# Patient Record
Sex: MALE | Race: WHITE | NOT HISPANIC OR LATINO
[De-identification: names, ages, dates, MRNs, and addresses within clinical notes are randomized per-mention and may not be internally consistent; named-entity substitution may affect disease eponyms.]

---

## 2018-10-15 ENCOUNTER — TRANSCRIPTION ENCOUNTER (OUTPATIENT)
Age: 83
End: 2018-10-15

## 2019-01-01 ENCOUNTER — LABORATORY RESULT (OUTPATIENT)
Age: 84
End: 2019-01-01

## 2019-01-01 ENCOUNTER — OUTPATIENT (OUTPATIENT)
Dept: OUTPATIENT SERVICES | Facility: HOSPITAL | Age: 84
LOS: 1 days | Discharge: HOME | End: 2019-01-01

## 2019-01-01 ENCOUNTER — APPOINTMENT (OUTPATIENT)
Dept: GASTROENTEROLOGY | Facility: CLINIC | Age: 84
End: 2019-01-01
Payer: MEDICARE

## 2019-01-01 ENCOUNTER — TRANSCRIPTION ENCOUNTER (OUTPATIENT)
Age: 84
End: 2019-01-01

## 2019-01-01 ENCOUNTER — APPOINTMENT (OUTPATIENT)
Dept: SURGERY | Facility: CLINIC | Age: 84
End: 2019-01-01
Payer: MEDICARE

## 2019-01-01 ENCOUNTER — INPATIENT (INPATIENT)
Facility: HOSPITAL | Age: 84
LOS: 19 days | Discharge: NURSING, REHAB OR LONG-TERM CARE | End: 2019-11-30
Attending: INTERNAL MEDICINE | Admitting: INTERNAL MEDICINE
Payer: MEDICARE

## 2019-01-01 ENCOUNTER — INBOUND DOCUMENT (OUTPATIENT)
Age: 84
End: 2019-01-01

## 2019-01-01 ENCOUNTER — MOBILE ON CALL (OUTPATIENT)
Age: 84
End: 2019-01-01

## 2019-01-01 ENCOUNTER — INPATIENT (INPATIENT)
Facility: HOSPITAL | Age: 84
LOS: 5 days | Discharge: HOME | End: 2019-10-02
Attending: INTERNAL MEDICINE | Admitting: INTERNAL MEDICINE
Payer: MEDICARE

## 2019-01-01 ENCOUNTER — APPOINTMENT (OUTPATIENT)
Dept: GASTROENTEROLOGY | Facility: CLINIC | Age: 84
End: 2019-01-01

## 2019-01-01 ENCOUNTER — INPATIENT (INPATIENT)
Facility: HOSPITAL | Age: 84
LOS: 2 days | Discharge: HOME | End: 2019-10-10
Attending: INTERNAL MEDICINE | Admitting: INTERNAL MEDICINE
Payer: MEDICARE

## 2019-01-01 ENCOUNTER — RESULT REVIEW (OUTPATIENT)
Age: 84
End: 2019-01-01

## 2019-01-01 ENCOUNTER — INPATIENT (INPATIENT)
Facility: HOSPITAL | Age: 84
LOS: 16 days | Discharge: NURSING, REHAB OR LONG-TERM CARE | End: 2019-11-05
Attending: COLON & RECTAL SURGERY | Admitting: COLON & RECTAL SURGERY
Payer: MEDICARE

## 2019-01-01 ENCOUNTER — CHART COPY (OUTPATIENT)
Age: 84
End: 2019-01-01

## 2019-01-01 ENCOUNTER — APPOINTMENT (OUTPATIENT)
Dept: SURGERY | Facility: CLINIC | Age: 84
End: 2019-01-01

## 2019-01-01 ENCOUNTER — APPOINTMENT (OUTPATIENT)
Dept: CARE COORDINATION | Facility: HOME HEALTH | Age: 84
End: 2019-01-01

## 2019-01-01 VITALS
DIASTOLIC BLOOD PRESSURE: 72 MMHG | SYSTOLIC BLOOD PRESSURE: 128 MMHG | HEART RATE: 81 BPM | RESPIRATION RATE: 19 BRPM | TEMPERATURE: 97 F

## 2019-01-01 VITALS
SYSTOLIC BLOOD PRESSURE: 191 MMHG | OXYGEN SATURATION: 97 % | HEART RATE: 79 BPM | RESPIRATION RATE: 18 BRPM | DIASTOLIC BLOOD PRESSURE: 91 MMHG | TEMPERATURE: 98 F

## 2019-01-01 VITALS
OXYGEN SATURATION: 98 % | RESPIRATION RATE: 16 BRPM | HEART RATE: 71 BPM | SYSTOLIC BLOOD PRESSURE: 178 MMHG | TEMPERATURE: 97 F | DIASTOLIC BLOOD PRESSURE: 82 MMHG

## 2019-01-01 VITALS
OXYGEN SATURATION: 99 % | HEART RATE: 70 BPM | TEMPERATURE: 98 F | SYSTOLIC BLOOD PRESSURE: 110 MMHG | RESPIRATION RATE: 16 BRPM | WEIGHT: 195 LBS | DIASTOLIC BLOOD PRESSURE: 60 MMHG

## 2019-01-01 VITALS
RESPIRATION RATE: 37 BRPM | OXYGEN SATURATION: 96 % | SYSTOLIC BLOOD PRESSURE: 117 MMHG | DIASTOLIC BLOOD PRESSURE: 58 MMHG | HEART RATE: 64 BPM

## 2019-01-01 VITALS
HEIGHT: 68 IN | BODY MASS INDEX: 26.37 KG/M2 | TEMPERATURE: 98 F | WEIGHT: 174 LBS | SYSTOLIC BLOOD PRESSURE: 130 MMHG | DIASTOLIC BLOOD PRESSURE: 54 MMHG | HEART RATE: 69 BPM

## 2019-01-01 VITALS
SYSTOLIC BLOOD PRESSURE: 137 MMHG | HEIGHT: 68 IN | HEART RATE: 80 BPM | DIASTOLIC BLOOD PRESSURE: 86 MMHG | BODY MASS INDEX: 28.34 KG/M2 | WEIGHT: 187 LBS

## 2019-01-01 VITALS
TEMPERATURE: 97.4 F | HEART RATE: 83 BPM | DIASTOLIC BLOOD PRESSURE: 59 MMHG | HEIGHT: 68 IN | BODY MASS INDEX: 25.61 KG/M2 | WEIGHT: 169 LBS | SYSTOLIC BLOOD PRESSURE: 100 MMHG

## 2019-01-01 VITALS
HEART RATE: 61 BPM | DIASTOLIC BLOOD PRESSURE: 64 MMHG | SYSTOLIC BLOOD PRESSURE: 126 MMHG | TEMPERATURE: 98.4 F | HEIGHT: 68 IN

## 2019-01-01 VITALS
HEART RATE: 70 BPM | DIASTOLIC BLOOD PRESSURE: 65 MMHG | RESPIRATION RATE: 18 BRPM | TEMPERATURE: 97 F | SYSTOLIC BLOOD PRESSURE: 134 MMHG

## 2019-01-01 VITALS — SYSTOLIC BLOOD PRESSURE: 156 MMHG | DIASTOLIC BLOOD PRESSURE: 67 MMHG | HEART RATE: 74 BPM

## 2019-01-01 VITALS
HEART RATE: 828 BPM | DIASTOLIC BLOOD PRESSURE: 87 MMHG | TEMPERATURE: 96 F | RESPIRATION RATE: 18 BRPM | OXYGEN SATURATION: 97 % | SYSTOLIC BLOOD PRESSURE: 120 MMHG

## 2019-01-01 VITALS
WEIGHT: 172 LBS | HEART RATE: 77 BPM | BODY MASS INDEX: 26.07 KG/M2 | SYSTOLIC BLOOD PRESSURE: 100 MMHG | TEMPERATURE: 98.4 F | DIASTOLIC BLOOD PRESSURE: 60 MMHG | HEIGHT: 68 IN

## 2019-01-01 VITALS
DIASTOLIC BLOOD PRESSURE: 75 MMHG | RESPIRATION RATE: 18 BRPM | OXYGEN SATURATION: 100 % | HEART RATE: 105 BPM | SYSTOLIC BLOOD PRESSURE: 141 MMHG | TEMPERATURE: 97 F

## 2019-01-01 DIAGNOSIS — I13.0 HYPERTENSIVE HEART AND CHRONIC KIDNEY DISEASE WITH HEART FAILURE AND STAGE 1 THROUGH STAGE 4 CHRONIC KIDNEY DISEASE, OR UNSPECIFIED CHRONIC KIDNEY DISEASE: ICD-10-CM

## 2019-01-01 DIAGNOSIS — E78.5 HYPERLIPIDEMIA, UNSPECIFIED: ICD-10-CM

## 2019-01-01 DIAGNOSIS — I47.2 VENTRICULAR TACHYCARDIA: ICD-10-CM

## 2019-01-01 DIAGNOSIS — K65.1 PERITONEAL ABSCESS: ICD-10-CM

## 2019-01-01 DIAGNOSIS — E87.5 HYPERKALEMIA: ICD-10-CM

## 2019-01-01 DIAGNOSIS — E83.42 HYPOMAGNESEMIA: ICD-10-CM

## 2019-01-01 DIAGNOSIS — E03.9 HYPOTHYROIDISM, UNSPECIFIED: ICD-10-CM

## 2019-01-01 DIAGNOSIS — Z87.891 PERSONAL HISTORY OF NICOTINE DEPENDENCE: ICD-10-CM

## 2019-01-01 DIAGNOSIS — I11.0 HYPERTENSIVE HEART DISEASE WITH HEART FAILURE: ICD-10-CM

## 2019-01-01 DIAGNOSIS — J96.01 ACUTE RESPIRATORY FAILURE WITH HYPOXIA: ICD-10-CM

## 2019-01-01 DIAGNOSIS — D62 ACUTE POSTHEMORRHAGIC ANEMIA: ICD-10-CM

## 2019-01-01 DIAGNOSIS — D72.828 OTHER ELEVATED WHITE BLOOD CELL COUNT: ICD-10-CM

## 2019-01-01 DIAGNOSIS — K55.059 ACUTE (REVERSIBLE) ISCHEMIA OF INTESTINE, PART AND EXTENT UNSPECIFIED: ICD-10-CM

## 2019-01-01 DIAGNOSIS — Z79.02 LONG TERM (CURRENT) USE OF ANTITHROMBOTICS/ANTIPLATELETS: ICD-10-CM

## 2019-01-01 DIAGNOSIS — I10 ESSENTIAL (PRIMARY) HYPERTENSION: ICD-10-CM

## 2019-01-01 DIAGNOSIS — A41.9 SEPSIS, UNSPECIFIED ORGANISM: ICD-10-CM

## 2019-01-01 DIAGNOSIS — I95.81 POSTPROCEDURAL HYPOTENSION: ICD-10-CM

## 2019-01-01 DIAGNOSIS — I21.9 ACUTE MYOCARDIAL INFARCTION, UNSPECIFIED: ICD-10-CM

## 2019-01-01 DIAGNOSIS — Z86.73 PERSONAL HISTORY OF TRANSIENT ISCHEMIC ATTACK (TIA), AND CEREBRAL INFARCTION WITHOUT RESIDUAL DEFICITS: ICD-10-CM

## 2019-01-01 DIAGNOSIS — I65.22 OCCLUSION AND STENOSIS OF LEFT CAROTID ARTERY: ICD-10-CM

## 2019-01-01 DIAGNOSIS — G45.9 TRANSIENT CEREBRAL ISCHEMIC ATTACK, UNSPECIFIED: ICD-10-CM

## 2019-01-01 DIAGNOSIS — Z79.82 LONG TERM (CURRENT) USE OF ASPIRIN: ICD-10-CM

## 2019-01-01 DIAGNOSIS — K25.4 CHRONIC OR UNSPECIFIED GASTRIC ULCER WITH HEMORRHAGE: ICD-10-CM

## 2019-01-01 DIAGNOSIS — I25.10 ATHEROSCLEROTIC HEART DISEASE OF NATIVE CORONARY ARTERY WITHOUT ANGINA PECTORIS: ICD-10-CM

## 2019-01-01 DIAGNOSIS — E87.1 HYPO-OSMOLALITY AND HYPONATREMIA: ICD-10-CM

## 2019-01-01 DIAGNOSIS — L23.89 ALLERGIC CONTACT DERMATITIS DUE TO OTHER AGENTS: ICD-10-CM

## 2019-01-01 DIAGNOSIS — K59.00 CONSTIPATION, UNSPECIFIED: ICD-10-CM

## 2019-01-01 DIAGNOSIS — K55.9 VASCULAR DISORDER OF INTESTINE, UNSPECIFIED: ICD-10-CM

## 2019-01-01 DIAGNOSIS — I21.4 NON-ST ELEVATION (NSTEMI) MYOCARDIAL INFARCTION: ICD-10-CM

## 2019-01-01 DIAGNOSIS — R79.9 ABNORMAL FINDING OF BLOOD CHEMISTRY, UNSPECIFIED: ICD-10-CM

## 2019-01-01 DIAGNOSIS — Z02.9 ENCOUNTER FOR ADMINISTRATIVE EXAMINATIONS, UNSPECIFIED: ICD-10-CM

## 2019-01-01 DIAGNOSIS — I25.84 CORONARY ATHEROSCLEROSIS DUE TO CALCIFIED CORONARY LESION: ICD-10-CM

## 2019-01-01 DIAGNOSIS — Z95.5 PRESENCE OF CORONARY ANGIOPLASTY IMPLANT AND GRAFT: ICD-10-CM

## 2019-01-01 DIAGNOSIS — Z86.19 PERSONAL HISTORY OF OTHER INFECTIOUS AND PARASITIC DISEASES: ICD-10-CM

## 2019-01-01 DIAGNOSIS — G62.9 POLYNEUROPATHY, UNSPECIFIED: ICD-10-CM

## 2019-01-01 DIAGNOSIS — I50.22 CHRONIC SYSTOLIC (CONGESTIVE) HEART FAILURE: ICD-10-CM

## 2019-01-01 DIAGNOSIS — R10.9 UNSPECIFIED ABDOMINAL PAIN: ICD-10-CM

## 2019-01-01 DIAGNOSIS — I16.1 HYPERTENSIVE EMERGENCY: ICD-10-CM

## 2019-01-01 DIAGNOSIS — R57.1 HYPOVOLEMIC SHOCK: ICD-10-CM

## 2019-01-01 DIAGNOSIS — Z87.19 PERSONAL HISTORY OF OTHER DISEASES OF THE DIGESTIVE SYSTEM: Chronic | ICD-10-CM

## 2019-01-01 DIAGNOSIS — I50.33 ACUTE ON CHRONIC DIASTOLIC (CONGESTIVE) HEART FAILURE: ICD-10-CM

## 2019-01-01 DIAGNOSIS — R10.10 UPPER ABDOMINAL PAIN, UNSPECIFIED: ICD-10-CM

## 2019-01-01 DIAGNOSIS — S31.109A UNSPECIFIED OPEN WOUND OF ABDOMINAL WALL, UNSPECIFIED QUADRANT WITHOUT PENETRATION INTO PERITONEAL CAVITY, INITIAL ENCOUNTER: ICD-10-CM

## 2019-01-01 DIAGNOSIS — K57.33 DIVERTICULITIS OF LARGE INTESTINE WITHOUT PERFORATION OR ABSCESS WITH BLEEDING: ICD-10-CM

## 2019-01-01 DIAGNOSIS — I95.9 HYPOTENSION, UNSPECIFIED: ICD-10-CM

## 2019-01-01 DIAGNOSIS — Z79.899 OTHER LONG TERM (CURRENT) DRUG THERAPY: ICD-10-CM

## 2019-01-01 DIAGNOSIS — Z90.49 ACQUIRED ABSENCE OF OTHER SPECIFIED PARTS OF DIGESTIVE TRACT: ICD-10-CM

## 2019-01-01 DIAGNOSIS — K64.8 OTHER HEMORRHOIDS: ICD-10-CM

## 2019-01-01 DIAGNOSIS — I25.2 OLD MYOCARDIAL INFARCTION: ICD-10-CM

## 2019-01-01 DIAGNOSIS — N18.2 CHRONIC KIDNEY DISEASE, STAGE 2 (MILD): ICD-10-CM

## 2019-01-01 DIAGNOSIS — D72.829 ELEVATED WHITE BLOOD CELL COUNT, UNSPECIFIED: ICD-10-CM

## 2019-01-01 DIAGNOSIS — I25.10 ATHEROSCLEROTIC HEART DISEASE OF NATIVE CORONARY ARTERY W/OUT ANGINA PECTORIS: ICD-10-CM

## 2019-01-01 DIAGNOSIS — A09 INFECTIOUS GASTROENTERITIS AND COLITIS, UNSPECIFIED: ICD-10-CM

## 2019-01-01 DIAGNOSIS — Z91.041 RADIOGRAPHIC DYE ALLERGY STATUS: ICD-10-CM

## 2019-01-01 DIAGNOSIS — D64.9 ANEMIA, UNSPECIFIED: ICD-10-CM

## 2019-01-01 DIAGNOSIS — K91.2 POSTSURGICAL MALABSORPTION, NOT ELSEWHERE CLASSIFIED: ICD-10-CM

## 2019-01-01 DIAGNOSIS — I50.9 HEART FAILURE, UNSPECIFIED: ICD-10-CM

## 2019-01-01 DIAGNOSIS — N28.89 OTHER SPECIFIED DISORDERS OF KIDNEY AND URETER: ICD-10-CM

## 2019-01-01 DIAGNOSIS — J98.11 ATELECTASIS: ICD-10-CM

## 2019-01-01 DIAGNOSIS — E87.6 HYPOKALEMIA: ICD-10-CM

## 2019-01-01 LAB
-  AMPICILLIN/SULBACTAM: SIGNIFICANT CHANGE UP
-  AMPICILLIN/SULBACTAM: SIGNIFICANT CHANGE UP
-  CEFAZOLIN: SIGNIFICANT CHANGE UP
-  CEFAZOLIN: SIGNIFICANT CHANGE UP
-  CLINDAMYCIN: SIGNIFICANT CHANGE UP
-  CLINDAMYCIN: SIGNIFICANT CHANGE UP
-  ERYTHROMYCIN: SIGNIFICANT CHANGE UP
-  ERYTHROMYCIN: SIGNIFICANT CHANGE UP
-  GENTAMICIN: SIGNIFICANT CHANGE UP
-  GENTAMICIN: SIGNIFICANT CHANGE UP
-  OXACILLIN: SIGNIFICANT CHANGE UP
-  OXACILLIN: SIGNIFICANT CHANGE UP
-  PENICILLIN: SIGNIFICANT CHANGE UP
-  RIFAMPIN: SIGNIFICANT CHANGE UP
-  RIFAMPIN: SIGNIFICANT CHANGE UP
-  TETRACYCLINE: SIGNIFICANT CHANGE UP
-  TETRACYCLINE: SIGNIFICANT CHANGE UP
-  TRIMETHOPRIM/SULFAMETHOXAZOLE: SIGNIFICANT CHANGE UP
-  TRIMETHOPRIM/SULFAMETHOXAZOLE: SIGNIFICANT CHANGE UP
-  VANCOMYCIN: SIGNIFICANT CHANGE UP
-  VANCOMYCIN: SIGNIFICANT CHANGE UP
ALBUMIN SERPL ELPH-MCNC: 2 G/DL — LOW (ref 3.5–5.2)
ALBUMIN SERPL ELPH-MCNC: 2.5 G/DL — LOW (ref 3.5–5.2)
ALBUMIN SERPL ELPH-MCNC: 2.5 G/DL — LOW (ref 3.5–5.2)
ALBUMIN SERPL ELPH-MCNC: 2.6 G/DL — LOW (ref 3.5–5.2)
ALBUMIN SERPL ELPH-MCNC: 2.8 G/DL — LOW (ref 3.5–5.2)
ALBUMIN SERPL ELPH-MCNC: 3.1 G/DL — LOW (ref 3.5–5.2)
ALBUMIN SERPL ELPH-MCNC: 3.2 G/DL — LOW (ref 3.5–5.2)
ALBUMIN SERPL ELPH-MCNC: 3.8 G/DL — SIGNIFICANT CHANGE UP (ref 3.5–5.2)
ALBUMIN SERPL ELPH-MCNC: 3.8 G/DL — SIGNIFICANT CHANGE UP (ref 3.5–5.2)
ALBUMIN SERPL ELPH-MCNC: 3.9 G/DL — SIGNIFICANT CHANGE UP (ref 3.5–5.2)
ALBUMIN SERPL ELPH-MCNC: 4 G/DL
ALBUMIN SERPL ELPH-MCNC: 4.3 G/DL — SIGNIFICANT CHANGE UP (ref 3.5–5.2)
ALBUMIN SERPL ELPH-MCNC: 4.3 G/DL — SIGNIFICANT CHANGE UP (ref 3.5–5.2)
ALBUMIN SERPL ELPH-MCNC: 4.4 G/DL — SIGNIFICANT CHANGE UP (ref 3.5–5.2)
ALP BLD-CCNC: 65 U/L
ALP SERPL-CCNC: 30 U/L — SIGNIFICANT CHANGE UP (ref 30–115)
ALP SERPL-CCNC: 37 U/L — SIGNIFICANT CHANGE UP (ref 30–115)
ALP SERPL-CCNC: 47 U/L — SIGNIFICANT CHANGE UP (ref 30–115)
ALP SERPL-CCNC: 49 U/L — SIGNIFICANT CHANGE UP (ref 30–115)
ALP SERPL-CCNC: 58 U/L — SIGNIFICANT CHANGE UP (ref 30–115)
ALP SERPL-CCNC: 62 U/L — SIGNIFICANT CHANGE UP (ref 30–115)
ALP SERPL-CCNC: 63 U/L — SIGNIFICANT CHANGE UP (ref 30–115)
ALP SERPL-CCNC: 63 U/L — SIGNIFICANT CHANGE UP (ref 30–115)
ALP SERPL-CCNC: 65 U/L — SIGNIFICANT CHANGE UP (ref 30–115)
ALP SERPL-CCNC: 66 U/L — SIGNIFICANT CHANGE UP (ref 30–115)
ALP SERPL-CCNC: 73 U/L — SIGNIFICANT CHANGE UP (ref 30–115)
ALP SERPL-CCNC: 75 U/L — SIGNIFICANT CHANGE UP (ref 30–115)
ALP SERPL-CCNC: 77 U/L — SIGNIFICANT CHANGE UP (ref 30–115)
ALP SERPL-CCNC: 78 U/L — SIGNIFICANT CHANGE UP (ref 30–115)
ALP SERPL-CCNC: 85 U/L — SIGNIFICANT CHANGE UP (ref 30–115)
ALP SERPL-CCNC: 87 U/L — SIGNIFICANT CHANGE UP (ref 30–115)
ALT FLD-CCNC: 108 U/L — HIGH (ref 0–41)
ALT FLD-CCNC: 167 U/L — HIGH (ref 0–41)
ALT FLD-CCNC: 21 U/L — SIGNIFICANT CHANGE UP (ref 0–41)
ALT FLD-CCNC: 28 U/L — SIGNIFICANT CHANGE UP (ref 0–41)
ALT FLD-CCNC: 30 U/L — SIGNIFICANT CHANGE UP (ref 0–41)
ALT FLD-CCNC: 32 U/L — SIGNIFICANT CHANGE UP (ref 0–41)
ALT FLD-CCNC: 34 U/L — SIGNIFICANT CHANGE UP (ref 0–41)
ALT FLD-CCNC: 41 U/L — SIGNIFICANT CHANGE UP (ref 0–41)
ALT FLD-CCNC: 43 U/L — HIGH (ref 0–41)
ALT FLD-CCNC: 46 U/L — HIGH (ref 0–41)
ALT FLD-CCNC: 46 U/L — HIGH (ref 0–41)
ALT FLD-CCNC: 53 U/L — HIGH (ref 0–41)
ALT FLD-CCNC: 58 U/L — HIGH (ref 0–41)
ALT FLD-CCNC: 61 U/L — HIGH (ref 0–41)
ALT FLD-CCNC: 84 U/L — HIGH (ref 0–41)
ALT FLD-CCNC: 99 U/L — HIGH (ref 0–41)
ALT SERPL-CCNC: 32 U/L
ANION GAP SERPL CALC-SCNC: 10 MMOL/L — SIGNIFICANT CHANGE UP (ref 7–14)
ANION GAP SERPL CALC-SCNC: 10 MMOL/L — SIGNIFICANT CHANGE UP (ref 7–14)
ANION GAP SERPL CALC-SCNC: 11 MMOL/L — SIGNIFICANT CHANGE UP (ref 7–14)
ANION GAP SERPL CALC-SCNC: 12 MMOL/L — SIGNIFICANT CHANGE UP (ref 7–14)
ANION GAP SERPL CALC-SCNC: 13 MMOL/L — SIGNIFICANT CHANGE UP (ref 7–14)
ANION GAP SERPL CALC-SCNC: 14 MMOL/L — SIGNIFICANT CHANGE UP (ref 7–14)
ANION GAP SERPL CALC-SCNC: 15 MMOL/L — HIGH (ref 7–14)
ANION GAP SERPL CALC-SCNC: 15 MMOL/L — HIGH (ref 7–14)
ANION GAP SERPL CALC-SCNC: 16 MMOL/L — HIGH (ref 7–14)
ANION GAP SERPL CALC-SCNC: 17 MMOL/L — HIGH (ref 7–14)
ANION GAP SERPL CALC-SCNC: 17 MMOL/L — HIGH (ref 7–14)
ANION GAP SERPL CALC-SCNC: 18 MMOL/L — HIGH (ref 7–14)
ANION GAP SERPL CALC-SCNC: 20 MMOL/L
ANION GAP SERPL CALC-SCNC: 20 MMOL/L — HIGH (ref 7–14)
ANION GAP SERPL CALC-SCNC: 23 MMOL/L — HIGH (ref 7–14)
ANION GAP SERPL CALC-SCNC: 7 MMOL/L — SIGNIFICANT CHANGE UP (ref 7–14)
ANION GAP SERPL CALC-SCNC: 8 MMOL/L — SIGNIFICANT CHANGE UP (ref 7–14)
ANION GAP SERPL CALC-SCNC: 8 MMOL/L — SIGNIFICANT CHANGE UP (ref 7–14)
ANION GAP SERPL CALC-SCNC: 9 MMOL/L — SIGNIFICANT CHANGE UP (ref 7–14)
ANISOCYTOSIS BLD QL: SLIGHT — SIGNIFICANT CHANGE UP
APPEARANCE UR: CLEAR — SIGNIFICANT CHANGE UP
APTT BLD: 22.4 SEC — CRITICAL LOW (ref 27–39.2)
APTT BLD: 23.4 SEC — CRITICAL LOW (ref 27–39.2)
APTT BLD: 27.4 SEC — SIGNIFICANT CHANGE UP (ref 27–39.2)
APTT BLD: 29.2 SEC — SIGNIFICANT CHANGE UP (ref 27–39.2)
APTT BLD: 29.3 SEC — SIGNIFICANT CHANGE UP (ref 27–39.2)
APTT BLD: 42.2 SEC — HIGH (ref 27–39.2)
AST SERPL-CCNC: 155 U/L — HIGH (ref 0–41)
AST SERPL-CCNC: 22 U/L — SIGNIFICANT CHANGE UP (ref 0–41)
AST SERPL-CCNC: 26 U/L — SIGNIFICANT CHANGE UP (ref 0–41)
AST SERPL-CCNC: 27 U/L — SIGNIFICANT CHANGE UP (ref 0–41)
AST SERPL-CCNC: 28 U/L
AST SERPL-CCNC: 28 U/L — SIGNIFICANT CHANGE UP (ref 0–41)
AST SERPL-CCNC: 36 U/L — SIGNIFICANT CHANGE UP (ref 0–41)
AST SERPL-CCNC: 36 U/L — SIGNIFICANT CHANGE UP (ref 0–41)
AST SERPL-CCNC: 37 U/L — SIGNIFICANT CHANGE UP (ref 0–41)
AST SERPL-CCNC: 42 U/L — HIGH (ref 0–41)
AST SERPL-CCNC: 43 U/L — HIGH (ref 0–41)
AST SERPL-CCNC: 50 U/L — HIGH (ref 0–41)
AST SERPL-CCNC: 51 U/L — HIGH (ref 0–41)
AST SERPL-CCNC: 57 U/L — HIGH (ref 0–41)
AST SERPL-CCNC: 75 U/L — HIGH (ref 0–41)
AST SERPL-CCNC: 81 U/L — HIGH (ref 0–41)
AST SERPL-CCNC: 86 U/L — HIGH (ref 0–41)
BACTERIA # UR AUTO: NEGATIVE — SIGNIFICANT CHANGE UP
BASE EXCESS BLDA CALC-SCNC: -2.7 MMOL/L — LOW (ref -2–2)
BASE EXCESS BLDA CALC-SCNC: 0.2 MMOL/L — SIGNIFICANT CHANGE UP (ref -2–2)
BASE EXCESS BLDA CALC-SCNC: 0.5 MMOL/L — SIGNIFICANT CHANGE UP (ref -2–2)
BASE EXCESS BLDA CALC-SCNC: 1.3 MMOL/L — SIGNIFICANT CHANGE UP (ref -2–2)
BASE EXCESS BLDA CALC-SCNC: 1.4 MMOL/L — SIGNIFICANT CHANGE UP (ref -2–2)
BASE EXCESS BLDA CALC-SCNC: 7.6 MMOL/L — HIGH (ref -2–2)
BASE EXCESS BLDA CALC-SCNC: 8.8 MMOL/L — HIGH (ref -2–2)
BASE EXCESS BLDV CALC-SCNC: 9.5 MMOL/L — HIGH (ref -2–2)
BASOPHILS # BLD AUTO: 0 K/UL — SIGNIFICANT CHANGE UP (ref 0–0.2)
BASOPHILS # BLD AUTO: 0 K/UL — SIGNIFICANT CHANGE UP (ref 0–0.2)
BASOPHILS # BLD AUTO: 0.01 K/UL — SIGNIFICANT CHANGE UP (ref 0–0.2)
BASOPHILS # BLD AUTO: 0.02 K/UL — SIGNIFICANT CHANGE UP (ref 0–0.2)
BASOPHILS # BLD AUTO: 0.03 K/UL — SIGNIFICANT CHANGE UP (ref 0–0.2)
BASOPHILS # BLD AUTO: 0.04 K/UL
BASOPHILS # BLD AUTO: 0.04 K/UL — SIGNIFICANT CHANGE UP (ref 0–0.2)
BASOPHILS # BLD AUTO: 0.04 K/UL — SIGNIFICANT CHANGE UP (ref 0–0.2)
BASOPHILS # BLD AUTO: 0.05 K/UL — SIGNIFICANT CHANGE UP (ref 0–0.2)
BASOPHILS # BLD AUTO: 0.05 K/UL — SIGNIFICANT CHANGE UP (ref 0–0.2)
BASOPHILS # BLD AUTO: 0.06 K/UL — SIGNIFICANT CHANGE UP (ref 0–0.2)
BASOPHILS NFR BLD AUTO: 0 % — SIGNIFICANT CHANGE UP (ref 0–1)
BASOPHILS NFR BLD AUTO: 0 % — SIGNIFICANT CHANGE UP (ref 0–1)
BASOPHILS NFR BLD AUTO: 0.1 % — SIGNIFICANT CHANGE UP (ref 0–1)
BASOPHILS NFR BLD AUTO: 0.2 % — SIGNIFICANT CHANGE UP (ref 0–1)
BASOPHILS NFR BLD AUTO: 0.3 %
BASOPHILS NFR BLD AUTO: 0.3 % — SIGNIFICANT CHANGE UP (ref 0–1)
BASOPHILS NFR BLD AUTO: 0.4 % — SIGNIFICANT CHANGE UP (ref 0–1)
BASOPHILS NFR BLD AUTO: 0.4 % — SIGNIFICANT CHANGE UP (ref 0–1)
BILIRUB DIRECT SERPL-MCNC: 0.3 MG/DL — HIGH (ref 0–0.2)
BILIRUB DIRECT SERPL-MCNC: <0.2 MG/DL — SIGNIFICANT CHANGE UP (ref 0–0.2)
BILIRUB INDIRECT FLD-MCNC: 0.3 MG/DL — SIGNIFICANT CHANGE UP (ref 0.2–1.2)
BILIRUB INDIRECT FLD-MCNC: >0.2 MG/DL — SIGNIFICANT CHANGE UP (ref 0.2–1.2)
BILIRUB SERPL-MCNC: 0.3 MG/DL — SIGNIFICANT CHANGE UP (ref 0.2–1.2)
BILIRUB SERPL-MCNC: 0.3 MG/DL — SIGNIFICANT CHANGE UP (ref 0.2–1.2)
BILIRUB SERPL-MCNC: 0.4 MG/DL
BILIRUB SERPL-MCNC: 0.4 MG/DL — SIGNIFICANT CHANGE UP (ref 0.2–1.2)
BILIRUB SERPL-MCNC: 0.5 MG/DL — SIGNIFICANT CHANGE UP (ref 0.2–1.2)
BILIRUB SERPL-MCNC: 0.6 MG/DL — SIGNIFICANT CHANGE UP (ref 0.2–1.2)
BILIRUB SERPL-MCNC: 0.6 MG/DL — SIGNIFICANT CHANGE UP (ref 0.2–1.2)
BILIRUB SERPL-MCNC: 0.7 MG/DL — SIGNIFICANT CHANGE UP (ref 0.2–1.2)
BILIRUB SERPL-MCNC: 0.8 MG/DL — SIGNIFICANT CHANGE UP (ref 0.2–1.2)
BILIRUB SERPL-MCNC: 0.8 MG/DL — SIGNIFICANT CHANGE UP (ref 0.2–1.2)
BILIRUB UR-MCNC: NEGATIVE — SIGNIFICANT CHANGE UP
BLD GP AB SCN SERPL QL: SIGNIFICANT CHANGE UP
BUN SERPL-MCNC: 10 MG/DL — SIGNIFICANT CHANGE UP (ref 10–20)
BUN SERPL-MCNC: 11 MG/DL — SIGNIFICANT CHANGE UP (ref 10–20)
BUN SERPL-MCNC: 11 MG/DL — SIGNIFICANT CHANGE UP (ref 10–20)
BUN SERPL-MCNC: 12 MG/DL — SIGNIFICANT CHANGE UP (ref 10–20)
BUN SERPL-MCNC: 13 MG/DL — SIGNIFICANT CHANGE UP (ref 10–20)
BUN SERPL-MCNC: 14 MG/DL — SIGNIFICANT CHANGE UP (ref 10–20)
BUN SERPL-MCNC: 15 MG/DL — SIGNIFICANT CHANGE UP (ref 10–20)
BUN SERPL-MCNC: 16 MG/DL — SIGNIFICANT CHANGE UP (ref 10–20)
BUN SERPL-MCNC: 17 MG/DL — SIGNIFICANT CHANGE UP (ref 10–20)
BUN SERPL-MCNC: 18 MG/DL — SIGNIFICANT CHANGE UP (ref 10–20)
BUN SERPL-MCNC: 19 MG/DL
BUN SERPL-MCNC: 19 MG/DL — SIGNIFICANT CHANGE UP (ref 10–20)
BUN SERPL-MCNC: 19 MG/DL — SIGNIFICANT CHANGE UP (ref 10–20)
BUN SERPL-MCNC: 21 MG/DL — HIGH (ref 10–20)
BUN SERPL-MCNC: 22 MG/DL — HIGH (ref 10–20)
BUN SERPL-MCNC: 23 MG/DL — HIGH (ref 10–20)
BUN SERPL-MCNC: 28 MG/DL — HIGH (ref 10–20)
BUN SERPL-MCNC: 32 MG/DL — HIGH (ref 10–20)
BUN SERPL-MCNC: 6 MG/DL — LOW (ref 10–20)
BUN SERPL-MCNC: 7 MG/DL — LOW (ref 10–20)
BUN SERPL-MCNC: 8 MG/DL — LOW (ref 10–20)
BUN SERPL-MCNC: 8 MG/DL — LOW (ref 10–20)
BUN SERPL-MCNC: 9 MG/DL — LOW (ref 10–20)
BURR CELLS BLD QL SMEAR: PRESENT — SIGNIFICANT CHANGE UP
C DIFF BY PCR RESULT: ABNORMAL
C DIFF BY PCR RESULT: NEGATIVE — SIGNIFICANT CHANGE UP
C DIFF TOX GENS STL QL NAA+PROBE: NORMAL
C DIFF TOX GENS STL QL NAA+PROBE: SIGNIFICANT CHANGE UP
C DIFF TOX GENS STL QL NAA+PROBE: SIGNIFICANT CHANGE UP
CA-I SERPL-SCNC: 1.1 MMOL/L — LOW (ref 1.12–1.3)
CALCIUM SERPL-MCNC: 6.9 MG/DL — LOW (ref 8.5–10.1)
CALCIUM SERPL-MCNC: 7 MG/DL — LOW (ref 8.5–10.1)
CALCIUM SERPL-MCNC: 7.1 MG/DL — LOW (ref 8.5–10.1)
CALCIUM SERPL-MCNC: 7.2 MG/DL — LOW (ref 8.5–10.1)
CALCIUM SERPL-MCNC: 7.3 MG/DL — LOW (ref 8.5–10.1)
CALCIUM SERPL-MCNC: 7.3 MG/DL — LOW (ref 8.5–10.1)
CALCIUM SERPL-MCNC: 7.4 MG/DL — LOW (ref 8.5–10.1)
CALCIUM SERPL-MCNC: 7.5 MG/DL — LOW (ref 8.5–10.1)
CALCIUM SERPL-MCNC: 7.5 MG/DL — LOW (ref 8.5–10.1)
CALCIUM SERPL-MCNC: 7.6 MG/DL — LOW (ref 8.5–10.1)
CALCIUM SERPL-MCNC: 7.7 MG/DL — LOW (ref 8.5–10.1)
CALCIUM SERPL-MCNC: 7.8 MG/DL — LOW (ref 8.5–10.1)
CALCIUM SERPL-MCNC: 7.9 MG/DL — LOW (ref 8.5–10.1)
CALCIUM SERPL-MCNC: 8 MG/DL — LOW (ref 8.5–10.1)
CALCIUM SERPL-MCNC: 8.1 MG/DL — LOW (ref 8.5–10.1)
CALCIUM SERPL-MCNC: 8.1 MG/DL — LOW (ref 8.5–10.1)
CALCIUM SERPL-MCNC: 8.3 MG/DL — LOW (ref 8.5–10.1)
CALCIUM SERPL-MCNC: 8.3 MG/DL — LOW (ref 8.5–10.1)
CALCIUM SERPL-MCNC: 8.4 MG/DL — LOW (ref 8.5–10.1)
CALCIUM SERPL-MCNC: 8.5 MG/DL — SIGNIFICANT CHANGE UP (ref 8.5–10.1)
CALCIUM SERPL-MCNC: 8.6 MG/DL — SIGNIFICANT CHANGE UP (ref 8.5–10.1)
CALCIUM SERPL-MCNC: 8.6 MG/DL — SIGNIFICANT CHANGE UP (ref 8.5–10.1)
CALCIUM SERPL-MCNC: 8.7 MG/DL — SIGNIFICANT CHANGE UP (ref 8.5–10.1)
CALCIUM SERPL-MCNC: 8.7 MG/DL — SIGNIFICANT CHANGE UP (ref 8.5–10.1)
CALCIUM SERPL-MCNC: 8.8 MG/DL — SIGNIFICANT CHANGE UP (ref 8.5–10.1)
CALCIUM SERPL-MCNC: 8.9 MG/DL
CALCIUM SERPL-MCNC: 8.9 MG/DL — SIGNIFICANT CHANGE UP (ref 8.5–10.1)
CALCIUM SERPL-MCNC: 9 MG/DL — SIGNIFICANT CHANGE UP (ref 8.5–10.1)
CALCIUM SERPL-MCNC: 9.2 MG/DL — SIGNIFICANT CHANGE UP (ref 8.5–10.1)
CALCIUM SERPL-MCNC: 9.3 MG/DL — SIGNIFICANT CHANGE UP (ref 8.5–10.1)
CALCIUM SERPL-MCNC: 9.4 MG/DL — SIGNIFICANT CHANGE UP (ref 8.5–10.1)
CALCIUM SERPL-MCNC: 9.5 MG/DL — SIGNIFICANT CHANGE UP (ref 8.5–10.1)
CALCIUM SERPL-MCNC: 9.5 MG/DL — SIGNIFICANT CHANGE UP (ref 8.5–10.1)
CDIFF BY PCR: NEGATIVE
CHLORIDE SERPL-SCNC: 101 MMOL/L — SIGNIFICANT CHANGE UP (ref 98–110)
CHLORIDE SERPL-SCNC: 101 MMOL/L — SIGNIFICANT CHANGE UP (ref 98–110)
CHLORIDE SERPL-SCNC: 102 MMOL/L — SIGNIFICANT CHANGE UP (ref 98–110)
CHLORIDE SERPL-SCNC: 103 MMOL/L — SIGNIFICANT CHANGE UP (ref 98–110)
CHLORIDE SERPL-SCNC: 105 MMOL/L — SIGNIFICANT CHANGE UP (ref 98–110)
CHLORIDE SERPL-SCNC: 106 MMOL/L — SIGNIFICANT CHANGE UP (ref 98–110)
CHLORIDE SERPL-SCNC: 107 MMOL/L — SIGNIFICANT CHANGE UP (ref 98–110)
CHLORIDE SERPL-SCNC: 108 MMOL/L — SIGNIFICANT CHANGE UP (ref 98–110)
CHLORIDE SERPL-SCNC: 108 MMOL/L — SIGNIFICANT CHANGE UP (ref 98–110)
CHLORIDE SERPL-SCNC: 109 MMOL/L — SIGNIFICANT CHANGE UP (ref 98–110)
CHLORIDE SERPL-SCNC: 111 MMOL/L — HIGH (ref 98–110)
CHLORIDE SERPL-SCNC: 111 MMOL/L — HIGH (ref 98–110)
CHLORIDE SERPL-SCNC: 87 MMOL/L — LOW (ref 98–110)
CHLORIDE SERPL-SCNC: 88 MMOL/L — LOW (ref 98–110)
CHLORIDE SERPL-SCNC: 88 MMOL/L — LOW (ref 98–110)
CHLORIDE SERPL-SCNC: 89 MMOL/L — LOW (ref 98–110)
CHLORIDE SERPL-SCNC: 90 MMOL/L — LOW (ref 98–110)
CHLORIDE SERPL-SCNC: 91 MMOL/L — LOW (ref 98–110)
CHLORIDE SERPL-SCNC: 92 MMOL/L — LOW (ref 98–110)
CHLORIDE SERPL-SCNC: 92 MMOL/L — LOW (ref 98–110)
CHLORIDE SERPL-SCNC: 93 MMOL/L — LOW (ref 98–110)
CHLORIDE SERPL-SCNC: 94 MMOL/L
CHLORIDE SERPL-SCNC: 95 MMOL/L — LOW (ref 98–110)
CHLORIDE SERPL-SCNC: 96 MMOL/L — LOW (ref 98–110)
CHLORIDE SERPL-SCNC: 97 MMOL/L — LOW (ref 98–110)
CHLORIDE SERPL-SCNC: 97 MMOL/L — LOW (ref 98–110)
CHLORIDE SERPL-SCNC: 98 MMOL/L — SIGNIFICANT CHANGE UP (ref 98–110)
CHOLEST SERPL-MCNC: 100 MG/DL — SIGNIFICANT CHANGE UP (ref 100–200)
CK MB CFR SERPL CALC: 2.4 NG/ML — SIGNIFICANT CHANGE UP (ref 0.6–6.3)
CK MB CFR SERPL CALC: 3 NG/ML — SIGNIFICANT CHANGE UP (ref 0.6–6.3)
CK MB CFR SERPL CALC: 3.8 NG/ML — SIGNIFICANT CHANGE UP (ref 0.6–6.3)
CK MB CFR SERPL CALC: 4.7 NG/ML — SIGNIFICANT CHANGE UP (ref 0.6–6.3)
CK SERPL-CCNC: 58 U/L — SIGNIFICANT CHANGE UP (ref 0–225)
CK SERPL-CCNC: 77 U/L — SIGNIFICANT CHANGE UP (ref 0–225)
CK SERPL-CCNC: 88 U/L — SIGNIFICANT CHANGE UP (ref 0–225)
CO2 SERPL-SCNC: 18 MMOL/L — SIGNIFICANT CHANGE UP (ref 17–32)
CO2 SERPL-SCNC: 19 MMOL/L — SIGNIFICANT CHANGE UP (ref 17–32)
CO2 SERPL-SCNC: 21 MMOL/L — SIGNIFICANT CHANGE UP (ref 17–32)
CO2 SERPL-SCNC: 21 MMOL/L — SIGNIFICANT CHANGE UP (ref 17–32)
CO2 SERPL-SCNC: 22 MMOL/L — SIGNIFICANT CHANGE UP (ref 17–32)
CO2 SERPL-SCNC: 23 MMOL/L — SIGNIFICANT CHANGE UP (ref 17–32)
CO2 SERPL-SCNC: 24 MMOL/L — SIGNIFICANT CHANGE UP (ref 17–32)
CO2 SERPL-SCNC: 25 MMOL/L
CO2 SERPL-SCNC: 25 MMOL/L — SIGNIFICANT CHANGE UP (ref 17–32)
CO2 SERPL-SCNC: 26 MMOL/L — SIGNIFICANT CHANGE UP (ref 17–32)
CO2 SERPL-SCNC: 26 MMOL/L — SIGNIFICANT CHANGE UP (ref 17–32)
CO2 SERPL-SCNC: 27 MMOL/L — SIGNIFICANT CHANGE UP (ref 17–32)
CO2 SERPL-SCNC: 28 MMOL/L — SIGNIFICANT CHANGE UP (ref 17–32)
CO2 SERPL-SCNC: 29 MMOL/L — SIGNIFICANT CHANGE UP (ref 17–32)
CO2 SERPL-SCNC: 30 MMOL/L — SIGNIFICANT CHANGE UP (ref 17–32)
CO2 SERPL-SCNC: 31 MMOL/L — SIGNIFICANT CHANGE UP (ref 17–32)
CO2 SERPL-SCNC: 31 MMOL/L — SIGNIFICANT CHANGE UP (ref 17–32)
CO2 SERPL-SCNC: 32 MMOL/L — SIGNIFICANT CHANGE UP (ref 17–32)
CO2 SERPL-SCNC: 32 MMOL/L — SIGNIFICANT CHANGE UP (ref 17–32)
CO2 SERPL-SCNC: 33 MMOL/L — HIGH (ref 17–32)
CO2 SERPL-SCNC: 34 MMOL/L — HIGH (ref 17–32)
CO2 SERPL-SCNC: 36 MMOL/L — HIGH (ref 17–32)
CO2 SERPL-SCNC: 37 MMOL/L — HIGH (ref 17–32)
COLOR SPEC: ABNORMAL
COLOR SPEC: COLORLESS — SIGNIFICANT CHANGE UP
COLOR SPEC: YELLOW — SIGNIFICANT CHANGE UP
CREAT SERPL-MCNC: 0.5 MG/DL — LOW (ref 0.7–1.5)
CREAT SERPL-MCNC: 0.6 MG/DL — LOW (ref 0.7–1.5)
CREAT SERPL-MCNC: 0.7 MG/DL
CREAT SERPL-MCNC: 0.7 MG/DL — SIGNIFICANT CHANGE UP (ref 0.7–1.5)
CREAT SERPL-MCNC: 0.8 MG/DL — SIGNIFICANT CHANGE UP (ref 0.7–1.5)
CREAT SERPL-MCNC: 0.9 MG/DL — SIGNIFICANT CHANGE UP (ref 0.7–1.5)
CREAT SERPL-MCNC: <0.5 MG/DL — LOW (ref 0.7–1.5)
CULTURE RESULTS: SIGNIFICANT CHANGE UP
DIFF PNL FLD: NEGATIVE — SIGNIFICANT CHANGE UP
ELLIPTOCYTES BLD QL SMEAR: SLIGHT — SIGNIFICANT CHANGE UP
EOSINOPHIL # BLD AUTO: 0 K/UL — SIGNIFICANT CHANGE UP (ref 0–0.7)
EOSINOPHIL # BLD AUTO: 0.01 K/UL — SIGNIFICANT CHANGE UP (ref 0–0.7)
EOSINOPHIL # BLD AUTO: 0.02 K/UL — SIGNIFICANT CHANGE UP (ref 0–0.7)
EOSINOPHIL # BLD AUTO: 0.03 K/UL — SIGNIFICANT CHANGE UP (ref 0–0.7)
EOSINOPHIL # BLD AUTO: 0.04 K/UL — SIGNIFICANT CHANGE UP (ref 0–0.7)
EOSINOPHIL # BLD AUTO: 0.05 K/UL — SIGNIFICANT CHANGE UP (ref 0–0.7)
EOSINOPHIL # BLD AUTO: 0.05 K/UL — SIGNIFICANT CHANGE UP (ref 0–0.7)
EOSINOPHIL # BLD AUTO: 0.06 K/UL — SIGNIFICANT CHANGE UP (ref 0–0.7)
EOSINOPHIL # BLD AUTO: 0.06 K/UL — SIGNIFICANT CHANGE UP (ref 0–0.7)
EOSINOPHIL # BLD AUTO: 0.07 K/UL — SIGNIFICANT CHANGE UP (ref 0–0.7)
EOSINOPHIL # BLD AUTO: 0.08 K/UL — SIGNIFICANT CHANGE UP (ref 0–0.7)
EOSINOPHIL # BLD AUTO: 0.09 K/UL
EOSINOPHIL # BLD AUTO: 0.1 K/UL — SIGNIFICANT CHANGE UP (ref 0–0.7)
EOSINOPHIL # BLD AUTO: 0.16 K/UL — SIGNIFICANT CHANGE UP (ref 0–0.7)
EOSINOPHIL NFR BLD AUTO: 0 % — SIGNIFICANT CHANGE UP (ref 0–8)
EOSINOPHIL NFR BLD AUTO: 0.1 % — SIGNIFICANT CHANGE UP (ref 0–8)
EOSINOPHIL NFR BLD AUTO: 0.2 % — SIGNIFICANT CHANGE UP (ref 0–8)
EOSINOPHIL NFR BLD AUTO: 0.3 % — SIGNIFICANT CHANGE UP (ref 0–8)
EOSINOPHIL NFR BLD AUTO: 0.3 % — SIGNIFICANT CHANGE UP (ref 0–8)
EOSINOPHIL NFR BLD AUTO: 0.4 % — SIGNIFICANT CHANGE UP (ref 0–8)
EOSINOPHIL NFR BLD AUTO: 0.5 % — SIGNIFICANT CHANGE UP (ref 0–8)
EOSINOPHIL NFR BLD AUTO: 0.6 %
EOSINOPHIL NFR BLD AUTO: 0.6 % — SIGNIFICANT CHANGE UP (ref 0–8)
EOSINOPHIL NFR BLD AUTO: 0.8 % — SIGNIFICANT CHANGE UP (ref 0–8)
EOSINOPHIL NFR BLD AUTO: 0.8 % — SIGNIFICANT CHANGE UP (ref 0–8)
EOSINOPHIL NFR BLD AUTO: 1 % — SIGNIFICANT CHANGE UP (ref 0–8)
EOSINOPHIL NFR BLD AUTO: 1.3 % — SIGNIFICANT CHANGE UP (ref 0–8)
EOSINOPHIL NFR BLD AUTO: 1.3 % — SIGNIFICANT CHANGE UP (ref 0–8)
EOSINOPHIL NFR BLD AUTO: 1.4 % — SIGNIFICANT CHANGE UP (ref 0–8)
EPI CELLS # UR: 3 /HPF — SIGNIFICANT CHANGE UP (ref 0–5)
ESTIMATED AVERAGE GLUCOSE: 137 MG/DL — HIGH (ref 68–114)
FAT STL QN: NORMAL — SIGNIFICANT CHANGE UP
FAT STL QN: NORMAL — SIGNIFICANT CHANGE UP
FERRITIN SERPL-MCNC: 874 NG/ML — HIGH (ref 30–400)
GAS PNL BLDA: SIGNIFICANT CHANGE UP
GAS PNL BLDV: 135 MMOL/L — LOW (ref 136–145)
GAS PNL BLDV: SIGNIFICANT CHANGE UP
GAS PNL BLDV: SIGNIFICANT CHANGE UP
GIANT PLATELETS BLD QL SMEAR: PRESENT — SIGNIFICANT CHANGE UP
GLUCOSE BLDC GLUCOMTR-MCNC: 101 MG/DL — HIGH (ref 70–99)
GLUCOSE BLDC GLUCOMTR-MCNC: 102 MG/DL — HIGH (ref 70–99)
GLUCOSE BLDC GLUCOMTR-MCNC: 102 MG/DL — HIGH (ref 70–99)
GLUCOSE BLDC GLUCOMTR-MCNC: 103 MG/DL — HIGH (ref 70–99)
GLUCOSE BLDC GLUCOMTR-MCNC: 106 MG/DL — HIGH (ref 70–99)
GLUCOSE BLDC GLUCOMTR-MCNC: 107 MG/DL — HIGH (ref 70–99)
GLUCOSE BLDC GLUCOMTR-MCNC: 110 MG/DL — HIGH (ref 70–99)
GLUCOSE BLDC GLUCOMTR-MCNC: 112 MG/DL — HIGH (ref 70–99)
GLUCOSE BLDC GLUCOMTR-MCNC: 113 MG/DL — HIGH (ref 70–99)
GLUCOSE BLDC GLUCOMTR-MCNC: 115 MG/DL — HIGH (ref 70–99)
GLUCOSE BLDC GLUCOMTR-MCNC: 120 MG/DL — HIGH (ref 70–99)
GLUCOSE BLDC GLUCOMTR-MCNC: 120 MG/DL — HIGH (ref 70–99)
GLUCOSE BLDC GLUCOMTR-MCNC: 121 MG/DL — HIGH (ref 70–99)
GLUCOSE BLDC GLUCOMTR-MCNC: 123 MG/DL — HIGH (ref 70–99)
GLUCOSE BLDC GLUCOMTR-MCNC: 123 MG/DL — HIGH (ref 70–99)
GLUCOSE BLDC GLUCOMTR-MCNC: 125 MG/DL — HIGH (ref 70–99)
GLUCOSE BLDC GLUCOMTR-MCNC: 127 MG/DL — HIGH (ref 70–99)
GLUCOSE BLDC GLUCOMTR-MCNC: 136 MG/DL — HIGH (ref 70–99)
GLUCOSE BLDC GLUCOMTR-MCNC: 150 MG/DL — HIGH (ref 70–99)
GLUCOSE BLDC GLUCOMTR-MCNC: 155 MG/DL — HIGH (ref 70–99)
GLUCOSE BLDC GLUCOMTR-MCNC: 158 MG/DL — HIGH (ref 70–99)
GLUCOSE BLDC GLUCOMTR-MCNC: 161 MG/DL — HIGH (ref 70–99)
GLUCOSE BLDC GLUCOMTR-MCNC: 162 MG/DL — HIGH (ref 70–99)
GLUCOSE BLDC GLUCOMTR-MCNC: 176 MG/DL — HIGH (ref 70–99)
GLUCOSE BLDC GLUCOMTR-MCNC: 180 MG/DL — HIGH (ref 70–99)
GLUCOSE BLDC GLUCOMTR-MCNC: 187 MG/DL — HIGH (ref 70–99)
GLUCOSE BLDC GLUCOMTR-MCNC: 85 MG/DL — SIGNIFICANT CHANGE UP (ref 70–99)
GLUCOSE SERPL-MCNC: 100 MG/DL — HIGH (ref 70–99)
GLUCOSE SERPL-MCNC: 102 MG/DL — HIGH (ref 70–99)
GLUCOSE SERPL-MCNC: 102 MG/DL — HIGH (ref 70–99)
GLUCOSE SERPL-MCNC: 104 MG/DL — HIGH (ref 70–99)
GLUCOSE SERPL-MCNC: 104 MG/DL — HIGH (ref 70–99)
GLUCOSE SERPL-MCNC: 105 MG/DL — HIGH (ref 70–99)
GLUCOSE SERPL-MCNC: 106 MG/DL — HIGH (ref 70–99)
GLUCOSE SERPL-MCNC: 107 MG/DL — HIGH (ref 70–99)
GLUCOSE SERPL-MCNC: 108 MG/DL — HIGH (ref 70–99)
GLUCOSE SERPL-MCNC: 108 MG/DL — HIGH (ref 70–99)
GLUCOSE SERPL-MCNC: 111 MG/DL — HIGH (ref 70–99)
GLUCOSE SERPL-MCNC: 112 MG/DL — HIGH (ref 70–99)
GLUCOSE SERPL-MCNC: 114 MG/DL — HIGH (ref 70–99)
GLUCOSE SERPL-MCNC: 115 MG/DL — HIGH (ref 70–99)
GLUCOSE SERPL-MCNC: 116 MG/DL — HIGH (ref 70–99)
GLUCOSE SERPL-MCNC: 116 MG/DL — HIGH (ref 70–99)
GLUCOSE SERPL-MCNC: 118 MG/DL — HIGH (ref 70–99)
GLUCOSE SERPL-MCNC: 120 MG/DL — HIGH (ref 70–99)
GLUCOSE SERPL-MCNC: 120 MG/DL — HIGH (ref 70–99)
GLUCOSE SERPL-MCNC: 121 MG/DL — HIGH (ref 70–99)
GLUCOSE SERPL-MCNC: 125 MG/DL — HIGH (ref 70–99)
GLUCOSE SERPL-MCNC: 126 MG/DL — HIGH (ref 70–99)
GLUCOSE SERPL-MCNC: 132 MG/DL — HIGH (ref 70–99)
GLUCOSE SERPL-MCNC: 132 MG/DL — HIGH (ref 70–99)
GLUCOSE SERPL-MCNC: 133 MG/DL — HIGH (ref 70–99)
GLUCOSE SERPL-MCNC: 137 MG/DL — HIGH (ref 70–99)
GLUCOSE SERPL-MCNC: 139 MG/DL — HIGH (ref 70–99)
GLUCOSE SERPL-MCNC: 140 MG/DL — HIGH (ref 70–99)
GLUCOSE SERPL-MCNC: 141 MG/DL — HIGH (ref 70–99)
GLUCOSE SERPL-MCNC: 143 MG/DL — HIGH (ref 70–99)
GLUCOSE SERPL-MCNC: 144 MG/DL — HIGH (ref 70–99)
GLUCOSE SERPL-MCNC: 145 MG/DL — HIGH (ref 70–99)
GLUCOSE SERPL-MCNC: 147 MG/DL — HIGH (ref 70–99)
GLUCOSE SERPL-MCNC: 149 MG/DL — HIGH (ref 70–99)
GLUCOSE SERPL-MCNC: 150 MG/DL — HIGH (ref 70–99)
GLUCOSE SERPL-MCNC: 155 MG/DL — HIGH (ref 70–99)
GLUCOSE SERPL-MCNC: 159 MG/DL — HIGH (ref 70–99)
GLUCOSE SERPL-MCNC: 168 MG/DL — HIGH (ref 70–99)
GLUCOSE SERPL-MCNC: 170 MG/DL — HIGH (ref 70–99)
GLUCOSE SERPL-MCNC: 173 MG/DL — HIGH (ref 70–99)
GLUCOSE SERPL-MCNC: 187 MG/DL — HIGH (ref 70–99)
GLUCOSE SERPL-MCNC: 60 MG/DL — LOW (ref 70–99)
GLUCOSE SERPL-MCNC: 76 MG/DL — SIGNIFICANT CHANGE UP (ref 70–99)
GLUCOSE SERPL-MCNC: 77 MG/DL — SIGNIFICANT CHANGE UP (ref 70–99)
GLUCOSE SERPL-MCNC: 84 MG/DL — SIGNIFICANT CHANGE UP (ref 70–99)
GLUCOSE SERPL-MCNC: 87 MG/DL — SIGNIFICANT CHANGE UP (ref 70–99)
GLUCOSE SERPL-MCNC: 89 MG/DL — SIGNIFICANT CHANGE UP (ref 70–99)
GLUCOSE SERPL-MCNC: 92 MG/DL
GLUCOSE SERPL-MCNC: 92 MG/DL — SIGNIFICANT CHANGE UP (ref 70–99)
GLUCOSE SERPL-MCNC: 92 MG/DL — SIGNIFICANT CHANGE UP (ref 70–99)
GLUCOSE SERPL-MCNC: 93 MG/DL — SIGNIFICANT CHANGE UP (ref 70–99)
GLUCOSE SERPL-MCNC: 94 MG/DL — SIGNIFICANT CHANGE UP (ref 70–99)
GLUCOSE UR QL: ABNORMAL
GLUCOSE UR QL: NEGATIVE — SIGNIFICANT CHANGE UP
GLUCOSE UR QL: NEGATIVE — SIGNIFICANT CHANGE UP
HBA1C BLD-MCNC: 6.4 % — HIGH (ref 4–5.6)
HCO3 BLDA-SCNC: 21 MMOL/L — LOW (ref 23–27)
HCO3 BLDA-SCNC: 24 MMOL/L — SIGNIFICANT CHANGE UP (ref 23–27)
HCO3 BLDA-SCNC: 25 MMOL/L — SIGNIFICANT CHANGE UP (ref 23–27)
HCO3 BLDA-SCNC: 32 MMOL/L — HIGH (ref 23–27)
HCO3 BLDA-SCNC: 32 MMOL/L — HIGH (ref 23–27)
HCO3 BLDV-SCNC: 36 MMOL/L — HIGH (ref 22–29)
HCT VFR BLD CALC: 19.1 % — LOW (ref 42–52)
HCT VFR BLD CALC: 19.2 % — LOW (ref 42–52)
HCT VFR BLD CALC: 21.8 % — LOW (ref 42–52)
HCT VFR BLD CALC: 22.1 % — LOW (ref 42–52)
HCT VFR BLD CALC: 22.6 % — LOW (ref 42–52)
HCT VFR BLD CALC: 22.8 % — LOW (ref 42–52)
HCT VFR BLD CALC: 22.9 % — LOW (ref 42–52)
HCT VFR BLD CALC: 23.4 % — LOW (ref 42–52)
HCT VFR BLD CALC: 23.6 % — LOW (ref 42–52)
HCT VFR BLD CALC: 24.3 % — LOW (ref 42–52)
HCT VFR BLD CALC: 25 % — LOW (ref 42–52)
HCT VFR BLD CALC: 25.2 % — LOW (ref 42–52)
HCT VFR BLD CALC: 25.2 % — LOW (ref 42–52)
HCT VFR BLD CALC: 25.3 % — LOW (ref 42–52)
HCT VFR BLD CALC: 25.3 % — LOW (ref 42–52)
HCT VFR BLD CALC: 25.5 % — LOW (ref 42–52)
HCT VFR BLD CALC: 25.5 % — LOW (ref 42–52)
HCT VFR BLD CALC: 25.6 % — LOW (ref 42–52)
HCT VFR BLD CALC: 26.3 % — LOW (ref 42–52)
HCT VFR BLD CALC: 26.4 % — LOW (ref 42–52)
HCT VFR BLD CALC: 26.6 % — LOW (ref 42–52)
HCT VFR BLD CALC: 27 % — LOW (ref 42–52)
HCT VFR BLD CALC: 27.3 % — LOW (ref 42–52)
HCT VFR BLD CALC: 27.3 % — LOW (ref 42–52)
HCT VFR BLD CALC: 28.4 % — LOW (ref 42–52)
HCT VFR BLD CALC: 28.5 % — LOW (ref 42–52)
HCT VFR BLD CALC: 28.7 % — LOW (ref 42–52)
HCT VFR BLD CALC: 28.7 % — LOW (ref 42–52)
HCT VFR BLD CALC: 29.2 % — LOW (ref 42–52)
HCT VFR BLD CALC: 29.2 % — LOW (ref 42–52)
HCT VFR BLD CALC: 29.4 % — LOW (ref 42–52)
HCT VFR BLD CALC: 29.5 % — LOW (ref 42–52)
HCT VFR BLD CALC: 29.5 % — LOW (ref 42–52)
HCT VFR BLD CALC: 29.7 % — LOW (ref 42–52)
HCT VFR BLD CALC: 30.1 % — LOW (ref 42–52)
HCT VFR BLD CALC: 30.2 % — LOW (ref 42–52)
HCT VFR BLD CALC: 30.4 % — LOW (ref 42–52)
HCT VFR BLD CALC: 31.2 % — LOW (ref 42–52)
HCT VFR BLD CALC: 31.3 % — LOW (ref 42–52)
HCT VFR BLD CALC: 31.7 % — LOW (ref 42–52)
HCT VFR BLD CALC: 31.8 % — LOW (ref 42–52)
HCT VFR BLD CALC: 31.9 % — LOW (ref 42–52)
HCT VFR BLD CALC: 32.6 % — LOW (ref 42–52)
HCT VFR BLD CALC: 33.1 % — LOW (ref 42–52)
HCT VFR BLD CALC: 33.5 % — LOW (ref 42–52)
HCT VFR BLD CALC: 34.2 % — LOW (ref 42–52)
HCT VFR BLD CALC: 34.5 % — LOW (ref 42–52)
HCT VFR BLD CALC: 38.4 % — LOW (ref 42–52)
HCT VFR BLD CALC: 40.1 % — LOW (ref 42–52)
HCT VFR BLD CALC: 41.8 % — LOW (ref 42–52)
HCT VFR BLD CALC: 42.1 % — SIGNIFICANT CHANGE UP (ref 42–52)
HCT VFR BLD CALC: 42.9 % — SIGNIFICANT CHANGE UP (ref 42–52)
HCT VFR BLD CALC: 44.2 % — SIGNIFICANT CHANGE UP (ref 42–52)
HCT VFR BLD CALC: 44.2 % — SIGNIFICANT CHANGE UP (ref 42–52)
HCT VFR BLD CALC: 44.6 % — SIGNIFICANT CHANGE UP (ref 42–52)
HCT VFR BLD CALC: 45.5 %
HCT VFR BLD CALC: 45.5 % — SIGNIFICANT CHANGE UP (ref 42–52)
HCT VFR BLD CALC: 46.1 % — SIGNIFICANT CHANGE UP (ref 42–52)
HCT VFR BLD CALC: 46.2 % — SIGNIFICANT CHANGE UP (ref 42–52)
HCT VFR BLD CALC: 46.5 % — SIGNIFICANT CHANGE UP (ref 42–52)
HCT VFR BLD CALC: 46.9 % — SIGNIFICANT CHANGE UP (ref 42–52)
HCT VFR BLD CALC: 48.4 % — SIGNIFICANT CHANGE UP (ref 42–52)
HCT VFR BLD CALC: 50.3 % — SIGNIFICANT CHANGE UP (ref 42–52)
HCT VFR BLDA CALC: 25.6 % — LOW (ref 34–44)
HDLC SERPL-MCNC: 39 MG/DL — LOW
HGB BLD CALC-MCNC: 8.4 G/DL — LOW (ref 14–18)
HGB BLD-MCNC: 10 G/DL — LOW (ref 14–18)
HGB BLD-MCNC: 10.2 G/DL — LOW (ref 14–18)
HGB BLD-MCNC: 10.4 G/DL — LOW (ref 14–18)
HGB BLD-MCNC: 10.4 G/DL — LOW (ref 14–18)
HGB BLD-MCNC: 10.9 G/DL — LOW (ref 14–18)
HGB BLD-MCNC: 11 G/DL — LOW (ref 14–18)
HGB BLD-MCNC: 11.5 G/DL — LOW (ref 14–18)
HGB BLD-MCNC: 12.9 G/DL — LOW (ref 14–18)
HGB BLD-MCNC: 14 G/DL — SIGNIFICANT CHANGE UP (ref 14–18)
HGB BLD-MCNC: 14.2 G/DL — SIGNIFICANT CHANGE UP (ref 14–18)
HGB BLD-MCNC: 14.4 G/DL — SIGNIFICANT CHANGE UP (ref 14–18)
HGB BLD-MCNC: 14.5 G/DL — SIGNIFICANT CHANGE UP (ref 14–18)
HGB BLD-MCNC: 14.6 G/DL — SIGNIFICANT CHANGE UP (ref 14–18)
HGB BLD-MCNC: 15.1 G/DL — SIGNIFICANT CHANGE UP (ref 14–18)
HGB BLD-MCNC: 15.3 G/DL
HGB BLD-MCNC: 15.5 G/DL — SIGNIFICANT CHANGE UP (ref 14–18)
HGB BLD-MCNC: 15.6 G/DL — SIGNIFICANT CHANGE UP (ref 14–18)
HGB BLD-MCNC: 15.6 G/DL — SIGNIFICANT CHANGE UP (ref 14–18)
HGB BLD-MCNC: 16 G/DL — SIGNIFICANT CHANGE UP (ref 14–18)
HGB BLD-MCNC: 16 G/DL — SIGNIFICANT CHANGE UP (ref 14–18)
HGB BLD-MCNC: 16.3 G/DL — SIGNIFICANT CHANGE UP (ref 14–18)
HGB BLD-MCNC: 16.6 G/DL — SIGNIFICANT CHANGE UP (ref 14–18)
HGB BLD-MCNC: 17.3 G/DL — SIGNIFICANT CHANGE UP (ref 14–18)
HGB BLD-MCNC: 6.3 G/DL — CRITICAL LOW (ref 14–18)
HGB BLD-MCNC: 6.4 G/DL — CRITICAL LOW (ref 14–18)
HGB BLD-MCNC: 6.8 G/DL — CRITICAL LOW (ref 14–18)
HGB BLD-MCNC: 7.2 G/DL — LOW (ref 14–18)
HGB BLD-MCNC: 7.3 G/DL — LOW (ref 14–18)
HGB BLD-MCNC: 7.4 G/DL — LOW (ref 14–18)
HGB BLD-MCNC: 7.5 G/DL — LOW (ref 14–18)
HGB BLD-MCNC: 7.6 G/DL — LOW (ref 14–18)
HGB BLD-MCNC: 7.6 G/DL — LOW (ref 14–18)
HGB BLD-MCNC: 7.7 G/DL — LOW (ref 14–18)
HGB BLD-MCNC: 7.9 G/DL — LOW (ref 14–18)
HGB BLD-MCNC: 8 G/DL — LOW (ref 14–18)
HGB BLD-MCNC: 8 G/DL — LOW (ref 14–18)
HGB BLD-MCNC: 8.2 G/DL — LOW (ref 14–18)
HGB BLD-MCNC: 8.3 G/DL — LOW (ref 14–18)
HGB BLD-MCNC: 8.4 G/DL — LOW (ref 14–18)
HGB BLD-MCNC: 8.5 G/DL — LOW (ref 14–18)
HGB BLD-MCNC: 8.6 G/DL — LOW (ref 14–18)
HGB BLD-MCNC: 8.7 G/DL — LOW (ref 14–18)
HGB BLD-MCNC: 8.8 G/DL — LOW (ref 14–18)
HGB BLD-MCNC: 8.8 G/DL — LOW (ref 14–18)
HGB BLD-MCNC: 8.9 G/DL — LOW (ref 14–18)
HGB BLD-MCNC: 9 G/DL — LOW (ref 14–18)
HGB BLD-MCNC: 9 G/DL — LOW (ref 14–18)
HGB BLD-MCNC: 9.2 G/DL — LOW (ref 14–18)
HGB BLD-MCNC: 9.2 G/DL — LOW (ref 14–18)
HGB BLD-MCNC: 9.3 G/DL — LOW (ref 14–18)
HGB BLD-MCNC: 9.4 G/DL — LOW (ref 14–18)
HGB BLD-MCNC: 9.4 G/DL — LOW (ref 14–18)
HGB BLD-MCNC: 9.5 G/DL — LOW (ref 14–18)
HGB BLD-MCNC: 9.6 G/DL — LOW (ref 14–18)
HGB BLD-MCNC: 9.6 G/DL — LOW (ref 14–18)
HGB BLD-MCNC: 9.9 G/DL — LOW (ref 14–18)
HOROWITZ INDEX BLDA+IHG-RTO: 21 — SIGNIFICANT CHANGE UP
HOROWITZ INDEX BLDA+IHG-RTO: 40 — SIGNIFICANT CHANGE UP
HOROWITZ INDEX BLDA+IHG-RTO: 40 — SIGNIFICANT CHANGE UP
HOROWITZ INDEX BLDA+IHG-RTO: 50 — SIGNIFICANT CHANGE UP
HOROWITZ INDEX BLDA+IHG-RTO: 60 — SIGNIFICANT CHANGE UP
HOROWITZ INDEX BLDA+IHG-RTO: 80 — SIGNIFICANT CHANGE UP
HYALINE CASTS # UR AUTO: 4 /LPF — SIGNIFICANT CHANGE UP (ref 0–7)
IMM GRANULOCYTES NFR BLD AUTO: 0.4 % — HIGH (ref 0.1–0.3)
IMM GRANULOCYTES NFR BLD AUTO: 0.5 %
IMM GRANULOCYTES NFR BLD AUTO: 0.5 % — HIGH (ref 0.1–0.3)
IMM GRANULOCYTES NFR BLD AUTO: 0.6 % — HIGH (ref 0.1–0.3)
IMM GRANULOCYTES NFR BLD AUTO: 0.6 % — HIGH (ref 0.1–0.3)
IMM GRANULOCYTES NFR BLD AUTO: 0.7 % — HIGH (ref 0.1–0.3)
IMM GRANULOCYTES NFR BLD AUTO: 0.8 % — HIGH (ref 0.1–0.3)
IMM GRANULOCYTES NFR BLD AUTO: 0.9 % — HIGH (ref 0.1–0.3)
IMM GRANULOCYTES NFR BLD AUTO: 1 % — HIGH (ref 0.1–0.3)
IMM GRANULOCYTES NFR BLD AUTO: 1.1 % — HIGH (ref 0.1–0.3)
IMM GRANULOCYTES NFR BLD AUTO: 1.2 % — HIGH (ref 0.1–0.3)
IMM GRANULOCYTES NFR BLD AUTO: 1.3 % — HIGH (ref 0.1–0.3)
IMM GRANULOCYTES NFR BLD AUTO: 1.3 % — HIGH (ref 0.1–0.3)
IMM GRANULOCYTES NFR BLD AUTO: 1.4 % — HIGH (ref 0.1–0.3)
IMM GRANULOCYTES NFR BLD AUTO: 1.4 % — HIGH (ref 0.1–0.3)
IMM GRANULOCYTES NFR BLD AUTO: 1.9 % — HIGH (ref 0.1–0.3)
IMM GRANULOCYTES NFR BLD AUTO: 2 % — HIGH (ref 0.1–0.3)
IMM GRANULOCYTES NFR BLD AUTO: 2.2 % — HIGH (ref 0.1–0.3)
IMM GRANULOCYTES NFR BLD AUTO: 2.6 % — HIGH (ref 0.1–0.3)
IMM GRANULOCYTES NFR BLD AUTO: 5.9 % — HIGH (ref 0.1–0.3)
INR BLD: 1.01 RATIO — SIGNIFICANT CHANGE UP (ref 0.65–1.3)
INR BLD: 1.16 RATIO — SIGNIFICANT CHANGE UP (ref 0.65–1.3)
INR BLD: 1.22 RATIO — SIGNIFICANT CHANGE UP (ref 0.65–1.3)
INR BLD: 1.48 RATIO — HIGH (ref 0.65–1.3)
INR BLD: 1.48 RATIO — HIGH (ref 0.65–1.3)
INR BLD: 1.58 RATIO — HIGH (ref 0.65–1.3)
INR BLD: 1.73 RATIO — HIGH (ref 0.65–1.3)
INR PPP: 1.13 RATIO
IRON SATN MFR SERPL: 17 % — SIGNIFICANT CHANGE UP (ref 15–50)
IRON SATN MFR SERPL: 27 UG/DL — LOW (ref 35–150)
KETONES UR-MCNC: ABNORMAL
LACTATE BLDV-MCNC: 2.2 MMOL/L — HIGH (ref 0.5–1.6)
LACTATE SERPL-SCNC: 1.4 MMOL/L — SIGNIFICANT CHANGE UP (ref 0.5–2.2)
LACTATE SERPL-SCNC: 1.6 MMOL/L — SIGNIFICANT CHANGE UP (ref 0.5–2.2)
LACTATE SERPL-SCNC: 1.7 MMOL/L — SIGNIFICANT CHANGE UP (ref 0.5–2.2)
LACTATE SERPL-SCNC: 1.9 MMOL/L — SIGNIFICANT CHANGE UP (ref 0.5–2.2)
LACTATE SERPL-SCNC: 2 MMOL/L — SIGNIFICANT CHANGE UP (ref 0.5–2.2)
LACTATE SERPL-SCNC: 2.4 MMOL/L — HIGH (ref 0.5–2.2)
LACTATE SERPL-SCNC: 2.4 MMOL/L — HIGH (ref 0.5–2.2)
LEUKOCYTE ESTERASE UR-ACNC: NEGATIVE — SIGNIFICANT CHANGE UP
LIDOCAIN IGE QN: 178 U/L — HIGH (ref 7–60)
LIDOCAIN IGE QN: 22 U/L — SIGNIFICANT CHANGE UP (ref 7–60)
LIDOCAIN IGE QN: 57 U/L — SIGNIFICANT CHANGE UP (ref 7–60)
LIDOCAIN IGE QN: 69 U/L — HIGH (ref 7–60)
LIPID PNL WITH DIRECT LDL SERPL: 51 MG/DL — SIGNIFICANT CHANGE UP (ref 4–129)
LYMPHOCYTES # BLD AUTO: 0.27 K/UL — LOW (ref 1.2–3.4)
LYMPHOCYTES # BLD AUTO: 0.4 K/UL — LOW (ref 1.2–3.4)
LYMPHOCYTES # BLD AUTO: 0.87 K/UL — LOW (ref 1.2–3.4)
LYMPHOCYTES # BLD AUTO: 0.89 K/UL — LOW (ref 1.2–3.4)
LYMPHOCYTES # BLD AUTO: 0.91 K/UL — LOW (ref 1.2–3.4)
LYMPHOCYTES # BLD AUTO: 0.95 K/UL — LOW (ref 1.2–3.4)
LYMPHOCYTES # BLD AUTO: 0.98 K/UL — LOW (ref 1.2–3.4)
LYMPHOCYTES # BLD AUTO: 0.99 K/UL — LOW (ref 1.2–3.4)
LYMPHOCYTES # BLD AUTO: 1.01 K/UL — LOW (ref 1.2–3.4)
LYMPHOCYTES # BLD AUTO: 1.02 K/UL — LOW (ref 1.2–3.4)
LYMPHOCYTES # BLD AUTO: 1.04 K/UL — LOW (ref 1.2–3.4)
LYMPHOCYTES # BLD AUTO: 1.07 K/UL — LOW (ref 1.2–3.4)
LYMPHOCYTES # BLD AUTO: 1.09 K/UL — LOW (ref 1.2–3.4)
LYMPHOCYTES # BLD AUTO: 1.19 K/UL — LOW (ref 1.2–3.4)
LYMPHOCYTES # BLD AUTO: 1.28 K/UL — SIGNIFICANT CHANGE UP (ref 1.2–3.4)
LYMPHOCYTES # BLD AUTO: 1.31 K/UL — SIGNIFICANT CHANGE UP (ref 1.2–3.4)
LYMPHOCYTES # BLD AUTO: 1.32 K/UL — SIGNIFICANT CHANGE UP (ref 1.2–3.4)
LYMPHOCYTES # BLD AUTO: 1.32 K/UL — SIGNIFICANT CHANGE UP (ref 1.2–3.4)
LYMPHOCYTES # BLD AUTO: 1.42 K/UL — SIGNIFICANT CHANGE UP (ref 1.2–3.4)
LYMPHOCYTES # BLD AUTO: 1.46 K/UL — SIGNIFICANT CHANGE UP (ref 1.2–3.4)
LYMPHOCYTES # BLD AUTO: 1.47 K/UL — SIGNIFICANT CHANGE UP (ref 1.2–3.4)
LYMPHOCYTES # BLD AUTO: 1.52 K/UL — SIGNIFICANT CHANGE UP (ref 1.2–3.4)
LYMPHOCYTES # BLD AUTO: 1.54 K/UL — SIGNIFICANT CHANGE UP (ref 1.2–3.4)
LYMPHOCYTES # BLD AUTO: 1.56 K/UL — SIGNIFICANT CHANGE UP (ref 1.2–3.4)
LYMPHOCYTES # BLD AUTO: 1.73 K/UL — SIGNIFICANT CHANGE UP (ref 1.2–3.4)
LYMPHOCYTES # BLD AUTO: 1.75 K/UL — SIGNIFICANT CHANGE UP (ref 1.2–3.4)
LYMPHOCYTES # BLD AUTO: 1.76 K/UL — SIGNIFICANT CHANGE UP (ref 1.2–3.4)
LYMPHOCYTES # BLD AUTO: 1.8 % — LOW (ref 20.5–51.1)
LYMPHOCYTES # BLD AUTO: 1.91 K/UL — SIGNIFICANT CHANGE UP (ref 1.2–3.4)
LYMPHOCYTES # BLD AUTO: 1.95 K/UL — SIGNIFICANT CHANGE UP (ref 1.2–3.4)
LYMPHOCYTES # BLD AUTO: 1.97 K/UL — SIGNIFICANT CHANGE UP (ref 1.2–3.4)
LYMPHOCYTES # BLD AUTO: 11 % — LOW (ref 20.5–51.1)
LYMPHOCYTES # BLD AUTO: 11.1 % — LOW (ref 20.5–51.1)
LYMPHOCYTES # BLD AUTO: 11.2 % — LOW (ref 20.5–51.1)
LYMPHOCYTES # BLD AUTO: 11.3 % — LOW (ref 20.5–51.1)
LYMPHOCYTES # BLD AUTO: 11.4 % — LOW (ref 20.5–51.1)
LYMPHOCYTES # BLD AUTO: 11.9 % — LOW (ref 20.5–51.1)
LYMPHOCYTES # BLD AUTO: 12.6 % — LOW (ref 20.5–51.1)
LYMPHOCYTES # BLD AUTO: 12.7 % — LOW (ref 20.5–51.1)
LYMPHOCYTES # BLD AUTO: 12.8 % — LOW (ref 20.5–51.1)
LYMPHOCYTES # BLD AUTO: 13.3 % — LOW (ref 20.5–51.1)
LYMPHOCYTES # BLD AUTO: 13.7 % — LOW (ref 20.5–51.1)
LYMPHOCYTES # BLD AUTO: 13.7 % — LOW (ref 20.5–51.1)
LYMPHOCYTES # BLD AUTO: 15.7 % — LOW (ref 20.5–51.1)
LYMPHOCYTES # BLD AUTO: 15.7 % — LOW (ref 20.5–51.1)
LYMPHOCYTES # BLD AUTO: 16 % — LOW (ref 20.5–51.1)
LYMPHOCYTES # BLD AUTO: 16.1 % — LOW (ref 20.5–51.1)
LYMPHOCYTES # BLD AUTO: 16.7 % — LOW (ref 20.5–51.1)
LYMPHOCYTES # BLD AUTO: 16.7 % — LOW (ref 20.5–51.1)
LYMPHOCYTES # BLD AUTO: 17.1 % — LOW (ref 20.5–51.1)
LYMPHOCYTES # BLD AUTO: 17.5 % — LOW (ref 20.5–51.1)
LYMPHOCYTES # BLD AUTO: 17.5 % — LOW (ref 20.5–51.1)
LYMPHOCYTES # BLD AUTO: 17.8 % — LOW (ref 20.5–51.1)
LYMPHOCYTES # BLD AUTO: 19.9 % — LOW (ref 20.5–51.1)
LYMPHOCYTES # BLD AUTO: 2.46 K/UL — SIGNIFICANT CHANGE UP (ref 1.2–3.4)
LYMPHOCYTES # BLD AUTO: 2.71 K/UL
LYMPHOCYTES # BLD AUTO: 5.4 % — LOW (ref 20.5–51.1)
LYMPHOCYTES # BLD AUTO: 7.7 % — LOW (ref 20.5–51.1)
LYMPHOCYTES # BLD AUTO: 7.8 % — LOW (ref 20.5–51.1)
LYMPHOCYTES # BLD AUTO: 8.7 % — LOW (ref 20.5–51.1)
LYMPHOCYTES # BLD AUTO: 8.8 % — LOW (ref 20.5–51.1)
LYMPHOCYTES # BLD AUTO: 9.4 % — LOW (ref 20.5–51.1)
LYMPHOCYTES # BLD AUTO: 9.7 % — LOW (ref 20.5–51.1)
LYMPHOCYTES NFR BLD AUTO: 19.4 %
MAGNESIUM SERPL-MCNC: 1.5 MG/DL — LOW (ref 1.8–2.4)
MAGNESIUM SERPL-MCNC: 1.6 MG/DL — LOW (ref 1.8–2.4)
MAGNESIUM SERPL-MCNC: 1.7 MG/DL — LOW (ref 1.8–2.4)
MAGNESIUM SERPL-MCNC: 1.8 MG/DL — SIGNIFICANT CHANGE UP (ref 1.8–2.4)
MAGNESIUM SERPL-MCNC: 1.9 MG/DL — SIGNIFICANT CHANGE UP (ref 1.8–2.4)
MAGNESIUM SERPL-MCNC: 2 MG/DL — SIGNIFICANT CHANGE UP (ref 1.8–2.4)
MAGNESIUM SERPL-MCNC: 2.1 MG/DL — SIGNIFICANT CHANGE UP (ref 1.8–2.4)
MAGNESIUM SERPL-MCNC: 2.2 MG/DL — SIGNIFICANT CHANGE UP (ref 1.8–2.4)
MAGNESIUM SERPL-MCNC: 2.3 MG/DL — SIGNIFICANT CHANGE UP (ref 1.8–2.4)
MAGNESIUM SERPL-MCNC: 2.4 MG/DL — SIGNIFICANT CHANGE UP (ref 1.8–2.4)
MAN DIFF?: NORMAL
MANUAL SMEAR VERIFICATION: SIGNIFICANT CHANGE UP
MCHC RBC-ENTMCNC: 27.1 PG — SIGNIFICANT CHANGE UP (ref 27–31)
MCHC RBC-ENTMCNC: 27.6 PG — SIGNIFICANT CHANGE UP (ref 27–31)
MCHC RBC-ENTMCNC: 27.6 PG — SIGNIFICANT CHANGE UP (ref 27–31)
MCHC RBC-ENTMCNC: 27.8 PG — SIGNIFICANT CHANGE UP (ref 27–31)
MCHC RBC-ENTMCNC: 27.8 PG — SIGNIFICANT CHANGE UP (ref 27–31)
MCHC RBC-ENTMCNC: 27.9 PG — SIGNIFICANT CHANGE UP (ref 27–31)
MCHC RBC-ENTMCNC: 28 PG — SIGNIFICANT CHANGE UP (ref 27–31)
MCHC RBC-ENTMCNC: 28.1 PG — SIGNIFICANT CHANGE UP (ref 27–31)
MCHC RBC-ENTMCNC: 28.1 PG — SIGNIFICANT CHANGE UP (ref 27–31)
MCHC RBC-ENTMCNC: 28.2 PG — SIGNIFICANT CHANGE UP (ref 27–31)
MCHC RBC-ENTMCNC: 28.5 PG — SIGNIFICANT CHANGE UP (ref 27–31)
MCHC RBC-ENTMCNC: 28.6 PG — SIGNIFICANT CHANGE UP (ref 27–31)
MCHC RBC-ENTMCNC: 28.7 PG — SIGNIFICANT CHANGE UP (ref 27–31)
MCHC RBC-ENTMCNC: 28.7 PG — SIGNIFICANT CHANGE UP (ref 27–31)
MCHC RBC-ENTMCNC: 28.8 PG — SIGNIFICANT CHANGE UP (ref 27–31)
MCHC RBC-ENTMCNC: 28.9 PG — SIGNIFICANT CHANGE UP (ref 27–31)
MCHC RBC-ENTMCNC: 29 PG — SIGNIFICANT CHANGE UP (ref 27–31)
MCHC RBC-ENTMCNC: 29.1 PG — SIGNIFICANT CHANGE UP (ref 27–31)
MCHC RBC-ENTMCNC: 29.3 PG — SIGNIFICANT CHANGE UP (ref 27–31)
MCHC RBC-ENTMCNC: 29.3 PG — SIGNIFICANT CHANGE UP (ref 27–31)
MCHC RBC-ENTMCNC: 29.4 PG — SIGNIFICANT CHANGE UP (ref 27–31)
MCHC RBC-ENTMCNC: 29.5 G/DL — LOW (ref 32–37)
MCHC RBC-ENTMCNC: 29.5 PG — SIGNIFICANT CHANGE UP (ref 27–31)
MCHC RBC-ENTMCNC: 29.9 PG — SIGNIFICANT CHANGE UP (ref 27–31)
MCHC RBC-ENTMCNC: 30 G/DL — LOW (ref 32–37)
MCHC RBC-ENTMCNC: 30 G/DL — LOW (ref 32–37)
MCHC RBC-ENTMCNC: 30 PG — SIGNIFICANT CHANGE UP (ref 27–31)
MCHC RBC-ENTMCNC: 30.1 G/DL — LOW (ref 32–37)
MCHC RBC-ENTMCNC: 30.1 PG — SIGNIFICANT CHANGE UP (ref 27–31)
MCHC RBC-ENTMCNC: 30.2 G/DL — LOW (ref 32–37)
MCHC RBC-ENTMCNC: 30.2 PG — SIGNIFICANT CHANGE UP (ref 27–31)
MCHC RBC-ENTMCNC: 30.3 G/DL — LOW (ref 32–37)
MCHC RBC-ENTMCNC: 30.3 PG — SIGNIFICANT CHANGE UP (ref 27–31)
MCHC RBC-ENTMCNC: 30.3 PG — SIGNIFICANT CHANGE UP (ref 27–31)
MCHC RBC-ENTMCNC: 30.4 G/DL — LOW (ref 32–37)
MCHC RBC-ENTMCNC: 30.4 PG
MCHC RBC-ENTMCNC: 30.4 PG — SIGNIFICANT CHANGE UP (ref 27–31)
MCHC RBC-ENTMCNC: 30.5 G/DL — LOW (ref 32–37)
MCHC RBC-ENTMCNC: 30.5 PG — SIGNIFICANT CHANGE UP (ref 27–31)
MCHC RBC-ENTMCNC: 30.6 PG — SIGNIFICANT CHANGE UP (ref 27–31)
MCHC RBC-ENTMCNC: 30.7 G/DL — LOW (ref 32–37)
MCHC RBC-ENTMCNC: 30.7 PG — SIGNIFICANT CHANGE UP (ref 27–31)
MCHC RBC-ENTMCNC: 30.8 G/DL — LOW (ref 32–37)
MCHC RBC-ENTMCNC: 30.8 PG — SIGNIFICANT CHANGE UP (ref 27–31)
MCHC RBC-ENTMCNC: 30.8 PG — SIGNIFICANT CHANGE UP (ref 27–31)
MCHC RBC-ENTMCNC: 30.9 PG — SIGNIFICANT CHANGE UP (ref 27–31)
MCHC RBC-ENTMCNC: 30.9 PG — SIGNIFICANT CHANGE UP (ref 27–31)
MCHC RBC-ENTMCNC: 31 G/DL — LOW (ref 32–37)
MCHC RBC-ENTMCNC: 31 PG — SIGNIFICANT CHANGE UP (ref 27–31)
MCHC RBC-ENTMCNC: 31.2 G/DL — LOW (ref 32–37)
MCHC RBC-ENTMCNC: 31.2 PG — HIGH (ref 27–31)
MCHC RBC-ENTMCNC: 31.3 G/DL — LOW (ref 32–37)
MCHC RBC-ENTMCNC: 31.4 G/DL — LOW (ref 32–37)
MCHC RBC-ENTMCNC: 31.6 G/DL — LOW (ref 32–37)
MCHC RBC-ENTMCNC: 31.8 G/DL — LOW (ref 32–37)
MCHC RBC-ENTMCNC: 32 G/DL — SIGNIFICANT CHANGE UP (ref 32–37)
MCHC RBC-ENTMCNC: 32.2 G/DL — SIGNIFICANT CHANGE UP (ref 32–37)
MCHC RBC-ENTMCNC: 32.5 G/DL — SIGNIFICANT CHANGE UP (ref 32–37)
MCHC RBC-ENTMCNC: 32.6 G/DL — SIGNIFICANT CHANGE UP (ref 32–37)
MCHC RBC-ENTMCNC: 32.6 G/DL — SIGNIFICANT CHANGE UP (ref 32–37)
MCHC RBC-ENTMCNC: 32.7 G/DL — SIGNIFICANT CHANGE UP (ref 32–37)
MCHC RBC-ENTMCNC: 32.8 G/DL — SIGNIFICANT CHANGE UP (ref 32–37)
MCHC RBC-ENTMCNC: 32.9 G/DL — SIGNIFICANT CHANGE UP (ref 32–37)
MCHC RBC-ENTMCNC: 32.9 G/DL — SIGNIFICANT CHANGE UP (ref 32–37)
MCHC RBC-ENTMCNC: 33 G/DL — SIGNIFICANT CHANGE UP (ref 32–37)
MCHC RBC-ENTMCNC: 33.2 G/DL — SIGNIFICANT CHANGE UP (ref 32–37)
MCHC RBC-ENTMCNC: 33.4 G/DL — SIGNIFICANT CHANGE UP (ref 32–37)
MCHC RBC-ENTMCNC: 33.5 G/DL — SIGNIFICANT CHANGE UP (ref 32–37)
MCHC RBC-ENTMCNC: 33.5 G/DL — SIGNIFICANT CHANGE UP (ref 32–37)
MCHC RBC-ENTMCNC: 33.6 G/DL
MCHC RBC-ENTMCNC: 33.6 G/DL — SIGNIFICANT CHANGE UP (ref 32–37)
MCHC RBC-ENTMCNC: 33.6 G/DL — SIGNIFICANT CHANGE UP (ref 32–37)
MCHC RBC-ENTMCNC: 33.7 G/DL — SIGNIFICANT CHANGE UP (ref 32–37)
MCHC RBC-ENTMCNC: 33.8 G/DL — SIGNIFICANT CHANGE UP (ref 32–37)
MCHC RBC-ENTMCNC: 33.8 G/DL — SIGNIFICANT CHANGE UP (ref 32–37)
MCHC RBC-ENTMCNC: 33.9 G/DL — SIGNIFICANT CHANGE UP (ref 32–37)
MCHC RBC-ENTMCNC: 34.3 G/DL — SIGNIFICANT CHANGE UP (ref 32–37)
MCHC RBC-ENTMCNC: 34.4 G/DL — SIGNIFICANT CHANGE UP (ref 32–37)
MCHC RBC-ENTMCNC: 34.4 G/DL — SIGNIFICANT CHANGE UP (ref 32–37)
MCHC RBC-ENTMCNC: 34.6 G/DL — SIGNIFICANT CHANGE UP (ref 32–37)
MCHC RBC-ENTMCNC: 34.7 G/DL — SIGNIFICANT CHANGE UP (ref 32–37)
MCHC RBC-ENTMCNC: 34.8 G/DL — SIGNIFICANT CHANGE UP (ref 32–37)
MCHC RBC-ENTMCNC: 34.8 G/DL — SIGNIFICANT CHANGE UP (ref 32–37)
MCHC RBC-ENTMCNC: 34.9 G/DL — SIGNIFICANT CHANGE UP (ref 32–37)
MCHC RBC-ENTMCNC: 34.9 G/DL — SIGNIFICANT CHANGE UP (ref 32–37)
MCHC RBC-ENTMCNC: 35.3 G/DL — SIGNIFICANT CHANGE UP (ref 32–37)
MCV RBC AUTO: 86.8 FL — SIGNIFICANT CHANGE UP (ref 80–94)
MCV RBC AUTO: 87 FL — SIGNIFICANT CHANGE UP (ref 80–94)
MCV RBC AUTO: 87.1 FL — SIGNIFICANT CHANGE UP (ref 80–94)
MCV RBC AUTO: 87.4 FL — SIGNIFICANT CHANGE UP (ref 80–94)
MCV RBC AUTO: 87.7 FL — SIGNIFICANT CHANGE UP (ref 80–94)
MCV RBC AUTO: 87.7 FL — SIGNIFICANT CHANGE UP (ref 80–94)
MCV RBC AUTO: 88 FL — SIGNIFICANT CHANGE UP (ref 80–94)
MCV RBC AUTO: 88 FL — SIGNIFICANT CHANGE UP (ref 80–94)
MCV RBC AUTO: 88.1 FL — SIGNIFICANT CHANGE UP (ref 80–94)
MCV RBC AUTO: 88.3 FL — SIGNIFICANT CHANGE UP (ref 80–94)
MCV RBC AUTO: 88.6 FL — SIGNIFICANT CHANGE UP (ref 80–94)
MCV RBC AUTO: 88.7 FL — SIGNIFICANT CHANGE UP (ref 80–94)
MCV RBC AUTO: 89.4 FL — SIGNIFICANT CHANGE UP (ref 80–94)
MCV RBC AUTO: 89.5 FL — SIGNIFICANT CHANGE UP (ref 80–94)
MCV RBC AUTO: 89.7 FL — SIGNIFICANT CHANGE UP (ref 80–94)
MCV RBC AUTO: 89.8 FL — SIGNIFICANT CHANGE UP (ref 80–94)
MCV RBC AUTO: 89.8 FL — SIGNIFICANT CHANGE UP (ref 80–94)
MCV RBC AUTO: 89.9 FL — SIGNIFICANT CHANGE UP (ref 80–94)
MCV RBC AUTO: 90 FL — SIGNIFICANT CHANGE UP (ref 80–94)
MCV RBC AUTO: 90.2 FL — SIGNIFICANT CHANGE UP (ref 80–94)
MCV RBC AUTO: 90.2 FL — SIGNIFICANT CHANGE UP (ref 80–94)
MCV RBC AUTO: 90.3 FL — SIGNIFICANT CHANGE UP (ref 80–94)
MCV RBC AUTO: 90.5 FL
MCV RBC AUTO: 90.5 FL — SIGNIFICANT CHANGE UP (ref 80–94)
MCV RBC AUTO: 90.7 FL — SIGNIFICANT CHANGE UP (ref 80–94)
MCV RBC AUTO: 90.7 FL — SIGNIFICANT CHANGE UP (ref 80–94)
MCV RBC AUTO: 90.8 FL — SIGNIFICANT CHANGE UP (ref 80–94)
MCV RBC AUTO: 91 FL — SIGNIFICANT CHANGE UP (ref 80–94)
MCV RBC AUTO: 91.4 FL — SIGNIFICANT CHANGE UP (ref 80–94)
MCV RBC AUTO: 91.6 FL — SIGNIFICANT CHANGE UP (ref 80–94)
MCV RBC AUTO: 91.9 FL — SIGNIFICANT CHANGE UP (ref 80–94)
MCV RBC AUTO: 91.9 FL — SIGNIFICANT CHANGE UP (ref 80–94)
MCV RBC AUTO: 92 FL — SIGNIFICANT CHANGE UP (ref 80–94)
MCV RBC AUTO: 92 FL — SIGNIFICANT CHANGE UP (ref 80–94)
MCV RBC AUTO: 92.1 FL — SIGNIFICANT CHANGE UP (ref 80–94)
MCV RBC AUTO: 92.1 FL — SIGNIFICANT CHANGE UP (ref 80–94)
MCV RBC AUTO: 92.3 FL — SIGNIFICANT CHANGE UP (ref 80–94)
MCV RBC AUTO: 92.3 FL — SIGNIFICANT CHANGE UP (ref 80–94)
MCV RBC AUTO: 92.4 FL — SIGNIFICANT CHANGE UP (ref 80–94)
MCV RBC AUTO: 92.6 FL — SIGNIFICANT CHANGE UP (ref 80–94)
MCV RBC AUTO: 92.6 FL — SIGNIFICANT CHANGE UP (ref 80–94)
MCV RBC AUTO: 92.7 FL — SIGNIFICANT CHANGE UP (ref 80–94)
MCV RBC AUTO: 92.8 FL — SIGNIFICANT CHANGE UP (ref 80–94)
MCV RBC AUTO: 92.9 FL — SIGNIFICANT CHANGE UP (ref 80–94)
MCV RBC AUTO: 93.2 FL — SIGNIFICANT CHANGE UP (ref 80–94)
MCV RBC AUTO: 93.6 FL — SIGNIFICANT CHANGE UP (ref 80–94)
MCV RBC AUTO: 93.7 FL — SIGNIFICANT CHANGE UP (ref 80–94)
MCV RBC AUTO: 93.7 FL — SIGNIFICANT CHANGE UP (ref 80–94)
MCV RBC AUTO: 93.8 FL — SIGNIFICANT CHANGE UP (ref 80–94)
MCV RBC AUTO: 93.9 FL — SIGNIFICANT CHANGE UP (ref 80–94)
MCV RBC AUTO: 94.2 FL — HIGH (ref 80–94)
MCV RBC AUTO: 94.4 FL — HIGH (ref 80–94)
MCV RBC AUTO: 94.4 FL — HIGH (ref 80–94)
MCV RBC AUTO: 94.8 FL — HIGH (ref 80–94)
MCV RBC AUTO: 94.8 FL — HIGH (ref 80–94)
MCV RBC AUTO: 95 FL — HIGH (ref 80–94)
MCV RBC AUTO: 95.3 FL — HIGH (ref 80–94)
MCV RBC AUTO: 95.3 FL — HIGH (ref 80–94)
METHOD TYPE: SIGNIFICANT CHANGE UP
METHOD TYPE: SIGNIFICANT CHANGE UP
MICROCYTES BLD QL: SLIGHT — SIGNIFICANT CHANGE UP
MONOCYTES # BLD AUTO: 0.24 K/UL — SIGNIFICANT CHANGE UP (ref 0.1–0.6)
MONOCYTES # BLD AUTO: 0.25 K/UL — SIGNIFICANT CHANGE UP (ref 0.1–0.6)
MONOCYTES # BLD AUTO: 0.26 K/UL — SIGNIFICANT CHANGE UP (ref 0.1–0.6)
MONOCYTES # BLD AUTO: 0.27 K/UL — SIGNIFICANT CHANGE UP (ref 0.1–0.6)
MONOCYTES # BLD AUTO: 0.3 K/UL — SIGNIFICANT CHANGE UP (ref 0.1–0.6)
MONOCYTES # BLD AUTO: 0.31 K/UL — SIGNIFICANT CHANGE UP (ref 0.1–0.6)
MONOCYTES # BLD AUTO: 0.31 K/UL — SIGNIFICANT CHANGE UP (ref 0.1–0.6)
MONOCYTES # BLD AUTO: 0.32 K/UL — SIGNIFICANT CHANGE UP (ref 0.1–0.6)
MONOCYTES # BLD AUTO: 0.34 K/UL — SIGNIFICANT CHANGE UP (ref 0.1–0.6)
MONOCYTES # BLD AUTO: 0.38 K/UL — SIGNIFICANT CHANGE UP (ref 0.1–0.6)
MONOCYTES # BLD AUTO: 0.4 K/UL — SIGNIFICANT CHANGE UP (ref 0.1–0.6)
MONOCYTES # BLD AUTO: 0.45 K/UL — SIGNIFICANT CHANGE UP (ref 0.1–0.6)
MONOCYTES # BLD AUTO: 0.46 K/UL — SIGNIFICANT CHANGE UP (ref 0.1–0.6)
MONOCYTES # BLD AUTO: 0.47 K/UL — SIGNIFICANT CHANGE UP (ref 0.1–0.6)
MONOCYTES # BLD AUTO: 0.48 K/UL — SIGNIFICANT CHANGE UP (ref 0.1–0.6)
MONOCYTES # BLD AUTO: 0.49 K/UL — SIGNIFICANT CHANGE UP (ref 0.1–0.6)
MONOCYTES # BLD AUTO: 0.54 K/UL — SIGNIFICANT CHANGE UP (ref 0.1–0.6)
MONOCYTES # BLD AUTO: 0.55 K/UL — SIGNIFICANT CHANGE UP (ref 0.1–0.6)
MONOCYTES # BLD AUTO: 0.55 K/UL — SIGNIFICANT CHANGE UP (ref 0.1–0.6)
MONOCYTES # BLD AUTO: 0.56 K/UL — SIGNIFICANT CHANGE UP (ref 0.1–0.6)
MONOCYTES # BLD AUTO: 0.56 K/UL — SIGNIFICANT CHANGE UP (ref 0.1–0.6)
MONOCYTES # BLD AUTO: 0.58 K/UL — SIGNIFICANT CHANGE UP (ref 0.1–0.6)
MONOCYTES # BLD AUTO: 0.59 K/UL — SIGNIFICANT CHANGE UP (ref 0.1–0.6)
MONOCYTES # BLD AUTO: 0.61 K/UL
MONOCYTES # BLD AUTO: 0.62 K/UL — HIGH (ref 0.1–0.6)
MONOCYTES # BLD AUTO: 0.77 K/UL — HIGH (ref 0.1–0.6)
MONOCYTES # BLD AUTO: 0.83 K/UL — HIGH (ref 0.1–0.6)
MONOCYTES # BLD AUTO: 0.91 K/UL — HIGH (ref 0.1–0.6)
MONOCYTES # BLD AUTO: 0.92 K/UL — HIGH (ref 0.1–0.6)
MONOCYTES # BLD AUTO: 0.93 K/UL — HIGH (ref 0.1–0.6)
MONOCYTES # BLD AUTO: 1.07 K/UL — HIGH (ref 0.1–0.6)
MONOCYTES # BLD AUTO: 1.14 K/UL — HIGH (ref 0.1–0.6)
MONOCYTES NFR BLD AUTO: 1.8 % — SIGNIFICANT CHANGE UP (ref 1.7–9.3)
MONOCYTES NFR BLD AUTO: 2.5 % — SIGNIFICANT CHANGE UP (ref 1.7–9.3)
MONOCYTES NFR BLD AUTO: 2.8 % — SIGNIFICANT CHANGE UP (ref 1.7–9.3)
MONOCYTES NFR BLD AUTO: 3.1 % — SIGNIFICANT CHANGE UP (ref 1.7–9.3)
MONOCYTES NFR BLD AUTO: 3.2 % — SIGNIFICANT CHANGE UP (ref 1.7–9.3)
MONOCYTES NFR BLD AUTO: 3.6 % — SIGNIFICANT CHANGE UP (ref 1.7–9.3)
MONOCYTES NFR BLD AUTO: 3.8 % — SIGNIFICANT CHANGE UP (ref 1.7–9.3)
MONOCYTES NFR BLD AUTO: 3.9 % — SIGNIFICANT CHANGE UP (ref 1.7–9.3)
MONOCYTES NFR BLD AUTO: 4.2 % — SIGNIFICANT CHANGE UP (ref 1.7–9.3)
MONOCYTES NFR BLD AUTO: 4.3 % — SIGNIFICANT CHANGE UP (ref 1.7–9.3)
MONOCYTES NFR BLD AUTO: 4.4 %
MONOCYTES NFR BLD AUTO: 4.4 % — SIGNIFICANT CHANGE UP (ref 1.7–9.3)
MONOCYTES NFR BLD AUTO: 4.5 % — SIGNIFICANT CHANGE UP (ref 1.7–9.3)
MONOCYTES NFR BLD AUTO: 4.6 % — SIGNIFICANT CHANGE UP (ref 1.7–9.3)
MONOCYTES NFR BLD AUTO: 4.8 % — SIGNIFICANT CHANGE UP (ref 1.7–9.3)
MONOCYTES NFR BLD AUTO: 4.9 % — SIGNIFICANT CHANGE UP (ref 1.7–9.3)
MONOCYTES NFR BLD AUTO: 5.1 % — SIGNIFICANT CHANGE UP (ref 1.7–9.3)
MONOCYTES NFR BLD AUTO: 5.1 % — SIGNIFICANT CHANGE UP (ref 1.7–9.3)
MONOCYTES NFR BLD AUTO: 5.2 % — SIGNIFICANT CHANGE UP (ref 1.7–9.3)
MONOCYTES NFR BLD AUTO: 5.6 % — SIGNIFICANT CHANGE UP (ref 1.7–9.3)
MONOCYTES NFR BLD AUTO: 5.6 % — SIGNIFICANT CHANGE UP (ref 1.7–9.3)
MONOCYTES NFR BLD AUTO: 5.7 % — SIGNIFICANT CHANGE UP (ref 1.7–9.3)
MONOCYTES NFR BLD AUTO: 5.9 % — SIGNIFICANT CHANGE UP (ref 1.7–9.3)
MONOCYTES NFR BLD AUTO: 6 % — SIGNIFICANT CHANGE UP (ref 1.7–9.3)
MONOCYTES NFR BLD AUTO: 6 % — SIGNIFICANT CHANGE UP (ref 1.7–9.3)
MONOCYTES NFR BLD AUTO: 6.2 % — SIGNIFICANT CHANGE UP (ref 1.7–9.3)
MONOCYTES NFR BLD AUTO: 7 % — SIGNIFICANT CHANGE UP (ref 1.7–9.3)
MONOCYTES NFR BLD AUTO: 7.1 % — SIGNIFICANT CHANGE UP (ref 1.7–9.3)
MONOCYTES NFR BLD AUTO: 7.1 % — SIGNIFICANT CHANGE UP (ref 1.7–9.3)
MONOCYTES NFR BLD AUTO: 7.4 % — SIGNIFICANT CHANGE UP (ref 1.7–9.3)
MONOCYTES NFR BLD AUTO: 7.7 % — SIGNIFICANT CHANGE UP (ref 1.7–9.3)
MONOCYTES NFR BLD AUTO: 8.2 % — SIGNIFICANT CHANGE UP (ref 1.7–9.3)
MYELOCYTES NFR BLD: 2.6 % — HIGH (ref 0–0)
NEUTROPHILS # BLD AUTO: 10.04 K/UL — HIGH (ref 1.4–6.5)
NEUTROPHILS # BLD AUTO: 10.43 K/UL
NEUTROPHILS # BLD AUTO: 10.43 K/UL — HIGH (ref 1.4–6.5)
NEUTROPHILS # BLD AUTO: 10.52 K/UL — HIGH (ref 1.4–6.5)
NEUTROPHILS # BLD AUTO: 11.22 K/UL — HIGH (ref 1.4–6.5)
NEUTROPHILS # BLD AUTO: 11.53 K/UL — HIGH (ref 1.4–6.5)
NEUTROPHILS # BLD AUTO: 13.67 K/UL — HIGH (ref 1.4–6.5)
NEUTROPHILS # BLD AUTO: 15.59 K/UL — HIGH (ref 1.4–6.5)
NEUTROPHILS # BLD AUTO: 16.6 K/UL — HIGH (ref 1.4–6.5)
NEUTROPHILS # BLD AUTO: 18.86 K/UL — HIGH (ref 1.4–6.5)
NEUTROPHILS # BLD AUTO: 3.69 K/UL — SIGNIFICANT CHANGE UP (ref 1.4–6.5)
NEUTROPHILS # BLD AUTO: 4.36 K/UL — SIGNIFICANT CHANGE UP (ref 1.4–6.5)
NEUTROPHILS # BLD AUTO: 4.53 K/UL — SIGNIFICANT CHANGE UP (ref 1.4–6.5)
NEUTROPHILS # BLD AUTO: 4.82 K/UL — SIGNIFICANT CHANGE UP (ref 1.4–6.5)
NEUTROPHILS # BLD AUTO: 4.87 K/UL — SIGNIFICANT CHANGE UP (ref 1.4–6.5)
NEUTROPHILS # BLD AUTO: 5.06 K/UL — SIGNIFICANT CHANGE UP (ref 1.4–6.5)
NEUTROPHILS # BLD AUTO: 5.74 K/UL — SIGNIFICANT CHANGE UP (ref 1.4–6.5)
NEUTROPHILS # BLD AUTO: 6.14 K/UL — SIGNIFICANT CHANGE UP (ref 1.4–6.5)
NEUTROPHILS # BLD AUTO: 6.56 K/UL — HIGH (ref 1.4–6.5)
NEUTROPHILS # BLD AUTO: 7.47 K/UL — HIGH (ref 1.4–6.5)
NEUTROPHILS # BLD AUTO: 7.62 K/UL — HIGH (ref 1.4–6.5)
NEUTROPHILS # BLD AUTO: 7.64 K/UL — HIGH (ref 1.4–6.5)
NEUTROPHILS # BLD AUTO: 8.16 K/UL — HIGH (ref 1.4–6.5)
NEUTROPHILS # BLD AUTO: 8.29 K/UL — HIGH (ref 1.4–6.5)
NEUTROPHILS # BLD AUTO: 8.37 K/UL — HIGH (ref 1.4–6.5)
NEUTROPHILS # BLD AUTO: 8.59 K/UL — HIGH (ref 1.4–6.5)
NEUTROPHILS # BLD AUTO: 8.73 K/UL — HIGH (ref 1.4–6.5)
NEUTROPHILS # BLD AUTO: 8.77 K/UL — HIGH (ref 1.4–6.5)
NEUTROPHILS # BLD AUTO: 8.88 K/UL — HIGH (ref 1.4–6.5)
NEUTROPHILS # BLD AUTO: 9.71 K/UL — HIGH (ref 1.4–6.5)
NEUTROPHILS # BLD AUTO: 9.8 K/UL — HIGH (ref 1.4–6.5)
NEUTROPHILS # BLD AUTO: 9.85 K/UL — HIGH (ref 1.4–6.5)
NEUTROPHILS NFR BLD AUTO: 69.4 % — SIGNIFICANT CHANGE UP (ref 42.2–75.2)
NEUTROPHILS NFR BLD AUTO: 73.5 % — SIGNIFICANT CHANGE UP (ref 42.2–75.2)
NEUTROPHILS NFR BLD AUTO: 74.3 % — SIGNIFICANT CHANGE UP (ref 42.2–75.2)
NEUTROPHILS NFR BLD AUTO: 74.6 % — SIGNIFICANT CHANGE UP (ref 42.2–75.2)
NEUTROPHILS NFR BLD AUTO: 74.8 %
NEUTROPHILS NFR BLD AUTO: 75.7 % — HIGH (ref 42.2–75.2)
NEUTROPHILS NFR BLD AUTO: 76.1 % — HIGH (ref 42.2–75.2)
NEUTROPHILS NFR BLD AUTO: 76.1 % — HIGH (ref 42.2–75.2)
NEUTROPHILS NFR BLD AUTO: 76.7 % — HIGH (ref 42.2–75.2)
NEUTROPHILS NFR BLD AUTO: 77.5 % — HIGH (ref 42.2–75.2)
NEUTROPHILS NFR BLD AUTO: 78.3 % — HIGH (ref 42.2–75.2)
NEUTROPHILS NFR BLD AUTO: 78.4 % — HIGH (ref 42.2–75.2)
NEUTROPHILS NFR BLD AUTO: 78.8 % — HIGH (ref 42.2–75.2)
NEUTROPHILS NFR BLD AUTO: 79.1 % — HIGH (ref 42.2–75.2)
NEUTROPHILS NFR BLD AUTO: 79.2 % — HIGH (ref 42.2–75.2)
NEUTROPHILS NFR BLD AUTO: 79.6 % — HIGH (ref 42.2–75.2)
NEUTROPHILS NFR BLD AUTO: 80.8 % — HIGH (ref 42.2–75.2)
NEUTROPHILS NFR BLD AUTO: 81.3 % — HIGH (ref 42.2–75.2)
NEUTROPHILS NFR BLD AUTO: 82.3 % — HIGH (ref 42.2–75.2)
NEUTROPHILS NFR BLD AUTO: 82.3 % — HIGH (ref 42.2–75.2)
NEUTROPHILS NFR BLD AUTO: 82.4 % — HIGH (ref 42.2–75.2)
NEUTROPHILS NFR BLD AUTO: 82.8 % — HIGH (ref 42.2–75.2)
NEUTROPHILS NFR BLD AUTO: 83.4 % — HIGH (ref 42.2–75.2)
NEUTROPHILS NFR BLD AUTO: 83.4 % — HIGH (ref 42.2–75.2)
NEUTROPHILS NFR BLD AUTO: 83.5 % — HIGH (ref 42.2–75.2)
NEUTROPHILS NFR BLD AUTO: 83.9 % — HIGH (ref 42.2–75.2)
NEUTROPHILS NFR BLD AUTO: 84.7 % — HIGH (ref 42.2–75.2)
NEUTROPHILS NFR BLD AUTO: 85.2 % — HIGH (ref 42.2–75.2)
NEUTROPHILS NFR BLD AUTO: 86 % — HIGH (ref 42.2–75.2)
NEUTROPHILS NFR BLD AUTO: 90.6 % — HIGH (ref 42.2–75.2)
NEUTS BAND # BLD: 1.7 % — SIGNIFICANT CHANGE UP (ref 0–6)
NITRITE UR-MCNC: NEGATIVE — SIGNIFICANT CHANGE UP
NRBC # BLD: 0 /100 WBCS — SIGNIFICANT CHANGE UP (ref 0–0)
NRBC # BLD: 1 /100 WBCS — HIGH (ref 0–0)
NRBC # BLD: 2 /100 WBCS — HIGH (ref 0–0)
NRBC # BLD: 2 /100 — HIGH (ref 0–0)
NRBC # BLD: 3 /100 WBCS — HIGH (ref 0–0)
NRBC # BLD: 4 /100 WBCS — HIGH (ref 0–0)
NRBC # BLD: 4 /100 WBCS — HIGH (ref 0–0)
NRBC # BLD: SIGNIFICANT CHANGE UP /100 WBCS (ref 0–0)
NT-PROBNP SERPL-SCNC: 2131 PG/ML — HIGH (ref 0–300)
NT-PROBNP SERPL-SCNC: 2374 PG/ML — HIGH (ref 0–300)
NT-PROBNP SERPL-SCNC: 2456 PG/ML — HIGH (ref 0–300)
ORGANISM # SPEC MICROSCOPIC CNT: SIGNIFICANT CHANGE UP
PCO2 BLDA: 33 MMHG — LOW (ref 38–42)
PCO2 BLDA: 33 MMHG — LOW (ref 38–42)
PCO2 BLDA: 35 MMHG — LOW (ref 38–42)
PCO2 BLDA: 35 MMHG — LOW (ref 38–42)
PCO2 BLDA: 38 MMHG — SIGNIFICANT CHANGE UP (ref 38–42)
PCO2 BLDA: 40 MMHG — SIGNIFICANT CHANGE UP (ref 38–42)
PCO2 BLDA: 41 MMHG — SIGNIFICANT CHANGE UP (ref 38–42)
PCO2 BLDV: 59 MMHG — HIGH (ref 41–51)
PH BLDA: 7.41 — SIGNIFICANT CHANGE UP (ref 7.38–7.42)
PH BLDA: 7.42 — SIGNIFICANT CHANGE UP (ref 7.38–7.42)
PH BLDA: 7.44 — HIGH (ref 7.38–7.42)
PH BLDA: 7.46 — HIGH (ref 7.38–7.42)
PH BLDA: 7.48 — HIGH (ref 7.38–7.42)
PH BLDA: 7.5 — HIGH (ref 7.38–7.42)
PH BLDA: 7.53 — HIGH (ref 7.38–7.42)
PH BLDV: 7.39 — SIGNIFICANT CHANGE UP (ref 7.26–7.43)
PH UR: 6 — SIGNIFICANT CHANGE UP (ref 5–8)
PH UR: 6.5 — SIGNIFICANT CHANGE UP (ref 5–8)
PH UR: 7 — SIGNIFICANT CHANGE UP (ref 5–8)
PHOSPHATE SERPL-MCNC: 1.7 MG/DL — LOW (ref 2.1–4.9)
PHOSPHATE SERPL-MCNC: 1.8 MG/DL — LOW (ref 2.1–4.9)
PHOSPHATE SERPL-MCNC: 1.8 MG/DL — LOW (ref 2.1–4.9)
PHOSPHATE SERPL-MCNC: 1.9 MG/DL — LOW (ref 2.1–4.9)
PHOSPHATE SERPL-MCNC: 2 MG/DL — LOW (ref 2.1–4.9)
PHOSPHATE SERPL-MCNC: 2.2 MG/DL — SIGNIFICANT CHANGE UP (ref 2.1–4.9)
PHOSPHATE SERPL-MCNC: 2.3 MG/DL — SIGNIFICANT CHANGE UP (ref 2.1–4.9)
PHOSPHATE SERPL-MCNC: 2.4 MG/DL — SIGNIFICANT CHANGE UP (ref 2.1–4.9)
PHOSPHATE SERPL-MCNC: 2.4 MG/DL — SIGNIFICANT CHANGE UP (ref 2.1–4.9)
PHOSPHATE SERPL-MCNC: 2.6 MG/DL — SIGNIFICANT CHANGE UP (ref 2.1–4.9)
PHOSPHATE SERPL-MCNC: 2.6 MG/DL — SIGNIFICANT CHANGE UP (ref 2.1–4.9)
PHOSPHATE SERPL-MCNC: 2.8 MG/DL — SIGNIFICANT CHANGE UP (ref 2.1–4.9)
PHOSPHATE SERPL-MCNC: 3.1 MG/DL — SIGNIFICANT CHANGE UP (ref 2.1–4.9)
PHOSPHATE SERPL-MCNC: 3.4 MG/DL — SIGNIFICANT CHANGE UP (ref 2.1–4.9)
PHOSPHATE SERPL-MCNC: 3.5 MG/DL — SIGNIFICANT CHANGE UP (ref 2.1–4.9)
PHOSPHATE SERPL-MCNC: 4 MG/DL — SIGNIFICANT CHANGE UP (ref 2.1–4.9)
PLAT MORPH BLD: NORMAL — SIGNIFICANT CHANGE UP
PLATELET # BLD AUTO: 157 K/UL — SIGNIFICANT CHANGE UP (ref 130–400)
PLATELET # BLD AUTO: 186 K/UL — SIGNIFICANT CHANGE UP (ref 130–400)
PLATELET # BLD AUTO: 187 K/UL — SIGNIFICANT CHANGE UP (ref 130–400)
PLATELET # BLD AUTO: 199 K/UL — SIGNIFICANT CHANGE UP (ref 130–400)
PLATELET # BLD AUTO: 201 K/UL — SIGNIFICANT CHANGE UP (ref 130–400)
PLATELET # BLD AUTO: 209 K/UL — SIGNIFICANT CHANGE UP (ref 130–400)
PLATELET # BLD AUTO: 220 K/UL — SIGNIFICANT CHANGE UP (ref 130–400)
PLATELET # BLD AUTO: 220 K/UL — SIGNIFICANT CHANGE UP (ref 130–400)
PLATELET # BLD AUTO: 222 K/UL — SIGNIFICANT CHANGE UP (ref 130–400)
PLATELET # BLD AUTO: 224 K/UL — SIGNIFICANT CHANGE UP (ref 130–400)
PLATELET # BLD AUTO: 230 K/UL — SIGNIFICANT CHANGE UP (ref 130–400)
PLATELET # BLD AUTO: 230 K/UL — SIGNIFICANT CHANGE UP (ref 130–400)
PLATELET # BLD AUTO: 236 K/UL — SIGNIFICANT CHANGE UP (ref 130–400)
PLATELET # BLD AUTO: 236 K/UL — SIGNIFICANT CHANGE UP (ref 130–400)
PLATELET # BLD AUTO: 237 K/UL — SIGNIFICANT CHANGE UP (ref 130–400)
PLATELET # BLD AUTO: 238 K/UL — SIGNIFICANT CHANGE UP (ref 130–400)
PLATELET # BLD AUTO: 247 K/UL — SIGNIFICANT CHANGE UP (ref 130–400)
PLATELET # BLD AUTO: 255 K/UL — SIGNIFICANT CHANGE UP (ref 130–400)
PLATELET # BLD AUTO: 257 K/UL — SIGNIFICANT CHANGE UP (ref 130–400)
PLATELET # BLD AUTO: 258 K/UL — SIGNIFICANT CHANGE UP (ref 130–400)
PLATELET # BLD AUTO: 259 K/UL — SIGNIFICANT CHANGE UP (ref 130–400)
PLATELET # BLD AUTO: 261 K/UL — SIGNIFICANT CHANGE UP (ref 130–400)
PLATELET # BLD AUTO: 266 K/UL — SIGNIFICANT CHANGE UP (ref 130–400)
PLATELET # BLD AUTO: 266 K/UL — SIGNIFICANT CHANGE UP (ref 130–400)
PLATELET # BLD AUTO: 267 K/UL — SIGNIFICANT CHANGE UP (ref 130–400)
PLATELET # BLD AUTO: 269 K/UL — SIGNIFICANT CHANGE UP (ref 130–400)
PLATELET # BLD AUTO: 271 K/UL — SIGNIFICANT CHANGE UP (ref 130–400)
PLATELET # BLD AUTO: 273 K/UL — SIGNIFICANT CHANGE UP (ref 130–400)
PLATELET # BLD AUTO: 273 K/UL — SIGNIFICANT CHANGE UP (ref 130–400)
PLATELET # BLD AUTO: 274 K/UL — SIGNIFICANT CHANGE UP (ref 130–400)
PLATELET # BLD AUTO: 275 K/UL — SIGNIFICANT CHANGE UP (ref 130–400)
PLATELET # BLD AUTO: 277 K/UL — SIGNIFICANT CHANGE UP (ref 130–400)
PLATELET # BLD AUTO: 279 K/UL — SIGNIFICANT CHANGE UP (ref 130–400)
PLATELET # BLD AUTO: 288 K/UL — SIGNIFICANT CHANGE UP (ref 130–400)
PLATELET # BLD AUTO: 289 K/UL — SIGNIFICANT CHANGE UP (ref 130–400)
PLATELET # BLD AUTO: 292 K/UL — SIGNIFICANT CHANGE UP (ref 130–400)
PLATELET # BLD AUTO: 292 K/UL — SIGNIFICANT CHANGE UP (ref 130–400)
PLATELET # BLD AUTO: 293 K/UL — SIGNIFICANT CHANGE UP (ref 130–400)
PLATELET # BLD AUTO: 294 K/UL — SIGNIFICANT CHANGE UP (ref 130–400)
PLATELET # BLD AUTO: 294 K/UL — SIGNIFICANT CHANGE UP (ref 130–400)
PLATELET # BLD AUTO: 300 K/UL — SIGNIFICANT CHANGE UP (ref 130–400)
PLATELET # BLD AUTO: 301 K/UL — SIGNIFICANT CHANGE UP (ref 130–400)
PLATELET # BLD AUTO: 303 K/UL — SIGNIFICANT CHANGE UP (ref 130–400)
PLATELET # BLD AUTO: 307 K/UL — SIGNIFICANT CHANGE UP (ref 130–400)
PLATELET # BLD AUTO: 309 K/UL — SIGNIFICANT CHANGE UP (ref 130–400)
PLATELET # BLD AUTO: 309 K/UL — SIGNIFICANT CHANGE UP (ref 130–400)
PLATELET # BLD AUTO: 311 K/UL — SIGNIFICANT CHANGE UP (ref 130–400)
PLATELET # BLD AUTO: 314 K/UL — SIGNIFICANT CHANGE UP (ref 130–400)
PLATELET # BLD AUTO: 316 K/UL — SIGNIFICANT CHANGE UP (ref 130–400)
PLATELET # BLD AUTO: 316 K/UL — SIGNIFICANT CHANGE UP (ref 130–400)
PLATELET # BLD AUTO: 321 K/UL — SIGNIFICANT CHANGE UP (ref 130–400)
PLATELET # BLD AUTO: 330 K/UL — SIGNIFICANT CHANGE UP (ref 130–400)
PLATELET # BLD AUTO: 336 K/UL — SIGNIFICANT CHANGE UP (ref 130–400)
PLATELET # BLD AUTO: 339 K/UL — SIGNIFICANT CHANGE UP (ref 130–400)
PLATELET # BLD AUTO: 342 K/UL — SIGNIFICANT CHANGE UP (ref 130–400)
PLATELET # BLD AUTO: 347 K/UL
PLATELET # BLD AUTO: 359 K/UL — SIGNIFICANT CHANGE UP (ref 130–400)
PLATELET # BLD AUTO: 362 K/UL — SIGNIFICANT CHANGE UP (ref 130–400)
PLATELET # BLD AUTO: 381 K/UL — SIGNIFICANT CHANGE UP (ref 130–400)
PLATELET # BLD AUTO: 396 K/UL — SIGNIFICANT CHANGE UP (ref 130–400)
PLATELET # BLD AUTO: 400 K/UL — SIGNIFICANT CHANGE UP (ref 130–400)
PLATELET # BLD AUTO: 401 K/UL — HIGH (ref 130–400)
PLATELET # BLD AUTO: 405 K/UL — HIGH (ref 130–400)
PLATELET # BLD AUTO: 428 K/UL — HIGH (ref 130–400)
PLATELET # BLD AUTO: 428 K/UL — HIGH (ref 130–400)
PO2 BLDA: 112 MMHG — HIGH (ref 78–95)
PO2 BLDA: 167 MMHG — HIGH (ref 78–95)
PO2 BLDA: 179 MMHG — HIGH (ref 78–95)
PO2 BLDA: 205 MMHG — HIGH (ref 78–95)
PO2 BLDA: 379 MMHG — HIGH (ref 78–95)
PO2 BLDA: 73 MMHG — LOW (ref 78–95)
PO2 BLDA: 98 MMHG — HIGH (ref 78–95)
PO2 BLDV: 25 MMHG — SIGNIFICANT CHANGE UP (ref 20–40)
POIKILOCYTOSIS BLD QL AUTO: SIGNIFICANT CHANGE UP
POTASSIUM BLDV-SCNC: 3.5 MMOL/L — SIGNIFICANT CHANGE UP (ref 3.3–5.6)
POTASSIUM SERPL-MCNC: 3.1 MMOL/L — LOW (ref 3.5–5)
POTASSIUM SERPL-MCNC: 3.1 MMOL/L — LOW (ref 3.5–5)
POTASSIUM SERPL-MCNC: 3.2 MMOL/L — LOW (ref 3.5–5)
POTASSIUM SERPL-MCNC: 3.3 MMOL/L — LOW (ref 3.5–5)
POTASSIUM SERPL-MCNC: 3.3 MMOL/L — LOW (ref 3.5–5)
POTASSIUM SERPL-MCNC: 3.4 MMOL/L — LOW (ref 3.5–5)
POTASSIUM SERPL-MCNC: 3.4 MMOL/L — LOW (ref 3.5–5)
POTASSIUM SERPL-MCNC: 3.5 MMOL/L — SIGNIFICANT CHANGE UP (ref 3.5–5)
POTASSIUM SERPL-MCNC: 3.5 MMOL/L — SIGNIFICANT CHANGE UP (ref 3.5–5)
POTASSIUM SERPL-MCNC: 3.6 MMOL/L — SIGNIFICANT CHANGE UP (ref 3.5–5)
POTASSIUM SERPL-MCNC: 3.7 MMOL/L — SIGNIFICANT CHANGE UP (ref 3.5–5)
POTASSIUM SERPL-MCNC: 3.7 MMOL/L — SIGNIFICANT CHANGE UP (ref 3.5–5)
POTASSIUM SERPL-MCNC: 3.8 MMOL/L — SIGNIFICANT CHANGE UP (ref 3.5–5)
POTASSIUM SERPL-MCNC: 3.9 MMOL/L — SIGNIFICANT CHANGE UP (ref 3.5–5)
POTASSIUM SERPL-MCNC: 4 MMOL/L — SIGNIFICANT CHANGE UP (ref 3.5–5)
POTASSIUM SERPL-MCNC: 4.1 MMOL/L — SIGNIFICANT CHANGE UP (ref 3.5–5)
POTASSIUM SERPL-MCNC: 4.2 MMOL/L — SIGNIFICANT CHANGE UP (ref 3.5–5)
POTASSIUM SERPL-MCNC: 4.3 MMOL/L — SIGNIFICANT CHANGE UP (ref 3.5–5)
POTASSIUM SERPL-MCNC: 4.4 MMOL/L — SIGNIFICANT CHANGE UP (ref 3.5–5)
POTASSIUM SERPL-MCNC: 4.4 MMOL/L — SIGNIFICANT CHANGE UP (ref 3.5–5)
POTASSIUM SERPL-MCNC: 4.5 MMOL/L — SIGNIFICANT CHANGE UP (ref 3.5–5)
POTASSIUM SERPL-MCNC: 4.8 MMOL/L — SIGNIFICANT CHANGE UP (ref 3.5–5)
POTASSIUM SERPL-MCNC: 4.8 MMOL/L — SIGNIFICANT CHANGE UP (ref 3.5–5)
POTASSIUM SERPL-MCNC: 5 MMOL/L — SIGNIFICANT CHANGE UP (ref 3.5–5)
POTASSIUM SERPL-MCNC: 5 MMOL/L — SIGNIFICANT CHANGE UP (ref 3.5–5)
POTASSIUM SERPL-MCNC: 5.2 MMOL/L — HIGH (ref 3.5–5)
POTASSIUM SERPL-MCNC: 5.5 MMOL/L — HIGH (ref 3.5–5)
POTASSIUM SERPL-SCNC: 3.1 MMOL/L — LOW (ref 3.5–5)
POTASSIUM SERPL-SCNC: 3.1 MMOL/L — LOW (ref 3.5–5)
POTASSIUM SERPL-SCNC: 3.2 MMOL/L — LOW (ref 3.5–5)
POTASSIUM SERPL-SCNC: 3.3 MMOL/L — LOW (ref 3.5–5)
POTASSIUM SERPL-SCNC: 3.3 MMOL/L — LOW (ref 3.5–5)
POTASSIUM SERPL-SCNC: 3.4 MMOL/L — LOW (ref 3.5–5)
POTASSIUM SERPL-SCNC: 3.4 MMOL/L — LOW (ref 3.5–5)
POTASSIUM SERPL-SCNC: 3.5 MMOL/L — SIGNIFICANT CHANGE UP (ref 3.5–5)
POTASSIUM SERPL-SCNC: 3.5 MMOL/L — SIGNIFICANT CHANGE UP (ref 3.5–5)
POTASSIUM SERPL-SCNC: 3.6 MMOL/L — SIGNIFICANT CHANGE UP (ref 3.5–5)
POTASSIUM SERPL-SCNC: 3.7 MMOL/L — SIGNIFICANT CHANGE UP (ref 3.5–5)
POTASSIUM SERPL-SCNC: 3.7 MMOL/L — SIGNIFICANT CHANGE UP (ref 3.5–5)
POTASSIUM SERPL-SCNC: 3.8 MMOL/L
POTASSIUM SERPL-SCNC: 3.8 MMOL/L — SIGNIFICANT CHANGE UP (ref 3.5–5)
POTASSIUM SERPL-SCNC: 3.9 MMOL/L — SIGNIFICANT CHANGE UP (ref 3.5–5)
POTASSIUM SERPL-SCNC: 4 MMOL/L — SIGNIFICANT CHANGE UP (ref 3.5–5)
POTASSIUM SERPL-SCNC: 4.1 MMOL/L — SIGNIFICANT CHANGE UP (ref 3.5–5)
POTASSIUM SERPL-SCNC: 4.2 MMOL/L — SIGNIFICANT CHANGE UP (ref 3.5–5)
POTASSIUM SERPL-SCNC: 4.3 MMOL/L — SIGNIFICANT CHANGE UP (ref 3.5–5)
POTASSIUM SERPL-SCNC: 4.4 MMOL/L — SIGNIFICANT CHANGE UP (ref 3.5–5)
POTASSIUM SERPL-SCNC: 4.4 MMOL/L — SIGNIFICANT CHANGE UP (ref 3.5–5)
POTASSIUM SERPL-SCNC: 4.5 MMOL/L — SIGNIFICANT CHANGE UP (ref 3.5–5)
POTASSIUM SERPL-SCNC: 4.8 MMOL/L — SIGNIFICANT CHANGE UP (ref 3.5–5)
POTASSIUM SERPL-SCNC: 4.8 MMOL/L — SIGNIFICANT CHANGE UP (ref 3.5–5)
POTASSIUM SERPL-SCNC: 5 MMOL/L — SIGNIFICANT CHANGE UP (ref 3.5–5)
POTASSIUM SERPL-SCNC: 5 MMOL/L — SIGNIFICANT CHANGE UP (ref 3.5–5)
POTASSIUM SERPL-SCNC: 5.2 MMOL/L — HIGH (ref 3.5–5)
POTASSIUM SERPL-SCNC: 5.5 MMOL/L — HIGH (ref 3.5–5)
PROT SERPL-MCNC: 3.8 G/DL — LOW (ref 6–8)
PROT SERPL-MCNC: 4.9 G/DL — LOW (ref 6–8)
PROT SERPL-MCNC: 5.1 G/DL — LOW (ref 6–8)
PROT SERPL-MCNC: 5.2 G/DL — LOW (ref 6–8)
PROT SERPL-MCNC: 5.2 G/DL — LOW (ref 6–8)
PROT SERPL-MCNC: 5.3 G/DL — LOW (ref 6–8)
PROT SERPL-MCNC: 5.3 G/DL — LOW (ref 6–8)
PROT SERPL-MCNC: 5.4 G/DL — LOW (ref 6–8)
PROT SERPL-MCNC: 5.5 G/DL — LOW (ref 6–8)
PROT SERPL-MCNC: 5.6 G/DL — LOW (ref 6–8)
PROT SERPL-MCNC: 6.3 G/DL — SIGNIFICANT CHANGE UP (ref 6–8)
PROT SERPL-MCNC: 6.6 G/DL
PROT SERPL-MCNC: 6.7 G/DL — SIGNIFICANT CHANGE UP (ref 6–8)
PROT SERPL-MCNC: 6.8 G/DL — SIGNIFICANT CHANGE UP (ref 6–8)
PROT SERPL-MCNC: 6.9 G/DL — SIGNIFICANT CHANGE UP (ref 6–8)
PROT SERPL-MCNC: 7.3 G/DL — SIGNIFICANT CHANGE UP (ref 6–8)
PROT SERPL-MCNC: 7.5 G/DL — SIGNIFICANT CHANGE UP (ref 6–8)
PROT UR-MCNC: ABNORMAL
PROT UR-MCNC: ABNORMAL
PROT UR-MCNC: SIGNIFICANT CHANGE UP
PROTHROM AB SERPL-ACNC: 11.6 SEC — SIGNIFICANT CHANGE UP (ref 9.95–12.87)
PROTHROM AB SERPL-ACNC: 13.3 SEC — HIGH (ref 9.95–12.87)
PROTHROM AB SERPL-ACNC: 14 SEC — HIGH (ref 9.95–12.87)
PROTHROM AB SERPL-ACNC: 16.9 SEC — HIGH (ref 9.95–12.87)
PROTHROM AB SERPL-ACNC: 17 SEC — HIGH (ref 9.95–12.87)
PROTHROM AB SERPL-ACNC: 18.1 SEC — HIGH (ref 9.95–12.87)
PROTHROM AB SERPL-ACNC: 19.8 SEC — HIGH (ref 9.95–12.87)
PT BLD: 13 SEC
RBC # BLD: 2.05 M/UL — LOW (ref 4.7–6.1)
RBC # BLD: 2.07 M/UL — LOW (ref 4.7–6.1)
RBC # BLD: 2.36 M/UL — LOW (ref 4.7–6.1)
RBC # BLD: 2.45 M/UL — LOW (ref 4.7–6.1)
RBC # BLD: 2.46 M/UL — LOW (ref 4.7–6.1)
RBC # BLD: 2.48 M/UL — LOW (ref 4.7–6.1)
RBC # BLD: 2.51 M/UL — LOW (ref 4.7–6.1)
RBC # BLD: 2.51 M/UL — LOW (ref 4.7–6.1)
RBC # BLD: 2.55 M/UL — LOW (ref 4.7–6.1)
RBC # BLD: 2.61 M/UL — LOW (ref 4.7–6.1)
RBC # BLD: 2.68 M/UL — LOW (ref 4.7–6.1)
RBC # BLD: 2.69 M/UL — LOW (ref 4.7–6.1)
RBC # BLD: 2.7 M/UL — LOW (ref 4.7–6.1)
RBC # BLD: 2.7 M/UL — LOW (ref 4.7–6.1)
RBC # BLD: 2.72 M/UL — LOW (ref 4.7–6.1)
RBC # BLD: 2.75 M/UL — LOW (ref 4.7–6.1)
RBC # BLD: 2.77 M/UL — LOW (ref 4.7–6.1)
RBC # BLD: 2.77 M/UL — LOW (ref 4.7–6.1)
RBC # BLD: 2.78 M/UL — LOW (ref 4.7–6.1)
RBC # BLD: 2.82 M/UL — LOW (ref 4.7–6.1)
RBC # BLD: 2.84 M/UL — LOW (ref 4.7–6.1)
RBC # BLD: 2.85 M/UL — LOW (ref 4.7–6.1)
RBC # BLD: 2.91 M/UL — LOW (ref 4.7–6.1)
RBC # BLD: 2.95 M/UL — LOW (ref 4.7–6.1)
RBC # BLD: 3 M/UL — LOW (ref 4.7–6.1)
RBC # BLD: 3.04 M/UL — LOW (ref 4.7–6.1)
RBC # BLD: 3.08 M/UL — LOW (ref 4.7–6.1)
RBC # BLD: 3.08 M/UL — LOW (ref 4.7–6.1)
RBC # BLD: 3.09 M/UL — LOW (ref 4.7–6.1)
RBC # BLD: 3.11 M/UL — LOW (ref 4.7–6.1)
RBC # BLD: 3.12 M/UL — LOW (ref 4.7–6.1)
RBC # BLD: 3.13 M/UL — LOW (ref 4.7–6.1)
RBC # BLD: 3.15 M/UL — LOW (ref 4.7–6.1)
RBC # BLD: 3.17 M/UL — LOW (ref 4.7–6.1)
RBC # BLD: 3.19 M/UL — LOW (ref 4.7–6.1)
RBC # BLD: 3.2 M/UL — LOW (ref 4.7–6.1)
RBC # BLD: 3.22 M/UL — LOW (ref 4.7–6.1)
RBC # BLD: 3.23 M/UL — LOW (ref 4.7–6.1)
RBC # BLD: 3.23 M/UL — LOW (ref 4.7–6.1)
RBC # BLD: 3.3 M/UL — LOW (ref 4.7–6.1)
RBC # BLD: 3.38 M/UL — LOW (ref 4.7–6.1)
RBC # BLD: 3.38 M/UL — LOW (ref 4.7–6.1)
RBC # BLD: 3.39 M/UL — LOW (ref 4.7–6.1)
RBC # BLD: 3.47 M/UL — LOW (ref 4.7–6.1)
RBC # BLD: 3.56 M/UL — LOW (ref 4.7–6.1)
RBC # BLD: 3.57 M/UL — LOW (ref 4.7–6.1)
RBC # BLD: 3.58 M/UL — LOW (ref 4.7–6.1)
RBC # BLD: 3.63 M/UL — LOW (ref 4.7–6.1)
RBC # BLD: 3.77 M/UL — LOW (ref 4.7–6.1)
RBC # BLD: 4.27 M/UL — LOW (ref 4.7–6.1)
RBC # BLD: 4.55 M/UL — LOW (ref 4.7–6.1)
RBC # BLD: 4.66 M/UL — LOW (ref 4.7–6.1)
RBC # BLD: 4.74 M/UL — SIGNIFICANT CHANGE UP (ref 4.7–6.1)
RBC # BLD: 4.75 M/UL — SIGNIFICANT CHANGE UP (ref 4.7–6.1)
RBC # BLD: 4.76 M/UL — SIGNIFICANT CHANGE UP (ref 4.7–6.1)
RBC # BLD: 5 M/UL — SIGNIFICANT CHANGE UP (ref 4.7–6.1)
RBC # BLD: 5.03 M/UL
RBC # BLD: 5.05 M/UL — SIGNIFICANT CHANGE UP (ref 4.7–6.1)
RBC # BLD: 5.09 M/UL — SIGNIFICANT CHANGE UP (ref 4.7–6.1)
RBC # BLD: 5.12 M/UL — SIGNIFICANT CHANGE UP (ref 4.7–6.1)
RBC # BLD: 5.21 M/UL — SIGNIFICANT CHANGE UP (ref 4.7–6.1)
RBC # BLD: 5.29 M/UL — SIGNIFICANT CHANGE UP (ref 4.7–6.1)
RBC # BLD: 5.39 M/UL — SIGNIFICANT CHANGE UP (ref 4.7–6.1)
RBC # BLD: 5.41 M/UL — SIGNIFICANT CHANGE UP (ref 4.7–6.1)
RBC # BLD: 5.61 M/UL — SIGNIFICANT CHANGE UP (ref 4.7–6.1)
RBC # FLD: 12.8 % — SIGNIFICANT CHANGE UP (ref 11.5–14.5)
RBC # FLD: 12.8 % — SIGNIFICANT CHANGE UP (ref 11.5–14.5)
RBC # FLD: 13.2 % — SIGNIFICANT CHANGE UP (ref 11.5–14.5)
RBC # FLD: 13.2 % — SIGNIFICANT CHANGE UP (ref 11.5–14.5)
RBC # FLD: 13.3 % — SIGNIFICANT CHANGE UP (ref 11.5–14.5)
RBC # FLD: 13.3 % — SIGNIFICANT CHANGE UP (ref 11.5–14.5)
RBC # FLD: 13.4 % — SIGNIFICANT CHANGE UP (ref 11.5–14.5)
RBC # FLD: 13.7 % — SIGNIFICANT CHANGE UP (ref 11.5–14.5)
RBC # FLD: 13.8 % — SIGNIFICANT CHANGE UP (ref 11.5–14.5)
RBC # FLD: 13.9 %
RBC # FLD: 14 % — SIGNIFICANT CHANGE UP (ref 11.5–14.5)
RBC # FLD: 14.2 % — SIGNIFICANT CHANGE UP (ref 11.5–14.5)
RBC # FLD: 14.4 % — SIGNIFICANT CHANGE UP (ref 11.5–14.5)
RBC # FLD: 14.5 % — SIGNIFICANT CHANGE UP (ref 11.5–14.5)
RBC # FLD: 14.6 % — HIGH (ref 11.5–14.5)
RBC # FLD: 14.6 % — HIGH (ref 11.5–14.5)
RBC # FLD: 14.7 % — HIGH (ref 11.5–14.5)
RBC # FLD: 14.7 % — HIGH (ref 11.5–14.5)
RBC # FLD: 14.8 % — HIGH (ref 11.5–14.5)
RBC # FLD: 14.9 % — HIGH (ref 11.5–14.5)
RBC # FLD: 15.5 % — HIGH (ref 11.5–14.5)
RBC # FLD: 15.8 % — HIGH (ref 11.5–14.5)
RBC # FLD: 15.9 % — HIGH (ref 11.5–14.5)
RBC # FLD: 16 % — HIGH (ref 11.5–14.5)
RBC # FLD: 16.3 % — HIGH (ref 11.5–14.5)
RBC # FLD: 16.3 % — HIGH (ref 11.5–14.5)
RBC # FLD: 17.9 % — HIGH (ref 11.5–14.5)
RBC # FLD: 18 % — HIGH (ref 11.5–14.5)
RBC # FLD: 18.6 % — HIGH (ref 11.5–14.5)
RBC # FLD: 18.7 % — HIGH (ref 11.5–14.5)
RBC # FLD: 18.7 % — HIGH (ref 11.5–14.5)
RBC # FLD: 18.8 % — HIGH (ref 11.5–14.5)
RBC # FLD: 19 % — HIGH (ref 11.5–14.5)
RBC # FLD: 19.1 % — HIGH (ref 11.5–14.5)
RBC # FLD: 19.2 % — HIGH (ref 11.5–14.5)
RBC # FLD: 19.3 % — HIGH (ref 11.5–14.5)
RBC # FLD: 19.4 % — HIGH (ref 11.5–14.5)
RBC # FLD: 19.4 % — HIGH (ref 11.5–14.5)
RBC # FLD: 19.5 % — HIGH (ref 11.5–14.5)
RBC # FLD: 19.5 % — HIGH (ref 11.5–14.5)
RBC # FLD: 20.3 % — HIGH (ref 11.5–14.5)
RBC # FLD: 20.3 % — HIGH (ref 11.5–14.5)
RBC # FLD: 20.7 % — HIGH (ref 11.5–14.5)
RBC # FLD: 20.8 % — HIGH (ref 11.5–14.5)
RBC # FLD: 21.2 % — HIGH (ref 11.5–14.5)
RBC # FLD: 21.4 % — HIGH (ref 11.5–14.5)
RBC # FLD: 21.5 % — HIGH (ref 11.5–14.5)
RBC # FLD: 21.8 % — HIGH (ref 11.5–14.5)
RBC # FLD: 21.9 % — HIGH (ref 11.5–14.5)
RBC # FLD: 22.8 % — HIGH (ref 11.5–14.5)
RBC # FLD: 23.1 % — HIGH (ref 11.5–14.5)
RBC # FLD: 23.2 % — HIGH (ref 11.5–14.5)
RBC # FLD: 23.6 % — HIGH (ref 11.5–14.5)
RBC BLD AUTO: ABNORMAL
RBC CASTS # UR COMP ASSIST: 3 /HPF — SIGNIFICANT CHANGE UP (ref 0–4)
SAO2 % BLDA: 100 % — HIGH (ref 94–98)
SAO2 % BLDA: 100 % — HIGH (ref 94–98)
SAO2 % BLDA: 95 % — SIGNIFICANT CHANGE UP (ref 94–98)
SAO2 % BLDA: 98 % — SIGNIFICANT CHANGE UP (ref 94–98)
SAO2 % BLDA: 98 % — SIGNIFICANT CHANGE UP (ref 94–98)
SAO2 % BLDA: 99 % — HIGH (ref 94–98)
SAO2 % BLDA: 99 % — HIGH (ref 94–98)
SAO2 % BLDV: 37 % — SIGNIFICANT CHANGE UP
SMUDGE CELLS # BLD: PRESENT — SIGNIFICANT CHANGE UP
SODIUM SERPL-SCNC: 129 MMOL/L — LOW (ref 135–146)
SODIUM SERPL-SCNC: 131 MMOL/L — LOW (ref 135–146)
SODIUM SERPL-SCNC: 132 MMOL/L — LOW (ref 135–146)
SODIUM SERPL-SCNC: 132 MMOL/L — LOW (ref 135–146)
SODIUM SERPL-SCNC: 133 MMOL/L — LOW (ref 135–146)
SODIUM SERPL-SCNC: 135 MMOL/L — SIGNIFICANT CHANGE UP (ref 135–146)
SODIUM SERPL-SCNC: 135 MMOL/L — SIGNIFICANT CHANGE UP (ref 135–146)
SODIUM SERPL-SCNC: 136 MMOL/L — SIGNIFICANT CHANGE UP (ref 135–146)
SODIUM SERPL-SCNC: 137 MMOL/L — SIGNIFICANT CHANGE UP (ref 135–146)
SODIUM SERPL-SCNC: 138 MMOL/L — SIGNIFICANT CHANGE UP (ref 135–146)
SODIUM SERPL-SCNC: 139 MMOL/L
SODIUM SERPL-SCNC: 139 MMOL/L — SIGNIFICANT CHANGE UP (ref 135–146)
SODIUM SERPL-SCNC: 140 MMOL/L — SIGNIFICANT CHANGE UP (ref 135–146)
SODIUM SERPL-SCNC: 141 MMOL/L — SIGNIFICANT CHANGE UP (ref 135–146)
SODIUM SERPL-SCNC: 142 MMOL/L — SIGNIFICANT CHANGE UP (ref 135–146)
SODIUM SERPL-SCNC: 143 MMOL/L — SIGNIFICANT CHANGE UP (ref 135–146)
SODIUM SERPL-SCNC: 143 MMOL/L — SIGNIFICANT CHANGE UP (ref 135–146)
SODIUM SERPL-SCNC: 144 MMOL/L — SIGNIFICANT CHANGE UP (ref 135–146)
SODIUM SERPL-SCNC: 144 MMOL/L — SIGNIFICANT CHANGE UP (ref 135–146)
SP GR SPEC: 1.01 — LOW (ref 1.01–1.02)
SP GR SPEC: 1.02 — SIGNIFICANT CHANGE UP (ref 1.01–1.02)
SP GR SPEC: 1.03 — HIGH (ref 1.01–1.02)
SPECIMEN SOURCE: SIGNIFICANT CHANGE UP
SPHEROCYTES BLD QL SMEAR: SLIGHT — SIGNIFICANT CHANGE UP
SURGICAL PATHOLOGY STUDY: SIGNIFICANT CHANGE UP
T4 FREE SERPL DIALY-MCNC: 1.1 NG/DL
TIBC SERPL-MCNC: 157 UG/DL — LOW (ref 220–430)
TOTAL CHOLESTEROL/HDL RATIO MEASUREMENT: 2.6 RATIO — LOW (ref 4–5.5)
TRIGL SERPL-MCNC: 82 MG/DL — SIGNIFICANT CHANGE UP (ref 10–149)
TROPONIN T SERPL-MCNC: 0.02 NG/ML — HIGH
TROPONIN T SERPL-MCNC: 0.03 NG/ML — CRITICAL HIGH
TROPONIN T SERPL-MCNC: 0.04 NG/ML — CRITICAL HIGH
TROPONIN T SERPL-MCNC: 0.05 NG/ML — CRITICAL HIGH
TROPONIN T SERPL-MCNC: 0.05 NG/ML — CRITICAL HIGH
TROPONIN T SERPL-MCNC: 0.06 NG/ML — CRITICAL HIGH
TROPONIN T SERPL-MCNC: 0.78 NG/ML — CRITICAL HIGH
TROPONIN T SERPL-MCNC: 2.05 NG/ML — CRITICAL HIGH
TROPONIN T SERPL-MCNC: <0.01 NG/ML — SIGNIFICANT CHANGE UP
TSH SERPL-ACNC: 2.58 UIU/ML
TSH SERPL-MCNC: 3.45 UIU/ML — SIGNIFICANT CHANGE UP (ref 0.27–4.2)
UIBC SERPL-MCNC: 130 UG/DL — SIGNIFICANT CHANGE UP (ref 110–370)
UROBILINOGEN FLD QL: SIGNIFICANT CHANGE UP
WBC # BLD: 10.07 K/UL — SIGNIFICANT CHANGE UP (ref 4.8–10.8)
WBC # BLD: 10.1 K/UL — SIGNIFICANT CHANGE UP (ref 4.8–10.8)
WBC # BLD: 10.29 K/UL — SIGNIFICANT CHANGE UP (ref 4.8–10.8)
WBC # BLD: 10.54 K/UL — SIGNIFICANT CHANGE UP (ref 4.8–10.8)
WBC # BLD: 10.58 K/UL — SIGNIFICANT CHANGE UP (ref 4.8–10.8)
WBC # BLD: 10.61 K/UL — SIGNIFICANT CHANGE UP (ref 4.8–10.8)
WBC # BLD: 10.97 K/UL — HIGH (ref 4.8–10.8)
WBC # BLD: 11.05 K/UL — HIGH (ref 4.8–10.8)
WBC # BLD: 11.06 K/UL — HIGH (ref 4.8–10.8)
WBC # BLD: 11.08 K/UL — HIGH (ref 4.8–10.8)
WBC # BLD: 11.18 K/UL — HIGH (ref 4.8–10.8)
WBC # BLD: 11.3 K/UL — HIGH (ref 4.8–10.8)
WBC # BLD: 11.39 K/UL — HIGH (ref 4.8–10.8)
WBC # BLD: 11.58 K/UL — HIGH (ref 4.8–10.8)
WBC # BLD: 11.73 K/UL — HIGH (ref 4.8–10.8)
WBC # BLD: 11.78 K/UL — HIGH (ref 4.8–10.8)
WBC # BLD: 11.79 K/UL — HIGH (ref 4.8–10.8)
WBC # BLD: 11.9 K/UL — HIGH (ref 4.8–10.8)
WBC # BLD: 12.18 K/UL — HIGH (ref 4.8–10.8)
WBC # BLD: 12.38 K/UL — HIGH (ref 4.8–10.8)
WBC # BLD: 12.44 K/UL — HIGH (ref 4.8–10.8)
WBC # BLD: 12.75 K/UL — HIGH (ref 4.8–10.8)
WBC # BLD: 12.77 K/UL — HIGH (ref 4.8–10.8)
WBC # BLD: 12.8 K/UL — HIGH (ref 4.8–10.8)
WBC # BLD: 12.9 K/UL — HIGH (ref 4.8–10.8)
WBC # BLD: 12.91 K/UL — HIGH (ref 4.8–10.8)
WBC # BLD: 12.93 K/UL — HIGH (ref 4.8–10.8)
WBC # BLD: 13 K/UL — HIGH (ref 4.8–10.8)
WBC # BLD: 13.14 K/UL — HIGH (ref 4.8–10.8)
WBC # BLD: 13.16 K/UL — HIGH (ref 4.8–10.8)
WBC # BLD: 13.44 K/UL — HIGH (ref 4.8–10.8)
WBC # BLD: 13.74 K/UL — HIGH (ref 4.8–10.8)
WBC # BLD: 14.02 K/UL — HIGH (ref 4.8–10.8)
WBC # BLD: 14.19 K/UL — HIGH (ref 4.8–10.8)
WBC # BLD: 15.01 K/UL — HIGH (ref 4.8–10.8)
WBC # BLD: 15.31 K/UL — HIGH (ref 4.8–10.8)
WBC # BLD: 15.67 K/UL — HIGH (ref 4.8–10.8)
WBC # BLD: 15.96 K/UL — HIGH (ref 4.8–10.8)
WBC # BLD: 16.37 K/UL — HIGH (ref 4.8–10.8)
WBC # BLD: 17.94 K/UL — HIGH (ref 4.8–10.8)
WBC # BLD: 18.32 K/UL — HIGH (ref 4.8–10.8)
WBC # BLD: 18.83 K/UL — HIGH (ref 4.8–10.8)
WBC # BLD: 22.27 K/UL — HIGH (ref 4.8–10.8)
WBC # BLD: 4.97 K/UL — SIGNIFICANT CHANGE UP (ref 4.8–10.8)
WBC # BLD: 5.22 K/UL — SIGNIFICANT CHANGE UP (ref 4.8–10.8)
WBC # BLD: 5.92 K/UL — SIGNIFICANT CHANGE UP (ref 4.8–10.8)
WBC # BLD: 6.22 K/UL — SIGNIFICANT CHANGE UP (ref 4.8–10.8)
WBC # BLD: 6.39 K/UL — SIGNIFICANT CHANGE UP (ref 4.8–10.8)
WBC # BLD: 6.69 K/UL — SIGNIFICANT CHANGE UP (ref 4.8–10.8)
WBC # BLD: 6.87 K/UL — SIGNIFICANT CHANGE UP (ref 4.8–10.8)
WBC # BLD: 6.94 K/UL — SIGNIFICANT CHANGE UP (ref 4.8–10.8)
WBC # BLD: 6.97 K/UL — SIGNIFICANT CHANGE UP (ref 4.8–10.8)
WBC # BLD: 7.74 K/UL — SIGNIFICANT CHANGE UP (ref 4.8–10.8)
WBC # BLD: 8.33 K/UL — SIGNIFICANT CHANGE UP (ref 4.8–10.8)
WBC # BLD: 9.11 K/UL — SIGNIFICANT CHANGE UP (ref 4.8–10.8)
WBC # BLD: 9.26 K/UL — SIGNIFICANT CHANGE UP (ref 4.8–10.8)
WBC # BLD: 9.6 K/UL — SIGNIFICANT CHANGE UP (ref 4.8–10.8)
WBC # BLD: 9.64 K/UL — SIGNIFICANT CHANGE UP (ref 4.8–10.8)
WBC # BLD: 9.64 K/UL — SIGNIFICANT CHANGE UP (ref 4.8–10.8)
WBC # BLD: 9.82 K/UL — SIGNIFICANT CHANGE UP (ref 4.8–10.8)
WBC # BLD: 9.87 K/UL — SIGNIFICANT CHANGE UP (ref 4.8–10.8)
WBC # BLD: 9.91 K/UL — SIGNIFICANT CHANGE UP (ref 4.8–10.8)
WBC # FLD AUTO: 10.07 K/UL — SIGNIFICANT CHANGE UP (ref 4.8–10.8)
WBC # FLD AUTO: 10.1 K/UL — SIGNIFICANT CHANGE UP (ref 4.8–10.8)
WBC # FLD AUTO: 10.29 K/UL — SIGNIFICANT CHANGE UP (ref 4.8–10.8)
WBC # FLD AUTO: 10.54 K/UL — SIGNIFICANT CHANGE UP (ref 4.8–10.8)
WBC # FLD AUTO: 10.58 K/UL — SIGNIFICANT CHANGE UP (ref 4.8–10.8)
WBC # FLD AUTO: 10.61 K/UL — SIGNIFICANT CHANGE UP (ref 4.8–10.8)
WBC # FLD AUTO: 10.97 K/UL — HIGH (ref 4.8–10.8)
WBC # FLD AUTO: 11.05 K/UL — HIGH (ref 4.8–10.8)
WBC # FLD AUTO: 11.06 K/UL — HIGH (ref 4.8–10.8)
WBC # FLD AUTO: 11.08 K/UL — HIGH (ref 4.8–10.8)
WBC # FLD AUTO: 11.18 K/UL — HIGH (ref 4.8–10.8)
WBC # FLD AUTO: 11.3 K/UL — HIGH (ref 4.8–10.8)
WBC # FLD AUTO: 11.39 K/UL — HIGH (ref 4.8–10.8)
WBC # FLD AUTO: 11.58 K/UL — HIGH (ref 4.8–10.8)
WBC # FLD AUTO: 11.73 K/UL — HIGH (ref 4.8–10.8)
WBC # FLD AUTO: 11.78 K/UL — HIGH (ref 4.8–10.8)
WBC # FLD AUTO: 11.79 K/UL — HIGH (ref 4.8–10.8)
WBC # FLD AUTO: 11.9 K/UL — HIGH (ref 4.8–10.8)
WBC # FLD AUTO: 12.18 K/UL — HIGH (ref 4.8–10.8)
WBC # FLD AUTO: 12.38 K/UL — HIGH (ref 4.8–10.8)
WBC # FLD AUTO: 12.44 K/UL — HIGH (ref 4.8–10.8)
WBC # FLD AUTO: 12.75 K/UL — HIGH (ref 4.8–10.8)
WBC # FLD AUTO: 12.77 K/UL — HIGH (ref 4.8–10.8)
WBC # FLD AUTO: 12.8 K/UL — HIGH (ref 4.8–10.8)
WBC # FLD AUTO: 12.9 K/UL — HIGH (ref 4.8–10.8)
WBC # FLD AUTO: 12.91 K/UL — HIGH (ref 4.8–10.8)
WBC # FLD AUTO: 12.93 K/UL — HIGH (ref 4.8–10.8)
WBC # FLD AUTO: 13 K/UL — HIGH (ref 4.8–10.8)
WBC # FLD AUTO: 13.14 K/UL — HIGH (ref 4.8–10.8)
WBC # FLD AUTO: 13.16 K/UL — HIGH (ref 4.8–10.8)
WBC # FLD AUTO: 13.44 K/UL — HIGH (ref 4.8–10.8)
WBC # FLD AUTO: 13.74 K/UL — HIGH (ref 4.8–10.8)
WBC # FLD AUTO: 13.95 K/UL
WBC # FLD AUTO: 14.02 K/UL — HIGH (ref 4.8–10.8)
WBC # FLD AUTO: 14.19 K/UL — HIGH (ref 4.8–10.8)
WBC # FLD AUTO: 15.01 K/UL — HIGH (ref 4.8–10.8)
WBC # FLD AUTO: 15.31 K/UL — HIGH (ref 4.8–10.8)
WBC # FLD AUTO: 15.67 K/UL — HIGH (ref 4.8–10.8)
WBC # FLD AUTO: 15.96 K/UL — HIGH (ref 4.8–10.8)
WBC # FLD AUTO: 16.37 K/UL — HIGH (ref 4.8–10.8)
WBC # FLD AUTO: 17.94 K/UL — HIGH (ref 4.8–10.8)
WBC # FLD AUTO: 18.32 K/UL — HIGH (ref 4.8–10.8)
WBC # FLD AUTO: 18.83 K/UL — HIGH (ref 4.8–10.8)
WBC # FLD AUTO: 22.27 K/UL — HIGH (ref 4.8–10.8)
WBC # FLD AUTO: 4.97 K/UL — SIGNIFICANT CHANGE UP (ref 4.8–10.8)
WBC # FLD AUTO: 5.22 K/UL — SIGNIFICANT CHANGE UP (ref 4.8–10.8)
WBC # FLD AUTO: 5.92 K/UL — SIGNIFICANT CHANGE UP (ref 4.8–10.8)
WBC # FLD AUTO: 6.22 K/UL — SIGNIFICANT CHANGE UP (ref 4.8–10.8)
WBC # FLD AUTO: 6.39 K/UL — SIGNIFICANT CHANGE UP (ref 4.8–10.8)
WBC # FLD AUTO: 6.69 K/UL — SIGNIFICANT CHANGE UP (ref 4.8–10.8)
WBC # FLD AUTO: 6.87 K/UL — SIGNIFICANT CHANGE UP (ref 4.8–10.8)
WBC # FLD AUTO: 6.94 K/UL — SIGNIFICANT CHANGE UP (ref 4.8–10.8)
WBC # FLD AUTO: 6.97 K/UL — SIGNIFICANT CHANGE UP (ref 4.8–10.8)
WBC # FLD AUTO: 7.74 K/UL — SIGNIFICANT CHANGE UP (ref 4.8–10.8)
WBC # FLD AUTO: 8.33 K/UL — SIGNIFICANT CHANGE UP (ref 4.8–10.8)
WBC # FLD AUTO: 9.11 K/UL — SIGNIFICANT CHANGE UP (ref 4.8–10.8)
WBC # FLD AUTO: 9.26 K/UL — SIGNIFICANT CHANGE UP (ref 4.8–10.8)
WBC # FLD AUTO: 9.6 K/UL — SIGNIFICANT CHANGE UP (ref 4.8–10.8)
WBC # FLD AUTO: 9.64 K/UL — SIGNIFICANT CHANGE UP (ref 4.8–10.8)
WBC # FLD AUTO: 9.64 K/UL — SIGNIFICANT CHANGE UP (ref 4.8–10.8)
WBC # FLD AUTO: 9.82 K/UL — SIGNIFICANT CHANGE UP (ref 4.8–10.8)
WBC # FLD AUTO: 9.87 K/UL — SIGNIFICANT CHANGE UP (ref 4.8–10.8)
WBC # FLD AUTO: 9.91 K/UL — SIGNIFICANT CHANGE UP (ref 4.8–10.8)
WBC UR QL: 4 /HPF — SIGNIFICANT CHANGE UP (ref 0–5)

## 2019-01-01 PROCEDURE — 99223 1ST HOSP IP/OBS HIGH 75: CPT | Mod: AI

## 2019-01-01 PROCEDURE — 71045 X-RAY EXAM CHEST 1 VIEW: CPT | Mod: 26

## 2019-01-01 PROCEDURE — 74018 RADEX ABDOMEN 1 VIEW: CPT | Mod: 26

## 2019-01-01 PROCEDURE — 99024 POSTOP FOLLOW-UP VISIT: CPT

## 2019-01-01 PROCEDURE — 93010 ELECTROCARDIOGRAM REPORT: CPT

## 2019-01-01 PROCEDURE — 99214 OFFICE O/P EST MOD 30 MIN: CPT

## 2019-01-01 PROCEDURE — 99231 SBSQ HOSP IP/OBS SF/LOW 25: CPT

## 2019-01-01 PROCEDURE — 99233 SBSQ HOSP IP/OBS HIGH 50: CPT

## 2019-01-01 PROCEDURE — 93306 TTE W/DOPPLER COMPLETE: CPT | Mod: 26

## 2019-01-01 PROCEDURE — 99233 SBSQ HOSP IP/OBS HIGH 50: CPT | Mod: 25

## 2019-01-01 PROCEDURE — 99231 SBSQ HOSP IP/OBS SF/LOW 25: CPT | Mod: 25

## 2019-01-01 PROCEDURE — 99291 CRITICAL CARE FIRST HOUR: CPT | Mod: 25

## 2019-01-01 PROCEDURE — 74174 CTA ABD&PLVS W/CONTRAST: CPT | Mod: 26

## 2019-01-01 PROCEDURE — 93970 EXTREMITY STUDY: CPT | Mod: 26

## 2019-01-01 PROCEDURE — 99232 SBSQ HOSP IP/OBS MODERATE 35: CPT

## 2019-01-01 PROCEDURE — 71045 X-RAY EXAM CHEST 1 VIEW: CPT | Mod: 26,76

## 2019-01-01 PROCEDURE — 99222 1ST HOSP IP/OBS MODERATE 55: CPT

## 2019-01-01 PROCEDURE — 74177 CT ABD & PELVIS W/CONTRAST: CPT | Mod: 26

## 2019-01-01 PROCEDURE — 99239 HOSP IP/OBS DSCHRG MGMT >30: CPT

## 2019-01-01 PROCEDURE — 74176 CT ABD & PELVIS W/O CONTRAST: CPT | Mod: 26

## 2019-01-01 PROCEDURE — 99222 1ST HOSP IP/OBS MODERATE 55: CPT | Mod: 57

## 2019-01-01 PROCEDURE — 99285 EMERGENCY DEPT VISIT HI MDM: CPT | Mod: GC

## 2019-01-01 PROCEDURE — 93010 ELECTROCARDIOGRAM REPORT: CPT | Mod: 76

## 2019-01-01 PROCEDURE — 99291 CRITICAL CARE FIRST HOUR: CPT | Mod: 24

## 2019-01-01 PROCEDURE — 93010 ELECTROCARDIOGRAM REPORT: CPT | Mod: 77

## 2019-01-01 PROCEDURE — 99223 1ST HOSP IP/OBS HIGH 75: CPT | Mod: 57

## 2019-01-01 PROCEDURE — 99232 SBSQ HOSP IP/OBS MODERATE 35: CPT | Mod: 25

## 2019-01-01 PROCEDURE — 43235 EGD DIAGNOSTIC BRUSH WASH: CPT | Mod: XS

## 2019-01-01 PROCEDURE — 44120 REMOVAL OF SMALL INTESTINE: CPT

## 2019-01-01 PROCEDURE — 99238 HOSP IP/OBS DSCHRG MGMT 30/<: CPT

## 2019-01-01 PROCEDURE — 35633 BPG ILIO-MESENTERIC: CPT

## 2019-01-01 PROCEDURE — 97608 NEG PRS WND THER NDME>50SQCM: CPT

## 2019-01-01 PROCEDURE — 43255 EGD CONTROL BLEEDING ANY: CPT

## 2019-01-01 PROCEDURE — 45378 DIAGNOSTIC COLONOSCOPY: CPT

## 2019-01-01 PROCEDURE — 36620 INSERTION CATHETER ARTERY: CPT | Mod: 58

## 2019-01-01 PROCEDURE — 71275 CT ANGIOGRAPHY CHEST: CPT | Mod: 26

## 2019-01-01 PROCEDURE — 71260 CT THORAX DX C+: CPT | Mod: 26

## 2019-01-01 PROCEDURE — 91110 GI TRC IMG INTRAL ESOPH-ILE: CPT | Mod: 26

## 2019-01-01 PROCEDURE — 99223 1ST HOSP IP/OBS HIGH 75: CPT

## 2019-01-01 PROCEDURE — 99285 EMERGENCY DEPT VISIT HI MDM: CPT

## 2019-01-01 PROCEDURE — 36556 INSERT NON-TUNNEL CV CATH: CPT | Mod: 58

## 2019-01-01 PROCEDURE — 88307 TISSUE EXAM BY PATHOLOGIST: CPT | Mod: 26

## 2019-01-01 PROCEDURE — 99291 CRITICAL CARE FIRST HOUR: CPT

## 2019-01-01 PROCEDURE — 71045 X-RAY EXAM CHEST 1 VIEW: CPT | Mod: 26,77

## 2019-01-01 RX ORDER — METRONIDAZOLE 500 MG
500 TABLET ORAL EVERY 12 HOURS
Refills: 0 | Status: DISCONTINUED | OUTPATIENT
Start: 2019-01-01 | End: 2019-01-01

## 2019-01-01 RX ORDER — ASPIRIN/CALCIUM CARB/MAGNESIUM 324 MG
81 TABLET ORAL DAILY
Refills: 0 | Status: DISCONTINUED | OUTPATIENT
Start: 2019-01-01 | End: 2019-01-01

## 2019-01-01 RX ORDER — ALPRAZOLAM 0.25 MG
1 TABLET ORAL
Qty: 270 | Refills: 0
Start: 2019-01-01 | End: 2019-01-01

## 2019-01-01 RX ORDER — MORPHINE SULFATE 50 MG/1
4 CAPSULE, EXTENDED RELEASE ORAL ONCE
Refills: 0 | Status: DISCONTINUED | OUTPATIENT
Start: 2019-01-01 | End: 2019-01-01

## 2019-01-01 RX ORDER — CIPROFLOXACIN LACTATE 400MG/40ML
1 VIAL (ML) INTRAVENOUS
Qty: 16 | Refills: 0
Start: 2019-01-01 | End: 2019-01-01

## 2019-01-01 RX ORDER — NOREPINEPHRINE BITARTRATE/D5W 8 MG/250ML
0.05 PLASTIC BAG, INJECTION (ML) INTRAVENOUS
Qty: 8 | Refills: 0 | Status: DISCONTINUED | OUTPATIENT
Start: 2019-01-01 | End: 2019-01-01

## 2019-01-01 RX ORDER — SENNA PLUS 8.6 MG/1
2 TABLET ORAL AT BEDTIME
Refills: 0 | Status: DISCONTINUED | OUTPATIENT
Start: 2019-01-01 | End: 2019-01-01

## 2019-01-01 RX ORDER — LISINOPRIL 2.5 MG/1
10 TABLET ORAL DAILY
Refills: 0 | Status: DISCONTINUED | OUTPATIENT
Start: 2019-01-01 | End: 2019-01-01

## 2019-01-01 RX ORDER — CEFEPIME 1 G/1
2000 INJECTION, POWDER, FOR SOLUTION INTRAMUSCULAR; INTRAVENOUS EVERY 8 HOURS
Refills: 0 | Status: DISCONTINUED | OUTPATIENT
Start: 2019-01-01 | End: 2019-01-01

## 2019-01-01 RX ORDER — SODIUM CHLORIDE 9 MG/ML
1000 INJECTION INTRAMUSCULAR; INTRAVENOUS; SUBCUTANEOUS
Refills: 0 | Status: DISCONTINUED | OUTPATIENT
Start: 2019-01-01 | End: 2019-01-01

## 2019-01-01 RX ORDER — POTASSIUM PHOSPHATE, MONOBASIC POTASSIUM PHOSPHATE, DIBASIC 236; 224 MG/ML; MG/ML
30 INJECTION, SOLUTION INTRAVENOUS ONCE
Refills: 0 | Status: COMPLETED | OUTPATIENT
Start: 2019-01-01 | End: 2019-01-01

## 2019-01-01 RX ORDER — ASPIRIN/CALCIUM CARB/MAGNESIUM 324 MG
1 TABLET ORAL
Qty: 0 | Refills: 0 | DISCHARGE

## 2019-01-01 RX ORDER — ACETAMINOPHEN 500 MG
650 TABLET ORAL EVERY 6 HOURS
Refills: 0 | Status: DISCONTINUED | OUTPATIENT
Start: 2019-01-01 | End: 2019-01-01

## 2019-01-01 RX ORDER — VANCOMYCIN HCL 1 G
125 VIAL (EA) INTRAVENOUS EVERY 6 HOURS
Refills: 0 | Status: DISCONTINUED | OUTPATIENT
Start: 2019-01-01 | End: 2019-01-01

## 2019-01-01 RX ORDER — ALPRAZOLAM 0.25 MG
0.25 TABLET ORAL ONCE
Refills: 0 | Status: DISCONTINUED | OUTPATIENT
Start: 2019-01-01 | End: 2019-01-01

## 2019-01-01 RX ORDER — DIPHENHYDRAMINE HCL 50 MG
50 CAPSULE ORAL ONCE
Refills: 0 | Status: DISCONTINUED | OUTPATIENT
Start: 2019-01-01 | End: 2019-01-01

## 2019-01-01 RX ORDER — POTASSIUM CHLORIDE 20 MEQ
40 PACKET (EA) ORAL EVERY 4 HOURS
Refills: 0 | Status: COMPLETED | OUTPATIENT
Start: 2019-01-01 | End: 2019-01-01

## 2019-01-01 RX ORDER — PANTOPRAZOLE SODIUM 20 MG/1
1 TABLET, DELAYED RELEASE ORAL
Qty: 30 | Refills: 0
Start: 2019-01-01 | End: 2019-01-01

## 2019-01-01 RX ORDER — ROSUVASTATIN CALCIUM 20 MG/1
20 TABLET, FILM COATED ORAL DAILY
Refills: 0 | Status: ACTIVE | COMMUNITY

## 2019-01-01 RX ORDER — SODIUM CHLORIDE 9 MG/ML
1000 INJECTION INTRAMUSCULAR; INTRAVENOUS; SUBCUTANEOUS ONCE
Refills: 0 | Status: COMPLETED | OUTPATIENT
Start: 2019-01-01 | End: 2019-01-01

## 2019-01-01 RX ORDER — METOPROLOL TARTRATE 25 MG/1
25 TABLET, FILM COATED ORAL TWICE DAILY
Qty: 120 | Refills: 3 | Status: DISCONTINUED | COMMUNITY
End: 2019-01-01

## 2019-01-01 RX ORDER — CIPROFLOXACIN LACTATE 400MG/40ML
400 VIAL (ML) INTRAVENOUS EVERY 12 HOURS
Refills: 0 | Status: DISCONTINUED | OUTPATIENT
Start: 2019-01-01 | End: 2019-01-01

## 2019-01-01 RX ORDER — DIPHENHYDRAMINE HCL 50 MG
25 CAPSULE ORAL AT BEDTIME
Refills: 0 | Status: DISCONTINUED | OUTPATIENT
Start: 2019-01-01 | End: 2019-01-01

## 2019-01-01 RX ORDER — METRONIDAZOLE 500 MG
TABLET ORAL
Refills: 0 | Status: DISCONTINUED | OUTPATIENT
Start: 2019-01-01 | End: 2019-01-01

## 2019-01-01 RX ORDER — CEFEPIME 1 G/1
2000 INJECTION, POWDER, FOR SOLUTION INTRAMUSCULAR; INTRAVENOUS ONCE
Refills: 0 | Status: COMPLETED | OUTPATIENT
Start: 2019-01-01 | End: 2019-01-01

## 2019-01-01 RX ORDER — PANTOPRAZOLE SODIUM 20 MG/1
40 TABLET, DELAYED RELEASE ORAL DAILY
Refills: 0 | Status: DISCONTINUED | OUTPATIENT
Start: 2019-01-01 | End: 2019-01-01

## 2019-01-01 RX ORDER — PANTOPRAZOLE SODIUM 20 MG/1
40 TABLET, DELAYED RELEASE ORAL EVERY 12 HOURS
Refills: 0 | Status: DISCONTINUED | OUTPATIENT
Start: 2019-01-01 | End: 2019-01-01

## 2019-01-01 RX ORDER — SODIUM CHLORIDE 9 MG/ML
1000 INJECTION, SOLUTION INTRAVENOUS
Refills: 0 | Status: DISCONTINUED | OUTPATIENT
Start: 2019-01-01 | End: 2019-01-01

## 2019-01-01 RX ORDER — LEVOTHYROXINE SODIUM 125 MCG
1 TABLET ORAL
Qty: 0 | Refills: 0 | DISCHARGE
Start: 2019-01-01

## 2019-01-01 RX ORDER — CLOPIDOGREL BISULFATE 75 MG/1
75 TABLET, FILM COATED ORAL DAILY
Refills: 0 | Status: DISCONTINUED | OUTPATIENT
Start: 2019-01-01 | End: 2019-01-01

## 2019-01-01 RX ORDER — DEXMEDETOMIDINE HYDROCHLORIDE IN 0.9% SODIUM CHLORIDE 4 UG/ML
0.01 INJECTION INTRAVENOUS
Qty: 200 | Refills: 0 | Status: DISCONTINUED | OUTPATIENT
Start: 2019-01-01 | End: 2019-01-01

## 2019-01-01 RX ORDER — POTASSIUM CHLORIDE 20 MEQ
40 PACKET (EA) ORAL ONCE
Refills: 0 | Status: COMPLETED | OUTPATIENT
Start: 2019-01-01 | End: 2019-01-01

## 2019-01-01 RX ORDER — PANTOPRAZOLE SODIUM 20 MG/1
40 TABLET, DELAYED RELEASE ORAL
Refills: 0 | Status: DISCONTINUED | OUTPATIENT
Start: 2019-01-01 | End: 2019-01-01

## 2019-01-01 RX ORDER — HYDROMORPHONE HYDROCHLORIDE 2 MG/ML
0.25 INJECTION INTRAMUSCULAR; INTRAVENOUS; SUBCUTANEOUS EVERY 4 HOURS
Refills: 0 | Status: DISCONTINUED | OUTPATIENT
Start: 2019-01-01 | End: 2019-01-01

## 2019-01-01 RX ORDER — MAGNESIUM SULFATE 500 MG/ML
2 VIAL (ML) INJECTION ONCE
Refills: 0 | Status: COMPLETED | OUTPATIENT
Start: 2019-01-01 | End: 2019-01-01

## 2019-01-01 RX ORDER — SIMETHICONE 80 MG/1
1 TABLET, CHEWABLE ORAL
Qty: 30 | Refills: 0
Start: 2019-01-01 | End: 2019-01-01

## 2019-01-01 RX ORDER — BUDESONIDE AND FORMOTEROL FUMARATE DIHYDRATE 160; 4.5 UG/1; UG/1
0 AEROSOL RESPIRATORY (INHALATION)
Qty: 0 | Refills: 0 | DISCHARGE
Start: 2019-01-01

## 2019-01-01 RX ORDER — METRONIDAZOLE 500 MG
500 TABLET ORAL EVERY 8 HOURS
Refills: 0 | Status: DISCONTINUED | OUTPATIENT
Start: 2019-01-01 | End: 2019-01-01

## 2019-01-01 RX ORDER — HYDROCORTISONE 1 %
1 OINTMENT (GRAM) TOPICAL
Qty: 0 | Refills: 0 | DISCHARGE
Start: 2019-01-01

## 2019-01-01 RX ORDER — POTASSIUM PHOSPHATE, MONOBASIC POTASSIUM PHOSPHATE, DIBASIC 236; 224 MG/ML; MG/ML
15 INJECTION, SOLUTION INTRAVENOUS ONCE
Refills: 0 | Status: COMPLETED | OUTPATIENT
Start: 2019-01-01 | End: 2019-01-01

## 2019-01-01 RX ORDER — CHLORHEXIDINE GLUCONATE 213 G/1000ML
15 SOLUTION TOPICAL EVERY 12 HOURS
Refills: 0 | Status: DISCONTINUED | OUTPATIENT
Start: 2019-01-01 | End: 2019-01-01

## 2019-01-01 RX ORDER — AMIODARONE HYDROCHLORIDE 400 MG/1
200 TABLET ORAL DAILY
Refills: 0 | Status: DISCONTINUED | OUTPATIENT
Start: 2019-01-01 | End: 2019-01-01

## 2019-01-01 RX ORDER — CEFEPIME 1 G/1
INJECTION, POWDER, FOR SOLUTION INTRAMUSCULAR; INTRAVENOUS
Refills: 0 | Status: DISCONTINUED | OUTPATIENT
Start: 2019-01-01 | End: 2019-01-01

## 2019-01-01 RX ORDER — SODIUM CHLORIDE 9 MG/ML
250 INJECTION INTRAMUSCULAR; INTRAVENOUS; SUBCUTANEOUS ONCE
Refills: 0 | Status: COMPLETED | OUTPATIENT
Start: 2019-01-01 | End: 2019-01-01

## 2019-01-01 RX ORDER — CHLORHEXIDINE GLUCONATE 213 G/1000ML
1 SOLUTION TOPICAL
Refills: 0 | Status: DISCONTINUED | OUTPATIENT
Start: 2019-01-01 | End: 2019-01-01

## 2019-01-01 RX ORDER — OXYCODONE AND ACETAMINOPHEN 5; 325 MG/1; MG/1
1 TABLET ORAL ONCE
Refills: 0 | Status: DISCONTINUED | OUTPATIENT
Start: 2019-01-01 | End: 2019-01-01

## 2019-01-01 RX ORDER — TAMSULOSIN HYDROCHLORIDE 0.4 MG/1
1 CAPSULE ORAL
Qty: 0 | Refills: 0 | DISCHARGE
Start: 2019-01-01

## 2019-01-01 RX ORDER — LEVOTHYROXINE SODIUM 125 MCG
200 TABLET ORAL AT BEDTIME
Refills: 0 | Status: DISCONTINUED | OUTPATIENT
Start: 2019-01-01 | End: 2019-01-01

## 2019-01-01 RX ORDER — HYDROCORTISONE 20 MG
200 TABLET ORAL ONCE
Refills: 0 | Status: COMPLETED | OUTPATIENT
Start: 2019-01-01 | End: 2019-01-01

## 2019-01-01 RX ORDER — MORPHINE SULFATE 50 MG/1
2 CAPSULE, EXTENDED RELEASE ORAL EVERY 4 HOURS
Refills: 0 | Status: DISCONTINUED | OUTPATIENT
Start: 2019-01-01 | End: 2019-01-01

## 2019-01-01 RX ORDER — HYDRALAZINE HCL 50 MG
5 TABLET ORAL ONCE
Refills: 0 | Status: COMPLETED | OUTPATIENT
Start: 2019-01-01 | End: 2019-01-01

## 2019-01-01 RX ORDER — DIPHENHYDRAMINE HCL 50 MG
50 CAPSULE ORAL EVERY 4 HOURS
Refills: 0 | Status: DISCONTINUED | OUTPATIENT
Start: 2019-01-01 | End: 2019-01-01

## 2019-01-01 RX ORDER — LEVOTHYROXINE SODIUM 0.2 MG/1
200 TABLET ORAL DAILY
Refills: 0 | Status: DISCONTINUED | COMMUNITY
End: 2019-01-01

## 2019-01-01 RX ORDER — MORPHINE SULFATE 50 MG/1
2 CAPSULE, EXTENDED RELEASE ORAL ONCE
Refills: 0 | Status: DISCONTINUED | OUTPATIENT
Start: 2019-01-01 | End: 2019-01-01

## 2019-01-01 RX ORDER — BUDESONIDE AND FORMOTEROL FUMARATE DIHYDRATE 160; 4.5 UG/1; UG/1
2 AEROSOL RESPIRATORY (INHALATION)
Refills: 0 | Status: DISCONTINUED | OUTPATIENT
Start: 2019-01-01 | End: 2019-01-01

## 2019-01-01 RX ORDER — FUROSEMIDE 40 MG
20 TABLET ORAL ONCE
Refills: 0 | Status: COMPLETED | OUTPATIENT
Start: 2019-01-01 | End: 2019-01-01

## 2019-01-01 RX ORDER — LANOLIN ALCOHOL/MO/W.PET/CERES
3 CREAM (GRAM) TOPICAL ONCE
Refills: 0 | Status: COMPLETED | OUTPATIENT
Start: 2019-01-01 | End: 2019-01-01

## 2019-01-01 RX ORDER — ASPIRIN/CALCIUM CARB/MAGNESIUM 324 MG
300 TABLET ORAL DAILY
Refills: 0 | Status: DISCONTINUED | OUTPATIENT
Start: 2019-01-01 | End: 2019-01-01

## 2019-01-01 RX ORDER — LANOLIN ALCOHOL/MO/W.PET/CERES
1 CREAM (GRAM) TOPICAL ONCE
Refills: 0 | Status: COMPLETED | OUTPATIENT
Start: 2019-01-01 | End: 2019-01-01

## 2019-01-01 RX ORDER — ASPIRIN/CALCIUM CARB/MAGNESIUM 324 MG
162 TABLET ORAL ONCE
Refills: 0 | Status: COMPLETED | OUTPATIENT
Start: 2019-01-01 | End: 2019-01-01

## 2019-01-01 RX ORDER — LEVOTHYROXINE SODIUM 125 MCG
300 TABLET ORAL DAILY
Refills: 0 | Status: DISCONTINUED | OUTPATIENT
Start: 2019-01-01 | End: 2019-01-01

## 2019-01-01 RX ORDER — ACETAMINOPHEN 500 MG
650 TABLET ORAL DAILY
Refills: 0 | Status: DISCONTINUED | OUTPATIENT
Start: 2019-01-01 | End: 2019-01-01

## 2019-01-01 RX ORDER — PANTOPRAZOLE SODIUM 20 MG/1
8 TABLET, DELAYED RELEASE ORAL
Qty: 80 | Refills: 0 | Status: DISCONTINUED | OUTPATIENT
Start: 2019-01-01 | End: 2019-01-01

## 2019-01-01 RX ORDER — SODIUM CHLORIDE 9 MG/ML
500 INJECTION INTRAMUSCULAR; INTRAVENOUS; SUBCUTANEOUS ONCE
Refills: 0 | Status: COMPLETED | OUTPATIENT
Start: 2019-01-01 | End: 2019-01-01

## 2019-01-01 RX ORDER — METOPROLOL TARTRATE 50 MG
12.5 TABLET ORAL EVERY 12 HOURS
Refills: 0 | Status: DISCONTINUED | OUTPATIENT
Start: 2019-01-01 | End: 2019-01-01

## 2019-01-01 RX ORDER — PANTOPRAZOLE SODIUM 20 MG/1
20 TABLET, DELAYED RELEASE ORAL ONCE
Refills: 0 | Status: DISCONTINUED | OUTPATIENT
Start: 2019-01-01 | End: 2019-01-01

## 2019-01-01 RX ORDER — AMIODARONE HYDROCHLORIDE 200 MG/1
200 TABLET ORAL DAILY
Qty: 90 | Refills: 3 | Status: DISCONTINUED | COMMUNITY
End: 2019-01-01

## 2019-01-01 RX ORDER — SODIUM CHLORIDE 9 MG/ML
250 INJECTION, SOLUTION INTRAVENOUS ONCE
Refills: 0 | Status: COMPLETED | OUTPATIENT
Start: 2019-01-01 | End: 2019-01-01

## 2019-01-01 RX ORDER — SUCRALFATE 1 G
1 TABLET ORAL EVERY 12 HOURS
Refills: 0 | Status: DISCONTINUED | OUTPATIENT
Start: 2019-01-01 | End: 2019-01-01

## 2019-01-01 RX ORDER — OXYCODONE HYDROCHLORIDE 5 MG/1
5 TABLET ORAL EVERY 6 HOURS
Refills: 0 | Status: DISCONTINUED | OUTPATIENT
Start: 2019-01-01 | End: 2019-01-01

## 2019-01-01 RX ORDER — AMIODARONE HYDROCHLORIDE 400 MG/1
1 TABLET ORAL
Qty: 60 | Refills: 0
Start: 2019-01-01 | End: 2019-01-01

## 2019-01-01 RX ORDER — MORPHINE SULFATE 50 MG/1
4 CAPSULE, EXTENDED RELEASE ORAL EVERY 4 HOURS
Refills: 0 | Status: DISCONTINUED | OUTPATIENT
Start: 2019-01-01 | End: 2019-01-01

## 2019-01-01 RX ORDER — IPRATROPIUM/ALBUTEROL SULFATE 18-103MCG
3 AEROSOL WITH ADAPTER (GRAM) INHALATION EVERY 6 HOURS
Refills: 0 | Status: DISCONTINUED | OUTPATIENT
Start: 2019-01-01 | End: 2019-01-01

## 2019-01-01 RX ORDER — PIPERACILLIN AND TAZOBACTAM 4; .5 G/20ML; G/20ML
1 INJECTION, POWDER, LYOPHILIZED, FOR SOLUTION INTRAVENOUS ONCE
Refills: 0 | Status: DISCONTINUED | OUTPATIENT
Start: 2019-01-01 | End: 2019-01-01

## 2019-01-01 RX ORDER — ALPRAZOLAM 0.25 MG
0.5 TABLET ORAL ONCE
Refills: 0 | Status: DISCONTINUED | OUTPATIENT
Start: 2019-01-01 | End: 2019-01-01

## 2019-01-01 RX ORDER — THYROID 120 MG
1 TABLET ORAL
Qty: 0 | Refills: 0 | DISCHARGE

## 2019-01-01 RX ORDER — DIPHENHYDRAMINE HCL 50 MG
50 CAPSULE ORAL ONCE
Refills: 0 | Status: COMPLETED | OUTPATIENT
Start: 2019-01-01 | End: 2019-01-01

## 2019-01-01 RX ORDER — FUROSEMIDE 40 MG
1 TABLET ORAL
Qty: 0 | Refills: 0 | DISCHARGE
Start: 2019-01-01

## 2019-01-01 RX ORDER — CHLORHEXIDINE GLUCONATE 213 G/1000ML
15 SOLUTION TOPICAL
Refills: 0 | Status: DISCONTINUED | OUTPATIENT
Start: 2019-01-01 | End: 2019-01-01

## 2019-01-01 RX ORDER — HYDROMORPHONE HYDROCHLORIDE 2 MG/ML
1 INJECTION INTRAMUSCULAR; INTRAVENOUS; SUBCUTANEOUS
Refills: 0 | Status: DISCONTINUED | OUTPATIENT
Start: 2019-01-01 | End: 2019-01-01

## 2019-01-01 RX ORDER — NYSTATIN 500MM UNIT
500000 POWDER (EA) MISCELLANEOUS
Refills: 0 | Status: DISCONTINUED | OUTPATIENT
Start: 2019-01-01 | End: 2019-01-01

## 2019-01-01 RX ORDER — LISINOPRIL 2.5 MG/1
20 TABLET ORAL DAILY
Refills: 0 | Status: DISCONTINUED | OUTPATIENT
Start: 2019-01-01 | End: 2019-01-01

## 2019-01-01 RX ORDER — FUROSEMIDE 40 MG
20 TABLET ORAL DAILY
Refills: 0 | Status: DISCONTINUED | OUTPATIENT
Start: 2019-01-01 | End: 2019-01-01

## 2019-01-01 RX ORDER — POTASSIUM CHLORIDE 20 MEQ
20 PACKET (EA) ORAL
Refills: 0 | Status: COMPLETED | OUTPATIENT
Start: 2019-01-01 | End: 2019-01-01

## 2019-01-01 RX ORDER — FUROSEMIDE 40 MG
40 TABLET ORAL
Refills: 0 | Status: DISCONTINUED | OUTPATIENT
Start: 2019-01-01 | End: 2019-01-01

## 2019-01-01 RX ORDER — METOPROLOL TARTRATE 50 MG
1 TABLET ORAL
Qty: 0 | Refills: 0 | DISCHARGE
Start: 2019-01-01

## 2019-01-01 RX ORDER — DEXMEDETOMIDINE HYDROCHLORIDE IN 0.9% SODIUM CHLORIDE 4 UG/ML
0.1 INJECTION INTRAVENOUS
Qty: 200 | Refills: 0 | Status: DISCONTINUED | OUTPATIENT
Start: 2019-01-01 | End: 2019-01-01

## 2019-01-01 RX ORDER — ATORVASTATIN CALCIUM 80 MG/1
40 TABLET, FILM COATED ORAL AT BEDTIME
Refills: 0 | Status: DISCONTINUED | OUTPATIENT
Start: 2019-01-01 | End: 2019-01-01

## 2019-01-01 RX ORDER — DEXMEDETOMIDINE HYDROCHLORIDE IN 0.9% SODIUM CHLORIDE 4 UG/ML
0.2 INJECTION INTRAVENOUS
Qty: 200 | Refills: 0 | Status: DISCONTINUED | OUTPATIENT
Start: 2019-01-01 | End: 2019-01-01

## 2019-01-01 RX ORDER — HYDROMORPHONE HYDROCHLORIDE 2 MG/ML
0.5 INJECTION INTRAMUSCULAR; INTRAVENOUS; SUBCUTANEOUS
Refills: 0 | Status: DISCONTINUED | OUTPATIENT
Start: 2019-01-01 | End: 2019-01-01

## 2019-01-01 RX ORDER — SODIUM CHLORIDE 9 MG/ML
500 INJECTION, SOLUTION INTRAVENOUS ONCE
Refills: 0 | Status: COMPLETED | OUTPATIENT
Start: 2019-01-01 | End: 2019-01-01

## 2019-01-01 RX ORDER — OXYCODONE AND ACETAMINOPHEN 5; 325 MG/1; MG/1
1 TABLET ORAL EVERY 4 HOURS
Refills: 0 | Status: DISCONTINUED | OUTPATIENT
Start: 2019-01-01 | End: 2019-01-01

## 2019-01-01 RX ORDER — NOREPINEPHRINE BITARTRATE/D5W 8 MG/250ML
0.05 PLASTIC BAG, INJECTION (ML) INTRAVENOUS
Qty: 16 | Refills: 0 | Status: DISCONTINUED | OUTPATIENT
Start: 2019-01-01 | End: 2019-01-01

## 2019-01-01 RX ORDER — ONDANSETRON 8 MG/1
4 TABLET, FILM COATED ORAL EVERY 4 HOURS
Refills: 0 | Status: COMPLETED | OUTPATIENT
Start: 2019-01-01 | End: 2019-01-01

## 2019-01-01 RX ORDER — FAMOTIDINE 10 MG/ML
20 INJECTION INTRAVENOUS ONCE
Refills: 0 | Status: COMPLETED | OUTPATIENT
Start: 2019-01-01 | End: 2019-01-01

## 2019-01-01 RX ORDER — AMIODARONE HYDROCHLORIDE 400 MG/1
200 TABLET ORAL
Refills: 0 | Status: DISCONTINUED | OUTPATIENT
Start: 2019-01-01 | End: 2019-01-01

## 2019-01-01 RX ORDER — AMIODARONE HYDROCHLORIDE 400 MG/1
0.5 TABLET ORAL
Qty: 900 | Refills: 0 | Status: DISCONTINUED | OUTPATIENT
Start: 2019-01-01 | End: 2019-01-01

## 2019-01-01 RX ORDER — ONDANSETRON 8 MG/1
4 TABLET, FILM COATED ORAL ONCE
Refills: 0 | Status: DISCONTINUED | OUTPATIENT
Start: 2019-01-01 | End: 2019-01-01

## 2019-01-01 RX ORDER — MAGNESIUM OXIDE 400 MG ORAL TABLET 241.3 MG
400 TABLET ORAL
Refills: 0 | Status: DISCONTINUED | OUTPATIENT
Start: 2019-01-01 | End: 2019-01-01

## 2019-01-01 RX ORDER — AMIODARONE HYDROCHLORIDE 400 MG/1
1 TABLET ORAL
Qty: 0 | Refills: 0 | DISCHARGE
Start: 2019-01-01

## 2019-01-01 RX ORDER — GLYCERIN ADULT
1 SUPPOSITORY, RECTAL RECTAL ONCE
Refills: 0 | Status: COMPLETED | OUTPATIENT
Start: 2019-01-01 | End: 2019-01-01

## 2019-01-01 RX ORDER — ASPIRIN 81 MG/1
81 TABLET, COATED ORAL DAILY
Refills: 0 | Status: ACTIVE | COMMUNITY

## 2019-01-01 RX ORDER — METOPROLOL TARTRATE 50 MG
12.5 TABLET ORAL
Refills: 0 | Status: DISCONTINUED | OUTPATIENT
Start: 2019-01-01 | End: 2019-01-01

## 2019-01-01 RX ORDER — POLYETHYLENE GLYCOL 3350 17 G/17G
17 POWDER, FOR SOLUTION ORAL ONCE
Refills: 0 | Status: DISCONTINUED | OUTPATIENT
Start: 2019-01-01 | End: 2019-01-01

## 2019-01-01 RX ORDER — ATORVASTATIN CALCIUM 80 MG/1
80 TABLET, FILM COATED ORAL AT BEDTIME
Refills: 0 | Status: DISCONTINUED | OUTPATIENT
Start: 2019-01-01 | End: 2019-01-01

## 2019-01-01 RX ORDER — LEVOTHYROXINE SODIUM 125 MCG
100 TABLET ORAL AT BEDTIME
Refills: 0 | Status: DISCONTINUED | OUTPATIENT
Start: 2019-01-01 | End: 2019-01-01

## 2019-01-01 RX ORDER — IRON SUCROSE 20 MG/ML
300 INJECTION, SOLUTION INTRAVENOUS EVERY 24 HOURS
Refills: 0 | Status: COMPLETED | OUTPATIENT
Start: 2019-01-01 | End: 2019-01-01

## 2019-01-01 RX ORDER — HEPARIN SODIUM 5000 [USP'U]/ML
5000 INJECTION INTRAVENOUS; SUBCUTANEOUS EVERY 12 HOURS
Refills: 0 | Status: DISCONTINUED | OUTPATIENT
Start: 2019-01-01 | End: 2019-01-01

## 2019-01-01 RX ORDER — FUROSEMIDE 40 MG
40 TABLET ORAL EVERY 8 HOURS
Refills: 0 | Status: DISCONTINUED | OUTPATIENT
Start: 2019-01-01 | End: 2019-01-01

## 2019-01-01 RX ORDER — FUROSEMIDE 40 MG
40 TABLET ORAL DAILY
Refills: 0 | Status: DISCONTINUED | OUTPATIENT
Start: 2019-01-01 | End: 2019-01-01

## 2019-01-01 RX ORDER — FUROSEMIDE 20 MG/1
20 TABLET ORAL
Qty: 30 | Refills: 1 | Status: DISCONTINUED | COMMUNITY
End: 2019-01-01

## 2019-01-01 RX ORDER — SIMETHICONE 80 MG/1
80 TABLET, CHEWABLE ORAL
Refills: 0 | Status: DISCONTINUED | OUTPATIENT
Start: 2019-01-01 | End: 2019-01-01

## 2019-01-01 RX ORDER — POTASSIUM CHLORIDE 20 MEQ
40 PACKET (EA) ORAL EVERY 4 HOURS
Refills: 0 | Status: DISCONTINUED | OUTPATIENT
Start: 2019-01-01 | End: 2019-01-01

## 2019-01-01 RX ORDER — AMIODARONE HYDROCHLORIDE 400 MG/1
1 TABLET ORAL
Qty: 30 | Refills: 0
Start: 2019-01-01 | End: 2019-01-01

## 2019-01-01 RX ORDER — FENTANYL CITRATE 50 UG/ML
50 INJECTION INTRAVENOUS ONCE
Refills: 0 | Status: DISCONTINUED | OUTPATIENT
Start: 2019-01-01 | End: 2019-01-01

## 2019-01-01 RX ORDER — FUROSEMIDE 40 MG
40 TABLET ORAL THREE TIMES A DAY
Refills: 0 | Status: DISCONTINUED | OUTPATIENT
Start: 2019-01-01 | End: 2019-01-01

## 2019-01-01 RX ORDER — POTASSIUM CHLORIDE 20 MEQ
20 PACKET (EA) ORAL ONCE
Refills: 0 | Status: COMPLETED | OUTPATIENT
Start: 2019-01-01 | End: 2019-01-01

## 2019-01-01 RX ORDER — METOPROLOL TARTRATE 50 MG
25 TABLET ORAL
Refills: 0 | Status: DISCONTINUED | OUTPATIENT
Start: 2019-01-01 | End: 2019-01-01

## 2019-01-01 RX ORDER — FUROSEMIDE 40 MG
40 TABLET ORAL ONCE
Refills: 0 | Status: COMPLETED | OUTPATIENT
Start: 2019-01-01 | End: 2019-01-01

## 2019-01-01 RX ORDER — METOPROLOL TARTRATE 50 MG
2.5 TABLET ORAL EVERY 6 HOURS
Refills: 0 | Status: DISCONTINUED | OUTPATIENT
Start: 2019-01-01 | End: 2019-01-01

## 2019-01-01 RX ORDER — SODIUM CHLORIDE 9 MG/ML
250 INJECTION, SOLUTION INTRAVENOUS ONCE
Refills: 0 | Status: DISCONTINUED | OUTPATIENT
Start: 2019-01-01 | End: 2019-01-01

## 2019-01-01 RX ORDER — TAMSULOSIN HYDROCHLORIDE 0.4 MG/1
0.4 CAPSULE ORAL AT BEDTIME
Refills: 0 | Status: DISCONTINUED | OUTPATIENT
Start: 2019-01-01 | End: 2019-01-01

## 2019-01-01 RX ORDER — LABETALOL HCL 100 MG
10 TABLET ORAL ONCE
Refills: 0 | Status: COMPLETED | OUTPATIENT
Start: 2019-01-01 | End: 2019-01-01

## 2019-01-01 RX ORDER — NITROGLYCERIN 6.5 MG
0.4 CAPSULE, EXTENDED RELEASE ORAL
Refills: 0 | Status: DISCONTINUED | OUTPATIENT
Start: 2019-01-01 | End: 2019-01-01

## 2019-01-01 RX ORDER — CIPROFLOXACIN LACTATE 400MG/40ML
500 VIAL (ML) INTRAVENOUS EVERY 12 HOURS
Refills: 0 | Status: DISCONTINUED | OUTPATIENT
Start: 2019-01-01 | End: 2019-01-01

## 2019-01-01 RX ORDER — DEXAMETHASONE 0.5 MG/5ML
4 ELIXIR ORAL ONCE
Refills: 0 | Status: COMPLETED | OUTPATIENT
Start: 2019-01-01 | End: 2019-01-01

## 2019-01-01 RX ORDER — LEVOTHYROXINE SODIUM 125 MCG
200 TABLET ORAL DAILY
Refills: 0 | Status: DISCONTINUED | OUTPATIENT
Start: 2019-01-01 | End: 2019-01-01

## 2019-01-01 RX ORDER — SACCHAROMYCES BOULARDII 250 MG
250 POWDER IN PACKET (EA) ORAL
Refills: 0 | Status: DISCONTINUED | OUTPATIENT
Start: 2019-01-01 | End: 2019-01-01

## 2019-01-01 RX ORDER — CLOPIDOGREL BISULFATE 75 MG/1
1 TABLET, FILM COATED ORAL
Qty: 0 | Refills: 0 | DISCHARGE
Start: 2019-01-01

## 2019-01-01 RX ORDER — SIMETHICONE 80 MG/1
1 TABLET, CHEWABLE ORAL
Qty: 0 | Refills: 0 | DISCHARGE
Start: 2019-01-01

## 2019-01-01 RX ORDER — VASOPRESSIN 20 [USP'U]/ML
0.08 INJECTION INTRAVENOUS
Qty: 50 | Refills: 0 | Status: DISCONTINUED | OUTPATIENT
Start: 2019-01-01 | End: 2019-01-01

## 2019-01-01 RX ORDER — METRONIDAZOLE 500 MG
1 TABLET ORAL
Qty: 24 | Refills: 0
Start: 2019-01-01 | End: 2019-01-01

## 2019-01-01 RX ORDER — KETOROLAC TROMETHAMINE 30 MG/ML
10 SYRINGE (ML) INJECTION ONCE
Refills: 0 | Status: DISCONTINUED | OUTPATIENT
Start: 2019-01-01 | End: 2019-01-01

## 2019-01-01 RX ORDER — DOXYCYCLINE HYCLATE 100 MG/1
100 CAPSULE ORAL
Qty: 14 | Refills: 0 | Status: ACTIVE | COMMUNITY
Start: 2019-01-01 | End: 1900-01-01

## 2019-01-01 RX ORDER — IPRATROPIUM/ALBUTEROL SULFATE 18-103MCG
3 AEROSOL WITH ADAPTER (GRAM) INHALATION
Qty: 0 | Refills: 0 | DISCHARGE
Start: 2019-01-01

## 2019-01-01 RX ORDER — ENOXAPARIN SODIUM 100 MG/ML
40 INJECTION SUBCUTANEOUS DAILY
Refills: 0 | Status: DISCONTINUED | OUTPATIENT
Start: 2019-01-01 | End: 2019-01-01

## 2019-01-01 RX ORDER — ACETAMINOPHEN 500 MG
650 TABLET ORAL ONCE
Refills: 0 | Status: COMPLETED | OUTPATIENT
Start: 2019-01-01 | End: 2019-01-01

## 2019-01-01 RX ORDER — ONDANSETRON 8 MG/1
4 TABLET, FILM COATED ORAL ONCE
Refills: 0 | Status: COMPLETED | OUTPATIENT
Start: 2019-01-01 | End: 2019-01-01

## 2019-01-01 RX ORDER — METRONIDAZOLE 500 MG
500 TABLET ORAL ONCE
Refills: 0 | Status: COMPLETED | OUTPATIENT
Start: 2019-01-01 | End: 2019-01-01

## 2019-01-01 RX ORDER — OXYCODONE HYDROCHLORIDE 5 MG/1
10 TABLET ORAL EVERY 6 HOURS
Refills: 0 | Status: DISCONTINUED | OUTPATIENT
Start: 2019-01-01 | End: 2019-01-01

## 2019-01-01 RX ORDER — ALPRAZOLAM 0.25 MG
1 TABLET ORAL ONCE
Refills: 0 | Status: DISCONTINUED | OUTPATIENT
Start: 2019-01-01 | End: 2019-01-01

## 2019-01-01 RX ORDER — CEFEPIME 1 G/1
1000 INJECTION, POWDER, FOR SOLUTION INTRAMUSCULAR; INTRAVENOUS EVERY 8 HOURS
Refills: 0 | Status: DISCONTINUED | OUTPATIENT
Start: 2019-01-01 | End: 2019-01-01

## 2019-01-01 RX ORDER — IOHEXOL 300 MG/ML
30 INJECTION, SOLUTION INTRAVENOUS ONCE
Refills: 0 | Status: COMPLETED | OUTPATIENT
Start: 2019-01-01 | End: 2019-01-01

## 2019-01-01 RX ORDER — METHOCARBAMOL 500 MG/1
750 TABLET, FILM COATED ORAL EVERY 12 HOURS
Refills: 0 | Status: DISCONTINUED | OUTPATIENT
Start: 2019-01-01 | End: 2019-01-01

## 2019-01-01 RX ORDER — SODIUM CHLORIDE 9 MG/ML
2000 INJECTION INTRAMUSCULAR; INTRAVENOUS; SUBCUTANEOUS ONCE
Refills: 0 | Status: COMPLETED | OUTPATIENT
Start: 2019-01-01 | End: 2019-01-01

## 2019-01-01 RX ORDER — PHENYLEPHRINE HYDROCHLORIDE 10 MG/ML
0.63 INJECTION INTRAVENOUS
Qty: 160 | Refills: 0 | Status: DISCONTINUED | OUTPATIENT
Start: 2019-01-01 | End: 2019-01-01

## 2019-01-01 RX ORDER — DEXTROSE 10 % IN WATER 10 %
25 INTRAVENOUS SOLUTION INTRAVENOUS ONCE
Refills: 0 | Status: COMPLETED | OUTPATIENT
Start: 2019-01-01 | End: 2019-01-01

## 2019-01-01 RX ORDER — PIPERACILLIN AND TAZOBACTAM 4; .5 G/20ML; G/20ML
3.38 INJECTION, POWDER, LYOPHILIZED, FOR SOLUTION INTRAVENOUS ONCE
Refills: 0 | Status: COMPLETED | OUTPATIENT
Start: 2019-01-01 | End: 2019-01-01

## 2019-01-01 RX ORDER — MEPERIDINE HYDROCHLORIDE 50 MG/ML
12.5 INJECTION INTRAMUSCULAR; INTRAVENOUS; SUBCUTANEOUS
Refills: 0 | Status: DISCONTINUED | OUTPATIENT
Start: 2019-01-01 | End: 2019-01-01

## 2019-01-01 RX ORDER — IPRATROPIUM/ALBUTEROL SULFATE 18-103MCG
3 AEROSOL WITH ADAPTER (GRAM) INHALATION ONCE
Refills: 0 | Status: COMPLETED | OUTPATIENT
Start: 2019-01-01 | End: 2019-01-01

## 2019-01-01 RX ORDER — METOPROLOL TARTRATE 50 MG
1 TABLET ORAL
Qty: 0 | Refills: 0 | DISCHARGE
Start: 2019-01-01 | End: 2019-01-01

## 2019-01-01 RX ORDER — FUROSEMIDE 40 MG
40 TABLET ORAL EVERY 12 HOURS
Refills: 0 | Status: DISCONTINUED | OUTPATIENT
Start: 2019-01-01 | End: 2019-01-01

## 2019-01-01 RX ORDER — ALPRAZOLAM 0.25 MG
0.5 TABLET ORAL AT BEDTIME
Refills: 0 | Status: DISCONTINUED | OUTPATIENT
Start: 2019-01-01 | End: 2019-01-01

## 2019-01-01 RX ORDER — HEPARIN SODIUM 5000 [USP'U]/ML
5000 INJECTION INTRAVENOUS; SUBCUTANEOUS EVERY 8 HOURS
Refills: 0 | Status: DISCONTINUED | OUTPATIENT
Start: 2019-01-01 | End: 2019-01-01

## 2019-01-01 RX ORDER — SUCRALFATE 1 G
1 TABLET ORAL
Refills: 0 | Status: DISCONTINUED | OUTPATIENT
Start: 2019-01-01 | End: 2019-01-01

## 2019-01-01 RX ORDER — DIPHENHYDRAMINE HCL 50 MG
25 CAPSULE ORAL ONCE
Refills: 0 | Status: COMPLETED | OUTPATIENT
Start: 2019-01-01 | End: 2019-01-01

## 2019-01-01 RX ORDER — ASPIRIN/CALCIUM CARB/MAGNESIUM 324 MG
162 TABLET ORAL ONCE
Refills: 0 | Status: DISCONTINUED | OUTPATIENT
Start: 2019-01-01 | End: 2019-01-01

## 2019-01-01 RX ORDER — AMIODARONE HYDROCHLORIDE 400 MG/1
1 TABLET ORAL
Qty: 900 | Refills: 0 | Status: DISCONTINUED | OUTPATIENT
Start: 2019-01-01 | End: 2019-01-01

## 2019-01-01 RX ORDER — SUCRALFATE 1 G
1 TABLET ORAL
Qty: 0 | Refills: 0 | DISCHARGE
Start: 2019-01-01

## 2019-01-01 RX ORDER — NICARDIPINE HYDROCHLORIDE 30 MG/1
5 CAPSULE, EXTENDED RELEASE ORAL
Qty: 40 | Refills: 0 | Status: DISCONTINUED | OUTPATIENT
Start: 2019-01-01 | End: 2019-01-01

## 2019-01-01 RX ORDER — FENTANYL CITRATE 50 UG/ML
25 INJECTION INTRAVENOUS EVERY 6 HOURS
Refills: 0 | Status: DISCONTINUED | OUTPATIENT
Start: 2019-01-01 | End: 2019-01-01

## 2019-01-01 RX ORDER — ACETAMINOPHEN 500 MG
2 TABLET ORAL
Qty: 0 | Refills: 0 | DISCHARGE
Start: 2019-01-01

## 2019-01-01 RX ORDER — HYDROCORTISONE 1 %
1 OINTMENT (GRAM) TOPICAL
Refills: 0 | Status: DISCONTINUED | OUTPATIENT
Start: 2019-01-01 | End: 2019-01-01

## 2019-01-01 RX ORDER — THYROID, PORCINE 120 MG/1
120 TABLET ORAL DAILY
Refills: 0 | Status: ACTIVE | COMMUNITY

## 2019-01-01 RX ORDER — DIPHENHYDRAMINE HCL 50 MG
25 CAPSULE ORAL ONCE
Refills: 0 | Status: DISCONTINUED | OUTPATIENT
Start: 2019-01-01 | End: 2019-01-01

## 2019-01-01 RX ORDER — SENNA PLUS 8.6 MG/1
2 TABLET ORAL
Qty: 0 | Refills: 0 | DISCHARGE
Start: 2019-01-01

## 2019-01-01 RX ORDER — POLYETHYLENE GLYCOL 3350 17 G/17G
17 POWDER, FOR SOLUTION ORAL
Refills: 0 | Status: DISCONTINUED | OUTPATIENT
Start: 2019-01-01 | End: 2019-01-01

## 2019-01-01 RX ORDER — CLOPIDOGREL 75 MG/1
75 TABLET, FILM COATED ORAL DAILY
Qty: 30 | Refills: 6 | Status: DISCONTINUED | COMMUNITY
End: 2019-01-01

## 2019-01-01 RX ORDER — METOPROLOL TARTRATE 50 MG
0.5 TABLET ORAL
Qty: 30 | Refills: 0
Start: 2019-01-01 | End: 2019-01-01

## 2019-01-01 RX ORDER — AMIODARONE HYDROCHLORIDE 400 MG/1
150 TABLET ORAL ONCE
Refills: 0 | Status: COMPLETED | OUTPATIENT
Start: 2019-01-01 | End: 2019-01-01

## 2019-01-01 RX ORDER — HYDROCORTISONE 1 %
1 OINTMENT (GRAM) TOPICAL ONCE
Refills: 0 | Status: COMPLETED | OUTPATIENT
Start: 2019-01-01 | End: 2019-01-01

## 2019-01-01 RX ORDER — ONDANSETRON 8 MG/1
4 TABLET, FILM COATED ORAL EVERY 6 HOURS
Refills: 0 | Status: DISCONTINUED | OUTPATIENT
Start: 2019-01-01 | End: 2019-01-01

## 2019-01-01 RX ORDER — SOD SULF/SODIUM/NAHCO3/KCL/PEG
4000 SOLUTION, RECONSTITUTED, ORAL ORAL ONCE
Refills: 0 | Status: COMPLETED | OUTPATIENT
Start: 2019-01-01 | End: 2019-01-01

## 2019-01-01 RX ORDER — HYDROMORPHONE HYDROCHLORIDE 2 MG/ML
2 INJECTION INTRAMUSCULAR; INTRAVENOUS; SUBCUTANEOUS ONCE
Refills: 0 | Status: DISCONTINUED | OUTPATIENT
Start: 2019-01-01 | End: 2019-01-01

## 2019-01-01 RX ORDER — MAGNESIUM SULFATE 500 MG/ML
1 VIAL (ML) INJECTION ONCE
Refills: 0 | Status: COMPLETED | OUTPATIENT
Start: 2019-01-01 | End: 2019-01-01

## 2019-01-01 RX ORDER — LANOLIN ALCOHOL/MO/W.PET/CERES
5 CREAM (GRAM) TOPICAL AT BEDTIME
Refills: 0 | Status: DISCONTINUED | OUTPATIENT
Start: 2019-01-01 | End: 2019-01-01

## 2019-01-01 RX ORDER — PANTOPRAZOLE SODIUM 20 MG/1
1 TABLET, DELAYED RELEASE ORAL
Qty: 60 | Refills: 0
Start: 2019-01-01 | End: 2019-01-01

## 2019-01-01 RX ORDER — LISINOPRIL 20 MG/1
20 TABLET ORAL DAILY
Refills: 0 | Status: ACTIVE | COMMUNITY

## 2019-01-01 RX ORDER — PHENYLEPHRINE HYDROCHLORIDE 10 MG/ML
0.1 INJECTION INTRAVENOUS
Qty: 160 | Refills: 0 | Status: DISCONTINUED | OUTPATIENT
Start: 2019-01-01 | End: 2019-01-01

## 2019-01-01 RX ORDER — CLOPIDOGREL BISULFATE 75 MG/1
1 TABLET, FILM COATED ORAL
Qty: 30 | Refills: 0
Start: 2019-01-01 | End: 2019-01-01

## 2019-01-01 RX ADMIN — Medication 0.5 MILLIGRAM(S): at 23:32

## 2019-01-01 RX ADMIN — Medication 12.5 MILLIGRAM(S): at 13:14

## 2019-01-01 RX ADMIN — AMIODARONE HYDROCHLORIDE 200 MILLIGRAM(S): 400 TABLET ORAL at 05:22

## 2019-01-01 RX ADMIN — Medication 650 MILLIGRAM(S): at 12:02

## 2019-01-01 RX ADMIN — Medication 12.5 MILLIGRAM(S): at 17:36

## 2019-01-01 RX ADMIN — Medication 12.5 GRAM(S): at 11:50

## 2019-01-01 RX ADMIN — Medication 100 MICROGRAM(S): at 22:58

## 2019-01-01 RX ADMIN — Medication 125 MILLIGRAM(S): at 18:55

## 2019-01-01 RX ADMIN — ATORVASTATIN CALCIUM 40 MILLIGRAM(S): 80 TABLET, FILM COATED ORAL at 22:07

## 2019-01-01 RX ADMIN — Medication 1 GRAM(S): at 17:09

## 2019-01-01 RX ADMIN — LISINOPRIL 10 MILLIGRAM(S): 2.5 TABLET ORAL at 11:50

## 2019-01-01 RX ADMIN — ONDANSETRON 4 MILLIGRAM(S): 8 TABLET, FILM COATED ORAL at 00:54

## 2019-01-01 RX ADMIN — PANTOPRAZOLE SODIUM 40 MILLIGRAM(S): 20 TABLET, DELAYED RELEASE ORAL at 05:55

## 2019-01-01 RX ADMIN — Medication 1 TABLET(S): at 11:59

## 2019-01-01 RX ADMIN — CEFEPIME 100 MILLIGRAM(S): 1 INJECTION, POWDER, FOR SOLUTION INTRAMUSCULAR; INTRAVENOUS at 05:03

## 2019-01-01 RX ADMIN — Medication 1 GRAM(S): at 17:31

## 2019-01-01 RX ADMIN — LISINOPRIL 20 MILLIGRAM(S): 2.5 TABLET ORAL at 06:24

## 2019-01-01 RX ADMIN — HYDROMORPHONE HYDROCHLORIDE 0.25 MILLIGRAM(S): 2 INJECTION INTRAMUSCULAR; INTRAVENOUS; SUBCUTANEOUS at 18:20

## 2019-01-01 RX ADMIN — SODIUM CHLORIDE 1000 MILLILITER(S): 9 INJECTION INTRAMUSCULAR; INTRAVENOUS; SUBCUTANEOUS at 01:00

## 2019-01-01 RX ADMIN — SODIUM CHLORIDE 75 MILLILITER(S): 9 INJECTION INTRAMUSCULAR; INTRAVENOUS; SUBCUTANEOUS at 14:27

## 2019-01-01 RX ADMIN — CLOPIDOGREL BISULFATE 75 MILLIGRAM(S): 75 TABLET, FILM COATED ORAL at 12:37

## 2019-01-01 RX ADMIN — BUDESONIDE AND FORMOTEROL FUMARATE DIHYDRATE 2 PUFF(S): 160; 4.5 AEROSOL RESPIRATORY (INHALATION) at 09:09

## 2019-01-01 RX ADMIN — Medication 40 MILLIGRAM(S): at 18:28

## 2019-01-01 RX ADMIN — Medication 500000 UNIT(S): at 18:15

## 2019-01-01 RX ADMIN — Medication 40 MILLIGRAM(S): at 06:06

## 2019-01-01 RX ADMIN — Medication 300 MICROGRAM(S): at 05:06

## 2019-01-01 RX ADMIN — CEFEPIME 100 MILLIGRAM(S): 1 INJECTION, POWDER, FOR SOLUTION INTRAMUSCULAR; INTRAVENOUS at 09:44

## 2019-01-01 RX ADMIN — Medication 200 MILLIGRAM(S): at 11:50

## 2019-01-01 RX ADMIN — Medication 12.5 MILLIGRAM(S): at 06:08

## 2019-01-01 RX ADMIN — METHOCARBAMOL 750 MILLIGRAM(S): 500 TABLET, FILM COATED ORAL at 15:00

## 2019-01-01 RX ADMIN — Medication 25 GRAM(S): at 14:09

## 2019-01-01 RX ADMIN — PANTOPRAZOLE SODIUM 40 MILLIGRAM(S): 20 TABLET, DELAYED RELEASE ORAL at 17:17

## 2019-01-01 RX ADMIN — HYDROMORPHONE HYDROCHLORIDE 0.25 MILLIGRAM(S): 2 INJECTION INTRAMUSCULAR; INTRAVENOUS; SUBCUTANEOUS at 03:36

## 2019-01-01 RX ADMIN — MAGNESIUM OXIDE 400 MG ORAL TABLET 400 MILLIGRAM(S): 241.3 TABLET ORAL at 12:43

## 2019-01-01 RX ADMIN — PANTOPRAZOLE SODIUM 40 MILLIGRAM(S): 20 TABLET, DELAYED RELEASE ORAL at 17:30

## 2019-01-01 RX ADMIN — AMIODARONE HYDROCHLORIDE 200 MILLIGRAM(S): 400 TABLET ORAL at 06:20

## 2019-01-01 RX ADMIN — Medication 1 GRAM(S): at 18:29

## 2019-01-01 RX ADMIN — AMIODARONE HYDROCHLORIDE 33.33 MG/MIN: 400 TABLET ORAL at 18:21

## 2019-01-01 RX ADMIN — Medication 200 MICROGRAM(S): at 06:11

## 2019-01-01 RX ADMIN — Medication 12.5 MILLIGRAM(S): at 05:09

## 2019-01-01 RX ADMIN — ATORVASTATIN CALCIUM 40 MILLIGRAM(S): 80 TABLET, FILM COATED ORAL at 21:41

## 2019-01-01 RX ADMIN — AMIODARONE HYDROCHLORIDE 200 MILLIGRAM(S): 400 TABLET ORAL at 05:04

## 2019-01-01 RX ADMIN — Medication 300 MICROGRAM(S): at 06:14

## 2019-01-01 RX ADMIN — MAGNESIUM OXIDE 400 MG ORAL TABLET 400 MILLIGRAM(S): 241.3 TABLET ORAL at 08:07

## 2019-01-01 RX ADMIN — Medication 1 GRAM(S): at 17:22

## 2019-01-01 RX ADMIN — PANTOPRAZOLE SODIUM 40 MILLIGRAM(S): 20 TABLET, DELAYED RELEASE ORAL at 17:27

## 2019-01-01 RX ADMIN — Medication 12.5 MILLIGRAM(S): at 18:43

## 2019-01-01 RX ADMIN — PANTOPRAZOLE SODIUM 40 MILLIGRAM(S): 20 TABLET, DELAYED RELEASE ORAL at 05:09

## 2019-01-01 RX ADMIN — Medication 50 MILLIEQUIVALENT(S): at 03:29

## 2019-01-01 RX ADMIN — SENNA PLUS 2 TABLET(S): 8.6 TABLET ORAL at 22:07

## 2019-01-01 RX ADMIN — Medication 12.5 MILLIGRAM(S): at 20:14

## 2019-01-01 RX ADMIN — Medication 1 GRAM(S): at 06:08

## 2019-01-01 RX ADMIN — OXYCODONE AND ACETAMINOPHEN 1 TABLET(S): 5; 325 TABLET ORAL at 20:41

## 2019-01-01 RX ADMIN — HEPARIN SODIUM 5000 UNIT(S): 5000 INJECTION INTRAVENOUS; SUBCUTANEOUS at 17:26

## 2019-01-01 RX ADMIN — ATORVASTATIN CALCIUM 80 MILLIGRAM(S): 80 TABLET, FILM COATED ORAL at 22:05

## 2019-01-01 RX ADMIN — Medication 200 MICROGRAM(S): at 05:09

## 2019-01-01 RX ADMIN — CEFEPIME 100 MILLIGRAM(S): 1 INJECTION, POWDER, FOR SOLUTION INTRAMUSCULAR; INTRAVENOUS at 22:53

## 2019-01-01 RX ADMIN — Medication 300 MILLIGRAM(S): at 12:10

## 2019-01-01 RX ADMIN — Medication 100 MILLIGRAM(S): at 21:17

## 2019-01-01 RX ADMIN — LISINOPRIL 20 MILLIGRAM(S): 2.5 TABLET ORAL at 05:57

## 2019-01-01 RX ADMIN — Medication 40 MILLIGRAM(S): at 06:00

## 2019-01-01 RX ADMIN — LISINOPRIL 20 MILLIGRAM(S): 2.5 TABLET ORAL at 05:50

## 2019-01-01 RX ADMIN — PANTOPRAZOLE SODIUM 40 MILLIGRAM(S): 20 TABLET, DELAYED RELEASE ORAL at 18:41

## 2019-01-01 RX ADMIN — CLOPIDOGREL BISULFATE 75 MILLIGRAM(S): 75 TABLET, FILM COATED ORAL at 11:50

## 2019-01-01 RX ADMIN — Medication 0.25 MILLIGRAM(S): at 10:51

## 2019-01-01 RX ADMIN — AMIODARONE HYDROCHLORIDE 200 MILLIGRAM(S): 400 TABLET ORAL at 06:10

## 2019-01-01 RX ADMIN — Medication 40 MILLIGRAM(S): at 06:14

## 2019-01-01 RX ADMIN — HYDROMORPHONE HYDROCHLORIDE 0.25 MILLIGRAM(S): 2 INJECTION INTRAMUSCULAR; INTRAVENOUS; SUBCUTANEOUS at 14:15

## 2019-01-01 RX ADMIN — AMIODARONE HYDROCHLORIDE 200 MILLIGRAM(S): 400 TABLET ORAL at 05:57

## 2019-01-01 RX ADMIN — HEPARIN SODIUM 5000 UNIT(S): 5000 INJECTION INTRAVENOUS; SUBCUTANEOUS at 06:47

## 2019-01-01 RX ADMIN — HEPARIN SODIUM 5000 UNIT(S): 5000 INJECTION INTRAVENOUS; SUBCUTANEOUS at 06:11

## 2019-01-01 RX ADMIN — HEPARIN SODIUM 5000 UNIT(S): 5000 INJECTION INTRAVENOUS; SUBCUTANEOUS at 17:38

## 2019-01-01 RX ADMIN — OXYCODONE AND ACETAMINOPHEN 1 TABLET(S): 5; 325 TABLET ORAL at 21:21

## 2019-01-01 RX ADMIN — Medication 250 MILLIGRAM(S): at 18:18

## 2019-01-01 RX ADMIN — CEFEPIME 100 MILLIGRAM(S): 1 INJECTION, POWDER, FOR SOLUTION INTRAMUSCULAR; INTRAVENOUS at 05:13

## 2019-01-01 RX ADMIN — HEPARIN SODIUM 5000 UNIT(S): 5000 INJECTION INTRAVENOUS; SUBCUTANEOUS at 05:05

## 2019-01-01 RX ADMIN — CEFEPIME 100 MILLIGRAM(S): 1 INJECTION, POWDER, FOR SOLUTION INTRAMUSCULAR; INTRAVENOUS at 06:11

## 2019-01-01 RX ADMIN — Medication 12.5 MILLIGRAM(S): at 18:41

## 2019-01-01 RX ADMIN — SODIUM CHLORIDE 100 MILLILITER(S): 9 INJECTION, SOLUTION INTRAVENOUS at 01:07

## 2019-01-01 RX ADMIN — Medication 3 MILLILITER(S): at 08:59

## 2019-01-01 RX ADMIN — HEPARIN SODIUM 5000 UNIT(S): 5000 INJECTION INTRAVENOUS; SUBCUTANEOUS at 06:23

## 2019-01-01 RX ADMIN — Medication 200 MILLIGRAM(S): at 10:25

## 2019-01-01 RX ADMIN — CEFEPIME 100 MILLIGRAM(S): 1 INJECTION, POWDER, FOR SOLUTION INTRAMUSCULAR; INTRAVENOUS at 21:53

## 2019-01-01 RX ADMIN — MORPHINE SULFATE 4 MILLIGRAM(S): 50 CAPSULE, EXTENDED RELEASE ORAL at 04:30

## 2019-01-01 RX ADMIN — CLOPIDOGREL BISULFATE 75 MILLIGRAM(S): 75 TABLET, FILM COATED ORAL at 11:41

## 2019-01-01 RX ADMIN — Medication 1 GRAM(S): at 17:34

## 2019-01-01 RX ADMIN — Medication 650 MILLIGRAM(S): at 00:30

## 2019-01-01 RX ADMIN — AMIODARONE HYDROCHLORIDE 200 MILLIGRAM(S): 400 TABLET ORAL at 06:14

## 2019-01-01 RX ADMIN — HEPARIN SODIUM 5000 UNIT(S): 5000 INJECTION INTRAVENOUS; SUBCUTANEOUS at 14:19

## 2019-01-01 RX ADMIN — CLOPIDOGREL BISULFATE 75 MILLIGRAM(S): 75 TABLET, FILM COATED ORAL at 11:24

## 2019-01-01 RX ADMIN — FENTANYL CITRATE 25 MICROGRAM(S): 50 INJECTION INTRAVENOUS at 09:45

## 2019-01-01 RX ADMIN — Medication 650 MILLIGRAM(S): at 12:14

## 2019-01-01 RX ADMIN — CHLORHEXIDINE GLUCONATE 1 APPLICATION(S): 213 SOLUTION TOPICAL at 06:07

## 2019-01-01 RX ADMIN — IRON SUCROSE 176.67 MILLIGRAM(S): 20 INJECTION, SOLUTION INTRAVENOUS at 20:51

## 2019-01-01 RX ADMIN — Medication 50 MILLIGRAM(S): at 22:11

## 2019-01-01 RX ADMIN — HEPARIN SODIUM 5000 UNIT(S): 5000 INJECTION INTRAVENOUS; SUBCUTANEOUS at 22:31

## 2019-01-01 RX ADMIN — Medication 1 GRAM(S): at 05:10

## 2019-01-01 RX ADMIN — Medication 4 MILLIGRAM(S): at 21:12

## 2019-01-01 RX ADMIN — LISINOPRIL 20 MILLIGRAM(S): 2.5 TABLET ORAL at 05:09

## 2019-01-01 RX ADMIN — Medication 1 GRAM(S): at 06:24

## 2019-01-01 RX ADMIN — CEFEPIME 100 MILLIGRAM(S): 1 INJECTION, POWDER, FOR SOLUTION INTRAMUSCULAR; INTRAVENOUS at 13:09

## 2019-01-01 RX ADMIN — Medication 12.5 MILLIGRAM(S): at 05:42

## 2019-01-01 RX ADMIN — HYDROMORPHONE HYDROCHLORIDE 0.25 MILLIGRAM(S): 2 INJECTION INTRAMUSCULAR; INTRAVENOUS; SUBCUTANEOUS at 03:21

## 2019-01-01 RX ADMIN — ENOXAPARIN SODIUM 40 MILLIGRAM(S): 100 INJECTION SUBCUTANEOUS at 12:49

## 2019-01-01 RX ADMIN — Medication 12.5 MILLIGRAM(S): at 18:17

## 2019-01-01 RX ADMIN — Medication 600 MILLIGRAM(S): at 15:50

## 2019-01-01 RX ADMIN — AMIODARONE HYDROCHLORIDE 200 MILLIGRAM(S): 400 TABLET ORAL at 06:02

## 2019-01-01 RX ADMIN — Medication 100 MILLIGRAM(S): at 05:52

## 2019-01-01 RX ADMIN — FENTANYL CITRATE 50 MICROGRAM(S): 50 INJECTION INTRAVENOUS at 22:25

## 2019-01-01 RX ADMIN — Medication 20 MILLIGRAM(S): at 17:24

## 2019-01-01 RX ADMIN — CLOPIDOGREL BISULFATE 75 MILLIGRAM(S): 75 TABLET, FILM COATED ORAL at 11:59

## 2019-01-01 RX ADMIN — Medication 81 MILLIGRAM(S): at 12:36

## 2019-01-01 RX ADMIN — Medication 1 SUPPOSITORY(S): at 14:30

## 2019-01-01 RX ADMIN — PANTOPRAZOLE SODIUM 40 MILLIGRAM(S): 20 TABLET, DELAYED RELEASE ORAL at 05:10

## 2019-01-01 RX ADMIN — Medication 650 MILLIGRAM(S): at 15:19

## 2019-01-01 RX ADMIN — HEPARIN SODIUM 5000 UNIT(S): 5000 INJECTION INTRAVENOUS; SUBCUTANEOUS at 18:01

## 2019-01-01 RX ADMIN — SIMETHICONE 80 MILLIGRAM(S): 80 TABLET, CHEWABLE ORAL at 11:08

## 2019-01-01 RX ADMIN — Medication 12.5 MILLIGRAM(S): at 06:46

## 2019-01-01 RX ADMIN — Medication 1 GRAM(S): at 17:17

## 2019-01-01 RX ADMIN — ATORVASTATIN CALCIUM 80 MILLIGRAM(S): 80 TABLET, FILM COATED ORAL at 21:51

## 2019-01-01 RX ADMIN — Medication 81 MILLIGRAM(S): at 12:11

## 2019-01-01 RX ADMIN — TAMSULOSIN HYDROCHLORIDE 0.4 MILLIGRAM(S): 0.4 CAPSULE ORAL at 21:25

## 2019-01-01 RX ADMIN — LISINOPRIL 20 MILLIGRAM(S): 2.5 TABLET ORAL at 05:17

## 2019-01-01 RX ADMIN — Medication 40 MILLIGRAM(S): at 05:49

## 2019-01-01 RX ADMIN — Medication 650 MILLIGRAM(S): at 12:45

## 2019-01-01 RX ADMIN — BUDESONIDE AND FORMOTEROL FUMARATE DIHYDRATE 2 PUFF(S): 160; 4.5 AEROSOL RESPIRATORY (INHALATION) at 08:00

## 2019-01-01 RX ADMIN — PANTOPRAZOLE SODIUM 40 MILLIGRAM(S): 20 TABLET, DELAYED RELEASE ORAL at 06:54

## 2019-01-01 RX ADMIN — SIMETHICONE 80 MILLIGRAM(S): 80 TABLET, CHEWABLE ORAL at 17:09

## 2019-01-01 RX ADMIN — Medication 7.7 MICROGRAM(S)/KG/MIN: at 07:00

## 2019-01-01 RX ADMIN — Medication 1 TABLET(S): at 11:08

## 2019-01-01 RX ADMIN — Medication 81 MILLIGRAM(S): at 12:46

## 2019-01-01 RX ADMIN — Medication 200 MICROGRAM(S): at 05:11

## 2019-01-01 RX ADMIN — MAGNESIUM OXIDE 400 MG ORAL TABLET 400 MILLIGRAM(S): 241.3 TABLET ORAL at 12:25

## 2019-01-01 RX ADMIN — Medication 650 MILLIGRAM(S): at 05:49

## 2019-01-01 RX ADMIN — HYDROMORPHONE HYDROCHLORIDE 0.25 MILLIGRAM(S): 2 INJECTION INTRAMUSCULAR; INTRAVENOUS; SUBCUTANEOUS at 16:33

## 2019-01-01 RX ADMIN — ATORVASTATIN CALCIUM 40 MILLIGRAM(S): 80 TABLET, FILM COATED ORAL at 21:03

## 2019-01-01 RX ADMIN — PANTOPRAZOLE SODIUM 40 MILLIGRAM(S): 20 TABLET, DELAYED RELEASE ORAL at 06:29

## 2019-01-01 RX ADMIN — SIMETHICONE 80 MILLIGRAM(S): 80 TABLET, CHEWABLE ORAL at 17:17

## 2019-01-01 RX ADMIN — Medication 10 MILLIGRAM(S): at 22:10

## 2019-01-01 RX ADMIN — Medication 50 MILLIEQUIVALENT(S): at 23:26

## 2019-01-01 RX ADMIN — Medication 40 MILLIGRAM(S): at 17:09

## 2019-01-01 RX ADMIN — CLOPIDOGREL BISULFATE 75 MILLIGRAM(S): 75 TABLET, FILM COATED ORAL at 12:08

## 2019-01-01 RX ADMIN — AMIODARONE HYDROCHLORIDE 200 MILLIGRAM(S): 400 TABLET ORAL at 05:10

## 2019-01-01 RX ADMIN — Medication 12.5 MILLIGRAM(S): at 05:12

## 2019-01-01 RX ADMIN — PANTOPRAZOLE SODIUM 40 MILLIGRAM(S): 20 TABLET, DELAYED RELEASE ORAL at 05:21

## 2019-01-01 RX ADMIN — PANTOPRAZOLE SODIUM 40 MILLIGRAM(S): 20 TABLET, DELAYED RELEASE ORAL at 17:22

## 2019-01-01 RX ADMIN — OXYCODONE AND ACETAMINOPHEN 1 TABLET(S): 5; 325 TABLET ORAL at 15:26

## 2019-01-01 RX ADMIN — CLOPIDOGREL BISULFATE 75 MILLIGRAM(S): 75 TABLET, FILM COATED ORAL at 11:12

## 2019-01-01 RX ADMIN — PANTOPRAZOLE SODIUM 40 MILLIGRAM(S): 20 TABLET, DELAYED RELEASE ORAL at 05:49

## 2019-01-01 RX ADMIN — Medication 50 MILLIGRAM(S): at 10:26

## 2019-01-01 RX ADMIN — SIMETHICONE 80 MILLIGRAM(S): 80 TABLET, CHEWABLE ORAL at 18:15

## 2019-01-01 RX ADMIN — POTASSIUM PHOSPHATE, MONOBASIC POTASSIUM PHOSPHATE, DIBASIC 63.75 MILLIMOLE(S): 236; 224 INJECTION, SOLUTION INTRAVENOUS at 03:13

## 2019-01-01 RX ADMIN — TAMSULOSIN HYDROCHLORIDE 0.4 MILLIGRAM(S): 0.4 CAPSULE ORAL at 23:07

## 2019-01-01 RX ADMIN — SIMETHICONE 80 MILLIGRAM(S): 80 TABLET, CHEWABLE ORAL at 23:13

## 2019-01-01 RX ADMIN — LISINOPRIL 20 MILLIGRAM(S): 2.5 TABLET ORAL at 06:07

## 2019-01-01 RX ADMIN — BUDESONIDE AND FORMOTEROL FUMARATE DIHYDRATE 2 PUFF(S): 160; 4.5 AEROSOL RESPIRATORY (INHALATION) at 19:23

## 2019-01-01 RX ADMIN — MORPHINE SULFATE 4 MILLIGRAM(S): 50 CAPSULE, EXTENDED RELEASE ORAL at 17:16

## 2019-01-01 RX ADMIN — ENOXAPARIN SODIUM 40 MILLIGRAM(S): 100 INJECTION SUBCUTANEOUS at 12:11

## 2019-01-01 RX ADMIN — CLOPIDOGREL BISULFATE 75 MILLIGRAM(S): 75 TABLET, FILM COATED ORAL at 17:09

## 2019-01-01 RX ADMIN — SODIUM CHLORIDE 125 MILLILITER(S): 9 INJECTION INTRAMUSCULAR; INTRAVENOUS; SUBCUTANEOUS at 07:00

## 2019-01-01 RX ADMIN — Medication 1 APPLICATION(S): at 17:17

## 2019-01-01 RX ADMIN — BUDESONIDE AND FORMOTEROL FUMARATE DIHYDRATE 2 PUFF(S): 160; 4.5 AEROSOL RESPIRATORY (INHALATION) at 22:26

## 2019-01-01 RX ADMIN — SIMETHICONE 80 MILLIGRAM(S): 80 TABLET, CHEWABLE ORAL at 05:09

## 2019-01-01 RX ADMIN — SIMETHICONE 80 MILLIGRAM(S): 80 TABLET, CHEWABLE ORAL at 23:26

## 2019-01-01 RX ADMIN — Medication 12.5 MILLIGRAM(S): at 06:30

## 2019-01-01 RX ADMIN — SODIUM CHLORIDE 1500 MILLILITER(S): 9 INJECTION INTRAMUSCULAR; INTRAVENOUS; SUBCUTANEOUS at 02:00

## 2019-01-01 RX ADMIN — Medication 3 MILLILITER(S): at 22:25

## 2019-01-01 RX ADMIN — PANTOPRAZOLE SODIUM 40 MILLIGRAM(S): 20 TABLET, DELAYED RELEASE ORAL at 17:12

## 2019-01-01 RX ADMIN — CEFEPIME 100 MILLIGRAM(S): 1 INJECTION, POWDER, FOR SOLUTION INTRAMUSCULAR; INTRAVENOUS at 05:00

## 2019-01-01 RX ADMIN — Medication 40 MILLIGRAM(S): at 05:42

## 2019-01-01 RX ADMIN — SODIUM CHLORIDE 75 MILLILITER(S): 9 INJECTION, SOLUTION INTRAVENOUS at 00:54

## 2019-01-01 RX ADMIN — MAGNESIUM OXIDE 400 MG ORAL TABLET 400 MILLIGRAM(S): 241.3 TABLET ORAL at 09:41

## 2019-01-01 RX ADMIN — Medication 40 MILLIGRAM(S): at 05:06

## 2019-01-01 RX ADMIN — Medication 12.5 MILLIGRAM(S): at 05:05

## 2019-01-01 RX ADMIN — HEPARIN SODIUM 5000 UNIT(S): 5000 INJECTION INTRAVENOUS; SUBCUTANEOUS at 05:35

## 2019-01-01 RX ADMIN — Medication 12.5 MILLIGRAM(S): at 05:57

## 2019-01-01 RX ADMIN — CLOPIDOGREL BISULFATE 75 MILLIGRAM(S): 75 TABLET, FILM COATED ORAL at 11:07

## 2019-01-01 RX ADMIN — AMIODARONE HYDROCHLORIDE 200 MILLIGRAM(S): 400 TABLET ORAL at 05:49

## 2019-01-01 RX ADMIN — Medication 7.7 MICROGRAM(S)/KG/MIN: at 07:05

## 2019-01-01 RX ADMIN — PANTOPRAZOLE SODIUM 40 MILLIGRAM(S): 20 TABLET, DELAYED RELEASE ORAL at 17:46

## 2019-01-01 RX ADMIN — TAMSULOSIN HYDROCHLORIDE 0.4 MILLIGRAM(S): 0.4 CAPSULE ORAL at 21:47

## 2019-01-01 RX ADMIN — Medication 1 APPLICATION(S): at 18:29

## 2019-01-01 RX ADMIN — Medication 10 MILLIGRAM(S): at 05:06

## 2019-01-01 RX ADMIN — CHLORHEXIDINE GLUCONATE 1 APPLICATION(S): 213 SOLUTION TOPICAL at 06:40

## 2019-01-01 RX ADMIN — Medication 100 MILLIGRAM(S): at 05:12

## 2019-01-01 RX ADMIN — Medication 50 MILLIEQUIVALENT(S): at 02:10

## 2019-01-01 RX ADMIN — CHLORHEXIDINE GLUCONATE 1 APPLICATION(S): 213 SOLUTION TOPICAL at 06:23

## 2019-01-01 RX ADMIN — SIMETHICONE 80 MILLIGRAM(S): 80 TABLET, CHEWABLE ORAL at 05:50

## 2019-01-01 RX ADMIN — CLOPIDOGREL BISULFATE 75 MILLIGRAM(S): 75 TABLET, FILM COATED ORAL at 12:32

## 2019-01-01 RX ADMIN — Medication 1 GRAM(S): at 05:39

## 2019-01-01 RX ADMIN — Medication 650 MILLIGRAM(S): at 18:41

## 2019-01-01 RX ADMIN — CLOPIDOGREL BISULFATE 75 MILLIGRAM(S): 75 TABLET, FILM COATED ORAL at 16:12

## 2019-01-01 RX ADMIN — Medication 40 MILLIGRAM(S): at 21:56

## 2019-01-01 RX ADMIN — AMIODARONE HYDROCHLORIDE 200 MILLIGRAM(S): 400 TABLET ORAL at 05:29

## 2019-01-01 RX ADMIN — SIMETHICONE 80 MILLIGRAM(S): 80 TABLET, CHEWABLE ORAL at 23:18

## 2019-01-01 RX ADMIN — PANTOPRAZOLE SODIUM 40 MILLIGRAM(S): 20 TABLET, DELAYED RELEASE ORAL at 12:12

## 2019-01-01 RX ADMIN — Medication 81 MILLIGRAM(S): at 15:05

## 2019-01-01 RX ADMIN — Medication 50 MILLIGRAM(S): at 21:36

## 2019-01-01 RX ADMIN — AMIODARONE HYDROCHLORIDE 200 MILLIGRAM(S): 400 TABLET ORAL at 17:46

## 2019-01-01 RX ADMIN — Medication 81 MILLIGRAM(S): at 13:27

## 2019-01-01 RX ADMIN — Medication 100 MILLIGRAM(S): at 14:22

## 2019-01-01 RX ADMIN — SIMETHICONE 80 MILLIGRAM(S): 80 TABLET, CHEWABLE ORAL at 11:13

## 2019-01-01 RX ADMIN — SENNA PLUS 2 TABLET(S): 8.6 TABLET ORAL at 22:39

## 2019-01-01 RX ADMIN — Medication 1 GRAM(S): at 05:42

## 2019-01-01 RX ADMIN — Medication 25 MILLIGRAM(S): at 06:55

## 2019-01-01 RX ADMIN — PANTOPRAZOLE SODIUM 40 MILLIGRAM(S): 20 TABLET, DELAYED RELEASE ORAL at 05:40

## 2019-01-01 RX ADMIN — Medication 81 MILLIGRAM(S): at 12:21

## 2019-01-01 RX ADMIN — Medication 100 MILLIGRAM(S): at 21:04

## 2019-01-01 RX ADMIN — Medication 650 MILLIGRAM(S): at 06:55

## 2019-01-01 RX ADMIN — Medication 40 MILLIGRAM(S): at 06:38

## 2019-01-01 RX ADMIN — LISINOPRIL 20 MILLIGRAM(S): 2.5 TABLET ORAL at 05:21

## 2019-01-01 RX ADMIN — Medication 50 MILLIEQUIVALENT(S): at 01:24

## 2019-01-01 RX ADMIN — CHLORHEXIDINE GLUCONATE 1 APPLICATION(S): 213 SOLUTION TOPICAL at 05:00

## 2019-01-01 RX ADMIN — Medication 300 MICROGRAM(S): at 06:08

## 2019-01-01 RX ADMIN — HEPARIN SODIUM 5000 UNIT(S): 5000 INJECTION INTRAVENOUS; SUBCUTANEOUS at 18:18

## 2019-01-01 RX ADMIN — Medication 5 MILLIGRAM(S): at 18:20

## 2019-01-01 RX ADMIN — CHLORHEXIDINE GLUCONATE 1 APPLICATION(S): 213 SOLUTION TOPICAL at 07:14

## 2019-01-01 RX ADMIN — HEPARIN SODIUM 5000 UNIT(S): 5000 INJECTION INTRAVENOUS; SUBCUTANEOUS at 14:15

## 2019-01-01 RX ADMIN — ENOXAPARIN SODIUM 40 MILLIGRAM(S): 100 INJECTION SUBCUTANEOUS at 12:25

## 2019-01-01 RX ADMIN — CLOPIDOGREL BISULFATE 75 MILLIGRAM(S): 75 TABLET, FILM COATED ORAL at 12:00

## 2019-01-01 RX ADMIN — MAGNESIUM OXIDE 400 MG ORAL TABLET 400 MILLIGRAM(S): 241.3 TABLET ORAL at 13:17

## 2019-01-01 RX ADMIN — Medication 1 TABLET(S): at 12:28

## 2019-01-01 RX ADMIN — Medication 40 MILLIGRAM(S): at 10:41

## 2019-01-01 RX ADMIN — Medication 500000 UNIT(S): at 05:06

## 2019-01-01 RX ADMIN — Medication 500000 UNIT(S): at 23:55

## 2019-01-01 RX ADMIN — SIMETHICONE 80 MILLIGRAM(S): 80 TABLET, CHEWABLE ORAL at 12:55

## 2019-01-01 RX ADMIN — HYDROMORPHONE HYDROCHLORIDE 2 MILLIGRAM(S): 2 INJECTION INTRAMUSCULAR; INTRAVENOUS; SUBCUTANEOUS at 07:25

## 2019-01-01 RX ADMIN — CHLORHEXIDINE GLUCONATE 15 MILLILITER(S): 213 SOLUTION TOPICAL at 18:15

## 2019-01-01 RX ADMIN — Medication 12.5 MILLIGRAM(S): at 17:22

## 2019-01-01 RX ADMIN — Medication 81 MILLIGRAM(S): at 11:34

## 2019-01-01 RX ADMIN — SIMETHICONE 80 MILLIGRAM(S): 80 TABLET, CHEWABLE ORAL at 06:18

## 2019-01-01 RX ADMIN — Medication 500000 UNIT(S): at 15:44

## 2019-01-01 RX ADMIN — Medication 3 MILLIGRAM(S): at 01:47

## 2019-01-01 RX ADMIN — BUDESONIDE AND FORMOTEROL FUMARATE DIHYDRATE 2 PUFF(S): 160; 4.5 AEROSOL RESPIRATORY (INHALATION) at 12:54

## 2019-01-01 RX ADMIN — Medication 10 MILLIGRAM(S): at 11:25

## 2019-01-01 RX ADMIN — HYDROMORPHONE HYDROCHLORIDE 0.25 MILLIGRAM(S): 2 INJECTION INTRAMUSCULAR; INTRAVENOUS; SUBCUTANEOUS at 12:00

## 2019-01-01 RX ADMIN — PANTOPRAZOLE SODIUM 40 MILLIGRAM(S): 20 TABLET, DELAYED RELEASE ORAL at 05:42

## 2019-01-01 RX ADMIN — Medication 12.5 MILLIGRAM(S): at 05:48

## 2019-01-01 RX ADMIN — ATORVASTATIN CALCIUM 80 MILLIGRAM(S): 80 TABLET, FILM COATED ORAL at 21:16

## 2019-01-01 RX ADMIN — Medication 81 MILLIGRAM(S): at 13:17

## 2019-01-01 RX ADMIN — Medication 25 MILLIGRAM(S): at 05:09

## 2019-01-01 RX ADMIN — Medication 12.5 MILLIGRAM(S): at 06:19

## 2019-01-01 RX ADMIN — Medication 25 MILLIGRAM(S): at 17:12

## 2019-01-01 RX ADMIN — HEPARIN SODIUM 5000 UNIT(S): 5000 INJECTION INTRAVENOUS; SUBCUTANEOUS at 21:17

## 2019-01-01 RX ADMIN — AMIODARONE HYDROCHLORIDE 200 MILLIGRAM(S): 400 TABLET ORAL at 05:18

## 2019-01-01 RX ADMIN — Medication 40 MILLIGRAM(S): at 09:27

## 2019-01-01 RX ADMIN — MAGNESIUM OXIDE 400 MG ORAL TABLET 400 MILLIGRAM(S): 241.3 TABLET ORAL at 09:28

## 2019-01-01 RX ADMIN — VASOPRESSIN 5 UNIT(S)/MIN: 20 INJECTION INTRAVENOUS at 20:37

## 2019-01-01 RX ADMIN — Medication 650 MILLIGRAM(S): at 17:47

## 2019-01-01 RX ADMIN — Medication 1 GRAM(S): at 05:05

## 2019-01-01 RX ADMIN — Medication 1 APPLICATION(S): at 10:32

## 2019-01-01 RX ADMIN — PANTOPRAZOLE SODIUM 40 MILLIGRAM(S): 20 TABLET, DELAYED RELEASE ORAL at 11:43

## 2019-01-01 RX ADMIN — CEFEPIME 100 MILLIGRAM(S): 1 INJECTION, POWDER, FOR SOLUTION INTRAMUSCULAR; INTRAVENOUS at 01:24

## 2019-01-01 RX ADMIN — BUDESONIDE AND FORMOTEROL FUMARATE DIHYDRATE 2 PUFF(S): 160; 4.5 AEROSOL RESPIRATORY (INHALATION) at 22:20

## 2019-01-01 RX ADMIN — Medication 3 MILLILITER(S): at 13:46

## 2019-01-01 RX ADMIN — Medication 40 MILLIEQUIVALENT(S): at 18:40

## 2019-01-01 RX ADMIN — AMIODARONE HYDROCHLORIDE 200 MILLIGRAM(S): 400 TABLET ORAL at 18:01

## 2019-01-01 RX ADMIN — Medication 650 MILLIGRAM(S): at 13:19

## 2019-01-01 RX ADMIN — CLOPIDOGREL BISULFATE 75 MILLIGRAM(S): 75 TABLET, FILM COATED ORAL at 11:18

## 2019-01-01 RX ADMIN — Medication 1 GRAM(S): at 06:00

## 2019-01-01 RX ADMIN — MORPHINE SULFATE 4 MILLIGRAM(S): 50 CAPSULE, EXTENDED RELEASE ORAL at 04:22

## 2019-01-01 RX ADMIN — Medication 1 APPLICATION(S): at 05:42

## 2019-01-01 RX ADMIN — ENOXAPARIN SODIUM 40 MILLIGRAM(S): 100 INJECTION SUBCUTANEOUS at 13:17

## 2019-01-01 RX ADMIN — Medication 40 MILLIGRAM(S): at 05:48

## 2019-01-01 RX ADMIN — Medication 1 GRAM(S): at 05:59

## 2019-01-01 RX ADMIN — IRON SUCROSE 176.67 MILLIGRAM(S): 20 INJECTION, SOLUTION INTRAVENOUS at 20:22

## 2019-01-01 RX ADMIN — CLOPIDOGREL BISULFATE 75 MILLIGRAM(S): 75 TABLET, FILM COATED ORAL at 12:28

## 2019-01-01 RX ADMIN — Medication 500 MILLIGRAM(S): at 22:31

## 2019-01-01 RX ADMIN — SIMETHICONE 80 MILLIGRAM(S): 80 TABLET, CHEWABLE ORAL at 12:25

## 2019-01-01 RX ADMIN — Medication 200 MILLIGRAM(S): at 06:01

## 2019-01-01 RX ADMIN — BUDESONIDE AND FORMOTEROL FUMARATE DIHYDRATE 2 PUFF(S): 160; 4.5 AEROSOL RESPIRATORY (INHALATION) at 20:55

## 2019-01-01 RX ADMIN — Medication 650 MILLIGRAM(S): at 02:30

## 2019-01-01 RX ADMIN — Medication 81 MILLIGRAM(S): at 14:22

## 2019-01-01 RX ADMIN — PANTOPRAZOLE SODIUM 40 MILLIGRAM(S): 20 TABLET, DELAYED RELEASE ORAL at 05:17

## 2019-01-01 RX ADMIN — SIMETHICONE 80 MILLIGRAM(S): 80 TABLET, CHEWABLE ORAL at 05:06

## 2019-01-01 RX ADMIN — Medication 81 MILLIGRAM(S): at 13:10

## 2019-01-01 RX ADMIN — Medication 40 MILLIGRAM(S): at 20:16

## 2019-01-01 RX ADMIN — ATORVASTATIN CALCIUM 40 MILLIGRAM(S): 80 TABLET, FILM COATED ORAL at 22:16

## 2019-01-01 RX ADMIN — ENOXAPARIN SODIUM 40 MILLIGRAM(S): 100 INJECTION SUBCUTANEOUS at 13:00

## 2019-01-01 RX ADMIN — HYDROMORPHONE HYDROCHLORIDE 0.25 MILLIGRAM(S): 2 INJECTION INTRAMUSCULAR; INTRAVENOUS; SUBCUTANEOUS at 23:13

## 2019-01-01 RX ADMIN — PANTOPRAZOLE SODIUM 40 MILLIGRAM(S): 20 TABLET, DELAYED RELEASE ORAL at 17:25

## 2019-01-01 RX ADMIN — Medication 1 GRAM(S): at 17:46

## 2019-01-01 RX ADMIN — HYDROMORPHONE HYDROCHLORIDE 2 MILLIGRAM(S): 2 INJECTION INTRAMUSCULAR; INTRAVENOUS; SUBCUTANEOUS at 06:51

## 2019-01-01 RX ADMIN — CHLORHEXIDINE GLUCONATE 1 APPLICATION(S): 213 SOLUTION TOPICAL at 06:02

## 2019-01-01 RX ADMIN — Medication 300 MICROGRAM(S): at 05:39

## 2019-01-01 RX ADMIN — AMIODARONE HYDROCHLORIDE 200 MILLIGRAM(S): 400 TABLET ORAL at 07:13

## 2019-01-01 RX ADMIN — Medication 0.5 MILLIGRAM(S): at 22:19

## 2019-01-01 RX ADMIN — SIMETHICONE 80 MILLIGRAM(S): 80 TABLET, CHEWABLE ORAL at 06:06

## 2019-01-01 RX ADMIN — SENNA PLUS 2 TABLET(S): 8.6 TABLET ORAL at 22:14

## 2019-01-01 RX ADMIN — PANTOPRAZOLE SODIUM 40 MILLIGRAM(S): 20 TABLET, DELAYED RELEASE ORAL at 06:01

## 2019-01-01 RX ADMIN — PANTOPRAZOLE SODIUM 40 MILLIGRAM(S): 20 TABLET, DELAYED RELEASE ORAL at 19:03

## 2019-01-01 RX ADMIN — ATORVASTATIN CALCIUM 80 MILLIGRAM(S): 80 TABLET, FILM COATED ORAL at 22:31

## 2019-01-01 RX ADMIN — CLOPIDOGREL BISULFATE 75 MILLIGRAM(S): 75 TABLET, FILM COATED ORAL at 12:21

## 2019-01-01 RX ADMIN — Medication 300 MICROGRAM(S): at 05:55

## 2019-01-01 RX ADMIN — Medication 500000 UNIT(S): at 18:28

## 2019-01-01 RX ADMIN — CLOPIDOGREL BISULFATE 75 MILLIGRAM(S): 75 TABLET, FILM COATED ORAL at 12:02

## 2019-01-01 RX ADMIN — Medication 81 MILLIGRAM(S): at 12:37

## 2019-01-01 RX ADMIN — Medication 40 MILLIGRAM(S): at 00:20

## 2019-01-01 RX ADMIN — Medication 650 MILLIGRAM(S): at 00:00

## 2019-01-01 RX ADMIN — Medication 50 MILLIGRAM(S): at 08:36

## 2019-01-01 RX ADMIN — SIMETHICONE 80 MILLIGRAM(S): 80 TABLET, CHEWABLE ORAL at 17:30

## 2019-01-01 RX ADMIN — Medication 12.5 MILLIGRAM(S): at 18:19

## 2019-01-01 RX ADMIN — PHENYLEPHRINE HYDROCHLORIDE 10 MICROGRAM(S)/KG/MIN: 10 INJECTION INTRAVENOUS at 20:37

## 2019-01-01 RX ADMIN — Medication 1 GRAM(S): at 06:29

## 2019-01-01 RX ADMIN — AMIODARONE HYDROCHLORIDE 200 MILLIGRAM(S): 400 TABLET ORAL at 06:54

## 2019-01-01 RX ADMIN — LISINOPRIL 20 MILLIGRAM(S): 2.5 TABLET ORAL at 06:08

## 2019-01-01 RX ADMIN — Medication 50 GRAM(S): at 08:08

## 2019-01-01 RX ADMIN — Medication 10 MILLIGRAM(S): at 17:27

## 2019-01-01 RX ADMIN — BUDESONIDE AND FORMOTEROL FUMARATE DIHYDRATE 2 PUFF(S): 160; 4.5 AEROSOL RESPIRATORY (INHALATION) at 10:49

## 2019-01-01 RX ADMIN — PANTOPRAZOLE SODIUM 40 MILLIGRAM(S): 20 TABLET, DELAYED RELEASE ORAL at 12:41

## 2019-01-01 RX ADMIN — BUDESONIDE AND FORMOTEROL FUMARATE DIHYDRATE 2 PUFF(S): 160; 4.5 AEROSOL RESPIRATORY (INHALATION) at 20:22

## 2019-01-01 RX ADMIN — CHLORHEXIDINE GLUCONATE 1 APPLICATION(S): 213 SOLUTION TOPICAL at 05:35

## 2019-01-01 RX ADMIN — CHLORHEXIDINE GLUCONATE 1 APPLICATION(S): 213 SOLUTION TOPICAL at 05:39

## 2019-01-01 RX ADMIN — PANTOPRAZOLE SODIUM 40 MILLIGRAM(S): 20 TABLET, DELAYED RELEASE ORAL at 20:16

## 2019-01-01 RX ADMIN — Medication 12.5 MILLIGRAM(S): at 06:00

## 2019-01-01 RX ADMIN — CEFEPIME 100 MILLIGRAM(S): 1 INJECTION, POWDER, FOR SOLUTION INTRAMUSCULAR; INTRAVENOUS at 06:32

## 2019-01-01 RX ADMIN — FENTANYL CITRATE 25 MICROGRAM(S): 50 INJECTION INTRAVENOUS at 19:15

## 2019-01-01 RX ADMIN — MORPHINE SULFATE 2 MILLIGRAM(S): 50 CAPSULE, EXTENDED RELEASE ORAL at 00:45

## 2019-01-01 RX ADMIN — Medication 1 APPLICATION(S): at 17:29

## 2019-01-01 RX ADMIN — AMIODARONE HYDROCHLORIDE 200 MILLIGRAM(S): 400 TABLET ORAL at 06:25

## 2019-01-01 RX ADMIN — Medication 25 MILLIGRAM(S): at 21:12

## 2019-01-01 RX ADMIN — Medication 12.5 MILLIGRAM(S): at 18:30

## 2019-01-01 RX ADMIN — Medication 81 MILLIGRAM(S): at 12:25

## 2019-01-01 RX ADMIN — Medication 20 MILLIGRAM(S): at 14:18

## 2019-01-01 RX ADMIN — Medication 1 APPLICATION(S): at 06:23

## 2019-01-01 RX ADMIN — ATORVASTATIN CALCIUM 80 MILLIGRAM(S): 80 TABLET, FILM COATED ORAL at 22:09

## 2019-01-01 RX ADMIN — BUDESONIDE AND FORMOTEROL FUMARATE DIHYDRATE 2 PUFF(S): 160; 4.5 AEROSOL RESPIRATORY (INHALATION) at 22:13

## 2019-01-01 RX ADMIN — CLOPIDOGREL BISULFATE 75 MILLIGRAM(S): 75 TABLET, FILM COATED ORAL at 11:49

## 2019-01-01 RX ADMIN — Medication 500000 UNIT(S): at 06:07

## 2019-01-01 RX ADMIN — Medication 40 MILLIGRAM(S): at 12:38

## 2019-01-01 RX ADMIN — HYDROMORPHONE HYDROCHLORIDE 0.25 MILLIGRAM(S): 2 INJECTION INTRAMUSCULAR; INTRAVENOUS; SUBCUTANEOUS at 08:05

## 2019-01-01 RX ADMIN — HEPARIN SODIUM 5000 UNIT(S): 5000 INJECTION INTRAVENOUS; SUBCUTANEOUS at 05:00

## 2019-01-01 RX ADMIN — POTASSIUM PHOSPHATE, MONOBASIC POTASSIUM PHOSPHATE, DIBASIC 83.33 MILLIMOLE(S): 236; 224 INJECTION, SOLUTION INTRAVENOUS at 14:27

## 2019-01-01 RX ADMIN — Medication 100 MILLIGRAM(S): at 13:37

## 2019-01-01 RX ADMIN — Medication 40 MILLIEQUIVALENT(S): at 22:12

## 2019-01-01 RX ADMIN — SIMETHICONE 80 MILLIGRAM(S): 80 TABLET, CHEWABLE ORAL at 17:22

## 2019-01-01 RX ADMIN — DEXMEDETOMIDINE HYDROCHLORIDE IN 0.9% SODIUM CHLORIDE 0.2 MICROGRAM(S)/KG/HR: 4 INJECTION INTRAVENOUS at 07:01

## 2019-01-01 RX ADMIN — ENOXAPARIN SODIUM 40 MILLIGRAM(S): 100 INJECTION SUBCUTANEOUS at 11:36

## 2019-01-01 RX ADMIN — Medication 12.5 MILLIGRAM(S): at 05:59

## 2019-01-01 RX ADMIN — SODIUM CHLORIDE 125 MILLILITER(S): 9 INJECTION INTRAMUSCULAR; INTRAVENOUS; SUBCUTANEOUS at 22:29

## 2019-01-01 RX ADMIN — CHLORHEXIDINE GLUCONATE 1 APPLICATION(S): 213 SOLUTION TOPICAL at 06:25

## 2019-01-01 RX ADMIN — Medication 500000 UNIT(S): at 06:01

## 2019-01-01 RX ADMIN — LISINOPRIL 20 MILLIGRAM(S): 2.5 TABLET ORAL at 10:51

## 2019-01-01 RX ADMIN — SIMETHICONE 80 MILLIGRAM(S): 80 TABLET, CHEWABLE ORAL at 05:30

## 2019-01-01 RX ADMIN — Medication 1 GRAM(S): at 05:29

## 2019-01-01 RX ADMIN — CLOPIDOGREL BISULFATE 75 MILLIGRAM(S): 75 TABLET, FILM COATED ORAL at 12:10

## 2019-01-01 RX ADMIN — Medication 81 MILLIGRAM(S): at 12:55

## 2019-01-01 RX ADMIN — TAMSULOSIN HYDROCHLORIDE 0.4 MILLIGRAM(S): 0.4 CAPSULE ORAL at 21:45

## 2019-01-01 RX ADMIN — Medication 1 GRAM(S): at 17:27

## 2019-01-01 RX ADMIN — Medication 1 MILLIGRAM(S): at 22:25

## 2019-01-01 RX ADMIN — ATORVASTATIN CALCIUM 80 MILLIGRAM(S): 80 TABLET, FILM COATED ORAL at 22:10

## 2019-01-01 RX ADMIN — ATORVASTATIN CALCIUM 80 MILLIGRAM(S): 80 TABLET, FILM COATED ORAL at 21:47

## 2019-01-01 RX ADMIN — FENTANYL CITRATE 25 MICROGRAM(S): 50 INJECTION INTRAVENOUS at 06:00

## 2019-01-01 RX ADMIN — Medication 650 MILLIGRAM(S): at 13:20

## 2019-01-01 RX ADMIN — Medication 500000 UNIT(S): at 17:36

## 2019-01-01 RX ADMIN — OXYCODONE AND ACETAMINOPHEN 1 TABLET(S): 5; 325 TABLET ORAL at 10:08

## 2019-01-01 RX ADMIN — LISINOPRIL 20 MILLIGRAM(S): 2.5 TABLET ORAL at 05:59

## 2019-01-01 RX ADMIN — Medication 200 MICROGRAM(S): at 05:21

## 2019-01-01 RX ADMIN — CEFEPIME 100 MILLIGRAM(S): 1 INJECTION, POWDER, FOR SOLUTION INTRAMUSCULAR; INTRAVENOUS at 05:06

## 2019-01-01 RX ADMIN — Medication 1 GRAM(S): at 06:04

## 2019-01-01 RX ADMIN — Medication 500000 UNIT(S): at 17:16

## 2019-01-01 RX ADMIN — ONDANSETRON 4 MILLIGRAM(S): 8 TABLET, FILM COATED ORAL at 04:48

## 2019-01-01 RX ADMIN — SODIUM CHLORIDE 75 MILLILITER(S): 9 INJECTION, SOLUTION INTRAVENOUS at 02:32

## 2019-01-01 RX ADMIN — PANTOPRAZOLE SODIUM 40 MILLIGRAM(S): 20 TABLET, DELAYED RELEASE ORAL at 06:39

## 2019-01-01 RX ADMIN — Medication 1 APPLICATION(S): at 06:09

## 2019-01-01 RX ADMIN — SIMETHICONE 80 MILLIGRAM(S): 80 TABLET, CHEWABLE ORAL at 06:22

## 2019-01-01 RX ADMIN — Medication 162 MILLIGRAM(S): at 10:56

## 2019-01-01 RX ADMIN — DEXMEDETOMIDINE HYDROCHLORIDE IN 0.9% SODIUM CHLORIDE 0.2 MICROGRAM(S)/KG/HR: 4 INJECTION INTRAVENOUS at 07:00

## 2019-01-01 RX ADMIN — HYDROMORPHONE HYDROCHLORIDE 0.25 MILLIGRAM(S): 2 INJECTION INTRAMUSCULAR; INTRAVENOUS; SUBCUTANEOUS at 16:48

## 2019-01-01 RX ADMIN — SIMETHICONE 80 MILLIGRAM(S): 80 TABLET, CHEWABLE ORAL at 00:32

## 2019-01-01 RX ADMIN — Medication 300 MICROGRAM(S): at 06:24

## 2019-01-01 RX ADMIN — Medication 40 MILLIGRAM(S): at 22:16

## 2019-01-01 RX ADMIN — MAGNESIUM OXIDE 400 MG ORAL TABLET 400 MILLIGRAM(S): 241.3 TABLET ORAL at 17:25

## 2019-01-01 RX ADMIN — Medication 1 GRAM(S): at 18:09

## 2019-01-01 RX ADMIN — Medication 81 MILLIGRAM(S): at 12:02

## 2019-01-01 RX ADMIN — Medication 650 MILLIGRAM(S): at 12:36

## 2019-01-01 RX ADMIN — Medication 40 MILLIGRAM(S): at 05:29

## 2019-01-01 RX ADMIN — SIMETHICONE 80 MILLIGRAM(S): 80 TABLET, CHEWABLE ORAL at 12:01

## 2019-01-01 RX ADMIN — CHLORHEXIDINE GLUCONATE 1 APPLICATION(S): 213 SOLUTION TOPICAL at 05:32

## 2019-01-01 RX ADMIN — Medication 100 MILLIGRAM(S): at 01:31

## 2019-01-01 RX ADMIN — Medication 200 MILLIGRAM(S): at 05:05

## 2019-01-01 RX ADMIN — SIMETHICONE 80 MILLIGRAM(S): 80 TABLET, CHEWABLE ORAL at 06:00

## 2019-01-01 RX ADMIN — PANTOPRAZOLE SODIUM 40 MILLIGRAM(S): 20 TABLET, DELAYED RELEASE ORAL at 05:59

## 2019-01-01 RX ADMIN — Medication 50 GRAM(S): at 10:41

## 2019-01-01 RX ADMIN — IOHEXOL 30 MILLILITER(S): 300 INJECTION, SOLUTION INTRAVENOUS at 02:13

## 2019-01-01 RX ADMIN — SODIUM CHLORIDE 3000 MILLILITER(S): 9 INJECTION INTRAMUSCULAR; INTRAVENOUS; SUBCUTANEOUS at 21:00

## 2019-01-01 RX ADMIN — LISINOPRIL 20 MILLIGRAM(S): 2.5 TABLET ORAL at 06:18

## 2019-01-01 RX ADMIN — SIMETHICONE 80 MILLIGRAM(S): 80 TABLET, CHEWABLE ORAL at 13:17

## 2019-01-01 RX ADMIN — ATORVASTATIN CALCIUM 40 MILLIGRAM(S): 80 TABLET, FILM COATED ORAL at 22:12

## 2019-01-01 RX ADMIN — Medication 200 MILLIGRAM(S): at 18:16

## 2019-01-01 RX ADMIN — Medication 40 MILLIGRAM(S): at 17:25

## 2019-01-01 RX ADMIN — MAGNESIUM OXIDE 400 MG ORAL TABLET 400 MILLIGRAM(S): 241.3 TABLET ORAL at 18:15

## 2019-01-01 RX ADMIN — Medication 200 MICROGRAM(S): at 06:23

## 2019-01-01 RX ADMIN — Medication 100 MILLIGRAM(S): at 05:35

## 2019-01-01 RX ADMIN — Medication 1 GRAM(S): at 06:14

## 2019-01-01 RX ADMIN — Medication 500000 UNIT(S): at 06:09

## 2019-01-01 RX ADMIN — Medication 650 MILLIGRAM(S): at 18:00

## 2019-01-01 RX ADMIN — Medication 10 MILLIGRAM(S): at 05:05

## 2019-01-01 RX ADMIN — HEPARIN SODIUM 5000 UNIT(S): 5000 INJECTION INTRAVENOUS; SUBCUTANEOUS at 05:50

## 2019-01-01 RX ADMIN — Medication 12.5 MILLIGRAM(S): at 18:10

## 2019-01-01 RX ADMIN — Medication 300 MICROGRAM(S): at 05:48

## 2019-01-01 RX ADMIN — CLOPIDOGREL BISULFATE 75 MILLIGRAM(S): 75 TABLET, FILM COATED ORAL at 11:28

## 2019-01-01 RX ADMIN — AMIODARONE HYDROCHLORIDE 200 MILLIGRAM(S): 400 TABLET ORAL at 05:05

## 2019-01-01 RX ADMIN — SENNA PLUS 2 TABLET(S): 8.6 TABLET ORAL at 21:54

## 2019-01-01 RX ADMIN — Medication 500000 UNIT(S): at 05:39

## 2019-01-01 RX ADMIN — Medication 81 MILLIGRAM(S): at 12:00

## 2019-01-01 RX ADMIN — SODIUM CHLORIDE 75 MILLILITER(S): 9 INJECTION, SOLUTION INTRAVENOUS at 13:30

## 2019-01-01 RX ADMIN — Medication 200 MILLIGRAM(S): at 18:20

## 2019-01-01 RX ADMIN — ATORVASTATIN CALCIUM 80 MILLIGRAM(S): 80 TABLET, FILM COATED ORAL at 21:04

## 2019-01-01 RX ADMIN — Medication 300 MICROGRAM(S): at 06:00

## 2019-01-01 RX ADMIN — Medication 1 TABLET(S): at 13:17

## 2019-01-01 RX ADMIN — Medication 1 APPLICATION(S): at 05:06

## 2019-01-01 RX ADMIN — OXYCODONE AND ACETAMINOPHEN 1 TABLET(S): 5; 325 TABLET ORAL at 13:06

## 2019-01-01 RX ADMIN — Medication 100 MILLIGRAM(S): at 13:57

## 2019-01-01 RX ADMIN — SIMETHICONE 80 MILLIGRAM(S): 80 TABLET, CHEWABLE ORAL at 23:33

## 2019-01-01 RX ADMIN — Medication 1 GRAM(S): at 06:06

## 2019-01-01 RX ADMIN — AMIODARONE HYDROCHLORIDE 200 MILLIGRAM(S): 400 TABLET ORAL at 05:34

## 2019-01-01 RX ADMIN — Medication 1 GRAM(S): at 17:07

## 2019-01-01 RX ADMIN — CLOPIDOGREL BISULFATE 75 MILLIGRAM(S): 75 TABLET, FILM COATED ORAL at 13:49

## 2019-01-01 RX ADMIN — CLOPIDOGREL BISULFATE 75 MILLIGRAM(S): 75 TABLET, FILM COATED ORAL at 12:11

## 2019-01-01 RX ADMIN — IRON SUCROSE 176.67 MILLIGRAM(S): 20 INJECTION, SOLUTION INTRAVENOUS at 20:19

## 2019-01-01 RX ADMIN — PANTOPRAZOLE SODIUM 40 MILLIGRAM(S): 20 TABLET, DELAYED RELEASE ORAL at 18:18

## 2019-01-01 RX ADMIN — Medication 12.5 MILLIGRAM(S): at 06:16

## 2019-01-01 RX ADMIN — Medication 250 MILLIGRAM(S): at 14:34

## 2019-01-01 RX ADMIN — Medication 12.5 MILLIGRAM(S): at 05:50

## 2019-01-01 RX ADMIN — ATORVASTATIN CALCIUM 40 MILLIGRAM(S): 80 TABLET, FILM COATED ORAL at 21:56

## 2019-01-01 RX ADMIN — BUDESONIDE AND FORMOTEROL FUMARATE DIHYDRATE 2 PUFF(S): 160; 4.5 AEROSOL RESPIRATORY (INHALATION) at 20:20

## 2019-01-01 RX ADMIN — CLOPIDOGREL BISULFATE 75 MILLIGRAM(S): 75 TABLET, FILM COATED ORAL at 14:51

## 2019-01-01 RX ADMIN — Medication 650 MILLIGRAM(S): at 17:19

## 2019-01-01 RX ADMIN — SENNA PLUS 2 TABLET(S): 8.6 TABLET ORAL at 22:25

## 2019-01-01 RX ADMIN — SIMETHICONE 80 MILLIGRAM(S): 80 TABLET, CHEWABLE ORAL at 17:25

## 2019-01-01 RX ADMIN — Medication 300 MICROGRAM(S): at 06:39

## 2019-01-01 RX ADMIN — Medication 30 MILLILITER(S): at 13:26

## 2019-01-01 RX ADMIN — PANTOPRAZOLE SODIUM 40 MILLIGRAM(S): 20 TABLET, DELAYED RELEASE ORAL at 06:07

## 2019-01-01 RX ADMIN — Medication 81 MILLIGRAM(S): at 12:08

## 2019-01-01 RX ADMIN — Medication 500000 UNIT(S): at 05:57

## 2019-01-01 RX ADMIN — SIMETHICONE 80 MILLIGRAM(S): 80 TABLET, CHEWABLE ORAL at 06:07

## 2019-01-01 RX ADMIN — Medication 40 MILLIGRAM(S): at 05:39

## 2019-01-01 RX ADMIN — SIMETHICONE 80 MILLIGRAM(S): 80 TABLET, CHEWABLE ORAL at 17:31

## 2019-01-01 RX ADMIN — HEPARIN SODIUM 5000 UNIT(S): 5000 INJECTION INTRAVENOUS; SUBCUTANEOUS at 14:24

## 2019-01-01 RX ADMIN — TAMSULOSIN HYDROCHLORIDE 0.4 MILLIGRAM(S): 0.4 CAPSULE ORAL at 21:11

## 2019-01-01 RX ADMIN — Medication 40 MILLIGRAM(S): at 06:29

## 2019-01-01 RX ADMIN — SODIUM CHLORIDE 500 MILLILITER(S): 9 INJECTION, SOLUTION INTRAVENOUS at 11:16

## 2019-01-01 RX ADMIN — SIMETHICONE 80 MILLIGRAM(S): 80 TABLET, CHEWABLE ORAL at 00:08

## 2019-01-01 RX ADMIN — LISINOPRIL 20 MILLIGRAM(S): 2.5 TABLET ORAL at 05:55

## 2019-01-01 RX ADMIN — Medication 65 MILLIMOLE(S): at 06:32

## 2019-01-01 RX ADMIN — SIMETHICONE 80 MILLIGRAM(S): 80 TABLET, CHEWABLE ORAL at 18:32

## 2019-01-01 RX ADMIN — CLOPIDOGREL BISULFATE 75 MILLIGRAM(S): 75 TABLET, FILM COATED ORAL at 11:43

## 2019-01-01 RX ADMIN — AMIODARONE HYDROCHLORIDE 200 MILLIGRAM(S): 400 TABLET ORAL at 06:46

## 2019-01-01 RX ADMIN — LISINOPRIL 20 MILLIGRAM(S): 2.5 TABLET ORAL at 06:00

## 2019-01-01 RX ADMIN — PANTOPRAZOLE SODIUM 40 MILLIGRAM(S): 20 TABLET, DELAYED RELEASE ORAL at 06:09

## 2019-01-01 RX ADMIN — Medication 650 MILLIGRAM(S): at 12:46

## 2019-01-01 RX ADMIN — Medication 650 MILLIGRAM(S): at 06:53

## 2019-01-01 RX ADMIN — SIMETHICONE 80 MILLIGRAM(S): 80 TABLET, CHEWABLE ORAL at 12:03

## 2019-01-01 RX ADMIN — SODIUM CHLORIDE 1000 MILLILITER(S): 9 INJECTION INTRAMUSCULAR; INTRAVENOUS; SUBCUTANEOUS at 21:15

## 2019-01-01 RX ADMIN — Medication 200 MICROGRAM(S): at 05:57

## 2019-01-01 RX ADMIN — Medication 650 MILLIGRAM(S): at 23:56

## 2019-01-01 RX ADMIN — HEPARIN SODIUM 5000 UNIT(S): 5000 INJECTION INTRAVENOUS; SUBCUTANEOUS at 21:51

## 2019-01-01 RX ADMIN — SENNA PLUS 2 TABLET(S): 8.6 TABLET ORAL at 21:15

## 2019-01-01 RX ADMIN — BUDESONIDE AND FORMOTEROL FUMARATE DIHYDRATE 2 PUFF(S): 160; 4.5 AEROSOL RESPIRATORY (INHALATION) at 08:19

## 2019-01-01 RX ADMIN — Medication 40 MILLIGRAM(S): at 17:46

## 2019-01-01 RX ADMIN — CHLORHEXIDINE GLUCONATE 1 APPLICATION(S): 213 SOLUTION TOPICAL at 06:38

## 2019-01-01 RX ADMIN — ATORVASTATIN CALCIUM 40 MILLIGRAM(S): 80 TABLET, FILM COATED ORAL at 21:45

## 2019-01-01 RX ADMIN — SIMETHICONE 80 MILLIGRAM(S): 80 TABLET, CHEWABLE ORAL at 12:48

## 2019-01-01 RX ADMIN — PIPERACILLIN AND TAZOBACTAM 200 GRAM(S): 4; .5 INJECTION, POWDER, LYOPHILIZED, FOR SOLUTION INTRAVENOUS at 22:45

## 2019-01-01 RX ADMIN — HEPARIN SODIUM 5000 UNIT(S): 5000 INJECTION INTRAVENOUS; SUBCUTANEOUS at 21:52

## 2019-01-01 RX ADMIN — CHLORHEXIDINE GLUCONATE 1 APPLICATION(S): 213 SOLUTION TOPICAL at 05:05

## 2019-01-01 RX ADMIN — AMIODARONE HYDROCHLORIDE 200 MILLIGRAM(S): 400 TABLET ORAL at 05:42

## 2019-01-01 RX ADMIN — Medication 50 GRAM(S): at 13:18

## 2019-01-01 RX ADMIN — Medication 650 MILLIGRAM(S): at 12:35

## 2019-01-01 RX ADMIN — PANTOPRAZOLE SODIUM 40 MILLIGRAM(S): 20 TABLET, DELAYED RELEASE ORAL at 17:31

## 2019-01-01 RX ADMIN — Medication 62.5 MILLIMOLE(S): at 02:00

## 2019-01-01 RX ADMIN — Medication 1 APPLICATION(S): at 17:13

## 2019-01-01 RX ADMIN — CEFEPIME 100 MILLIGRAM(S): 1 INJECTION, POWDER, FOR SOLUTION INTRAMUSCULAR; INTRAVENOUS at 14:27

## 2019-01-01 RX ADMIN — PANTOPRAZOLE SODIUM 40 MILLIGRAM(S): 20 TABLET, DELAYED RELEASE ORAL at 18:19

## 2019-01-01 RX ADMIN — Medication 100 MILLIGRAM(S): at 05:05

## 2019-01-01 RX ADMIN — SENNA PLUS 2 TABLET(S): 8.6 TABLET ORAL at 22:06

## 2019-01-01 RX ADMIN — Medication 1 TABLET(S): at 12:01

## 2019-01-01 RX ADMIN — Medication 50 MILLIGRAM(S): at 21:48

## 2019-01-01 RX ADMIN — Medication 500000 UNIT(S): at 06:25

## 2019-01-01 RX ADMIN — Medication 600 MILLIGRAM(S): at 06:38

## 2019-01-01 RX ADMIN — Medication 600 MILLIGRAM(S): at 18:10

## 2019-01-01 RX ADMIN — Medication 40 MILLIGRAM(S): at 18:15

## 2019-01-01 RX ADMIN — Medication 20 MILLIEQUIVALENT(S): at 16:15

## 2019-01-01 RX ADMIN — Medication 12.5 MILLIGRAM(S): at 17:42

## 2019-01-01 RX ADMIN — ONDANSETRON 4 MILLIGRAM(S): 8 TABLET, FILM COATED ORAL at 17:15

## 2019-01-01 RX ADMIN — Medication 50 MILLIGRAM(S): at 10:21

## 2019-01-01 RX ADMIN — Medication 200 MICROGRAM(S): at 23:07

## 2019-01-01 RX ADMIN — Medication 81 MILLIGRAM(S): at 11:51

## 2019-01-01 RX ADMIN — AMIODARONE HYDROCHLORIDE 16.67 MG/MIN: 400 TABLET ORAL at 00:30

## 2019-01-01 RX ADMIN — Medication 1 GRAM(S): at 20:19

## 2019-01-01 RX ADMIN — Medication 1 GRAM(S): at 18:20

## 2019-01-01 RX ADMIN — Medication 500000 UNIT(S): at 06:08

## 2019-01-01 RX ADMIN — SENNA PLUS 2 TABLET(S): 8.6 TABLET ORAL at 21:47

## 2019-01-01 RX ADMIN — SENNA PLUS 2 TABLET(S): 8.6 TABLET ORAL at 22:17

## 2019-01-01 RX ADMIN — CHLORHEXIDINE GLUCONATE 1 APPLICATION(S): 213 SOLUTION TOPICAL at 05:42

## 2019-01-01 RX ADMIN — Medication 25 MILLIGRAM(S): at 05:17

## 2019-01-01 RX ADMIN — HEPARIN SODIUM 5000 UNIT(S): 5000 INJECTION INTRAVENOUS; SUBCUTANEOUS at 06:54

## 2019-01-01 RX ADMIN — HYDROMORPHONE HYDROCHLORIDE 0.25 MILLIGRAM(S): 2 INJECTION INTRAMUSCULAR; INTRAVENOUS; SUBCUTANEOUS at 18:35

## 2019-01-01 RX ADMIN — Medication 12.5 MILLIGRAM(S): at 06:24

## 2019-01-01 RX ADMIN — Medication 1 APPLICATION(S): at 05:51

## 2019-01-01 RX ADMIN — Medication 20 MILLIGRAM(S): at 05:10

## 2019-01-01 RX ADMIN — SODIUM CHLORIDE 75 MILLILITER(S): 9 INJECTION, SOLUTION INTRAVENOUS at 21:36

## 2019-01-01 RX ADMIN — CEFEPIME 100 MILLIGRAM(S): 1 INJECTION, POWDER, FOR SOLUTION INTRAMUSCULAR; INTRAVENOUS at 21:18

## 2019-01-01 RX ADMIN — Medication 650 MILLIGRAM(S): at 17:30

## 2019-01-01 RX ADMIN — Medication 100 MILLIGRAM(S): at 05:23

## 2019-01-01 RX ADMIN — OXYCODONE AND ACETAMINOPHEN 1 TABLET(S): 5; 325 TABLET ORAL at 22:25

## 2019-01-01 RX ADMIN — Medication 1 TABLET(S): at 12:55

## 2019-01-01 RX ADMIN — Medication 1 GRAM(S): at 06:25

## 2019-01-01 RX ADMIN — MAGNESIUM OXIDE 400 MG ORAL TABLET 400 MILLIGRAM(S): 241.3 TABLET ORAL at 20:15

## 2019-01-01 RX ADMIN — Medication 500000 UNIT(S): at 17:25

## 2019-01-01 RX ADMIN — Medication 50 MILLIEQUIVALENT(S): at 02:05

## 2019-01-01 RX ADMIN — TAMSULOSIN HYDROCHLORIDE 0.4 MILLIGRAM(S): 0.4 CAPSULE ORAL at 21:50

## 2019-01-01 RX ADMIN — Medication 20 MILLIGRAM(S): at 06:22

## 2019-01-01 RX ADMIN — HEPARIN SODIUM 5000 UNIT(S): 5000 INJECTION INTRAVENOUS; SUBCUTANEOUS at 13:10

## 2019-01-01 RX ADMIN — CEFEPIME 100 MILLIGRAM(S): 1 INJECTION, POWDER, FOR SOLUTION INTRAMUSCULAR; INTRAVENOUS at 05:27

## 2019-01-01 RX ADMIN — Medication 12.5 MILLIGRAM(S): at 05:55

## 2019-01-01 RX ADMIN — Medication 200 MILLIGRAM(S): at 20:28

## 2019-01-01 RX ADMIN — BUDESONIDE AND FORMOTEROL FUMARATE DIHYDRATE 2 PUFF(S): 160; 4.5 AEROSOL RESPIRATORY (INHALATION) at 12:03

## 2019-01-01 RX ADMIN — SIMETHICONE 80 MILLIGRAM(S): 80 TABLET, CHEWABLE ORAL at 01:05

## 2019-01-01 RX ADMIN — SODIUM CHLORIDE 125 MILLILITER(S): 9 INJECTION INTRAMUSCULAR; INTRAVENOUS; SUBCUTANEOUS at 07:01

## 2019-01-01 RX ADMIN — Medication 12.5 MILLIGRAM(S): at 05:24

## 2019-01-01 RX ADMIN — Medication 100 MILLIGRAM(S): at 21:21

## 2019-01-01 RX ADMIN — ENOXAPARIN SODIUM 40 MILLIGRAM(S): 100 INJECTION SUBCUTANEOUS at 11:47

## 2019-01-01 RX ADMIN — CEFEPIME 100 MILLIGRAM(S): 1 INJECTION, POWDER, FOR SOLUTION INTRAMUSCULAR; INTRAVENOUS at 01:07

## 2019-01-01 RX ADMIN — Medication 40 MILLIGRAM(S): at 05:55

## 2019-01-01 RX ADMIN — NICARDIPINE HYDROCHLORIDE 25 MG/HR: 30 CAPSULE, EXTENDED RELEASE ORAL at 13:30

## 2019-01-01 RX ADMIN — Medication 50 MILLIGRAM(S): at 14:35

## 2019-01-01 RX ADMIN — SODIUM CHLORIDE 125 MILLILITER(S): 9 INJECTION INTRAMUSCULAR; INTRAVENOUS; SUBCUTANEOUS at 07:05

## 2019-01-01 RX ADMIN — Medication 25 MILLIGRAM(S): at 05:21

## 2019-01-01 RX ADMIN — PANTOPRAZOLE SODIUM 40 MILLIGRAM(S): 20 TABLET, DELAYED RELEASE ORAL at 11:18

## 2019-01-01 RX ADMIN — PANTOPRAZOLE SODIUM 40 MILLIGRAM(S): 20 TABLET, DELAYED RELEASE ORAL at 18:16

## 2019-01-01 RX ADMIN — Medication 1 APPLICATION(S): at 17:25

## 2019-01-01 RX ADMIN — CLOPIDOGREL BISULFATE 75 MILLIGRAM(S): 75 TABLET, FILM COATED ORAL at 11:10

## 2019-01-01 RX ADMIN — PANTOPRAZOLE SODIUM 40 MILLIGRAM(S): 20 TABLET, DELAYED RELEASE ORAL at 17:49

## 2019-01-01 RX ADMIN — CLOPIDOGREL BISULFATE 75 MILLIGRAM(S): 75 TABLET, FILM COATED ORAL at 13:26

## 2019-01-01 RX ADMIN — Medication 81 MILLIGRAM(S): at 11:41

## 2019-01-01 RX ADMIN — Medication 81 MILLIGRAM(S): at 11:43

## 2019-01-01 RX ADMIN — CEFEPIME 100 MILLIGRAM(S): 1 INJECTION, POWDER, FOR SOLUTION INTRAMUSCULAR; INTRAVENOUS at 22:33

## 2019-01-01 RX ADMIN — Medication 12.5 MILLIGRAM(S): at 17:31

## 2019-01-01 RX ADMIN — CHLORHEXIDINE GLUCONATE 1 APPLICATION(S): 213 SOLUTION TOPICAL at 05:51

## 2019-01-01 RX ADMIN — Medication 81 MILLIGRAM(S): at 13:49

## 2019-01-01 RX ADMIN — NICARDIPINE HYDROCHLORIDE 25 MG/HR: 30 CAPSULE, EXTENDED RELEASE ORAL at 01:06

## 2019-01-01 RX ADMIN — CHLORHEXIDINE GLUCONATE 1 APPLICATION(S): 213 SOLUTION TOPICAL at 06:24

## 2019-01-01 RX ADMIN — AMIODARONE HYDROCHLORIDE 200 MILLIGRAM(S): 400 TABLET ORAL at 05:24

## 2019-01-01 RX ADMIN — CLOPIDOGREL BISULFATE 75 MILLIGRAM(S): 75 TABLET, FILM COATED ORAL at 12:25

## 2019-01-01 RX ADMIN — Medication 81 MILLIGRAM(S): at 09:51

## 2019-01-01 RX ADMIN — Medication 1 GRAM(S): at 19:03

## 2019-01-01 RX ADMIN — Medication 500000 UNIT(S): at 18:32

## 2019-01-01 RX ADMIN — SIMETHICONE 80 MILLIGRAM(S): 80 TABLET, CHEWABLE ORAL at 12:00

## 2019-01-01 RX ADMIN — HEPARIN SODIUM 5000 UNIT(S): 5000 INJECTION INTRAVENOUS; SUBCUTANEOUS at 05:06

## 2019-01-01 RX ADMIN — Medication 12.5 MILLIGRAM(S): at 07:07

## 2019-01-01 RX ADMIN — CLOPIDOGREL BISULFATE 75 MILLIGRAM(S): 75 TABLET, FILM COATED ORAL at 11:34

## 2019-01-01 RX ADMIN — ATORVASTATIN CALCIUM 80 MILLIGRAM(S): 80 TABLET, FILM COATED ORAL at 21:50

## 2019-01-01 RX ADMIN — Medication 600 MILLIGRAM(S): at 05:55

## 2019-01-01 RX ADMIN — BUDESONIDE AND FORMOTEROL FUMARATE DIHYDRATE 2 PUFF(S): 160; 4.5 AEROSOL RESPIRATORY (INHALATION) at 08:07

## 2019-01-01 RX ADMIN — Medication 12.5 MILLIGRAM(S): at 21:54

## 2019-01-01 RX ADMIN — Medication 1 APPLICATION(S): at 06:02

## 2019-01-01 RX ADMIN — OXYCODONE AND ACETAMINOPHEN 1 TABLET(S): 5; 325 TABLET ORAL at 15:52

## 2019-01-01 RX ADMIN — Medication 12.5 MILLIGRAM(S): at 05:39

## 2019-01-01 RX ADMIN — CHLORHEXIDINE GLUCONATE 1 APPLICATION(S): 213 SOLUTION TOPICAL at 06:22

## 2019-01-01 RX ADMIN — POTASSIUM PHOSPHATE, MONOBASIC POTASSIUM PHOSPHATE, DIBASIC 63.75 MILLIMOLE(S): 236; 224 INJECTION, SOLUTION INTRAVENOUS at 02:15

## 2019-01-01 RX ADMIN — Medication 25 MILLIGRAM(S): at 18:09

## 2019-01-01 RX ADMIN — Medication 1 GRAM(S): at 17:49

## 2019-01-01 RX ADMIN — Medication 12.5 MILLIGRAM(S): at 05:29

## 2019-01-01 RX ADMIN — LISINOPRIL 20 MILLIGRAM(S): 2.5 TABLET ORAL at 05:39

## 2019-01-01 RX ADMIN — Medication 12.5 MILLIGRAM(S): at 19:03

## 2019-01-01 RX ADMIN — Medication 500000 UNIT(S): at 17:10

## 2019-01-01 RX ADMIN — CHLORHEXIDINE GLUCONATE 1 APPLICATION(S): 213 SOLUTION TOPICAL at 09:45

## 2019-01-01 RX ADMIN — PANTOPRAZOLE SODIUM 40 MILLIGRAM(S): 20 TABLET, DELAYED RELEASE ORAL at 17:09

## 2019-01-01 RX ADMIN — Medication 500000 UNIT(S): at 06:18

## 2019-01-01 RX ADMIN — Medication 50 GRAM(S): at 02:05

## 2019-01-01 RX ADMIN — FENTANYL CITRATE 25 MICROGRAM(S): 50 INJECTION INTRAVENOUS at 10:00

## 2019-01-01 RX ADMIN — Medication 300 MICROGRAM(S): at 06:07

## 2019-01-01 RX ADMIN — LISINOPRIL 20 MILLIGRAM(S): 2.5 TABLET ORAL at 06:53

## 2019-01-01 RX ADMIN — ATORVASTATIN CALCIUM 40 MILLIGRAM(S): 80 TABLET, FILM COATED ORAL at 21:54

## 2019-01-01 RX ADMIN — ATORVASTATIN CALCIUM 80 MILLIGRAM(S): 80 TABLET, FILM COATED ORAL at 21:12

## 2019-01-01 RX ADMIN — Medication 10 MILLIGRAM(S): at 11:18

## 2019-01-01 RX ADMIN — CLOPIDOGREL BISULFATE 75 MILLIGRAM(S): 75 TABLET, FILM COATED ORAL at 12:55

## 2019-01-01 RX ADMIN — Medication 81 MILLIGRAM(S): at 11:24

## 2019-01-01 RX ADMIN — Medication 1 GRAM(S): at 05:55

## 2019-01-01 RX ADMIN — AMIODARONE HYDROCHLORIDE 200 MILLIGRAM(S): 400 TABLET ORAL at 05:59

## 2019-01-01 RX ADMIN — CHLORHEXIDINE GLUCONATE 15 MILLILITER(S): 213 SOLUTION TOPICAL at 05:00

## 2019-01-01 RX ADMIN — LISINOPRIL 20 MILLIGRAM(S): 2.5 TABLET ORAL at 06:22

## 2019-01-01 RX ADMIN — Medication 1 GRAM(S): at 18:14

## 2019-01-01 RX ADMIN — MAGNESIUM OXIDE 400 MG ORAL TABLET 400 MILLIGRAM(S): 241.3 TABLET ORAL at 12:28

## 2019-01-01 RX ADMIN — SODIUM CHLORIDE 500 MILLILITER(S): 9 INJECTION INTRAMUSCULAR; INTRAVENOUS; SUBCUTANEOUS at 00:37

## 2019-01-01 RX ADMIN — Medication 12.5 MILLIGRAM(S): at 06:07

## 2019-01-01 RX ADMIN — Medication 200 MILLIGRAM(S): at 05:06

## 2019-01-01 RX ADMIN — CLOPIDOGREL BISULFATE 75 MILLIGRAM(S): 75 TABLET, FILM COATED ORAL at 12:04

## 2019-01-01 RX ADMIN — Medication 500000 UNIT(S): at 17:22

## 2019-01-01 RX ADMIN — DEXMEDETOMIDINE HYDROCHLORIDE IN 0.9% SODIUM CHLORIDE 0.2 MICROGRAM(S)/KG/HR: 4 INJECTION INTRAVENOUS at 07:15

## 2019-01-01 RX ADMIN — Medication 200 MILLIGRAM(S): at 19:37

## 2019-01-01 RX ADMIN — HYDROMORPHONE HYDROCHLORIDE 0.25 MILLIGRAM(S): 2 INJECTION INTRAMUSCULAR; INTRAVENOUS; SUBCUTANEOUS at 07:51

## 2019-01-01 RX ADMIN — Medication 500 MILLIGRAM(S): at 06:53

## 2019-01-01 RX ADMIN — Medication 12.5 MILLIGRAM(S): at 17:17

## 2019-01-01 RX ADMIN — Medication 12.5 MILLIGRAM(S): at 06:53

## 2019-01-01 RX ADMIN — Medication 40 MILLIEQUIVALENT(S): at 12:33

## 2019-01-01 RX ADMIN — Medication 1 GRAM(S): at 18:15

## 2019-01-01 RX ADMIN — MAGNESIUM OXIDE 400 MG ORAL TABLET 400 MILLIGRAM(S): 241.3 TABLET ORAL at 18:27

## 2019-01-01 RX ADMIN — FENTANYL CITRATE 25 MICROGRAM(S): 50 INJECTION INTRAVENOUS at 19:00

## 2019-01-01 RX ADMIN — PANTOPRAZOLE SODIUM 40 MILLIGRAM(S): 20 TABLET, DELAYED RELEASE ORAL at 06:25

## 2019-01-01 RX ADMIN — Medication 100 MICROGRAM(S): at 22:31

## 2019-01-01 RX ADMIN — AMIODARONE HYDROCHLORIDE 200 MILLIGRAM(S): 400 TABLET ORAL at 06:08

## 2019-01-01 RX ADMIN — SENNA PLUS 2 TABLET(S): 8.6 TABLET ORAL at 21:11

## 2019-01-01 RX ADMIN — AMIODARONE HYDROCHLORIDE 200 MILLIGRAM(S): 400 TABLET ORAL at 06:22

## 2019-01-01 RX ADMIN — SODIUM CHLORIDE 500 MILLILITER(S): 9 INJECTION, SOLUTION INTRAVENOUS at 17:16

## 2019-01-01 RX ADMIN — Medication 101 MILLIGRAM(S): at 02:00

## 2019-01-01 RX ADMIN — PANTOPRAZOLE SODIUM 40 MILLIGRAM(S): 20 TABLET, DELAYED RELEASE ORAL at 11:28

## 2019-01-01 RX ADMIN — Medication 1 GRAM(S): at 05:06

## 2019-01-01 RX ADMIN — CLOPIDOGREL BISULFATE 75 MILLIGRAM(S): 75 TABLET, FILM COATED ORAL at 09:51

## 2019-01-01 RX ADMIN — PANTOPRAZOLE SODIUM 40 MILLIGRAM(S): 20 TABLET, DELAYED RELEASE ORAL at 05:12

## 2019-01-01 RX ADMIN — SODIUM CHLORIDE 75 MILLILITER(S): 9 INJECTION INTRAMUSCULAR; INTRAVENOUS; SUBCUTANEOUS at 00:01

## 2019-01-01 RX ADMIN — TAMSULOSIN HYDROCHLORIDE 0.4 MILLIGRAM(S): 0.4 CAPSULE ORAL at 22:31

## 2019-01-01 RX ADMIN — HYDROMORPHONE HYDROCHLORIDE 0.25 MILLIGRAM(S): 2 INJECTION INTRAMUSCULAR; INTRAVENOUS; SUBCUTANEOUS at 20:20

## 2019-01-01 RX ADMIN — Medication 100 MILLIGRAM(S): at 14:19

## 2019-01-01 RX ADMIN — MAGNESIUM OXIDE 400 MG ORAL TABLET 400 MILLIGRAM(S): 241.3 TABLET ORAL at 10:27

## 2019-01-01 RX ADMIN — Medication 3 MILLILITER(S): at 14:51

## 2019-01-01 RX ADMIN — Medication 40 MILLIEQUIVALENT(S): at 21:12

## 2019-01-01 RX ADMIN — Medication 25 MILLIGRAM(S): at 17:08

## 2019-01-01 RX ADMIN — Medication 12.5 MILLIGRAM(S): at 05:11

## 2019-01-01 RX ADMIN — LISINOPRIL 20 MILLIGRAM(S): 2.5 TABLET ORAL at 06:29

## 2019-01-01 RX ADMIN — Medication 200 MILLIGRAM(S): at 14:35

## 2019-01-01 RX ADMIN — CHLORHEXIDINE GLUCONATE 1 APPLICATION(S): 213 SOLUTION TOPICAL at 06:08

## 2019-01-01 RX ADMIN — Medication 81 MILLIGRAM(S): at 12:28

## 2019-01-01 RX ADMIN — CLOPIDOGREL BISULFATE 75 MILLIGRAM(S): 75 TABLET, FILM COATED ORAL at 13:17

## 2019-01-01 RX ADMIN — Medication 650 MILLIGRAM(S): at 11:28

## 2019-01-01 RX ADMIN — Medication 300 MICROGRAM(S): at 06:06

## 2019-01-01 RX ADMIN — SIMETHICONE 80 MILLIGRAM(S): 80 TABLET, CHEWABLE ORAL at 20:14

## 2019-01-01 RX ADMIN — Medication 650 MILLIGRAM(S): at 06:09

## 2019-01-01 RX ADMIN — Medication 100 MILLIGRAM(S): at 21:12

## 2019-01-01 RX ADMIN — HEPARIN SODIUM 5000 UNIT(S): 5000 INJECTION INTRAVENOUS; SUBCUTANEOUS at 05:52

## 2019-01-01 RX ADMIN — Medication 25 MILLIGRAM(S): at 06:25

## 2019-01-01 RX ADMIN — Medication 25 MILLIGRAM(S): at 10:29

## 2019-01-01 RX ADMIN — ATORVASTATIN CALCIUM 80 MILLIGRAM(S): 80 TABLET, FILM COATED ORAL at 21:21

## 2019-01-01 RX ADMIN — HYDROMORPHONE HYDROCHLORIDE 0.25 MILLIGRAM(S): 2 INJECTION INTRAMUSCULAR; INTRAVENOUS; SUBCUTANEOUS at 22:58

## 2019-01-01 RX ADMIN — HEPARIN SODIUM 5000 UNIT(S): 5000 INJECTION INTRAVENOUS; SUBCUTANEOUS at 23:07

## 2019-01-01 RX ADMIN — ATORVASTATIN CALCIUM 80 MILLIGRAM(S): 80 TABLET, FILM COATED ORAL at 21:19

## 2019-01-01 RX ADMIN — Medication 50 MILLIGRAM(S): at 19:30

## 2019-01-01 RX ADMIN — AMIODARONE HYDROCHLORIDE 200 MILLIGRAM(S): 400 TABLET ORAL at 05:52

## 2019-01-01 RX ADMIN — CHLORHEXIDINE GLUCONATE 1 APPLICATION(S): 213 SOLUTION TOPICAL at 17:13

## 2019-01-01 RX ADMIN — DEXMEDETOMIDINE HYDROCHLORIDE IN 0.9% SODIUM CHLORIDE 4.24 MICROGRAM(S)/KG/HR: 4 INJECTION INTRAVENOUS at 21:30

## 2019-01-01 RX ADMIN — DEXMEDETOMIDINE HYDROCHLORIDE IN 0.9% SODIUM CHLORIDE 4.11 MICROGRAM(S)/KG/HR: 4 INJECTION INTRAVENOUS at 22:24

## 2019-01-01 RX ADMIN — Medication 200 MICROGRAM(S): at 06:02

## 2019-01-01 RX ADMIN — Medication 200 MICROGRAM(S): at 05:05

## 2019-01-01 RX ADMIN — Medication 50 MILLIEQUIVALENT(S): at 09:42

## 2019-01-01 RX ADMIN — ATORVASTATIN CALCIUM 40 MILLIGRAM(S): 80 TABLET, FILM COATED ORAL at 22:25

## 2019-01-01 RX ADMIN — PANTOPRAZOLE SODIUM 40 MILLIGRAM(S): 20 TABLET, DELAYED RELEASE ORAL at 18:14

## 2019-01-01 RX ADMIN — ENOXAPARIN SODIUM 40 MILLIGRAM(S): 100 INJECTION SUBCUTANEOUS at 12:36

## 2019-01-01 RX ADMIN — Medication 200 MILLIGRAM(S): at 06:23

## 2019-01-01 RX ADMIN — ATORVASTATIN CALCIUM 40 MILLIGRAM(S): 80 TABLET, FILM COATED ORAL at 21:24

## 2019-01-01 RX ADMIN — Medication 300 MICROGRAM(S): at 06:29

## 2019-01-01 RX ADMIN — Medication 100 MILLIGRAM(S): at 13:18

## 2019-01-01 RX ADMIN — Medication 12.5 MILLIGRAM(S): at 18:14

## 2019-01-01 RX ADMIN — Medication 200 MICROGRAM(S): at 05:17

## 2019-01-01 RX ADMIN — OXYCODONE AND ACETAMINOPHEN 1 TABLET(S): 5; 325 TABLET ORAL at 14:55

## 2019-01-01 RX ADMIN — Medication 650 MILLIGRAM(S): at 19:07

## 2019-01-01 RX ADMIN — SIMETHICONE 80 MILLIGRAM(S): 80 TABLET, CHEWABLE ORAL at 23:11

## 2019-01-01 RX ADMIN — Medication 4000 MILLILITER(S): at 15:30

## 2019-01-01 RX ADMIN — CHLORHEXIDINE GLUCONATE 1 APPLICATION(S): 213 SOLUTION TOPICAL at 17:36

## 2019-01-01 RX ADMIN — Medication 7.7 MICROGRAM(S)/KG/MIN: at 00:11

## 2019-01-01 RX ADMIN — Medication 40 MILLIGRAM(S): at 15:05

## 2019-01-01 RX ADMIN — Medication 10 MILLIGRAM(S): at 20:26

## 2019-01-01 RX ADMIN — Medication 60 MILLIGRAM(S): at 17:36

## 2019-01-01 RX ADMIN — Medication 81 MILLIGRAM(S): at 11:28

## 2019-01-01 RX ADMIN — Medication 40 MILLIGRAM(S): at 06:09

## 2019-01-01 RX ADMIN — Medication 40 MILLIGRAM(S): at 15:00

## 2019-01-01 RX ADMIN — Medication 12.5 MILLIGRAM(S): at 18:01

## 2019-01-01 RX ADMIN — ENOXAPARIN SODIUM 40 MILLIGRAM(S): 100 INJECTION SUBCUTANEOUS at 12:29

## 2019-01-01 RX ADMIN — AMIODARONE HYDROCHLORIDE 200 MILLIGRAM(S): 400 TABLET ORAL at 06:23

## 2019-01-01 RX ADMIN — ENOXAPARIN SODIUM 40 MILLIGRAM(S): 100 INJECTION SUBCUTANEOUS at 11:51

## 2019-01-01 RX ADMIN — Medication 650 MILLIGRAM(S): at 08:44

## 2019-01-01 RX ADMIN — AMIODARONE HYDROCHLORIDE 200 MILLIGRAM(S): 400 TABLET ORAL at 17:13

## 2019-01-01 RX ADMIN — Medication 250 MILLIGRAM(S): at 17:27

## 2019-01-01 RX ADMIN — AMIODARONE HYDROCHLORIDE 200 MILLIGRAM(S): 400 TABLET ORAL at 05:11

## 2019-01-01 RX ADMIN — CHLORHEXIDINE GLUCONATE 1 APPLICATION(S): 213 SOLUTION TOPICAL at 05:06

## 2019-01-01 RX ADMIN — TAMSULOSIN HYDROCHLORIDE 0.4 MILLIGRAM(S): 0.4 CAPSULE ORAL at 21:21

## 2019-01-01 RX ADMIN — Medication 1 GRAM(S): at 17:25

## 2019-01-01 RX ADMIN — Medication 650 MILLIGRAM(S): at 12:56

## 2019-01-01 RX ADMIN — Medication 500000 UNIT(S): at 17:31

## 2019-01-01 RX ADMIN — Medication 12.5 MILLIGRAM(S): at 17:46

## 2019-01-01 RX ADMIN — Medication 600 MILLIGRAM(S): at 17:49

## 2019-01-01 RX ADMIN — Medication 81 MILLIGRAM(S): at 13:00

## 2019-01-01 RX ADMIN — CLOPIDOGREL BISULFATE 75 MILLIGRAM(S): 75 TABLET, FILM COATED ORAL at 12:48

## 2019-01-01 RX ADMIN — Medication 12.5 MILLIGRAM(S): at 17:09

## 2019-01-01 RX ADMIN — SODIUM CHLORIDE 500 MILLILITER(S): 9 INJECTION, SOLUTION INTRAVENOUS at 12:00

## 2019-01-01 RX ADMIN — Medication 650 MILLIGRAM(S): at 11:50

## 2019-01-01 RX ADMIN — OXYCODONE AND ACETAMINOPHEN 1 TABLET(S): 5; 325 TABLET ORAL at 13:20

## 2019-01-01 RX ADMIN — CEFEPIME 100 MILLIGRAM(S): 1 INJECTION, POWDER, FOR SOLUTION INTRAMUSCULAR; INTRAVENOUS at 21:11

## 2019-01-01 RX ADMIN — Medication 100 MILLIGRAM(S): at 18:21

## 2019-01-01 RX ADMIN — FENTANYL CITRATE 25 MICROGRAM(S): 50 INJECTION INTRAVENOUS at 12:30

## 2019-01-01 RX ADMIN — Medication 0.4 MILLIGRAM(S): at 10:56

## 2019-01-01 RX ADMIN — Medication 25 MILLIGRAM(S): at 05:11

## 2019-01-01 RX ADMIN — Medication 40 MILLIEQUIVALENT(S): at 09:10

## 2019-01-01 RX ADMIN — Medication 1 GRAM(S): at 06:10

## 2019-01-01 RX ADMIN — CEFEPIME 100 MILLIGRAM(S): 1 INJECTION, POWDER, FOR SOLUTION INTRAMUSCULAR; INTRAVENOUS at 23:48

## 2019-01-01 RX ADMIN — Medication 500000 UNIT(S): at 06:14

## 2019-01-01 RX ADMIN — CHLORHEXIDINE GLUCONATE 1 APPLICATION(S): 213 SOLUTION TOPICAL at 05:24

## 2019-01-01 RX ADMIN — ATORVASTATIN CALCIUM 40 MILLIGRAM(S): 80 TABLET, FILM COATED ORAL at 21:12

## 2019-01-01 RX ADMIN — Medication 12.5 MILLIGRAM(S): at 17:34

## 2019-01-01 RX ADMIN — MAGNESIUM OXIDE 400 MG ORAL TABLET 400 MILLIGRAM(S): 241.3 TABLET ORAL at 12:01

## 2019-01-01 RX ADMIN — Medication 12.5 MILLIGRAM(S): at 17:27

## 2019-01-01 RX ADMIN — ATORVASTATIN CALCIUM 80 MILLIGRAM(S): 80 TABLET, FILM COATED ORAL at 21:25

## 2019-01-01 RX ADMIN — SODIUM CHLORIDE 3000 MILLILITER(S): 9 INJECTION INTRAMUSCULAR; INTRAVENOUS; SUBCUTANEOUS at 03:00

## 2019-01-01 RX ADMIN — SODIUM CHLORIDE 333.33 MILLILITER(S): 9 INJECTION INTRAMUSCULAR; INTRAVENOUS; SUBCUTANEOUS at 02:04

## 2019-01-01 RX ADMIN — Medication 300 MICROGRAM(S): at 06:19

## 2019-01-01 RX ADMIN — CLOPIDOGREL BISULFATE 75 MILLIGRAM(S): 75 TABLET, FILM COATED ORAL at 13:16

## 2019-01-01 RX ADMIN — BUDESONIDE AND FORMOTEROL FUMARATE DIHYDRATE 2 PUFF(S): 160; 4.5 AEROSOL RESPIRATORY (INHALATION) at 09:10

## 2019-01-01 RX ADMIN — Medication 250 MILLIGRAM(S): at 05:06

## 2019-01-01 RX ADMIN — Medication 50 MILLIGRAM(S): at 03:05

## 2019-01-01 RX ADMIN — CHLORHEXIDINE GLUCONATE 1 APPLICATION(S): 213 SOLUTION TOPICAL at 05:07

## 2019-01-01 RX ADMIN — Medication 50 GRAM(S): at 01:52

## 2019-01-01 RX ADMIN — Medication 12.5 MILLIGRAM(S): at 17:13

## 2019-01-01 RX ADMIN — Medication 500000 UNIT(S): at 13:49

## 2019-01-01 RX ADMIN — Medication 100 MICROGRAM(S): at 01:26

## 2019-01-01 RX ADMIN — Medication 300 MICROGRAM(S): at 06:17

## 2019-01-01 RX ADMIN — MORPHINE SULFATE 4 MILLIGRAM(S): 50 CAPSULE, EXTENDED RELEASE ORAL at 05:50

## 2019-01-01 RX ADMIN — Medication 40 MILLIGRAM(S): at 22:14

## 2019-01-01 RX ADMIN — Medication 1 APPLICATION(S): at 18:16

## 2019-01-01 RX ADMIN — Medication 50 GRAM(S): at 04:30

## 2019-01-01 RX ADMIN — PANTOPRAZOLE SODIUM 40 MILLIGRAM(S): 20 TABLET, DELAYED RELEASE ORAL at 17:34

## 2019-01-01 RX ADMIN — Medication 250 MILLIGRAM(S): at 05:05

## 2019-01-01 RX ADMIN — OXYCODONE AND ACETAMINOPHEN 1 TABLET(S): 5; 325 TABLET ORAL at 23:14

## 2019-01-01 RX ADMIN — MORPHINE SULFATE 2 MILLIGRAM(S): 50 CAPSULE, EXTENDED RELEASE ORAL at 20:27

## 2019-01-01 RX ADMIN — SENNA PLUS 2 TABLET(S): 8.6 TABLET ORAL at 21:41

## 2019-01-01 RX ADMIN — Medication 20 MILLIGRAM(S): at 21:41

## 2019-01-01 RX ADMIN — Medication 1 TABLET(S): at 17:09

## 2019-01-01 RX ADMIN — ATORVASTATIN CALCIUM 40 MILLIGRAM(S): 80 TABLET, FILM COATED ORAL at 22:06

## 2019-01-01 RX ADMIN — BUDESONIDE AND FORMOTEROL FUMARATE DIHYDRATE 2 PUFF(S): 160; 4.5 AEROSOL RESPIRATORY (INHALATION) at 20:02

## 2019-01-01 RX ADMIN — CLOPIDOGREL BISULFATE 75 MILLIGRAM(S): 75 TABLET, FILM COATED ORAL at 11:47

## 2019-01-01 RX ADMIN — Medication 500000 UNIT(S): at 01:09

## 2019-01-01 RX ADMIN — Medication 12.5 MILLIGRAM(S): at 06:23

## 2019-01-01 RX ADMIN — Medication 20 MILLIEQUIVALENT(S): at 18:20

## 2019-01-01 RX ADMIN — Medication 60 MILLIGRAM(S): at 06:23

## 2019-01-01 RX ADMIN — Medication 1 GRAM(S): at 06:21

## 2019-01-01 RX ADMIN — Medication 81 MILLIGRAM(S): at 11:50

## 2019-01-01 RX ADMIN — Medication 20 MILLIEQUIVALENT(S): at 18:24

## 2019-01-01 RX ADMIN — CEFEPIME 100 MILLIGRAM(S): 1 INJECTION, POWDER, FOR SOLUTION INTRAMUSCULAR; INTRAVENOUS at 15:21

## 2019-01-01 RX ADMIN — AMIODARONE HYDROCHLORIDE 200 MILLIGRAM(S): 400 TABLET ORAL at 06:17

## 2019-01-01 RX ADMIN — Medication 81 MILLIGRAM(S): at 11:18

## 2019-01-01 RX ADMIN — CHLORHEXIDINE GLUCONATE 1 APPLICATION(S): 213 SOLUTION TOPICAL at 05:50

## 2019-01-01 RX ADMIN — Medication 20 MILLIEQUIVALENT(S): at 14:18

## 2019-01-01 RX ADMIN — Medication 1 GRAM(S): at 05:21

## 2019-01-01 RX ADMIN — BUDESONIDE AND FORMOTEROL FUMARATE DIHYDRATE 2 PUFF(S): 160; 4.5 AEROSOL RESPIRATORY (INHALATION) at 09:42

## 2019-01-01 RX ADMIN — ENOXAPARIN SODIUM 40 MILLIGRAM(S): 100 INJECTION SUBCUTANEOUS at 12:00

## 2019-01-01 RX ADMIN — LISINOPRIL 10 MILLIGRAM(S): 2.5 TABLET ORAL at 06:02

## 2019-01-01 RX ADMIN — HEPARIN SODIUM 5000 UNIT(S): 5000 INJECTION INTRAVENOUS; SUBCUTANEOUS at 05:34

## 2019-01-01 RX ADMIN — LISINOPRIL 10 MILLIGRAM(S): 2.5 TABLET ORAL at 06:23

## 2019-01-01 RX ADMIN — Medication 1 GRAM(S): at 06:19

## 2019-01-01 RX ADMIN — Medication 60 MILLIGRAM(S): at 06:02

## 2019-01-01 RX ADMIN — SIMETHICONE 80 MILLIGRAM(S): 80 TABLET, CHEWABLE ORAL at 18:19

## 2019-01-01 RX ADMIN — CHLORHEXIDINE GLUCONATE 1 APPLICATION(S): 213 SOLUTION TOPICAL at 06:00

## 2019-01-01 RX ADMIN — Medication 1 GRAM(S): at 18:33

## 2019-01-01 RX ADMIN — CLOPIDOGREL BISULFATE 75 MILLIGRAM(S): 75 TABLET, FILM COATED ORAL at 13:10

## 2019-01-01 RX ADMIN — SIMETHICONE 80 MILLIGRAM(S): 80 TABLET, CHEWABLE ORAL at 00:12

## 2019-01-01 RX ADMIN — Medication 20 MILLIGRAM(S): at 21:45

## 2019-01-01 RX ADMIN — CHLORHEXIDINE GLUCONATE 15 MILLILITER(S): 213 SOLUTION TOPICAL at 21:51

## 2019-01-01 RX ADMIN — Medication 40 MILLIGRAM(S): at 06:19

## 2019-01-01 RX ADMIN — Medication 50 MILLIGRAM(S): at 23:22

## 2019-01-01 RX ADMIN — CLOPIDOGREL BISULFATE 75 MILLIGRAM(S): 75 TABLET, FILM COATED ORAL at 13:00

## 2019-01-01 RX ADMIN — ATORVASTATIN CALCIUM 80 MILLIGRAM(S): 80 TABLET, FILM COATED ORAL at 21:35

## 2019-01-01 RX ADMIN — PANTOPRAZOLE SODIUM 40 MILLIGRAM(S): 20 TABLET, DELAYED RELEASE ORAL at 05:30

## 2019-01-01 RX ADMIN — POTASSIUM PHOSPHATE, MONOBASIC POTASSIUM PHOSPHATE, DIBASIC 83.33 MILLIMOLE(S): 236; 224 INJECTION, SOLUTION INTRAVENOUS at 06:12

## 2019-01-01 RX ADMIN — BUDESONIDE AND FORMOTEROL FUMARATE DIHYDRATE 2 PUFF(S): 160; 4.5 AEROSOL RESPIRATORY (INHALATION) at 10:27

## 2019-01-01 RX ADMIN — Medication 100 MILLIGRAM(S): at 05:11

## 2019-01-01 RX ADMIN — LISINOPRIL 20 MILLIGRAM(S): 2.5 TABLET ORAL at 05:48

## 2019-01-01 RX ADMIN — MORPHINE SULFATE 4 MILLIGRAM(S): 50 CAPSULE, EXTENDED RELEASE ORAL at 04:51

## 2019-01-01 RX ADMIN — MORPHINE SULFATE 2 MILLIGRAM(S): 50 CAPSULE, EXTENDED RELEASE ORAL at 15:20

## 2019-01-01 RX ADMIN — SIMETHICONE 80 MILLIGRAM(S): 80 TABLET, CHEWABLE ORAL at 11:02

## 2019-01-01 RX ADMIN — Medication 40 MILLIEQUIVALENT(S): at 13:16

## 2019-01-01 RX ADMIN — Medication 12.5 MILLIGRAM(S): at 06:15

## 2019-01-01 RX ADMIN — HYDROMORPHONE HYDROCHLORIDE 0.25 MILLIGRAM(S): 2 INJECTION INTRAMUSCULAR; INTRAVENOUS; SUBCUTANEOUS at 12:15

## 2019-01-01 RX ADMIN — MAGNESIUM OXIDE 400 MG ORAL TABLET 400 MILLIGRAM(S): 241.3 TABLET ORAL at 18:31

## 2019-01-01 RX ADMIN — LISINOPRIL 20 MILLIGRAM(S): 2.5 TABLET ORAL at 05:11

## 2019-01-01 RX ADMIN — Medication 25 MILLIGRAM(S): at 17:47

## 2019-01-01 RX ADMIN — Medication 40 MILLIGRAM(S): at 06:07

## 2019-01-01 RX ADMIN — AMIODARONE HYDROCHLORIDE 200 MILLIGRAM(S): 400 TABLET ORAL at 06:37

## 2019-01-01 RX ADMIN — ATORVASTATIN CALCIUM 40 MILLIGRAM(S): 80 TABLET, FILM COATED ORAL at 21:14

## 2019-01-01 RX ADMIN — Medication 40 MILLIGRAM(S): at 18:14

## 2019-01-01 RX ADMIN — PANTOPRAZOLE SODIUM 40 MILLIGRAM(S): 20 TABLET, DELAYED RELEASE ORAL at 05:06

## 2019-01-01 RX ADMIN — Medication 12.5 MILLIGRAM(S): at 17:49

## 2019-01-01 RX ADMIN — Medication 81 MILLIGRAM(S): at 12:10

## 2019-01-01 RX ADMIN — BUDESONIDE AND FORMOTEROL FUMARATE DIHYDRATE 2 PUFF(S): 160; 4.5 AEROSOL RESPIRATORY (INHALATION) at 21:12

## 2019-01-01 RX ADMIN — AMIODARONE HYDROCHLORIDE 200 MILLIGRAM(S): 400 TABLET ORAL at 05:39

## 2019-01-01 RX ADMIN — Medication 200 MILLIGRAM(S): at 22:41

## 2019-01-01 RX ADMIN — Medication 100 MILLIGRAM(S): at 05:04

## 2019-01-01 RX ADMIN — Medication 50 MILLIEQUIVALENT(S): at 03:56

## 2019-01-01 RX ADMIN — Medication 0.5 MILLIGRAM(S): at 22:33

## 2019-01-01 RX ADMIN — Medication 650 MILLIGRAM(S): at 04:05

## 2019-01-01 RX ADMIN — OXYCODONE AND ACETAMINOPHEN 1 TABLET(S): 5; 325 TABLET ORAL at 11:00

## 2019-01-01 RX ADMIN — Medication 1 GRAM(S): at 17:12

## 2019-01-01 RX ADMIN — AMIODARONE HYDROCHLORIDE 200 MILLIGRAM(S): 400 TABLET ORAL at 06:24

## 2019-01-01 RX ADMIN — OXYCODONE AND ACETAMINOPHEN 1 TABLET(S): 5; 325 TABLET ORAL at 03:20

## 2019-01-01 RX ADMIN — Medication 1 TABLET(S): at 11:49

## 2019-01-01 RX ADMIN — AMIODARONE HYDROCHLORIDE 200 MILLIGRAM(S): 400 TABLET ORAL at 17:36

## 2019-01-01 RX ADMIN — CHLORHEXIDINE GLUCONATE 1 APPLICATION(S): 213 SOLUTION TOPICAL at 05:11

## 2019-01-01 RX ADMIN — Medication 40 MILLIGRAM(S): at 05:09

## 2019-01-01 RX ADMIN — Medication 12.5 MILLIGRAM(S): at 18:15

## 2019-01-01 RX ADMIN — PANTOPRAZOLE SODIUM 40 MILLIGRAM(S): 20 TABLET, DELAYED RELEASE ORAL at 18:34

## 2019-01-01 RX ADMIN — CEFEPIME 100 MILLIGRAM(S): 1 INJECTION, POWDER, FOR SOLUTION INTRAMUSCULAR; INTRAVENOUS at 13:14

## 2019-01-01 RX ADMIN — PANTOPRAZOLE SODIUM 40 MILLIGRAM(S): 20 TABLET, DELAYED RELEASE ORAL at 05:13

## 2019-01-01 RX ADMIN — PANTOPRAZOLE SODIUM 40 MILLIGRAM(S): 20 TABLET, DELAYED RELEASE ORAL at 06:45

## 2019-01-01 RX ADMIN — Medication 12.5 MILLIGRAM(S): at 06:11

## 2019-01-01 RX ADMIN — ATORVASTATIN CALCIUM 40 MILLIGRAM(S): 80 TABLET, FILM COATED ORAL at 22:09

## 2019-01-01 RX ADMIN — Medication 100 MILLIGRAM(S): at 14:34

## 2019-01-01 RX ADMIN — Medication 1 GRAM(S): at 18:38

## 2019-01-01 RX ADMIN — Medication 81 MILLIGRAM(S): at 11:10

## 2019-01-01 RX ADMIN — CEFEPIME 100 MILLIGRAM(S): 1 INJECTION, POWDER, FOR SOLUTION INTRAMUSCULAR; INTRAVENOUS at 05:53

## 2019-01-01 RX ADMIN — SIMETHICONE 80 MILLIGRAM(S): 80 TABLET, CHEWABLE ORAL at 12:28

## 2019-01-01 RX ADMIN — SIMETHICONE 80 MILLIGRAM(S): 80 TABLET, CHEWABLE ORAL at 06:24

## 2019-01-01 RX ADMIN — AMIODARONE HYDROCHLORIDE 200 MILLIGRAM(S): 400 TABLET ORAL at 05:21

## 2019-01-01 RX ADMIN — POLYETHYLENE GLYCOL 3350 17 GRAM(S): 17 POWDER, FOR SOLUTION ORAL at 18:34

## 2019-01-01 RX ADMIN — LISINOPRIL 20 MILLIGRAM(S): 2.5 TABLET ORAL at 05:10

## 2019-01-01 RX ADMIN — Medication 40 MILLIGRAM(S): at 21:57

## 2019-01-01 RX ADMIN — CHLORHEXIDINE GLUCONATE 1 APPLICATION(S): 213 SOLUTION TOPICAL at 05:13

## 2019-01-01 RX ADMIN — PANTOPRAZOLE SODIUM 10 MG/HR: 20 TABLET, DELAYED RELEASE ORAL at 01:06

## 2019-01-01 RX ADMIN — Medication 200 MICROGRAM(S): at 05:10

## 2019-01-01 RX ADMIN — Medication 40 MILLIGRAM(S): at 06:10

## 2019-01-01 RX ADMIN — Medication 100 MILLIGRAM(S): at 21:56

## 2019-01-01 RX ADMIN — CEFEPIME 100 MILLIGRAM(S): 1 INJECTION, POWDER, FOR SOLUTION INTRAMUSCULAR; INTRAVENOUS at 14:22

## 2019-01-01 RX ADMIN — SODIUM CHLORIDE 3000 MILLILITER(S): 9 INJECTION, SOLUTION INTRAVENOUS at 11:30

## 2019-01-01 RX ADMIN — Medication 40 MILLIGRAM(S): at 06:24

## 2019-01-01 RX ADMIN — Medication 100 MILLIGRAM(S): at 05:06

## 2019-01-01 RX ADMIN — Medication 25 MILLIGRAM(S): at 03:59

## 2019-01-01 RX ADMIN — SIMETHICONE 80 MILLIGRAM(S): 80 TABLET, CHEWABLE ORAL at 06:10

## 2019-01-01 RX ADMIN — PANTOPRAZOLE SODIUM 40 MILLIGRAM(S): 20 TABLET, DELAYED RELEASE ORAL at 06:13

## 2019-01-01 RX ADMIN — Medication 25 MILLIGRAM(S): at 22:01

## 2019-01-01 RX ADMIN — Medication 50 MILLIEQUIVALENT(S): at 05:35

## 2019-01-01 RX ADMIN — Medication 500 MILLIGRAM(S): at 19:08

## 2019-01-01 RX ADMIN — LISINOPRIL 20 MILLIGRAM(S): 2.5 TABLET ORAL at 06:14

## 2019-01-01 RX ADMIN — ATORVASTATIN CALCIUM 40 MILLIGRAM(S): 80 TABLET, FILM COATED ORAL at 22:13

## 2019-01-01 RX ADMIN — LISINOPRIL 20 MILLIGRAM(S): 2.5 TABLET ORAL at 06:39

## 2019-01-01 RX ADMIN — Medication 1 MILLIGRAM(S): at 03:48

## 2019-01-01 RX ADMIN — Medication 1 GRAM(S): at 05:18

## 2019-01-01 RX ADMIN — ATORVASTATIN CALCIUM 40 MILLIGRAM(S): 80 TABLET, FILM COATED ORAL at 21:57

## 2019-01-01 RX ADMIN — Medication 650 MILLIGRAM(S): at 13:10

## 2019-01-01 RX ADMIN — POTASSIUM PHOSPHATE, MONOBASIC POTASSIUM PHOSPHATE, DIBASIC 83.33 MILLIMOLE(S): 236; 224 INJECTION, SOLUTION INTRAVENOUS at 03:40

## 2019-01-01 RX ADMIN — Medication 100 MICROGRAM(S): at 21:54

## 2019-01-01 RX ADMIN — LISINOPRIL 20 MILLIGRAM(S): 2.5 TABLET ORAL at 05:05

## 2019-01-01 RX ADMIN — Medication 1 GRAM(S): at 05:09

## 2019-01-01 RX ADMIN — Medication 500000 UNIT(S): at 18:19

## 2019-01-01 RX ADMIN — ENOXAPARIN SODIUM 40 MILLIGRAM(S): 100 INJECTION SUBCUTANEOUS at 12:22

## 2019-01-01 RX ADMIN — CHLORHEXIDINE GLUCONATE 1 APPLICATION(S): 213 SOLUTION TOPICAL at 06:09

## 2019-01-01 RX ADMIN — AMIODARONE HYDROCHLORIDE 618 MILLIGRAM(S): 400 TABLET ORAL at 18:06

## 2019-01-01 RX ADMIN — LISINOPRIL 20 MILLIGRAM(S): 2.5 TABLET ORAL at 05:12

## 2019-01-01 RX ADMIN — Medication 25 MILLIGRAM(S): at 17:57

## 2019-01-01 RX ADMIN — Medication 300 MICROGRAM(S): at 05:59

## 2019-01-01 RX ADMIN — Medication 50 MILLIGRAM(S): at 17:48

## 2019-01-01 RX ADMIN — OXYCODONE AND ACETAMINOPHEN 1 TABLET(S): 5; 325 TABLET ORAL at 16:49

## 2019-01-01 RX ADMIN — Medication 50 GRAM(S): at 16:32

## 2019-01-01 RX ADMIN — OXYCODONE AND ACETAMINOPHEN 1 TABLET(S): 5; 325 TABLET ORAL at 04:06

## 2019-01-01 RX ADMIN — Medication 650 MILLIGRAM(S): at 18:05

## 2019-01-01 RX ADMIN — Medication 12.5 MILLIGRAM(S): at 06:40

## 2019-01-01 RX ADMIN — SENNA PLUS 2 TABLET(S): 8.6 TABLET ORAL at 21:25

## 2019-01-01 RX ADMIN — SIMETHICONE 80 MILLIGRAM(S): 80 TABLET, CHEWABLE ORAL at 18:35

## 2019-01-01 RX ADMIN — ATORVASTATIN CALCIUM 80 MILLIGRAM(S): 80 TABLET, FILM COATED ORAL at 21:48

## 2019-01-01 RX ADMIN — CLOPIDOGREL BISULFATE 75 MILLIGRAM(S): 75 TABLET, FILM COATED ORAL at 12:46

## 2019-01-01 RX ADMIN — Medication 1 TABLET(S): at 11:13

## 2019-01-01 RX ADMIN — HYDROMORPHONE HYDROCHLORIDE 0.25 MILLIGRAM(S): 2 INJECTION INTRAMUSCULAR; INTRAVENOUS; SUBCUTANEOUS at 14:30

## 2019-01-01 RX ADMIN — CEFEPIME 100 MILLIGRAM(S): 1 INJECTION, POWDER, FOR SOLUTION INTRAMUSCULAR; INTRAVENOUS at 14:34

## 2019-01-01 RX ADMIN — Medication 1 SUPPOSITORY(S): at 20:27

## 2019-01-01 RX ADMIN — Medication 200 MICROGRAM(S): at 06:45

## 2019-01-01 RX ADMIN — CHLORHEXIDINE GLUCONATE 1 APPLICATION(S): 213 SOLUTION TOPICAL at 06:20

## 2019-01-01 RX ADMIN — Medication 500000 UNIT(S): at 06:03

## 2019-01-01 RX ADMIN — ATORVASTATIN CALCIUM 80 MILLIGRAM(S): 80 TABLET, FILM COATED ORAL at 21:15

## 2019-01-01 RX ADMIN — LISINOPRIL 10 MILLIGRAM(S): 2.5 TABLET ORAL at 05:11

## 2019-01-01 RX ADMIN — Medication 1 TABLET(S): at 12:03

## 2019-01-01 RX ADMIN — Medication 200 MICROGRAM(S): at 05:06

## 2019-01-01 RX ADMIN — SIMETHICONE 80 MILLIGRAM(S): 80 TABLET, CHEWABLE ORAL at 06:14

## 2019-01-01 RX ADMIN — CHLORHEXIDINE GLUCONATE 1 APPLICATION(S): 213 SOLUTION TOPICAL at 06:16

## 2019-01-01 RX ADMIN — FENTANYL CITRATE 25 MICROGRAM(S): 50 INJECTION INTRAVENOUS at 12:15

## 2019-01-01 RX ADMIN — LISINOPRIL 20 MILLIGRAM(S): 2.5 TABLET ORAL at 06:45

## 2019-01-01 RX ADMIN — SODIUM CHLORIDE 500 MILLILITER(S): 9 INJECTION, SOLUTION INTRAVENOUS at 16:16

## 2019-01-01 RX ADMIN — PANTOPRAZOLE SODIUM 40 MILLIGRAM(S): 20 TABLET, DELAYED RELEASE ORAL at 06:18

## 2019-01-01 RX ADMIN — MAGNESIUM OXIDE 400 MG ORAL TABLET 400 MILLIGRAM(S): 241.3 TABLET ORAL at 17:09

## 2019-01-01 RX ADMIN — Medication 25 MILLIGRAM(S): at 17:49

## 2019-01-01 RX ADMIN — Medication 300 MICROGRAM(S): at 05:42

## 2019-01-01 RX ADMIN — PANTOPRAZOLE SODIUM 40 MILLIGRAM(S): 20 TABLET, DELAYED RELEASE ORAL at 06:16

## 2019-01-01 RX ADMIN — FAMOTIDINE 104 MILLIGRAM(S): 10 INJECTION INTRAVENOUS at 13:25

## 2019-01-01 RX ADMIN — Medication 25 GRAM(S): at 00:42

## 2019-01-01 RX ADMIN — ENOXAPARIN SODIUM 40 MILLIGRAM(S): 100 INJECTION SUBCUTANEOUS at 12:21

## 2019-01-01 RX ADMIN — PANTOPRAZOLE SODIUM 40 MILLIGRAM(S): 20 TABLET, DELAYED RELEASE ORAL at 17:47

## 2019-01-01 RX ADMIN — SIMETHICONE 80 MILLIGRAM(S): 80 TABLET, CHEWABLE ORAL at 20:27

## 2019-01-01 RX ADMIN — Medication 25 MILLIGRAM(S): at 17:27

## 2019-01-01 RX ADMIN — HYDROMORPHONE HYDROCHLORIDE 0.25 MILLIGRAM(S): 2 INJECTION INTRAMUSCULAR; INTRAVENOUS; SUBCUTANEOUS at 04:09

## 2019-01-01 RX ADMIN — Medication 200 MILLIGRAM(S): at 08:39

## 2019-01-01 RX ADMIN — AMIODARONE HYDROCHLORIDE 200 MILLIGRAM(S): 400 TABLET ORAL at 06:09

## 2019-01-01 RX ADMIN — SODIUM CHLORIDE 100 MILLILITER(S): 9 INJECTION INTRAMUSCULAR; INTRAVENOUS; SUBCUTANEOUS at 14:41

## 2019-01-01 RX ADMIN — Medication 12.5 MILLIGRAM(S): at 18:29

## 2019-01-01 RX ADMIN — Medication 50 MILLIEQUIVALENT(S): at 13:18

## 2019-01-01 RX ADMIN — POLYETHYLENE GLYCOL 3350 17 GRAM(S): 17 POWDER, FOR SOLUTION ORAL at 05:49

## 2019-01-01 RX ADMIN — Medication 100 MILLIGRAM(S): at 05:00

## 2019-01-01 RX ADMIN — ATORVASTATIN CALCIUM 80 MILLIGRAM(S): 80 TABLET, FILM COATED ORAL at 23:07

## 2019-01-01 RX ADMIN — SIMETHICONE 80 MILLIGRAM(S): 80 TABLET, CHEWABLE ORAL at 05:39

## 2019-01-01 RX ADMIN — Medication 50 MILLIGRAM(S): at 00:00

## 2019-01-01 RX ADMIN — HEPARIN SODIUM 5000 UNIT(S): 5000 INJECTION INTRAVENOUS; SUBCUTANEOUS at 13:14

## 2019-01-01 RX ADMIN — LISINOPRIL 10 MILLIGRAM(S): 2.5 TABLET ORAL at 06:24

## 2019-01-01 RX ADMIN — LISINOPRIL 20 MILLIGRAM(S): 2.5 TABLET ORAL at 05:29

## 2019-01-01 RX ADMIN — Medication 50 GRAM(S): at 18:07

## 2019-01-01 RX ADMIN — MAGNESIUM OXIDE 400 MG ORAL TABLET 400 MILLIGRAM(S): 241.3 TABLET ORAL at 08:19

## 2019-01-01 RX ADMIN — Medication 100 MILLIGRAM(S): at 09:45

## 2019-01-01 RX ADMIN — Medication 25 MILLIGRAM(S): at 05:10

## 2019-01-01 RX ADMIN — Medication 200 MICROGRAM(S): at 11:35

## 2019-01-01 RX ADMIN — CEFEPIME 100 MILLIGRAM(S): 1 INJECTION, POWDER, FOR SOLUTION INTRAMUSCULAR; INTRAVENOUS at 13:17

## 2019-01-01 RX ADMIN — CEFEPIME 100 MILLIGRAM(S): 1 INJECTION, POWDER, FOR SOLUTION INTRAMUSCULAR; INTRAVENOUS at 05:35

## 2019-01-01 RX ADMIN — Medication 1 APPLICATION(S): at 17:23

## 2019-01-01 RX ADMIN — SODIUM CHLORIDE 3000 MILLILITER(S): 9 INJECTION, SOLUTION INTRAVENOUS at 11:00

## 2019-01-01 RX ADMIN — Medication 3 MILLILITER(S): at 04:03

## 2019-01-01 RX ADMIN — POLYETHYLENE GLYCOL 3350 17 GRAM(S): 17 POWDER, FOR SOLUTION ORAL at 05:30

## 2019-01-01 RX ADMIN — SENNA PLUS 2 TABLET(S): 8.6 TABLET ORAL at 21:14

## 2019-01-01 RX ADMIN — Medication 1 APPLICATION(S): at 06:20

## 2019-01-01 RX ADMIN — Medication 300 MICROGRAM(S): at 05:29

## 2019-01-01 RX ADMIN — Medication 81 MILLIGRAM(S): at 12:48

## 2019-01-01 RX ADMIN — ATORVASTATIN CALCIUM 40 MILLIGRAM(S): 80 TABLET, FILM COATED ORAL at 21:50

## 2019-01-01 RX ADMIN — LISINOPRIL 20 MILLIGRAM(S): 2.5 TABLET ORAL at 05:42

## 2019-01-01 RX ADMIN — Medication 1 GRAM(S): at 18:16

## 2019-01-01 RX ADMIN — PANTOPRAZOLE SODIUM 40 MILLIGRAM(S): 20 TABLET, DELAYED RELEASE ORAL at 11:24

## 2019-01-01 RX ADMIN — Medication 50 MILLILITER(S): at 01:57

## 2019-01-01 RX ADMIN — PANTOPRAZOLE SODIUM 40 MILLIGRAM(S): 20 TABLET, DELAYED RELEASE ORAL at 18:29

## 2019-01-01 RX ADMIN — PANTOPRAZOLE SODIUM 40 MILLIGRAM(S): 20 TABLET, DELAYED RELEASE ORAL at 18:10

## 2019-01-01 RX ADMIN — SIMETHICONE 80 MILLIGRAM(S): 80 TABLET, CHEWABLE ORAL at 11:49

## 2019-01-01 RX ADMIN — SODIUM CHLORIDE 3000 MILLILITER(S): 9 INJECTION INTRAMUSCULAR; INTRAVENOUS; SUBCUTANEOUS at 00:45

## 2019-01-01 RX ADMIN — ATORVASTATIN CALCIUM 40 MILLIGRAM(S): 80 TABLET, FILM COATED ORAL at 22:15

## 2019-01-01 RX ADMIN — Medication 650 MILLIGRAM(S): at 01:47

## 2019-01-01 RX ADMIN — Medication 40 MILLIGRAM(S): at 15:40

## 2019-01-01 RX ADMIN — Medication 300 MICROGRAM(S): at 05:09

## 2019-01-01 RX ADMIN — Medication 25 GRAM(S): at 01:41

## 2019-01-01 RX ADMIN — Medication 12.5 MILLIGRAM(S): at 17:25

## 2019-01-01 RX ADMIN — AMIODARONE HYDROCHLORIDE 200 MILLIGRAM(S): 400 TABLET ORAL at 05:06

## 2019-01-01 RX ADMIN — FENTANYL CITRATE 25 MICROGRAM(S): 50 INJECTION INTRAVENOUS at 06:15

## 2019-01-01 RX ADMIN — TAMSULOSIN HYDROCHLORIDE 0.4 MILLIGRAM(S): 0.4 CAPSULE ORAL at 22:09

## 2019-01-01 RX ADMIN — Medication 3 MILLILITER(S): at 06:47

## 2019-01-01 RX ADMIN — Medication 1 GRAM(S): at 06:40

## 2019-01-01 RX ADMIN — LISINOPRIL 20 MILLIGRAM(S): 2.5 TABLET ORAL at 12:10

## 2019-01-01 RX ADMIN — Medication 3 MILLILITER(S): at 18:41

## 2019-01-01 RX ADMIN — MORPHINE SULFATE 4 MILLIGRAM(S): 50 CAPSULE, EXTENDED RELEASE ORAL at 15:59

## 2019-01-01 RX ADMIN — LISINOPRIL 10 MILLIGRAM(S): 2.5 TABLET ORAL at 06:35

## 2019-01-01 RX ADMIN — ATORVASTATIN CALCIUM 40 MILLIGRAM(S): 80 TABLET, FILM COATED ORAL at 22:17

## 2019-01-01 RX ADMIN — DEXMEDETOMIDINE HYDROCHLORIDE IN 0.9% SODIUM CHLORIDE 2.12 MICROGRAM(S)/KG/HR: 4 INJECTION INTRAVENOUS at 05:04

## 2019-01-01 RX ADMIN — ENOXAPARIN SODIUM 40 MILLIGRAM(S): 100 INJECTION SUBCUTANEOUS at 15:07

## 2019-01-01 RX ADMIN — Medication 500000 UNIT(S): at 05:30

## 2019-01-01 RX ADMIN — Medication 12.5 MILLIGRAM(S): at 17:19

## 2019-01-01 RX ADMIN — Medication 50 GRAM(S): at 04:55

## 2019-01-01 RX ADMIN — Medication 81 MILLIGRAM(S): at 11:47

## 2019-01-01 RX ADMIN — SODIUM CHLORIDE 75 MILLILITER(S): 9 INJECTION, SOLUTION INTRAVENOUS at 17:01

## 2019-01-01 RX ADMIN — PANTOPRAZOLE SODIUM 40 MILLIGRAM(S): 20 TABLET, DELAYED RELEASE ORAL at 17:11

## 2019-01-01 RX ADMIN — MORPHINE SULFATE 2 MILLIGRAM(S): 50 CAPSULE, EXTENDED RELEASE ORAL at 01:15

## 2019-01-01 RX ADMIN — SODIUM CHLORIDE 1500 MILLILITER(S): 9 INJECTION, SOLUTION INTRAVENOUS at 15:15

## 2019-01-01 RX ADMIN — PANTOPRAZOLE SODIUM 40 MILLIGRAM(S): 20 TABLET, DELAYED RELEASE ORAL at 17:58

## 2019-01-29 ENCOUNTER — OUTPATIENT (OUTPATIENT)
Dept: OUTPATIENT SERVICES | Facility: HOSPITAL | Age: 84
LOS: 1 days | Discharge: HOME | End: 2019-01-29

## 2019-01-29 DIAGNOSIS — I71.4 ABDOMINAL AORTIC ANEURYSM, WITHOUT RUPTURE: ICD-10-CM

## 2019-09-26 NOTE — H&P ADULT - ASSESSMENT
85 yo M with hx of CAD s/p PCIx2, lyme disease, hypothyroidism, multiple TIAs, L carotid stenosis, neuropathy presenting with NSTEMI.    #NSTEMI  - cath 9/26 showed significant 2 vessel coronary artery disease (LAD, LCX) s/p successful PCI of proximal LAD  - staged PCI of distal LCX/OM2  - continue DAPT, statin, BB, ACEi  - Echo pending  - lipids, HbA1c pending    #Hypothyroidism  - continue thyroxine 83 yo M with hx of CAD s/p PCIx2, lyme disease, hypothyroidism, multiple TIAs, L carotid stenosis, neuropathy presenting with NSTEMI.    #NSTEMI  - cath 9/26 showed significant 2 vessel coronary artery disease (LAD, LCX) s/p successful PCI of proximal LAD  - staged PCI of distal LCX/OM2 later this admission  - continue DAPT, statin, BB, ACEi  - Echo pending  - lipids, HbA1c pending    #Hypothyroidism  - continue thyroxine 85 yo M with hx of CAD s/p PCIx2, lyme disease, hypothyroidism, multiple TIAs, L carotid stenosis, neuropathy presenting with NSTEMI.    #NSTEMI  - cath 9/26 showed significant 2 vessel coronary artery disease (LAD, LCX) s/p successful PCI of proximal LAD  - staged PCI of distal LCX/OM2 later this admission  - continue DAPT, statin, BB, ACEi  - Echo pending  - lipids, HbA1c pending 83 yo M with hx of CAD s/p PCIx2, lyme disease, hypothyroidism, multiple TIAs, L carotid stenosis, neuropathy presenting with NSTEMI.    #NSTEMI  - cath 9/26 showed significant 2 vessel coronary artery disease (LAD, LCX) s/p successful PCI of proximal LAD  - staged PCI of distal LCX/OM2 later this admission  - continue DAPT, statin, BB, ACEi  - Echo pending  - lipids, HbA1c pending    #Rash  - IV 4 decadron 9/26  - benadryl as needed

## 2019-09-26 NOTE — H&P ADULT - NSHPPHYSICALEXAM_GEN_ALL_CORE
CONSTITUTIONAL: Well-developed; well-nourished; in no acute distress.   SKIN: diffuse rash throughout body  HEAD: Normocephalic; atraumatic.  EYES: PERRL, EOMI, normal sclera and conjunctiva   ENT: No nasal discharge; airway clear.  NECK: Supple; non tender.  CARD: S1, S2 normal; no murmurs, gallops, or rubs. Regular rate and rhythm.   RESP: No wheezes, rales or rhonchi.  ABD: soft ntnd,   EXT: Normal ROM.  No clubbing, cyanosis or edema.   LYMPH: No acute cervical adenopathy.  NEURO: Alert, oriented, grossly unremarkable  PSYCH: Cooperative, appropriate.

## 2019-09-26 NOTE — ED PROVIDER NOTE - PHYSICAL EXAMINATION
CONSTITUTIONAL: well developed, nontoxic appearing, in no acute distress, speaking in full sentences  SKIN: warm, dry, diffuse erythematous rash with central clearing, cap refill < 2 seconds  HEENT: normocephalic, atraumatic, no conjunctival erythema, moist mucous membranes, patent airway  NECK: supple, no masses  CV:  regular rate, regular rhythm, 2+ radial pulses bilaterally  RESP: no wheezes, no rales, no rhonchi, normal work of breathing  ABD: soft, nontender, nondistended, no rebound, no guarding  MSK: normal ROM, no cyanosis, no edema  NEURO: alert, oriented, grossly unremarkable  PSYCH: cooperative, appropriate

## 2019-09-26 NOTE — H&P ADULT - NSICDXPASTMEDICALHX_GEN_ALL_CORE_FT
PAST MEDICAL HISTORY:  Coronary artery disease     Hypothyroidism     Left carotid stenosis     Lyme disease     Neuropathy     TIA (transient ischemic attack)

## 2019-09-26 NOTE — ED ADULT TRIAGE NOTE - CHIEF COMPLAINT QUOTE
chest pain that started one week ago. EMS gave 162 mg PO Aspirin and 1 Nitro sublingual. alert and in no distress chest pain that started one week ago. EMS gave 162 mg PO Aspirin and 1 Nitro sublingual. alert and in no distress. STEMI code activated

## 2019-09-26 NOTE — ED PROVIDER NOTE - OBJECTIVE STATEMENT
85 yo M with hx of CAD s/p PCIx2, lyme disease, hypothyroidism, multiple TIAs, L carotid stenosis, neuropathy who presents with gradual onset of intermittent, midsternal, moderate chest tightness radiating to back and associated sob that started yesterday while walking and became worse today. Took  mg at home and then called EMS. Given sublingual NTG x1 by EMS without improvement in sx. Pt is being worked up for a possible pelvic mass and had a CT abd with IV contrast recently when he developed hives, so he thought sob was related to this. No prior hx of allergic reaction to IV contrast. No fever, chills, cough, abd pain, leg pain/swelling. Cardiologist is in [place].

## 2019-09-26 NOTE — ED PROVIDER NOTE - PMH
Coronary artery disease    Hypothyroidism    Left carotid stenosis    Lyme disease    Neuropathy    TIA (transient ischemic attack)

## 2019-09-26 NOTE — CHART NOTE - NSCHARTNOTEFT_GEN_A_CORE
STEMI code called which I responded immediately  EKG reviewed with STEMI attending on call  STEMI code cancelled

## 2019-09-26 NOTE — ED PROVIDER NOTE - ATTENDING CONTRIBUTION TO CARE
84yoM with h/o HTN, HLD, CAD s/p stents, presents with chest pressure worsened with walking as well as SOB with walking for the past few days. Associates this with allergic reaction he had to contrast a few days ago. On exam, afebrile, hemodynamically stable, saturating well, NAD, well appearing, no increased WOB, head NCAT, EOMI grossly, anicteric, MMM, no JVD, RRR, nml S1/S2, no m/r/g, lungs CTAB, no w/r/r, abd soft, NT, ND, nml BS, no rebound or guarding, AAO, CN's 3-12 grossly intact, CRUZ spontaneously, no leg cyanosis or edema, skin warm, well perfused, no rashes or hives. Character low suspicion for PE and no associated signs of DVT/hypoxia/tachycardia. Character low suspicion for dissection and no murmur or pulse asymmetry. No e/o fluid overload, PTX, PNA. Concern for CP/SOB and CAD hx as well as mild aVR-V1 elevation for ACS. Per cardiology low suspicion for acute STEMI, and also pt appearance and context of lower suspicion for active STEMI as well. Per cardiology will be going for cath later today. Given ASA. 84yoM with h/o HTN, HLD, CAD s/p stents, presents with chest pressure worsened with walking as well as SOB with walking for the past few days. Associates this with allergic reaction he had to contrast a few days ago. On exam, afebrile, hemodynamically stable, saturating well, NAD, well appearing, no increased WOB, head NCAT, EOMI grossly, anicteric, MMM, no JVD, RRR, nml S1/S2, no m/r/g, lungs CTAB, no w/r/r, abd soft, NT, ND, nml BS, no rebound or guarding, AAO, CN's 3-12 grossly intact, CRUZ spontaneously, no leg cyanosis or edema, skin warm, well perfused, no rashes or hives. Character low suspicion for PE and no associated signs of DVT/hypoxia/tachycardia. Character low suspicion for dissection and no murmur or pulse asymmetry. No e/o fluid overload, PTX, PNA. Concern for CP/SOB and CAD hx as well as mild aVR-V1 elevation for ACS. Per cardiology low suspicion for acute STEMI, and also pt appearance and context of lower suspicion for active STEMI as well. Per cardiology will be going for cath later today. Given ASA. Patient well appearing, hemodynamically stable. Admitted to CCU for further monitoring, w/u, and care.

## 2019-09-26 NOTE — H&P ADULT - HISTORY OF PRESENT ILLNESS
83 yo M with hx of CAD s/p PCIx2, lyme disease, hypothyroidism, multiple TIAs, L carotid stenosis, neuropathy who presents with gradual onset of intermittent, midsternal, moderate chest tightness radiating to back and associated sob that started yesterday worse on exertion today. Pt received  and sublingual with no improvement.    Pt had a CT abd w/ IV contrast on Monday for workup of kidney mass and developed hives that have responded to steroids and benadryl. No prior hx of allergic reaction to IV contrast. Otherwise no fever, chills, cough, abd pain, leg pain/swelling.

## 2019-09-26 NOTE — ED ADULT NURSE NOTE - CHIEF COMPLAINT QUOTE
chest pain that started one week ago. EMS gave 162 mg PO Aspirin and 1 Nitro sublingual. alert and in no distress. STEMI code activated

## 2019-09-26 NOTE — ED PROVIDER NOTE - NS ED ROS FT
GEN:  no fever, no chills  NEURO:  no headache, no dizziness  ENT: no sore throat, no runny nose  CV:  + chest pain, + SOB  RESP:  + dyspnea, no cough  GI:  no nausea, no vomiting, no abdominal pain  :  no dysuria, no urinary frequency, no hematuria  MSK:  no joint pain, no edema  SKIN:  + rash, no bruising  HEME: no hematochezia, no melena

## 2019-09-26 NOTE — ED ADULT NURSE NOTE - NSIMPLEMENTINTERV_GEN_ALL_ED
Implemented All Fall with Harm Risk Interventions:  [place] to call system. Call bell, personal items and telephone within reach. Instruct patient to call for assistance. Room bathroom lighting operational. Non-slip footwear when patient is off stretcher. Physically safe environment: no spills, clutter or unnecessary equipment. Stretcher in lowest position, wheels locked, appropriate side rails in place. Provide visual cue, wrist band, yellow gown, etc. Monitor gait and stability. Monitor for mental status changes and reorient to person, place, and time. Review medications for side effects contributing to fall risk. Reinforce activity limits and safety measures with patient and family. Provide visual clues: red socks.

## 2019-09-26 NOTE — CHART NOTE - NSCHARTNOTEFT_GEN_A_CORE
Preliminary Cardiac Catheterization Post-Procedure Report    PRE-OP DIAGNOSIS: NSTEMI    PROCEDURE: Coronary angiogram, PCI    Physician: Dr. Chavez   Assistant: Dr. Mccracken, Dr. Blackburn     ANESTHESIA TYPE:  [  ]General Anesthesia  [ x ] Sedation  [ x ] Local/Regional    ESTIMATED BLOOD LOSS:   < 10 mL    CONDITION  [  ] Critical  [  ] Serious  [  ]Fair  [  x]Good    ACCESS & HEMOSTASIS  [ x ] Right radial  -> D-stat  [  ] Right femoral  [  ] Left radial  [  ] Left femoral       FINDINGS    Coronary circulation: The coronary circulation is right dominant. There was significant 2-vessel coronary artery disease (LAD and circumflex). Left main: Normal. LAD: The vessel was medium sized. Proximal LAD: There was a tubular 95 % stenosis in-stent. The lesion was heavily calcified. There was MAUREEN grade 3 flow through the vessel (brisk flow). This is a likely culprit for the patient's clinical presentation. An intervention was performed. Mid LAD: Angiography showed mild atherosclerosis with no flow limiting lesions. Patent prior stents. Distal LAD: Angiography showed mild atherosclerosis with no flow limiting lesions. 1st diagonal: The vessel was small sized. Angiography showed mild atherosclerosis with no flow limiting lesions. Circumflex: The vessel was medium sized. Proximal circumflex: Angiography showed mild atherosclerosis with no flow limiting lesions. Distal circumflex: There was a tubular 80 % stenosis at the origin of OM1. 1st obtuse marginal: Angiography showed minor luminal irregularities with no flow limiting lesions. 2nd obtuse marginal: There was a tubular 90 % stenosis in the proximal third of the vessel segment. RCA: The vessel was medium sized. Proximal RCA: Angiography showed minor luminal irregularities with no flow limiting lesions. Mid RCA: Angiography showed mild atherosclerosis with no flow limiting lesions. Distal RCA: Angiography showed minor luminal irregularities with no flow limiting lesions. Right PDA: Normal. Right posterolateral segment: Normal.       INTERVENTION/ IMPLANTS: cutting balloon angioplasty and DULCE x 1 to proximal LAD.      POST-OP DIAGNOSIS  There is significant double vessel coronary artery disease (LAD, LCX) s/p successful PCI of proximal LAD.         PLAN OF CARE  CCU monitoring  cont DAPT, statin, BB, ACEi  check ECHO, lipids, HBA1C  Staged PCI of distal LCX/ OM2 during this admission  IV hydration today, monitor Is & Os  check Labs in AM Preliminary Cardiac Catheterization Post-Procedure Report    PRE-OP DIAGNOSIS: NSTEMI    PROCEDURE: Coronary angiogram, PCI    Physician: Dr. Chavez   Assistant: Dr. Mccracken, Dr. Blackburn     ANESTHESIA TYPE:  [  ]General Anesthesia  [ x ] Sedation  [ x ] Local/Regional    ESTIMATED BLOOD LOSS:   < 10 mL    CONDITION  [  ] Critical  [  ] Serious  [  ]Fair  [  x]Good    ACCESS & HEMOSTASIS  [ x ] Right radial  -> D-stat  [  ] Right femoral  [  ] Left radial  [  ] Left femoral       FINDINGS    Coronary circulation: The coronary circulation is right dominant. There was significant 2-vessel coronary artery disease (LAD and circumflex). Left main: Normal. LAD: The vessel was medium sized. Proximal LAD: There was a tubular 95 % stenosis in-stent. The lesion was heavily calcified. There was MAUREEN grade 3 flow through the vessel (brisk flow). This is a likely culprit for the patient's clinical presentation. An intervention was performed. Mid LAD: Angiography showed mild atherosclerosis with no flow limiting lesions. Patent prior stents. Distal LAD: Angiography showed mild atherosclerosis with no flow limiting lesions. 1st diagonal: The vessel was small sized. Angiography showed mild atherosclerosis with no flow limiting lesions. Circumflex: The vessel was medium sized. Proximal circumflex: Angiography showed mild atherosclerosis with no flow limiting lesions. Distal circumflex: There was a tubular 80 % stenosis at the origin of OM1. 1st obtuse marginal: Angiography showed minor luminal irregularities with no flow limiting lesions. 2nd obtuse marginal: There was a tubular 90 % stenosis in the proximal third of the vessel segment. RCA: The vessel was medium sized. Proximal RCA: Angiography showed minor luminal irregularities with no flow limiting lesions. Mid RCA: Angiography showed mild atherosclerosis with no flow limiting lesions. Distal RCA: Angiography showed minor luminal irregularities with no flow limiting lesions. Right PDA: Normal. Right posterolateral segment: Normal.       INTERVENTION/ IMPLANTS: cutting balloon angioplasty and DULCE x 1 to proximal LAD.      POST-OP DIAGNOSIS  There is significant double vessel coronary artery disease (LAD, LCX) s/p successful PCI of proximal LAD.         PLAN OF CARE  CCU monitoring  cont DAPT, statin, BB, ACEi  check ECHO, lipids, HBA1C  Staged PCI of distal LCX/ OM2 during this admission  IV hydration today, monitor Is & Os  check Labs in AM    agree

## 2019-09-26 NOTE — H&P ADULT - NSHPREVIEWOFSYSTEMS_GEN_ALL_CORE
Constitutional:  see HPI  Head:  no headache, dizziness, loss of consciousness  Eyes:  no visual changes; no eye pain, redness, or discharge  ENMT:  no ear pain or discharge; no hearing problems; no mouth or throat sores or lesions; no throat pain  Cardiac: substernal pressure radiating to back  Respiratory: sob  GI: no nausea, vomiting, diarrhea or abdominal pain  :  no dysuria, frequency, or burning with urination; no change in urine output  MS: no joint pain or swelling  Neuro: generalized weakness  Skin:  rash

## 2019-09-26 NOTE — CONSULT NOTE ADULT - ASSESSMENT
Assessment:  Unstable angina    Plan:  give aspirin  c/w statin  NPO for possible cath today Assessment:  NSTEMI    Plan:  give aspirin  c/w statin  NPO for possible cath today Assessment:  NSTEMI    Plan:  give aspirin  c/w statin  NPO for possible cath today    agree

## 2019-09-26 NOTE — ED PROVIDER NOTE - CLINICAL SUMMARY MEDICAL DECISION MAKING FREE TEXT BOX
84yoM with h/o HTN, HLD, CAD s/p stents, presents with chest pressure worsened with walking as well as SOB with walking for the past few days. Associates this with allergic reaction he had to contrast a few days ago. On exam, afebrile, hemodynamically stable, saturating well, NAD, well appearing, no increased WOB, head NCAT, EOMI grossly, anicteric, MMM, no JVD, RRR, nml S1/S2, no m/r/g, lungs CTAB, no w/r/r, abd soft, NT, ND, nml BS, no rebound or guarding, AAO, CN's 3-12 grossly intact, CRUZ spontaneously, no leg cyanosis or edema, skin warm, well perfused, no rashes or hives. Character low suspicion for PE and no associated signs of DVT/hypoxia/tachycardia. Character low suspicion for dissection and no murmur or pulse asymmetry. No e/o fluid overload, PTX, PNA. Concern for CP/SOB and CAD hx as well as mild aVR-V1 elevation for ACS. Per cardiology low suspicion for acute STEMI, and also pt appearance and context of lower suspicion for active STEMI as well. Per cardiology will be going for cath later today. Given ASA. Patient well appearing, hemodynamically stable. Admitted to CCU for further monitoring, w/u, and care.

## 2019-09-26 NOTE — ED PROVIDER NOTE - PROGRESS NOTE DETAILS
STEMI code activated, cardiac fellow Dr. Morales at bedside. Cancelled STEMI code but will take pt to cath later today. Pt already took  mg at home, given another 162 mg here. Ekg shows ST elevation in RVR and V1. STEMI code activated, cardiac fellow Dr. Morales at bedside. Cancelled STEMI code but will take pt to cath later today. Pt already took  mg at home, given another 162 mg here. Lab called positive troponin 0.78, cardiology informed, will discuss with Dr. Agustin. Lab called positive troponin 0.78, cardiology informed, will discuss with Dr. Chavez

## 2019-09-26 NOTE — PATIENT PROFILE ADULT - TOBACCO USE
Bleeding that does not stop/Unable to Urinate/Pain not relieved by Medications Pain not relieved by Medications/Bleeding that does not stop/Unable to Urinate/Fever greater than 101 Never smoker

## 2019-09-26 NOTE — CONSULT NOTE ADULT - SUBJECTIVE AND OBJECTIVE BOX
Date of Admission: 09-26-19    CHIEF COMPLAINT: Patient is a 84y old  Male who presents with a chief complaint of shortness of breath, chest pressure    HPI: 85 y/o M with PMH of CAD s/p PCI+stents, active lyme disease, hypothyroidism, left carotid stenosis, neuropathy, recent allergic reaction to dye, possible pelvic mass currently work up presented with worsening chest pressure associated with SOB. Pt has been having intermittent symptoms for past few days with varying chest pressure from scale of 3-8.      PAST MEDICAL & SURGICAL HISTORY:  CAD s/p PCI+Stents  Lyme disease  Hypothyroidism  Left carotid stenosis  Neuorpathy    Knee surgery    FAMILY HISTORY:  [x] no pertinent family history of premature cardiovascular disease in first degree relatives.    SOCIAL HISTORY:    [x] Non-smoker  [x] No alcohol use  [x] No illicit drug use    Allergies: iodinated radiocontrast agents (Hives)    REVIEW OF SYSTEMS:  CONSTITUTIONAL: No fever, weight loss. (+) weakness and fatigue.  CARDIOLOGY: (+) Chest pressure, VAZQUEZ. No syncopal episodes.   RESPIRATORY: (+) SOB. No cough, wheezing.   NEUROLOGICAL: No weakness, no focal deficits to report.  GI: No BRBPR, no N,V,diarrhea.    PSYCHIATRY: Normal mood and affect.  HEENT: No nasal discharge, no ecchymosis  SKIN: No ecchymosis, no breakdown. Diffuse rash.  MUSCULOSKELETAL: Full range of motion x4.   EXTREM: No leg swelling or erythema.    PHYSICAL EXAM:  T(C): 36.7 (09-26-19 @ 10:59), Max: 36.7 (09-26-19 @ 10:59)  HR: 85 (09-26-19 @ 10:59) (82 - 828)  BP: 140/78 (09-26-19 @ 10:59) (116/87 - 140/78)  RR: 18 (09-26-19 @ 10:59) (16 - 18)  SpO2: 99% (09-26-19 @ 10:59) (96% - 99%)  Wt(kg): --  I&O's Summary      General Appearance: Well appearing, normal for age and gender. 	  Neck: Normal JVP, no bruit.   Eyes: No xanthomalasia, Extra Ocular muscles intact.   Cardiovascular: Regular rate and rhythm S1 S2, No JVD, No murmurs.  Respiratory: Lungs clear to auscultation. No wheezes, rales or rhonchi.  Psychiatry: Alert and oriented x 3, Mood & affect appropriate  Gastrointestinal:  Soft, Non-tender  Skin/Integumen: No ecchymoses, No cyanosis. Diffuse erythematous rash throughout the body.  Neurologic: Non-focal deficits.  Musculoskeletal/ extremities: Normal range of motion, No clubbing, cyanosis or edema  Vascular: Peripheral pulses palpable 2+ bilaterally    LABS:	 	                        16.0   13.74 )-----------( 201      ( 26 Sep 2019 10:44 )             46.2             TELEMETRY EVENTS: 	    ECG:  	  RADIOLOGY:  OTHER: 	      	  Home Medications:    aspirin  crestor   lisinopril  armour thyroid    MEDICATIONS  (STANDING):    MEDICATIONS  (PRN):  nitroglycerin     SubLingual 0.4 milliGRAM(s) SubLingual every 5 minutes PRN Chest Pain Date of Admission: 09-26-19    CHIEF COMPLAINT: Patient is a 84y old  Male who presents with a chief complaint of shortness of breath, chest pressure    HPI: 83 y/o M with PMH of CAD s/p PCI+stents, active lyme disease, hypothyroidism, multiple TIAs, left carotid stenosis, neuropathy, recent allergic reaction to dye, possible pelvic mass currently work up presented with worsening chest pressure associated with SOB. Pt has been having intermittent symptoms for past few days with varying chest pressure from scale of 3-8.      PAST MEDICAL & SURGICAL HISTORY:  CAD s/p PCI+Stents  Lyme disease  Hypothyroidism  TIAs  Left carotid stenosis  Neuropathy    Knee surgery    FAMILY HISTORY:  [x] no pertinent family history of premature cardiovascular disease in first degree relatives.    SOCIAL HISTORY:    [x] Non-smoker  [x] No alcohol use  [x] No illicit drug use    Allergies: iodinated radiocontrast agents (Hives)    REVIEW OF SYSTEMS:  CONSTITUTIONAL: No fever, weight loss. (+) weakness and fatigue.  CARDIOLOGY: (+) Chest pressure, VAZQUEZ. No syncopal episodes.   RESPIRATORY: (+) SOB. No cough, wheezing.   NEUROLOGICAL: No weakness, no focal deficits to report.  GI: No BRBPR, no N,V,diarrhea.    PSYCHIATRY: Normal mood and affect.  HEENT: No nasal discharge, no ecchymosis  SKIN: No ecchymosis, no breakdown. Diffuse rash.  MUSCULOSKELETAL: Full range of motion x4.   EXTREM: No leg swelling or erythema.    PHYSICAL EXAM:  T(C): 36.7 (09-26-19 @ 10:59), Max: 36.7 (09-26-19 @ 10:59)  HR: 85 (09-26-19 @ 10:59) (82 - 828)  BP: 140/78 (09-26-19 @ 10:59) (116/87 - 140/78)  RR: 18 (09-26-19 @ 10:59) (16 - 18)  SpO2: 99% (09-26-19 @ 10:59) (96% - 99%)  Wt(kg): --  I&O's Summary      General Appearance: Well appearing, normal for age and gender. 	  Neck: Normal JVP, no bruit.   Eyes: No xanthomalasia, Extra Ocular muscles intact.   Cardiovascular: Regular rate and rhythm S1 S2, No JVD, No murmurs.  Respiratory: Lungs clear to auscultation. No wheezes, rales or rhonchi.  Psychiatry: Alert and oriented x 3, Mood & affect appropriate  Gastrointestinal:  Soft, Non-tender  Skin/Integumen: No ecchymoses, No cyanosis. Diffuse erythematous rash throughout the body.  Neurologic: Non-focal deficits.  Musculoskeletal/ extremities: Normal range of motion, No clubbing, cyanosis or edema  Vascular: Peripheral pulses palpable 2+ bilaterally    LABS:	 	                        16.0   13.74 )-----------( 201      ( 26 Sep 2019 10:44 )             46.2             TELEMETRY EVENTS: 	    ECG:  	  RADIOLOGY:  OTHER: 	      	  Home Medications:    aspirin  crestor   lisinopril  armour thyroid    MEDICATIONS  (STANDING):    MEDICATIONS  (PRN):  nitroglycerin     SubLingual 0.4 milliGRAM(s) SubLingual every 5 minutes PRN Chest Pain

## 2019-09-27 NOTE — CHART NOTE - NSCHARTNOTEFT_GEN_A_CORE
5 yo M with hx of CAD s/p PCIx2, lyme disease, hypothyroidism, multiple TIAs, L carotid stenosis, neuropathy presenting with NSTEMI. Cath on 9/26 showed significant 2 vessel coronary artery disease (LAD, LCX) s/p successful PCI of proximal LAD. Will require Staged PCI, 2nd cath likely on tuesday. Will need contrast allergy protocol prep, see below.    #NSTEMI  - cath 9/26 showed significant 2 vessel coronary artery disease (LAD, LCX) s/p successful PCI of proximal LAD  - staged PCI of distal LCX/OM2 later this admission  - continue DAPT, statin, BB, ACEi  - f/u echo results  - f/u lipids, HbA1c results  - no need for CBC, only need to monitor Cr as per cardiology    #Rash  - IV 4 decadron 9/26  - benadryl as needed  - improving since Tuesday    #Hypotension  - reduced lisinopril from 20 to 10 mg    Plan  - continue DAPT, statin, BB, aCEi  - f/u HbA1c, lipids, echo  - No need to draw CBC, only CMP for Cr  -  IV Contrast Allergy: use Standard Oral Premedication Regiment ( Total 3 doses ) prior to procedure 13 hr as per cardiology    Dispo: tele to wait for staged PCI 5 yo M with hx of CAD s/p PCIx2, lyme disease, hypothyroidism, multiple TIAs, L carotid stenosis, neuropathy presenting with NSTEMI. Cath on 9/26 showed significant 2 vessel coronary artery disease (LAD, LCX) s/p successful PCI of proximal LAD. Will require Staged PCI, 2nd cath likely on tuesday. Will need contrast allergy protocol prep, see below.    #NSTEMI  - cath 9/26 showed significant 2 vessel coronary artery disease (LAD, LCX) s/p successful PCI of proximal LAD  - staged PCI of distal LCX/OM2 later this admission  - continue DAPT, statin, BB, ACEi  - f/u echo results  - f/u lipids, HbA1c results  - no need for CBC, only need to monitor Cr as per cardiology  - echo (9/27): EF 35-40%    #Rash  - IV 4 decadron 9/26  - benadryl as needed  - improving since Tuesday    #Hypotension  - reduced lisinopril from 20 to 10 mg    Plan  - continue DAPT, statin, BB, aCEi  - f/u HbA1c, lipids, echo  - No need to draw CBC, only CMP for Cr  -  IV Contrast Allergy: use Standard Oral Premedication Regiment ( Total 3 doses ) prior to procedure 13 hr as per cardiology    Dispo: tele to wait for staged PCI 85 yo M with hx of CAD s/p PCIx2, lyme disease, hypothyroidism, multiple TIAs, L carotid stenosis, neuropathy presenting with NSTEMI. Cath on 9/26 showed significant 2 vessel coronary artery disease (LAD, LCX) s/p successful PCI of proximal LAD. Will require Staged PCI, 2nd cath likely on tuesday. Will need contrast allergy protocol prep, see below.    #NSTEMI  - cath 9/26 showed significant 2 vessel coronary artery disease (LAD, LCX) s/p successful PCI of proximal LAD  - staged PCI of distal LCX/OM2 later this admission  - continue DAPT, statin, BB, ACEi  - f/u echo results  - f/u lipids, HbA1c results  - no need for CBC, only need to monitor Cr as per cardiology  - echo (9/27): EF 35-40%    #Rash  - IV 4 decadron 9/26  - benadryl as needed  - improving since Tuesday    #Hypotension  - reduced lisinopril from 20 to 10 mg    Plan  - continue DAPT, statin, BB, aCEi  - f/u HbA1c, lipids, echo  - No need to draw CBC, only CMP for Cr  -  IV Contrast Allergy: use Standard Oral Premedication Regiment ( Total 3 doses ) prior to procedure 13 hr as per cardiology    Dispo: tele to wait for staged PCI 83 yo M with hx of CAD s/p PCIx2, lyme disease, hypothyroidism, multiple TIAs, L carotid stenosis, neuropathy presenting with NSTEMI. Cath on 9/26 showed significant 2 vessel coronary artery disease (LAD, LCX) s/p successful PCI of proximal LAD. Will require Staged PCI, 2nd cath likely on tuesday. Will need contrast allergy protocol prep, see below.  Patient had a sustained run of v-tach in which he was treated with a push of amiodarone 150mg IV pushed over 10minutes and then subsequent infusions of 1mg/hour over 6 hours of amiodarone and 18 hours of .5mg/hour of amiodarone.     #NSTEMI  - cath 9/26 showed significant 2 vessel coronary artery disease (LAD, LCX) s/p successful PCI of proximal LAD  - staged PCI of distal LCX/OM2 later this admission  - continue DAPT, statin, BB, ACEi  - f/u echo results  - f/u lipids, HbA1c results  - no need for CBC, only need to monitor Cr as per cardiology  - echo (9/27): EF 35-40%    #Rash  - IV 4 decadron 9/26  - benadryl as needed  - improving since Tuesday    #Hypotension  - reduced lisinopril from 20 to 10 mg    Plan  - continue DAPT, statin, BB, aCEi  - f/u HbA1c, lipids, echo  - No need to draw CBC, only CMP for Cr  -  IV Contrast Allergy: use Standard Oral Premedication Regiment ( Total 3 doses ) prior to procedure 13 hr as per cardiology    Dispo: tele to wait for staged PCI

## 2019-09-27 NOTE — PROGRESS NOTE ADULT - SUBJECTIVE AND OBJECTIVE BOX
PATIENT:  LAKSHMI JUDD  [id number]    CHIEF COMPLAINT:  Patient is a 84y old  Male who presents with a chief complaint of Code STEMI (26 Sep 2019 11:15)      INTERVAL HISTORYOVERNIGHT EVENTS:  Several beats of vtach non sustained. BP dropped <60 MAP, no chest pain or SOB.        MEDICATIONS:  MEDICATIONS  (STANDING):  aspirin  chewable 81 milliGRAM(s) Oral daily  atorvastatin 80 milliGRAM(s) Oral at bedtime  clopidogrel Tablet 75 milliGRAM(s) Oral daily  enoxaparin Injectable 40 milliGRAM(s) SubCutaneous daily  lisinopril 10 milliGRAM(s) Oral daily  metoprolol tartrate 25 milliGRAM(s) Oral two times a day  sodium chloride 0.9%. 1000 milliLiter(s) (150 mL/Hr) IV Continuous <Continuous>    MEDICATIONS  (PRN):  nitroglycerin     SubLingual 0.4 milliGRAM(s) SubLingual every 5 minutes PRN Chest Pain      ALLERGIES:  Allergies    iodinated radiocontrast agents (Hives)    Intolerances        OBJECTIVE:  ICU Vital Signs Last 24 Hrs  T(C): 36.7 (27 Sep 2019 04:00), Max: 37.1 (26 Sep 2019 20:00)  T(F): 98 (27 Sep 2019 04:00), Max: 98.7 (26 Sep 2019 20:00)  HR: 62 (27 Sep 2019 08:00) (62 - 828)  BP: 94/51 (27 Sep 2019 08:00) (77/41 - 140/78)  BP(mean): 62 (27 Sep 2019 08:00) (51 - 95)  ABP: --  ABP(mean): --  RR: 22 (27 Sep 2019 08:00) (16 - 28)  SpO2: 97% (27 Sep 2019 08:00) (95% - 99%)      Adult Advanced Hemodynamics Last 24 Hrs  CVP(mm Hg): --  CVP(cm H2O): --  CO: --  CI: --  PA: --  PA(mean): --  PCWP: --  SVR: --  SVRI: --  PVR: --  PVRI: --  CAPILLARY BLOOD GLUCOSE        CAPILLARY BLOOD GLUCOSE        I&O's Summary    26 Sep 2019 07:01  -  27 Sep 2019 07:00  --------------------------------------------------------  IN: 1550 mL / OUT: 500 mL / NET: 1050 mL      Daily     Daily Weight in k (27 Sep 2019 06:00)    PHYSICAL EXAMINATION:  General: WN/WD NAD  HEENT: PERRLA, EOMI, moist mucous membranes  Neurology: A&Ox3  Respiratory: CTA B/L, normal respiratory effort, no wheezes, crackles, rales  CV: RRR, S1S2, no murmurs, rubs or gallops  Abdominal: Soft, NT, ND  Extremities: No edema  Incisions:   Tubes:    LABS:                          14.2   8.33  )-----------( 157      ( 27 Sep 2019 04:45 )             42.1         139  |  105  |  22<H>  ----------------------------<  133<H>  4.8   |  23  |  0.7    Ca    8.3<L>      27 Sep 2019 04:45  Mg     2.1         TPro  5.3<L>  /  Alb  3.2<L>  /  TBili  0.6  /  DBili  x   /  AST  155<H>  /  ALT  53<H>  /  AlkPhos  30      LIVER FUNCTIONS - ( 27 Sep 2019 04:45 )  Alb: 3.2 g/dL / Pro: 5.3 g/dL / ALK PHOS: 30 U/L / ALT: 53 U/L / AST: 155 U/L / GGT: x           PT/INR - ( 26 Sep 2019 10:44 )   PT: 11.60 sec;   INR: 1.01 ratio           Troponin T, Serum: 2.05 ng/mL ( @ 04:45)  Troponin T, Serum: 0.78 ng/mL ( @ 10:44)    CARDIAC MARKERS ( 27 Sep 2019 04:45 )  x     / 2.05 ng/mL / x     / x     / x      CARDIAC MARKERS ( 26 Sep 2019 10:44 )  x     / 0.78 ng/mL / x     / x     / x              TELEMETRY:     EKG:     IMAGING: PATIENT:  LAKSHMI JUDD  [id number]    CHIEF COMPLAINT:  Patient is a 84y old  Male who presents with a chief complaint of Code STEMI (26 Sep 2019 11:15)      INTERVAL HISTORYOVERNIGHT EVENTS:  Several beats of vtach non sustained. BP dropped <60 MAP, no chest pain or SOB.        MEDICATIONS:  MEDICATIONS  (STANDING):  aspirin  chewable 81 milliGRAM(s) Oral daily  atorvastatin 80 milliGRAM(s) Oral at bedtime  clopidogrel Tablet 75 milliGRAM(s) Oral daily  enoxaparin Injectable 40 milliGRAM(s) SubCutaneous daily  lisinopril 10 milliGRAM(s) Oral daily  metoprolol tartrate 25 milliGRAM(s) Oral two times a day  sodium chloride 0.9%. 1000 milliLiter(s) (150 mL/Hr) IV Continuous <Continuous>    MEDICATIONS  (PRN):  nitroglycerin     SubLingual 0.4 milliGRAM(s) SubLingual every 5 minutes PRN Chest Pain      ALLERGIES:  Allergies    iodinated radiocontrast agents (Hives)    Intolerances        OBJECTIVE:  ICU Vital Signs Last 24 Hrs  T(C): 36.7 (27 Sep 2019 04:00), Max: 37.1 (26 Sep 2019 20:00)  T(F): 98 (27 Sep 2019 04:00), Max: 98.7 (26 Sep 2019 20:00)  HR: 62 (27 Sep 2019 08:00) (62 - 828)  BP: 94/51 (27 Sep 2019 08:00) (77/41 - 140/78)  BP(mean): 62 (27 Sep 2019 08:00) (51 - 95)  ABP: --  ABP(mean): --  RR: 22 (27 Sep 2019 08:00) (16 - 28)  SpO2: 97% (27 Sep 2019 08:00) (95% - 99%)      Adult Advanced Hemodynamics Last 24 Hrs  CVP(mm Hg): --  CVP(cm H2O): --  CO: --  CI: --  PA: --  PA(mean): --  PCWP: --  SVR: --  SVRI: --  PVR: --  PVRI: --  CAPILLARY BLOOD GLUCOSE        CAPILLARY BLOOD GLUCOSE        I&O's Summary    26 Sep 2019 07:01  -  27 Sep 2019 07:00  --------------------------------------------------------  IN: 1550 mL / OUT: 500 mL / NET: 1050 mL      Daily     Daily Weight in k (27 Sep 2019 06:00)    PHYSICAL EXAMINATION:  General: WN/WD NAD  HEENT: PERRLA, EOMI, moist mucous membranes  Neurology: A&Ox3  Respiratory: CTA B/L, normal respiratory effort, no wheezes, crackles, rales  CV: RRR, S1S2, no murmurs, rubs or gallops  Abdominal: Soft, NT, ND  Extremities: No edema, rash on R upper extremity and b/l upper thigh      LABS:                          14.2   8.33  )-----------( 157      ( 27 Sep 2019 04:45 )             42.1         139  |  105  |  22<H>  ----------------------------<  133<H>  4.8   |  23  |  0.7    Ca    8.3<L>      27 Sep 2019 04:45  Mg     2.1         TPro  5.3<L>  /  Alb  3.2<L>  /  TBili  0.6  /  DBili  x   /  AST  155<H>  /  ALT  53<H>  /  AlkPhos  30      LIVER FUNCTIONS - ( 27 Sep 2019 04:45 )  Alb: 3.2 g/dL / Pro: 5.3 g/dL / ALK PHOS: 30 U/L / ALT: 53 U/L / AST: 155 U/L / GGT: x           PT/INR - ( 26 Sep 2019 10:44 )   PT: 11.60 sec;   INR: 1.01 ratio           Troponin T, Serum: 2.05 ng/mL ( @ 04:45)  Troponin T, Serum: 0.78 ng/mL ( @ 10:44)    CARDIAC MARKERS ( 27 Sep 2019 04:45 )  x     / 2.05 ng/mL / x     / x     / x      CARDIAC MARKERS ( 26 Sep 2019 10:44 )  x     / 0.78 ng/mL / x     / x     / x              TELEMETRY:     EKG:     IMAGING:

## 2019-09-27 NOTE — PROGRESS NOTE ADULT - SUBJECTIVE AND OBJECTIVE BOX
Cardiology Follow up    LAKSHMI JUDD   84y Male  PAST MEDICAL & SURGICAL HISTORY:  Neuropathy  TIA (transient ischemic attack)  Left carotid stenosis  Lyme disease  Hypothyroidism  Coronary artery disease       HPI:  83 yo M with hx of CAD s/p PCIx2, lyme disease, hypothyroidism, multiple TIAs, L carotid stenosis, neuropathy who presents with gradual onset of intermittent, midsternal, moderate chest tightness radiating to back and associated sob that started yesterday worse on exertion today. Pt received  and sublingual with no improvement.    Pt had a CT abd w/ IV contrast on Monday for workup of kidney mass and developed hives that have responded to steroids and benadryl. No prior hx of allergic reaction to IV contrast. Otherwise no fever, chills, cough, abd pain, leg pain/swelling. (26 Sep 2019 18:51)    Allergies    iodinated radiocontrast agents (Hives)    Patient without complaints. Pt ambulated without issues/symptoms  Denies CP, SOB, palpitations, or dizziness  NSVTACK and PVC's noted on telemetry overnight    Vital Signs Last 24 Hrs  T(C): 36.7 (27 Sep 2019 04:00), Max: 37.1 (26 Sep 2019 20:00)  T(F): 98 (27 Sep 2019 04:00), Max: 98.7 (26 Sep 2019 20:00)  HR: 62 (27 Sep 2019 08:00) (62 - 828)  BP: 94/51 (27 Sep 2019 08:00) (77/41 - 140/78)  BP(mean): 62 (27 Sep 2019 08:00) (51 - 95)  RR: 22 (27 Sep 2019 08:00) (16 - 28)  SpO2: 97% (27 Sep 2019 08:00) (95% - 99%)    MEDICATIONS  (STANDING):  aspirin  chewable 81 milliGRAM(s) Oral daily  atorvastatin 80 milliGRAM(s) Oral at bedtime  clopidogrel Tablet 75 milliGRAM(s) Oral daily  enoxaparin Injectable 40 milliGRAM(s) SubCutaneous daily  lisinopril 10 milliGRAM(s) Oral daily  metoprolol tartrate 25 milliGRAM(s) Oral two times a day  sodium chloride 0.9%. 1000 milliLiter(s) (150 mL/Hr) IV Continuous <Continuous>    MEDICATIONS  (PRN):  nitroglycerin     SubLingual 0.4 milliGRAM(s) SubLingual every 5 minutes PRN Chest Pain    REVIEW OF SYSTEMS:          CONSTITUTIONAL: No weakness, fevers or chills          EYES/ENT: No visual changes;  No vertigo or throat pain           NECK: No pain or stiffness          RESPIRATORY: No cough, wheezing, hemoptysis          CARDIOVASCULAR: no pain, no VAZQUEZ, no palpitations           GASTROINTESTINAL: No abdominal or epigastric pain. No nausea, vomiting, or hematemesis;           GENITOURINARY: No dysuria, frequency or hematuria          NEUROLOGICAL: No numbness or weakness          SKIN: No itching, rashes    PHYSICAL EXAM:           CONSTITUTIONAL: Well-developed; well-nourished; in no acute distress  	SKIN: warm, dry  	HEAD: Normocephalic; atraumatic  	EYES: PERRL.  	ENT: No nasal discharge, airway clear, mucous membranes moist  	NECK: Supple; non tender.  	CARD: +S1, +S2, no murmurs, gallops, or rubs. Regular rate and rhythm    	RESP: No wheezes, rales or rhonchi. CTA B/L  	ABD: soft ntnd, + BS x 4 quadrants  	EXT: moves all extremities,  no clubbing, cyanosis or edema  	NEURO: Alert and oriented x3, no focal deficits          PSYCH: Cooperative, appropriate          VASCULAR:  +2 Rad / +2PTs / + 2DPs          EXTREMITY:              Right Radial: Dressing removed, access site soft, no hematoma, no pain, no signs of infection            ECG:   < from: 12 Lead ECG (09.27.19 @ 07:24) >  Ventricular Rate 67 BPM    Atrial Rate 67 BPM    P-R Interval 126 ms    QRS Duration 88 ms    Q-T Interval 390 ms    QTC Calculation(Bezet) 412 ms    P Axis 65 degrees    R Axis -6 degrees    T Axis 36 degrees    Diagnosis Line Normal sinus rhythm  Low voltage QRS  Inferior infarct , age undetermined  Cannot rule out Anteroseptal infarct , age undetermined  Abnormal ECG    Confirmed by FATOUMATA FRANCO MD ([id number]) on 9/27/2019 9:08:46 AM                                                                                 2D ECHO:  P    LABS:                        14.2   8.33  )-----------( 157      ( 27 Sep 2019 04:45 )             42.1     09-27    139  |  105  |  22<H>  ----------------------------<  133<H>  4.8   |  23  |  0.7    Ca    8.3<L>      27 Sep 2019 04:45  Mg     2.1     09-26    TPro  5.3<L>  /  Alb  3.2<L>  /  TBili  0.6  /  DBili  x   /  AST  155<H>  /  ALT  53<H>  /  AlkPhos  30  09-27    CARDIAC MARKERS ( 27 Sep 2019 04:45 )  x     / 2.05 ng/mL / x     / x     / x      CARDIAC MARKERS ( 26 Sep 2019 10:44 )  x     / 0.78 ng/mL / x     / x     / x          Magnesium, Serum: 2.1 mg/dL [1.8 - 2.4] (09-26-19 @ 10:44)  LIVER FUNCTIONS - ( 27 Sep 2019 04:45 )  Alb: 3.2 g/dL / Pro: 5.3 g/dL / ALK PHOS: 30 U/L / ALT: 53 U/L / AST: 155 U/L / GGT: x             A/P:  I discussed the case with Cardiologist Dr. Chavez and recommend the following:    s/p NSTEMI, PCI: cutting balloon angioplasty and DULCE x 1 to proximal LAD                     Decrease Lisinopril to 10 mg PO Daily due to low B/P                   F/U 2D Echo results                   Daily ECG                   f/u A1C                   OOB, Ambulate                   Monitor access site                    Keep K = 4, Mg = 2                   Monitor LFT's                   Continue DAPT ( Aspirin 81 mg daily and Plavix 75 mg daily ), B-Blocker, Statin Therapy, Ace                   Patient given 30 day supply of ( Aspirin 81 mg daily and Plavix 75 mg daily ) to take at home                   Patient agreeing to take DAPT for at least one year or as directed by cardiologist                    Pt given instructions on importance of taking antiplatelet medication or risk acute stent thrombosis/death                   Post cath instructions, access site care and activity restrictions reviewed with patient                     Discussed with patient to return to hospital if experience chest pain, shortness breath, dizziness and site bleeding                   Aggressive risk factor modification, diet counseling, smoking cessation discussed with patient                       Can transfer patient to TELE Cardiology Follow up    LAKSHMI JUDD   84y Male  PAST MEDICAL & SURGICAL HISTORY:  Neuropathy  TIA (transient ischemic attack)  Left carotid stenosis  Lyme disease  Hypothyroidism  Coronary artery disease       HPI:  85 yo M with hx of CAD s/p PCIx2, lyme disease, hypothyroidism, multiple TIAs, L carotid stenosis, neuropathy who presents with gradual onset of intermittent, midsternal, moderate chest tightness radiating to back and associated sob that started yesterday worse on exertion today. Pt received  and sublingual with no improvement.    Pt had a CT abd w/ IV contrast on Monday for workup of kidney mass and developed hives that have responded to steroids and benadryl. No prior hx of allergic reaction to IV contrast. Otherwise no fever, chills, cough, abd pain, leg pain/swelling. (26 Sep 2019 18:51)    Allergies    iodinated radiocontrast agents (Hives)    Patient without complaints. Pt ambulated without issues/symptoms  Denies CP, SOB, palpitations, or dizziness  NSVTACK and PVC's noted on telemetry overnight    Vital Signs Last 24 Hrs  T(C): 36.7 (27 Sep 2019 04:00), Max: 37.1 (26 Sep 2019 20:00)  T(F): 98 (27 Sep 2019 04:00), Max: 98.7 (26 Sep 2019 20:00)  HR: 62 (27 Sep 2019 08:00) (62 - 828)  BP: 94/51 (27 Sep 2019 08:00) (77/41 - 140/78)  BP(mean): 62 (27 Sep 2019 08:00) (51 - 95)  RR: 22 (27 Sep 2019 08:00) (16 - 28)  SpO2: 97% (27 Sep 2019 08:00) (95% - 99%)    MEDICATIONS  (STANDING):  aspirin  chewable 81 milliGRAM(s) Oral daily  atorvastatin 80 milliGRAM(s) Oral at bedtime  clopidogrel Tablet 75 milliGRAM(s) Oral daily  enoxaparin Injectable 40 milliGRAM(s) SubCutaneous daily  lisinopril 10 milliGRAM(s) Oral daily  metoprolol tartrate 25 milliGRAM(s) Oral two times a day  sodium chloride 0.9%. 1000 milliLiter(s) (150 mL/Hr) IV Continuous <Continuous>    MEDICATIONS  (PRN):  nitroglycerin     SubLingual 0.4 milliGRAM(s) SubLingual every 5 minutes PRN Chest Pain    REVIEW OF SYSTEMS:          CONSTITUTIONAL: No weakness, fevers or chills          EYES/ENT: No visual changes;  No vertigo or throat pain           NECK: No pain or stiffness          RESPIRATORY: No cough, wheezing, hemoptysis          CARDIOVASCULAR: no pain, no VAZQUEZ, no palpitations           GASTROINTESTINAL: No abdominal or epigastric pain. No nausea, vomiting, or hematemesis;           GENITOURINARY: No dysuria, frequency or hematuria          NEUROLOGICAL: No numbness or weakness          SKIN: No itching, rashes    PHYSICAL EXAM:           CONSTITUTIONAL: Well-developed; well-nourished; in no acute distress  	SKIN: warm, dry  	HEAD: Normocephalic; atraumatic  	EYES: PERRL.  	ENT: No nasal discharge, airway clear, mucous membranes moist  	NECK: Supple; non tender.  	CARD: +S1, +S2, no murmurs, gallops, or rubs. Regular rate and rhythm    	RESP: No wheezes, rales or rhonchi. CTA B/L  	ABD: soft ntnd, + BS x 4 quadrants  	EXT: moves all extremities,  no clubbing, cyanosis or edema  	NEURO: Alert and oriented x3, no focal deficits          PSYCH: Cooperative, appropriate          VASCULAR:  +2 Rad / +2PTs / + 2DPs          EXTREMITY:              Right Radial: Dressing removed, access site soft, no hematoma, no pain, no signs of infection            ECG:   < from: 12 Lead ECG (09.27.19 @ 07:24) >  Ventricular Rate 67 BPM    Atrial Rate 67 BPM    P-R Interval 126 ms    QRS Duration 88 ms    Q-T Interval 390 ms    QTC Calculation(Bezet) 412 ms    P Axis 65 degrees    R Axis -6 degrees    T Axis 36 degrees    Diagnosis Line Normal sinus rhythm  Low voltage QRS  Inferior infarct , age undetermined  Cannot rule out Anteroseptal infarct , age undetermined  Abnormal ECG    Confirmed by FATOUMATA FRANCO MD [id number]) on 9/27/2019 9:08:46 AM                                                                                 2D ECHO:  P    LABS:                        14.2   8.33  )-----------( 157      ( 27 Sep 2019 04:45 )             42.1     09-27    139  |  105  |  22<H>  ----------------------------<  133<H>  4.8   |  23  |  0.7    Ca    8.3<L>      27 Sep 2019 04:45  Mg     2.1     09-26    TPro  5.3<L>  /  Alb  3.2<L>  /  TBili  0.6  /  DBili  x   /  AST  155<H>  /  ALT  53<H>  /  AlkPhos  30  09-27    CARDIAC MARKERS ( 27 Sep 2019 04:45 )  x     / 2.05 ng/mL / x     / x     / x      CARDIAC MARKERS ( 26 Sep 2019 10:44 )  x     / 0.78 ng/mL / x     / x     / x          Magnesium, Serum: 2.1 mg/dL [1.8 - 2.4] (09-26-19 @ 10:44)  LIVER FUNCTIONS - ( 27 Sep 2019 04:45 )  Alb: 3.2 g/dL / Pro: 5.3 g/dL / ALK PHOS: 30 U/L / ALT: 53 U/L / AST: 155 U/L / GGT: x             A/P:  I discussed the case with Cardiologist Dr. Chavez and recommend the following:    s/p NSTEMI, PCI: cutting balloon angioplasty and DULCE x 1 to proximal LAD                     Decrease Lisinopril to 10 mg PO Daily due to low B/P                   F/U 2D Echo results                   Daily ECG                   f/u A1C                   OOB, Ambulate                   Monitor access site                    Keep K = 4, Mg = 2                   Monitor LFT's                   Continue DAPT ( Aspirin 81 mg daily and Plavix 75 mg daily ), B-Blocker, Statin Therapy, Ace                   Patient given 30 day supply of ( Aspirin 81 mg daily and Plavix 75 mg daily ) to take at home                   Patient agreeing to take DAPT for at least one year or as directed by cardiologist                    Pt given instructions on importance of taking antiplatelet medication or risk acute stent thrombosis/death                   Post cath instructions, access site care and activity restrictions reviewed with patient                     Discussed with patient to return to hospital if experience chest pain, shortness breath, dizziness and site bleeding                   Aggressive risk factor modification, diet counseling, smoking cessation discussed with patient                       Can transfer patient to TELE                    Staged PCI of distal LCX/ OM2 during this admission Cardiology Follow up    LAKSHMI JUDD   84y Male  PAST MEDICAL & SURGICAL HISTORY:  Neuropathy  TIA (transient ischemic attack)  Left carotid stenosis  Lyme disease  Hypothyroidism  Coronary artery disease       HPI:  85 yo M with hx of CAD s/p PCIx2, lyme disease, hypothyroidism, multiple TIAs, L carotid stenosis, neuropathy who presents with gradual onset of intermittent, midsternal, moderate chest tightness radiating to back and associated sob that started yesterday worse on exertion today. Pt received  and sublingual with no improvement.    Pt had a CT abd w/ IV contrast on Monday for workup of kidney mass and developed hives that have responded to steroids and benadryl. No prior hx of allergic reaction to IV contrast. Otherwise no fever, chills, cough, abd pain, leg pain/swelling. (26 Sep 2019 18:51)    Allergies    iodinated radiocontrast agents (Hives)    Patient without complaints. Pt ambulated without issues/symptoms  Denies CP, SOB, palpitations, or dizziness  NSVTACK and PVC's noted on telemetry overnight    Vital Signs Last 24 Hrs  T(C): 36.7 (27 Sep 2019 04:00), Max: 37.1 (26 Sep 2019 20:00)  T(F): 98 (27 Sep 2019 04:00), Max: 98.7 (26 Sep 2019 20:00)  HR: 62 (27 Sep 2019 08:00) (62 - 828)  BP: 94/51 (27 Sep 2019 08:00) (77/41 - 140/78)  BP(mean): 62 (27 Sep 2019 08:00) (51 - 95)  RR: 22 (27 Sep 2019 08:00) (16 - 28)  SpO2: 97% (27 Sep 2019 08:00) (95% - 99%)    MEDICATIONS  (STANDING):  aspirin  chewable 81 milliGRAM(s) Oral daily  atorvastatin 80 milliGRAM(s) Oral at bedtime  clopidogrel Tablet 75 milliGRAM(s) Oral daily  enoxaparin Injectable 40 milliGRAM(s) SubCutaneous daily  lisinopril 10 milliGRAM(s) Oral daily  metoprolol tartrate 25 milliGRAM(s) Oral two times a day  sodium chloride 0.9%. 1000 milliLiter(s) (150 mL/Hr) IV Continuous <Continuous>    MEDICATIONS  (PRN):  nitroglycerin     SubLingual 0.4 milliGRAM(s) SubLingual every 5 minutes PRN Chest Pain    REVIEW OF SYSTEMS:          CONSTITUTIONAL: No weakness, fevers or chills          EYES/ENT: No visual changes;  No vertigo or throat pain           NECK: No pain or stiffness          RESPIRATORY: No cough, wheezing, hemoptysis          CARDIOVASCULAR: no pain, no VAZQUEZ, no palpitations           GASTROINTESTINAL: No abdominal or epigastric pain. No nausea, vomiting, or hematemesis;           GENITOURINARY: No dysuria, frequency or hematuria          NEUROLOGICAL: No numbness or weakness          SKIN: No itching, rashes    PHYSICAL EXAM:           CONSTITUTIONAL: Well-developed; well-nourished; in no acute distress  	SKIN: warm, dry  	HEAD: Normocephalic; atraumatic  	EYES: PERRL.  	ENT: No nasal discharge, airway clear, mucous membranes moist  	NECK: Supple; non tender.  	CARD: +S1, +S2, no murmurs, gallops, or rubs. Regular rate and rhythm    	RESP: No wheezes, rales or rhonchi. CTA B/L  	ABD: soft ntnd, + BS x 4 quadrants  	EXT: moves all extremities,  no clubbing, cyanosis or edema  	NEURO: Alert and oriented x3, no focal deficits          PSYCH: Cooperative, appropriate          VASCULAR:  +2 Rad / +2PTs / + 2DPs          EXTREMITY:              Right Radial: Dressing removed, access site soft, no hematoma, no pain, no signs of infection            ECG:   < from: 12 Lead ECG (09.27.19 @ 07:24) >  Ventricular Rate 67 BPM    Atrial Rate 67 BPM    P-R Interval 126 ms    QRS Duration 88 ms    Q-T Interval 390 ms    QTC Calculation(Bezet) 412 ms    P Axis 65 degrees    R Axis -6 degrees    T Axis 36 degrees    Diagnosis Line Normal sinus rhythm  Low voltage QRS  Inferior infarct , age undetermined  Cannot rule out Anteroseptal infarct , age undetermined  Abnormal ECG    Confirmed by FATOUMATA FRANCO MD [id number]) on 9/27/2019 9:08:46 AM                                                                                 2D ECHO:  P    LABS:                        14.2   8.33  )-----------( 157      ( 27 Sep 2019 04:45 )             42.1     09-27    139  |  105  |  22<H>  ----------------------------<  133<H>  4.8   |  23  |  0.7    Ca    8.3<L>      27 Sep 2019 04:45  Mg     2.1     09-26    TPro  5.3<L>  /  Alb  3.2<L>  /  TBili  0.6  /  DBili  x   /  AST  155<H>  /  ALT  53<H>  /  AlkPhos  30  09-27    CARDIAC MARKERS ( 27 Sep 2019 04:45 )  x     / 2.05 ng/mL / x     / x     / x      CARDIAC MARKERS ( 26 Sep 2019 10:44 )  x     / 0.78 ng/mL / x     / x     / x          Magnesium, Serum: 2.1 mg/dL [1.8 - 2.4] (09-26-19 @ 10:44)  LIVER FUNCTIONS - ( 27 Sep 2019 04:45 )  Alb: 3.2 g/dL / Pro: 5.3 g/dL / ALK PHOS: 30 U/L / ALT: 53 U/L / AST: 155 U/L / GGT: x             A/P:  I discussed the case with Cardiologist Dr. Chavez and recommend the following:    s/p NSTEMI, PCI: cutting balloon angioplasty and DULCE x 1 to proximal LAD                     no need for CBC, monitor Cr                    Decrease Lisinopril to 10 mg PO Daily due to low B/P                   F/U 2D Echo results                   Daily ECG                   f/u A1C                   OOB, Ambulate                   Monitor access site                    Keep K = 4, Mg = 2                   Monitor LFT's                   Continue DAPT ( Aspirin 81 mg daily and Plavix 75 mg daily ), B-Blocker, Statin Therapy, Ace                   Patient given 30 day supply of ( Aspirin 81 mg daily and Plavix 75 mg daily ) to take at home                   Patient agreeing to take DAPT for at least one year or as directed by cardiologist                    Pt given instructions on importance of taking antiplatelet medication or risk acute stent thrombosis/death                   Post cath instructions, access site care and activity restrictions reviewed with patient                     Discussed with patient to return to hospital if experience chest pain, shortness breath, dizziness and site bleeding                   Aggressive risk factor modification, diet counseling, smoking cessation discussed with patient                       Can transfer patient to TELE                    Staged PCI of distal LCX/ OM2 during this admission Cardiology Follow up    LAKSHMI JUDD   84y Male  PAST MEDICAL & SURGICAL HISTORY:  Neuropathy  TIA (transient ischemic attack)  Left carotid stenosis  Lyme disease  Hypothyroidism  Coronary artery disease       HPI:  83 yo M with hx of CAD s/p PCIx2, lyme disease, hypothyroidism, multiple TIAs, L carotid stenosis, neuropathy who presents with gradual onset of intermittent, midsternal, moderate chest tightness radiating to back and associated sob that started yesterday worse on exertion today. Pt received  and sublingual with no improvement.    Pt had a CT abd w/ IV contrast on Monday for workup of kidney mass and developed hives that have responded to steroids and benadryl. No prior hx of allergic reaction to IV contrast. Otherwise no fever, chills, cough, abd pain, leg pain/swelling. (26 Sep 2019 18:51)    Allergies    iodinated radiocontrast agents (Hives)    Patient without complaints. Pt ambulated without issues/symptoms  Denies CP, SOB, palpitations, or dizziness  NSVTACK and PVC's noted on telemetry overnight    Vital Signs Last 24 Hrs  T(C): 36.7 (27 Sep 2019 04:00), Max: 37.1 (26 Sep 2019 20:00)  T(F): 98 (27 Sep 2019 04:00), Max: 98.7 (26 Sep 2019 20:00)  HR: 62 (27 Sep 2019 08:00) (62 - 828)  BP: 94/51 (27 Sep 2019 08:00) (77/41 - 140/78)  BP(mean): 62 (27 Sep 2019 08:00) (51 - 95)  RR: 22 (27 Sep 2019 08:00) (16 - 28)  SpO2: 97% (27 Sep 2019 08:00) (95% - 99%)    MEDICATIONS  (STANDING):  aspirin  chewable 81 milliGRAM(s) Oral daily  atorvastatin 80 milliGRAM(s) Oral at bedtime  clopidogrel Tablet 75 milliGRAM(s) Oral daily  enoxaparin Injectable 40 milliGRAM(s) SubCutaneous daily  lisinopril 10 milliGRAM(s) Oral daily  metoprolol tartrate 25 milliGRAM(s) Oral two times a day  sodium chloride 0.9%. 1000 milliLiter(s) (150 mL/Hr) IV Continuous <Continuous>    MEDICATIONS  (PRN):  nitroglycerin     SubLingual 0.4 milliGRAM(s) SubLingual every 5 minutes PRN Chest Pain    REVIEW OF SYSTEMS:          CONSTITUTIONAL: No weakness, fevers or chills          EYES/ENT: No visual changes;  No vertigo or throat pain           NECK: No pain or stiffness          RESPIRATORY: No cough, wheezing, hemoptysis          CARDIOVASCULAR: no pain, no VAZQUEZ, no palpitations           GASTROINTESTINAL: No abdominal or epigastric pain. No nausea, vomiting, or hematemesis;           GENITOURINARY: No dysuria, frequency or hematuria          NEUROLOGICAL: No numbness or weakness          SKIN: No itching, rashes    PHYSICAL EXAM:           CONSTITUTIONAL: Well-developed; well-nourished; in no acute distress  	SKIN: warm, dry  	HEAD: Normocephalic; atraumatic  	EYES: PERRL.  	ENT: No nasal discharge, airway clear, mucous membranes moist  	NECK: Supple; non tender.  	CARD: +S1, +S2, no murmurs, gallops, or rubs. Regular rate and rhythm    	RESP: No wheezes, rales or rhonchi. CTA B/L  	ABD: soft ntnd, + BS x 4 quadrants  	EXT: moves all extremities,  no clubbing, cyanosis or edema  	NEURO: Alert and oriented x3, no focal deficits          PSYCH: Cooperative, appropriate          VASCULAR:  +2 Rad / +2PTs / + 2DPs          EXTREMITY:              Right Radial: Dressing removed, access site soft, no hematoma, no pain, no signs of infection            ECG:   < from: 12 Lead ECG (09.27.19 @ 07:24) >  Ventricular Rate 67 BPM    Atrial Rate 67 BPM    P-R Interval 126 ms    QRS Duration 88 ms    Q-T Interval 390 ms    QTC Calculation(Bezet) 412 ms    P Axis 65 degrees    R Axis -6 degrees    T Axis 36 degrees    Diagnosis Line Normal sinus rhythm  Low voltage QRS  Inferior infarct , age undetermined  Cannot rule out Anteroseptal infarct , age undetermined  Abnormal ECG    Confirmed by FATOUMATA FRANCO MD [id number]) on 9/27/2019 9:08:46 AM                                                                                 2D ECHO:  < from: Transthoracic Echocardiogram (09.27.19 @ 07:17) >  EXAM:  2-D ECHO (TTE) COMPLETE        PROCEDURE DATE:  09/27/2019      INTERPRETATION:  REPORT:    TRANSTHORACIC ECHOCARDIOGRAM REPORT         Patient Name:   LAKSHMI JUDD Accession #: [id number]  Medical Rec #:  [id number]          Height:      68.0 in 172.7 cm  YOB: 1935         Weight:      194.0 lb 88.00 kg  Patient Age:    84 years          BSA:         2.02 m²  Patient Gender: M                 BP:          105/66 mmHg       Date of Exam:        9/27/2019 7:17:35 AM  Referring Physician: [id number] MILI RAO  Sonographer:         Ori zuleta  Reading Physician:   Everett Haq M.D.    Procedure:   2D Echo/Doppler/Color Doppler Complete.  Indications: R07.9 - Chest Pain, unspecified  Diagnosis:   Chest pain, unspecified - R07.9         Summary:   1. Left ventricular ejection fraction, by visual estimation, is 35 to   40%.   2. Mid anteroseptal segment and entire apex are abnormal as described   above.   3. Mildly increased LV wall thickness.   4. Mild mitral annular calcification.   5. PSAP at least 35.   6. Sclerotic aortic valve with normal opening.   7. Dilatation of the ascending aorta.   8. There is mild aortic root calcification.    PHYSICIAN INTERPRETATION:  Left Ventricle: The left ventricularinternal cavity size is normal. Left   ventricular wall thickness is mildly increased. Left ventricular ejection   fraction, by visual estimation, is 35 to 40%.       LV Wall Scoring:  The mid anteroseptal segment is akinetic. The entire apex is hypokinetic.   All  remaining scored segments are normal.    Left Atrium: Mildly enlarged left atrium.  Right Atrium: Normal right atrial size.  Mitral Valve: Structurally normal mitral valve, with normal leaflet   excursion. There is mild mitral annular calcification. Trace mitral valve   regurgitation is seen.  Tricuspid Valve: The tricuspid valve is normal in structure. Mild   tricuspid regurgitation is visualized. PSAP at least 35.  Aortic Valve: The aortic valve is trileaflet. Sclerotic aortic valve with   normal opening.  Aorta: The aortic root is normal in size and structure. There is   dilatation of the ascending aorta. There is mild aortic root   calcification.       2D AND M-MODE MEASUREMENTS (normal ranges within parentheses):  Left             Normal   Aorta/Left             Normal  Ventricle:                     Atrium:  IVSd (2D):  1.40 cm  (0.7-1.1) AoV Cusp       2.23  (1.5-2.6)  LVPWd (2D): 1.41 cm  (0.7-1.1) Separation:     cm  LVIDd (2D): 4.60 cm  (3.4-5.7) Left Atrium  4.37  (1.9-4.0)  LVIDs (2D): 3.70 cm            (Mmode):        cm  LV FS (2D):  19.6 %   (>25%)   LA Volume      30.4  IVSd        1.19 cm  (0.7-1.1) Index         ml/m²  (Mmode):                       Right  LVPWd       1.20 cm  (0.7-1.1) Ventricle:  (Mmode):                       RVd (2D):      2.49 cm  LVIDd       4.66 cm  (3.4-5.7)  (Mmode):  LVIDs       3.36 cm  (Mmode):  LV FS        27.8 %   (>25%)  (Mmode):  Relative      0.61    (<0.42)  Wall  Thickness  Rel. Wall     0.51    (<0.42)  Thickness  Mm  LV Mass      103.0  Index:        g/m²  Mmode    SPECTRAL DOPPLER ANALYSIS:  LV DIASTOLIC FUNCTION:  MV Peak E: 0.85 m/s Decel Time: 204 msec  MV Peak A: 0.58 m/s  E/A Ratio: 1.46    LVOT Vmax: 0.95 m/s  LVOT VTI:  0.19 m  LVOT Diam: 1.70 cm    Mitral Valve:  MV P1/2 Time: 59.21 msec  MV Area, PHT: 3.72 cm²    Tricuspid Valve and PA/RV Systolic Pressure: TR Max Velocity: 2.96 m/s RA   Pressure:  RVSP/PASP: 38.0 mmHg     [id number] Everett Haq M.D., Electronically signed on 9/27/2019 at   11:18:55 AM    Hemoglobin A1C with Mean Plasma Glucose (09.27.19 @ 04:45)    Hemoglobin A1C, Whole Blood: 6.4: Method: Immunoassay       Reference Range                4.0-5.6%       High risk (prediabetic)        5.7-6.4%       Diabetic, diagnostic             >=6.5%       ADA diabetic treatment goal       <7.0%  The Hemoglobin A1c testing is NGSP-certified.Reference ranges are based  upon the 2010 recommendations of  the American Diabetes Association.  Interpretation may vary for children  and adolescents. %    Mean Plasma Glucose: 137 mg/dL      LABS:                        14.2   8.33  )-----------( 157      ( 27 Sep 2019 04:45 )             42.1     09-27    139  |  105  |  22<H>  ----------------------------<  133<H>  4.8   |  23  |  0.7    Ca    8.3<L>      27 Sep 2019 04:45  Mg     2.1     09-26    TPro  5.3<L>  /  Alb  3.2<L>  /  TBili  0.6  /  DBili  x   /  AST  155<H>  /  ALT  53<H>  /  AlkPhos  30  09-27    CARDIAC MARKERS ( 27 Sep 2019 04:45 )  x     / 2.05 ng/mL / x     / x     / x      CARDIAC MARKERS ( 26 Sep 2019 10:44 )  x     / 0.78 ng/mL / x     / x     / x          Magnesium, Serum: 2.1 mg/dL [1.8 - 2.4] (09-26-19 @ 10:44)  LIVER FUNCTIONS - ( 27 Sep 2019 04:45 )  Alb: 3.2 g/dL / Pro: 5.3 g/dL / ALK PHOS: 30 U/L / ALT: 53 U/L / AST: 155 U/L / GGT: x             A/P:  I discussed the case with Cardiologist Dr. Chavez and recommend the following:    s/p NSTEMI, PCI: cutting balloon angioplasty and DULCE x 1 to proximal LAD                     Possible staged PCI on Monday/Tuesday                   IV Contrast Allergy: use Standard Oral Premedication Regiment ( Total 3 doses ) prior to procedure 13 hr                   no need for daily CBC, monitor Cr                    Decrease Lisinopril to 10 mg PO Daily due to low B/P                   trend CE                   Daily ECG                   OOB, Ambulate                   Monitor access site                    Keep K = 4, Mg = 2                   Monitor LFT's                   Continue DAPT ( Aspirin 81 mg daily and Plavix 75 mg daily ), B-Blocker, Statin Therapy, Ace                   Patient given 30 day supply of ( Aspirin 81 mg daily and Plavix 75 mg daily ) to take at home                   Patient agreeing to take DAPT for at least one year or as directed by cardiologist                    Pt given instructions on importance of taking antiplatelet medication or risk acute stent thrombosis/death                   Post cath instructions, access site care and activity restrictions reviewed with patient                     Discussed with patient to return to hospital if experience chest pain, shortness breath, dizziness and site bleeding                   Aggressive risk factor modification, diet counseling, smoking cessation discussed with patient                       Can transfer patient to TELE                    Staged PCI of distal LCX/ OM2 during this admission Cardiology Follow up    LAKSHMI JUDD   84y Male  PAST MEDICAL & SURGICAL HISTORY:  Neuropathy  TIA (transient ischemic attack)  Left carotid stenosis  Lyme disease  Hypothyroidism  Coronary artery disease       HPI:  83 yo M with hx of CAD s/p PCIx2, lyme disease, hypothyroidism, multiple TIAs, L carotid stenosis, neuropathy who presents with gradual onset of intermittent, midsternal, moderate chest tightness radiating to back and associated sob that started yesterday worse on exertion today. Pt received  and sublingual with no improvement.    Pt had a CT abd w/ IV contrast on Monday for workup of kidney mass and developed hives that have responded to steroids and benadryl. No prior hx of allergic reaction to IV contrast. Otherwise no fever, chills, cough, abd pain, leg pain/swelling. (26 Sep 2019 18:51)    Allergies    iodinated radiocontrast agents (Hives)    Patient without complaints. Pt ambulated without issues/symptoms  Denies CP, SOB, palpitations, or dizziness  NSVTACK and PVC's noted on telemetry overnight    Vital Signs Last 24 Hrs  T(C): 36.7 (27 Sep 2019 04:00), Max: 37.1 (26 Sep 2019 20:00)  T(F): 98 (27 Sep 2019 04:00), Max: 98.7 (26 Sep 2019 20:00)  HR: 62 (27 Sep 2019 08:00) (62 - 828)  BP: 94/51 (27 Sep 2019 08:00) (77/41 - 140/78)  BP(mean): 62 (27 Sep 2019 08:00) (51 - 95)  RR: 22 (27 Sep 2019 08:00) (16 - 28)  SpO2: 97% (27 Sep 2019 08:00) (95% - 99%)    MEDICATIONS  (STANDING):  aspirin  chewable 81 milliGRAM(s) Oral daily  atorvastatin 80 milliGRAM(s) Oral at bedtime  clopidogrel Tablet 75 milliGRAM(s) Oral daily  enoxaparin Injectable 40 milliGRAM(s) SubCutaneous daily  lisinopril 10 milliGRAM(s) Oral daily  metoprolol tartrate 25 milliGRAM(s) Oral two times a day  sodium chloride 0.9%. 1000 milliLiter(s) (150 mL/Hr) IV Continuous <Continuous>    MEDICATIONS  (PRN):  nitroglycerin     SubLingual 0.4 milliGRAM(s) SubLingual every 5 minutes PRN Chest Pain    REVIEW OF SYSTEMS:          CONSTITUTIONAL: No weakness, fevers or chills          EYES/ENT: No visual changes;  No vertigo or throat pain           NECK: No pain or stiffness          RESPIRATORY: No cough, wheezing, hemoptysis          CARDIOVASCULAR: no pain, no VAZQUEZ, no palpitations           GASTROINTESTINAL: No abdominal or epigastric pain. No nausea, vomiting, or hematemesis;           GENITOURINARY: No dysuria, frequency or hematuria          NEUROLOGICAL: No numbness or weakness          SKIN: No itching, rashes    PHYSICAL EXAM:           CONSTITUTIONAL: Well-developed; well-nourished; in no acute distress  	SKIN: warm, dry  	HEAD: Normocephalic; atraumatic  	EYES: PERRL.  	ENT: No nasal discharge, airway clear, mucous membranes moist  	NECK: Supple; non tender.  	CARD: +S1, +S2, no murmurs, gallops, or rubs. Regular rate and rhythm    	RESP: No wheezes, rales or rhonchi. CTA B/L  	ABD: soft ntnd, + BS x 4 quadrants  	EXT: moves all extremities,  no clubbing, cyanosis or edema  	NEURO: Alert and oriented x3, no focal deficits          PSYCH: Cooperative, appropriate          VASCULAR:  +2 Rad / +2PTs / + 2DPs          EXTREMITY:              Right Radial: Dressing removed, access site soft, no hematoma, no pain, no signs of infection            ECG:   < from: 12 Lead ECG (09.27.19 @ 07:24) >  Ventricular Rate 67 BPM    Atrial Rate 67 BPM    P-R Interval 126 ms    QRS Duration 88 ms    Q-T Interval 390 ms    QTC Calculation(Bezet) 412 ms    P Axis 65 degrees    R Axis -6 degrees    T Axis 36 degrees    Diagnosis Line Normal sinus rhythm  Low voltage QRS  Inferior infarct , age undetermined  Cannot rule out Anteroseptal infarct , age undetermined  Abnormal ECG    Confirmed by FATOUMATA FRANCO MD ([id number]) on 9/27/2019 9:08:46 AM                                                                                 2D ECHO:  < from: Transthoracic Echocardiogram (09.27.19 @ 07:17) >  EXAM:  2-D ECHO (TTE) COMPLETE        PROCEDURE DATE:  09/27/2019      INTERPRETATION:  REPORT:    TRANSTHORACIC ECHOCARDIOGRAM REPORT         Patient Name:   LAKSHMI JUDD Accession #: [id number]  Medical Rec #:  [id number]          Height:      68.0 in 172.7 cm  YOB: 1935         Weight:      194.0 lb 88.00 kg  Patient Age:    84 years          BSA:         2.02 m²  Patient Gender: M                 BP:          105/66 mmHg       Date of Exam:        9/27/2019 7:17:35 AM  Referring Physician: [id number] MILI RAO  Sonographer:         Ori zuleta  Reading Physician:   Everett Haq M.D.    Procedure:   2D Echo/Doppler/Color Doppler Complete.  Indications: R07.9 - Chest Pain, unspecified  Diagnosis:   Chest pain, unspecified - R07.9         Summary:   1. Left ventricular ejection fraction, by visual estimation, is 35 to   40%.   2. Mid anteroseptal segment and entire apex are abnormal as described   above.   3. Mildly increased LV wall thickness.   4. Mild mitral annular calcification.   5. PSAP at least 35.   6. Sclerotic aortic valve with normal opening.   7. Dilatation of the ascending aorta.   8. There is mild aortic root calcification.    PHYSICIAN INTERPRETATION:  Left Ventricle: The left ventricularinternal cavity size is normal. Left   ventricular wall thickness is mildly increased. Left ventricular ejection   fraction, by visual estimation, is 35 to 40%.       LV Wall Scoring:  The mid anteroseptal segment is akinetic. The entire apex is hypokinetic.   All  remaining scored segments are normal.    Left Atrium: Mildly enlarged left atrium.  Right Atrium: Normal right atrial size.  Mitral Valve: Structurally normal mitral valve, with normal leaflet   excursion. There is mild mitral annular calcification. Trace mitral valve   regurgitation is seen.  Tricuspid Valve: The tricuspid valve is normal in structure. Mild   tricuspid regurgitation is visualized. PSAP at least 35.  Aortic Valve: The aortic valve is trileaflet. Sclerotic aortic valve with   normal opening.  Aorta: The aortic root is normal in size and structure. There is   dilatation of the ascending aorta. There is mild aortic root   calcification.       2D AND M-MODE MEASUREMENTS (normal ranges within parentheses):  Left             Normal   Aorta/Left             Normal  Ventricle:                     Atrium:  IVSd (2D):  1.40 cm  (0.7-1.1) AoV Cusp       2.23  (1.5-2.6)  LVPWd (2D): 1.41 cm  (0.7-1.1) Separation:     cm  LVIDd (2D): 4.60 cm  (3.4-5.7) Left Atrium  4.37  (1.9-4.0)  LVIDs (2D): 3.70 cm            (Mmode):        cm  LV FS (2D):  19.6 %   (>25%)   LA Volume      30.4  IVSd        1.19 cm  (0.7-1.1) Index         ml/m²  (Mmode):                       Right  LVPWd       1.20 cm  (0.7-1.1) Ventricle:  (Mmode):                       RVd (2D):      2.49 cm  LVIDd       4.66 cm  (3.4-5.7)  (Mmode):  LVIDs       3.36 cm  (Mmode):  LV FS        27.8 %   (>25%)  (Mmode):  Relative      0.61    (<0.42)  Wall  Thickness  Rel. Wall     0.51    (<0.42)  Thickness  Mm  LV Mass      103.0  Index:        g/m²  Mmode    SPECTRAL DOPPLER ANALYSIS:  LV DIASTOLIC FUNCTION:  MV Peak E: 0.85 m/s Decel Time: 204 msec  MV Peak A: 0.58 m/s  E/A Ratio: 1.46    LVOT Vmax: 0.95 m/s  LVOT VTI:  0.19 m  LVOT Diam: 1.70 cm    Mitral Valve:  MV P1/2 Time: 59.21 msec  MV Area, PHT: 3.72 cm²    Tricuspid Valve and PA/RV Systolic Pressure: TR Max Velocity: 2.96 m/s RA   Pressure:  RVSP/PASP: 38.0 mmHg     [id number] Everett Haq M.D., Electronically signed on 9/27/2019 at   11:18:55 AM    Hemoglobin A1C with Mean Plasma Glucose (09.27.19 @ 04:45)    Hemoglobin A1C, Whole Blood: 6.4: Method: Immunoassay       Reference Range                4.0-5.6%       High risk (prediabetic)        5.7-6.4%       Diabetic, diagnostic             >=6.5%       ADA diabetic treatment goal       <7.0%  The Hemoglobin A1c testing is NGSP-certified.Reference ranges are based  upon the 2010 recommendations of  the American Diabetes Association.  Interpretation may vary for children  and adolescents. %    Mean Plasma Glucose: 137 mg/dL      LABS:                        14.2   8.33  )-----------( 157      ( 27 Sep 2019 04:45 )             42.1     09-27    139  |  105  |  22<H>  ----------------------------<  133<H>  4.8   |  23  |  0.7    Ca    8.3<L>      27 Sep 2019 04:45  Mg     2.1     09-26    TPro  5.3<L>  /  Alb  3.2<L>  /  TBili  0.6  /  DBili  x   /  AST  155<H>  /  ALT  53<H>  /  AlkPhos  30  09-27    CARDIAC MARKERS ( 27 Sep 2019 04:45 )  x     / 2.05 ng/mL / x     / x     / x      CARDIAC MARKERS ( 26 Sep 2019 10:44 )  x     / 0.78 ng/mL / x     / x     / x          Magnesium, Serum: 2.1 mg/dL [1.8 - 2.4] (09-26-19 @ 10:44)  LIVER FUNCTIONS - ( 27 Sep 2019 04:45 )  Alb: 3.2 g/dL / Pro: 5.3 g/dL / ALK PHOS: 30 U/L / ALT: 53 U/L / AST: 155 U/L / GGT: x             A/P:  I discussed the case with Cardiologist Dr. Chavez and recommend the following:    s/p NSTEMI, PCI: cutting balloon angioplasty and DULCE x 1 to proximal LAD                     Possible staged PCI on Monday/Tuesday                   IV Contrast Allergy: use Standard Oral Premedication Regiment ( Total 3 doses ) prior to procedure 13 hr                   no need for daily CBC, monitor Cr                    Decrease Lisinopril to 10 mg PO Daily due to low B/P                   trend CE                   Daily ECG                   OOB, Ambulate                   Monitor access site                    Keep K = 4, Mg = 2                   Monitor LFT's                   Continue DAPT ( Aspirin 81 mg daily and Plavix 75 mg daily ), B-Blocker, Statin Therapy, Ace                   Patient given 30 day supply of ( Aspirin 81 mg daily and Plavix 75 mg daily ) to take at home                   Patient agreeing to take DAPT for at least one year or as directed by cardiologist                    Pt given instructions on importance of taking antiplatelet medication or risk acute stent thrombosis/death                   Post cath instructions, access site care and activity restrictions reviewed with patient                     Discussed with patient to return to hospital if experience chest pain, shortness breath, dizziness and site bleeding                   Aggressive risk factor modification, diet counseling, smoking cessation discussed with patient                       Can transfer patient to TELE                    Staged PCI of distal LCX/ OM2 during this admission      agree with above.

## 2019-09-27 NOTE — PROGRESS NOTE ADULT - ASSESSMENT
85 yo M with hx of CAD s/p PCIx2, lyme disease, hypothyroidism, multiple TIAs, L carotid stenosis, neuropathy presenting with NSTEMI.    #NSTEMI  - cath 9/26 showed significant 2 vessel coronary artery disease (LAD, LCX) s/p successful PCI of proximal LAD  - staged PCI of distal LCX/OM2 later this admission  - continue DAPT, statin, BB, ACEi  - Echo pending  - lipids, HbA1c pending    #Rash  - IV 4 decadron 9/26  - benadryl as needed    #Hypotension  - reduced lisinopril from 20 to 10 mg    Dispo: tele to wait for staged PCI pending cardio clearance 85 yo M with hx of CAD s/p PCIx2, lyme disease, hypothyroidism, multiple TIAs, L carotid stenosis, neuropathy presenting with NSTEMI.    #NSTEMI  - cath 9/26 showed significant 2 vessel coronary artery disease (LAD, LCX) s/p successful PCI of proximal LAD  - staged PCI of distal LCX/OM2 later this admission  - continue DAPT, statin, BB, ACEi  - f/u echo results  - f/u lipids, HbA1c results  - no need for CBC, only need to monitor Cr as per cardiology    #Rash  - IV 4 decadron 9/26  - benadryl as needed  - improving since Tuesday    #Hypotension  - reduced lisinopril from 20 to 10 mg    Dispo: tele to wait for staged PCI pending cardio clearance

## 2019-09-28 NOTE — PROGRESS NOTE ADULT - ASSESSMENT
83 yo M with hx of CAD s/p PCIx2, lyme disease, hypothyroidism, multiple TIAs, L carotid stenosis, neuropathy presenting with NSTEMI. Had successful PCI to proximal LAD.  Staged PCI of distal LCX/OM2 later this admission    1. CAD c/b NSTEMI: Patient had PCI to proximal LAD. Staged PCI of distal LCX/OM2 scheduled for early next week. Echo reviewed (35-40% EF). Cont DAPT, BB, ACEi.     #Progress Note Handoff  Pending (specify): Repeat PCI  Family discussion: Discussed with pt regarding plan for 2nd [facility] next week  Disposition: TBD

## 2019-09-28 NOTE — PROGRESS NOTE ADULT - SUBJECTIVE AND OBJECTIVE BOX
LAKSHMI JUDD  84y, Male  Allergy: iodinated radiocontrast agents (Hives)    Hospital Day: 2d    Patient seen and examined earlier today. No acute events overnight.    PMH/PSH:  PAST MEDICAL & SURGICAL HISTORY:  Neuropathy  TIA (transient ischemic attack)  Left carotid stenosis  Lyme disease  Hypothyroidism  Coronary artery disease    VITALS:  T(F): 97.8 (09-28-19 @ 05:29), Max: 98.5 (09-28-19 @ 00:26)  HR: 71 (09-28-19 @ 05:29)  BP: 116/56 (09-28-19 @ 05:29) (102/59 - 130/55)  RR: 20 (09-28-19 @ 00:26)  SpO2: 96% (09-27-19 @ 21:00)    TESTS & MEASUREMENTS:  Weight (Kg): 90 (09-26-19 @ 18:18)    09-26-19 @ 07:01  -  09-27-19 @ 07:00  --------------------------------------------------------  IN: 1550 mL / OUT: 500 mL / NET: 1050 mL    09-27-19 @ 07:01  -  09-28-19 @ 07:00  --------------------------------------------------------  IN: 0 mL / OUT: 275 mL / NET: -275 mL                        14.2   8.33  )-----------( 157      ( 27 Sep 2019 04:45 )             42.1     09-28    138  |  103  |  23<H>  ----------------------------<  92  4.4   |  23  |  0.7    Ca    8.0<L>      28 Sep 2019 06:19  Phos  2.6     09-28  Mg     2.0     09-28    TPro  5.3<L>  /  Alb  3.2<L>  /  TBili  0.6  /  DBili  x   /  AST  155<H>  /  ALT  53<H>  /  AlkPhos  30  09-27    LIVER FUNCTIONS - ( 27 Sep 2019 04:45 )  Alb: 3.2 g/dL / Pro: 5.3 g/dL / ALK PHOS: 30 U/L / ALT: 53 U/L / AST: 155 U/L / GGT: x           CARDIAC MARKERS ( 27 Sep 2019 04:45 )  x     / 2.05 ng/mL / x     / x     / x        RECENT DIAGNOSTIC ORDERS:  Diet, DASH/TLC:   Sodium & Cholesterol Restricted (09-28-19 @ 00:13)  Complete Blood Count: Repeat From: 28-Sep-2019 11:54 To: 28-Oct-2019 04:30, Every 1 day(s) (09-28-19 @ 11:55)  Complete Blood Count: AM Sched. Collection: 29-Sep-2019 04:30 (09-28-19 @ 11:55)  Basic Metabolic Panel: Repeat From: 28-Sep-2019 11:55 To: 28-Oct-2019 04:30, Every 1 day(s) (09-28-19 @ 11:55)  Basic Metabolic Panel: AM Sched. Collection: 29-Sep-2019 04:30 (09-28-19 @ 11:55)    MEDICATIONS:  MEDICATIONS  (STANDING):  aspirin  chewable 81 milliGRAM(s) Oral daily  atorvastatin 80 milliGRAM(s) Oral at bedtime  clopidogrel Tablet 75 milliGRAM(s) Oral daily  enoxaparin Injectable 40 milliGRAM(s) SubCutaneous daily  lisinopril 10 milliGRAM(s) Oral daily  metoprolol tartrate 25 milliGRAM(s) Oral two times a day  sodium chloride 0.9%. 1000 milliLiter(s) (150 mL/Hr) IV Continuous <Continuous>    MEDICATIONS  (PRN):  nitroglycerin     SubLingual 0.4 milliGRAM(s) SubLingual every 5 minutes PRN Chest Pain    HOME MEDICATIONS:  [place] Thyroid 120 mg oral tablet (09-26)  aspirin 81 mg oral tablet, chewable (09-26)  Crestor 20 mg oral tablet (09-26)  lisinopril 20 mg oral tablet (09-26)    REVIEW OF SYSTEMS:  All other review of systems is negative unless indicated above.     PHYSICAL EXAM:  GENERAL: NAD  HEENT: No Swelling  CHEST/LUNG: Good air entry, No wheezing  HEART: RRR, No murmurs  ABDOMEN: Soft, Bowel sounds present  EXTREMITIES:  No clubbing

## 2019-09-28 NOTE — PROGRESS NOTE ADULT - ASSESSMENT
83 yo M with hx of CAD s/p PCIx2, lyme disease, hypothyroidism, multiple TIAs, L carotid stenosis, neuropathy presenting with NSTEMI. Cath on 9/26 showed significant 2 vessel coronary artery disease (LAD, LCX) s/p successful PCI of proximal LAD. Will require Staged PCI, 2nd cath likely on tuesday. Will need contrast allergy protocol prep, see below.  Patient had a sustained run of v-tach in which he was treated with a push of amiodarone 150mg IV pushed over 10minutes and then subsequent infusions of 1mg/hour over 6 hours of amiodarone and 18 hours of .5mg/hour of amiodarone.     #NSTEMI  - cath 9/26 showed significant 2 vessel coronary artery disease (LAD, LCX) s/p successful PCI of proximal LAD  - staged PCI of distal LCX/OM2 later this admission  - continue DAPT, statin, BB, ACEi  - HbA1c 6.4  - no need for CBC, only need to monitor Cr as per cardiology  - echo (9/27): EF 35-40%    #Vtach  - on amio drip    #Rash  - IV 4 decadron 9/26  - benadryl as needed  - improving since Tuesday    #Hypotension  - reduced lisinopril from 20 to 10 mg    Plan  - continue DAPT, statin, BB, aCEi  - No need to draw CBC, only CMP for Cr  -  IV Contrast Allergy: use Standard Oral Premedication Regiment ( Total 3 doses ) prior to procedure 13 hr as per cardiology  - Staged PCI on Tuesday    Dispo: tele to wait for staged PCI. 83 yo M with hx of CAD s/p PCIx2, lyme disease, hypothyroidism, multiple TIAs, L carotid stenosis, neuropathy presenting with NSTEMI. Cath on 9/26 showed significant 2 vessel coronary artery disease (LAD, LCX) s/p successful PCI of proximal LAD. Will require Staged PCI, 2nd cath likely on tuesday. Will need contrast allergy protocol prep, see below.  Patient had a sustained run of v-tach in which he was treated with a push of amiodarone 150mg IV pushed over 10minutes and then subsequent infusions of 1mg/hour over 6 hours of amiodarone and 18 hours of .5mg/hour of amiodarone.     #NSTEMI  - cath 9/26 showed significant 2 vessel coronary artery disease (LAD, LCX) s/p successful PCI of proximal LAD  - staged PCI of distal LCX/OM2 later this admission  - continue DAPT, statin, BB, ACEi  - HbA1c 6.4  - no need for CBC, only need to monitor Cr as per cardiology  - echo (9/27): EF 35-40%    #Vtach  - on amio drip    #Rash  - IV 4 decadron 9/26  - benadryl as needed  - improving since Tuesday    #Hypotension  - reduced lisinopril from 20 to 10 mg    Plan  - continue DAPT, statin, BB, aCEi  - No need to draw CBC, only CMP for Cr  -  IV Contrast Allergy: use Standard Oral Premedication Regiment: methylpredinsolone 32 mg @ 12 hours prior and 2 hours prior, 50 mg IV benadryl at 1 hour prior  - Staged PCI on Tuesday    Dispo: tele to wait for staged PCI. 85 yo M with hx of CAD s/p PCIx2, lyme disease, hypothyroidism, multiple TIAs, L carotid stenosis, neuropathy presenting with NSTEMI. Cath on 9/26 showed significant 2 vessel coronary artery disease (LAD, LCX) s/p successful PCI of proximal LAD. Will require Staged PCI, 2nd cath likely on tuesday. Will need contrast allergy protocol prep, see below.  Patient had a sustained run of v-tach in which he was treated with a push of amiodarone 150mg IV pushed over 10minutes and then subsequent infusions of 1mg/hour over 6 hours of amiodarone and 18 hours of .5mg/hour of amiodarone.     #NSTEMI  - cath 9/26 showed significant 2 vessel coronary artery disease (LAD, LCX) s/p successful PCI of proximal LAD  - staged PCI of distal LCX/OM2 later this admission  - continue DAPT, statin, BB, ACEi  - HbA1c 6.4  - no need for CBC, only need to monitor Cr as per cardiology  - echo (9/27): EF 35-40%    #Vtach  - on amio drip    #Rash  - IV 4 decadron 9/26  - benadryl as needed  - improving since Tuesday    #Hypotension  - reduced lisinopril from 20 to 10 mg    Plan  - continue DAPT, statin, BB, aCEi  - No need to draw CBC, only CMP for Cr  -  IV Contrast Allergy: use Standard Oral Premedication Regiment: PO methylpredinsolone 32 mg @ 12 hours prior and 2 hours prior, 50 mg IV benadryl at 1 hour prior  - Staged PCI on Tuesday    Dispo: tele to wait for staged PCI.

## 2019-09-28 NOTE — PROGRESS NOTE ADULT - SUBJECTIVE AND OBJECTIVE BOX
PATIENT:  LAKSHMI JUDD  [id number]    CHIEF COMPLAINT:  Patient is a 84y old  Male who presents with a chief complaint of nstemi (28 Sep 2019 15:34)      INTERVAL HISTORYOVERNIGHT EVENTS:  Had sustained v tach, on amio drip now. no chest pain or sob.        MEDICATIONS:  MEDICATIONS  (STANDING):  amiodarone Infusion 1 mG/Min (33.333 mL/Hr) IV Continuous <Continuous>  amiodarone Infusion 0.5 mG/Min (16.667 mL/Hr) IV Continuous <Continuous>  aspirin  chewable 81 milliGRAM(s) Oral daily  atorvastatin 80 milliGRAM(s) Oral at bedtime  clopidogrel Tablet 75 milliGRAM(s) Oral daily  enoxaparin Injectable 40 milliGRAM(s) SubCutaneous daily  lisinopril 10 milliGRAM(s) Oral daily  metoprolol tartrate 25 milliGRAM(s) Oral two times a day  sodium chloride 0.9%. 1000 milliLiter(s) (150 mL/Hr) IV Continuous <Continuous>    MEDICATIONS  (PRN):  nitroglycerin     SubLingual 0.4 milliGRAM(s) SubLingual every 5 minutes PRN Chest Pain      ALLERGIES:  Allergies    iodinated radiocontrast agents (Hives)    Intolerances        OBJECTIVE:  ICU Vital Signs Last 24 Hrs  T(C): 36.4 (28 Sep 2019 21:40), Max: 36.9 (28 Sep 2019 00:26)  T(F): 97.5 (28 Sep 2019 21:40), Max: 98.5 (28 Sep 2019 00:26)  HR: 64 (28 Sep 2019 22:00) (64 - 88)  BP: 114/54 (28 Sep 2019 22:00) (114/54 - 156/70)  BP(mean): 80 (28 Sep 2019 22:00) (80 - 89)  ABP: 133/62 (28 Sep 2019 18:09) (106/54 - 133/62)  ABP(mean): --  RR: 18 (28 Sep 2019 22:00) (18 - 20)  SpO2: 96% (28 Sep 2019 22:00) (96% - 99%)      Adult Advanced Hemodynamics Last 24 Hrs  CVP(mm Hg): --  CVP(cm H2O): --  CO: --  CI: --  PA: --  PA(mean): --  PCWP: --  SVR: --  SVRI: --  PVR: --  PVRI: --  CAPILLARY BLOOD GLUCOSE        CAPILLARY BLOOD GLUCOSE        I&O's Summary    27 Sep 2019 07:  -  28 Sep 2019 07:00  --------------------------------------------------------  IN: 0 mL / OUT: 275 mL / NET: -275 mL    28 Sep 2019 07:  -  28 Sep 2019 22:46  --------------------------------------------------------  IN: 333.3 mL / OUT: 725 mL / NET: -391.7 mL      Daily Height in cm: 172.72 (28 Sep 2019 21:40)    Daily Weight in k (28 Sep 2019 05:29)    PHYSICAL EXAMINATION:  General: WN/WD NAD  HEENT: PERRLA, EOMI, moist mucous membranes  Neurology: A&Ox3, nonfocal, CRUZ x 4  Respiratory: CTA B/L, normal respiratory effort, no wheezes, crackles, rales  CV: RRR, S1S2, no murmurs, rubs or gallops  Abdominal: Soft, NT, ND +BS, Last BM  Extremities: No edema, + peripheral pulses  Incisions:   Tubes:    LABS:                          14.2   8.33  )-----------( 157      ( 27 Sep 2019 04:45 )             42.1         138  |  103  |  23<H>  ----------------------------<  92  4.4   |  23  |  0.7    Ca    8.0<L>      28 Sep 2019 06:19  Phos  2.6       Mg     2.0         TPro  5.3<L>  /  Alb  3.2<L>  /  TBili  0.6  /  DBili  x   /  AST  155<H>  /  ALT  53<H>  /  AlkPhos  30      LIVER FUNCTIONS - ( 27 Sep 2019 04:45 )  Alb: 3.2 g/dL / Pro: 5.3 g/dL / ALK PHOS: 30 U/L / ALT: 53 U/L / AST: 155 U/L / GGT: x               CARDIAC MARKERS ( 27 Sep 2019 04:45 )  x     / 2.05 ng/mL / x     / x     / x              TELEMETRY:     EKG:     IMAGING:

## 2019-09-28 NOTE — PROGRESS NOTE ADULT - SUBJECTIVE AND OBJECTIVE BOX
Subjective:pt feels well and is ambulating with no cp nor sob.  pt's right wrist feels fine.        MEDICATIONS  (STANDING):  aspirin  chewable 81 milliGRAM(s) Oral daily  atorvastatin 80 milliGRAM(s) Oral at bedtime  clopidogrel Tablet 75 milliGRAM(s) Oral daily  enoxaparin Injectable 40 milliGRAM(s) SubCutaneous daily  lisinopril 10 milliGRAM(s) Oral daily  metoprolol tartrate 25 milliGRAM(s) Oral two times a day  sodium chloride 0.9%. 1000 milliLiter(s) (150 mL/Hr) IV Continuous <Continuous>    MEDICATIONS  (PRN):  nitroglycerin     SubLingual 0.4 milliGRAM(s) SubLingual every 5 minutes PRN Chest Pain            Vital Signs Last 24 Hrs  T(C): 36.8 (28 Sep 2019 13:07), Max: 36.9 (28 Sep 2019 00:26)  T(F): 98.3 (28 Sep 2019 13:07), Max: 98.5 (28 Sep 2019 00:26)  HR: 72 (28 Sep 2019 13:07) (62 - 74)  BP: 135/64 (28 Sep 2019 13:07) (102/59 - 135/64)  BP(mean): 75 (27 Sep 2019 20:00) (75 - 83)  RR: 20 (28 Sep 2019 13:07) (20 - 31)  SpO2: 96% (27 Sep 2019 21:00) (96% - 96%)             REVIEW OF SYSTEMS:  CONSTITUTIONAL: no fever, no chills, no diaphoresis  CARDIOLOGY: no chest pain, no SOB, no palpitation, no diaphoresis, no faint   RESPIRATORY: no dyspnea, no wheeze, no orthopnea, no PND   NEUROLOGICAL: no dizziness, headache, focal deficits to report.  GI: no abdominal pain, no dyspepsia, no nausea, no vomiting, no diarrhea.    HEENT: no congestion, no nasal bleeding  SKIN: no ecchymosis             PHYSICAL EXAM:  · CONSTITUTIONAL: Looks stable  . NECK: Supple, no JVD, no bruit on either carotid side   · RESPIRATORY: Normal air entry to lung base, no wheeze, no crackle, no wet rales  · CARDIOVASCULAR: Normal S1, A2, P2, no murmur, no click, regular rate,  no rub,  · EXTREMITIES: No cyanosis, no clubbing, no edema  · VASCULAR: Pulses are regular, equal, bilateral in upper and lower extremities rt radial is 2 pluse  	  TELEMETRY: nsr, 3 4 beats vt and 1 14 beat vt overnight.     ECG:< from: 12 Lead ECG (09.27.19 @ 07:24) >  Diagnosis Line Normal sinus rhythm  Low voltage QRS  Inferior infarct , age undetermined  Cannot rule out Anteroseptal infarct , age undetermined  Abnormal ECG      < end of copied text >      TTE:< from: Transthoracic Echocardiogram (09.27.19 @ 07:17) >  Summary:   1. Left ventricular ejection fraction, by visual estimation, is 35 to   40%.   2. Mid anteroseptal segment and entire apex are abnormal as described   above.   3. Mildly increased LV wall thickness.   4. Mild mitral annular calcification.   5. PSAP at least 35.   6. Sclerotic aortic valve with normal opening.   7. Dilatation of the ascending aorta.   8. There is mild aortic root calcification.    < end of copied text >      LABS:                        14.2   8.33  )-----------( 157      ( 27 Sep 2019 04:45 )             42.1     09-28    138  |  103  |  23<H>  ----------------------------<  92  4.4   |  23  |  0.7    Ca    8.0<L>      28 Sep 2019 06:19  Phos  2.6     09-28  Mg     2.0     09-28    TPro  5.3<L>  /  Alb  3.2<L>  /  TBili  0.6  /  DBili  x   /  AST  155<H>  /  ALT  53<H>  /  AlkPhos  30  09-27    CARDIAC MARKERS ( 27 Sep 2019 04:45 )  x     / 2.05 ng/mL / x     / x     / x              I&O's Summary    27 Sep 2019 07:01  -  28 Sep 2019 07:00  --------------------------------------------------------  IN: 0 mL / OUT: 275 mL / NET: -275 mL      BNP  RADIOLOGY & ADDITIONAL STUDIES:    IMPRESSION AND PLAN:

## 2019-09-29 NOTE — PROGRESS NOTE ADULT - SUBJECTIVE AND OBJECTIVE BOX
Subjective: pt feels well with no cp nor sob.  pt had rapid heart rate on monitor yesterday for 40 beats, pt unaware.  pt was placed on amiodarone by Dr. Chavez and transferred to the ccu.  pt continues to feel well.            MEDICATIONS  (STANDING):  amiodarone    Tablet 200 milliGRAM(s) Oral two times a day  amiodarone Infusion 1 mG/Min (33.333 mL/Hr) IV Continuous <Continuous>  amiodarone Infusion 0.5 mG/Min (16.667 mL/Hr) IV Continuous <Continuous>  aspirin  chewable 81 milliGRAM(s) Oral daily  atorvastatin 80 milliGRAM(s) Oral at bedtime  chlorhexidine 4% Liquid 1 Application(s) Topical two times a day  clopidogrel Tablet 75 milliGRAM(s) Oral daily  enoxaparin Injectable 40 milliGRAM(s) SubCutaneous daily  levothyroxine 200 MICROGram(s) Oral daily  lisinopril 10 milliGRAM(s) Oral daily  metoprolol tartrate 12.5 milliGRAM(s) Oral two times a day  sodium chloride 0.9%. 1000 milliLiter(s) (150 mL/Hr) IV Continuous <Continuous>    MEDICATIONS  (PRN):  nitroglycerin     SubLingual 0.4 milliGRAM(s) SubLingual every 5 minutes PRN Chest Pain            Vital Signs Last 24 Hrs  T(C): 36.7 (29 Sep 2019 11:00), Max: 36.8 (28 Sep 2019 20:30)  T(F): 98 (29 Sep 2019 11:00), Max: 98.2 (28 Sep 2019 20:30)  HR: 60 (29 Sep 2019 15:00) (52 - 88)  BP: 124/55 (29 Sep 2019 15:00) (99/53 - 156/70)  BP(mean): 83 (29 Sep 2019 15:00) (67 - 98)  RR: 27 (29 Sep 2019 15:00) (18 - 32)  SpO2: 97% (29 Sep 2019 15:00) (95% - 99%)             REVIEW OF SYSTEMS:  CONSTITUTIONAL: no fever, no chills, no diaphoresis  CARDIOLOGY: no chest pain, no SOB, no palpitation, no diaphoresis, no faint   RESPIRATORY: no dyspnea, no wheeze, no orthopnea, no PND   NEUROLOGICAL: no dizziness, headache, focal deficits to report.  GI: no abdominal pain, no dyspepsia, no nausea, no vomiting, no diarrhea.    HEENT: no congestion, no nasal bleeding  SKIN: no ecchymosis             PHYSICAL EXAM:  · CONSTITUTIONAL: Looks stable, in no diress  . NECK: Supple, no JVD, no bruit on either carotid side   · RESPIRATORY: Normal air entry to lung base, no wheeze, no crackle, no wet rales  · CARDIOVASCULAR: Normal S1, A2, P2, no murmur, no click, regular rate,  no rub,  · EXTREMITIES: No cyanosis, no clubbing, no edema  · VASCULAR: Pulses are regular, equal, bilateral in upper and lower extremities  	  TELEMETRY:nsr, vt vs svt with abberancy    ECG:< from: 12 Lead ECG (09.28.19 @ 07:57) >  Diagnosis Line Normal sinus rhythm  Left axis deviation  Cannot rule out Inferior infarct , age undetermined  Cannot rule out Anteroseptal infarct , age undetermined  T wave abnormality, consider lateral ischemia  Abnormal ECG    < end of copied text >      TTE:  < from: Transthoracic Echocardiogram (09.27.19 @ 07:17) >  Summary:   1. Left ventricular ejection fraction, by visual estimation, is 35 to   40%.   2. Mid anteroseptal segment and entire apex are abnormal as described   above.   3. Mildly increased LV wall thickness.   4. Mild mitral annular calcification.   5. PSAP at least 35.   6. Sclerotic aortic valve with normal opening.   7. Dilatation of the ascending aorta.   8. There is mild aortic root calcification.    < end of copied text >    LABS:    09-29    137  |  106  |  19  ----------------------------<  125<H>  5.0   |  22  |  0.8    Ca    8.1<L>      29 Sep 2019 05:21  Phos  2.6     09-28  Mg     2.0     09-28              I&O's Summary    28 Sep 2019 07:01  -  29 Sep 2019 07:00  --------------------------------------------------------  IN: 483.5 mL / OUT: 1125 mL / NET: -641.5 mL    29 Sep 2019 07:01  -  29 Sep 2019 15:33  --------------------------------------------------------  IN: 373.6 mL / OUT: 500 mL / NET: -126.4 mL      BNP  RADIOLOGY & ADDITIONAL STUDIES:    IMPRESSION AND PLAN:

## 2019-09-29 NOTE — PROGRESS NOTE ADULT - SUBJECTIVE AND OBJECTIVE BOX
LAKSHMI JUDD  84y, Male  Allergy: iodinated radiocontrast agents (Hives)    Hospital Day: 3d    Patient seen and examined earlier today. Transferred back to CCU overnight due to episode of non-sustained Vtach.    PMH/PSH:  PAST MEDICAL & SURGICAL HISTORY:  Neuropathy  TIA (transient ischemic attack)  Left carotid stenosis  Lyme disease  Hypothyroidism  Coronary artery disease    VITALS:  T(F): 98 (09-29-19 @ 11:00), Max: 98.3 (09-28-19 @ 13:07)  HR: 58 (09-29-19 @ 11:00)  BP: 120/58 (09-29-19 @ 11:00) (99/53 - 156/70)  RR: 29 (09-29-19 @ 11:00)  SpO2: 98% (09-29-19 @ 11:00)    TESTS & MEASUREMENTS:  Weight (Kg): 93.4 (09-28-19 @ 21:40)  BMI (kg/m2): 31.3 (09-28)    09-27-19 @ 07:01  -  09-28-19 @ 07:00  --------------------------------------------------------  IN: 0 mL / OUT: 275 mL / NET: -275 mL    09-28-19 @ 07:01  -  09-29-19 @ 07:00  --------------------------------------------------------  IN: 483.5 mL / OUT: 1125 mL / NET: -641.5 mL    09-29    137  |  106  |  19  ----------------------------<  125<H>  5.0   |  22  |  0.8    Ca    8.1<L>      29 Sep 2019 05:21  Phos  2.6     09-28  Mg     2.0     09-28    RECENT DIAGNOSTIC ORDERS:  Basic Metabolic Panel: AM Sched. Collection: 30-Sep-2019 04:30 (09-28-19 @ 21:44)  12 Lead ECG (09-28-19 @ 21:53)  12 Lead ECG (09-28-19 @ 21:53)      MEDICATIONS:  MEDICATIONS  (STANDING):  amiodarone Infusion 1 mG/Min (33.333 mL/Hr) IV Continuous <Continuous>  amiodarone Infusion 0.5 mG/Min (16.667 mL/Hr) IV Continuous <Continuous>  aspirin  chewable 81 milliGRAM(s) Oral daily  atorvastatin 80 milliGRAM(s) Oral at bedtime  chlorhexidine 4% Liquid 1 Application(s) Topical two times a day  clopidogrel Tablet 75 milliGRAM(s) Oral daily  enoxaparin Injectable 40 milliGRAM(s) SubCutaneous daily  levothyroxine 200 MICROGram(s) Oral daily  lisinopril 10 milliGRAM(s) Oral daily  metoprolol tartrate 25 milliGRAM(s) Oral two times a day  sodium chloride 0.9%. 1000 milliLiter(s) (150 mL/Hr) IV Continuous <Continuous>    MEDICATIONS  (PRN):  nitroglycerin     SubLingual 0.4 milliGRAM(s) SubLingual every 5 minutes PRN Chest Pain    HOME MEDICATIONS:  Mims Thyroid 120 mg oral tablet (09-26)  aspirin 81 mg oral tablet, chewable (09-26)  Crestor 20 mg oral tablet (09-26)  lisinopril 20 mg oral tablet (09-26)      REVIEW OF SYSTEMS:  All other review of systems is negative unless indicated above.     PHYSICAL EXAM:  GENERAL: NAD  HEENT: No Swelling  CHEST/LUNG: Good air entry, No wheezing  HEART: RRR, No murmurs  ABDOMEN: Soft, Bowel sounds present  EXTREMITIES:  No clubbing

## 2019-09-29 NOTE — PROGRESS NOTE ADULT - PROBLEM SELECTOR PLAN 1
pt s/p stent to lad for staged procedure on Tuesday of lcx  keep on tele  cont current meds
pt s/p lad stent and for staged lcx stent on Tuesday.  pt asymptomatic but had an arrhythmia and is on amiodarone iv.  pt to completee load and start amio 200 po q12  f/u ep  keep in icu  keep MG > 2 and K+ 4 to 5 as doing  decrease metoprolol to 12.5 po q12 with bradycardia  start prednisone tomorrow am 60 mgs po q12 x 3 doase  f/u ekg today and check qtc to confirm not prolonging

## 2019-09-29 NOTE — PROGRESS NOTE ADULT - ASSESSMENT
83 yo M with hx of CAD s/p PCIx2, lyme disease, hypothyroidism, multiple TIAs, L carotid stenosis, neuropathy presenting with NSTEMI. Had successful PCI to proximal LAD.  Staged PCI of distal LCX/OM2 later this admission    1. CAD c/b NSTEMI: On amio drip for episode of non-sustained V-Tack 09/28 PM. Patient had PCI to proximal LAD 09/26. Staged PCI of distal LCX/OM2 scheduled forTuesday. Echo reviewed (35-40% EF). Cont DAPT, BB, ACEi.     #Progress Note Handoff  Pending (specify): Repeat PCI  Family discussion: Discussed with pt regarding plan for PCI  Disposition: TBD

## 2019-09-30 NOTE — PROGRESS NOTE ADULT - SUBJECTIVE AND OBJECTIVE BOX
PATIENT:  LAKSHMI JUDD  [id number]    CHIEF COMPLAINT:  Patient is a 84y old  Male who presents with a chief complaint of cp (30 Sep 2019 09:58)      INTERVAL HISTORYOVERNIGHT EVENTS:  No events overnight.         MEDICATIONS:  MEDICATIONS  (STANDING):  amiodarone    Tablet 200 milliGRAM(s) Oral two times a day  amiodarone Infusion 1 mG/Min (33.333 mL/Hr) IV Continuous <Continuous>  amiodarone Infusion 0.5 mG/Min (16.667 mL/Hr) IV Continuous <Continuous>  aspirin  chewable 81 milliGRAM(s) Oral daily  atorvastatin 80 milliGRAM(s) Oral at bedtime  chlorhexidine 4% Liquid 1 Application(s) Topical two times a day  clopidogrel Tablet 75 milliGRAM(s) Oral daily  enoxaparin Injectable 40 milliGRAM(s) SubCutaneous daily  levothyroxine 200 MICROGram(s) Oral daily  lisinopril 10 milliGRAM(s) Oral daily  metoprolol tartrate 12.5 milliGRAM(s) Oral two times a day  predniSONE   Tablet 60 milliGRAM(s) Oral every 12 hours  sodium chloride 0.9%. 1000 milliLiter(s) (150 mL/Hr) IV Continuous <Continuous>    MEDICATIONS  (PRN):  nitroglycerin     SubLingual 0.4 milliGRAM(s) SubLingual every 5 minutes PRN Chest Pain      ALLERGIES:  Allergies    iodinated radiocontrast agents (Hives)    Intolerances        OBJECTIVE:  ICU Vital Signs Last 24 Hrs  T(C): 35.6 (30 Sep 2019 08:00), Max: 37.4 (29 Sep 2019 18:00)  T(F): 96 (30 Sep 2019 08:00), Max: 99.3 (29 Sep 2019 18:00)  HR: 56 (30 Sep 2019 08:00) (52 - 72)  BP: 137/66 (30 Sep 2019 08:00) (100/49 - 149/73)  BP(mean): 92 (30 Sep 2019 08:00) (69 - 112)  ABP: --  ABP(mean): --  RR: 19 (30 Sep 2019 08:00) (18 - 22)  SpO2: 96% (30 Sep 2019 08:00) (96% - 99%)      Adult Advanced Hemodynamics Last 24 Hrs  CVP(mm Hg): --  CVP(cm H2O): --  CO: --  CI: --  PA: --  PA(mean): --  PCWP: --  SVR: --  SVRI: --  PVR: --  PVRI: --  CAPILLARY BLOOD GLUCOSE        CAPILLARY BLOOD GLUCOSE        I&O's Summary    29 Sep 2019 07:  -  30 Sep 2019 07:00  --------------------------------------------------------  IN: 543.7 mL / OUT: 1050 mL / NET: -506.3 mL    30 Sep 2019 07:  -  30 Sep 2019 10:06  --------------------------------------------------------  IN: 0 mL / OUT: 250 mL / NET: -250 mL      Daily     Daily Weight in k.5 (30 Sep 2019 06:00)    PHYSICAL EXAMINATION:  General: WN/WD NAD  HEENT: PERRLA, EOMI, moist mucous membranes  Neurology: A&Ox3, nonfocal, CRUZ x 4  Respiratory: CTA B/L, normal respiratory effort, no wheezes, crackles, rales  CV: RRR, S1S2, no murmurs, rubs or gallops  Abdominal: Soft, NT, ND   Extremities: No edema  Incisions:   Tubes:    LABS:          137  |  103  |  13  ----------------------------<  111<H>  5.2<H>   |  22  |  0.7    Ca    8.3<L>      30 Sep 2019 05:35  Phos  4.0       Mg     2.1                         TELEMETRY:     EKG:     IMAGING: PATIENT:  LAKSHMI JUDD  [id number]    CHIEF COMPLAINT:  Patient is a 84y old  Male who presents with a chief complaint of cp (30 Sep 2019 09:58)      INTERVAL HISTORYOVERNIGHT EVENTS:  No events overnight. No active complaints    MEDICATIONS:  MEDICATIONS  (STANDING):  amiodarone    Tablet 200 milliGRAM(s) Oral two times a day  amiodarone Infusion 1 mG/Min (33.333 mL/Hr) IV Continuous <Continuous>  amiodarone Infusion 0.5 mG/Min (16.667 mL/Hr) IV Continuous <Continuous>  aspirin  chewable 81 milliGRAM(s) Oral daily  atorvastatin 80 milliGRAM(s) Oral at bedtime  chlorhexidine 4% Liquid 1 Application(s) Topical two times a day  clopidogrel Tablet 75 milliGRAM(s) Oral daily  enoxaparin Injectable 40 milliGRAM(s) SubCutaneous daily  levothyroxine 200 MICROGram(s) Oral daily  lisinopril 10 milliGRAM(s) Oral daily  metoprolol tartrate 12.5 milliGRAM(s) Oral two times a day  predniSONE   Tablet 60 milliGRAM(s) Oral every 12 hours  sodium chloride 0.9%. 1000 milliLiter(s) (150 mL/Hr) IV Continuous <Continuous>    MEDICATIONS  (PRN):  nitroglycerin     SubLingual 0.4 milliGRAM(s) SubLingual every 5 minutes PRN Chest Pain    ALLERGIES:  Allergies    iodinated radiocontrast agents (Hives)    OBJECTIVE:  ICU Vital Signs Last 24 Hrs  T(C): 35.6 (30 Sep 2019 08:00), Max: 37.4 (29 Sep 2019 18:00)  T(F): 96 (30 Sep 2019 08:00), Max: 99.3 (29 Sep 2019 18:00)  HR: 56 (30 Sep 2019 08:00) (52 - 72)  BP: 137/66 (30 Sep 2019 08:00) (100/49 - 149/73)  BP(mean): 92 (30 Sep 2019 08:00) (69 - 112)  RR: 19 (30 Sep 2019 08:00) (18 - 22)  SpO2: 96% (30 Sep 2019 08:00) (96% - 99%)    29 Sep 2019 07:01  -  30 Sep 2019 07:00  --------------------------------------------------------  IN: 543.7 mL / OUT: 1050 mL / NET: -506.3 mL    30 Sep 2019 07:01  -  30 Sep 2019 10:06  --------------------------------------------------------  IN: 0 mL / OUT: 250 mL / NET: -250 mL    Daily Weight in k.5 (30 Sep 2019 06:00)    PHYSICAL EXAMINATION:  General: WN/WD NAD  HEENT: PERRLA, EOMI, moist mucous membranes  Neurology: A&Ox3, nonfocal, CRUZ x 4  Respiratory: CTA B/L, normal respiratory effort, no wheezes, crackles, rales  CV: RRR, S1S2, no murmurs, rubs or gallops  Abdominal: Soft, NT, ND   Extremities: No edema, no clubbing or cyanosis    137  |  103  |  13  ----------------------------<  111<H>  5.2<H>   |  22  |  0.7    Ca    8.3<L>      30 Sep 2019 05:35  Phos  4.0       Mg     2.1

## 2019-09-30 NOTE — PROGRESS NOTE ADULT - SUBJECTIVE AND OBJECTIVE BOX
SUBJ:pt stable foing well on amiodarone.     No further events.  For intervention of LCX on Tuesday.         MEDICATIONS  (STANDING):  amiodarone    Tablet 200 milliGRAM(s) Oral two times a day  amiodarone Infusion 1 mG/Min (33.333 mL/Hr) IV Continuous <Continuous>  amiodarone Infusion 0.5 mG/Min (16.667 mL/Hr) IV Continuous <Continuous>  aspirin  chewable 81 milliGRAM(s) Oral daily  atorvastatin 80 milliGRAM(s) Oral at bedtime  chlorhexidine 4% Liquid 1 Application(s) Topical two times a day  clopidogrel Tablet 75 milliGRAM(s) Oral daily  enoxaparin Injectable 40 milliGRAM(s) SubCutaneous daily  levothyroxine 200 MICROGram(s) Oral daily  lisinopril 10 milliGRAM(s) Oral daily  metoprolol tartrate 12.5 milliGRAM(s) Oral two times a day  predniSONE   Tablet 60 milliGRAM(s) Oral every 12 hours  sodium chloride 0.9%. 1000 milliLiter(s) (150 mL/Hr) IV Continuous <Continuous>    MEDICATIONS  (PRN):  nitroglycerin     SubLingual 0.4 milliGRAM(s) SubLingual every 5 minutes PRN Chest Pain            Vital Signs Last 24 Hrs  T(C): 35.6 (30 Sep 2019 08:00), Max: 37.4 (29 Sep 2019 18:00)  T(F): 96 (30 Sep 2019 08:00), Max: 99.3 (29 Sep 2019 18:00)  HR: 56 (30 Sep 2019 08:00) (52 - 72)  BP: 137/66 (30 Sep 2019 08:00) (100/49 - 149/73)  BP(mean): 92 (30 Sep 2019 08:00) (69 - 112)  RR: 19 (30 Sep 2019 08:00) (18 - 23)  SpO2: 96% (30 Sep 2019 08:00) (96% - 99%)     REVIEW OF SYSTEMS:  CONSTITUTIONAL: No fever, weight loss, or fatigue  CARDIOLOGY: PAtient denies chest pain, shortness of breath or syncopal episodes.   RESPIRATORY: denies shortness of breath, wheezeing.   NEUROLOGICAL: NO weakness, no focal deficits to report.  ENDOCRINOLOGICAL: no recent change in diabetic medications.   GI: no BRBPR, no N,V,diarrhea.    PSYCHIATRY: normal mood and affect  HEENT: no nasal discharge, no ecchymosis  SKIN: no ecchymosis, no breakdown  MUSCULOSKELETAL: Full range of motion x4.        PHYSICAL EXAM:  · CONSTITUTIONAL:	Well-developed, well nourished    BMI-  ·RESPIRATORY:   airway patent; breath sounds equal; good air movement; respirations non-labored; clear to auscultation bilaterally; no chest wall tenderness; no intercostal retractions; no rales,rhonchi or wheeze  · CARDIOVASCULAR	regular rate and rhythm  no rub  no murmur  normal PMI  · EXTREMITIES: No cyanosis, clubbing or edema  · VASCULAR: 	Equal and normal pulses (carotid, femoral, dorsalis pedis)  	  TELEMETRY:    ECG:    TTE:    LABS:    09-30    137  |  103  |  13  ----------------------------<  111<H>  5.2<H>   |  22  |  0.7    Ca    8.3<L>      30 Sep 2019 05:35  Phos  4.0     09-29  Mg     2.1     09-29              I&O's Summary    29 Sep 2019 07:01  -  30 Sep 2019 07:00  --------------------------------------------------------  IN: 543.7 mL / OUT: 1050 mL / NET: -506.3 mL    30 Sep 2019 07:01  -  30 Sep 2019 09:59  --------------------------------------------------------  IN: 0 mL / OUT: 250 mL / NET: -250 mL      BNP  RADIOLOGY & ADDITIONAL STUDIES:    IMPRESSION AND PLAN:    Pt is stable doing well.    on amiodarone.   We will do intervention of LCX at 10 am.   Doing well. Continue BB  prep with prednisone.

## 2019-09-30 NOTE — PROGRESS NOTE ADULT - SUBJECTIVE AND OBJECTIVE BOX
Cardiology Follow up    LAKSHMI JUDD   84y Male  PAST MEDICAL & SURGICAL HISTORY:  Neuropathy  TIA (transient ischemic attack)  Left carotid stenosis  Lyme disease  Hypothyroidism  Coronary artery disease       HPI:  83 yo M with hx of CAD s/p PCIx2, lyme disease, hypothyroidism, multiple TIAs, L carotid stenosis, neuropathy who presents with gradual onset of intermittent, midsternal, moderate chest tightness radiating to back and associated sob that started yesterday worse on exertion today. Pt received  and sublingual with no improvement.    Pt had a CT abd w/ IV contrast on Monday for workup of kidney mass and developed hives that have responded to steroids and benadryl. No prior hx of allergic reaction to IV contrast. Otherwise no fever, chills, cough, abd pain, leg pain/swelling. (26 Sep 2019 18:51)    Allergies    iodinated radiocontrast agents (Hives)    Intolerances      Patient without complaints. Pt ambulated without issues/symptoms  Denies CP, SOB, palpitations, or dizziness  No events on telemetry overnight    Vital Signs Last 24 Hrs  T(C): 35.6 (30 Sep 2019 08:00), Max: 37.4 (29 Sep 2019 18:00)  T(F): 96 (30 Sep 2019 08:00), Max: 99.3 (29 Sep 2019 18:00)  HR: 56 (30 Sep 2019 08:00) (52 - 72)  BP: 137/66 (30 Sep 2019 08:00) (100/49 - 149/73)  BP(mean): 92 (30 Sep 2019 08:00) (69 - 112)  RR: 19 (30 Sep 2019 08:00) (18 - 22)  SpO2: 96% (30 Sep 2019 08:00) (96% - 99%)    MEDICATIONS  (STANDING):  amiodarone    Tablet 200 milliGRAM(s) Oral two times a day  amiodarone Infusion 1 mG/Min (33.333 mL/Hr) IV Continuous <Continuous>  amiodarone Infusion 0.5 mG/Min (16.667 mL/Hr) IV Continuous <Continuous>  aspirin  chewable 81 milliGRAM(s) Oral daily  atorvastatin 80 milliGRAM(s) Oral at bedtime  chlorhexidine 4% Liquid 1 Application(s) Topical two times a day  clopidogrel Tablet 75 milliGRAM(s) Oral daily  enoxaparin Injectable 40 milliGRAM(s) SubCutaneous daily  levothyroxine 200 MICROGram(s) Oral daily  lisinopril 10 milliGRAM(s) Oral daily  metoprolol tartrate 12.5 milliGRAM(s) Oral two times a day  predniSONE   Tablet 60 milliGRAM(s) Oral every 12 hours  sodium chloride 0.9%. 1000 milliLiter(s) (150 mL/Hr) IV Continuous <Continuous>    MEDICATIONS  (PRN):  nitroglycerin     SubLingual 0.4 milliGRAM(s) SubLingual every 5 minutes PRN Chest Pain      REVIEW OF SYSTEMS:          CONSTITUTIONAL: No weakness, fevers or chills          EYES/ENT: No visual changes;  No vertigo or throat pain           NECK: No pain or stiffness          RESPIRATORY: No cough, wheezing, hemoptysis          CARDIOVASCULAR: no pain, no VAZQUEZ, no palpitations           GASTROINTESTINAL: No abdominal or epigastric pain. No nausea, vomiting, or hematemesis;           GENITOURINARY: No dysuria, frequency or hematuria          NEUROLOGICAL: No numbness or weakness          SKIN: No itching, rashes    PHYSICAL EXAM:           CONSTITUTIONAL: Well-developed; well-nourished; in no acute distress  	SKIN: warm, dry  	HEAD: Normocephalic; atraumatic  	EYES: PERRL.  	ENT: No nasal discharge, airway clear, mucous membranes moist  	NECK: Supple; non tender.  	CARD: +S1, +S2, no murmurs, gallops, or rubs. (Regular) rate and rhythm    	RESP: No wheezes, rales or rhonchi. CTA B/L  	ABD: soft ntnd, + BS x 4 quadrants  	EXT: moves all extremities,  no clubbing, cyanosis or edema  	NEURO: Alert and oriented x3, no focal deficits          PSYCH: Cooperative, appropriate          VASCULAR:  + Rad / + PTs / + DPs          EXTREMITY:  	   Right Radial: Dressing removed, + pulses, access site soft, no hematoma, no pain, no numbness, no signs and symptoms of infection             ECG:   Ventricular Rate 54 BPM    Atrial Rate 54 BPM    P-R Interval 128 ms    QRS Duration 92 ms    Q-T Interval 460 ms    QTC Calculation(Bezet) 436 ms    P Axis 18 degrees    R Axis 11 degrees    T Axis 7 degrees    Diagnosis Line Sinus bradycardia  Septal infarct , age undetermined  Baseline artifact  Abnormal ECG    Confirmed by Sharlene Art MD [id number]) on 9/30/2019 8:37:23 AM                                                                                                                  2D ECHO:  EXAM:  2-D ECHO (TTE) COMPLETE        PROCEDURE DATE:  09/27/2019      INTERPRETATION:  REPORT:    TRANSTHORACIC ECHOCARDIOGRAM REPORT         Patient Name:   LAKSHMI JUDD Accession #: [id number]  Medical Rec #:  [id number]          Height:      68.0 in 172.7 cm  YOB: 1935         Weight:      194.0 lb 88.00 kg  Patient Age:    84 years          BSA:         2.02 m²  Patient Gender: M                 BP:          105/66 mmHg       Date of Exam:        9/27/2019 7:17:35 AM  Referring Physician: [id number] MILI RAO  Sonographer:         Ori zuleta  Reading Physician:   Ernesto Davenport M.D.    Procedure:   2D Echo/Doppler/Color Doppler Complete.  Indications: R07.9 - Chest Pain, unspecified  Diagnosis:   Chest pain, unspecified - R07.9         Summary:   1. Left ventricular ejection fraction, by visual estimation, is 35 to   40%.   2. Mid anteroseptal segment and entire apex are abnormal as described   above.   3. Mildly increased LV wall thickness.   4. Mild mitral annular calcification.   5. PSAP at least 35.   6. Sclerotic aortic valve with normal opening.   7. Dilatation of the ascending aorta.   8. There is mild aortic root calcification.    PHYSICIAN INTERPRETATION:  Left Ventricle: The left ventricularinternal cavity size is normal. Left   ventricular wall thickness is mildly increased. Left ventricular ejection   fraction, by visual estimation, is 35 to 40%.       LV Wall Scoring:  The mid anteroseptal segment is akinetic. The entire apex is hypokinetic.   All  remaining scored segments are normal.    Left Atrium: Mildly enlarged left atrium.  Right Atrium: Normal right atrial size.  Mitral Valve: Structurally normal mitral valve, with normal leaflet   excursion. There is mild mitral annular calcification. Trace mitral valve   regurgitation is seen.  Tricuspid Valve: The tricuspid valve is normal in structure. Mild   tricuspid regurgitation is visualized. PSAP at least 35.  Aortic Valve: The aortic valve is trileaflet. Sclerotic aortic valve with   normal opening.  Aorta: The aortic root is normal in size and structure. There is   dilatation of the ascending aorta. There is mild aortic root   calcification.       2D AND M-MODE MEASUREMENTS (normal ranges within parentheses):  Left             Normal   Aorta/Left             Normal  Ventricle:                     Atrium:  IVSd (2D):  1.40 cm  (0.7-1.1) AoV Cusp       2.23  (1.5-2.6)  LVPWd (2D): 1.41 cm  (0.7-1.1) Separation:     cm  LVIDd (2D): 4.60 cm  (3.4-5.7) Left Atrium  4.37  (1.9-4.0)  LVIDs (2D): 3.70 cm            (Mmode):        cm  LV FS (2D):  19.6 %   (>25%)   LA Volume      30.4  IVSd        1.19 cm  (0.7-1.1) Index         ml/m²  (Mmode):                       Right  LVPWd       1.20 cm  (0.7-1.1) Ventricle:  (Mmode):                       RVd (2D):      2.49 cm  LVIDd       4.66 cm  (3.4-5.7)  (Mmode):  LVIDs       3.36 cm  (Mmode):  LV FS        27.8 %   (>25%)  (Mmode):  Relative      0.61    (<0.42)  Wall  Thickness  Rel. Wall     0.51    (<0.42)  Thickness  Mm  LV Mass      103.0  Index:        g/m²  Mmode    SPECTRAL DOPPLER ANALYSIS:  LV DIASTOLIC FUNCTION:  MV Peak E: 0.85 m/s Decel Time: 204 msec  MV Peak A: 0.58 m/s  E/A Ratio: 1.46    LVOT Vmax: 0.95 m/s  LVOT VTI:  0.19 m  LVOT Diam: 1.70 cm    Mitral Valve:  MV P1/2 Time: 59.21 msec  MV Area, PHT: 3.72 cm²    Tricuspid Valve and PA/RV Systolic Pressure: TR Max Velocity: 2.96 m/s RA   Pressure:  RVSP/PASP: 38.0 mmHg       [id number] Ernesto Davenport M.D., Electronically signed on 9/27/2019 at   11:18:55 AM              *** Final ***                    ERNETSO DAVENPORT MD  This document has been electronically signed. Sep 27 2019  7:17AM                  LABS:    09-30    137  |  103  |  13  ----------------------------<  111<H>  5.2<H>   |  22  |  0.7    Ca    8.3<L>      30 Sep 2019 05:35  Phos  4.0     09-29  Mg     2.1     09-29          Magnesium, Serum: 2.1 mg/dL [1.8 - 2.4] (09-29-19 @ 21:00)      A/P:  I discussed the case with Cardiologist Dr. Chavez and recommend the following:    NSTEMI/ S/P PCI pLAD/ 2V CAD(LAD and LCX)  	     Continue DAPT (asa 81mg daily, plavix 75mg daily),  B-Blocker, Statin Therapy, ACE                    continue current medical regimen                    keep K>4, Mg >2                   daily EKG to monitor QTC                   f/u EPS recommendations                    keep NPO p Midnight for staged PCI LCX Tues at 10am                    NS 75cc/hr x 12hrs for pre-cath hydration as per Dr. Chavez                   prednisone prep as ordered                    Patient given 30 day supply of ( Aspirin 81 mg daily and Plavix 75 mg daily ) to take at home                   Patient agreeing to take DAPT for at least one year or as directed by cardiologist                    Pt given instructions on importance of taking antiplatelet medication or risk acute stent thrombosis/death                   Post cath instructions, access site care and activity restrictions reviewed with patient                     Discussed with patient to return to hospital if experience chest pain, shortness breath, dizziness and site bleeding                   Aggressive risk factor modification, diet counseling, smoking cessation discussed with patient                       continue CCU monitoring Cardiology Follow up    LAKSHMI JUDD   84y Male  PAST MEDICAL & SURGICAL HISTORY:  Neuropathy  TIA (transient ischemic attack)  Left carotid stenosis  Lyme disease  Hypothyroidism  Coronary artery disease       HPI:  85 yo M with hx of CAD s/p PCIx2, lyme disease, hypothyroidism, multiple TIAs, L carotid stenosis, neuropathy who presents with gradual onset of intermittent, midsternal, moderate chest tightness radiating to back and associated sob that started yesterday worse on exertion today. Pt received  and sublingual with no improvement.    Pt had a CT abd w/ IV contrast on Monday for workup of kidney mass and developed hives that have responded to steroids and benadryl. No prior hx of allergic reaction to IV contrast. Otherwise no fever, chills, cough, abd pain, leg pain/swelling. (26 Sep 2019 18:51)    Allergies    iodinated radiocontrast agents (Hives)    Intolerances      Patient without complaints. Pt ambulated without issues/symptoms  Denies CP, SOB, palpitations, or dizziness  No events on telemetry overnight    Vital Signs Last 24 Hrs  T(C): 35.6 (30 Sep 2019 08:00), Max: 37.4 (29 Sep 2019 18:00)  T(F): 96 (30 Sep 2019 08:00), Max: 99.3 (29 Sep 2019 18:00)  HR: 56 (30 Sep 2019 08:00) (52 - 72)  BP: 137/66 (30 Sep 2019 08:00) (100/49 - 149/73)  BP(mean): 92 (30 Sep 2019 08:00) (69 - 112)  RR: 19 (30 Sep 2019 08:00) (18 - 22)  SpO2: 96% (30 Sep 2019 08:00) (96% - 99%)    MEDICATIONS  (STANDING):  amiodarone    Tablet 200 milliGRAM(s) Oral two times a day  amiodarone Infusion 1 mG/Min (33.333 mL/Hr) IV Continuous <Continuous>  amiodarone Infusion 0.5 mG/Min (16.667 mL/Hr) IV Continuous <Continuous>  aspirin  chewable 81 milliGRAM(s) Oral daily  atorvastatin 80 milliGRAM(s) Oral at bedtime  chlorhexidine 4% Liquid 1 Application(s) Topical two times a day  clopidogrel Tablet 75 milliGRAM(s) Oral daily  enoxaparin Injectable 40 milliGRAM(s) SubCutaneous daily  levothyroxine 200 MICROGram(s) Oral daily  lisinopril 10 milliGRAM(s) Oral daily  metoprolol tartrate 12.5 milliGRAM(s) Oral two times a day  predniSONE   Tablet 60 milliGRAM(s) Oral every 12 hours  sodium chloride 0.9%. 1000 milliLiter(s) (150 mL/Hr) IV Continuous <Continuous>    MEDICATIONS  (PRN):  nitroglycerin     SubLingual 0.4 milliGRAM(s) SubLingual every 5 minutes PRN Chest Pain      REVIEW OF SYSTEMS:          CONSTITUTIONAL: No weakness, fevers or chills          EYES/ENT: No visual changes;  No vertigo or throat pain           NECK: No pain or stiffness          RESPIRATORY: No cough, wheezing, hemoptysis          CARDIOVASCULAR: no pain, no VAZQUEZ, no palpitations           GASTROINTESTINAL: No abdominal or epigastric pain. No nausea, vomiting, or hematemesis;           GENITOURINARY: No dysuria, frequency or hematuria          NEUROLOGICAL: No numbness or weakness          SKIN: No itching, rashes    PHYSICAL EXAM:           CONSTITUTIONAL: Well-developed; well-nourished; in no acute distress  	SKIN: warm, dry  	HEAD: Normocephalic; atraumatic  	EYES: PERRL.  	ENT: No nasal discharge, airway clear, mucous membranes moist  	NECK: Supple; non tender.  	CARD: +S1, +S2, no murmurs, gallops, or rubs. (Regular) rate and rhythm    	RESP: No wheezes, rales or rhonchi. CTA B/L  	ABD: soft ntnd, + BS x 4 quadrants  	EXT: moves all extremities,  no clubbing, cyanosis or edema  	NEURO: Alert and oriented x3, no focal deficits          PSYCH: Cooperative, appropriate          VASCULAR:  + Rad / + PTs / + DPs          EXTREMITY:  	   Right Radial: Dressing removed, + pulses, access site soft, no hematoma, no pain, no numbness, no signs and symptoms of infection             ECG:   Ventricular Rate 54 BPM    Atrial Rate 54 BPM    P-R Interval 128 ms    QRS Duration 92 ms    Q-T Interval 460 ms    QTC Calculation(Bezet) 436 ms    P Axis 18 degrees    R Axis 11 degrees    T Axis 7 degrees    Diagnosis Line Sinus bradycardia  Septal infarct , age undetermined  Baseline artifact  Abnormal ECG    Confirmed by Sharlene Art MD [id number]) on 9/30/2019 8:37:23 AM                                                                                                                  2D ECHO:  EXAM:  2-D ECHO (TTE) COMPLETE        PROCEDURE DATE:  09/27/2019      INTERPRETATION:  REPORT:    TRANSTHORACIC ECHOCARDIOGRAM REPORT         Patient Name:   LAKSHMI JUDD Accession #: [id number]  Medical Rec #:  [id number]          Height:      68.0 in 172.7 cm  YOB: 1935         Weight:      194.0 lb 88.00 kg  Patient Age:    84 years          BSA:         2.02 m²  Patient Gender: M                 BP:          105/66 mmHg       Date of Exam:        9/27/2019 7:17:35 AM  Referring Physician: [id number] MILI RAO  Sonographer:         Ori zuleta  Reading Physician:   Ernesto Davenport M.D.    Procedure:   2D Echo/Doppler/Color Doppler Complete.  Indications: R07.9 - Chest Pain, unspecified  Diagnosis:   Chest pain, unspecified - R07.9         Summary:   1. Left ventricular ejection fraction, by visual estimation, is 35 to   40%.   2. Mid anteroseptal segment and entire apex are abnormal as described   above.   3. Mildly increased LV wall thickness.   4. Mild mitral annular calcification.   5. PSAP at least 35.   6. Sclerotic aortic valve with normal opening.   7. Dilatation of the ascending aorta.   8. There is mild aortic root calcification.    PHYSICIAN INTERPRETATION:  Left Ventricle: The left ventricularinternal cavity size is normal. Left   ventricular wall thickness is mildly increased. Left ventricular ejection   fraction, by visual estimation, is 35 to 40%.       LV Wall Scoring:  The mid anteroseptal segment is akinetic. The entire apex is hypokinetic.   All  remaining scored segments are normal.    Left Atrium: Mildly enlarged left atrium.  Right Atrium: Normal right atrial size.  Mitral Valve: Structurally normal mitral valve, with normal leaflet   excursion. There is mild mitral annular calcification. Trace mitral valve   regurgitation is seen.  Tricuspid Valve: The tricuspid valve is normal in structure. Mild   tricuspid regurgitation is visualized. PSAP at least 35.  Aortic Valve: The aortic valve is trileaflet. Sclerotic aortic valve with   normal opening.  Aorta: The aortic root is normal in size and structure. There is   dilatation of the ascending aorta. There is mild aortic root   calcification.       2D AND M-MODE MEASUREMENTS (normal ranges within parentheses):  Left             Normal   Aorta/Left             Normal  Ventricle:                     Atrium:  IVSd (2D):  1.40 cm  (0.7-1.1) AoV Cusp       2.23  (1.5-2.6)  LVPWd (2D): 1.41 cm  (0.7-1.1) Separation:     cm  LVIDd (2D): 4.60 cm  (3.4-5.7) Left Atrium  4.37  (1.9-4.0)  LVIDs (2D): 3.70 cm            (Mmode):        cm  LV FS (2D):  19.6 %   (>25%)   LA Volume      30.4  IVSd        1.19 cm  (0.7-1.1) Index         ml/m²  (Mmode):                       Right  LVPWd       1.20 cm  (0.7-1.1) Ventricle:  (Mmode):                       RVd (2D):      2.49 cm  LVIDd       4.66 cm  (3.4-5.7)  (Mmode):  LVIDs       3.36 cm  (Mmode):  LV FS        27.8 %   (>25%)  (Mmode):  Relative      0.61    (<0.42)  Wall  Thickness  Rel. Wall     0.51    (<0.42)  Thickness  Mm  LV Mass      103.0  Index:        g/m²  Mmode    SPECTRAL DOPPLER ANALYSIS:  LV DIASTOLIC FUNCTION:  MV Peak E: 0.85 m/s Decel Time: 204 msec  MV Peak A: 0.58 m/s  E/A Ratio: 1.46    LVOT Vmax: 0.95 m/s  LVOT VTI:  0.19 m  LVOT Diam: 1.70 cm    Mitral Valve:  MV P1/2 Time: 59.21 msec  MV Area, PHT: 3.72 cm²    Tricuspid Valve and PA/RV Systolic Pressure: TR Max Velocity: 2.96 m/s RA   Pressure:  RVSP/PASP: 38.0 mmHg       [id number] Ernesto Davenport M.D., Electronically signed on 9/27/2019 at   11:18:55 AM              *** Final ***                    ERNESTO DAVENPORT MD  This document has been electronically signed. Sep 27 2019  7:17AM                  LABS:    09-30    137  |  103  |  13  ----------------------------<  111<H>  5.2<H>   |  22  |  0.7    Ca    8.3<L>      30 Sep 2019 05:35  Phos  4.0     09-29  Mg     2.1     09-29          Magnesium, Serum: 2.1 mg/dL [1.8 - 2.4] (09-29-19 @ 21:00)      A/P:  I discussed the case with Cardiologist Dr. Chavez and recommend the following:    NSTEMI/ S/P PCI pLAD/ 2V CAD(LAD and LCX)  	     Continue DAPT (asa 81mg daily, plavix 75mg daily),  B-Blocker, Statin Therapy, ACE                    continue current medical regimen                    keep K>4, Mg >2                   daily EKG to monitor QTC                   f/u EPS recommendations                    keep NPO p Midnight for staged PCI LCX Tues at 10am                    NS 75cc/hr x 12hrs for pre-cath hydration as per Dr. Chavez                   hold am lovenox dose prior to cath                    prednisone prep as ordered                    Patient given 30 day supply of ( Aspirin 81 mg daily and Plavix 75 mg daily ) to take at home                   Patient agreeing to take DAPT for at least one year or as directed by cardiologist                    Pt given instructions on importance of taking antiplatelet medication or risk acute stent thrombosis/death                   Post cath instructions, access site care and activity restrictions reviewed with patient                     Discussed with patient to return to hospital if experience chest pain, shortness breath, dizziness and site bleeding                   Aggressive risk factor modification, diet counseling, smoking cessation discussed with patient                       continue CCU monitoring Cardiology Follow up    LAKSHMI JUDD   84y Male  PAST MEDICAL & SURGICAL HISTORY:  Neuropathy  TIA (transient ischemic attack)  Left carotid stenosis  Lyme disease  Hypothyroidism  Coronary artery disease       HPI:  83 yo M with hx of CAD s/p PCIx2, lyme disease, hypothyroidism, multiple TIAs, L carotid stenosis, neuropathy who presents with gradual onset of intermittent, midsternal, moderate chest tightness radiating to back and associated sob that started yesterday worse on exertion today. Pt received  and sublingual with no improvement.    Pt had a CT abd w/ IV contrast on Monday for workup of kidney mass and developed hives that have responded to steroids and benadryl. No prior hx of allergic reaction to IV contrast. Otherwise no fever, chills, cough, abd pain, leg pain/swelling. (26 Sep 2019 18:51)    Allergies    iodinated radiocontrast agents (Hives)    Intolerances      Patient without complaints. Pt ambulated without issues/symptoms  Denies CP, SOB, palpitations, or dizziness  No events on telemetry overnight    Vital Signs Last 24 Hrs  T(C): 35.6 (30 Sep 2019 08:00), Max: 37.4 (29 Sep 2019 18:00)  T(F): 96 (30 Sep 2019 08:00), Max: 99.3 (29 Sep 2019 18:00)  HR: 56 (30 Sep 2019 08:00) (52 - 72)  BP: 137/66 (30 Sep 2019 08:00) (100/49 - 149/73)  BP(mean): 92 (30 Sep 2019 08:00) (69 - 112)  RR: 19 (30 Sep 2019 08:00) (18 - 22)  SpO2: 96% (30 Sep 2019 08:00) (96% - 99%)    MEDICATIONS  (STANDING):  amiodarone    Tablet 200 milliGRAM(s) Oral two times a day  amiodarone Infusion 1 mG/Min (33.333 mL/Hr) IV Continuous <Continuous>  amiodarone Infusion 0.5 mG/Min (16.667 mL/Hr) IV Continuous <Continuous>  aspirin  chewable 81 milliGRAM(s) Oral daily  atorvastatin 80 milliGRAM(s) Oral at bedtime  chlorhexidine 4% Liquid 1 Application(s) Topical two times a day  clopidogrel Tablet 75 milliGRAM(s) Oral daily  enoxaparin Injectable 40 milliGRAM(s) SubCutaneous daily  levothyroxine 200 MICROGram(s) Oral daily  lisinopril 10 milliGRAM(s) Oral daily  metoprolol tartrate 12.5 milliGRAM(s) Oral two times a day  predniSONE   Tablet 60 milliGRAM(s) Oral every 12 hours  sodium chloride 0.9%. 1000 milliLiter(s) (150 mL/Hr) IV Continuous <Continuous>    MEDICATIONS  (PRN):  nitroglycerin     SubLingual 0.4 milliGRAM(s) SubLingual every 5 minutes PRN Chest Pain      REVIEW OF SYSTEMS:          CONSTITUTIONAL: No weakness, fevers or chills          EYES/ENT: No visual changes;  No vertigo or throat pain           NECK: No pain or stiffness          RESPIRATORY: No cough, wheezing, hemoptysis          CARDIOVASCULAR: no pain, no VAZQUEZ, no palpitations           GASTROINTESTINAL: No abdominal or epigastric pain. No nausea, vomiting, or hematemesis;           GENITOURINARY: No dysuria, frequency or hematuria          NEUROLOGICAL: No numbness or weakness          SKIN: No itching, rashes    PHYSICAL EXAM:           CONSTITUTIONAL: Well-developed; well-nourished; in no acute distress  	SKIN: warm, dry  	HEAD: Normocephalic; atraumatic  	EYES: PERRL.  	ENT: No nasal discharge, airway clear, mucous membranes moist  	NECK: Supple; non tender.  	CARD: +S1, +S2, no murmurs, gallops, or rubs. (Regular) rate and rhythm    	RESP: No wheezes, rales or rhonchi. CTA B/L  	ABD: soft ntnd, + BS x 4 quadrants  	EXT: moves all extremities,  no clubbing, cyanosis or edema  	NEURO: Alert and oriented x3, no focal deficits          PSYCH: Cooperative, appropriate          VASCULAR:  + Rad / + PTs / + DPs          EXTREMITY:  	   Right Radial: Dressing removed, + pulses, access site soft, no hematoma, no pain, no numbness, no signs and symptoms of infection             ECG:   Ventricular Rate 54 BPM    Atrial Rate 54 BPM    P-R Interval 128 ms    QRS Duration 92 ms    Q-T Interval 460 ms    QTC Calculation(Bezet) 436 ms    P Axis 18 degrees    R Axis 11 degrees    T Axis 7 degrees    Diagnosis Line Sinus bradycardia  Septal infarct , age undetermined  Baseline artifact  Abnormal ECG    Confirmed by Sharlene Art MD [id number]) on 9/30/2019 8:37:23 AM                                                                                                                  2D ECHO:  EXAM:  2-D ECHO (TTE) COMPLETE        PROCEDURE DATE:  09/27/2019      INTERPRETATION:  REPORT:    TRANSTHORACIC ECHOCARDIOGRAM REPORT         Patient Name:   LAKSHMI JUDD Accession #: [id number]  Medical Rec #:  [id number]          Height:      68.0 in 172.7 cm  YOB: 1935         Weight:      194.0 lb 88.00 kg  Patient Age:    84 years          BSA:         2.02 m²  Patient Gender: M                 BP:          105/66 mmHg       Date of Exam:        9/27/2019 7:17:35 AM  Referring Physician: [id number] MILI RAO  Sonographer:         Ori zuleta  Reading Physician:   Ernesto Davenport M.D.    Procedure:   2D Echo/Doppler/Color Doppler Complete.  Indications: R07.9 - Chest Pain, unspecified  Diagnosis:   Chest pain, unspecified - R07.9         Summary:   1. Left ventricular ejection fraction, by visual estimation, is 35 to   40%.   2. Mid anteroseptal segment and entire apex are abnormal as described   above.   3. Mildly increased LV wall thickness.   4. Mild mitral annular calcification.   5. PSAP at least 35.   6. Sclerotic aortic valve with normal opening.   7. Dilatation of the ascending aorta.   8. There is mild aortic root calcification.    PHYSICIAN INTERPRETATION:  Left Ventricle: The left ventricularinternal cavity size is normal. Left   ventricular wall thickness is mildly increased. Left ventricular ejection   fraction, by visual estimation, is 35 to 40%.       LV Wall Scoring:  The mid anteroseptal segment is akinetic. The entire apex is hypokinetic.   All  remaining scored segments are normal.    Left Atrium: Mildly enlarged left atrium.  Right Atrium: Normal right atrial size.  Mitral Valve: Structurally normal mitral valve, with normal leaflet   excursion. There is mild mitral annular calcification. Trace mitral valve   regurgitation is seen.  Tricuspid Valve: The tricuspid valve is normal in structure. Mild   tricuspid regurgitation is visualized. PSAP at least 35.  Aortic Valve: The aortic valve is trileaflet. Sclerotic aortic valve with   normal opening.  Aorta: The aortic root is normal in size and structure. There is   dilatation of the ascending aorta. There is mild aortic root   calcification.       2D AND M-MODE MEASUREMENTS (normal ranges within parentheses):  Left             Normal   Aorta/Left             Normal  Ventricle:                     Atrium:  IVSd (2D):  1.40 cm  (0.7-1.1) AoV Cusp       2.23  (1.5-2.6)  LVPWd (2D): 1.41 cm  (0.7-1.1) Separation:     cm  LVIDd (2D): 4.60 cm  (3.4-5.7) Left Atrium  4.37  (1.9-4.0)  LVIDs (2D): 3.70 cm            (Mmode):        cm  LV FS (2D):  19.6 %   (>25%)   LA Volume      30.4  IVSd        1.19 cm  (0.7-1.1) Index         ml/m²  (Mmode):                       Right  LVPWd       1.20 cm  (0.7-1.1) Ventricle:  (Mmode):                       RVd (2D):      2.49 cm  LVIDd       4.66 cm  (3.4-5.7)  (Mmode):  LVIDs       3.36 cm  (Mmode):  LV FS        27.8 %   (>25%)  (Mmode):  Relative      0.61    (<0.42)  Wall  Thickness  Rel. Wall     0.51    (<0.42)  Thickness  Mm  LV Mass      103.0  Index:        g/m²  Mmode    SPECTRAL DOPPLER ANALYSIS:  LV DIASTOLIC FUNCTION:  MV Peak E: 0.85 m/s Decel Time: 204 msec  MV Peak A: 0.58 m/s  E/A Ratio: 1.46    LVOT Vmax: 0.95 m/s  LVOT VTI:  0.19 m  LVOT Diam: 1.70 cm    Mitral Valve:  MV P1/2 Time: 59.21 msec  MV Area, PHT: 3.72 cm²    Tricuspid Valve and PA/RV Systolic Pressure: TR Max Velocity: 2.96 m/s RA   Pressure:  RVSP/PASP: 38.0 mmHg       [id number] Ernesto Davenport M.D., Electronically signed on 9/27/2019 at   11:18:55 AM              *** Final ***                    ERNESTO DAVENPORT MD  This document has been electronically signed. Sep 27 2019  7:17AM                  LABS:    09-30    137  |  103  |  13  ----------------------------<  111<H>  5.2<H>   |  22  |  0.7    Ca    8.3<L>      30 Sep 2019 05:35  Phos  4.0     09-29  Mg     2.1     09-29          Magnesium, Serum: 2.1 mg/dL [1.8 - 2.4] (09-29-19 @ 21:00)      A/P:  I discussed the case with Cardiologist Dr. Chavez and recommend the following:    NSTEMI/ S/P PCI pLAD/ 2V CAD(LAD and LCX)  	     Continue DAPT (asa 81mg daily, plavix 75mg daily),  B-Blocker, Statin Therapy, ACE                    continue current medical regimen                    keep K>4, Mg >2                   daily EKG to monitor QTC                   f/u EPS recommendations                    keep NPO p Midnight for staged PCI LCX Tues at 10am                    NS 75cc/hr x 12hrs for pre-cath hydration as per Dr. Chavez                   hold am lovenox dose prior to cath                    prednisone prep as ordered                    Patient given 30 day supply of ( Aspirin 81 mg daily and Plavix 75 mg daily ) to take at home                   Patient agreeing to take DAPT for at least one year or as directed by cardiologist                    Pt given instructions on importance of taking antiplatelet medication or risk acute stent thrombosis/death                   Post cath instructions, access site care and activity restrictions reviewed with patient                     Discussed with patient to return to hospital if experience chest pain, shortness breath, dizziness and site bleeding                   Aggressive risk factor modification, diet counseling, smoking cessation discussed with patient                       continue CCU monitoring     agree

## 2019-09-30 NOTE — PROGRESS NOTE ADULT - ASSESSMENT
83 yo M with hx of CAD s/p PCIx2, lyme disease, hypothyroidism, multiple TIAs, L carotid stenosis, neuropathy presenting with NSTEMI. Cath on 9/26 showed significant 2 vessel coronary artery disease (LAD, LCX) s/p successful PCI of proximal LAD. Will require Staged PCI, 2nd cath likely on tuesday. Will need contrast allergy protocol prep, see below.  Patient had a sustained run of v-tach in which he was treated with a push of amiodarone 150mg IV pushed over 10minutes and then subsequent infusions of 1mg/hour over 6 hours of amiodarone and 18 hours of .5mg/hour of amiodarone. Cath 9/31, will prep with prednisone 60 mg for contrast dye allergy.      #NSTEMI  - cath 9/26 showed significant 2 vessel coronary artery disease (LAD, LCX) s/p successful PCI of proximal LAD  - continue DAPT, statin, BB, ACEi  - HbA1c 6.4  - no need for CBC, only need to monitor Cr as per cardiology  - echo (9/27): EF 35-40%  - staged PCI of distal LCX/OM2 9/31, will prep with 60 mg predinsone    #Vtach  - off amio drip, start on PO amio    #Rash  - IV 4 decadron 9/26  - benadryl as needed  - improving since Tuesday  - prep for PCI tuesday with 60 mg predinsone     #Hypotension  - reduced lisinopril from 20 to 10 mg    Plan  - continue DAPT, statin, BB, aCEi  - No need to draw CBC, only CMP for Cr  - Staged PCI on Tuesday, prep with prednisone     Dispo: tele to wait for staged PCI. 83 yo M with hx of CAD s/p PCIx2, lyme disease, hypothyroidism, multiple TIAs, L carotid stenosis, neuropathy presenting with NSTEMI. Cath on 9/26 showed significant 2 vessel coronary artery disease (LAD, LCX) s/p successful PCI of proximal LAD. Will require Staged PCI, 2nd cath likely on tuesday. Will need contrast allergy protocol prep, see below.  Patient had a sustained run of v-tach in which he was treated with a push of amiodarone 150mg IV pushed over 10minutes and then subsequent infusions of 1mg/hour over 6 hours of amiodarone and 18 hours of .5mg/hour of amiodarone. Cath 9/31, will prep with prednisone 60 mg for contrast dye allergy.      #NSTEMI  - cath 9/26 showed significant 2 vessel coronary artery disease (LAD, LCX) s/p successful PCI of proximal LAD  - continue DAPT, statin, BB, ACEi  - HbA1c 6.4  - no need for CBC, only need to monitor Cr as per cardiology  - echo (9/27): EF 35-40%  - staged PCI of distal LCX/OM2 9/31, will prep with 60 mg predinsone    #Vtach  - off amio drip, start on PO amio    #Rash  - IV 4 decadron 9/26  - benadryl as needed  - improving since Tuesday  - prep for PCI tuesday with 60 mg predinsone     #Hypotension  - reduced lisinopril from 20 to 10 mg    Plan  - continue DAPT, statin, BB, aCEi  - No need to draw CBC, only CMP for Cr  - Staged PCI on Tuesday, prep with prednisone

## 2019-10-01 NOTE — PROGRESS NOTE ADULT - ASSESSMENT
85 yo M with hx of CAD s/p PCIx2, lyme disease, hypothyroidism, multiple TIAs, L carotid stenosis, neuropathy presenting with NSTEMI. Cath on 9/26 showed significant 2 vessel coronary artery disease (LAD, LCX) s/p successful PCI of proximal LAD. Will require Staged PCI, 2nd cath likely on tuesday. Will need contrast allergy protocol prep, see below.  Patient had a sustained run of v-tach in which he was treated with a push of amiodarone 150mg IV pushed over 10minutes and then subsequent infusions of 1mg/hour over 6 hours of amiodarone and 18 hours of .5mg/hour of amiodarone. Cath 9/31, will prep with prednisone 60 mg for contrast dye allergy.      #NSTEMI  - cath 9/26 showed significant 2 vessel coronary artery disease (LAD, LCX) s/p successful PCI of proximal LAD  - continue DAPT, statin, BB, ACEi  - HbA1c 6.4  - no need for CBC, only need to monitor Cr as per cardiology  - echo (9/27): EF 35-40%  - staged PCI of distal LCX/OM2 9/31, will prep with 60 mg predinsone    #Vtach  - off amio drip, start on PO amio  - possible EP consult after staged PCI tuesday    #Rash  - IV 4 decadron 9/26  - benadryl as needed  - improving since admission  - prep for PCI tuesday with 60 mg predinsone     #Hypotension  - reduced lisinopril from 20 to 10 mg  - BP improved    Plan  - continue DAPT, statin, BB, aCEi  - No need to draw CBC, only CMP for Cr  - Staged PCI on Tuesday, prep with prednisone

## 2019-10-01 NOTE — CHART NOTE - NSCHARTNOTEFT_GEN_A_CORE
PREOPERATIVE DAY OF PROCEDURE EVALUATION:  I have personally seen and examined the patient.  I agree with the history and physical which I have reviewed and noted any changes below.  (Signed electronically by Dr. James Chavez)  10-01-19 @ 10:20    Pre cath note:    indication:  [ ] STEMI                [ ] NSTEMI                 [ ] Acute coronary syndrome                     [ ]Unstable Angina   [ ] high risk  [ ] intermediate risk  [ ] low risk                     [ ] Stable Angina     non-invasive testing:                          Date:                     result: [ ] high risk  [ ] intermediate risk  [ ] low risk    Anti- Anginal medications:                    [ ] not used                       [ x] used                   [ ] not used but strong indication not to use    Ejection Fraction                   [ ] <29            [ ] 30-39%   [ ] 40-49%     [ x]>50%    CHF                   [ ] active (within last 14 days on meds   [ ] Chronic (on meds but no exacerbation)    COPD                   [ ] mild (on chronic bronchodilators)  [ ] moderate (on chronic steroid therapy)      [ ] severe (indication for home O2 or PACO2 >50)    Other risk factors:                       [ ] Previous MI                     [ ] CVA/ stroke                    [ ] carotid stent/ CEA                    [ ] PVD/PAD- (arterial aneurysm, non-palpable pulses, tortuous vessel with inability to insert catheter, infra-renal dissection, renal or subclavian artery stenosis)                    [x ] diabetic                    [ ] previous CABG                    [ ] Renal Failure                           15.1   9.82  )-----------( 199      ( 01 Oct 2019 05:57 )             45.5     10-01    137  |  102  |  14  ----------------------------<  144<H>  5.5<H>   |  24  |  0.6<L>    Ca    8.5      01 Oct 2019 05:57  Phos  4.0     09-29  Mg     2.2     10-01    TPro  5.6<L>  /  Alb  3.1<L>  /  TBili  0.4  /  DBili  x   /  AST  42<H>  /  ALT  108<H>  /  AlkPhos  49  10-01 PREOPERATIVE DAY OF PROCEDURE EVALUATION:  I have personally seen and examined the patient.  I agree with the history and physical which I have reviewed and noted any changes below.  (Signed electronically by Dr. James Chavez)  10-01-19 @ 10:20    Pre cath note:    indication:  [ ] STEMI                [ ] NSTEMI                 [ ] Acute coronary syndrome                     [ ]Unstable Angina   [ ] high risk  [ ] intermediate risk  [ ] low risk                     [ ] Stable Angina     non-invasive testing:                          Date:                     result: [ ] high risk  [ ] intermediate risk  [ ] low risk    Anti- Anginal medications:                    [ ] not used                       [ x] used                   [ ] not used but strong indication not to use    Ejection Fraction                   [ ] <29            [x ] 30-39%   [ ] 40-49%     [ ]>50%    CHF                   [ ] active (within last 14 days on meds   [ ] Chronic (on meds but no exacerbation)    COPD                   [ ] mild (on chronic bronchodilators)  [ ] moderate (on chronic steroid therapy)      [ ] severe (indication for home O2 or PACO2 >50)    Other risk factors:                       [ ] Previous MI                     [ ] CVA/ stroke                    [ ] carotid stent/ CEA                    [ ] PVD/PAD- (arterial aneurysm, non-palpable pulses, tortuous vessel with inability to insert catheter, infra-renal dissection, renal or subclavian artery stenosis)                    [x ] diabetic                    [ ] previous CABG                    [ ] Renal Failure                           15.1   9.82  )-----------( 199      ( 01 Oct 2019 05:57 )             45.5     10-01    137  |  102  |  14  ----------------------------<  144<H>  5.5<H>   |  24  |  0.6<L>    Ca    8.5      01 Oct 2019 05:57  Phos  4.0     09-29  Mg     2.2     10-01    TPro  5.6<L>  /  Alb  3.1<L>  /  TBili  0.4  /  DBili  x   /  AST  42<H>  /  ALT  108<H>  /  AlkPhos  49  10-01

## 2019-10-01 NOTE — CHART NOTE - NSCHARTNOTEFT_GEN_A_CORE
PRE-OP DIAGNOSIS: NSTEMI, staged PCI    PROCEDURE: [facility] with coronary angiography    Physician: ankita  Assistant: meghan Hill    ANESTHESIA TYPE:  [  ]General Anesthesia  [ x ] Sedation  [ x ] Local/Regional    ESTIMATED BLOOD LOSS:    10   mL    CONDITION  [  ] Critical  [  ] Serious  [  ]Fair  [ x ]Good      SPECIMENS REMOVED (IF APPLICABLE): N/A      IV CONTRAST:    150         mL      IMPLANTS (IF APPLICABLE)      FINDINGS    Left Heart Catheterization:  LVEF%:  LVEDP:  [ ] Normal Coronary Arteries  [ ] Luminal Irregularities  [ ] Non-obstructive CAD      LEFT HEART CATHETERIZATION                                    Left main: normal    LAD: previously placed stent patent  Diag: ostial D1 90% stenosis    Left Circumflex: prox circumflex 80%, OM2 90% calcified lesion.   OM:    Right Coronary Artery: not injected  RPDA  RPL    Ramus Intermed:    DOMINANCE: Right     ACCESS: r radial   CLOSURE: d stat    INTERVENTION  IMPLANTS: DULCE x2 in pLCx and OM2    POST-OP DIAGNOSIS: 2V CAD.           PLAN OF CARE  [ ] D/C Home today  [ ]  D/C in AM  [x ] Return to In-patient bed  [ ] Admit for observation  [ ] Return for staged procedure:  [ ] CT Surgery consult called  [ x]  Continue DAPT, B-blocker & Statin therapy

## 2019-10-01 NOTE — PROGRESS NOTE ADULT - SUBJECTIVE AND OBJECTIVE BOX
PATIENT:  LAKSHMI JUDD  [id number]    CHIEF COMPLAINT:  Patient is a 84y old  Male who presents with a chief complaint of cp (30 Sep 2019 09:58)      INTERVAL HISTORYOVERNIGHT EVENTS:  No events overnight. No chest pain or SOB.         MEDICATIONS:  MEDICATIONS  (STANDING):  amiodarone    Tablet 200 milliGRAM(s) Oral two times a day  amiodarone Infusion 1 mG/Min (33.333 mL/Hr) IV Continuous <Continuous>  amiodarone Infusion 0.5 mG/Min (16.667 mL/Hr) IV Continuous <Continuous>  aspirin  chewable 81 milliGRAM(s) Oral daily  atorvastatin 80 milliGRAM(s) Oral at bedtime  chlorhexidine 4% Liquid 1 Application(s) Topical two times a day  clopidogrel Tablet 75 milliGRAM(s) Oral daily  enoxaparin Injectable 40 milliGRAM(s) SubCutaneous daily  levothyroxine 200 MICROGram(s) Oral daily  lisinopril 10 milliGRAM(s) Oral daily  metoprolol tartrate 12.5 milliGRAM(s) Oral two times a day  sodium chloride 0.9%. 1000 milliLiter(s) (150 mL/Hr) IV Continuous <Continuous>  sodium chloride 0.9%. 1000 milliLiter(s) (75 mL/Hr) IV Continuous <Continuous>    MEDICATIONS  (PRN):  nitroglycerin     SubLingual 0.4 milliGRAM(s) SubLingual every 5 minutes PRN Chest Pain      ALLERGIES:  Allergies    iodinated radiocontrast agents (Hives)    Intolerances        OBJECTIVE:  ICU Vital Signs Last 24 Hrs  T(C): 36.7 (01 Oct 2019 07:36), Max: 36.7 (30 Sep 2019 19:45)  T(F): 98.1 (01 Oct 2019 07:36), Max: 98.1 (30 Sep 2019 19:45)  HR: 60 (01 Oct 2019 07:36) (48 - 82)  BP: 122/61 (01 Oct 2019 07:36) (99/41 - 150/63)  BP(mean): 95 (01 Oct 2019 07:36) (58 - 96)  ABP: --  ABP(mean): --  RR: 23 (01 Oct 2019 07:36) (14 - 29)  SpO2: 98% (01 Oct 2019 07:36) (95% - 98%)      Adult Advanced Hemodynamics Last 24 Hrs  CVP(mm Hg): --  CVP(cm H2O): --  CO: --  CI: --  PA: --  PA(mean): --  PCWP: --  SVR: --  SVRI: --  PVR: --  PVRI: --  CAPILLARY BLOOD GLUCOSE        CAPILLARY BLOOD GLUCOSE        I&O's Summary    30 Sep 2019 07:01  -  01 Oct 2019 07:00  --------------------------------------------------------  IN: 375 mL / OUT: 1150 mL / NET: -775 mL      Daily     Daily     PHYSICAL EXAMINATION:  General: WN/WD NAD  HEENT: PERRLA, EOMI, moist mucous membranes  Neurology: A&Ox  Respiratory: CTA B/L, normal respiratory effort, no wheezes, crackles, rales  CV: RRR, S1S2, no murmurs, rubs or gallops  Abdominal: Soft, NT, ND  Extremities: No edema  Incisions:   Tubes:    LABS:                          15.1   9.82  )-----------( 199      ( 01 Oct 2019 05:57 )             45.5     10-01    137  |  102  |  14  ----------------------------<  144<H>  5.5<H>   |  24  |  0.6<L>    Ca    8.5      01 Oct 2019 05:57  Phos  4.0     09-29  Mg     2.2     10-01    TPro  5.6<L>  /  Alb  3.1<L>  /  TBili  0.4  /  DBili  x   /  AST  42<H>  /  ALT  108<H>  /  AlkPhos  49  10-01    LIVER FUNCTIONS - ( 01 Oct 2019 05:57 )  Alb: 3.1 g/dL / Pro: 5.6 g/dL / ALK PHOS: 49 U/L / ALT: 108 U/L / AST: 42 U/L / GGT: x                       TELEMETRY:     EKG:     IMAGING: PATIENT:  LAKSHMI JUDD  [id number]    CHIEF COMPLAINT:  Patient is a 84y old  Male who presents with a chief complaint of cp (30 Sep 2019 09:58)      INTERVAL HISTORYOVERNIGHT EVENTS:  No events overnight. No chest pain or SOB. No active complaints    MEDICATIONS:  MEDICATIONS  (STANDING):  amiodarone    Tablet 200 milliGRAM(s) Oral two times a day  amiodarone Infusion 1 mG/Min (33.333 mL/Hr) IV Continuous <Continuous>  amiodarone Infusion 0.5 mG/Min (16.667 mL/Hr) IV Continuous <Continuous>  aspirin  chewable 81 milliGRAM(s) Oral daily  atorvastatin 80 milliGRAM(s) Oral at bedtime  chlorhexidine 4% Liquid 1 Application(s) Topical two times a day  clopidogrel Tablet 75 milliGRAM(s) Oral daily  enoxaparin Injectable 40 milliGRAM(s) SubCutaneous daily  levothyroxine 200 MICROGram(s) Oral daily  lisinopril 10 milliGRAM(s) Oral daily  metoprolol tartrate 12.5 milliGRAM(s) Oral two times a day  sodium chloride 0.9%. 1000 milliLiter(s) (150 mL/Hr) IV Continuous <Continuous>  sodium chloride 0.9%. 1000 milliLiter(s) (75 mL/Hr) IV Continuous <Continuous>    MEDICATIONS  (PRN):  nitroglycerin     SubLingual 0.4 milliGRAM(s) SubLingual every 5 minutes PRN Chest Pain    ALLERGIES:  Allergies    iodinated radiocontrast agents (Hives)    OBJECTIVE:  ICU Vital Signs Last 24 Hrs  T(C): 36.7 (01 Oct 2019 07:36), Max: 36.7 (30 Sep 2019 19:45)  T(F): 98.1 (01 Oct 2019 07:36), Max: 98.1 (30 Sep 2019 19:45)  HR: 60 (01 Oct 2019 07:36) (48 - 82)  BP: 122/61 (01 Oct 2019 07:36) (99/41 - 150/63)  BP(mean): 95 (01 Oct 2019 07:36) (58 - 96)  RR: 23 (01 Oct 2019 07:36) (14 - 29)  SpO2: 98% (01 Oct 2019 07:36) (95% - 98%)      30 Sep 2019 07:01  -  01 Oct 2019 07:00  --------------------------------------------------------  IN: 375 mL / OUT: 1150 mL / NET: -775 mL    PHYSICAL EXAMINATION:  General: WN/WD NAD  HEENT: PERRLA, EOMI, moist mucous membranes  Neurology: A&Ox3  Respiratory: CTA B/L, normal respiratory effort, no wheezes, crackles, rales  CV: RRR, S1S2, no murmurs, rubs or gallops  Abdominal: Soft, NT, ND  Extremities: No edema      LABS:                          15.1   9.82  )-----------( 199      ( 01 Oct 2019 05:57 )             45.5     10-01    137  |  102  |  14  ----------------------------<  144<H>  5.5<H>   |  24  |  0.6<L>    Ca    8.5      01 Oct 2019 05:57  Phos  4.0     09-29  Mg     2.2     10-01    TPro  5.6<L>  /  Alb  3.1<L>  /  TBili  0.4  /  DBili  x   /  AST  42<H>  /  ALT  108<H>  /  AlkPhos  49  10-01    LIVER FUNCTIONS - ( 01 Oct 2019 05:57 )  Alb: 3.1 g/dL / Pro: 5.6 g/dL / ALK PHOS: 49 U/L / ALT: 108 U/L / AST: 42 U/L / GGT: x

## 2019-10-01 NOTE — PROGRESS NOTE ADULT - ATTENDING COMMENTS
83 yo M with hx of CAD s/p PCIx2, lyme disease, hypothyroidism, multiple TIAs, L carotid stenosis, neuropathy presenting with NSTEMI. Had successful PCI to proximal LAD.  Staged PCI of distal LCX/OM2 later this admission    1. CAD c/b NSTEMI: Staged PCI of distal LCX/OM2 scheduled for early next week. Care per cardiology attending. Cont DAPT, BB, ACEi. Pending Echo. Will be transferred to telemetry    #Progress Note Handoff  Pending (specify): Repeat PCI  Family discussion: Discussed with pt regarding plan for 2nd [facility] next week  Disposition: TBD
Patient is seen and examined independently at bedside. I agree with the resident's note, history and plan as above. Corrections made where appropriate    All labs, radiologic studies, vitals, orders and medications list reviewed.     #Progress Note Handoff  Pending (specify):  PCI tomorrow with Dr Chavez  Family discussion: Plan of care discussed with patient, aware and agreeable   Disposition:  home when medically optimized    Patient requires continuous CCU monitoring
Patient is seen and examined independently at bedside. I agree with the resident's note, history and plan as above. Corrections made where appropriate    All labs, radiologic studies, vitals, orders and medications list reviewed.     NSTEMI, complicated by episodes of NSVT 9/28  Patient is s/p cardiac cath today with 2v CAD and DULCE x2 in pLCx and OM2  c/w medical management    Hyperkalemia  repeat K in afternoon - if remains >5.5, give insulin and d50 or d10 (apparent shortage of d50 in the hospital)    #Progress Note Handoff  Pending (specify):   Clinical improvement  Family discussion: Plan of care discussed with patient, aware and agreeable   Disposition:  Home when medically stable     patient requires continuous CCU monitoring

## 2019-10-02 NOTE — DISCHARGE NOTE PROVIDER - NSDCCPCAREPLAN_GEN_ALL_CORE_FT
PRINCIPAL DISCHARGE DIAGNOSIS  Diagnosis: NSTEMI (non-ST elevated myocardial infarction)  Assessment and Plan of Treatment: - continue taking Crestor, aspirin, plavix, metoprolol  - follow up with cardiologist and PCP      SECONDARY DISCHARGE DIAGNOSES  Diagnosis: Hypothyroidism  Assessment and Plan of Treatment: - continue with thyroid medications PRINCIPAL DISCHARGE DIAGNOSIS  Diagnosis: NSTEMI (non-ST elevated myocardial infarction)  Assessment and Plan of Treatment: - continue taking Crestor, aspirin, plavix, metoprolol  - follow up with cardiologist and PCP      SECONDARY DISCHARGE DIAGNOSES  Diagnosis: Leukocytosis, unspecified type  Assessment and Plan of Treatment: This could be due to stress or heart attack  Must f/u in 2-5 days with PMD to re-check your CBC in rare case there might be an infection going on. But for now you can go home and f/u with PMD. IF fevers or chills or not feeling well return to the ER or call your PMD.

## 2019-10-02 NOTE — DISCHARGE NOTE PROVIDER - HOSPITAL COURSE
85 yo M with hx of CAD s/p PCIx2, lyme disease, hypothyroidism, multiple TIAs, L carotid stenosis, neuropathy presenting with NSTEMI. Cath on 9/26 showed significant 2 vessel coronary artery disease (LAD, LCX) s/p successful PCI of proximal LAD, cutting balloon angioplasty and DULCE x 1 to proximal LAD.. Required a Staged PCI, 2nd cath on 10/1 showed ostial D1 90% stenosis, Left Circumflex: prox circumflex 80%, OM2 90% calcified lesion with DULCE x2 in pLCx and OM2.         During admission, patient had a sustained run of v-tach in which he was treated with a push of amiodarone 150mg IV pushed over 10minutes and then subsequent infusions of 1mg/hour over 6 hours of amiodarone and 18 hours of .5mg/hour of amiodarone. Responsive to therapy and stable on discharge. 83 yo M with hx of CAD s/p PCIx2, lyme disease, hypothyroidism, multiple TIAs, L carotid stenosis, neuropathy presenting with NSTEMI. Cath on 9/26 showed significant 2 vessel coronary artery disease (LAD, LCX) s/p successful PCI of proximal LAD, cutting balloon angioplasty and DULCE x 1 to proximal LAD.. Required a Staged PCI, 2nd cath on 10/1 showed ostial D1 90% stenosis, Left Circumflex: prox circumflex 80%, OM2 90% calcified lesion with DULCE x2 in pLCx and OM2.         During admission, patient had a sustained run of v-tach in which he was treated with a push of amiodarone 150mg IV pushed over 10minutes and then subsequent infusions of 1mg/hour over 6 hours of amiodarone and 18 hours of .5mg/hour of amiodarone. Responsive to therapy and stable on discharge.        Vital Signs Last 24 Hrs    T(C): 36.7 (02 Oct 2019 07:54), Max: 37.2 (01 Oct 2019 20:00)    T(F): 98 (02 Oct 2019 07:54), Max: 98.9 (01 Oct 2019 20:00)    HR: 64 (02 Oct 2019 12:26) (54 - 78)    BP: 117/58 (02 Oct 2019 12:26) (103/52 - 188/88)    BP(mean): 69 (02 Oct 2019 09:43) (69 - 136)    RR: 37 (02 Oct 2019 12:26) (24 - 37)    SpO2: 96% (02 Oct 2019 12:26) (96% - 98%)        PHYSICAL EXAM:    GENERAL: NAD, well-developed    HEAD:  Atraumatic, Normocephalic    EYES: EOMI, PERRLA, conjunctiva and sclera clear    NECK: Supple, No JVD    CHEST: No tenderness     LUNG: Clear to auscultation bilaterally; No wheeze    HEART: Regular rate and rhythm; No murmurs, rubs, or gallops    ABDOMEN: Soft, Nontender, Nondistended; Bowel sounds present    EXTREMITIES:  2+ Peripheral Pulses, No clubbing, cyanosis, or edema    PSYCH: AAOx3    NEUROLOGY: non-focal    SKIN: No rashes or lesions                                14.4     12.38 )-----------( 236      ( 02 Oct 2019 04:15 )               44.2         10-02        141  |  101  |  22<H>    ----------------------------<  106<H>    4.8   |  28  |  0.8        Ca    8.7      02 Oct 2019 04:15    Mg     2.2     10-02        TPro  6.3  /  Alb  3.8  /  TBili  0.4  /  DBili  x   /  AST  81<H>  /  ALT  167<H>  /  AlkPhos  58  10-02        CXR:    Impression:      Stable elevated right hemidiaphragm. No focal consolidation, effusion, or pneumothorax.        MEDS per MED REC             Leukocytosis noted and explained to pt to f/u in 2-5 days with PMD for routine CBC check and to call PMD if fevers or chills. Wife aware as well.

## 2019-10-02 NOTE — PROGRESS NOTE ADULT - SUBJECTIVE AND OBJECTIVE BOX
Cardiology Follow up    LAKSHMI JUDD   84y Male  PAST MEDICAL & SURGICAL HISTORY:  Neuropathy  TIA (transient ischemic attack)  Left carotid stenosis  Lyme disease  Hypothyroidism  Coronary artery disease       HPI:  83 yo M with hx of CAD s/p PCIx2, lyme disease, hypothyroidism, multiple TIAs, L carotid stenosis, neuropathy who presents with gradual onset of intermittent, midsternal, moderate chest tightness radiating to back and associated sob that started yesterday worse on exertion today. Pt received  and sublingual with no improvement.    Pt had a CT abd w/ IV contrast on Monday for workup of kidney mass and developed hives that have responded to steroids and benadryl. No prior hx of allergic reaction to IV contrast. Otherwise no fever, chills, cough, abd pain, leg pain/swelling. (26 Sep 2019 18:51)    Allergies    iodinated radiocontrast agents (Hives)    Patient without complaints. Pt ambulated without issues/symptoms  Denies CP, SOB, palpitations, or dizziness  No events on telemetry overnight    Vital Signs Last 24 Hrs  T(C): 37.1 (02 Oct 2019 04:00), Max: 37.2 (01 Oct 2019 20:00)  T(F): 98.8 (02 Oct 2019 04:00), Max: 98.9 (01 Oct 2019 20:00)  HR: 56 (02 Oct 2019 06:00) (54 - 78)  BP: 123/60 (02 Oct 2019 06:00) (103/52 - 188/88)  BP(mean): 81 (02 Oct 2019 06:00) (80 - 136)  RR: 28 (02 Oct 2019 06:00) (24 - 35)  SpO2: 98% (02 Oct 2019 06:00) (96% - 98%)    MEDICATIONS  (STANDING):  amiodarone    Tablet 200 milliGRAM(s) Oral two times a day  amiodarone Infusion 1 mG/Min (33.333 mL/Hr) IV Continuous <Continuous>  amiodarone Infusion 0.5 mG/Min (16.667 mL/Hr) IV Continuous <Continuous>  aspirin  chewable 81 milliGRAM(s) Oral daily  atorvastatin 80 milliGRAM(s) Oral at bedtime  chlorhexidine 4% Liquid 1 Application(s) Topical two times a day  clopidogrel Tablet 75 milliGRAM(s) Oral daily  enoxaparin Injectable 40 milliGRAM(s) SubCutaneous daily  levothyroxine 200 MICROGram(s) Oral daily  lisinopril 10 milliGRAM(s) Oral daily  metoprolol tartrate 12.5 milliGRAM(s) Oral two times a day  sodium chloride 0.9%. 1000 milliLiter(s) (150 mL/Hr) IV Continuous <Continuous>  sodium chloride 0.9%. 1000 milliLiter(s) (75 mL/Hr) IV Continuous <Continuous>  sodium chloride 0.9%. 1000 milliLiter(s) (100 mL/Hr) IV Continuous <Continuous>    MEDICATIONS  (PRN):  nitroglycerin     SubLingual 0.4 milliGRAM(s) SubLingual every 5 minutes PRN Chest Pain    REVIEW OF SYSTEMS:          CONSTITUTIONAL: No weakness, fevers or chills          EYES/ENT: No visual changes;  No vertigo or throat pain           NECK: No pain or stiffness          RESPIRATORY: No cough, wheezing, hemoptysis          CARDIOVASCULAR: no pain, no VAZQUEZ, no palpitations           GASTROINTESTINAL: No abdominal or epigastric pain. No nausea, vomiting, or hematemesis;           GENITOURINARY: No dysuria, frequency or hematuria          NEUROLOGICAL: No numbness or weakness          SKIN: No itching, rashes    PHYSICAL EXAM:           CONSTITUTIONAL: Well-developed; well-nourished; in no acute distress  	SKIN: warm, dry  	HEAD: Normocephalic; atraumatic  	EYES: PERRL.  	ENT: No nasal discharge, airway clear, mucous membranes moist  	NECK: Supple; non tender.  	CARD: +S1, +S2, no murmurs, gallops, or rubs. Regular rate and rhythm    	RESP: No wheezes, rales or rhonchi. CTA B/L  	ABD: soft ntnd, + BS x 4 quadrants  	EXT: moves all extremities,  no clubbing, cyanosis or edema  	NEURO: Alert and oriented x3, no focal deficits          PSYCH: Cooperative, appropriate          VASCULAR:  + Rad / + PTs / + DPs          EXTREMITY:              Right Radial: Dressing removed, access site soft, no hematoma, no pain, + pulses, no sign of infection            ECG:   < from: 12 Lead ECG (10.02.19 @ 07:45) >  Ventricular Rate 54 BPM    Atrial Rate 54 BPM    P-R Interval 102 ms    QRS Duration 86 ms    Q-T Interval 454 ms    QTC Calculation(Bezet) 430 ms    P Axis 39 degrees    R Axis -26 degrees    T Axis 72 degrees    Diagnosis Line Sinus bradycardia with short LA  Anteroseptal infarct , age undetermined  Abnormal ECG    Confirmed by Kenneth Ryan ([id number]) on 10/2/2019 9:09:35 AM                                                                                            2D ECHO:  < from: Transthoracic Echocardiogram (09.27.19 @ 07:17) >  EXAM:  2-D ECHO (TTE) COMPLETE        PROCEDURE DATE:  09/27/2019      INTERPRETATION:  REPORT:    TRANSTHORACIC ECHOCARDIOGRAM REPORT         Patient Name:   LAKSHMI JUDD Accession #: [id number]  Medical Rec #:  [id number]          Height:      68.0 in 172.7 cm  YOB: 1935         Weight:      194.0 lb 88.00 kg  Patient Age:    84 years          BSA:         2.02 m²  Patient Gender: M                 BP:          105/66 mmHg       Date of Exam:        9/27/2019 7:17:35 AM  Referring Physician: [id number] MILI RAO  Sonographer:         Ori zuleta  Reading Physician:   Everett Haq M.D.    Procedure:   2D Echo/Doppler/Color Doppler Complete.  Indications: R07.9 - Chest Pain, unspecified  Diagnosis:   Chest pain, unspecified - R07.9         Summary:   1. Left ventricular ejection fraction, by visual estimation, is 35 to   40%.   2. Mid anteroseptal segment and entire apex are abnormal as described   above.   3. Mildly increased LV wall thickness.   4. Mild mitral annular calcification.   5. PSAP at least 35.   6. Sclerotic aortic valve with normal opening.   7. Dilatation of the ascending aorta.   8. There is mild aortic root calcification.    PHYSICIAN INTERPRETATION:  Left Ventricle: The left ventricularinternal cavity size is normal. Left   ventricular wall thickness is mildly increased. Left ventricular ejection   fraction, by visual estimation, is 35 to 40%.       LV Wall Scoring:  The mid anteroseptal segment is akinetic. The entire apex is hypokinetic.   All  remaining scored segments are normal.    Left Atrium: Mildly enlarged left atrium.  Right Atrium: Normal right atrial size.  Mitral Valve: Structurally normal mitral valve, with normal leaflet   excursion. There is mild mitral annular calcification. Trace mitral valve   regurgitation is seen.  Tricuspid Valve: The tricuspid valve is normal in structure. Mild   tricuspid regurgitation is visualized. PSAP at least 35.  Aortic Valve: The aortic valve is trileaflet. Sclerotic aortic valve with   normal opening.  Aorta: The aortic root is normal in size and structure. There is   dilatation of the ascending aorta. There is mild aortic root   calcification.       2D AND M-MODE MEASUREMENTS (normal ranges within parentheses):  Left             Normal   Aorta/Left             Normal  Ventricle:                     Atrium:  IVSd (2D):  1.40 cm  (0.7-1.1) AoV Cusp       2.23  (1.5-2.6)  LVPWd (2D): 1.41 cm  (0.7-1.1) Separation:     cm  LVIDd (2D): 4.60 cm  (3.4-5.7) Left Atrium  4.37  (1.9-4.0)  LVIDs (2D): 3.70 cm            (Mmode):        cm  LV FS (2D):  19.6 %   (>25%)   LA Volume      30.4  IVSd        1.19 cm  (0.7-1.1) Index         ml/m²  (Mmode):                       Right  LVPWd       1.20 cm  (0.7-1.1) Ventricle:  (Mmode):                       RVd (2D):      2.49 cm  LVIDd       4.66 cm  (3.4-5.7)  (Mmode):  LVIDs       3.36 cm  (Mmode):  LV FS        27.8 %   (>25%)  (Mmode):  Relative      0.61    (<0.42)  Wall  Thickness  Rel. Wall     0.51    (<0.42)  Thickness  Mm  LV Mass      103.0  Index:        g/m²  Mmode    SPECTRAL DOPPLER ANALYSIS:  LV DIASTOLIC FUNCTION:  MV Peak E: 0.85 m/s Decel Time: 204 msec  MV Peak A: 0.58 m/s  E/A Ratio: 1.46    LVOT Vmax: 0.95 m/s  LVOT VTI:  0.19 m  LVOT Diam: 1.70 cm    Mitral Valve:  MV P1/2 Time: 59.21 msec  MV Area, PHT: 3.72 cm²    Tricuspid Valve and PA/RV Systolic Pressure: TR Max Velocity: 2.96 m/s RA   Pressure:  RVSP/PASP: 38.0 mmHg     [id number] Everett Haq M.D., Electronically signed on 9/27/2019 at   11:18:55 AM      LABS:                        14.4   12.38 )-----------( 236      ( 02 Oct 2019 04:15 )             44.2     10-02    141  |  101  |  22<H>  ----------------------------<  106<H>  4.8   |  28  |  0.8    Ca    8.7      02 Oct 2019 04:15  Mg     2.2     10-02    TPro  6.3  /  Alb  3.8  /  TBili  0.4  /  DBili  x   /  AST  81<H>  /  ALT  167<H>  /  AlkPhos  58  10-02      Magnesium, Serum: 2.2 mg/dL [1.8 - 2.4] (10-02-19 @ 04:15)  LIVER FUNCTIONS - ( 02 Oct 2019 04:15 )  Alb: 3.8 g/dL / Pro: 6.3 g/dL / ALK PHOS: 58 U/L / ALT: 167 U/L / AST: 81 U/L / GGT: x           A/P:  I discussed the case with Cardiologist    and recommend the following:    PCI: DULCE x2 in pLCx and OM2                     Change Lipitor to Crestor 20 mg PO qHS for home use                   add Xanax 0.25 mg PO Q8 hr PRN # 90 tabs - no refill   	     Continue DAPT (Aspirin 81 mg daily and Plavix 75 mg daily ),  B-Blocker, Statin Therapy                   Patient given 30 day supply of ( Aspirin 81 mg daily and Plavix 75 mg daily ) to take at home                   Patient agreeing to take DAPT for at least one year or as directed by cardiologist                    Pt given instructions on importance of taking antiplatelet medication or risk acute stent thrombosis/death                   Post cath instructions, access site care and activity restrictions reviewed with patient                     Discussed with patient to return to hospital if experience chest pain, shortness breath, dizziness and site bleeding                   Aggressive risk factor modification, diet counseling, smoking cessation discussed with patient                       Can discharge patient from cardiac standpoint after ambulating without symptoms                    Follow up with Cardiology Dr. Chavez in one week.  Instructed to call and make an appointment

## 2019-10-02 NOTE — DISCHARGE NOTE PROVIDER - CARE PROVIDER_API CALL
James Chavez)  Cardiovascular Disease; Interventional Cardiology  [address] [place] [address], [address]  [place] [address]  Phone: [phone]  Fax: [phone]  Follow Up Time: 1 week    Masoud Zarco)  Family Medicine  [address] [facility], [address]  [place] [address]  Phone: [phone]  Fax: [phone]  Follow Up Time: 1-3 days

## 2019-10-02 NOTE — DISCHARGE NOTE PROVIDER - PROVIDER TOKENS
PROVIDER:[TOKEN:[[id number]],FOLLOWUP:[1 week]],PROVIDER:[TOKEN:[[id number]],FOLLOWUP:[1-3 days]]

## 2019-10-02 NOTE — PROGRESS NOTE ADULT - REASON FOR ADMISSION
cp
nstemi
s/p NSTEMI, PCI: cutting balloon angioplasty and DULCE x 1 to proximal LAD.
s/p PCI: DULCE x2 in pLCx and OM2
MI

## 2019-10-02 NOTE — DISCHARGE NOTE NURSING/CASE MANAGEMENT/SOCIAL WORK - NSDCPEPTSTRK_GEN_ALL_CORE
Need for follow up after discharge/Prescribed medications/Risk factors for stroke/Stroke support groups for patients, families, and friends/Call 911 for stroke/Stroke warning signs and symptoms/Signs and symptoms of stroke/Stroke education booklet

## 2019-10-02 NOTE — DISCHARGE NOTE NURSING/CASE MANAGEMENT/SOCIAL WORK - PATIENT PORTAL LINK FT
You can access the FollowMyHealth Patient Portal offered by [facility] by registering at the following website: [email or web address]. By joining [employer]’s FollowMyHealth portal, you will also be able to view your health information using other applications (apps) compatible with our system.

## 2019-10-02 NOTE — PROGRESS NOTE ADULT - PROVIDER SPECIALTY LIST ADULT
CCU
Cardiology
Hospitalist
Hospitalist
CCU
Cardiology

## 2019-10-02 NOTE — DISCHARGE NOTE PROVIDER - CARE PROVIDERS DIRECT ADDRESSES
,DirectAddress_Unknown,[email or web address].[facility]

## 2019-10-04 PROBLEM — I25.10 ATHEROSCLEROTIC HEART DISEASE OF NATIVE CORONARY ARTERY WITHOUT ANGINA PECTORIS: Chronic | Status: ACTIVE | Noted: 2019-01-01

## 2019-10-04 PROBLEM — G62.9 POLYNEUROPATHY, UNSPECIFIED: Chronic | Status: ACTIVE | Noted: 2019-01-01

## 2019-10-04 PROBLEM — E03.9 HYPOTHYROIDISM, UNSPECIFIED: Chronic | Status: ACTIVE | Noted: 2019-01-01

## 2019-10-04 PROBLEM — A69.20 LYME DISEASE, UNSPECIFIED: Chronic | Status: ACTIVE | Noted: 2019-01-01

## 2019-10-04 PROBLEM — I65.22 OCCLUSION AND STENOSIS OF LEFT CAROTID ARTERY: Chronic | Status: ACTIVE | Noted: 2019-01-01

## 2019-10-04 PROBLEM — G45.9 TRANSIENT CEREBRAL ISCHEMIC ATTACK, UNSPECIFIED: Chronic | Status: ACTIVE | Noted: 2019-01-01

## 2019-10-07 PROBLEM — K59.00 CONSTIPATION, UNSPECIFIED CONSTIPATION TYPE: Status: ACTIVE | Noted: 2019-01-01

## 2019-10-07 PROBLEM — I50.9 HEART FAILURE, UNSPECIFIED HF CHRONICITY, UNSPECIFIED HEART FAILURE TYPE: Status: ACTIVE | Noted: 2019-01-01

## 2019-10-07 PROBLEM — I25.10 CAD (CORONARY ARTERY DISEASE): Status: ACTIVE | Noted: 2019-01-01

## 2019-10-07 PROBLEM — I47.2 VENTRICULAR TACHYCARDIA: Status: ACTIVE | Noted: 2019-01-01

## 2019-10-07 NOTE — ED ADULT NURSE NOTE - CHIEF COMPLAINT QUOTE
Pt s/p cardiac cath with 3 stents x 2 weeks ago. Pt c/o LLQ abdominal pain for 1 week and sent in by Dr. Masoud Zarco.

## 2019-10-07 NOTE — ED ADULT NURSE NOTE - NS ED NOTE  TALK SOMEONE YN
Andreas Pierce Jr. is a 53 year old male   Chief Complaint   Patient presents with   • Cancer     Prostate       REVIEW OF SYSTEMS: GENERAL:  Patient denies fever after chemo on fridays 99 -100, chills, night sweats, excessive fatigue, anorexia, weight loss  ALLERGIC/IMMUNOLOGIC: no reactions  EYES:  Patient denies significant visual difficulties, diplopia  ENT/MOUTH:  Patient denies problems with hearing, sore throat, mucositis or mouth sores, sinus drainage Hoarse Voice \"continues off and on\". Feel like it starts after his steroid med.  ENDOCRINE:  Patient denies  hot flashes, night sweats  HEMATOLOGIC/LYMPHATIC: Patient denies  easy bruising, bleeding  BREASTS: Patient denies abnormal masses of breast, nipple discharge, pain  RESPIRATORY:  Patient denies  dyspnea on exertion, chest pain, cough, hemoptysis  CARDIOVASCULAR:  Patient denies anginal chest pain, palpitations, orthopnea, peripheral edema   GASTROINTESTINAL: Patient denies  nausea, vomiting, diarrhea, GI bleeding, constipation, change in bowel habits, but complains of:   Heartburn   \"occasionally\" uses OTC med which helps.   (MALE):  Patient denies  hematuria, hesitancy, incontinence, other problems with urination  MUSCULOSKELETAL:  Patient denies  joint pain after chemo the following Sat, swelling, redness  SKIN:  Patient denies chronic rashes, inflammation, ulcerations or skin changes  NEUROLOGIC:  Patient denies headache, blurred vision, areas of focal weakness or numbness, abnormal gait, sensory problems  PSYCHIATRIC: Patient denies depression, anxiety.    Denies Latex allergy or sensitivity.    Medication verified and med list updated.  PCP and Pharmacy verified.    History   Smoking Status   • Never Smoker   Smokeless Tobacco   • Not on file        No

## 2019-10-07 NOTE — CHART NOTE - NSCHARTNOTEFT_GEN_A_CORE
Discussed star patient's case with PA, patient presented with abdominal pain found to be septic secondary to sigmoid colitis, currently hemodynamically stable, will admit for further management.

## 2019-10-07 NOTE — PHYSICAL EXAM
[Normal Outer Ear/Nose] : the outer ears and nose were normal in appearance [No Acute Distress] : no acute distress [Normal Sclera/Conjunctiva] : normal sclera/conjunctiva [No JVD] : no jugular venous distention [No Respiratory Distress] : no respiratory distress  [Normal Rate] : normal rate  [No Accessory Muscle Use] : no accessory muscle use [Clear to Auscultation] : lungs were clear to auscultation bilaterally [Regular Rhythm] : with a regular rhythm [Normal S1, S2] : normal S1 and S2 [No Murmur] : no murmur heard [Pedal Pulses Present] : the pedal pulses are present [No Edema] : there was no peripheral edema [No Varicosities] : no varicosities [Non Tender] : non-tender [Soft] : abdomen soft [Non-distended] : non-distended [Hyperactive] : hyperactive [Coordination Grossly Intact] : coordination grossly intact [No Joint Swelling] : no joint swelling [No Rash] : no rash [Normal Gait] : normal gait [No Focal Deficits] : no focal deficits [Normal Affect] : the affect was normal [Alert and Oriented x3] : oriented to person, place, and time [Normal Insight/Judgement] : insight and judgment were intact

## 2019-10-07 NOTE — COUNSELING
[de-identified] : Pt was informed about CN’s role/ STARS program and overview of transitional care reviewed with patient. In addition, yellow contact card was provided. Pt educated on topics of importance such as compliance with all provider visits, prescribed medication regimen, and low salt diet. Pt encouraged calling CN with any issues, concerns or questions, also educated to notify CN if experiencing CP, SOB , cough, increased mucus/phlegm production, abdominal discomfort/swelling, difficulty sleeping or lying flat, fever, chills, fatigue, weight gain of 2-3lbs in 24 hours or 5lbs in one week,  dizziness, lightheadedness, n/v/d/c, swelling to extremities and/or any signs of CHF exacerbation as reviewed. Reassurance provided. Will continue to monitor\par \par

## 2019-10-07 NOTE — PLAN
[FreeTextEntry1] : CAD- c/w Statin, ASA, plavix \par HF - daily weights,  low salt diet , c/w BB, ACE and furosemide\par constipation _ colace 100mg BID, high fiber diet discussed\par ventricular tachycardia - c/w amiodarone \par c/w all prescribed medication regimens. \par Continued PCP/ cardiology follow up

## 2019-10-07 NOTE — ED ADULT TRIAGE NOTE - CHIEF COMPLAINT QUOTE
Pt s/p cardiac stent with 3 stents x 2 weeks ago. Pt c/o LLQ abdominal pain for 1 week and sent in by Dr. Masoud Zarco. Pt s/p cardiac cath with 3 stents x 2 weeks ago. Pt c/o LLQ abdominal pain for 1 week and sent in by Dr. Masoud Zarco.

## 2019-10-07 NOTE — ED CLERICAL - NS ED CLERK NOTE PRE-ARRIVAL INFORMATION; ADDITIONAL PRE-ARRIVAL INFORMATION
This patient is enrolled in the STARS readmission reduction initiative and has active care navigation. This patient can be followed up by the care navigation team within 24 hours.  To arrange close follow-up or to obtain additional clinical information, please call the contact number above. The on call [facility] Hospitalist has been notified and will coordinate care in concert with the ED Physician including consults as necessary. Call [phone] (North), [phone] (South) to reach the hospitalist. Consider CDU for management per guidelines

## 2019-10-07 NOTE — ED CLERICAL - NS ED CLERK NOTE PRE-ARRIVAL INFOMATION; PCP NAME
[facility] [employer]

## 2019-10-07 NOTE — ED PROVIDER NOTE - ATTENDING CONTRIBUTION TO CARE
84M PMH CAD s/p stent placement 10/1 for NSTEMI, s/p appendectomy, inguinal hernia repair, bph, p/w abd pain x 1 week. seen by pmd dr barboza in office and sent to ED. pt reports 1 week diffuse grad onset abd pain, nonradiating, constant, "soreness" sensation. no cp, sob. no fever. nausea assoc. no vdc. dec po assoc. no le edema, le pain, immobilization, hormones, hemoptysis. no tearing back pain. no brbpr or melena.     on exam, AFVSS, well anthony nad, ncat, eomi, perrla, mmm, lctab, rrr nl s1s2 no mrg, abd soft mild diffuse abd tenderness, nd, aaox3, no focal deficits, no le edema or calf ttp,     a/p; Abd pain, will do labs, ua, ct, ivf,pain control, npo re-eval. concern for colitis, pancreatitis, uti. no CP concerning for ACS/stent thrombosis.

## 2019-10-07 NOTE — ED PROVIDER NOTE - CLINICAL SUMMARY MEDICAL DECISION MAKING FREE TEXT BOX
Patient presented with abdominal pain, nausea, vomiting, inability to tolerate PO. tender on exam but otherwise afebrile, HD stable. Obtained labs which were remarkable for a significant leukocytosis. Patient without cough or respiratory symptoms to suggest PNA. UA negative for infection. CT abd/pelvis showed (+) possible colitis, but no evidence of perforation, obstruction, or other emergent pathologies. Patient tx in ED but still unable to tolerate PO and feels too weak to go home. Will admit for further monitoring and management. Family agreeable with plan. HD stable at time of admission.

## 2019-10-07 NOTE — ED PROVIDER NOTE - OBJECTIVE STATEMENT
85 yo male with a pmh of CAD, stent 3x on 10/1, hypothyroid, and neuropathy present with abdominal pain. pt states this pain has been present for about one week to mick upper quadrants with no radiation to the chest. the pain is described as sharp in nature and he has had a decrease in appetite for the past two days. he has not noted any aggravating/alleviating factors and has been experiencing nausea. he denies any other symptoms including fevers, chill, headache, recent illness/travel, cough, urinary/bowel changes, chest pain, or SOB

## 2019-10-07 NOTE — ED PROVIDER NOTE - PHYSICAL EXAMINATION
Gen: NAD  Head: NCAT  HEENT: PERRL, oral mucosa moist, normal conjunctiva, oropharynx clear without exudate or erythema  Lung: CTAB, no respiratory distress, no wheezing, rales, rhonchi  CV: normal s1/s2, rrr, Normal perfusion, pulses 2+ throughout  Abd: diffuse tenderness with no rebounding or guarding, distention, no CVA tenderness  Genitourinary: no pelvic tenderness  MSK: No edema, no visible deformities, full range of motion in all 4 extremities  Neuro: AOx3  Skin: No rash   Psych: normal affect

## 2019-10-07 NOTE — ED ADULT NURSE NOTE - NSIMPLEMENTINTERV_GEN_ALL_ED
Implemented All Fall with Harm Risk Interventions:  [place] to call system. Call bell, personal items and telephone within reach. Instruct patient to call for assistance. Room bathroom lighting operational. Non-slip footwear when patient is off stretcher. Physically safe environment: no spills, clutter or unnecessary equipment. Stretcher in lowest position, wheels locked, appropriate side rails in place. Provide visual cue, wrist band, yellow gown, etc. Monitor gait and stability. Monitor for mental status changes and reorient to person, place, and time. Review medications for side effects contributing to fall risk. Reinforce activity limits and safety measures with patient and family. Provide visual clues: red socks.

## 2019-10-07 NOTE — HISTORY OF PRESENT ILLNESS
[FreeTextEntry1] : Patient seen at home s/p recent admission for NSTEMI  [de-identified] : Patient is an 85 yo M [facility] s/p rcent admission for NSTEMI seen at home with a PMH  of CAD s/p PCIx2, lyme disease, hypothyroidism, multiple TIAs, L carotid stenosis, neuropathy presenting with NSTEMI, Cath done on  9/26 . Patient had episodes of V-tach treated with amiodarone during his inpatient stay. Patient was found stable for dc by the inpatient team and followed up with both PCP and cardiologist on Friday. On arrival to home denies c/p, sob, cough, wt gain, BLLE swelling, palpitations , , NVD, c/o constipation x 2 days.,

## 2019-10-08 NOTE — H&P ADULT - NSHPLABSRESULTS_GEN_ALL_CORE
CBC Full  -  ( 07 Oct 2019 18:42 )  WBC Count : 22.27 K/uL  RBC Count : 5.41 M/uL  Hemoglobin : 16.6 g/dL  Hematocrit : 48.4 %  Platelet Count - Automated : 259 K/uL  Mean Cell Volume : 89.5 fL  Mean Cell Hemoglobin : 30.7 pg  Mean Cell Hemoglobin Concentration : 34.3 g/dL  Auto Neutrophil # : 18.86 K/uL  Auto Lymphocyte # : 1.97 K/uL  Auto Monocyte # : 1.14 K/uL  Auto Eosinophil # : 0.02 K/uL  Auto Basophil # : 0.06 K/uL  Auto Neutrophil % : 84.7 %  Auto Lymphocyte % : 8.8 %  Auto Monocyte % : 5.1 %  Auto Eosinophil % : 0.1 %  Auto Basophil % : 0.3 %    10-07    129<L>  |  88<L>  |  13  ----------------------------<  126<H>  5.0   |  21  |  0.8    Ca    9.5      07 Oct 2019 18:42    TPro  7.5  /  Alb  4.3  /  TBili  0.8  /  DBili  x   /  AST  36  /  ALT  46<H>  /  AlkPhos  66  10-07      CT abdomen    Sigmoid colitis with colonic wall thickening. No intraperitoneal free air.    Additional Findings/Recommendations After Attending Radiologist Review:    Colitis is questionable, I see no definite acute abdominal findings.   There are chronic findings as above.

## 2019-10-08 NOTE — CONSULT NOTE ADULT - SUBJECTIVE AND OBJECTIVE BOX
Gastroenterology Consultation:    Patient is a 84y old  Male who presents with a chief complaint of Abdominal Pain (08 Oct 2019 00:20)      Admitted on: 10-08-19  HPI:  83 yo male with a pmh of CAD, stent 3x on 10/1, hypothyroid, and neuropathy present with abdominal pain. pt states this pain has been present for about one week . Non specific , sharp , worsened by food intake, better with bowel movements , bilateral upper quadrants , and periumbilical , sometimes radiating to the back , and does not occur at night, associated with  decrease in appetite for the past two days, and associated  nausea, but no vomiting. Patient is also complaining of bloating, with constipation, last bowel movement was 3 days ago.  He  denies any other symptoms including fevers, chill, headache, recent illness/travel, cough, urinary/bowel changes, chest pain, or SOB      Prior records Reviewed (Y/N): Y  History obtained from person other than patient (Y/N): Y    Prior EGD: last one in [place] 3 years ago and was normal according to patient   Prior Colonoscopy: last one in [place] 3 years ago and was normal according to patient       PAST MEDICAL & SURGICAL HISTORY:  Neuropathy  TIA (transient ischemic attack)  Left carotid stenosis  Lyme disease  Hypothyroidism  Coronary artery disease      FAMILY HISTORY: no family history of colon cancer       Social History:  Tobacco: negative   Alcohol: negative   Drugs: negative     Home Medications:  [place] Thyroid 120 mg oral tablet: 1 tab(s) orally once a day (26 Sep 2019 11:56)  aspirin 81 mg oral tablet, chewable: 1 tab(s) orally once a day (26 Sep 2019 11:56)  clopidogrel 75 mg oral tablet: 1 tab(s) orally once a day (02 Oct 2019 13:26)  Crestor 20 mg oral tablet: 1 tab(s) orally once a day (at bedtime) (26 Sep 2019 11:55)  lisinopril 20 mg oral tablet: 1 tab(s) orally once a day (26 Sep 2019 11:55)  metoprolol: 1 tab(s) orally 2 times a day (02 Oct 2019 13:26)    MEDICATIONS  (STANDING):  amiodarone    Tablet 200 milliGRAM(s) Oral daily  aspirin  chewable 81 milliGRAM(s) Oral daily  atorvastatin 40 milliGRAM(s) Oral at bedtime  ciprofloxacin   IVPB 400 milliGRAM(s) IV Intermittent every 12 hours  clopidogrel Tablet 75 milliGRAM(s) Oral daily  dicyclomine 10 milliGRAM(s) Oral three times a day before meals  heparin  Injectable 5000 Unit(s) SubCutaneous every 12 hours  levothyroxine 200 MICROGram(s) Oral daily  metoprolol tartrate 12.5 milliGRAM(s) Oral two times a day  metroNIDAZOLE  IVPB 500 milliGRAM(s) IV Intermittent every 8 hours  pantoprazole  Injectable 40 milliGRAM(s) IV Push daily  saccharomyces boulardii 250 milliGRAM(s) Oral two times a day  sucralfate 1 Gram(s) Oral every 12 hours    MEDICATIONS  (PRN):  acetaminophen   Tablet .. 650 milliGRAM(s) Oral every 6 hours PRN Moderate Pain (4 - 6)  aluminum hydroxide/magnesium hydroxide/simethicone Suspension 30 milliLiter(s) Oral every 4 hours PRN Dyspepsia  diphenhydrAMINE   Injectable 50 milliGRAM(s) IntraMuscular every 4 hours PRN Rash and/or Itching      Allergies  iodinated radiocontrast agents (Hives)      Review of Systems:   Constitutional:  No Fever, No Chills  ENT/Mouth:  No Hearing Changes,  No Difficulty Swallowing  Eyes:  No Eye Pain, No Vision Changes  Cardiovascular:  No Chest Pain, No Palpitations  Respiratory:  No Cough, No Dyspnea  Gastrointestinal:  As described in HPI  Musculoskeletal:  No Joint Swelling, No Back Pain  Skin:  No Skin Lesions, No Jaundice  Neuro:  No Syncope, No Dizziness  Heme/Lymph:  No Bruising, No Bleeding.          Physical Examination:  T(C): 36.7 (10-08-19 @ 14:27), Max: 37.3 (10-08-19 @ 00:14)  HR: 84 (10-08-19 @ 14:27) (74 - 86)  BP: 162/78 (10-08-19 @ 14:27) (140/64 - 200/93)  RR: 19 (10-08-19 @ 14:27) (18 - 20)  SpO2: 96% (10-08-19 @ 13:35) (96% - 99%)        Constitutional: No acute distress.  Eyes:. Conjunctivae are clear, Sclera is non-icteric.  Ears Nose and Throat: The external ears are normal appearing,  Oral mucosa is pink and moist.  Respiratory:  No signs of respiratory distress. Lung sounds are clear bilaterally.  Cardiovascular:  S1 S2, Regular rate and rhythm.  GI: Abdomen is soft, symmetric, and non-tender without distention. There are no visible lesions or scars. Bowel sounds are present and normoactive in all four quadrants. No masses, hepatomegaly, or splenomegaly are noted.   Neuro: No Tremor, No involuntary movements  Skin: No rashes, No Jaundice.          Data: (reviewed by attending)                        16.3   16.37 )-----------( 307      ( 08 Oct 2019 06:47 )             46.9     Hgb Trend:  16.3  10-08-19 @ 06:47  16.6  10-07-19 @ 18:42      10-08    133<L>  |  89<L>  |  11  ----------------------------<  168<H>  3.9   |  26  |  0.7    Ca    9.3      08 Oct 2019 06:47    TPro  7.5  /  Alb  4.3  /  TBili  0.8  /  DBili  x   /  AST  36  /  ALT  46<H>  /  AlkPhos  66  10-07    Liver panel trend:  TBili 0.8   /   AST 36   /   ALT 46   /   AlkP 66   /   Tptn 7.5   /   Alb 4.3    /   DBili --      10-07  TBili 0.4   /   AST 81   /      /   AlkP 58   /   Tptn 6.3   /   Alb 3.8    /   DBili --      10-02  TBili 0.4   /   AST 42   /      /   AlkP 49   /   Tptn 5.6   /   Alb 3.1    /   DBili --      10-01              Radiology:(reviewed by attending)  CT Abdomen and Pelvis w/ IV Cont:   EXAM:  CT ABDOMEN AND PELVIS IC            PROCEDURE DATE:  10/07/2019            INTERPRETATION:  CLINICAL HISTORY / REASON FOR EXAM: Abdominal pain.    TECHNIQUE: Contiguous axial CT images were obtained from the lower chest   to the pubic symphysis following administration of 100 mL Optiray 320   intravenous contrast. Oral contrast was not administered. Reformatted   images in the coronal and sagittal planes were acquired.    COMPARISON CT: CT abdomen and pelvis from January 20, 2016    OTHER STUDIES USED FOR CORRELATION: None.       FINDINGS:    LOWER CHEST: Stable elevation of the right hemidiaphragm. Right basilar   atelectasis. Left pleural thickening.    HEPATOBILIARY:  Unremarkable.    SPLEEN: Unremarkable.    PANCREAS: Unremarkable.    ADRENAL GLANDS: Unremarkable.    KIDNEYS: Symmetric enhancement bilaterally. No evidence of   hydronephrosis. Left renal cyst measuring 5.2 cm (series 5, image 276).    ABDOMINOPELVIC NODES: Unremarkable.    PELVIC ORGANS: Prostate enlargement.    PERITONEUM/MESENTERY/BOWEL: Colonic wall thickening of the sigmoid colon.   No bowel obstruction, ascites or intraperitoneal free air.    BONES/SOFT TISSUES: Degenerative changes to the thoracolumbar spine.   Small fat-containing umbilical hernia. Right hydrocele.    VASCULAR: Aorta is normal in caliber. Vascular calcifications are seen.      IMPRESSION:    Sigmoid colitis with colonic wall thickening. No intraperitoneal free air.    Additional Findings/Recommendations After Attending Radiologist Review:    Colitis is questionable, I see no definite acute abdominal findings.   There are chronic findings as above.              VAIBHAV LATHAM M.D., RESIDENT RADIOLOGIST  This document has been electronically signed.  CHEN ROJAS M.D., ATTENDING RADIOLOGIST  This document has been electronically signed. Oct  7 2019 11:32PM             (10-07-19 @ 22:18)

## 2019-10-08 NOTE — H&P ADULT - ASSESSMENT
85 yo male with a pmh of CAD, stent 3x on 10/1, hypothyroid, and neuropathy present with abdominal pain. pt states this pain has been present for about one week to bilateral upper quadrants with no radiation to the chest. The  pain is described as sharp in nature and he has had a decrease in appetite for the past two days. he has not noted any aggravating/alleviating factors and has been experiencing nausea. he denies any other symptoms including fevers, chill, headache, recent illness/travel, cough, urinary/bowel changes, chest pain, or SOB  On presentation to hospital pt was meeting sepsis criterion and found to have colitis on CT scan.    #) Sepsis secondary to Colitis  - admit to medicine  - IV LR @ 75  - Cefepime  - f/u b/c , f/u stool culture , f/u gi pcr  - Soft diet      #h/o NSTEMI  - continue medical management    #) h/o V tach  - s/p cath , oral amio and metoprolol    #) HTN  - hold lisinopril for sepsis    DLD  Statin    Hypothyroid  -conitnue Levothyroxine    -#) Diet soft    #) DVT PPX    Dispo Acute

## 2019-10-08 NOTE — H&P ADULT - ATTENDING COMMENTS
Pt was seen and examined at bedside independently, this morning pt felt better, ate solid food for breakfast and developed severe abdominal pain in epigastric area, radiating to his back, felt better after Maalox, Bentyl.   I agree with residents findings, assessment and plan above with few corrections in my note.     Vital Signs Last 24 Hrs  T(C): 36.7 (08 Oct 2019 14:27), Max: 37.3 (08 Oct 2019 00:14)  T(F): 98 (08 Oct 2019 14:27), Max: 99.1 (08 Oct 2019 00:14)  HR: 84 (08 Oct 2019 14:27) (71 - 86)  BP: 162/78 (08 Oct 2019 14:27) (140/64 - 200/93)  BP(mean): --  RR: 19 (08 Oct 2019 14:27) (16 - 20)  SpO2: 96% (08 Oct 2019 13:35) (96% - 99%)    PHYSICAL EXAM:  GENERAL: NAD, well-developed  HEAD:  Atraumatic, Normocephalic  EYES: EOMI, PERRLA, conjunctiva and sclera erythematous   NECK: Supple, No JVD  CHEST/LUNG: decreased BS at bases   HEART: Regular rate and rhythm; No murmurs, rubs, or gallops  ABDOMEN: soft, tender in epigastric area and LLQ on deep palpation, no rebound, no guarding, BS present   EXTREMITIES:  2+ Peripheral Pulses, No clubbing, cyanosis, or edema  NEUROLOGY: AAOx3 non-focal  SKIN: No rashes or lesions, facial erythema     LABs:                        16.3   16.37 )-----------( 307      ( 08 Oct 2019 06:47 )             46.9   10-08    133<L>  |  89<L>  |  11  ----------------------------<  168<H>  3.9   |  26  |  0.7    Ca    9.3      08 Oct 2019 06:47    TPro  7.5  /  Alb  4.3  /  TBili  0.8  /  DBili  x   /  AST  36  /  ALT  46<H>  /  AlkPhos  66  10-07    RADIOLOGY:  < from: CT Abdomen and Pelvis w/ IV Cont (10.07.19 @ 22:18) >  IMPRESSION:  Sigmoid colitis with colonic wall thickening. No intraperitoneal free air.  Additional Findings/Recommendations After Attending Radiologist Review:    Colitis is questionable, I see no definite acute abdominal findings.   There are chronic findings as above.  < from: Transthoracic Echocardiogram (09.27.19 @ 07:17) >  Summary:   1. Left ventricular ejection fraction, by visual estimation, is 35 to   40%.   2. Mid anteroseptal segment and entire apex are abnormal as described   above.   3. Mildly increased LV wall thickness.   4. Mild mitral annular calcification.   5. PSAP at least 35.   6. Sclerotic aortic valve with normal opening.   7. Dilatation of the ascending aorta.   8. There is mild aortic root calcification.    A/P  # Abdominal pain/ questionable colitis on CT  - pt did not tolerate solid  diet, back on full liquids   -started on IV PPIs, Carafate, probiotics, Bentyl and Maalox   - f/u pancreatic enzymes in AM  - GI consult   - Change Abx to Cipro and flagyl   - avoid IV hydration ( pt has systolic CHF with recent stents by DR Agustin)    # Significant leukocytosis on admission / Sepsis   - change Abx to Cipro and Flagyl   - probiotics   - ID consult, f/u blood Cx     # CAD/ recent cardiac stents / recent NSTEMI  - c/w amiodarone and BB , statin and ACE   - c/w ASA and Plavix   - monitor and replete electrolytes   - repeat 2Decho in 3 moths to evaluate EF     # Hypothyroids   - c/w Synthroid     # Poorly controlled HTN  - repeat BP in my presence manual is 160/80  - likely due to abdominal pain, treat the symptom   - c/w home medications for now     # GI  - on Protonix IV now  - Carafate and Probiotics   # DVT rpophylaxis     #Progress Note Handoff  Pending (specify):  GI and ID consult, c/w full liquid diet ( do not advance for next 12-18 hours) , f/u blood Cx, f/u pancreatic enzymes in AM   Family discussion: I spoke with pts wife at the bedside today   Disposition: Home_x __/SNF___/Other________/Unknown at this time________

## 2019-10-08 NOTE — CONSULT NOTE ADULT - ASSESSMENT
83 yo male with a PMH of CAD, stent 3x on 10/1, hypothyroid, and neuropathy present with one week of abdominal pain , bloating , altered bowel habits mainly constipation and associated with nausea.     CT scan abdomen and pelvis : showing questionable colitis   leucocytosis at presentation , trending down   patient is on dual antiplatelets for recent NSTMI with 3 stent insertion     #abdominal pain, bloating , altered bowel movement mainly constipation  Miralax twice daily   Zofran PRN for nausea   continue antibiotics for now   trend CBC   patient will need repeat colonoscopy as outpatient 83 yo male with a PMH of CAD, stent 3x on 10/1, hypothyroid, and neuropathy present with one week of abdominal pain , bloating , altered bowel habits mainly constipation and associated with nausea.     CT scan abdomen and pelvis : showing questionable colitis   leucocytosis at presentation , trending down   patient is on dual antiplatelets for recent NSTMI with 3 stent insertion     #abdominal pain, bloating , loose bowel movements   colitis on CT   REC:   check GI PCR , c. diff negative   ciprofloxacin , flagyl for total of 10 days   Zofran PRN for nausea   patient will need repeat colonoscopy as outpatient

## 2019-10-09 NOTE — PROGRESS NOTE ADULT - SUBJECTIVE AND OBJECTIVE BOX
LAKSHMI JUDD  84y  [facility] [address]      Patient is a 84y old  Male who presents with a chief complaint of Abdominal Pain (09 Oct 2019 11:16)      INTERVAL HPI/OVERNIGHT EVENTS:    able to tolerate minute amounts of liquid    REVIEW OF SYSTEMS:  CONSTITUTIONAL: No fever, weight loss, or fatigue  EYES: No eye pain, visual disturbances, or discharge  ENMT:  No difficulty hearing, tinnitus, vertigo; No sinus or throat pain  NECK: No pain or stiffness  BREASTS: No pain, masses, or nipple discharge  RESPIRATORY: No cough, wheezing, chills or hemoptysis; No shortness of breath  CARDIOVASCULAR: No chest pain, palpitations, dizziness, or leg swelling  GASTROINTESTINAL:tolerating small amount of soup  GENITOURINARY: No dysuria, frequency, hematuria, or incontinence  NEUROLOGICAL: No headaches, memory loss, loss of strength, numbness, or tremors  SKIN: No itching, burning, rashes, or lesions   LYMPH NODES: No enlarged glands  ENDOCRINE: No heat or cold intolerance; No hair loss  MUSCULOSKELETAL: No joint pain or swelling; No muscle, back, or extremity pain  PSYCHIATRIC: No depression, anxiety, mood swings, or difficulty sleeping  HEME/LYMPH: No easy bruising, or bleeding gums  ALLERY AND IMMUNOLOGIC: No hives or eczema  FAMILY HISTORY:    T(C): 35.8 (10-09-19 @ 12:42), Max: 36.7 (10-08-19 @ 14:27)  HR: 73 (10-09-19 @ 12:42) (73 - 86)  BP: 137/64 (10-09-19 @ 12:42) (111/55 - 183/86)  RR: 19 (10-09-19 @ 12:42) (18 - 19)  SpO2: --  Wt(kg): --Vital Signs Last 24 Hrs  T(C): 35.8 (09 Oct 2019 12:42), Max: 36.7 (08 Oct 2019 14:27)  T(F): 96.5 (09 Oct 2019 12:42), Max: 98 (08 Oct 2019 14:27)  HR: 73 (09 Oct 2019 12:42) (73 - 86)  BP: 137/64 (09 Oct 2019 12:42) (111/55 - 183/86)  BP(mean): --  RR: 19 (09 Oct 2019 12:42) (18 - 19)  SpO2: --    PHYSICAL EXAM:  GENERAL: NAD, well-groomed, well-developed  HEAD:  Atraumatic, Normocephalic  EYES: EOMI, PERRLA, conjunctiva and sclera clear  ENMT: No tonsillar erythema, exudates, or enlargement; Moist mucous membranes, Good dentition, No lesions  NECK: Supple, No JVD, Normal thyroid  NERVOUS SYSTEM:  Alert & Oriented X3, Good concentration; Motor Strength 5/5 B/L upper and lower extremities; DTRs 2+ intact and symmetric  PULM: Clear to auscultation bilaterally  CARDIAC: Regular rate and rhythm; No murmurs, rubs, or gallops  GI: Soft, Nontender, distended; Bowel sounds present  EXTREMITIES:  2+ Peripheral Pulses, No clubbing, cyanosis, or edema  LYMPH: No lymphadenopathy noted  SKIN: No rashes or lesions    Consultant(s) Notes Reviewed:  [x ] YES  [ ] NO  Care Discussed with Consultants/Other Providers [ x] YES  [ ] NO    LABS:                            16.3   16.37 )-----------( 307      ( 08 Oct 2019 06:47 )             46.9   10-08    133<L>  |  89<L>  |  11  ----------------------------<  168<H>  3.9   |  26  |  0.7    Ca    9.3      08 Oct 2019 06:47    TPro  7.5  /  Alb  4.3  /  TBili  0.8  /  DBili  x   /  AST  36  /  ALT  46<H>  /  AlkPhos  66  10-07            GI PCR Panel, Stool (collected 08 Oct 2019 20:34)  Source: .Stool Feces  Final Report (09 Oct 2019 13:46):    GI PCR Results: NOT detected    *******Please Note:*******    GI panel PCR evaluates for:    Campylobacter, Plesiomonas shigelloides, Salmonella,    Vibrio, Yersinia enterocolitica, Enteroaggregative    Escherichia coli (EAEC), Enteropathogenic E.coli (EPEC),    Enterotoxigenic E. coli (ETEC) lt/st, Shiga-like    toxin-producing E. coli (STEC) stx1/stx2,    Shigella/ Enteroinvasive E. coli (EIEC), Cryptosporidium,    Cyclospora cayetanensis, Entamoeba histolytica,    Giardia lamblia, Adenovirus F 40/41, Astrovirus,    Norovirus GI/GII, Rotavirus A, Sapovirus    Culture - Urine (collected 07 Oct 2019 18:42)  Source: .Urine Clean Catch (Midstream)  Final Report (09 Oct 2019 06:59):    <10,000 CFU/mL Normal Urogenital Yuly      acetaminophen   Tablet .. 650 milliGRAM(s) Oral every 6 hours PRN  aluminum hydroxide/magnesium hydroxide/simethicone Suspension 30 milliLiter(s) Oral every 4 hours PRN  amiodarone    Tablet 200 milliGRAM(s) Oral daily  aspirin  chewable 81 milliGRAM(s) Oral daily  atorvastatin 40 milliGRAM(s) Oral at bedtime  ciprofloxacin     Tablet 500 milliGRAM(s) Oral every 12 hours  clopidogrel Tablet 75 milliGRAM(s) Oral daily  dicyclomine 10 milliGRAM(s) Oral three times a day before meals  diphenhydrAMINE   Injectable 50 milliGRAM(s) IntraMuscular every 4 hours PRN  heparin  Injectable 5000 Unit(s) SubCutaneous every 12 hours  levothyroxine 200 MICROGram(s) Oral daily  metoprolol tartrate 12.5 milliGRAM(s) Oral two times a day  metroNIDAZOLE    Tablet 500 milliGRAM(s) Oral every 12 hours  pantoprazole  Injectable 40 milliGRAM(s) IV Push daily  saccharomyces boulardii 250 milliGRAM(s) Oral two times a day  sucralfate 1 Gram(s) Oral every 12 hours      HEALTH ISSUES - PROBLEM Dx:          Case Discussed with House Staff    Spectra [phone]

## 2019-10-09 NOTE — CONSULT NOTE ADULT - ASSESSMENT
83 yo male with a pmh of CAD, stent 3x on 10/1, hypothyroid, and neuropathy present with abdominal pain. pt states this pain has been present for about one week to bilateral upper quadrants with no radiation to the chest. The  pain is described as sharp in nature and he has had a decrease in appetite for the past two days.     IMPRESSION:  Chronic colitis with no active disease on CT.  No intraabdominal abscess  Clinically diverticulitis  CD NG  UCx NG  WBC 16.3    RECOMMENDATIONS:  Po Ciprofloxacin 500 mg q12h  Po Flagyl 500 mg q12h  duration 10 days

## 2019-10-09 NOTE — CONSULT NOTE ADULT - SUBJECTIVE AND OBJECTIVE BOX
LAKSHMI JUDD  84y, Male  Allergy: iodinated radiocontrast agents (Hives)      HPI:  83 yo male with a pmh of CAD, stent 3x on 10/1, hypothyroid, and neuropathy present with abdominal pain. pt states this pain has been present for about one week to bilateral upper quadrants with no radiation to the chest. The  pain is described as sharp in nature and he has had a decrease in appetite for the past two days. he has not noted any aggravating/alleviating factors and has been experiencing nausea. he denies any other symptoms including fevers, chill, headache, recent illness/travel, cough, urinary/bowel changes, chest pain, or SOB  On presentation to hospital pt was meeting sepsis criterion and found to have colitis on CT scan. (08 Oct 2019 00:20)    FAMILY HISTORY:    PAST MEDICAL & SURGICAL HISTORY:  Neuropathy  TIA (transient ischemic attack)  Left carotid stenosis  Lyme disease  Hypothyroidism  Coronary artery disease        VITALS:  T(F): 96.5, Max: 98 (10-08-19 @ 14:27)  HR: 78  BP: 111/55  RR: 18Vital Signs Last 24 Hrs  T(C): 35.8 (09 Oct 2019 04:54), Max: 36.7 (08 Oct 2019 14:27)  T(F): 96.5 (09 Oct 2019 04:54), Max: 98 (08 Oct 2019 14:27)  HR: 78 (09 Oct 2019 04:54) (78 - 86)  BP: 111/55 (09 Oct 2019 04:54) (111/55 - 200/93)  BP(mean): --  RR: 18 (09 Oct 2019 04:54) (18 - 19)  SpO2: 96% (08 Oct 2019 13:35) (96% - 96%)    TESTS & MEASUREMENTS:                        16.3   16.37 )-----------( 307      ( 08 Oct 2019 06:47 )             46.9     10    133<L>  |  89<L>  |  11  ----------------------------<  168<H>  3.9   |  26  |  0.7    Ca    9.3      08 Oct 2019 06:47    TPro  7.5  /  Alb  4.3  /  TBili  0.8  /  DBili  x   /  AST  36  /  ALT  46<H>  /  AlkPhos  66  10-07    LIVER FUNCTIONS - ( 07 Oct 2019 18:42 )  Alb: 4.3 g/dL / Pro: 7.5 g/dL / ALK PHOS: 66 U/L / ALT: 46 U/L / AST: 36 U/L / GGT: x             Culture - Urine (collected 10-07-19 @ 18:42)  Source: .Urine Clean Catch (Midstream)  Final Report (10-09-19 @ 06:59):    <10,000 CFU/mL Normal Urogenital Yuly      Urinalysis Basic - ( 07 Oct 2019 18:42 )    Color: Yellow / Appearance: Clear / S.016 / pH: x  Gluc: x / Ketone: Small  / Bili: Negative / Urobili: <2 mg/dL   Blood: x / Protein: 30 mg/dL / Nitrite: Negative   Leuk Esterase: Negative / RBC: 2 /HPF / WBC 1 /HPF   Sq Epi: x / Non Sq Epi: 0 /HPF / Bacteria: Negative          RADIOLOGY & ADDITIONAL TESTS:    ANTIBIOTICS:  ciprofloxacin   IVPB 400 milliGRAM(s) IV Intermittent every 12 hours  metroNIDAZOLE  IVPB 500 milliGRAM(s) IV Intermittent every 8 hours

## 2019-10-09 NOTE — CHART NOTE - NSCHARTNOTEFT_GEN_A_CORE
Patient refused oral antibiotics despite counselling saying his stomach is not working so patient was switched to IV for now. patient agreeable to go to oral antibiotics on discharge likely tomorrow

## 2019-10-09 NOTE — PROGRESS NOTE ADULT - SUBJECTIVE AND OBJECTIVE BOX
Patient is a 84y old  Male who presents with a chief complaint of Abdominal Pain (09 Oct 2019 09:10)  he has been experiencing bilateral upper quadrants abdominal pain radiating to the back associated with anorexia and nausea  was found sigmoid colitis on CT abdomen. started empirically on ciprofloxacin and flagyl    OVERNIGHT EVENTS: no acute events overnight    SUBJECTIVE / INTERVAL HPI: Patient seen and examined at bedside. he is feeling better today. able to tolerate PO intake. still c/op mild abdominal pain    VITAL SIGNS:  Vital Signs Last 24 Hrs  T(C): 35.8 (09 Oct 2019 04:54), Max: 36.7 (08 Oct 2019 14:27)  T(F): 96.5 (09 Oct 2019 04:54), Max: 98 (08 Oct 2019 14:27)  HR: 78 (09 Oct 2019 04:54) (78 - 86)  BP: 111/55 (09 Oct 2019 04:54) (111/55 - 200/93)  BP(mean): --  RR: 18 (09 Oct 2019 04:54) (18 - 19)  SpO2: 96% (08 Oct 2019 13:35) (96% - 96%)    PHYSICAL EXAM:    General: WDWN  HEENT: NC/AT; PERRL, clear conjunctiva  Neck: supple  Cardiovascular: +S1/S2; RRR  Respiratory: CTA b/l; no W/R/R  Gastrointestinal: non tender, soft, + BS  Extremities: WWP; 2+ peripheral pulses; no edema   Neurological: AAOx3; no focal deficits    MEDICATIONS:  MEDICATIONS  (STANDING):  amiodarone    Tablet 200 milliGRAM(s) Oral daily  aspirin  chewable 81 milliGRAM(s) Oral daily  atorvastatin 40 milliGRAM(s) Oral at bedtime  ciprofloxacin     Tablet 500 milliGRAM(s) Oral every 12 hours  clopidogrel Tablet 75 milliGRAM(s) Oral daily  dicyclomine 10 milliGRAM(s) Oral three times a day before meals  heparin  Injectable 5000 Unit(s) SubCutaneous every 12 hours  levothyroxine 200 MICROGram(s) Oral daily  metoprolol tartrate 12.5 milliGRAM(s) Oral two times a day  metroNIDAZOLE    Tablet 500 milliGRAM(s) Oral every 12 hours  pantoprazole  Injectable 40 milliGRAM(s) IV Push daily  saccharomyces boulardii 250 milliGRAM(s) Oral two times a day  sucralfate 1 Gram(s) Oral every 12 hours    MEDICATIONS  (PRN):  acetaminophen   Tablet .. 650 milliGRAM(s) Oral every 6 hours PRN Moderate Pain (4 - 6)  aluminum hydroxide/magnesium hydroxide/simethicone Suspension 30 milliLiter(s) Oral every 4 hours PRN Dyspepsia  diphenhydrAMINE   Injectable 50 milliGRAM(s) IntraMuscular every 4 hours PRN Rash and/or Itching      ALLERGIES:  Allergies    iodinated radiocontrast agents (Hives)    Intolerances        LABS:                        16.3   16.37 )-----------( 307      ( 08 Oct 2019 06:47 )             46.9     10-08    133<L>  |  89<L>  |  11  ----------------------------<  168<H>  3.9   |  26  |  0.7    Ca    9.3      08 Oct 2019 06:47    TPro  7.5  /  Alb  4.3  /  TBili  0.8  /  DBili  x   /  AST  36  /  ALT  46<H>  /  AlkPhos  66  10-07      Urinalysis Basic - ( 07 Oct 2019 18:42 )    Color: Yellow / Appearance: Clear / S.016 / pH: x  Gluc: x / Ketone: Small  / Bili: Negative / Urobili: <2 mg/dL   Blood: x / Protein: 30 mg/dL / Nitrite: Negative   Leuk Esterase: Negative / RBC: 2 /HPF / WBC 1 /HPF   Sq Epi: x / Non Sq Epi: 0 /HPF / Bacteria: Negative

## 2019-10-10 NOTE — DISCHARGE NOTE NURSING/CASE MANAGEMENT/SOCIAL WORK - PATIENT PORTAL LINK FT
You can access the FollowMyHealth Patient Portal offered by [facility] by registering at the following website: [email or web address]. By joining [employer]’s FollowMyHealth portal, you will also be able to view your health information using other applications (apps) compatible with our system.

## 2019-10-10 NOTE — DISCHARGE NOTE PROVIDER - CARE PROVIDERS DIRECT ADDRESSES
[email or web address],DirectAddress_Unknown

## 2019-10-10 NOTE — DISCHARGE NOTE PROVIDER - CARE PROVIDER_API CALL
Trevon Post)  Gastroenterology; Internal Medicine  [address] [place] [address]  Phone: [phone]  Fax: [phone]  Follow Up Time: 1 week Trevon Post)  Gastroenterology; Internal Medicine  [address] [place] [address]  Phone: [phone]  Fax: [phone]  Follow Up Time: 1 week    Masoud Zarco)  Family Medicine  [address] [facility] [facility], [address]  [place] [address]  Phone: [phone]  Fax: [phone]  Follow Up Time:     Bartolome Daugherty)  Gastroenterology; Internal Medicine  [address] [place] [address]  Phone: [phone]  Fax: [phone]  Follow Up Time:

## 2019-10-10 NOTE — DISCHARGE NOTE PROVIDER - NSDCCPCAREPLAN_GEN_ALL_CORE_FT
PRINCIPAL DISCHARGE DIAGNOSIS  Diagnosis: Sepsis  Assessment and Plan of Treatment: secondary to infectious colitis  became better on ciprofloxacin and flagyl  continue antibiotics for a total course of 8 days      SECONDARY DISCHARGE DIAGNOSES  Diagnosis: Colitis  Assessment and Plan of Treatment: infectious colitis  follow up with DR Rosales for possible colonoscopy as OP

## 2019-10-10 NOTE — DISCHARGE NOTE PROVIDER - HOSPITAL COURSE
83 yo male with a pmh of CAD, stent 3x on 10/1, hypothyroid, and neuropathy present with abdominal pain. pt states this pain has been present for about one week to bilateral upper quadrants with no radiation to the chest. The  pain is described as sharp in nature and he has had a decrease in appetite for the past two days. he has not noted any aggravating/alleviating factors and has been experiencing nausea. he denies any other symptoms including fevers, chill, headache, recent illness/travel, cough, urinary/bowel changes, chest pain, or SOB    On presentation to hospital pt was meeting sepsis criterion and found to have colitis on CT scan.    started on ciprofloxacin and flagyl with improvement in his symptoms    able to tolerate oral intake    plan is to discharge him and to follow up with GI ( dr Rosales) in 2 weeks for a repeat colonoscopy

## 2019-10-10 NOTE — PROGRESS NOTE ADULT - SUBJECTIVE AND OBJECTIVE BOX
LAKSHMI JUDD  84y, Male      OVERNIGHT EVENTS:    no fevers, feels well at rest with minimal abdominal discomfort  no diarrhea    VITALS:  T(F): 97.4, Max: 98.2 (10-09-19 @ 21:25)  HR: 70  BP: 134/65  RR: 18Vital Signs Last 24 Hrs  T(C): 36.3 (10 Oct 2019 12:51), Max: 36.8 (09 Oct 2019 21:25)  T(F): 97.4 (10 Oct 2019 12:51), Max: 98.2 (09 Oct 2019 21:25)  HR: 70 (10 Oct 2019 12:51) (65 - 76)  BP: 134/65 (10 Oct 2019 12:51) (110/59 - 162/73)  BP(mean): --  RR: 18 (10 Oct 2019 12:51) (18 - 18)  SpO2: --    TESTS & MEASUREMENTS:                        15.6   11.39 )-----------( 267      ( 10 Oct 2019 05:29 )             46.5     10-10    139  |  96<L>  |  16  ----------------------------<  93  4.0   |  30  |  0.8    Ca    8.6      10 Oct 2019 05:29  Mg     2.3     10-09    TPro  7.3  /  Alb  4.3  /  TBili  0.7  /  DBili  x   /  AST  26  /  ALT  32  /  AlkPhos  65  10-09    LIVER FUNCTIONS - ( 09 Oct 2019 19:00 )  Alb: 4.3 g/dL / Pro: 7.3 g/dL / ALK PHOS: 65 U/L / ALT: 32 U/L / AST: 26 U/L / GGT: x             GI PCR Panel, Stool (collected 10-08-19 @ 20:34)  Source: .Stool Feces  Final Report (10-09-19 @ 13:46):    GI PCR Results: NOT detected    *******Please Note:*******    GI panel PCR evaluates for:    Campylobacter, Plesiomonas shigelloides, Salmonella,    Vibrio, Yersinia enterocolitica, Enteroaggregative    Escherichia coli (EAEC), Enteropathogenic E.coli (EPEC),    Enterotoxigenic E. coli (ETEC) lt/st, Shiga-like    toxin-producing E. coli (STEC) stx1/stx2,    Shigella/ Enteroinvasive E. coli (EIEC), Cryptosporidium,    Cyclospora cayetanensis, Entamoeba histolytica,    Giardia lamblia, Adenovirus F 40/41, Astrovirus,    Norovirus GI/GII, Rotavirus A, Sapovirus    Culture - Stool (collected 10-08-19 @ 20:34)  Source: .Stool Feces  Preliminary Report (10-10-19 @ 11:49):    No enteric pathogens to date: Final culture pending    Culture - Blood (collected 10-08-19 @ 06:47)  Source: .Blood None  Preliminary Report (10-09-19 @ 14:01):    No growth to date.    Culture - Urine (collected 10-07-19 @ 18:42)  Source: .Urine Clean Catch (Midstream)  Final Report (10-09-19 @ 06:59):    <10,000 CFU/mL Normal Urogenital Yuly            RADIOLOGY & ADDITIONAL TESTS:    ANTIBIOTICS:  ciprofloxacin   IVPB 400 milliGRAM(s) IV Intermittent every 12 hours  metroNIDAZOLE  IVPB 500 milliGRAM(s) IV Intermittent every 12 hours

## 2019-10-10 NOTE — DISCHARGE NOTE PROVIDER - PROVIDER TOKENS
PROVIDER:[TOKEN:[[id number]],FOLLOWUP:[1 week]] PROVIDER:[TOKEN:[[id number]],FOLLOWUP:[1 week]],PROVIDER:[TOKEN:[[id number]]],PROVIDER:[TOKEN:[[id number]]]

## 2019-10-10 NOTE — PROGRESS NOTE ADULT - SUBJECTIVE AND OBJECTIVE BOX
LAKSHMI JUDD  84y  [facility] [address]      Patient is a 84y old  Male who presents with a chief complaint of Abdominal Pain (10 Oct 2019 09:45)      INTERVAL HPI/OVERNIGHT EVENTS:    tolerated solid food for lunch    REVIEW OF SYSTEMS:  CONSTITUTIONAL: No fever, weight loss, or fatigue  EYES: No eye pain, visual disturbances, or discharge  ENMT:  No difficulty hearing, tinnitus, vertigo; No sinus or throat pain  NECK: No pain or stiffness  BREASTS: No pain, masses, or nipple discharge  RESPIRATORY: No cough, wheezing, chills or hemoptysis; No shortness of breath  CARDIOVASCULAR: No chest pain, palpitations, dizziness, or leg swelling  GASTROINTESTINAL: abdominal soreness which is much less than yesterday  GENITOURINARY: No dysuria, frequency, hematuria, or incontinence  NEUROLOGICAL: No headaches, memory loss, loss of strength, numbness, or tremors  SKIN: No itching, burning, rashes, or lesions   LYMPH NODES: No enlarged glands  ENDOCRINE: No heat or cold intolerance; No hair loss  MUSCULOSKELETAL: No joint pain or swelling; No muscle, back, or extremity pain  PSYCHIATRIC: No depression, anxiety, mood swings, or difficulty sleeping  HEME/LYMPH: No easy bruising, or bleeding gums  ALLERY AND IMMUNOLOGIC: No hives or eczema  FAMILY HISTORY:    T(C): 36.6 (10-10-19 @ 04:30), Max: 36.8 (10-09-19 @ 21:25)  HR: 76 (10-10-19 @ 04:30) (65 - 76)  BP: 110/59 (10-10-19 @ 04:30) (110/59 - 162/73)  RR: 18 (10-10-19 @ 04:30) (18 - 19)  SpO2: --  Wt(kg): --Vital Signs Last 24 Hrs  T(C): 36.6 (10 Oct 2019 04:30), Max: 36.8 (09 Oct 2019 21:25)  T(F): 97.8 (10 Oct 2019 04:30), Max: 98.2 (09 Oct 2019 21:25)  HR: 76 (10 Oct 2019 04:30) (65 - 76)  BP: 110/59 (10 Oct 2019 04:30) (110/59 - 162/73)  BP(mean): --  RR: 18 (10 Oct 2019 04:30) (18 - 19)  SpO2: --    PHYSICAL EXAM:  GENERAL: NAD, well-groomed, well-developed  HEAD:  Atraumatic, Normocephalic  EYES: EOMI, PERRLA, conjunctiva and sclera clear  ENMT: No tonsillar erythema, exudates, or enlargement; Moist mucous membranes, Good dentition, No lesions  NECK: Supple, No JVD, Normal thyroid  NERVOUS SYSTEM:  Alert & Oriented X3, Good concentration; Motor Strength 5/5 B/L upper and lower extremities; DTRs 2+ intact and symmetric  PULM: Clear to auscultation bilaterally  CARDIAC: Regular rate and rhythm; No murmurs, rubs, or gallops  GI: Soft, Nontender, mild distention  EXTREMITIES:  2+ Peripheral Pulses, No clubbing, cyanosis, or edema  LYMPH: No lymphadenopathy noted  SKIN: No rashes or lesions      LABS:                            15.6   11.39 )-----------( 267      ( 10 Oct 2019 05:29 )             46.5   10-10    139  |  96<L>  |  16  ----------------------------<  93  4.0   |  30  |  0.8    Ca    8.6      10 Oct 2019 05:29  Mg     2.3     10-09    TPro  7.3  /  Alb  4.3  /  TBili  0.7  /  DBili  x   /  AST  26  /  ALT  32  /  AlkPhos  65  10-09            GI PCR Panel, Stool (collected 08 Oct 2019 20:34)  Source: .Stool Feces  Final Report (09 Oct 2019 13:46):    GI PCR Results: NOT detected    *******Please Note:*******    GI panel PCR evaluates for:    Campylobacter, Plesiomonas shigelloides, Salmonella,    Vibrio, Yersinia enterocolitica, Enteroaggregative    Escherichia coli (EAEC), Enteropathogenic E.coli (EPEC),    Enterotoxigenic E. coli (ETEC) lt/st, Shiga-like    toxin-producing E. coli (STEC) stx1/stx2,    Shigella/ Enteroinvasive E. coli (EIEC), Cryptosporidium,    Cyclospora cayetanensis, Entamoeba histolytica,    Giardia lamblia, Adenovirus F 40/41, Astrovirus,    Norovirus GI/GII, Rotavirus A, Sapovirus    Culture - Stool (collected 08 Oct 2019 20:34)  Source: .Stool Feces  Preliminary Report (10 Oct 2019 11:49):    No enteric pathogens to date: Final culture pending    Culture - Blood (collected 08 Oct 2019 06:47)  Source: .Blood None  Preliminary Report (09 Oct 2019 14:01):    No growth to date.    Culture - Urine (collected 07 Oct 2019 18:42)  Source: .Urine Clean Catch (Midstream)  Final Report (09 Oct 2019 06:59):    <10,000 CFU/mL Normal Urogenital Yuly      acetaminophen   Tablet .. 650 milliGRAM(s) Oral every 6 hours PRN  aluminum hydroxide/magnesium hydroxide/simethicone Suspension 30 milliLiter(s) Oral every 4 hours PRN  amiodarone    Tablet 200 milliGRAM(s) Oral daily  aspirin  chewable 81 milliGRAM(s) Oral daily  atorvastatin 40 milliGRAM(s) Oral at bedtime  ciprofloxacin   IVPB 400 milliGRAM(s) IV Intermittent every 12 hours  clopidogrel Tablet 75 milliGRAM(s) Oral daily  dicyclomine 10 milliGRAM(s) Oral three times a day before meals  diphenhydrAMINE   Injectable 50 milliGRAM(s) IntraMuscular every 4 hours PRN  heparin  Injectable 5000 Unit(s) SubCutaneous every 12 hours  levothyroxine 200 MICROGram(s) Oral daily  metoprolol tartrate 12.5 milliGRAM(s) Oral two times a day  metroNIDAZOLE  IVPB 500 milliGRAM(s) IV Intermittent every 12 hours  pantoprazole  Injectable 40 milliGRAM(s) IV Push daily  saccharomyces boulardii 250 milliGRAM(s) Oral two times a day  sucralfate 1 Gram(s) Oral every 12 hours      HEALTH ISSUES - PROBLEM Dx:          Case Discussed with House Staff     Spectra [phone]

## 2019-10-14 NOTE — HISTORY OF PRESENT ILLNESS
[de-identified] : 84-year-old male with abdominal pain.  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  The patient had a CT scan nd developed a rash secondary to contrast  The patient at that time felt chest pressure and was evaluated by cardiology, Dr. Chavez.  The patient required 3 stentsin his heart.  Several days after the stents, the patient developed severe abdominal pain.  The patient was seen in the emergency room, and CT scan showed thickening of the sigmoid colon.  The patient had diarrhea, C. difficile was negative, stool for GI PCRwas negative, cultures were negative.  The patient was discharged on antibiotics.  Several days after discharge the patient developed worsening diarrhea.  The patient was told to discontinue antibiotics, and his discomfort and diarrhea resolved.  He denies fevers or chills or diarrhea at this time.  The patient describes tremendous anxiety, but denies nausea or vomiting black or bloody stools.  Patient feels weak but

## 2019-10-14 NOTE — PHYSICAL EXAM
[General Appearance - Alert] : alert [General Appearance - In No Acute Distress] : in no acute distress [Oropharynx] : the oropharynx was normal [Outer Ear] : the ears and nose were normal in appearance [Neck Appearance] : the appearance of the neck was normal [Neck Cervical Mass (___cm)] : no neck mass was observed [Jugular Venous Distention Increased] : there was no jugular-venous distention [Thyroid Diffuse Enlargement] : the thyroid was not enlarged [Thyroid Nodule] : there were no palpable thyroid nodules [Cervical Lymph Nodes Enlarged Posterior Bilaterally] : posterior cervical [Axillary Lymph Nodes Enlarged Bilaterally] : axillary [Cervical Lymph Nodes Enlarged Anterior Bilaterally] : anterior cervical [Supraclavicular Lymph Nodes Enlarged Bilaterally] : supraclavicular [Femoral Lymph Nodes Enlarged Bilaterally] : femoral [Inguinal Lymph Nodes Enlarged Bilaterally] : inguinal [Abnormal Walk] : normal gait [Musculoskeletal - Swelling] : no joint swelling seen [Nail Clubbing] : no clubbing  or cyanosis of the fingernails [Impaired Insight] : insight and judgment were intact [Oriented To Time, Place, And Person] : oriented to person, place, and time [Motor Tone] : muscle strength and tone were normal [Affect] : the affect was normal [FreeTextEntry1] : Very agitated

## 2019-10-19 NOTE — ED PROVIDER NOTE - OBJECTIVE STATEMENT
85y/o M w/ hx of htn, hiatal hernia, hypercholesteremia, CAD (2 stents 18 years ago, 3 stents sept 24 by Dr. Gamez) recentely admitted NSTEMI and had 3 stents placed, after being discharged sept 26, pt started having nb diarrhea pt was admitted for colitis and dc oct 8 on po flagyl and cipro and diarrhea was resolving; pt took 2 doses and diarrhea started again pt was told to stop antibiotics, but since then nb watery diarrhea continues, no cp or sob.  pt with generalized achy abdominal 6/10 pain.  pt followed up with Dr. Post, but pt cannot be scoped until 3 months after stents placement.  denies any testicular swelling or pain. no travel hx.

## 2019-10-19 NOTE — ED PROVIDER NOTE - NS ED ROS FT
Constitutional: (-) fever  Eyes/ENT: (-) blurry vision, (-) epistaxis  Cardiovascular: (-) chest pain, (-) syncope  Respiratory: (-) cough, (-) shortness of breath  Gastrointestinal: (-) vomiting, (+ diarrhea  Musculoskeletal: (-) neck pain, (-) back pain, (-) joint pain  Integumentary: (-) rash, (-) edema  Neurological: (-) headache, (-) altered mental status  Psychiatric: (-) hallucinations  Allergic/Immunologic: (-) pruritus

## 2019-10-19 NOTE — H&P ADULT - NSHPPHYSICALEXAM_GEN_ALL_CORE
PHYSICAL EXAM:  GENERAL: NAD, lying in bed comfortably  HEAD:  Atraumatic, Normocephalic  EYES: EOMI, PERRLA, conjunctiva and sclera clear  ENT: Moist mucous membranes  NECK: Supple, No JVD  CHEST/LUNG: Clear to auscultation bilaterally; No rales, rhonchi, wheezing, or rubs. Unlabored respirations  HEART: Regular rate and rhythm; No murmurs, rubs, or gallops  ABDOMEN: Bowel sounds present; Soft, Nontender, Nondistended. No hepatomegally  EXTREMITIES:  2+ Peripheral Pulses, brisk capillary refill. No clubbing, cyanosis, or edema  NERVOUS SYSTEM:  Alert & Oriented X3, speech clear. No deficits   MSK: FROM all 4 extremities, full and equal strength  SKIN: No rashes or lesions HEAD:  Atraumatic, Normocephalic  EYES: EOMI, PERRLA, conjunctiva and sclera clear  ENT: dy mucous membranes  NECK: Supple, No JVD  CHEST/LUNG: Clear to auscultation bilaterally;  HEART: Regular rate and rhythm; No murmurs, rubs, or gallops  ABDOMEN: Soft, Nontender, Nondistended.  EXTREMITIES:  brisk capillary refill. No clubbing, cyanosis, or edema  NERVOUS SYSTEM:  Alert & Oriented X3, speech clear. No deficits   MSK: FROM all 4 extremities, full and equal strength  SKIN: No rashes or lesions HEAD:  Atraumatic, Normocephalic  EYES: EOMI, PERRLA, conjunctiva and sclera clear  ENT: dy mucous membranes  NECK: Supple, No JVD  CHEST/LUNG: Clear to auscultation bilaterally;  HEART/CVS: Regular rate and rhythm; No murmurs, rubs, or gallops  ABDOMEN/GI: Soft, Nontender, Nondistended.  EXTREMITIES:  brisk capillary refill. No clubbing, cyanosis, or edema  NERVOUS SYSTEM:  Alert & Oriented X3, speech clear. No deficits   MSK: FROM all 4 extremities, full and equal strength  SKIN: No rashes or lesions

## 2019-10-19 NOTE — ED PROVIDER NOTE - PROGRESS NOTE DETAILS
oct 8 pt with negative cdiff. sept 27 pt with 2.05 troponin; today 0.05 pt not able to tolerate po. very uncomfortable. pt not able to tolerate po. very uncomfortable. pt not able to urinate, pt looks dry, but recent stent placement hx of low EF.  pt needs to be well hydrated with monitoring.

## 2019-10-19 NOTE — ED PROVIDER NOTE - PHYSICAL EXAMINATION
VITAL SIGNS: I have reviewed nursing notes and confirm.  CONSTITUTIONAL: Well-developed; well-nourished; in no acute distress.obese, uncomfortable.   SKIN: skin exam is warm and dry, no acute rash.   HEAD: Normocephalic; atraumatic.  EYES:  EOM intact; conjunctiva and sclera clear.  ENT: No nasal discharge; airway clear. dry oral mucosa.   NECK: Supple; non tender.  CARD: S1, S2 normal; no murmurs, gallops, or rubs. Regular rate and rhythm. posterior tibial and radial pulses 2+  RESP: No wheezes, rales or rhonchi. cta b/l. no use of accessory muscles. no retractions  ABD: Normal bowel sounds; soft; non-distended; generalized tenderness; no rebound. negative psoas, rovsign's and murphys.  EXT: Normal ROM. No  cyanosis or edema.  BACK: No cva tenderness  NEURO: Alert, oriented, grossly unremarkable.    PSYCH: Cooperative, appropriate.

## 2019-10-19 NOTE — ED PROVIDER NOTE - CARE PLAN
Principal Discharge DX:	Abdominal pain  Secondary Diagnosis:	Diarrhea  Secondary Diagnosis:	Unable to eat

## 2019-10-19 NOTE — ED ADULT NURSE NOTE - NSIMPLEMENTINTERV_GEN_ALL_ED
Implemented All Universal Safety Interventions:  [place] to call system. Call bell, personal items and telephone within reach. Instruct patient to call for assistance. Room bathroom lighting operational. Non-slip footwear when patient is off stretcher. Physically safe environment: no spills, clutter or unnecessary equipment. Stretcher in lowest position, wheels locked, appropriate side rails in place.

## 2019-10-19 NOTE — H&P ADULT - ASSESSMENT
84 year history of HTN, hiatal hernia, hypercholesteremia, and CAD s/p multiple stents presents with abdominal pain, and diarrhea.      # Subacute diarrhea  - GI PCR, Cdiff were negative   - CT last admission showed questionable colitis   - IVF   - clear liquids and advance as tolerated   - send stool osmolarity/ fat   - stool ova/ parasite      - check TSH   - Gi eval     # CAD   # H/O ACS and recent stent  # HTN   - c/w aspirin/ Plavix and statin     # Hypothyroidism   - c/e levothyroxine     # DVT PPX   # DASH diet   # full code 84 year history of HTN, hiatal hernia, hypercholesteremia, and CAD s/p multiple stents presents with abdominal pain, and diarrhea.      # Subacute diarrhea  - GI PCR, Cdiff were negative   - CT last admission showed questionable colitis   - IVF   - clear liquids and advance as tolerated   - send stool osmolarity/ fat   - stool ova/ parasite      - check TSH   - Gi eval     # CAD   # H/O ACS and recent stent  # HTN   #) h/o V tach  -oral amio and metoprolol  - c/w aspirin/ Plavix and statin     # Hypothyroidism   - c/e levothyroxine     # DVT PPX   # DASH diet   # full code 84 year history of HTN, hiatal hernia, hypercholesteremia, and CAD s/p multiple stents presents with abdominal pain, and diarrhea.      # Subacute diarrhea  - GI PCR, Cdiff were negative from last admission, will  repeat    - CT last admission showed questionable colitis   - IVF   - clear liquids and advance as tolerated   - send stool osmolarity/ fat   - stool ova/ parasite      - check TSH   - Gi eval     # CAD   # H/O ACS and recent stent  # HTN   #) h/o V tach  -oral amio and metoprolol  - c/w aspirin/ Plavix and statin     # Hypothyroidism   - c/e levothyroxine     # DVT PPX   # DASH diet   # full code

## 2019-10-19 NOTE — H&P ADULT - ATTENDING COMMENTS
Patient seen and examined independently. Resident's H & P reviewed. Agree with the findings and plan of care except,    An 84 years old male who was discharged on 10/8 presented with diarrhea and diffuse abdominal pain.     WBC: 12.90  K: 3.3    ASSESSMENT:    1. Diarrhea  2. CAD  3. HTN  4. H/O VT  5. Hypothyroidism  6. Hypokalemia    PLAN:    . Hold Abx  . Check stool for C. difficil  . Stool cxs  . GI eval Patient seen and examined independently. Resident's H & P reviewed. Agree with the findings and plan of care except,    An 84 years old male who was discharged on 10/8 presented with diarrhea and diffuse abdominal pain.     WBC: 12.90  K: 3.3    ASSESSMENT:    1. Diarrhea  2. CAD  3. HTN  4. H/O VT  5. Hypothyroidism  6. Hypokalemia    PLAN:    . Hold Abx  . Check stool for C. difficil  . Stool cxs  . GI eval noted.   . Encourage oral intake.  . Rehab consult  . Anticipate D/C in AM

## 2019-10-19 NOTE — ED PROVIDER NOTE - CLINICAL SUMMARY MEDICAL DECISION MAKING FREE TEXT BOX
pt seen for dehydration, diarrhea and persistent abdominal discomfort, labs with K 3.3 and WBC 12, slight elevated troponin. pt admitted for further care given sx, inability to tolerate PO, weakness and clinical dehydration.

## 2019-10-19 NOTE — ED PROVIDER NOTE - ATTENDING CONTRIBUTION TO CARE
85 yo male with PMH CAD s/p stents 9/26 secondary to NSTEMI, HTN, HLD, CHF presented  with nonbloody persistent diarrhea associated with weakness and abdominal discomfort. Pt admitted for abut a week for similar, stool cultures negative. Pt s/p cipro and flagyl but sx persisted and discontinued the abx. Pt following with GI but not able to have scope yet due to recent stents. Pt denied any CP, SOB, palpitations.    VITAL SIGNS: noted  CONSTITUTIONAL: Well-developed; well-nourished; in no acute distress  HEAD: Normocephalic; atraumatic  EYES: PERRL, EOM intact; conjunctiva and sclera clear  ENT: No nasal discharge; airway clear. Dry MM  NECK: Supple; non tender. No JVD  CARD: S1, S2 normal; no murmurs, gallops, or rubs. Regular rate and rhythm  RESP: CTAB/L, no wheezes, rales or rhonchi  ABD: Normal bowel sounds; soft; non-distended; mild tenderness, no rebound or guarding   EXT: Normal ROM. No calf tenderness or edema. Distal pulses intact  NEURO: Alert, oriented. Grossly unremarkable. No focal deficits  SKIN: Skin exam is warm and dry

## 2019-10-19 NOTE — H&P ADULT - HISTORY OF PRESENT ILLNESS
84 year history of HTN, hiatal hernia, hypercholesteremia, and CAD s/p multiple stents presents with abdominal pain, and diarrhea.    patient was admitted on Oct 1st for  NSTEMI and had 3 stents placed.   Readmitted after 5 days for colitis and discharged on  oct 8 on po flagyl and cipro.  Has been doing relatively well and diarrhea was resolving        pt took 2 doses and diarrhea started again pt was told to stop antibiotics, but since then nb watery diarrhea continues,   Donald chest pain or Shortness of breath, urinary symptoms, fever or chills   In Ed   T(C): 36.6 T(F): 97.8   HR: 79   BP: 191/91   RR: 18  SpO2: 97% 84 year history of HTN, hiatal hernia, hypercholesteremia, and CAD s/p multiple stents presents with abdominal pain, and diarrhea.  patient was admitted on Oct 1st for  NSTEMI and had 3 stents placed.   Readmitted after 5 days for colitis and discharged on oct 8 on po flagyl and cipro.  Has been doing relatively well until one day after discharge when diarrhea started again, associated with diffuse abdominal discomfort, called his gastroenterologists who recommended to stop Abx, daiirrhia improved after stopping the ABx but never resolved, complains of nausea with no vomiting, denies blood with stool, denies dysphagia, fever, chills. Donald chest pain or Shortness of breath, urinary symptoms, fever or chills   In Ed   T(C): 36.6 T(F): 97.8   HR: 79   BP: 191/91   RR: 18  SpO2: 97% 84 year history of HTN, hiatal hernia, hypercholesteremia, and CAD s/p multiple stents presents with abdominal pain, and diarrhea.  patient was admitted on Oct 1st for  NSTEMI and had 3 stents placed.   Readmitted after 5 days for colitis and discharged on oct 8 on po flagyl and cipro.  Has been doing relatively well until one day after discharge when diarrhea started again, associated with diffuse abdominal discomfort, called his gastroenterologists who recommended to stop Abx, diarrhea  improved after stopping the ABx but never resolved, complains of nausea with no vomiting, denies blood with stool, denies dysphagia, fever, chills. Donald chest pain or Shortness of breath, urinary symptoms, fever or chills. no recent travel   In Ed   T(C): 36.6 T(F): 97.8   HR: 79   BP: 191/91   RR: 18  SpO2: 97%

## 2019-10-19 NOTE — H&P ADULT - NSHPLABSRESULTS_GEN_ALL_CORE
15.5   12.90 )-----------( 321      ( 19 Oct 2019 16:08 )             44.6       10-19    136  |  92<L>  |  14  ----------------------------<  145<H>  3.3<L>   |  31  |  0.7    Ca    9.4      19 Oct 2019 16:08  Mg     1.9     10-19    TPro  6.7  /  Alb  3.8  /  TBili  0.4  /  DBili  <0.2  /  AST  28  /  ALT  34  /  AlkPhos  62  10-19                      Lactate Trend  10-19 @ 16:08 Lactate:1.6       CARDIAC MARKERS ( 19 Oct 2019 16:45 )  x     / 0.05 ng/mL / x     / x     / x            CAPILLARY BLOOD GLUCOSE            Culture Results:   No enteric pathogens isolated.  (Stool culture examined for Salmonella,  Shigella, Campylobacter, Aeromonas, Plesiomonas,  Vibrio, E.coli O157 and Yersinia) (10-08 @ 20:34)  Culture Results:   GI PCR Results: NOT detected  *******Please Note:*******  GI panel PCR evaluates for:  Campylobacter, Plesiomonas shigelloides, Salmonella,  Vibrio, Yersinia enterocolitica, Enteroaggregative  Escherichia coli (EAEC), Enteropathogenic E.coli (EPEC),  Enterotoxigenic E. coli (ETEC) lt/st, Shiga-like  toxin-producing E. coli (STEC) stx1/stx2,  Shigella/ Enteroinvasive E. coli (EIEC), Cryptosporidium,  Cyclospora cayetanensis, Entamoeba histolytica,  Giardia lamblia, Adenovirus F 40/41, Astrovirus,  Norovirus GI/GII, Rotavirus A, Sapovirus (10-08 @ 20:34)  Culture Results:   No growth at 5 days. (10-08 @ 06:47)  Culture Results:   <10,000 CFU/mL Normal Urogenital Yuly (10-07 @ 18:42)

## 2019-10-19 NOTE — ED CLERICAL - NS ED CLERK NOTE PRE-ARRIVAL INFORMATION; ADDITIONAL PRE-ARRIVAL INFORMATION
This patient is enrolled in the [facility] readmission reduction initiative and has active care navigation. This patient can be followed up by the care navigation team within 24 hours.  To arrange close follow-up or to obtain additional clinical information, please call the contact number above. The on call [facility] Hospitalist has been notified and will coordinate care in concert with the ED Physician including consults as necessary. Call [phone] (North), [phone] (South) to reach the hospitalist. You may also call the Hospitalist Division at  at either site. Consider CDU for management per guidelines

## 2019-10-20 NOTE — PROGRESS NOTE ADULT - ASSESSMENT
84 year history of HTN, hiatal hernia, hypercholesteremia, and CAD s/p multiple stents presents with abdominal pain, and diarrhea.      # Subacute diarrhea  - GI PCR, Cdiff were negative from last admission, will repeat  if diarrhea recure   - CT last admission showed questionable colitis   - IVF   - clear liquids and advance as tolerated   - send stool osmolarity/ fat   - stool ova/ parasite      - check TSH   - Gi eval is pending     # CAD   # H/O ACS and recent stent  # HTN   #) h/o V tach  -oral amio and metoprolol  - c/w aspirin/ Plavix and statin     # Hypothyroidism - c/e levothyroxine     # DVT PPX   # DASH diet as tolerated   # full code

## 2019-10-20 NOTE — CONSULT NOTE ADULT - SUBJECTIVE AND OBJECTIVE BOX
GI HPI:    84 year history of HTN, hiatal hernia, hypercholesteremia, and CAD s/p multiple stents on aspirin and plavix presents with abdominal pain, and diarrhea.  patient was admitted on Oct 1st for  NSTEMI and had 3 stents placed. Readmitted after 5 days for sigmoid colitis and discharged on oct 8 on po flagyl and cipro.  Has been doing relatively well until one day after discharge when diarrhea started again, associated with diffuse abdominal discomfort, called his gastroenterologists who recommended to stop Abx, diarrhea  improved after stopping the ABx but never resolved. He now reports that diarrhea resolved but the abdominal pain is still there and did not improve. Last BM yesterday. He reports not been able to eat because of abd pain.         Previous EGD:  in [place] 3 years ago and was normal according to patient   Previous Colonoscopy:  in [place] 3 years ago and was normal according to patient           PAST MEDICAL & SURGICAL HISTORY  Neuropathy  TIA (transient ischemic attack)  Left carotid stenosis  Lyme disease  Hypothyroidism  Coronary artery disease  No significant past surgical history          FAMILY HISTORY:  FAMILY HISTORY:      ALLERGIES:  iodinated radiocontrast agents (Hives)      MEDICATIONS:  MEDICATIONS  (STANDING):  amiodarone    Tablet 200 milliGRAM(s) Oral two times a day  aspirin  chewable 81 milliGRAM(s) Oral daily  atorvastatin 80 milliGRAM(s) Oral at bedtime  chlorhexidine 4% Liquid 1 Application(s) Topical <User Schedule>  clopidogrel Tablet 75 milliGRAM(s) Oral daily  heparin  Injectable 5000 Unit(s) SubCutaneous every 12 hours  levothyroxine 200 MICROGram(s) Oral daily  lisinopril 20 milliGRAM(s) Oral daily  metoprolol tartrate 12.5 milliGRAM(s) Oral two times a day  pantoprazole    Tablet 40 milliGRAM(s) Oral before breakfast    MEDICATIONS  (PRN):  ondansetron Injectable 4 milliGRAM(s) IV Push every 4 hours PRN Nausea and/or Vomiting      HOME MEDICATIONS:  Home Medications:  acetaminophen 325 mg oral tablet: 2 tab(s) orally every 6 hours, As needed, Moderate Pain (4 - 6) (10 Oct 2019 09:54)  [place] Thyroid 120 mg oral tablet: 1 tab(s) orally once a day (26 Sep 2019 11:56)  aspirin 81 mg oral tablet, chewable: 1 tab(s) orally once a day (26 Sep 2019 11:56)  clopidogrel 75 mg oral tablet: 1 tab(s) orally once a day (02 Oct 2019 13:26)  Crestor 20 mg oral tablet: 1 tab(s) orally once a day (at bedtime) (26 Sep 2019 11:55)  levothyroxine 200 mcg (0.2 mg) oral tablet: 1 tab(s) orally once a day (10 Oct 2019 09:54)  lisinopril 20 mg oral tablet: 1 tab(s) orally once a day (26 Sep 2019 11:55)  sucralfate 1 g oral tablet: 1 tab(s) orally every 12 hours (10 Oct 2019 09:54)      ROS:     REVIEW OF SYSTEMS  General:  No fevers  Eyes:  No reported pain   ENT:  No sore throat   NECK: No stiffness   CV:  No chest pain   Resp:  No shortness of breath  GI:  See HPI  :  No dysuria  Muscle:  +++  weakness  Neuro:  No tingling  Endocrine:  No polyuria  Heme:  No ecchymosis          VITALS:   T(F): 97.8 (10-19 @ 23:52), Max: 97.8 (10-19 @ 15:25)  HR: 91 (10-19 @ 23:52) (79 - 91)  BP: 124/60 (10-19 @ 23:52) (124/60 - 191/91)  BP(mean): --  RR: 18 (10-19 @ 23:52) (18 - 18)  SpO2: 98% (10-19 @ 23:52) (97% - 98%)    I&O's Summary      PHYSICAL EXAM:  GENERAL:  Appears in no distress  HEENT:  Conjunctivae  anicteric  CHEST:  Full & symmetric excursion  HEART:  N S1, S2  ABDOMEN:  Soft, non-tender, non-distended, no masses   EXTEREMITIES:  no  edema  SKIN:  No rash  NEURO:  Alert, No asterixis         LABS:                        14.5   11.90 )-----------( 274      ( 20 Oct 2019 05:37 )             42.9       LIVER FUNCTIONS - ( 19 Oct 2019 16:08 )  Alb: 3.8 g/dL / Pro: 6.7 g/dL / ALK PHOS: 62 U/L / ALT: 34 U/L / AST: 28 U/L / GGT: x           10-20    140  |  96<L>  |  13  ----------------------------<  102<H>  4.2   |  27  |  0.6<L>    Ca    9.0      20 Oct 2019 05:37  Mg     1.9     10-19      CARDIAC MARKERS ( 19 Oct 2019 16:45 )  x     / 0.05 ng/mL / x     / x     / x          09-27 Chol 100 LDL 51 HDL 39<L> Trig 82          RADIOLOGY:    < from: CT Abdomen and Pelvis w/ IV Cont (10.07.19 @ 22:18) >    EXAM:  CT ABDOMEN AND PELVIS IC            PROCEDURE DATE:  10/07/2019            INTERPRETATION:  CLINICAL HISTORY / REASON FOR EXAM: Abdominal pain.    TECHNIQUE: Contiguous axial CT images were obtained from the lower chest   to the pubic symphysis following administration of 100 mL Optiray 320   intravenous contrast. Oral contrast was not administered. Reformatted   images in the coronal and sagittal planes were acquired.    COMPARISON CT: CT abdomen and pelvis from January 20, 2016    OTHER STUDIES USED FOR CORRELATION: None.       FINDINGS:    LOWER CHEST: Stable elevation of the right hemidiaphragm. Right basilar   atelectasis. Left pleural thickening.    HEPATOBILIARY:  Unremarkable.    SPLEEN: Unremarkable.    PANCREAS: Unremarkable.    ADRENAL GLANDS: Unremarkable.    KIDNEYS: Symmetric enhancement bilaterally. No evidence of   hydronephrosis. Left renal cyst measuring 5.2 cm (series 5, image 276).    ABDOMINOPELVIC NODES: Unremarkable.    PELVIC ORGANS: Prostate enlargement.    PERITONEUM/MESENTERY/BOWEL: Colonic wall thickening of the sigmoid colon.   No bowel obstruction, ascites or intraperitoneal free air.    BONES/SOFT TISSUES: Degenerative changes to the thoracolumbar spine.   Small fat-containing umbilical hernia. Right hydrocele.    VASCULAR: Aorta is normal in caliber. Vascular calcifications are seen.      IMPRESSION:    Sigmoid colitis with colonic wall thickening. No intraperitoneal free air.    Additional Findings/Recommendations After Attending Radiologist Review:    Colitis is questionable, I see no definite acute abdominal findings.   There are chronic findings as above.              VAIBHAV LATHAM M.D., RESIDENT RADIOLOGIST  This document has been electronically signed.  CHEN ROJAS M.D., ATTENDING RADIOLOGIST  This document has been electronically signed. Oct  7 2019 11:32PM              < end of copied text >

## 2019-10-20 NOTE — PROGRESS NOTE ADULT - SUBJECTIVE AND OBJECTIVE BOX
SUBJECTIVE:    Patient is a 84y old Male who presents with a chief complaint of diarrhea (20 Oct 2019 08:18)  Currently admitted to medicine with the primary diagnosis of Abdominal pain  Today is hospital day 1d. This morning he is resting comfortably in bed and reports no new issues or overnight events.   today pt reports that he didn't have any BM yet, but endorses diffuse abdominal pain and decreased po intake.     PAST MEDICAL & SURGICAL HISTORY  Neuropathy  TIA (transient ischemic attack)  Left carotid stenosis  Lyme disease  Hypothyroidism  Coronary artery disease  No significant past surgical history    SOCIAL HISTORY:  Negative for smoking/alcohol/drug use.     ALLERGIES:  iodinated radiocontrast agents (Hives)    MEDICATIONS:  STANDING MEDICATIONS  amiodarone    Tablet 200 milliGRAM(s) Oral two times a day  aspirin  chewable 81 milliGRAM(s) Oral daily  atorvastatin 80 milliGRAM(s) Oral at bedtime  chlorhexidine 4% Liquid 1 Application(s) Topical <User Schedule>  clopidogrel Tablet 75 milliGRAM(s) Oral daily  heparin  Injectable 5000 Unit(s) SubCutaneous every 12 hours  levothyroxine 200 MICROGram(s) Oral daily  lisinopril 20 milliGRAM(s) Oral daily  metoprolol tartrate 12.5 milliGRAM(s) Oral two times a day  pantoprazole    Tablet 40 milliGRAM(s) Oral before breakfast    PRN MEDICATIONS  ondansetron Injectable 4 milliGRAM(s) IV Push every 4 hours PRN    VITALS:   T(F): 98.6  HR: 66  BP: 119/56  RR: 18  SpO2: 97%    LABS:                        14.5   11.90 )-----------( 274      ( 20 Oct 2019 05:37 )             42.9     10-20    140  |  96<L>  |  13  ----------------------------<  102<H>  4.2   |  27  |  0.6<L>    Ca    9.0      20 Oct 2019 05:37  Mg     1.9     10-19    TPro  6.7  /  Alb  3.8  /  TBili  0.4  /  DBili  <0.2  /  AST  28  /  ALT  34  /  AlkPhos  62  10-19    Troponin T, Serum: 0.05 ng/mL <HH> (10-19-19 @ 16:45)  Lactate, Blood: 1.6 mmol/L (10-19-19 @ 16:08)    CARDIAC MARKERS ( 19 Oct 2019 16:45 )  x     / 0.05 ng/mL / x     / x     / x          RADIOLOGY:    < from: Xray Chest 1 View AP/PA (10.19.19 @ 18:03) >  Impression:      No radiographic evidence of acute cardiopulmonary disease.    < end of copied text >      PHYSICAL EXAM:  GEN: No acute distress  LUNGS: Clear to auscultation bilaterally   HEART: S1/S2 present. RRR.   ABD: Soft, non-tender, non-distended. Bowel sounds present  EXT: No ll edema   NEURO: AAOX3

## 2019-10-20 NOTE — CONSULT NOTE ADULT - ASSESSMENT
84 year history of HTN, hiatal hernia, hypercholesteremia, and CAD s/p multiple stents on aspirin and plavix presents with abdominal pain, and diarrhea.    #Abdominal pain, loose bowel movements that resolved now   colitis on last CT done previous admission on oct 8   Normal Lactate  Normal Liver enzymes   Slightly increased wbc   REC:   C Diff PCR if diarrhea reccurs  IV hydration   Avoid constipation  Zofran PRN for nausea   will need repeat colonoscopy as outpatient 84 year history of HTN, hiatal hernia, hypercholesteremia, and CAD s/p multiple stents on aspirin and plavix presents with abdominal pain, and diarrhea.    #Abdominal pain, loose bowel movements that resolved now   colitis on last CT done previous admission on oct 8   Normal Lactate  Normal Liver enzymes   Slightly increased wbc   REC:   C Diff PCR if diarrhea reccurs  IV hydration   Nystatin oral   Advance diet   Zofran PRN for nausea   will need repeat colonoscopy as outpatient

## 2019-10-21 NOTE — PATIENT PROFILE ADULT - NSPROHMSYMPCOND_GEN_A_NUR
Neuropathy  TIA (transient ischemic attack)  Left carotid stenosis  Lyme disease  Hypothyroidism  Coronary artery disease

## 2019-10-21 NOTE — CONSULT NOTE ADULT - ASSESSMENT
IMPRESSION: Rehabilitation for deconditioning    PRECAUTIONS: [  ] Cardiac  [  ] Respiratory  [  ] Seizures [  ] Contact Precautions  [  ] Droplet Isolation  [  ] Other    Weight Bearing Status:  WBAT    RECOMMENDATION:    Out of Bed to Chair     DVT/Decubiti Prophylaxis    REHABILITATION PLAN:     [  X ] Bedside PT 3-5 times a week   [   ]   Bedside OT  2-3 times a week             [   ] No Rehab Therapy Indicated                   [   ]  Speech Therapy   Conditioning/ROM                                    ADL  Bed Mobility                                               Conditioning/ROM  Transfers                                                     Bed Mobility  Sitting /Standing Balance                         Transfers                                        Gait Training                                               Sitting/Standing Balance  Stair Training [   ]Applicable                    Home equipment Eval                                                                        Splinting  [   ] Only      GOALS:   ADL   [   ]   Independent                    Transfers  [   ] Independent                          Ambulation  [   ] Independent     [    ] With device                            [   ]  CG                                                         [   ]  CG                                                                  [   ] CG                            [    ] Min A                                                   [   ] Min A                                                              [   ] Min  A          DISCHARGE PLAN:   [   ]  Good candidate for Intensive Rehabilitation/Hospital-based 4A [facility]                                             Will tolerate 3hrs Intensive Rehab Daily                                       [    ]  Short Term Rehabilitation in Skilled Nursing Facility                                       [    ]  Home with Outpatient or VN services                                         [    ]  Possible Candidate for Intensive Hospital-based Rehabilitation      Thank you. IMPRESSION: Rehabilitation for deconditioning    PRECAUTIONS: [  ] Cardiac  [  ] Respiratory  [  ] Seizures [  ] Contact Precautions  [  ] Droplet Isolation  [  ] Other    Weight Bearing Status:  WBAT    RECOMMENDATION:    Needs orders to reflect his current Out of Bed to Chair status    DVT/Decubiti Prophylaxis    REHABILITATION PLAN:     [  X ] Bedside PT 3-5 times a week   [   ]   Bedside OT  2-3 times a week             [   ] No Rehab Therapy Indicated                   [   ]  Speech Therapy   Conditioning/ROM                                    ADL  Bed Mobility                                               Conditioning/ROM  Transfers                                                     Bed Mobility  Sitting /Standing Balance                         Transfers                                        Gait Training                                               Sitting/Standing Balance  Stair Training [ X  ]Applicable                    Home equipment Eval                                                                        Splinting  [   ] Only      GOALS:   ADL   [   ]   Independent                    Transfers  [  X ] Independent                          Ambulation  [ X  ] Independent     [   X ] With device                            [   ]  CG                                                         [   ]  CG                                                                  [   ] CG                            [    ] Min A                                                   [   ] Min A                                                              [   ] Min  A          DISCHARGE PLAN:   [   ]  Good candidate for Intensive Rehabilitation/Hospital-based 4A [facility]                                             Will tolerate 3hrs Intensive Rehab Daily                                       [   X ]  Short Term Rehabilitation in Skilled Nursing Facility                                       [  X  ]  Home with Outpatient or  services                                         [    ]  Possible Candidate for Intensive Hospital-based Rehabilitation      Thank you. IMPRESSION: Rehabilitation for deconditioning    PRECAUTIONS: [  ] Cardiac  [  ] Respiratory  [  ] Seizures [ X ] Contact Precautions  [  ] Droplet Isolation  [  ] Other    Weight Bearing Status:  WBAT    RECOMMENDATION:    Needs orders to reflect his current Out of Bed to Chair status    DVT/Decubiti Prophylaxis    REHABILITATION PLAN:     [  X ] Bedside PT 3-5 times a week   [   ]   Bedside OT  2-3 times a week             [   ] No Rehab Therapy Indicated                   [   ]  Speech Therapy   Conditioning/ROM                                    ADL  Bed Mobility                                               Conditioning/ROM  Transfers                                                     Bed Mobility  Sitting /Standing Balance                         Transfers                                        Gait Training                                               Sitting/Standing Balance  Stair Training [ X  ]Applicable                    Home equipment Eval                                                                        Splinting  [   ] Only      GOALS:   ADL   [   ]   Independent                    Transfers  [  X ] Independent                          Ambulation  [ X  ] Independent     [   X ] With device                            [   ]  CG                                                         [   ]  CG                                                                  [   ] CG                            [    ] Min A                                                   [   ] Min A                                                              [   ] Min  A          DISCHARGE PLAN:   [   ]  Good candidate for Intensive Rehabilitation/Hospital-based 4A [facility]                                             Will tolerate 3hrs Intensive Rehab Daily                                       [   X ]  Short Term Rehabilitation in Skilled Nursing Facility                                       [  X  ]  Home with Outpatient or  services                                         [    ]  Possible Candidate for Intensive Hospital-based Rehabilitation      Thank you.

## 2019-10-21 NOTE — PROGRESS NOTE ADULT - ASSESSMENT
84 year history of HTN, hiatal hernia, hypercholesteremia, and CAD s/p multiple stents presents with abdominal pain and diarrhea.     # Subacute diarrhea. Had colitis early October, discharged Oct 8 on PO cipro and flagyl, diarrhea worsened, outpt GI recommended stopping abx, he stopped abx and the diarrhea improved but did not resolve, patient re-presented for care 10/19. Lactate neg. Patient improving off abx, tolerating diet. GI consulted.  - GI PCR, Cdiff were negative last admission, this admission C Diff indeterminate--will repeat  if diarrhea recurs   - TSH wnl  -Pending studies: GI PCR, Stool osmolarity/fat, O&P, culture  -Zofran, protonix  -Nystatin oral per GI  -Carafate, Toradol x1 for abdominal pain  - Repeat colonoscopy as outpatient     # CAD   # H/O ACS and recent stent  # HTN   #) h/o V tach  -oral amio and metoprolol  - c/w aspirin/ Plavix and statin     # Hypothyroidism - c/e levothyroxine     #Anemia. Hb trending down, no evidence of bleed  -GI Follow up outpatient   -Monitor    # DVT PPX : subcutaneous heparin   # DASH diet as tolerated   # full code   #Dispo: likely back to home once diarrhea and abdominal pain improved

## 2019-10-21 NOTE — PROGRESS NOTE ADULT - SUBJECTIVE AND OBJECTIVE BOX
SUBJECTIVE:   Patient c/o pain abdominal pain and cramps, having diarrhea    *********************** VITALS ******************************************  Vital Signs Last 24 Hrs  T(C): 35.7 (21 Oct 2019 12:34), Max: 36 (21 Oct 2019 04:00)  T(F): 96.3 (21 Oct 2019 12:34), Max: 96.8 (21 Oct 2019 04:00)  HR: 67 (21 Oct 2019 12:34) (67 - 77)  BP: 229/106 (21 Oct 2019 18:09) (137/64 - 229/106)  BP(mean): --  RR: 18 (21 Oct 2019 12:34) (18 - 18)  SpO2: 98% (21 Oct 2019 00:35) (98% - 98%)    ******************************** PHYSICAL EXAM:**************************************************  GENERAL:  NAD    PSYCH: no agitation     HEENT:  EOMI     NERVOUS SYSTEM:  Alert & Oriented X3     PULMONARY:  Lungs clear     CARDIOVASCULAR:  RRR,  S1 S2 audible      ABDOMEN: Soft, some mild guarding but no rebound tenderness, no distention, no obvious palpable masses    EXTREMITIES:  warm           LABS:                          12.9   11.30 )-----------( 236      ( 21 Oct 2019 08:09 )             38.4       10-21    137  |  97<L>  |  12  ----------------------------<  106<H>  3.9   |  27  |  0.5<L>    Ca    8.4<L>      Mg     1.6     10-21        < from: CT Abdomen and Pelvis w/ IV Cont (10.07.19 @ 22:18) >  IMPRESSION:    Sigmoid colitis with colonic wall thickening. No intraperitoneal free air.    < end of copied text >      Clostridium difficile Toxin by PCR (10.20.19 @ 21:00)    C Diff by PCR Result: Indeterminate        GI PCR Panel, Stool (10.20.19 @ 21:00)    Specimen Source: .Stool Feces    Culture Results:   GI PCR Results: NOT detected  *******Please Note:*******  GI panel PCR evaluates for:  Campylobacter, Plesiomonas shigelloides, Salmonella,  Vibrio, Yersinia enterocolitica, Enteroaggregative  Escherichia coli (EAEC), Enteropathogenic E.coli (EPEC),  Enterotoxigenic E. coli (ETEC) lt/st, Shiga-like  toxin-producing E. coli (STEC) stx1/stx2,  Shigella/ Enteroinvasive E. coli (EIEC), Cryptosporidium,  Cyclospora cayetanensis, Entamoeba histolytica,  Giardia lamblia, Adenovirus F 40/41, Astrovirus,  Norovirus GI/GII, Rotavirus A, Sapovirus

## 2019-10-21 NOTE — PROGRESS NOTE ADULT - ASSESSMENT
84 year history of HTN, hiatal hernia, hypercholesteremia, and CAD s/p multiple stents presents with abdominal pain, and diarrhea.      # Colitis  diarrhea resolving; no ABX   cont diet as tolerated  added pain mgmt  GI has evaluated, stool C.diff was indeterminate, so will hold ABX, monitor and can check repeat stool c.diff if diarrhea recurs  cont supportive care for now,  IVF , empiric PPI    # CAD   # H/O ACS and recent stent  # HTN   #) h/o V tach  -oral amio and metoprolol  - c/w aspirin/ Plavix and statin     # Hypothyroidism   - c/e levothyroxine      #. Hypokalemia  # hypoMg  replace/replete, monitor      # Anemia  Hbg has dropped slightly   suspect this may be dilutional from IVF  could chk stool OB  recheck H/H tmrw AM      #Progress Note Handoff    Pending (specify)  none, monitoring clinically w supportive care     Family discussion:  na    Disposition: Home_x__/SNF___/Other________/Unknown at this time________ 24hrs if abd pain and diarrhea resolved

## 2019-10-21 NOTE — CHART NOTE - NSCHARTNOTEFT_GEN_A_CORE
Called by RN for HTN and Abdominal Pain    Spoke with family and patient. Patient reported feeling diaphoretic earlier in the day. Noted BM earlier i    Vitals:   /100  HR 82  RR 17  Sat 98% room air    Physical Exam:  General: Alert and conversive, stating moderate abdominal pain  Cardiac: RRR s1s2, No m/r/g  Respiratiory: CTA, No w/r/r  Abdomen: Mild tenderness in upper quadrants, no rebound, No guarding. Bowel sounds present, Mild CVA  Neuro: Non focal    Impression:  HTN likely secondary to pain  Abdominal Pain: r/o ileus vs stone    Plan:   - Hydralazine 5mg IV with minimal improvement that was temporary  - Labetalol 10mg IV push   - KUB STAT  - UA STAT  - Anusol Suppository  - Simethicone PRN   - Pending UA and KUB; would proceed further with CT a/p non contrast     Verbal sign out endorsed to night intern [phone] to follow through on plan

## 2019-10-21 NOTE — CONSULT NOTE ADULT - SUBJECTIVE AND OBJECTIVE BOX
HPI:  84-year-old man with history of HTN, hiatal hernia, hypercholesteremia, and CAD s/p multiple stents presents with abdominal pain, and diarrhea.  patient was admitted on Oct 1st for  NSTEMI and had 3 stents placed.   Readmitted after 5 days for colitis and discharged on oct 8 on po flagyl and cipro.  Has been doing relatively well until one day after discharge when diarrhea started again, associated with diffuse abdominal discomfort, called his gastroenterologists who recommended to stop Abx, diarrhea  improved after stopping the ABx but never resolved, complains of nausea with no vomiting, denies blood with stool, denies dysphagia, fever, chills. Donald chest pain or Shortness of breath, urinary symptoms, fever or chills. no recent travel   In Ed   T(C): 36.6 T(F): 97.8   HR: 79   BP: 191/91   RR: 18  SpO2: 97% (19 Oct 2019 22:47)      PAST MEDICAL & SURGICAL HISTORY:  Neuropathy  TIA (transient ischemic attack)  Left carotid stenosis  Lyme disease  Hypothyroidism  Coronary artery disease  No significant past surgical history      Hospital Course:    TODAY'S SUBJECTIVE & REVIEW OF SYMPTOMS:    Constitutional WNL  Cardio WNL   Resp WNL  GI WNL   WNL  Endocrine WNL  Skin WNL  Musculoskeletal WNL  Neuro WNL  Cognitive WNL  Psych WNL      MEDICATIONS  (STANDING):  amiodarone    Tablet 200 milliGRAM(s) Oral two times a day  aspirin  chewable 81 milliGRAM(s) Oral daily  atorvastatin 80 milliGRAM(s) Oral at bedtime  chlorhexidine 4% Liquid 1 Application(s) Topical <User Schedule>  clopidogrel Tablet 75 milliGRAM(s) Oral daily  heparin  Injectable 5000 Unit(s) SubCutaneous every 12 hours  lactated ringers. 1000 milliLiter(s) (75 mL/Hr) IV Continuous <Continuous>  levothyroxine 200 MICROGram(s) Oral daily  lisinopril 20 milliGRAM(s) Oral daily  metoprolol tartrate 12.5 milliGRAM(s) Oral two times a day  nystatin    Suspension 581289 Unit(s) Oral four times a day  pantoprazole    Tablet 40 milliGRAM(s) Oral before breakfast    MEDICATIONS  (PRN):      FAMILY HISTORY:      Allergies    iodinated radiocontrast agents (Hives)    Intolerances        SOCIAL HISTORY:    [  ] EtOH  [  ] Smoking  [  ] Substance abuse     Home Environment:  [  ] Alone  [  ] Lives with Family  [  ] Home Health Aide    Dwelling:  [  ] Apartment  [  ] Private House  [  ] Adult Home  [  ] Skilled Nursing Facility      [  ] Short Term  [  ] Long Term  [  ] Stairs       Elevator [  ]    FUNCTIONAL STATUS PTA: (Check all that apply)  Ambulation: [   ]Independent    [  ] Dependent     [  ] Non-Ambulatory  Assistive Device: [  ] Straight Cane  [  ]  Quad Cane  [  ] Walker  [  ]  Wheelchair  ADL: [  ] Independent  [  ]  Dependent       Vital Signs Last 24 Hrs  T(C): 36 (21 Oct 2019 04:00), Max: 36.2 (20 Oct 2019 17:10)  T(F): 96.8 (21 Oct 2019 04:00), Max: 97.1 (20 Oct 2019 17:10)  HR: 67 (21 Oct 2019 04:00) (67 - 77)  BP: 139/62 (21 Oct 2019 04:00) (133/83 - 192/82)  BP(mean): 107 (20 Oct 2019 17:32) (107 - 107)  RR: 18 (21 Oct 2019 04:00) (17 - 18)  SpO2: 98% (21 Oct 2019 00:35) (98% - 98%)      PHYSICAL EXAM: Alert & oriented X 3  GENERAL: Well-developed, well-nourished, in NAD  HEAD:  Normocephalic, atraumatic  EYES: EOMI, PERRLA, sclerae anicteric, conjunctivae not injected  NECK: Supple, No JVD, Normal thyroid  CHEST/LUNG: Clear to auscultation bilaterally; No rales, rhonchi, wheezing  HEART: Regular rate and rhythm; No murmurs, gallops, or rubs  ABDOMEN: Soft, nontender, nondistended; Bowel sounds present  EXTREMITIES:  2+ Peripheral pulses; No clubbing, cyanosis, or edema    NERVOUS SYSTEM:  Cranial Nerves II-XII intact [  ] Abnormal  [  ]  ROM: WFL all extremities [  ]  Abnormal [  ]  Motor Strength: WFL all extremities  [  ]  Abnormal [  ]  Sensation: intact to light touch [  ] Abnormal [  ]  Reflexes: Symmetric [  ]  Abnormal [  ]    FUNCTIONAL STATUS:  Bed Mobility: Independent [  ]  Supervision [  ]  Needs Assistance [  ]  N/A [  ]  Transfers: Independent [  ]  Supervision [  ]  Needs Assistance [  ]  N/A [  ]   Ambulation: Independent [  ]  Supervision [  ]  Needs Assistance [  ]  N/A [  ]  ADLs: Independent [  ] Requires Assistance [  ] N/A [  ]      LABS:                        12.9   11.30 )-----------( 236      ( 21 Oct 2019 08:09 )             38.4     10    137  |  97<L>  |  12  ----------------------------<  106<H>  3.9   |  27  |  0.5<L>    Ca    8.4<L>      21 Oct 2019 08:09  Mg     1.6     10-21    TPro  6.7  /  Alb  3.8  /  TBili  0.4  /  DBili  <0.2  /  AST  28  /  ALT  34  /  AlkPhos  62  10-19      Urinalysis Basic - ( 20 Oct 2019 18:30 )    Color: Margaux / Appearance: Clear / S.027 / pH: x  Gluc: x / Ketone: Moderate  / Bili: Negative / Urobili: <2 mg/dL   Blood: x / Protein: 30 mg/dL / Nitrite: Negative   Leuk Esterase: Negative / RBC: 3 /HPF / WBC 4 /HPF   Sq Epi: x / Non Sq Epi: 3 /HPF / Bacteria: Negative        RADIOLOGY & ADDITIONAL STUDIES:    EXAM:  XR CHEST FRONTAL 1V            PROCEDURE DATE:  10/19/2019            INTERPRETATION:  Clinical History / Reason for exam: Abdominal pain.    Comparison : Chest radiograph dated 2019.    Technique/Positioning: Portable frontal.    Findings:    Support devices: None.    Cardiac/mediastinum/hilum: Unremarkable.    Lung parenchyma/Pleura: Within normal limits.    Skeleton/soft tissues: Elevated right hemidiaphragm. No acute osseous   abnormality.    Impression:      No radiographic evidence of acute cardiopulmonary disease. HPI:  84-year-old right-handed  man with history of HTN, hiatal hernia, hypercholesteremia, and CAD s/p multiple stents presents with abdominal pain, and diarrhea.  patient was admitted on Oct 1st for  NSTEMI and had 3 stents placed.   Readmitted after 5 days for colitis and discharged on oct 8 on po flagyl and cipro.  Has been doing relatively well until one day after discharge when diarrhea started again, associated with diffuse abdominal discomfort, called his gastroenterologists who recommended to stop Abx, diarrhea  improved after stopping the ABx but never resolved, complains of nausea with no vomiting, denies blood with stool, denies dysphagia, fever, chills. Donald chest pain or Shortness of breath, urinary symptoms, fever or chills. no recent travel   In Ed   T(C): 36.6 T(F): 97.8   HR: 79   BP: 191/91   RR: 18  SpO2: 97% (19 Oct 2019 22:47)      PAST MEDICAL & SURGICAL HISTORY:  Neuropathy  TIA (transient ischemic attack)  Left carotid stenosis  Lyme disease  Hypothyroidism  Coronary artery disease  No significant past surgical history      Hospital Course:  Receiving IV hydration    TODAY'S SUBJECTIVE & REVIEW OF SYMPTOMS:    Constitutional WNL  Cardio WNL   Resp WNL  GI + abdominal cramping   WNL  Endocrine WNL  Skin WNL  Musculoskeletal WNL  Neuro WNL  Cognitive WNL  Psych WNL      MEDICATIONS  (STANDING):  amiodarone    Tablet 200 milliGRAM(s) Oral two times a day  aspirin  chewable 81 milliGRAM(s) Oral daily  atorvastatin 80 milliGRAM(s) Oral at bedtime  chlorhexidine 4% Liquid 1 Application(s) Topical <User Schedule>  clopidogrel Tablet 75 milliGRAM(s) Oral daily  heparin  Injectable 5000 Unit(s) SubCutaneous every 12 hours  lactated ringers. 1000 milliLiter(s) (75 mL/Hr) IV Continuous <Continuous>  levothyroxine 200 MICROGram(s) Oral daily  lisinopril 20 milliGRAM(s) Oral daily  metoprolol tartrate 12.5 milliGRAM(s) Oral two times a day  nystatin    Suspension 084757 Unit(s) Oral four times a day  pantoprazole    Tablet 40 milliGRAM(s) Oral before breakfast    MEDICATIONS  (PRN):      FAMILY HISTORY:      Allergies    iodinated radiocontrast agents (Hives)    Intolerances        SOCIAL HISTORY:    [ X ] EtOH--occasional wine  [  ] Smoking  [  ] Substance abuse     Home Environment:  [  ] Alone  [ X ] Lives with Family--wife  [  ] Home Health Aide    Dwelling:  [  ] Apartment  [ X ] Private House  [  ] Adult Home  [  ] Skilled Nursing Facility      [  ] Short Term  [  ] Long Term  [ X ] Stairs 4-5 step entry and one flight inside      Elevator [  ]    FUNCTIONAL STATUS PTA: (Check all that apply)  Ambulation: [ X  ]Independent    [  ] Dependent     [  ] Non-Ambulatory  Assistive Device: [  ] Straight Cane  [  ]  Quad Cane  [  ] Walker  [  ]  Wheelchair  ADL: [ X ] Independent  [  ]  Dependent       Vital Signs Last 24 Hrs  T(C): 36 (21 Oct 2019 04:00), Max: 36.2 (20 Oct 2019 17:10)  T(F): 96.8 (21 Oct 2019 04:00), Max: 97.1 (20 Oct 2019 17:10)  HR: 67 (21 Oct 2019 04:00) (67 - 77)  BP: 139/62 (21 Oct 2019 04:00) (133/83 - 192/82)  BP(mean): 107 (20 Oct 2019 17:32) (107 - 107)  RR: 18 (21 Oct 2019 04:00) (17 - 18)  SpO2: 98% (21 Oct 2019 00:35) (98% - 98%)      PHYSICAL EXAM: Alert & oriented X 4  GENERAL: Well-developed, well-nourished, in NAD  HEAD:  Normocephalic, atraumatic  EYES: EOMI, PERRLA, sclerae anicteric, conjunctivae not injected  NECK: Supple, No JVD, Normal thyroid  CHEST/LUNG: Clear to auscultation bilaterally; No rales, rhonchi, wheezing  HEART: Regular rate and rhythm; No murmurs, gallops, or rubs  ABDOMEN: Soft, nontender, nondistended; Bowel sounds present  EXTREMITIES:  2+ Peripheral pulses; No clubbing, cyanosis, or edema    NERVOUS SYSTEM:  Cranial Nerves II-XII intact [  ] Abnormal  [  ]  ROM: WFL all extremities [ X ]  Abnormal [  ]  Motor Strength: WFL all extremities  [  ]  Abnormal [X  ]  Sensation: intact to light touch [  ] Abnormal [X  ]  Reflexes: Symmetric [  ]  Abnormal [  ]    FUNCTIONAL STATUS:  Bed Mobility: Independent [ X ]  Supervision [  ]  Needs Assistance [  ]  N/A [  ]  Transfers: Independent [  ]  Supervision [ X ]  Needs Assistance [  ]  N/A [  ]   Ambulation: Independent [  ]  Supervision [  ]  Needs Assistance [ X ]  N/A [  ]  ADLs: Independent [  X] Requires Assistance [  ] N/A [  ]      LABS:                        12.9   11.30 )-----------( 236      ( 21 Oct 2019 08:09 )             38.4     10    137  |  97<L>  |  12  ----------------------------<  106<H>  3.9   |  27  |  0.5<L>    Ca    8.4<L>      21 Oct 2019 08:09  Mg     1.6     10-21    TPro  6.7  /  Alb  3.8  /  TBili  0.4  /  DBili  <0.2  /  AST  28  /  ALT  34  /  AlkPhos  62  10-19      Urinalysis Basic - ( 20 Oct 2019 18:30 )    Color: Margaux / Appearance: Clear / S.027 / pH: x  Gluc: x / Ketone: Moderate  / Bili: Negative / Urobili: <2 mg/dL   Blood: x / Protein: 30 mg/dL / Nitrite: Negative   Leuk Esterase: Negative / RBC: 3 /HPF / WBC 4 /HPF   Sq Epi: x / Non Sq Epi: 3 /HPF / Bacteria: Negative        RADIOLOGY & ADDITIONAL STUDIES:    EXAM:  XR CHEST FRONTAL 1V            PROCEDURE DATE:  10/19/2019            INTERPRETATION:  Clinical History / Reason for exam: Abdominal pain.    Comparison : Chest radiograph dated 2019.    Technique/Positioning: Portable frontal.    Findings:    Support devices: None.    Cardiac/mediastinum/hilum: Unremarkable.    Lung parenchyma/Pleura: Within normal limits.    Skeleton/soft tissues: Elevated right hemidiaphragm. No acute osseous   abnormality.    Impression:      No radiographic evidence of acute cardiopulmonary disease.

## 2019-10-21 NOTE — PROGRESS NOTE ADULT - SUBJECTIVE AND OBJECTIVE BOX
LAKSHMI JUDD 84y Male  MRN#: [id number]     SUBJECTIVE  Patient is a 84y old Male who presents with a chief complaint of diarrhea (21 Oct 2019 11:53)      Today is hospital day 2d, and this morning he is lying in bed without distress. Reports soft non-watery non-bloody stools since yesterday AM; no diarrhea for over 24 hours. Ongoing abdominal pain, "soreness", not worse with eating. He is hungry and wants to eat. Tolerated solid food yesterday. No chest pain, shortness of breath, leg pain.   No acute overnight events.     OBJECTIVE  PAST MEDICAL & SURGICAL HISTORY  Neuropathy  TIA (transient ischemic attack)  Left carotid stenosis  Lyme disease  Hypothyroidism  Coronary artery disease  No significant past surgical history    ALLERGIES:  iodinated radiocontrast agents (Hives)    MEDICATIONS:  STANDING MEDICATIONS  amiodarone    Tablet 200 milliGRAM(s) Oral two times a day  aspirin  chewable 81 milliGRAM(s) Oral daily  atorvastatin 80 milliGRAM(s) Oral at bedtime  chlorhexidine 4% Liquid 1 Application(s) Topical <User Schedule>  clopidogrel Tablet 75 milliGRAM(s) Oral daily  heparin  Injectable 5000 Unit(s) SubCutaneous every 12 hours  ketorolac   Injectable 10 milliGRAM(s) IntraMuscular once  lactated ringers. 1000 milliLiter(s) IV Continuous <Continuous>  levothyroxine 200 MICROGram(s) Oral daily  lisinopril 20 milliGRAM(s) Oral daily  metoprolol tartrate 12.5 milliGRAM(s) Oral two times a day  nystatin    Suspension 701489 Unit(s) Oral four times a day  pantoprazole    Tablet 40 milliGRAM(s) Oral before breakfast  sucralfate suspension 1 Gram(s) Oral two times a day    PRN MEDICATIONS  morphine  - Injectable 2 milliGRAM(s) IV Push every 4 hours PRN  morphine  - Injectable 4 milliGRAM(s) IV Push every 4 hours PRN    HOME MEDICATIONS  Home Medications:  acetaminophen 325 mg oral tablet: 2 tab(s) orally every 6 hours, As needed, Moderate Pain (4 - 6) (10 Oct 2019 09:54)  [place]udley Thyroid 120 mg oral tablet: 1 tab(s) orally once a day (26 Sep 2019 11:56)  aspirin 81 mg oral tablet, chewable: 1 tab(s) orally once a day (26 Sep 2019 11:56)  clopidogrel 75 mg oral tablet: 1 tab(s) orally once a day (02 Oct 2019 13:26)  Crestor 20 mg oral tablet: 1 tab(s) orally once a day (at bedtime) (26 Sep 2019 11:55)  levothyroxine 200 mcg (0.2 mg) oral tablet: 1 tab(s) orally once a day (10 Oct 2019 09:54)  lisinopril 20 mg oral tablet: 1 tab(s) orally once a day (26 Sep 2019 11:55)  sucralfate 1 g oral tablet: 1 tab(s) orally every 12 hours (10 Oct 2019 09:54)      LABS:                        12.9   11.30 )-----------( 236      ( 21 Oct 2019 08:09 )             38.4     10    137  |  97<L>  |  12  ----------------------------<  106<H>  3.9   |  27  |  0.5<L>    Ca    8.4<L>      21 Oct 2019 08:09  Mg     1.6     1021          Urinalysis Basic - ( 20 Oct 2019 18:30 )    Color: Margaux / Appearance: Clear / S.027 / pH: x  Gluc: x / Ketone: Moderate  / Bili: Negative / Urobili: <2 mg/dL   Blood: x / Protein: 30 mg/dL / Nitrite: Negative   Leuk Esterase: Negative / RBC: 3 /HPF / WBC 4 /HPF   Sq Epi: x / Non Sq Epi: 3 /HPF / Bacteria: Negative            GI PCR Panel, Stool (collected 20 Oct 2019 21:00)  Source: .Stool Feces  Final Report (21 Oct 2019 09:41):    GI PCR Results: NOT detected    *******Please Note:*******    GI panel PCR evaluates for:    Campylobacter, Plesiomonas shigelloides, Salmonella,    Vibrio, Yersinia enterocolitica, Enteroaggregative    Escherichia coli (EAEC), Enteropathogenic E.coli (EPEC),    Enterotoxigenic E. coli (ETEC) lt/st, Shiga-like    toxin-producing E. coli (STEC) stx1/stx2,    Shigella/ Enteroinvasive E. coli (EIEC), Cryptosporidium,    Cyclospora cayetanensis, Entamoeba histolytica,    Giardia lamblia, Adenovirus F 40/41, Astrovirus,    Norovirus GI/GII, Rotavirus A, Sapovirus      CARDIAC MARKERS ( 19 Oct 2019 16:45 )  x     / 0.05 ng/mL / x     / x     / x          CAPILLARY BLOOD GLUCOSE      PHYSICAL EXAM:  T(C): 35.7 (10-21-19 @ 12:34), Max: 36.2 (10-20-19 @ 17:10)  HR: 67 (10-21-19 @ 12:34) (67 - 77)  BP: 146/67 (10-21-19 @ 12:34) (133/83 - 192/82)  RR: 18 (10-21-19 @ 12:34) (17 - 18)  SpO2: 98% (10-21-19 @ 00:35) (98% - 98%)    GENERAL: NAD, well-developed, 84y sitting in bed, awake  EENT: EOMI, conjunctiva and sclera clear, No nasal obstruction or discharge  RESPIRATORY: Clear to auscultation bilaterally; No wheeze or crackles  CARDIOVASCULAR: Regular rate and rhythm; No murmurs, rubs, or gallops, no LE edema  GASTROINTESTINAL: Abdomen Soft, Bowel sounds present, +diffusely mildly tender to palpation, +mildly distended  MUSCULOSKELETAL:  No cyanosis, extremities grossly symmetrical  NEUROLOGY: non-focal, cognition intact, MAEE    ADMISSION SUMMARY  Patient is a 84y old Male who presents with a chief complaint of diarrhea (21 Oct 2019 11:53)

## 2019-10-22 NOTE — PROGRESS NOTE ADULT - SUBJECTIVE AND OBJECTIVE BOX
Hospital Day:  3d    Subjective:    Patient is a 84y old  Male who presents with a chief complaint of diarrhea (22 Oct 2019 07:52)      Past Medical Hx:   Neuropathy  TIA (transient ischemic attack)  Left carotid stenosis  Lyme disease  Hypothyroidism  Coronary artery disease    Past Sx:  No significant past surgical history    Allergies:  iodinated radiocontrast agents (Hives)    Current Meds:   Standng Meds:  amiodarone    Tablet 200 milliGRAM(s) Oral two times a day  aspirin  chewable 81 milliGRAM(s) Oral daily  atorvastatin 80 milliGRAM(s) Oral at bedtime  cefepime   IVPB 2000 milliGRAM(s) IV Intermittent every 8 hours  cefepime   IVPB      chlorhexidine 4% Liquid 1 Application(s) Topical <User Schedule>  clopidogrel Tablet 75 milliGRAM(s) Oral daily  heparin  Injectable 5000 Unit(s) SubCutaneous every 12 hours  lactated ringers. 1000 milliLiter(s) (75 mL/Hr) IV Continuous <Continuous>  levothyroxine 200 MICROGram(s) Oral daily  lisinopril 20 milliGRAM(s) Oral daily  metoprolol tartrate 12.5 milliGRAM(s) Oral two times a day  metroNIDAZOLE  IVPB 500 milliGRAM(s) IV Intermittent every 8 hours  niCARdipine Infusion 5 mG/Hr (25 mL/Hr) IV Continuous <Continuous>  nystatin    Suspension 180456 Unit(s) Oral four times a day  pantoprazole    Tablet 40 milliGRAM(s) Oral before breakfast  sucralfate suspension 1 Gram(s) Oral two times a day    PRN Meds:  morphine  - Injectable 2 milliGRAM(s) IV Push every 4 hours PRN Moderate Pain (4 - 6)  morphine  - Injectable 4 milliGRAM(s) IV Push every 4 hours PRN Severe Pain (7 - 10)  simethicone 80 milliGRAM(s) Chew four times a day PRN gas and bloating    HOME MEDICATIONS:  acetaminophen 325 mg oral tablet: 2 tab(s) orally every 6 hours, As needed, Moderate Pain (4 - 6)  [place] Thyroid 120 mg oral tablet: 1 tab(s) orally once a day  aspirin 81 mg oral tablet, chewable: 1 tab(s) orally once a day  clopidogrel 75 mg oral tablet: 1 tab(s) orally once a day  Crestor 20 mg oral tablet: 1 tab(s) orally once a day (at bedtime)  levothyroxine 200 mcg (0.2 mg) oral tablet: 1 tab(s) orally once a day  lisinopril 20 mg oral tablet: 1 tab(s) orally once a day  sucralfate 1 g oral tablet: 1 tab(s) orally every 12 hours      Vital Signs:   T(F): 98.5 (10-22-19 @ 12:16), Max: 98.5 (10-22-19 @ 12:16)  HR: 109 (10-22-19 @ 12:16) (70 - 122)  BP: 142/89 (10-22-19 @ 12:16) (119/84 - 229/106)  RR: 22 (10-22-19 @ 12:16) (12 - 36)  SpO2: 97% (10-22-19 @ 12:16) (94% - 98%)      10-21-19 @ 07:01  -  10-22-19 @ 07:00  --------------------------------------------------------  IN: 1012.5 mL / OUT: 350 mL / NET: 662.5 mL    10-22-19 @ 07:01  -  10-22-19 @ 12:41  --------------------------------------------------------  IN: 400 mL / OUT: 450 mL / NET: -50 mL        Physical Exam:   GENERAL: NAD  HEENT: NCAT  CHEST/LUNG: CTAB  HEART: Regular rate and rhythm; s1 s2 appreciated, No murmurs, rubs, or gallops  ABDOMEN: Soft, Nontender, Nondistended; Bowel sounds present  EXTREMITIES: No LE edema b/l  NERVOUS SYSTEM:  Alert & Oriented X3        Labs:                         16.0   15.96 )-----------( 316      ( 22 Oct 2019 04:40 )             46.1     Neutophil% 90.6, Lymphocyte% 5.4, Monocyte% 3.2, Bands% 0.5 10-22-19 @ 00:00    22 Oct 2019 04:40    132    |  89     |  7      ----------------------------<  147    4.2     |  25     |  0.5      Ca    8.7        22 Oct 2019 04:40  Mg     1.6       22 Oct 2019 04:40    TPro  6.8    /  Alb  3.9    /  TBili  0.5    /  DBili  x      /  AST  27     /  ALT  30     /  AlkPhos  73     22 Oct 2019 00:00       pTT    29.3             ----< 1.22 INR  (10-22-19 @ 00:00)    14.00        PT    Amylase --, Lipase 57, 10-19-19 @ 16:08    Hemoglobin A1C, Whole Blood: 6.4 % (19 @ 04:45)    Serum Pro-Brain Natriuretic Peptide: 2374 pg/mL (10-19-19 @ 16:45)    Troponin 0.06, CKMB --, CK -- 10-22-19 @ 00:00  Troponin 0.05, CKMB --, CK -- 10-19-19 @ 16:45    Urinalysis Basic - ( 21 Oct 2019 22:20 )    Color: Colorless / Appearance: Clear / S.009 / pH: x  Gluc: x / Ketone: Small  / Bili: Negative / Urobili: <2 mg/dL   Blood: x / Protein: Trace / Nitrite: Negative   Leuk Esterase: Negative / RBC: x / WBC x   Sq Epi: x / Non Sq Epi: x / Bacteria: x    Radiology:    < from: CT Abdomen and Pelvis No Cont (10.21.19 @ 22:40) >  HEPATOBILIARY: Portal venous gas (series 4, image ).    PERITONEUM/MESENTERY/BOWEL: There is pneumatosis as well as areas of wall   thickening within small bowel loops. Questionable ascending colon wall   thickening. Scattered colonic diverticula. No pneumoperitoneum..    VASCULAR: Extensive atherosclerotic vascular disease with likely short   segment focal dissections and moderate to severe calcifications at the   origins of the celiac artery and SMA.    IMPRESSION:    Small bowel pneumatosis and portal venous gas. Surgical consultation is   recommended as primary consideration is bowel ischemia.  < end of copied text >    < from: CT Angio Abdomen and Pelvis w/ IV Cont (10.22.19 @ 10:13) >  IMPRESSION:    1.  Complete occlusion of the proximal SMA with reconstitution of flow in   mid SMA and patent diminutive branches.  2.  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 Major branches of the   celiac trunk are patent, but diminutive.  3.  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    4.  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  5.  Unchanged portal venous gas and small bowel pneumatosis.  6.  Other stable/nonacute findings as described above.    < end of copied text > Hospital Day:  3d    Subjective:    Patient is a 84y old  Male who presents with a chief complaint of diarrhea. Overnight patient was upgraded to the unit for uncontrolled /100 as well as radiologic findings of acute ischemic bowel on CT. This morning BP had improve to 160s systolic of nicardipine drip (2.5mcgs). States the pain is constant in the RUQ, LUQ, epigastric region.        Past Medical Hx:   Neuropathy  TIA (transient ischemic attack)  Left carotid stenosis  Lyme disease  Hypothyroidism  Coronary artery disease    Past Sx:  No significant past surgical history    Allergies:  iodinated radiocontrast agents (Hives)    Current Meds:   Stand Meds:  amiodarone    Tablet 200 milliGRAM(s) Oral two times a day  aspirin  chewable 81 milliGRAM(s) Oral daily  atorvastatin 80 milliGRAM(s) Oral at bedtime  cefepime   IVPB 2000 milliGRAM(s) IV Intermittent every 8 hours  cefepime   IVPB      chlorhexidine 4% Liquid 1 Application(s) Topical <User Schedule>  clopidogrel Tablet 75 milliGRAM(s) Oral daily  heparin  Injectable 5000 Unit(s) SubCutaneous every 12 hours  lactated ringers. 1000 milliLiter(s) (75 mL/Hr) IV Continuous <Continuous>  levothyroxine 200 MICROGram(s) Oral daily  lisinopril 20 milliGRAM(s) Oral daily  metoprolol tartrate 12.5 milliGRAM(s) Oral two times a day  metroNIDAZOLE  IVPB 500 milliGRAM(s) IV Intermittent every 8 hours  niCARdipine Infusion 5 mG/Hr (25 mL/Hr) IV Continuous <Continuous>  nystatin    Suspension 686385 Unit(s) Oral four times a day  pantoprazole    Tablet 40 milliGRAM(s) Oral before breakfast  sucralfate suspension 1 Gram(s) Oral two times a day    PRN Meds:  morphine  - Injectable 2 milliGRAM(s) IV Push every 4 hours PRN Moderate Pain (4 - 6)  morphine  - Injectable 4 milliGRAM(s) IV Push every 4 hours PRN Severe Pain (7 - 10)  simethicone 80 milliGRAM(s) Chew four times a day PRN gas and bloating    HOME MEDICATIONS:  acetaminophen 325 mg oral tablet: 2 tab(s) orally every 6 hours, As needed, Moderate Pain (4 - 6)  [place] Thyroid 120 mg oral tablet: 1 tab(s) orally once a day  aspirin 81 mg oral tablet, chewable: 1 tab(s) orally once a day  clopidogrel 75 mg oral tablet: 1 tab(s) orally once a day  Crestor 20 mg oral tablet: 1 tab(s) orally once a day (at bedtime)  levothyroxine 200 mcg (0.2 mg) oral tablet: 1 tab(s) orally once a day  lisinopril 20 mg oral tablet: 1 tab(s) orally once a day  sucralfate 1 g oral tablet: 1 tab(s) orally every 12 hours      Vital Signs:   T(F): 98.5 (10-22-19 @ 12:16), Max: 98.5 (10-22-19 @ 12:16)  HR: 109 (10-22-19 @ 12:16) (70 - 122)  BP: 142/89 (10-22-19 @ 12:16) (119/84 - 229/106)  RR: 22 (10-22-19 @ 12:16) (12 - 36)  SpO2: 97% (10-22-19 @ 12:16) (94% - 98%)      10-21-19 @ 07:01  -  10-22-19 @ 07:00  --------------------------------------------------------  IN: 1012.5 mL / OUT: 350 mL / NET: 662.5 mL    10-22-19 @ 07:01  -  10-22-19 @ 12:41  --------------------------------------------------------  IN: 400 mL / OUT: 450 mL / NET: -50 mL        Physical Exam:   GENERAL: NAD  HEENT: NCAT  CHEST/LUNG: CTA b/l   HEART: Regular rate and rhythm; s1 s2 appreciated, No murmurs, rubs, or gallops  ABDOMEN: Tenderness to light palpation of upper quadrants, nondistended  EXTREMITIES: No LE edema b/l  NERVOUS SYSTEM:  Alert & Oriented X3        Labs:                         16.0   15.96 )-----------( 316      ( 22 Oct 2019 04:40 )             46.1     Neutophil% 90.6, Lymphocyte% 5.4, Monocyte% 3.2, Bands% 0.5 10-22-19 @ 00:00    22 Oct 2019 04:40    132    |  89     |  7      ----------------------------<  147    4.2     |  25     |  0.5      Ca    8.7        22 Oct 2019 04:40  Mg     1.6       22 Oct 2019 04:40    TPro  6.8    /  Alb  3.9    /  TBili  0.5    /  DBili  x      /  AST  27     /  ALT  30     /  AlkPhos  73     22 Oct 2019 00:00       pTT    29.3             ----< 1.22 INR  (10-22-19 @ 00:00)    14.00        PT    Amylase --, Lipase 57, 10-19-19 @ 16:08    Hemoglobin A1C, Whole Blood: 6.4 % (19 @ 04:45)    Serum Pro-Brain Natriuretic Peptide: 2374 pg/mL (10-19-19 @ 16:45)    Troponin 0.06, CKMB --, CK -- 10-22-19 @ 00:00  Troponin 0.05, CKMB --, CK -- 10-19-19 @ 16:45    Urinalysis Basic - ( 21 Oct 2019 22:20 )    Color: Colorless / Appearance: Clear / S.009 / pH: x  Gluc: x / Ketone: Small  / Bili: Negative / Urobili: <2 mg/dL   Blood: x / Protein: Trace / Nitrite: Negative   Leuk Esterase: Negative / RBC: x / WBC x   Sq Epi: x / Non Sq Epi: x / Bacteria: x    Radiology:    < from: CT Abdomen and Pelvis No Cont (10.21.19 @ 22:40) >  HEPATOBILIARY: Portal venous gas (series 4, image ).    PERITONEUM/MESENTERY/BOWEL: There is pneumatosis as well as areas of wall   thickening within small bowel loops. Questionable ascending colon wall   thickening. Scattered colonic diverticula. No pneumoperitoneum..    VASCULAR: Extensive atherosclerotic vascular disease with likely short   segment focal dissections and moderate to severe calcifications at the   origins of the celiac artery and SMA.    IMPRESSION:    Small bowel pneumatosis and portal venous gas. Surgical consultation is   recommended as primary consideration is bowel ischemia.  < end of copied text >    < from: CT Angio Abdomen and Pelvis w/ IV Cont (10.22.19 @ 10:13) >  IMPRESSION:    1.  Complete occlusion of the proximal SMA with reconstitution of flow in   mid SMA and patent diminutive branches.  2.  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 Major branches of the   celiac trunk are patent, but diminutive.  3.  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    4.  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  5.  Unchanged portal venous gas and small bowel pneumatosis.  6.  Other stable/nonacute findings as described above.    < end of copied text > Hospital Day:  3d    Subjective:    Patient is a 84y old  Male who presents with a chief complaint of diarrhea. Overnight patient was upgraded to the unit for uncontrolled /100 as well as radiologic findings of acute ischemic bowel on CT. This morning BP had improve to 160s systolic of nicardipine drip (2.5mcgs). States the pain is constant in the RUQ, LUQ, epigastric region.        Past Medical Hx:   Neuropathy  TIA (transient ischemic attack)  Left carotid stenosis  Lyme disease  Hypothyroidism  Coronary artery disease    Past Sx:  No significant past surgical history    Allergies:  iodinated radiocontrast agents (Hives)    Current Meds:   Stand Meds:  amiodarone    Tablet 200 milliGRAM(s) Oral two times a day  aspirin  chewable 81 milliGRAM(s) Oral daily  atorvastatin 80 milliGRAM(s) Oral at bedtime  cefepime   IVPB 2000 milliGRAM(s) IV Intermittent every 8 hours  cefepime   IVPB      chlorhexidine 4% Liquid 1 Application(s) Topical <User Schedule>  clopidogrel Tablet 75 milliGRAM(s) Oral daily  heparin  Injectable 5000 Unit(s) SubCutaneous every 12 hours  lactated ringers. 1000 milliLiter(s) (75 mL/Hr) IV Continuous <Continuous>  levothyroxine 200 MICROGram(s) Oral daily  lisinopril 20 milliGRAM(s) Oral daily  metoprolol tartrate 12.5 milliGRAM(s) Oral two times a day  metroNIDAZOLE  IVPB 500 milliGRAM(s) IV Intermittent every 8 hours  niCARdipine Infusion 5 mG/Hr (25 mL/Hr) IV Continuous <Continuous>  nystatin    Suspension 327719 Unit(s) Oral four times a day  pantoprazole    Tablet 40 milliGRAM(s) Oral before breakfast  sucralfate suspension 1 Gram(s) Oral two times a day    PRN Meds:  morphine  - Injectable 2 milliGRAM(s) IV Push every 4 hours PRN Moderate Pain (4 - 6)  morphine  - Injectable 4 milliGRAM(s) IV Push every 4 hours PRN Severe Pain (7 - 10)  simethicone 80 milliGRAM(s) Chew four times a day PRN gas and bloating    HOME MEDICATIONS:  acetaminophen 325 mg oral tablet: 2 tab(s) orally every 6 hours, As needed, Moderate Pain (4 - 6)  [place] Thyroid 120 mg oral tablet: 1 tab(s) orally once a day  aspirin 81 mg oral tablet, chewable: 1 tab(s) orally once a day  clopidogrel 75 mg oral tablet: 1 tab(s) orally once a day  Crestor 20 mg oral tablet: 1 tab(s) orally once a day (at bedtime)  levothyroxine 200 mcg (0.2 mg) oral tablet: 1 tab(s) orally once a day  lisinopril 20 mg oral tablet: 1 tab(s) orally once a day  sucralfate 1 g oral tablet: 1 tab(s) orally every 12 hours      Vital Signs:   T(F): 98.5 (10-22-19 @ 12:16), Max: 98.5 (10-22-19 @ 12:16)  HR: 109 (10-22-19 @ 12:16) (70 - 122)  BP: 142/89 (10-22-19 @ 12:16) (119/84 - 229/106)  RR: 22 (10-22-19 @ 12:16) (12 - 36)  SpO2: 97% (10-22-19 @ 12:16) (94% - 98%)      10-21-19 @ 07:01  -  10-22-19 @ 07:00  --------------------------------------------------------  IN: 1012.5 mL / OUT: 350 mL / NET: 662.5 mL    10-22-19 @ 07:01  -  10-22-19 @ 12:41  --------------------------------------------------------  IN: 400 mL / OUT: 450 mL / NET: -50 mL        Physical Exam:   GENERAL: NAD  HEENT: NCAT  CHEST/LUNG: CTA b/l   HEART: Regular rate and rhythm; s1 s2 appreciated, No murmurs, rubs, or gallops  ABDOMEN: Tenderness to light palpation of upper quadrants, nondistended  EXTREMITIES: No LE edema b/l  NERVOUS SYSTEM:  Alert & Oriented X3        Labs:                         16.0   15.96 )-----------( 316      ( 22 Oct 2019 04:40 )             46.1     Neutophil% 90.6, Lymphocyte% 5.4, Monocyte% 3.2, Bands% 0.5 10-22-19 @ 00:00    22 Oct 2019 04:40    132    |  89     |  7      ----------------------------<  147    4.2     |  25     |  0.5      Ca    8.7        22 Oct 2019 04:40  Mg     1.6       22 Oct 2019 04:40    TPro  6.8    /  Alb  3.9    /  TBili  0.5    /  DBili  x      /  AST  27     /  ALT  30     /  AlkPhos  73     22 Oct 2019 00:00       pTT    29.3             ----< 1.22 INR  (10-22-19 @ 00:00)    14.00        PT    Amylase --, Lipase 57, 10-19-19 @ 16:08    Hemoglobin A1C, Whole Blood: 6.4 % (19 @ 04:45)    Serum Pro-Brain Natriuretic Peptide: 2374 pg/mL (10-19-19 @ 16:45)    Troponin 0.06, CKMB --, CK -- 10-22-19 @ 00:00  Troponin 0.05, CKMB --, CK -- 10-19-19 @ 16:45    Urinalysis Basic - ( 21 Oct 2019 22:20 )    Color: Colorless / Appearance: Clear / S.009 / pH: x  Gluc: x / Ketone: Small  / Bili: Negative / Urobili: <2 mg/dL   Blood: x / Protein: Trace / Nitrite: Negative   Leuk Esterase: Negative / RBC: x / WBC x   Sq Epi: x / Non Sq Epi: x / Bacteria: x    Radiology:    < from: Xray Kidney Ureter Bladder (10.21.19 @ 20:47) >  Impression:  1.  Dilated multiple loops of small bowel are identified, possible   partial small bowel obstruction versus ileus. Correlate with the clinical   findings and if clinically indicated a CT abdomen pelvis is recommended   for further evaluation with oral and intravenous contrast.   2.  There is air in the colon.   3.  No abnormal calcified densities within the abdomen.  < end of copied text >    < from: CT Abdomen and Pelvis No Cont (10.21.19 @ 22:40) >  HEPATOBILIARY: Portal venous gas (series 4, image ).    PERITONEUM/MESENTERY/BOWEL: There is pneumatosis as well as areas of wall   thickening within small bowel loops. Questionable ascending colon wall   thickening. Scattered colonic diverticula. No pneumoperitoneum..    VASCULAR: Extensive atherosclerotic vascular disease with likely short   segment focal dissections and moderate to severe calcifications at the   origins of the celiac artery and SMA.    IMPRESSION:    Small bowel pneumatosis and portal venous gas. Surgical consultation is   recommended as primary consideration is bowel ischemia.  < end of copied text >    < from: CT Angio Abdomen and Pelvis w/ IV Cont (10.22.19 @ 10:13) >  IMPRESSION:    1.  Complete occlusion of the proximal SMA with reconstitution of flow in   mid SMA and patent diminutive branches.  2.  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 Major branches of the   celiac trunk are patent, but diminutive.  3.  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    4.  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  5.  Unchanged portal venous gas and small bowel pneumatosis.  6.  Other stable/nonacute findings as described above.  < end of copied text >

## 2019-10-22 NOTE — CHART NOTE - NSCHARTNOTEFT_GEN_A_CORE
Patient Examined multiple times throughout the evening.     Initial consult placed in late evening after free air found on CT scan, patient immediately upgrade to CCU.   On initial exam at approximately midnight patient complained of mild abdominal pain, at the time patients abdomen was non distended and mildly tender, patients HR was normal at this time, BP was elevated with plans to start cardene drip. lactate at that time was WNL but WBC was elevated    Patient reexamined shortly after with attending and exam at that time was unchanged    Patient reexamined approximately 2h later, remained vitally stable, BP normalized, repeat lactate was WNL once again at 1.2    At 5am patient reexamined for the third time, at this time patient was complaining of slight increase in pain, remained vitally stable with repeat lactate resulting at 1.7    Patient examined with the team on round at approximately 630, at this time complaining of increase in pain again, HR began to rise, tachycardic to 110s/120s, repeat lactate sent resulting at 1.2

## 2019-10-22 NOTE — CONSULT NOTE ADULT - SUBJECTIVE AND OBJECTIVE BOX
LAKSHMI JUDD [id number]    84M history below notable for  CAD s/p multiple stents presents with abdominal pain, and diarrhea., patient was admitted on Oct 1st for  NSTEMI and had 3 stents placed.   Readmitted after 5 days for colitis and discharged on oct 8 on po flagyl and cipro. Has been doing  well until one day after discharge when diarrhea started again, associated with diffuse abdominal discomfort, called his gastroenterologists who recommended to stop Abx, diarrhea  improved after stopping the ABx but never resolved, complains of nausea with no vomiting, denies blood with stool, denies dysphagia, fever, chills. Donald chest pain or Shortness of breath, urinary symptoms, fever or chills. no recent travel. Admitted to medical service, due to worsening abdominal symptoms CT ordered today showing signs of portal venous gas and pneumatosis of the small bowel concerning for ischemia         PAST MEDICAL & SURGICAL HISTORY:  Neuropathy  TIA (transient ischemic attack)  Left carotid stenosis  Lyme disease  Hypothyroidism  Coronary artery disease  HTN  Hiatal hernia  DLD  Appendectomy  No significant past surgical history        MEDICATIONS  (STANDING):  amiodarone    Tablet 200 milliGRAM(s) Oral two times a day  aspirin  chewable 81 milliGRAM(s) Oral daily  atorvastatin 80 milliGRAM(s) Oral at bedtime  cefepime   IVPB 2000 milliGRAM(s) IV Intermittent once  cefepime   IVPB 2000 milliGRAM(s) IV Intermittent every 8 hours  cefepime   IVPB      chlorhexidine 4% Liquid 1 Application(s) Topical <User Schedule>  clopidogrel Tablet 75 milliGRAM(s) Oral daily  diphenhydrAMINE   Injectable 50 milliGRAM(s) IV Push once  heparin  Injectable 5000 Unit(s) SubCutaneous every 12 hours  lactated ringers. 1000 milliLiter(s) (100 mL/Hr) IV Continuous <Continuous>  levothyroxine 200 MICROGram(s) Oral daily  lisinopril 20 milliGRAM(s) Oral daily  methylPREDNISolone sodium succinate Injectable 40 milliGRAM(s) IV Push every 4 hours  metoprolol tartrate 12.5 milliGRAM(s) Oral two times a day  metroNIDAZOLE  IVPB 500 milliGRAM(s) IV Intermittent every 8 hours  niCARdipine Infusion 5 mG/Hr (25 mL/Hr) IV Continuous <Continuous>  nystatin    Suspension 728424 Unit(s) Oral four times a day  pantoprazole    Tablet 40 milliGRAM(s) Oral before breakfast  sodium chloride 0.9% Bolus 2000 milliLiter(s) IV Bolus once  sucralfate suspension 1 Gram(s) Oral two times a day    MEDICATIONS  (PRN):  morphine  - Injectable 2 milliGRAM(s) IV Push every 4 hours PRN Moderate Pain (4 - 6)  morphine  - Injectable 4 milliGRAM(s) IV Push every 4 hours PRN Severe Pain (7 - 10)  simethicone 80 milliGRAM(s) Chew four times a day PRN gas and bloating      Allergies    iodinated radiocontrast agents (Hives)    Intolerances    REVIEW OF SYSTEMS    [X] A ten-point review of systems was otherwise negative except as noted.  [ ] Due to altered mental status/intubation, subjective information were not able to be obtained from the patient. History was obtained, to the extent possible, from review of the chart and collateral sources of information.      Vital Signs Last 24 Hrs  T(C): 36.6 (22 Oct 2019 00:59), Max: 36.6 (22 Oct 2019 00:18)  T(F): 97.8 (22 Oct 2019 00:18), Max: 97.8 (22 Oct 2019 00:18)  HR: 82 (22 Oct 2019 01:00) (67 - 82)  BP: 195/90 (22 Oct 2019 01:00) (139/62 - 229/106)  BP(mean): 150 (22 Oct 2019 01:00) (147 - 150)  RR: 24 (22 Oct 2019 01:00) (18 - 24)  SpO2: 96% (22 Oct 2019 01:00) (95% - 96%)    PHYSICAL EXAM:  GENERAL: NAD, appears comfortable  CHEST/LUNG: Clear to auscultation bilaterally  HEART: Regular rate and rhythm, PVCs appreciated on bedside monitor  ABDOMEN: Soft, Nondistended, no guarding or rebound, mild tenderness and pain at baseline         LABS:  Labs:  CAPILLARY BLOOD GLUCOSE      POCT Blood Glucose.: 176 mg/dL (22 Oct 2019 00:14)                          15.6   18.32 )-----------( 336      ( 22 Oct 2019 00:00 )             44.2       Auto Neutrophil %: 90.6 % (10-22-19 @ 00:00)  Auto Immature Granulocyte %: 0.5 % (10-22-19 @ 00:00)    10-22    132<L>  |  88<L>  |  7<L>  ----------------------------<  173<H>  3.1<L>   |  26  |  <0.5<L>      Calcium, Total Serum: 8.9 mg/dL (10-22-19 @ 00:00)      LFTs:             6.8  | 0.5  | 27       ------------------[73      ( 22 Oct 2019 00:00 )  3.9  | x    | 30          Lipase:x      Amylase:x         Lactate, Blood: 1.4 mmol/L (10-22-19 @ 00:00)  Lactate, Blood: 1.6 mmol/L (10-19-19 @ 16:08)      Coags:     14.00  ----< 1.22    ( 22 Oct 2019 00:00 )     29.3        CARDIAC MARKERS ( 22 Oct 2019 00:00 )  x     / 0.06 ng/mL / x     / x     / x          Serum Pro-Brain Natriuretic Peptide: 2374 pg/mL (10-19-19 @ 16:45)      Urinalysis Basic - ( 21 Oct 2019 22:20 )    Color: Colorless / Appearance: Clear / S.009 / pH: x  Gluc: x / Ketone: Small  / Bili: Negative / Urobili: <2 mg/dL   Blood: x / Protein: Trace / Nitrite: Negative   Leuk Esterase: Negative / RBC: x / WBC x   Sq Epi: x / Non Sq Epi: x / Bacteria: x        GI PCR Panel, Stool (collected 20 Oct 2019 21:00)  Source: .Stool Feces  Final Report (21 Oct 2019 09:41):    GI PCR Results: NOT detected        RADIOLOGY & ADDITIONAL STUDIES:  < from: CT Abdomen and Pelvis No Cont (10.21.19 @ 22:40) >  Small bowel pneumatosis and portal venous gas. Surgical consultation is   recommended as primary consideration is bowel ischemia.    < end of copied text >      < from: Xray Chest 1 View AP/PA (10.19.19 @ 18:03) >  No radiographic evidence of acute cardiopulmonary disease.    < end of copied text >      < from: CT Abdomen and Pelvis w/ IV Cont (10.07.19 @ 22:18) >  Sigmoid colitis with colonic wall thickening. No intraperitoneal free air.    Additional Findings/Recommendations After Attending Radiologist Review:    Colitis is questionable, I see no definite acute abdominal findings.   There are chronic findings as above.    < end of copied text >    < from: Transthoracic Echocardiogram (19 @ 07:17) >  ummary:   1. Left ventricular ejection fraction, by visual estimation, is 35 to   40%.   2. Mid anteroseptal segment and entire apex are abnormal as described   above.   3. Mildly increased LV wall thickness.   4. Mild mitral annular calcification.   5. PSAP at least 35.   6. Sclerotic aortic valve with normal opening.   7. Dilatation of the ascending aorta.   8. There is mild aortic root calcification    < end of copied text >

## 2019-10-22 NOTE — CONSULT NOTE ADULT - SUBJECTIVE AND OBJECTIVE BOX
VASCULAR SURGERY CONSULT NOTE      HPI:  84 year history of HTN, hiatal hernia, hypercholesteremia, and CAD s/p multiple stents presents with abdominal pain, and diarrhea.  patient was admitted on Oct 1st for  NSTEMI and had 3 stents placed.   Readmitted after 5 days for colitis and discharged on oct 8 on po flagyl and cipro.  Has been doing relatively well until one day after discharge when diarrhea started again, associated with diffuse abdominal discomfort, called his gastroenterologists who recommended to stop Abx, diarrhea  improved after stopping the ABx but never resolved, complains of nausea with no vomiting, denies blood with stool, denies dysphagia, fever, chills. Donald chest pain or Shortness of breath, urinary symptoms, fever or chills. no recent travel   In Ed   T(C): 36.6 T(F): 97.8   HR: 79   BP: 191/91   RR: 18  SpO2: 97% (19 Oct 2019 22:47)        PAST MEDICAL & SURGICAL HISTORY:  Neuropathy  TIA (transient ischemic attack)  Left carotid stenosis  Lyme disease  Hypothyroidism  Coronary artery disease  No significant past surgical history    iodinated radiocontrast agents (Hives)    Home Medications:  acetaminophen 325 mg oral tablet: 2 tab(s) orally every 6 hours, As needed, Moderate Pain (4 - 6) (10 Oct 2019 09:54)  [place] Thyroid 120 mg oral tablet: 1 tab(s) orally once a day (26 Sep 2019 11:56)  aspirin 81 mg oral tablet, chewable: 1 tab(s) orally once a day (26 Sep 2019 11:56)  clopidogrel 75 mg oral tablet: 1 tab(s) orally once a day (02 Oct 2019 13:26)  Crestor 20 mg oral tablet: 1 tab(s) orally once a day (at bedtime) (26 Sep 2019 11:55)  levothyroxine 200 mcg (0.2 mg) oral tablet: 1 tab(s) orally once a day (10 Oct 2019 09:54)  lisinopril 20 mg oral tablet: 1 tab(s) orally once a day (26 Sep 2019 11:55)  sucralfate 1 g oral tablet: 1 tab(s) orally every 12 hours (10 Oct 2019 09:54)    No permtinent family history of PVD    REVIEW OF SYSTEMS:  GENERAL:                                         negative  SKIN:                                                 negative  OPTHALMOLOGIC:                          negative  ENMT:                                               negative  RESPIRATORY AND THORAX:        negative  CARDIOVASCULAR:                        see HPI  GASTROINTESTINAL:                       negative  NEPHROLOGY:                                  negative  MUSCULOSKELETAL:                       negative  NEUROLOGIC:                                   negative  PSYCHIATRIC:                                    negative  HEMATOLOGY/LYMPHATICS:         negative  ENDOCRINE:                                   see HPI  ALLERGIC/IMMUNOLOGIC:            negative    12 point ROS otherwise normal except as stated in HPI    PHYSICAL EXAM  Vital Signs Last 24 Hrs  T(C): 36.6 (22 Oct 2019 04:00), Max: 36.6 (22 Oct 2019 00:18)  T(F): 97.9 (22 Oct 2019 04:00), Max: 97.9 (22 Oct 2019 04:00)  HR: 98 (22 Oct 2019 07:00) (67 - 122)  BP: 157/78 (22 Oct 2019 07:00) (134/70 - 229/106)  BP(mean): 119 (22 Oct 2019 07:00) (92 - 150)  RR: 25 (22 Oct 2019 07:00) (15 - 36)  SpO2: 97% (22 Oct 2019 07:00) (95% - 98%)    Appearance: Normal	  HEENT:   Normal oral mucosa, PERRL, EOMI	  Neck: Supple, - JVD;   Cardiovascular: Normal S1 S2, No JVD, No murmurs,   Respiratory: Lungs clear to auscultation, No Rales, Rhonchi, Wheezing	  Gastrointestinal:   Soft, Nondistended, no guarding or rebound, mild tenderness and pain at baseline   Skin: No rashes, No ecchymoses, No cyanosis  Extremities: Normal range of motion, No clubbing, cyanosis or edema  Neurologic: Non-focal  Psychiatry: A & O x 3, Mood & affect appropriate        MEDICATIONS:   MEDICATIONS  (STANDING):  amiodarone    Tablet 200 milliGRAM(s) Oral two times a day  aspirin  chewable 81 milliGRAM(s) Oral daily  atorvastatin 80 milliGRAM(s) Oral at bedtime  cefepime   IVPB 2000 milliGRAM(s) IV Intermittent every 8 hours  cefepime   IVPB      chlorhexidine 4% Liquid 1 Application(s) Topical <User Schedule>  clopidogrel Tablet 75 milliGRAM(s) Oral daily  heparin  Injectable 5000 Unit(s) SubCutaneous every 12 hours  lactated ringers. 1000 milliLiter(s) (75 mL/Hr) IV Continuous <Continuous>  levothyroxine 200 MICROGram(s) Oral daily  lisinopril 20 milliGRAM(s) Oral daily  magnesium sulfate  IVPB 2 Gram(s) IV Intermittent once  metoprolol tartrate 12.5 milliGRAM(s) Oral two times a day  metroNIDAZOLE  IVPB 500 milliGRAM(s) IV Intermittent every 8 hours  niCARdipine Infusion 5 mG/Hr (25 mL/Hr) IV Continuous <Continuous>  nystatin    Suspension 786858 Unit(s) Oral four times a day  pantoprazole    Tablet 40 milliGRAM(s) Oral before breakfast  sucralfate suspension 1 Gram(s) Oral two times a day    MEDICATIONS  (PRN):  morphine  - Injectable 2 milliGRAM(s) IV Push every 4 hours PRN Moderate Pain (4 - 6)  morphine  - Injectable 4 milliGRAM(s) IV Push every 4 hours PRN Severe Pain (7 - 10)  simethicone 80 milliGRAM(s) Chew four times a day PRN gas and bloating      LAB/STUDIES:                        16.0   15.96 )-----------( 316      ( 22 Oct 2019 04:40 )             46.1     10    132<L>  |  89<L>  |  7<L>  ----------------------------<  147<H>  4.2   |  25  |  0.5<L>    Ca    8.7      22 Oct 2019 04:40  Mg     1.6     10    TPro  6.8  /  Alb  3.9  /  TBili  0.5  /  DBili  x   /  AST  27  /  ALT  30  /  AlkPhos  73  10-    PT/INR - ( 22 Oct 2019 00:00 )   PT: 14.00 sec;   INR: 1.22 ratio         PTT - ( 22 Oct 2019 00:00 )  PTT:29.3 sec  LIVER FUNCTIONS - ( 22 Oct 2019 00:00 )  Alb: 3.9 g/dL / Pro: 6.8 g/dL / ALK PHOS: 73 U/L / ALT: 30 U/L / AST: 27 U/L / GGT: x             Urinalysis Basic - ( 21 Oct 2019 22:20 )    Color: Colorless / Appearance: Clear / S.009 / pH: x  Gluc: x / Ketone: Small  / Bili: Negative / Urobili: <2 mg/dL   Blood: x / Protein: Trace / Nitrite: Negative   Leuk Esterase: Negative / RBC: x / WBC x   Sq Epi: x / Non Sq Epi: x / Bacteria: x      CARDIAC MARKERS ( 22 Oct 2019 00:00 )  x     / 0.06 ng/mL / x     / x     / x              ABG - ( 22 Oct 2019 06:42 )  pH, Arterial: 7.50  pH, Blood: x     /  pCO2: 41    /  pO2: 73    / HCO3: 32    / Base Excess: 7.6   /  SaO2: 95                  GI PCR Panel, Stool (collected 20 Oct 2019 21:00)  Source: .Stool Feces  Final Report (21 Oct 2019 09:41):    GI PCR Results: NOT detected    *******Please Note:*******    GI panel PCR evaluates for:    Campylobacter, Plesiomonas shigelloides, Salmonella,    Vibrio, Yersinia enterocolitica, Enteroaggregative    Escherichia coli (EAEC), Enteropathogenic E.coli (EPEC),    Enterotoxigenic E. coli (ETEC) lt/st, Shiga-like    toxin-producing E. coli (STEC) stx1/stx2,    Shigella/ Enteroinvasive E. coli (EIEC), Cryptosporidium,    Cyclospora cayetanensis, Entamoeba histolytica,    Giardia lamblia, Adenovirus F 40/41, Astrovirus,    Norovirus GI/GII, Rotavirus A, Sapovirus    Culture - Stool (collected 20 Oct 2019 21:00)  Source: .Stool Feces  Preliminary Report (22 Oct 2019 07:33):    No enteric gram negative rods isolated    No enteric pathogens to date: Final culture pending    GI PCR Panel, Stool (collected 20 Oct 2019 01:02)  Source: .Stool Feces  Final Report (21 Oct 2019 23:54):    GI PCR Results: NOT detected    *******Please Note:*******    GI panel PCR evaluates for:    Campylobacter, Plesiomonas shigelloides, Salmonella,    Vibrio, Yersinia enterocolitica, Enteroaggregative    Escherichia coli (EAEC), Enteropathogenic E.coli (EPEC),    Enterotoxigenic E. coli (ETEC) lt/st, Shiga-like    toxin-producing E. coli (STEC) stx1/stx2,    Shigella/ Enteroinvasive E. coli (EIEC), Cryptosporidium,    Cyclospora cayetanensis, Entamoeba histolytica,    Giardia lamblia, Adenovirus F 40/41, Astrovirus,    Norovirus GI/GII, Rotavirus A, Sapovirus        IMAGING:      < from: CT Abdomen and Pelvis No Cont (10.21. @ 22:40) >  Small bowel pneumatosis and portal venous gas. Surgical consultation is   recommended as primary consideration is bowel ischemia.

## 2019-10-22 NOTE — CONSULT NOTE ADULT - SUBJECTIVE AND OBJECTIVE BOX
SICU Consultation Note  ======================================================================================================  LAKSHMI JUDD  [id number]    HPI:  84y Male with PMH significant for hypothyroidism, HTN, CAD, recent admission for NSTEMI 10/1/19 s/p staged PCI with DULCE x 2 in pLCx and OM2 (on ASA, plavix), presents this admission with continued abdominal pain for 2-3 weeks. Patient had been evaluated on 10/8 for enteritis and colitis and sent home with PO antibiotics (cipro, flagyl), however returned to ED for increasing abdominal discomfort, diffuse, nausea but no emesis, diarrhea. Denied melena or BRBPR. WBC 12 on presentation, lact 1.6. General surgery and vascular surgery consulted after CT scan was performed showing SMA occlusion, a proximal loop of SB with pneumatosis and portal venous gas-- findings concerning for ischemic bowel. Patient was taken to the OR for exploratory laparotomy, SBR with primary anastomosis, left external iliac to SMA bypass with PTFE graft for mesenteric ischemia (with Dr. Layton and Dr. Nieves).     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 Abdomen and fascia closed, no drains.     PAST MEDICAL & SURGICAL HISTORY:  Neuropathy  TIA (transient ischemic attack)  Left carotid stenosis  Lyme disease  Hypothyroidism  Coronary artery disease  No significant past surgical history    Home Meds:   Home Medications:  acetaminophen 325 mg oral tablet: 2 tab(s) orally every 6 hours, As needed, Moderate Pain (4 - 6) (10 Oct 2019 09:54)  [place] Thyroid 120 mg oral tablet: 1 tab(s) orally once a day (26 Sep 2019 11:56)  aspirin 81 mg oral tablet, chewable: 1 tab(s) orally once a day (26 Sep 2019 11:56)  clopidogrel 75 mg oral tablet: 1 tab(s) orally once a day (02 Oct 2019 13:26)  Crestor 20 mg oral tablet: 1 tab(s) orally once a day (at bedtime) (26 Sep 2019 11:55)  levothyroxine 200 mcg (0.2 mg) oral tablet: 1 tab(s) orally once a day (10 Oct 2019 09:54)  lisinopril 20 mg oral tablet: 1 tab(s) orally once a day (26 Sep 2019 11:55)  sucralfate 1 g oral tablet: 1 tab(s) orally every 12 hours (10 Oct 2019 09:54)    Allergies  iodinated radiocontrast agents (Hives)    Advanced Directives: Full Code    CURRENT MEDICATIONS:   cefepime   IVPB 2000 milliGRAM(s) IV Intermittent once  cefepime   IVPB      chlorhexidine 0.12% Liquid 15 milliLiter(s) Oral Mucosa every 12 hours  dexMEDEtomidine Infusion 0.01 MICROgram(s)/kG/Hr (0.205 mL/Hr) IV Continuous <Continuous>  dexMEDEtomidine Infusion 0.2 MICROgram(s)/kG/Hr (4.105 mL/Hr) IV Continuous <Continuous>  HYDROmorphone  Injectable 0.5 milliGRAM(s) IV Push every 10 minutes PRN  HYDROmorphone  Injectable 1 milliGRAM(s) IV Push every 10 minutes PRN  lactated ringers. 1000 milliLiter(s) (100 mL/Hr) IV Continuous <Continuous>  levothyroxine Injectable 100 MICROGram(s) IV Push at bedtime  meperidine     Injectable 12.5 milliGRAM(s) IV Push every 10 minutes PRN  metoprolol tartrate Injectable 2.5 milliGRAM(s) IV Push every 6 hours  metroNIDAZOLE  IVPB 500 milliGRAM(s) IV Intermittent every 8 hours  ondansetron Injectable 4 milliGRAM(s) IV Push every 6 hours PRN  sodium chloride 0.9%. 1000 milliLiter(s) (125 mL/Hr) IV Continuous <Continuous>    VITAL SIGNS, INS/OUTS (Last 24hours):    ICU Vital Signs Last 24 Hrs  T(C): 36.9 (22 Oct 2019 15:14), Max: 36.9 (22 Oct 2019 12:16)  T(F): 98.5 (22 Oct 2019 12:16), Max: 98.5 (22 Oct 2019 12:16)  HR: 83 (22 Oct 2019 22:12) (70 - 126)  BP: 151/72 (22 Oct 2019 15:14) (119/84 - 210/86)  BP(mean): 95 (22 Oct 2019 15:14) (86 - 150)  RR: 21 (22 Oct 2019 15:14) (12 - 36)  SpO2: 100% (22 Oct 2019 22:12) (94% - 100%)    I&O's Summary    21 Oct 2019 07:  -  22 Oct 2019 07:00  --------------------------------------------------------  IN: 1012.5 mL / OUT: 350 mL / NET: 662.5 mL    22 Oct 2019 07:01  -  22 Oct 2019 22:32  --------------------------------------------------------  IN: 662.5 mL / OUT: 650 mL / NET: 12.5 mL    Height (cm): 325.12 (10-22-19)  Weight (kg): 82.1 (10-22-19)  BMI (kg/m2): 7.8 (10-22-19)  BSA (m2): 3.1 (10-22-19)    Physical Exam:  ---------------------------------------------------------------------------------------  RASS:   GCS:    E/M/V  Exam: intubated and sedated, follows commands, awake, no focal deficits    RESPIRATORY:  Intubated  Lungs clear to auscultation b/l, Normal expansion/effort  Mechanical Ventilation: Mode: AC/ CMV (Assist Control/ Continuous Mandatory Ventilation)  RR (machine): 10  TV (machine): 500  FiO2: 80  PEEP: 5  ITime: 1  MAP: 19  PIP: 23    CARDIOVASCULAR:   S1/S2.  RRR  No peripheral edema    GASTROINTESTINAL:  Abdomen soft, non-tender, non-distended  Incision site CDI    MUSCULOSKELETAL:  Extremities warm, pink, well-perfused.  Palpable/Doppler pulses signals      DERM:  No skin breakdown     :   Exam: Shen catheter in place.     Tubes/Lines/Drains   ----------------------------------------------------------------------------------------------------------  [X] Peripheral IV  [X] Central Venous Line    R        IJ          [X] Arterial Line		   R       Radial  [X] Urinary Catheter Shen                                        LABS  --------------------------------------------------------------------------------------  CAPILLARY BLOOD GLUCOSE      POCT Blood Glucose.: 136 mg/dL (22 Oct 2019 22:16)  POCT Blood Glucose.: 161 mg/dL (22 Oct 2019 12:09)  POCT Blood Glucose.: 176 mg/dL (22 Oct 2019 00:14)                          16.0   15.96 )-----------( 316      ( 22 Oct 2019 04:40 )             46.1         10-22    132<L>  |  89<L>  |  7<L>  ----------------------------<  147<H>  4.2   |  25  |  0.5<L>      Calcium, Total Serum: 8.7 mg/dL (10-22-19 @ 04:40)      LFTs:             6.8  | 0.5  | 27       ------------------[73      ( 22 Oct 2019 00:00 )  3.9  | x    | 30             ABG - ( 22 Oct 2019 20:03 )  pH: 7.49  /  pCO2: 33    /  pO2: 296   / HCO3: 25    / Base Excess: 2.3   /  SaO2: 99        ABG - ( 22 Oct 2019 06:42 )  pH: 7.50  /  pCO2: 41    /  pO2: 73    / HCO3: 32    / Base Excess: 7.6   /  SaO2: 95        ABG - ( 22 Oct 2019 01:38 )  pH: 7.53  /  pCO2: 38    /  pO2: 98    / HCO3: 32    / Base Excess: 8.8   /  SaO2: 98        Coags:     14.00  ----< 1.22    ( 22 Oct 2019 00:00 )     29.3        CARDIAC MARKERS ( 22 Oct 2019 00:00 )  x     / 0.06 ng/mL / x     / x     / x          Urinalysis Basic - ( 21 Oct 2019 22:20 )    Color: Colorless / Appearance: Clear / S.009 / pH: x  Gluc: x / Ketone: Small  / Bili: Negative / Urobili: <2 mg/dL   Blood: x / Protein: Trace / Nitrite: Negative   Leuk Esterase: Negative / RBC: x / WBC x   Sq Epi: x / Non Sq Epi: x / Bacteria: x        GI PCR Panel, Stool (collected 20 Oct 2019 21:00)  Source: .Stool Feces  Final Report (21 Oct 2019 09:41):    GI PCR Results: NOT detected    *******Please Note:*******    GI panel PCR evaluates for:    Campylobacter, Plesiomonas shigelloides, Salmonella,    Vibrio, Yersinia enterocolitica, Enteroaggregative    Escherichia coli (EAEC), Enteropathogenic E.coli (EPEC),    Enterotoxigenic E. coli (ETEC) lt/st, Shiga-like    toxin-producing E. coli (STEC) stx1/stx2,    Shigella/ Enteroinvasive E. coli (EIEC), Cryptosporidium,    Cyclospora cayetanensis, Entamoeba histolytica,    Giardia lamblia, Adenovirus F 40/41, Astrovirus,    Norovirus GI/GII, Rotavirus A, Sapovirus    Culture - Stool (collected 20 Oct 2019 21:00)  Source: .Stool Feces  Preliminary Report (22 Oct 2019 07:33):    No enteric gram negative rods isolated    No enteric pathogens to date: Final culture pending    GI PCR Panel, Stool (collected 20 Oct 2019 01:02)  Source: .Stool Feces  Final Report (21 Oct 2019 23:54):    GI PCR Results: NOT detected    *******Please Note:*******    GI panel PCR evaluates for:    Campylobacter, Plesiomonas shigelloides, Salmonella,    Vibrio, Yersinia enterocolitica, Enteroaggregative    Escherichia coli (EAEC), Enteropathogenic E.coli (EPEC),    Enterotoxigenic E. coli (ETEC) lt/st, Shiga-like    toxin-producing E. coli (STEC) stx1/stx2,    Shigella/ Enteroinvasive E. coli (EIEC), Cryptosporidium,    Cyclospora cayetanensis, Entamoeba histolytica,    Giardia lamblia, Adenovirus F 40/41, Astrovirus,    Norovirus GI/GII, Rotavirus A, Sapovirus      CT/XRAY/ECHO/TCD/EEG  --------------------------------------------------------------------------------------  < from: CT Angio Abdomen and Pelvis w/ IV Cont (10.22.19 @ 10:13) >  1.  Complete occlusion of the proximal SMA with reconstitution of flow in   mid SMA and patent diminutive branches.    2.  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 Major branches of the   celiac trunk are patent, but diminutive.    3.  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    4.  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    5.  Unchanged portal venous gas and small bowel pneumatosis.    < end of copied text >    --------------------------------------------------------------------------------------  Admit Diagnosis: ABDOMINAL PAIN; DIARRHEA; UNABLE TO EAT

## 2019-10-22 NOTE — CONSULT NOTE ADULT - SUBJECTIVE AND OBJECTIVE BOX
Date of Admission: 10-19-19    CHIEF COMPLAINT: Patient is a 84y old  Male who presents with a chief complaint of diarrhea (21 Oct 2019 18:21)      HPI:  84 year history of HTN, hiatal hernia, hypercholesteremia, and CAD s/p multiple stents presents with abdominal pain, and diarrhea.  patient was admitted on Oct 1st for  NSTEMI and had 3 stents placed.   Readmitted after 5 days for colitis and discharged on oct 8 on po flagyl and cipro.  Has been doing relatively well until one day after discharge when diarrhea started again, associated with diffuse abdominal discomfort, called his gastroenterologists who recommended to stop Abx, diarrhea  improved after stopping the ABx but never resolved, complains of nausea with no vomiting, denies blood with stool, denies dysphagia, fever, chills. Donald chest pain or Shortness of breath, urinary symptoms, fever or chills. no recent travel   In Ed   T(C): 36.6 T(F): 97.8   HR: 79   BP: 191/91   RR: 18  SpO2: 97% (19 Oct 2019 22:47)      PAST MEDICAL & SURGICAL HISTORY:  Neuropathy  TIA (transient ischemic attack)  Left carotid stenosis  Lyme disease  Hypothyroidism  Coronary artery disease  No significant past surgical history        FAMILY HISTORY:  [x] no pertinent family history of premature cardiovascular disease in first degree relatives.    SOCIAL HISTORY:    [x] Non-smoker  [x] No alcohol use  [x] No illicit drug use    Allergies    iodinated radiocontrast agents (Hives)    Intolerances    	    REVIEW OF SYSTEMS:  CONSTITUTIONAL: No fever, weight loss, or fatigue  CARDIOLOGY: No chest pain, dyspnea of exertion, or syncopal episodes.   RESPIRATORY: No shortness of breath, cough, wheezing.   NEUROLOGICAL: No weakness, no focal deficits to report.  GI: No BRBPR, no N,V,diarrhea.  (+) Abdominal pain.  PSYCHIATRY: Normal mood and affect.  HEENT: No nasal discharge, no ecchymosis  SKIN: No ecchymosis, no breakdown  MUSCULOSKELETAL: Full range of motion x4.   EXTREM: No leg swelling or erythema.    PHYSICAL EXAM:  T(C): 36.6 (10-22-19 @ 00:18), Max: 36.6 (10-22-19 @ 00:18)  HR: 70 (10-22-19 @ 00:18) (67 - 82)  BP: 197/90 (10-22-19 @ 00:18) (137/64 - 229/106)  RR: 18 (10-22-19 @ 00:18) (18 - 18)  SpO2: 96% (10-22-19 @ 00:18) (95% - 98%)  Wt(kg): --  I&O's Summary    20 Oct 2019 07:01  -  21 Oct 2019 07:00  --------------------------------------------------------  IN: 225 mL / OUT: 0 mL / NET: 225 mL        General Appearance: In moderate distress, normal for age and gender. 	  Neck: Normal JVP, no bruit.   Eyes: No xanthomalasia, Extra Ocular muscles intact.   Cardiovascular: Regular rate and rhythm S1 S2, No JVD, No murmurs.  Respiratory: Lungs clear to auscultation. No wheezes, rales or rhonchi.  Psychiatry: Alert and oriented x 3, Mood & affect appropriate  Gastrointestinal:  Distended, diffusely tender, hyperactive bowel sounds in bilateral UQ  Skin/Integumen: No rashes, No ecchymoses, No cyanosis	  Neurologic: Non-focal deficits.  Musculoskeletal/ extremities: Normal range of motion, No clubbing, cyanosis or edema  Vascular: Peripheral pulses palpable 2+ bilaterally    LABS:	 	                        15.6   18.32 )-----------( 336      ( 22 Oct 2019 00:00 )             44.2     10-21    137  |  97<L>  |  12  ----------------------------<  106<H>  3.9   |  27  |  0.5<L>    Ca    8.4<L>      21 Oct 2019 08:09  Mg     1.6     10-21    TELEMETRY EVENTS: 	    ECG:  < from: 12 Lead ECG (10.02.19 @ 07:45) >  Diagnosis Line Sinus bradycardia with short SC  Anteroseptal infarct , age undetermined  Abnormal ECG    	  RADIOLOGY: < from: Xray Chest 1 View AP/PA (10.19.19 @ 18:03) >  No radiographic evidence of acute cardiopulmonary disease.    OTHER: 	    PREVIOUS DIAGNOSTIC TESTING:    [x] Echocardiogram: < from: Transthoracic Echocardiogram (09.27.19 @ 07:17) >  Summary:   1. Left ventricular ejection fraction, by visual estimation, is 35 to   40%.   2. Mid anteroseptal segment and entire apex are abnormal as described   above.   3. Mildly increased LV wall thickness.   4. Mild mitral annular calcification.   5. PSAP at least 35.   6. Sclerotic aortic valve with normal opening.   7. Dilatation of the ascending aorta.   8. There is mild aortic root calcification.      [x] Catheterization: Left main: normal    LAD: previously placed stent patent  Diag: ostial D1 90% stenosis  Left Circumflex: prox circumflex 80%, OM2 90% calcified lesion.     INTERVENTION  IMPLANTS: DULCE x2 in pLCx and OM2    	  Home Medications:  acetaminophen 325 mg oral tablet: 2 tab(s) orally every 6 hours, As needed, Moderate Pain (4 - 6) (10 Oct 2019 09:54)  [place] Thyroid 120 mg oral tablet: 1 tab(s) orally once a day (26 Sep 2019 11:56)  aspirin 81 mg oral tablet, chewable: 1 tab(s) orally once a day (26 Sep 2019 11:56)  clopidogrel 75 mg oral tablet: 1 tab(s) orally once a day (02 Oct 2019 13:26)  Crestor 20 mg oral tablet: 1 tab(s) orally once a day (at bedtime) (26 Sep 2019 11:55)  levothyroxine 200 mcg (0.2 mg) oral tablet: 1 tab(s) orally once a day (10 Oct 2019 09:54)  lisinopril 20 mg oral tablet: 1 tab(s) orally once a day (26 Sep 2019 11:55)  sucralfate 1 g oral tablet: 1 tab(s) orally every 12 hours (10 Oct 2019 09:54)    MEDICATIONS  (STANDING):  amiodarone    Tablet 200 milliGRAM(s) Oral two times a day  aspirin  chewable 81 milliGRAM(s) Oral daily  atorvastatin 80 milliGRAM(s) Oral at bedtime  chlorhexidine 4% Liquid 1 Application(s) Topical <User Schedule>  clopidogrel Tablet 75 milliGRAM(s) Oral daily  heparin  Injectable 5000 Unit(s) SubCutaneous every 12 hours  lactated ringers. 1000 milliLiter(s) (75 mL/Hr) IV Continuous <Continuous>  levothyroxine 200 MICROGram(s) Oral daily  lisinopril 20 milliGRAM(s) Oral daily  metoprolol tartrate 12.5 milliGRAM(s) Oral two times a day  niCARdipine Infusion 5 mG/Hr (25 mL/Hr) IV Continuous <Continuous>  nystatin    Suspension 134076 Unit(s) Oral four times a day  pantoprazole    Tablet 40 milliGRAM(s) Oral before breakfast  sucralfate suspension 1 Gram(s) Oral two times a day    MEDICATIONS  (PRN):  morphine  - Injectable 2 milliGRAM(s) IV Push every 4 hours PRN Moderate Pain (4 - 6)  morphine  - Injectable 4 milliGRAM(s) IV Push every 4 hours PRN Severe Pain (7 - 10)  simethicone 80 milliGRAM(s) Chew four times a day PRN gas and bloating

## 2019-10-22 NOTE — BRIEF OPERATIVE NOTE - NSICDXBRIEFPREOP_GEN_ALL_CORE_FT
PRE-OP DIAGNOSIS:  Acute mesenteric ischemia 22-Oct-2019 21:45:43  Jason Jones
PRE-OP DIAGNOSIS:  Ischemic bowel disease 22-Oct-2019 21:50:33  Morro Smith  Acute mesenteric ischemia 22-Oct-2019 21:45:43  Jason Jones

## 2019-10-22 NOTE — CONSULT NOTE ADULT - ASSESSMENT
84 year history of HTN, hiatal hernia, hypercholesteremia, and CAD s/p multiple stents, known chronic mesenteric ischemia, presents with abdominal pain on and off for some time, blaoting and diarrhea. CT without contrast c/w portal venous gas and pneumatosis.  Vascular surgery called for suspected acute mesenteric ischemia  Plan:   - please, get a CTA abdomen, premedicate as per protocol for contrast allergy  - keep NPO    Dr. Layton spoke with patient and family in extend. Patient verbalized understanding    SPECTRA [id number]

## 2019-10-22 NOTE — CONSULT NOTE ADULT - ASSESSMENT
84M with recent MI and 3 stents on 10/01 presents with recurrent abdominal pain and symptoms for approximately 3 weeks. CT scan performed [place] showed signs of portal venous gas and pneumatosis. On exam patients abdomen is soft with minimal tenderness and no peritoneal signs.    Plan:  No acute surgical intervention at this time  NPO, IVF  WBC increasing - continue to trend  Lactate 1.4  trend  Recommend starting broad spectrum Abx: Vanc and Annie  Serial Abdominal exams  Will continue to follow closely     Discussed with Dr. Nieves 84M with recent MI and 3 stents on 10/01 presents with recurrent abdominal pain and symptoms for approximately 3 weeks. CT scan performed [place] showed signs of portal venous gas and pneumatosis. On exam patients abdomen is soft with minimal tenderness and no peritoneal signs.    Plan:  No acute surgical intervention at this time  NPO, IVF  WBC increasing - continue to trend  Lactate 1.4  trend  Recommend starting broad spectrum Abx: Vanc and Annie  Serial Abdominal exams  Will continue to follow closely     At this time patient and family do not wish to discuss goals of care or surgical/treatment options. Patient states he wants to try antibiotics and wait for his wife    Discussed with Dr. Nieves

## 2019-10-22 NOTE — PROGRESS NOTE ADULT - ASSESSMENT
84 M w reported Hx of chronic mesenteric ischemia, HTN, hiatal hernia, hypercholesteremia, and CAD s/p  stents recently presented originally with abdominal pain, and diarrhea.    Patient's stool C.diff was indeterminate.  Was transferred to CCU overnight on 10/21/19 for HTN urgency requiring Nicardipine gtt, and acute on chronic mesenteric ischemia of small bowel.     # Acute on chronic mesenteric ischemia  Surgery and Vascular surgical teams are following closely  Patient may be planned for operative vascular intervention today  do not suspect C.diff at this time  agree w keeping NPO, .continuing IVF, empiric ABX w Cefepime / Flagyl  follow surgery team plan of care    # Medical preop eval.  the patient is at elevated risk for a higher risk operation, however benefits of treating his mesenteri ischemia far outweigh risks and patient has already had his CAD address recently w PCI.  The patient currently remains on ASA, Plavix at this time.     # CAD   # H/O ACS and recent stent  #) h/o V tach  -oral amio and metoprolol  - c/w aspirin/ Plavix and statin     # HTN urgency  severe R Renal artery stenosis  consider holding ACE-inhibitor  remains on nicardipine gtt, metoprolol and BPs much better controlled    # Hypothyroidism   - c/w levothyroxine      #. Hypokalemia -- corrected  # hypoMg  replace/replete, monitor           #Progress Note Handoff    Pending (specify)   vascular surgery following closely for ischemic gut and may plan operative intervention depending on their clinical assessment.      Family discussion:  pt and family at bedside aware of care plan and in agreement    Disposition: Home___/SNF___/Other________/Unknown at this time__x______   still acute level of care 84 M w reported Hx of chronic mesenteric ischemia, HTN, hiatal hernia, hypercholesteremia, and CAD s/p  stents recently presented originally with abdominal pain, and diarrhea.    Patient's stool C.diff was indeterminate.  Was transferred to CCU overnight on 10/21/19 for HTN urgency requiring Nicardipine gtt, and acute on chronic mesenteric ischemia of small bowel.     # Acute on chronic mesenteric ischemia  Surgery and Vascular surgical teams are following closely  Patient may be planned for operative vascular intervention today  do not suspect C.diff at this time  agree w keeping NPO, .continuing IVF, empiric ABX w Cefepime / Flagyl  follow surgery team plan of care    # Medical preop eval.  the patient is at elevated risk for a higher risk operation, however benefits of treating his mesenteri ischemia far outweigh risks and patient has already had his CAD address recently w PCI.  The patient currently remains on ASA, Plavix at this time.     # CAD   # H/O ACS and recent stent  #) h/o V tach  -oral amio and metoprolol  - c/w aspirin/ Plavix and statin     # HTN urgency  severe R Renal artery stenosis  consider holding ACE-inhibitor  remains on nicardipine gtt, metoprolol and BPs much better controlled    # Hypothyroidism   - c/w levothyroxine      #. Hypokalemia -- corrected  # hypoMg  replace/replete, monitor    # HypoNatremia  currently on LR IVF  monitor Na daily  if persists, could consider chk urine lytes (Na, Cr, Osm) to further characterize         #Progress Note Handoff    Pending (specify)   vascular surgery following closely for ischemic gut and may plan operative intervention depending on their clinical assessment.      Family discussion:  pt and family at bedside aware of care plan and in agreement    Disposition: Home___/SNF___/Other________/Unknown at this time__x______   still acute level of care

## 2019-10-22 NOTE — PRE-ANESTHESIA EVALUATION ADULT - NSANTHADDINFOFT_GEN_ALL_CORE
R/B/A explained and accepted , All questions by wife and patient answered. Plan ISAI Fatima for hemodynamic monitoring.  Patient at risk for postop ventilation as well perioperative/intraoperitive cardiac event given recent MI and stenting.  Cardiac eval reviewed High risk

## 2019-10-22 NOTE — CONSULT NOTE ADULT - ASSESSMENT
Assessment:  Hypertensive emergency  Abdominal pain, CT shows bowel ischemia  Plan:  transfer to CCU  c/w aspirin, plavix  start on nicardipine drip, titrate to SBP<160  stat surgical evaluation Assessment:  Hypertensive emergency  Abdominal pain, CT shows bowel ischemia  Plan:  transfer to CCU  c/w aspirin, plavix  start on nicardipine drip, titrate to      Pt is on clopidogrel    we noted ef 35-40% post 3 weeks stests.    Pt is clear high risk for surgery.   Despite EF can give agrressive fluids if needed will diurese.   Continue cardine drip to      Must continue antiplatelet treatment post.

## 2019-10-22 NOTE — PROGRESS NOTE ADULT - SUBJECTIVE AND OBJECTIVE BOX
OVERNIGHT EVENTS:    Patient continued to have some abdominal pain and became markedly hypertensive with blood pressures as high as 220/100.  Patient was transferred to CCU for HTN urgency and placed on Nicardipine drip for better BP control.  KUB showed distention of small bowels and CT abdomen showed pneumatosis intestinalis of small bowel w portal venous gas presence suggesting small bowel ischemia.  Surgery consult had seen patient and suspected mesenteric ischemia.  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     SUBJECTIVE:  Patient still c/o abdominal pains.  No N/V/diarrhea noted.  No fevers or chills.  BP controlled on Nicardipine gtt this AM.       *********************** VITALS ******************************************  ICU Vital Signs Last 24 Hrs  T(C): 36.9 (22 Oct 2019 12:16), Max: 36.9 (22 Oct 2019 12:16)  T(F): 98.5 (22 Oct 2019 12:16), Max: 98.5 (22 Oct 2019 12:16)  HR: 109 (22 Oct 2019 12:16) (70 - 122)  BP: 142/89 (22 Oct 2019 12:16) (119/84 - 229/106)  BP(mean): 107 (22 Oct 2019 11:30) (86 - 150)  ABP: --  ABP(mean): --  RR: 22 (22 Oct 2019 12:16) (12 - 36)  SpO2: 97% (22 Oct 2019 12:16) (94% - 98%)      ******************************** PHYSICAL EXAM:**************************************************  GENERAL:  NAD    PSYCH: no agitation     HEENT:  EOMI     NERVOUS SYSTEM:  Alert & Oriented X3     PULMONARY:  Lungs clear     CARDIOVASCULAR:  RRR,  S1 S2 audible      ABDOMEN: Soft, some mild guarding but no rebound tenderness, mild distention, no obvious palpable masses    EXTREMITIES:  warm           LABS:                        16.0   15.96 )-----------( 316      ( 22 Oct 2019 04:40 )             46.1     10-22    132<L>  |  89<L>  |  7<L>  ----------------------------<  147<H>  4.2   |  25  |  0.5<L>    Ca    8.7      22 Oct 2019 04:40  Mg     1.6     10-22    TPro  6.8  /  Alb  3.9  /  TBili  0.5  /  DBili  x   /  AST  27  /  ALT  30  /  AlkPhos  73  10-22      Blood Gas Arterial, Lactate (10.22.19 @ 06:42)    Blood Gas Arterial, Lactate: 1.2 mmoL/L         Culture - Stool (collected 20 Oct 2019 21:00)  Source: .Stool Feces  Preliminary Report (22 Oct 2019 07:33):    No enteric gram negative rods isolated    No enteric pathogens to date: Final culture pending    GI PCR Panel, Stool (collected 20 Oct 2019 01:02)  Source: .Stool Feces  Final Report (21 Oct 2019 23:54):    GI PCR Results: NOT detected    *******Please Note:*******    GI panel PCR evaluates for:    Campylobacter, Plesiomonas shigelloides, Salmonella,    Vibrio, Yersinia enterocolitica, Enteroaggregative    Escherichia coli (EAEC), Enteropathogenic E.coli (EPEC),    Enterotoxigenic E. coli (ETEC) lt/st, Shiga-like    toxin-producing E. coli (STEC) stx1/stx2,    Shigella/ Enteroinvasive E. coli (EIEC), Cryptosporidium,    Cyclospora cayetanensis, Entamoeba histolytica,    Giardia lamblia, Adenovirus F 40/41, Astrovirus,    Norovirus GI/GII, Rotavirus A, Sapovirus       < from: CT Angio Abdomen and Pelvis w/ IV Cont (10.22.19 @ 10:13) >  IMPRESSION:    1.  Complete occlusion of the proximal SMA with reconstitution of flow in   mid SMA and patent diminutive branches.    2.  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 Major branches of the   celiac trunk are patent, but diminutive.    3.  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    4.  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    5.  Unchanged portal venous gas and small bowel pneumatosis.    6.  Other stable/nonacute findings as described above.         MEDICATIONS  (STANDING):  amiodarone    Tablet 200 milliGRAM(s) Oral two times a day  aspirin  chewable 81 milliGRAM(s) Oral daily  atorvastatin 80 milliGRAM(s) Oral at bedtime  cefepime   IVPB 2000 milliGRAM(s) IV Intermittent every 8 hours  cefepime   IVPB      chlorhexidine 4% Liquid 1 Application(s) Topical <User Schedule>  clopidogrel Tablet 75 milliGRAM(s) Oral daily  heparin  Injectable 5000 Unit(s) SubCutaneous every 12 hours  lactated ringers. 1000 milliLiter(s) (75 mL/Hr) IV Continuous <Continuous>  levothyroxine 200 MICROGram(s) Oral daily  lisinopril 20 milliGRAM(s) Oral daily  metoprolol tartrate 12.5 milliGRAM(s) Oral two times a day  metroNIDAZOLE  IVPB 500 milliGRAM(s) IV Intermittent every 8 hours  niCARdipine Infusion 5 mG/Hr (25 mL/Hr) IV Continuous <Continuous>  nystatin    Suspension 874183 Unit(s) Oral four times a day  pantoprazole    Tablet 40 milliGRAM(s) Oral before breakfast  sucralfate suspension 1 Gram(s) Oral two times a day    MEDICATIONS  (PRN):  morphine  - Injectable 2 milliGRAM(s) IV Push every 4 hours PRN Moderate Pain (4 - 6)  morphine  - Injectable 4 milliGRAM(s) IV Push every 4 hours PRN Severe Pain (7 - 10)  simethicone 80 milliGRAM(s) Chew four times a day PRN gas and bloating

## 2019-10-22 NOTE — BRIEF OPERATIVE NOTE - NSICDXBRIEFPOSTOP_GEN_ALL_CORE_FT
POST-OP DIAGNOSIS:  Mesenteric ischemia 22-Oct-2019 21:46:01  Jason Jones
POST-OP DIAGNOSIS:  Ischemic bowel disease 22-Oct-2019 21:51:06  Morro Smith  Mesenteric ischemia 22-Oct-2019 21:46:01  Jason Jones

## 2019-10-22 NOTE — CONSULT NOTE ADULT - ASSESSMENT
Assessment & Plan    84y Male s/p exploratory laparotomy, SBR with primary anastomosis, left external iliac to SMA bypass with PTFE for mesenteric ischemia    NEURO:     Intubated, sedated with precedex, following commands    Pain-controlled with fentanyl 25mcg q6    RESP:     Intubated on /10/40/5    No chronic dx, maintain sat>95%    AM CXR, AM ABG    ABG 7.41/40/379/25 O2 sat 100, lact 3.0    CARDS:     S/p NSTEMI 10/1/19 s/p staged PCI with DULCE x 2 in pLCx and OM2- holding ASA, plavix    Hx of HTN, CAD- holding home lisinopril, statin- on metop 2.5mg IV q6    Keep normotensive, maintain MAP>65    Wean levophed     CE x 3 pending-     EKG-     GI/NUTR:     S/p ex-lap, SBR, L external iliac to SMA bypass with PTFE    Diet, NPO    PPI    Senna    /RENAL:     Cr 0.5, rudolph    Monitor lytes, replete PRN    Lytes-Na 132 // K 4.2 // Phos -- //  Mag 1.6    HEME/ONC:     Monitor Hgb- 16.0 g/dL (15.6 g/dL)    HSQ    ID:     Afebrile, monitor for signs of infection    WBC Count: 11.3>18.3>15.96     ENDO:    Hypothyroidism- synthroid 100mcg IV      mg/dL, 161    LINES/DRAINS: PIV, R IJ, L Hermelinda Mack   DISPO: SICU Assessment & Plan    84y Male s/p exploratory laparotomy, SBR with primary anastomosis, left external iliac to SMA bypass with PTFE for mesenteric ischemia    NEURO:     Intubated, sedated with precedex, following commands    Pain-controlled with fentanyl 25mcg q6    RESP:     Intubated on /10/40/5    No chronic dx, maintain sat>95%    AM CXR, AM ABG    ABG 7.41/40/379/25 O2 sat 100, lact 3.0    CARDS:     S/p NSTEMI 10/1/19 s/p staged PCI with DULCE x 2 in pLCx and OM2- holding ASA, plavix    Hx of HTN, CAD- holding home lisinopril, statin- on metop 2.5mg IV q6    Keep normotensive, maintain MAP>65    Wean levophed     CE x 3 pending-     EKG-     GI/NUTR:     S/p ex-lap, SBR, L external iliac to SMA bypass with PTFE    Diet, NPO    PPI    Senna    /RENAL:     Cr 0.5, rudolph    Monitor lytes, replete PRN    Lytes-Na 132 // K 4.2 // Phos -- //  Mag 1.6    HEME/ONC:     Monitor Hgb- 16.0 g/dL (15.6 g/dL)    HSQ    ID:     Cefepime, flagyl    Afebrile, monitor for signs of infection    WBC Count: 11.3>18.3>15.96     ENDO:    Hypothyroidism- synthroid 100mcg IV      mg/dL, 161    LINES/DRAINS: PIV, R IJ, L Hermelinda Mack   DISPO: SICU

## 2019-10-22 NOTE — CHART NOTE - NSCHARTNOTEFT_GEN_A_CORE
PACU ANESTHESIA ADMISSION NOTE      Procedure: Small bowel resection with anastomosis  Exploratory laparotomy  Bypass of superior mesenteric artery    Post op diagnosis:  Ischemic bowel disease  Mesenteric ischemia      __x__  Intubated  TV:__500____       Rate: ___10___      FiO2: ___80%___    ____  Patent Airway    ____  Full return of protective reflexes    ____  Full recovery from anesthesia / back to baseline      Vitals:   T:   98        R:    10              BP:      119/57            Sat:     100%               P:  83      Mental Status:  ____ Awake   _____ Alert   _____ Drowsy   ____ Sedated    Nausea/Vomiting:  __x__ NO  ______Yes,   See Post - Op Orders          Pain Scale (0-10):  _____    Treatment: ____ None    ___x_ See Post - Op/PCA Orders    Post - Operative Fluids:   ____ Oral   ___x_ See Post - Op Orders    Plan: Discharge:   ____Home       _____Floor     ___x__Critical Care    _____  Other:_________________    Comments:  No anesthesia issues or complications noted.  Patient stable upon arrival to PACU. Report given to RN, signout given to ICU team. Discharge when criteria met.

## 2019-10-22 NOTE — CHART NOTE - NSCHARTNOTEFT_GEN_A_CORE
84 year history of HTN, hiatal hernia, hypercholesteremia, and CAD s/p multiple stents presents with abdominal pain, and diarrhea.  patient was admitted on Oct 1st for  NSTEMI and had 3 stents placed.   Readmitted after 5 days for colitis and discharged on oct 8 on po flagyl and cipro.  Has been doing relatively well until one day after discharge when diarrhea started again, associated with diffuse abdominal discomfort, called his gastroenterologists who recommended to stop Abx, diarrhea  improved after stopping the ABx but never resolved, complains of nausea with no vomiting, denies blood with stool, denies dysphagia, fever, chills. Donald chest pain or Shortness of breath, urinary symptoms, fever or chills. no recent travel   In Ed  T(C): 36.6 T(F): 97.8, HR: 79, BP: 191/91, RR: 18, SpO2: 97%     Interval History:  Patient admitted to , pt had normal lactate, normal LFTs, and slightly elevated WBC, pt followed up by GI and was started on IV hydration, Zofran prn for vomiting and out patient colonoscopy planned. Today diarrhea has improved but patient was complaining of severe abdominal pain associated with nausea and vomiting. Pt developed hypertensive urgency, and was given hydralazine 5mg IV once and labetalol 10mg IV twice without any noticeable drop in BP. D/w cardiology fellow on call who for patient to be transferred to CCU for antihypertensive drip administration.     KUB showed gas bowel pattern, CT abdomen pelvis showed possibility of small bowel infarction. Called surgery and d/w resident on call. Surgery will follow up.    Follow up:  - STAT CBC, CMP, lactate, Mg PT, PTT, INR  - pt NPO for possible surgery  - monitor BP  - start on nicardipine drip for HTN  - f/u surgery     VITALS:   T(F): 97.8  HR: 70  BP: 197/90  RR: 18  SpO2: 96%    LABS:                        15.6   18.32 )-----------( 336      ( 22 Oct 2019 00:00 )             44.2     10-    137  |  97<L>  |  12  ----------------------------<  106<H>  3.9   |  27  |  0.5<L>    Ca    8.4<L>      21 Oct 2019 08:09  Mg     1.6     10-21      PT/INR - ( 22 Oct 2019 00:00 )   PT: 14.00 sec;   INR: 1.22 ratio         PTT - ( 22 Oct 2019 00:00 )  PTT:29.3 sec  Urinalysis Basic - ( 21 Oct 2019 22:20 )    Color: Colorless / Appearance: Clear / S.009 / pH: x  Gluc: x / Ketone: Small  / Bili: Negative / Urobili: <2 mg/dL   Blood: x / Protein: Trace / Nitrite: Negative   Leuk Esterase: Negative / RBC: x / WBC x   Sq Epi: x / Non Sq Epi: x / Bacteria: x        Lactate, Blood: 1.4 mmol/L (10-22-19 @ 00:00)  Troponin T, Serum: 0.06 ng/mL <HH> (10-22-19 @ 00:00)      GI PCR Panel, Stool (collected 20 Oct 2019 21:00)  Source: .Stool Feces  Final Report (21 Oct 2019 09:41):    GI PCR Results: NOT detected    *******Please Note:*******    GI panel PCR evaluates for:    Campylobacter, Plesiomonas shigelloides, Salmonella,    Vibrio, Yersinia enterocolitica, Enteroaggregative    Escherichia coli (EAEC), Enteropathogenic E.coli (EPEC),    Enterotoxigenic E. coli (ETEC) lt/st, Shiga-like    toxin-producing E. coli (STEC) stx1/stx2,    Shigella/ Enteroinvasive E. coli (EIEC), Cryptosporidium,    Cyclospora cayetanensis, Entamoeba histolytica,    Giardia lamblia, Adenovirus F 40/41, Astrovirus,    Norovirus GI/GII, Rotavirus A, Sapovirus    GI PCR Panel, Stool (collected 20 Oct 2019 01:02)  Source: .Stool Feces  Final Report (21 Oct 2019 23:54):    GI PCR Results: NOT detected    *******Please Note:*******    GI panel PCR evaluates for:    Campylobacter, Plesiomonas shigelloides, Salmonella,    Vibrio, Yersinia enterocolitica, Enteroaggregative    Escherichia coli (EAEC), Enteropathogenic E.coli (EPEC),    Enterotoxigenic E. coli (ETEC) lt/st, Shiga-like    toxin-producing E. coli (STEC) stx1/stx2,    Shigella/ Enteroinvasive E. coli (EIEC), Cryptosporidium,    Cyclospora cayetanensis, Entamoeba histolytica,    Giardia lamblia, Adenovirus F 40/41, Astrovirus,    Norovirus GI/GII, Rotavirus A, Sapovirus      CARDIAC MARKERS ( 22 Oct 2019 00:00 )  x     / 0.06 ng/mL / x     / x     / x          RADIOLOGY:  < from: CT Abdomen and Pelvis No Cont (10.21.19 @ 22:40) >      Portal venous gas and pneumatosis of the small bowel. Findings consistent   with bowel ischemia. Recommend surgical consultation    < end of copied text >      Physical Exam:  General: Alert and conversive, stating moderate abdominal pain  Cardiac: RRR s1s2, No m/r/g  Respiratiory: CTA, No w/r/r  Abdomen: Mild tenderness in upper quadrants, no rebound, No guarding. Bowel sounds present, Mild CVA  Neuro: Non focal        Assessment and Plan:   	  84 year history of HTN, hiatal hernia, hypercholesteremia, and CAD s/p multiple stents presents with abdominal pain and diarrhea.         # Abdominal pain and Subacute diarrhea possibly secondary to small bowel ischemia: Had colitis early October, discharged Oct 8 on PO cipro and flagyl, diarrhea worsened, outpt GI recommended stopping abx, he stopped abx and the diarrhea improved but did not resolve, patient re-presented for care 10/19. Lactate neg. Patient improving off abx, tolerating diet. GI consulted.  - GI PCR, Cdiff were negative last admission, this admission C Diff indeterminate--will repeat  if diarrhea recurs   - TSH wnl  - Pending studies: GI PCR, Stool osmolarity/fat, O&P, culture  - Zofran, protonix  - Nystatin oral per GI  - Repeat colonoscopy as outpatient   - STAT KUB showed bowel air gas pattern  - CT a/p showed pneumostasis, pneumoperitoneum, portal venous air and calcification of SMA  - d/w cardiology as pt hypertensive, recommended transfer to CCU and starting pt on nicardipine drip  - STAT CBC, CMP, lactate, Mg, trop, PT, PTT, INR  - d/w surgery, will follow up    # HTN  - s/p hydralazine 5mg once and labetalol 10mg x twice  - will start nicardipine drip  - c/w metoprolol and lisinopril     # CAD   # H/O ACS and recent stent       #) h/o V tach  -oral amio and metoprolol  - c/w aspirin/ Plavix and statin     # Hypothyroidism - c/e levothyroxine     #Anemia. Hb trending down, no evidence of bleed  -GI Follow up outpatient   -Monitor    # DVT PPX : subcutaneous heparin   # Diet: NPO  # full code   #Dispo: likely back to home once diarrhea and abdominal pain improved

## 2019-10-22 NOTE — CONSULT NOTE ADULT - ATTENDING COMMENTS
84 year old with 3 week history of abdominal pain. Pt had acute worsening over the past few days. CTA was done which showed pneumatosis and portal venous air. This is concerning for ischemic bowel. 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     Will plan for exploration in conjunction with general surgery.    DW family
85 yo male patient with multiple medical problems, recently with an MI, s/p 3 stents.  Abdominal pain for 2-3 weeks, on and off.  Treated for enteritis and colitis.  No melena or BRBPR.  c/o increased abdominal pian. CT abd showed a proximal loop of SB with pneumatosis and portal vein air.  Abdomen mildly distended, BS present, no guarding or RT. Mild tenderness to deep palpation.  WC 18 down to 15.  Lactate normal.    a/p:  Mesenteric ischemia, chronic with an acute process involved.  Conservative management was decided last night, today with more pain.  Diagnostic laparoscopy, possible bowel resection, possible ostomy creation.  Dr. Layton, ordered a CTA to assess mesenteric vessels and bowel.  Patient and family not sure if they want surgery at his point.
I personally provided over 30 minutes of direct critical care to this patient.  I examined the patient with the PA and resident and discussed my plan with them.    this patient is extremely high risk for cardiovascular deterioration after this operation considering recent mi and drug eluting stents.  he needs close cardiac monitoring and to restart anti-platelet agents when surgically feasible.  he can not be extubated at this time

## 2019-10-22 NOTE — PROGRESS NOTE ADULT - ASSESSMENT
Assessment and Plan:    84 year history of HTN, hiatal hernia, hypercholesteremia, and CAD s/p multiple stents presents with abdominal pain and diarrhea.     # Abdominal pain and Subacute diarrhea secondary to mesenteric ischemia, unchanged  - pt and family agreed to proceed with surgery  - CT w/o contrast, CT w/ contrast results as above  - Lactate neg.   - GI PCR, Cdiff were negative last admission, this admission C Diff indeterminate    - Pending studies: GI PCR, Stool osmolarity/fat, O&P, culture  - Zofran, protonix  - Nystatin oral per GI  - Repeat colonoscopy as outpatient   - STAT KUB showed bowel air gas pattern  - CT a/p showed pneumostasis, pneumoperitoneum, portal venous air and calcification of SMA  - d/w cardiology as pt hypertensive, recommended transfer to CCU and starting pt on nicardipine drip  - STAT CBC, CMP, lactate, Mg, trop, PT, PTT, INR  - d/w surgery, will follow up    # HTN  - s/p hydralazine 5mg once and labetalol 10mg x twice  - will start nicardipine drip  - c/w metoprolol and lisinopril     # CAD   # H/O ACS and recent stent     #) h/o V tach  -oral amio and metoprolol  - c/w aspirin/ Plavix and statin     # Hypothyroidism - c/e levothyroxine     #Anemia. Hb trending down, no evidence of bleed  -GI Follow up outpatient   -Monitor    # DVT PPX : subcutaneous heparin   # Diet: NPO  # full code   #Dispo: likely back to home once diarrhea and abdominal pain improved. Assessment and Plan:    84 year history of HTN, hiatal hernia, hypercholesteremia, and CAD s/p multiple stents presents with abdominal pain and diarrhea.     # Abdominal pain and Subacute diarrhea secondary to mesenteric ischemia  - pt and family agreed to proceed with surgery  - KUB CT w/o contrast, CT w/ contrast results as above   - AM lactate 1.7 (previous lactate also low)  - GI PCR, Cdiff were negative last admission, this admission C Diff indeterminate  - Pending studies: GI PCR, Stool osmolarity/fat, O&P, culture  - c/w Zofran, protonix  - Nystatin oral per GI  - Repeat colonoscopy as outpatient   - STAT KUB showed bowel air gas pattern    # HTN  - s/p hydralazine 5mg once and labetalol 10mg x twice on the floors   - currently on nicardipine drip in the unit  - SBPs in the 160s, DBPs 70s  - c/w metoprolol and lisinopril     # CAD   # H/O ACS and recent stent  - c/w aspirin/ Plavix and statin      #) h/o V tach  -oral amio and metoprolol  - c/w aspirin/ Plavix and statin     # Hypothyroidism   - c/w levothyroxine     #Anemia. Hb trending down, no evidence of bleed  -GI Follow up outpatient   -Monitor    # DVT PPX : SubQ heparin  # Diet: NPO  # full code   #Dispo: planned for OR

## 2019-10-22 NOTE — BRIEF OPERATIVE NOTE - NSICDXBRIEFPROCEDURE_GEN_ALL_CORE_FT
PROCEDURES:  Bypass of superior mesenteric artery 22-Oct-2019 21:45:25  Jason Jones
PROCEDURES:  Small bowel resection with anastomosis 22-Oct-2019 21:50:15  Morro Smith  Exploratory laparotomy 22-Oct-2019 21:49:46  Morro Smith

## 2019-10-23 NOTE — PROGRESS NOTE ADULT - ATTENDING COMMENTS
I personally provided over 30 minutes of direct critical care to this patient.  I examined the patient with the PA and resident and discussed my plan with them.    this patient remains unstable and is not ready for extubation at this time  sbt performed, good results but not physiologically ready for extubation  he remains on pressors, with mildly elevated troponin, but plateau and still downtrended from peak earlier in the month, no new changes on ekg  will need to restart asa and plavix as soon as vascular team deems acceptable, poss suppository  npo for gi surgery, continue ngt top lcs

## 2019-10-23 NOTE — PROGRESS NOTE ADULT - ASSESSMENT
Patient is a 84y old Male s/p exploratory laparotomy with small bowel resection and anastomosis and a left External Iliac to SMA bypass with PTFE    Plan   - pulse checks   - SBT   - ICU Patient is a 84y old Male s/p exploratory laparotomy with small bowel resection and anastomosis and a left External Iliac to SMA bypass with PTFE    Plan   - pulse checks   - SBT   - may resume ASA and Plavix as per cards request  - ICU management

## 2019-10-23 NOTE — PROGRESS NOTE ADULT - ASSESSMENT
Assessment:  Patient is a 84y old Male s/p exploratory laparotomy with small bowel resection and anastomosis and a left External Iliac to SMA bypass with PTFE    Plan:  - wean off levo  - SBT this AM  - hemodynamic monitoring

## 2019-10-23 NOTE — PROGRESS NOTE ADULT - SUBJECTIVE AND OBJECTIVE BOX
GENERAL SURGERY PROGRESS NOTE     LAKSHMI JUDD  84y  Male  Hospital day: 5d  POD: 1d  Procedure: Small bowel resection with anastomosis  Exploratory laparotomy  Bypass of superior mesenteric artery    OVERNIGHT EVENTS: on .2 levo, getting nicom, intubated, sedated, elevated trops (.05>.06)    T(F): 98.2 (10-23-19 @ 04:00), Max: 98.5 (10-22-19 @ 12:16)  HR: 62 (10-23-19 @ 04:00) (54 - 126)  BP: 106/53 (10-23-19 @ 00:00) (102/54 - 169/56)  ABP: 104/50 (10-23-19 @ 04:00) (92/48 - 126/54)  ABP(mean): 68 (10-23-19 @ 04:00) (62 - 76)  RR: 14 (10-23-19 @ 04:00) (10 - 30)  SpO2: 99% (10-23-19 @ 04:00) (94% - 100%)    DIET/FLUIDS: sodium chloride 0.9% Bolus 250 milliLiter(s) IV Bolus once  sodium chloride 0.9%. 1000 milliLiter(s) IV Continuous <Continuous>    URINE:    Indwelling Urethral Catheter:     Connect To:  Straight Drainage/[place]    Indication:  Perioperative use for Selected Surgical Procedures    Additional Instructions:  DO NOT REMOVE without a discontinuation order (10-22-19 @ 22:21)    GI proph:  pantoprazole  Injectable 40 milliGRAM(s) IV Push daily    AC/ proph: heparin  Injectable 5000 Unit(s) SubCutaneous every 8 hours    ABx: cefepime   IVPB 2000 milliGRAM(s) IV Intermittent every 8 hours  cefepime   IVPB      metroNIDAZOLE  IVPB 500 milliGRAM(s) IV Intermittent every 8 hours    Physical Exam  General: NAD AAOx3   Cards: RRR S1S2  Resp: CTAB  Abdomen: midline dressing dry and intact, abdomen soft, non distended   : rudolph   Ext: b/l feet cool but equal in temperature. No change in skin colour, no rigidity   Pulses:   Left: DP and PT dopplerable   Right: DP and PT dopplerable    Labs:  CAPILLARY BLOOD GLUCOSE  POCT Blood Glucose.: 136 mg/dL (22 Oct 2019 22:16)  POCT Blood Glucose.: 161 mg/dL (22 Oct 2019 12:09)                        14.0   13.44 )-----------( 275      ( 23 Oct 2019 04:10 )             40.1       Auto Neutrophil %: 83.4 % (10-23-19 @ 04:10)  Auto Immature Granulocyte %: 0.7 % (10-23-19 @ 04:10)  Auto Neutrophil %: 82.3 % (10-22-19 @ 22:54)  Band Neutrophils %: 8.8 % (10-22-19 @ 22:54)    10-23    136  |  102  |  14  ----------------------------<  155<H>  4.2   |  23  |  0.7    Calcium, Total Serum: 7.8 mg/dL (10-23-19 @ 04:10)    LFTs:             6.8  | 0.5  | 27       ------------------[73      ( 22 Oct 2019 00:00 )  3.9  | x    | 30          Blood Gas Arterial, Lactate: 1.0 mmoL/L (10-23-19 @ 04:33)  Blood Gas Arterial, Lactate: 3.0 mmoL/L (10-22-19 @ 22:21)  Blood Gas Arterial, Lactate: 2.6 mmoL/L (10-22-19 @ 20:03)  Blood Gas Arterial, Lactate: 1.2 mmoL/L (10-22-19 @ 06:42)  Lactate, Blood: 1.7 mmol/L (10-22-19 @ 04:40)  Blood Gas Arterial, Lactate: 1.2 mmoL/L (10-22-19 @ 01:38)  Lactate, Blood: 1.4 mmol/L (10-22-19 @ 00:00)    ABG - ( 23 Oct 2019 04:33 )  pH: 7.44  /  pCO2: 37    /  pO2: 195   / HCO3: 25    / Base Excess: 1.4   /  SaO2: 100       ABG - ( 22 Oct 2019 22:21 )  pH: 7.41  /  pCO2: 40    /  pO2: 379   / HCO3: 25    / Base Excess: 0.2   /  SaO2: 100       ABG - ( 22 Oct 2019 20:03 )  pH: 7.49  /  pCO2: 33    /  pO2: 296   / HCO3: 25    / Base Excess: 2.3   /  SaO2: 99        Coags:     14.00  ----< 1.22    ( 22 Oct 2019 00:00 )     29.3      CARDIAC MARKERS ( 23 Oct 2019 04:10 )  x     / 0.06 ng/mL / x     / x     / x      CARDIAC MARKERS ( 22 Oct 2019 22:54 )  x     / 0.05 ng/mL / 58 U/L / x     / 4.7 ng/mL  CARDIAC MARKERS ( 22 Oct 2019 00:00 )  x     / 0.06 ng/mL / x     / x     / x        Serum Pro-Brain Natriuretic Peptide: 2374 pg/mL (10-19-19 @ 16:45)    Urinalysis Basic - ( 21 Oct 2019 22:20 )    Color: Colorless / Appearance: Clear / S.009 / pH: x  Gluc: x / Ketone: Small  / Bili: Negative / Urobili: <2 mg/dL   Blood: x / Protein: Trace / Nitrite: Negative   Leuk Esterase: Negative / RBC: x / WBC x   Sq Epi: x / Non Sq Epi: x / Bacteria: x    GI PCR Panel, Stool (collected 20 Oct 2019 21:00)  Source: .Stool Feces  Final Report (21 Oct 2019 09:41):    GI PCR Results: NOT detected    *******Please Note:*******    GI panel PCR evaluates for:    Campylobacter, Plesiomonas shigelloides, Salmonella,    Vibrio, Yersinia enterocolitica, Enteroaggregative    Escherichia coli (EAEC), Enteropathogenic E.coli (EPEC),    Enterotoxigenic E. coli (ETEC) lt/st, Shiga-like    toxin-producing E. coli (STEC) stx1/stx2,    Shigella/ Enteroinvasive E. coli (EIEC), Cryptosporidium,    Cyclospora cayetanensis, Entamoeba histolytica,    Giardia lamblia, Adenovirus F 40/41, Astrovirus,    Norovirus GI/GII, Rotavirus A, Sapovirus    Culture - Stool (collected 20 Oct 2019 21:00)  Source: .Stool Feces  Preliminary Report (22 Oct 2019 07:33):    No enteric gram negative rods isolated    No enteric pathogens to date: Final culture pending

## 2019-10-23 NOTE — PROGRESS NOTE ADULT - ASSESSMENT
impression:  Acute mesenteric ischemia s/p small bowel resection and SMA bypass  Recent NSTEMI s/p PCI    Plan:  discussed with surgery team- ok to give meds through NGT  cont ASA, Plavix, statin through NGT  start metoprolol after improvement in BP impression:  Acute mesenteric ischemia s/p small bowel resection and SMA bypass  Recent NSTEMI s/p PCI    Plan:  discussed with surgery team- ok to give meds through NGT  cont ASA, Plavix, statin through NGT  start metoprolol after improvement in BP    agree

## 2019-10-23 NOTE — PROGRESS NOTE ADULT - ATTENDING COMMENTS
Stable and afebrile.  on levo. Adequate UO.  Wound intact.  NGT minimal.  Hb stable.    a/P:  progressing well.  NPO, IVF, NGT.  Wean off vet as per SICU.  ASA and PLavix: start today.

## 2019-10-23 NOTE — PROGRESS NOTE ADULT - SUBJECTIVE AND OBJECTIVE BOX
LAKSHMI JUDD  84y  Male    Allergy: iodinated radiocontrast agents (Hives)      Patient is a 84y old  Male who presents with a chief complaint of diarrhea (23 Oct 2019 05:53)      INTERVAL HPI/OVERNIGHT EVENTS:  seen and examined pt at bedside. no overnight events. currently in ICU, still intubated.     REVIEW OF SYSTEMS:  All other review of systems negative    PAST MEDICAL & SURGICAL HISTORY:  Neuropathy  TIA (transient ischemic attack)  Left carotid stenosis  Lyme disease  Hypothyroidism  Coronary artery disease  No significant past surgical history      Vital Signs Last 24 Hrs  T(C): 36.8 (23 Oct 2019 08:00), Max: 36.9 (22 Oct 2019 23:30)  T(F): 98.3 (23 Oct 2019 08:00), Max: 98.5 (23 Oct 2019 00:00)  HR: 64 (23 Oct 2019 15:37) (54 - 88)  BP: 117/50 (23 Oct 2019 13:30) (102/54 - 144/57)  BP(mean): 69 (23 Oct 2019 13:30) (62 - 97)  RR: 16 (23 Oct 2019 13:30) (10 - 22)  SpO2: 100% (23 Oct 2019 15:37) (99% - 100%)    I&O's Summary    22 Oct 2019 07:01  -  23 Oct 2019 07:00  --------------------------------------------------------  IN: 3350 mL / OUT: 920 mL / NET: 2430 mL    23 Oct 2019 07:01  -  23 Oct 2019 17:08  --------------------------------------------------------  IN: 1545 mL / OUT: 135 mL / NET: 1410 mL        Home Medications:  acetaminophen 325 mg oral tablet: 2 tab(s) orally every 6 hours, As needed, Moderate Pain (4 - 6) (10 Oct 2019 09:54)  [place] Thyroid 120 mg oral tablet: 1 tab(s) orally once a day (26 Sep 2019 11:56)  aspirin 81 mg oral tablet, chewable: 1 tab(s) orally once a day (26 Sep 2019 11:56)  clopidogrel 75 mg oral tablet: 1 tab(s) orally once a day (02 Oct 2019 13:26)  Crestor 20 mg oral tablet: 1 tab(s) orally once a day (at bedtime) (26 Sep 2019 11:55)  levothyroxine 200 mcg (0.2 mg) oral tablet: 1 tab(s) orally once a day (10 Oct 2019 09:54)  lisinopril 20 mg oral tablet: 1 tab(s) orally once a day (26 Sep 2019 11:55)  sucralfate 1 g oral tablet: 1 tab(s) orally every 12 hours (10 Oct 2019 09:54)      PHYSICAL EXAM:  GENERAL: NAD  HEENT: no pallor/icterus  NECK: supple  PSYCH: no agitation, baseline mentation  NERVOUS SYSTEM:  Alert & Oriented X3, no focal deficits  PULMONARY: CTA b/l ; No rales, rhonchi, wheezing, or rubs  CARDIOVASCULAR: Regular rate and rhythm; No murmurs, rubs, or gallops  GI: Soft, Nontender, Nondistended; Bowel sounds present  EXTREMITIES:  2+ Peripheral Pulses, No clubbing, cyanosis, or edema  SKIN: No rashes      LABS                        14.0   13.44 )-----------( 275      ( 23 Oct 2019 04:10 )             40.1     10-23    136  |  102  |  14  ----------------------------<  155<H>  4.2   |  23  |  0.7    Ca    7.8<L>      23 Oct 2019 04:10  Phos  3.5     10-23  Mg     2.2     10-23    TPro  6.8  /  Alb  3.9  /  TBili  0.5  /  DBili  x   /  AST  27  /  ALT  30  /  AlkPhos  73  10-22    CARDIAC MARKERS ( 23 Oct 2019 11:26 )  x     / 0.06 ng/mL / x     / x     / x      CARDIAC MARKERS ( 23 Oct 2019 04:10 )  x     / 0.06 ng/mL / x     / x     / x      CARDIAC MARKERS ( 22 Oct 2019 22:54 )  x     / 0.05 ng/mL / 58 U/L / x     / 4.7 ng/mL  CARDIAC MARKERS ( 22 Oct 2019 00:00 )  x     / 0.06 ng/mL / x     / x     / x            GI PCR Panel, Stool (collected 20 Oct 2019 21:00)  Source: .Stool Feces  Final Report (21 Oct 2019 09:41):    GI PCR Results: NOT detected    *******Please Note:*******    GI panel PCR evaluates for:    Campylobacter, Plesiomonas shigelloides, Salmonella,    Vibrio, Yersinia enterocolitica, Enteroaggregative    Escherichia coli (EAEC), Enteropathogenic E.coli (EPEC),    Enterotoxigenic E. coli (ETEC) lt/st, Shiga-like    toxin-producing E. coli (STEC) stx1/stx2,    Shigella/ Enteroinvasive E. coli (EIEC), Cryptosporidium,    Cyclospora cayetanensis, Entamoeba histolytica,    Giardia lamblia, Adenovirus F 40/41, Astrovirus,    Norovirus GI/GII, Rotavirus A, Sapovirus    Culture - Stool (collected 20 Oct 2019 21:00)  Source: .Stool Feces  Final Report (23 Oct 2019 08:13):    No enteric pathogens isolated.    (Stool culture examined for Salmonella,    Shigella, Campylobacter, Aeromonas, Plesiomonas,    Vibrio, E.coli O157 and Yersinia)      PT/INR - ( 22 Oct 2019 00:00 )   PT: 14.00 sec;   INR: 1.22 ratio         PTT - ( 22 Oct 2019 00:00 )  PTT:29.3 sec    RADIOLOGY & ADDITIONAL TESTS:      MEDICATIONS  (STANDING):  atorvastatin 80 milliGRAM(s) Oral at bedtime  cefepime   IVPB 2000 milliGRAM(s) IV Intermittent every 8 hours  cefepime   IVPB      chlorhexidine 0.12% Liquid 15 milliLiter(s) Oral Mucosa two times a day  chlorhexidine 4% Liquid 1 Application(s) Topical <User Schedule>  clopidogrel Tablet 75 milliGRAM(s) Oral daily  dexMEDEtomidine Infusion 0.01 MICROgram(s)/kG/Hr (0.205 mL/Hr) IV Continuous <Continuous>  heparin  Injectable 5000 Unit(s) SubCutaneous every 8 hours  lactated ringers Bolus 250 milliLiter(s) IV Bolus once  lactated ringers Bolus 500 milliLiter(s) IV Bolus once  levothyroxine Injectable 100 MICROGram(s) IV Push at bedtime  metroNIDAZOLE  IVPB 500 milliGRAM(s) IV Intermittent every 8 hours  norepinephrine Infusion 0.05 MICROgram(s)/kG/Min (7.697 mL/Hr) IV Continuous <Continuous>  pantoprazole  Injectable 40 milliGRAM(s) IV Push daily  sodium chloride 0.9%. 1000 milliLiter(s) (125 mL/Hr) IV Continuous <Continuous>    MEDICATIONS  (PRN):  fentaNYL    Injectable 25 MICROGram(s) IV Push every 6 hours PRN Moderate Pain (4 - 6)

## 2019-10-23 NOTE — PROGRESS NOTE ADULT - ASSESSMENT
Assessment & Plan    84y Male s/p exploratory laparotomy, SBR with primary anastomosis, left external iliac to SMA bypass with PTFE for mesenteric ischemia    NEURO:     Intubated, sedated with precedex, following commands    Pain-controlled with fentanyl 25mcg q6    RESP:     Intubated on /10/40/5    No chronic dx, maintain sat>95%    AM CXR, AM ABG    ABG 7.41/40/379/25 O2 sat 100, lact 3.0    CARDS:     S/p NSTEMI 10/1/19 s/p staged PCI with DULCE x 2 in pLCx and OM2- holding ASA, plavix    Hx of HTN, CAD- holding home lisinopril, statin- on metop 2.5mg IV q6    Keep normotensive, maintain MAP>65    Wean levophed     CE x 3 pending- trop 0.05>    EKG-     GI/NUTR:     S/p ex-lap, SBR, L external iliac to SMA bypass with PTFE    Diet, NPO    PPI    /RENAL:     Cr 0.5, rudolph    Monitor lytes, replete PRN    Lytes-Na 133 // K 4.3 // Phos 3.4 //  Mag 1.6    HEME/ONC:     Monitor Hgb- 15.6>16.0>14.6 (postop)    HSQ    ID:     Cefepime, flagyl    Afebrile, monitor for signs of infection    WBC Count: 11.3>18.3>15.96>15.01 (postop)    ENDO:    Hypothyroidism- synthroid 100mcg IV     , 161, 136    LINES/DRAINS: PIV, R IJ, L Hermelinda Mack   DISPO: SICU Assessment & Plan    84y Male s/p exploratory laparotomy, SBR with primary anastomosis, left external iliac to SMA bypass with PTFE for mesenteric ischemia    NEURO:     Intubated, sedated with precedex, following commands    Pain-controlled with fentanyl 25mcg q6    RESP:     Intubated on /10/40/5    No chronic dx, maintain sat>95%    AM CXR, AM ABG    ABG 7.41/40/379/25 O2 sat 100, lact 3.0    ABG     Blood Gas Profile - Arterial (10.23.19 @ 04:33)    pH, Arterial: 7.44    pCO2, Arterial: 37 mmHg    pO2, Arterial: 195 mmHg    HCO3, Arterial: 25 mmoL/L    Base Excess, Arterial: 1.4 mmoL/L    Oxygen Saturation, Arterial: 100 %    FIO2, Arterial: 40    Lactate 1    CARDS:     S/p NSTEMI 10/1/19 s/p staged PCI with DULCE x 2 in pLCx and OM2- holding ASA, plavix    Hx of HTN, CAD- holding home lisinopril, statin- on metop 2.5mg IV q6    Keep normotensive, maintain MAP>65    Wean levophed     CE x 3 pending- trop 0.05>0.06    GI/NUTR:     S/p ex-lap, SBR, L external iliac to SMA bypass with PTFE    Diet, NPO    PPI    /RENAL:     Cr 0.5, rudolph 25-30cc/hr    Monitor lytes, replete PRN  Lytes-Na 136 // K 4.2 // Phos 3.5 //  Mag 2.2    HEME/ONC:     Monitor Hgb- 15.6>16.0>14.6 (postop)>14.0    HSQ    ID:     Cefepime, flagyl    Afebrile, monitor for signs of infection    WBC Count: 11.3>18.3>15.96>15.01 (postop)>13    ENDO:    Hypothyroidism- synthroid 100mcg IV     , 161, 136    LINES/DRAINS: PIV, R IJ, L Hermelinda Mack   DISPO: SICU

## 2019-10-23 NOTE — PROGRESS NOTE ADULT - SUBJECTIVE AND OBJECTIVE BOX
=======================================  LAKSHMI JUDD  MRN-[id number]    HPI:  84y Male with PMH significant for hypothyroidism, HTN, CAD, recent admission for NSTEMI 10/1/19 s/p staged PCI with DULCE x 2 in pLCx and OM2 (on ASA, plavix), presents this admission with continued abdominal pain for 2-3 weeks. Patient had been evaluated on 10/8 for enteritis and colitis and sent home with PO antibiotics (cipro, flagyl), however returned to ED for increasing abdominal discomfort, diffuse, nausea but no emesis, diarrhea. Denied melena or BRBPR. WBC 12 on presentation, lact 1.6. General surgery and vascular surgery consulted after CT scan was performed showing SMA occlusion, a proximal loop of SB with pneumatosis and portal venous gas-- findings concerning for ischemic bowel. Patient was taken to the OR for exploratory laparotomy, SBR with primary anastomosis, left external iliac to SMA bypass with PTFE graft for mesenteric ischemia (with Dr. Layton and Dr. Nieves).     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 Abdomen and fascia closed, no drains.     PAST MEDICAL & SURGICAL HISTORY:  Neuropathy  TIA (transient ischemic attack)  Left carotid stenosis  Lyme disease  Hypothyroidism  Coronary artery disease  No significant past surgical history    Home Meds:   Home Medications:  acetaminophen 325 mg oral tablet: 2 tab(s) orally every 6 hours, As needed, Moderate Pain (4 - 6) (10 Oct 2019 09:54)  [place] Thyroid 120 mg oral tablet: 1 tab(s) orally once a day (26 Sep 2019 11:56)  aspirin 81 mg oral tablet, chewable: 1 tab(s) orally once a day (26 Sep 2019 11:56)  clopidogrel 75 mg oral tablet: 1 tab(s) orally once a day (02 Oct 2019 13:26)  Crestor 20 mg oral tablet: 1 tab(s) orally once a day (at bedtime) (26 Sep 2019 11:55)  levothyroxine 200 mcg (0.2 mg) oral tablet: 1 tab(s) orally once a day (10 Oct 2019 09:54)  lisinopril 20 mg oral tablet: 1 tab(s) orally once a day (26 Sep 2019 11:55)  sucralfate 1 g oral tablet: 1 tab(s) orally every 12 hours (10 Oct 2019 09:54)    Allergies  iodinated radiocontrast agents (Hives)    Advanced Directives: Full Code    CURRENT MEDICATIONS:   cefepime   IVPB 2000 milliGRAM(s) IV Intermittent once  cefepime   IVPB      chlorhexidine 0.12% Liquid 15 milliLiter(s) Oral Mucosa every 12 hours  dexMEDEtomidine Infusion 0.01 MICROgram(s)/kG/Hr (0.205 mL/Hr) IV Continuous <Continuous>  dexMEDEtomidine Infusion 0.2 MICROgram(s)/kG/Hr (4.105 mL/Hr) IV Continuous <Continuous>  HYDROmorphone  Injectable 0.5 milliGRAM(s) IV Push every 10 minutes PRN  HYDROmorphone  Injectable 1 milliGRAM(s) IV Push every 10 minutes PRN  lactated ringers. 1000 milliLiter(s) (100 mL/Hr) IV Continuous <Continuous>  levothyroxine Injectable 100 MICROGram(s) IV Push at bedtime  meperidine     Injectable 12.5 milliGRAM(s) IV Push every 10 minutes PRN  metoprolol tartrate Injectable 2.5 milliGRAM(s) IV Push every 6 hours  metroNIDAZOLE  IVPB 500 milliGRAM(s) IV Intermittent every 8 hours  ondansetron Injectable 4 milliGRAM(s) IV Push every 6 hours PRN  sodium chloride 0.9%. 1000 milliLiter(s) (125 mL/Hr) IV Continuous <Continuous>    VITAL SIGNS, INS/OUTS (Last 24hours):    ICU Vital Signs Last 24 Hrs  T(C): 36.9 (22 Oct 2019 15:14), Max: 36.9 (22 Oct 2019 12:16)  T(F): 98.5 (22 Oct 2019 12:16), Max: 98.5 (22 Oct 2019 12:16)  HR: 83 (22 Oct 2019 22:12) (70 - 126)  BP: 151/72 (22 Oct 2019 15:14) (119/84 - 210/86)  BP(mean): 95 (22 Oct 2019 15:14) (86 - 150)  RR: 21 (22 Oct 2019 15:14) (12 - 36)  SpO2: 100% (22 Oct 2019 22:12) (94% - 100%)    I&O's Summary    21 Oct 2019 07:  -  22 Oct 2019 07:00  --------------------------------------------------------  IN: 1012.5 mL / OUT: 350 mL / NET: 662.5 mL    22 Oct 2019 07:  -  22 Oct 2019 22:32  --------------------------------------------------------  IN: 662.5 mL / OUT: 650 mL / NET: 12.5 mL    Height (cm): 325.12 (10-22-19)  Weight (kg): 82.1 (10-22-19)  BMI (kg/m2): 7.8 (10-22-19)  BSA (m2): 3.1 (10-22-19)    Physical Exam:  ---------------------------------------------------------------------------------------  RASS:   GCS:    E/M/V  Exam: intubated and sedated, follows commands, awake, no focal deficits    RESPIRATORY:  Intubated  Lungs clear to auscultation b/l, Normal expansion/effort  Mechanical Ventilation: Mode: AC/ CMV (Assist Control/ Continuous Mandatory Ventilation)  RR (machine): 10  TV (machine): 500  FiO2: 80  PEEP: 5  ITime: 1  MAP: 19  PIP: 23    CARDIOVASCULAR:   S1/S2.  RRR  No peripheral edema    GASTROINTESTINAL:  Abdomen soft, non-tender, non-distended  Incision site CDI    MUSCULOSKELETAL:  Extremities warm, pink, well-perfused.  Palpable/Doppler pulses signals      DERM:  No skin breakdown     :   Exam: Shen catheter in place.     Tubes/Lines/Drains   ----------------------------------------------------------------------------------------------------------  [X] Peripheral IV  [X] Central Venous Line    R        IJ          [X] Arterial Line		   R       Radial  [X] Urinary Catheter Shen                                        LABS  --------------------------------------------------------------------------------------  CAPILLARY BLOOD GLUCOSE      POCT Blood Glucose.: 136 mg/dL (22 Oct 2019 22:16)  POCT Blood Glucose.: 161 mg/dL (22 Oct 2019 12:09)  POCT Blood Glucose.: 176 mg/dL (22 Oct 2019 00:14)                          16.0   15.96 )-----------( 316      ( 22 Oct 2019 04:40 )             46.1         10-22    132<L>  |  89<L>  |  7<L>  ----------------------------<  147<H>  4.2   |  25  |  0.5<L>      Calcium, Total Serum: 8.7 mg/dL (10-22-19 @ 04:40)      LFTs:             6.8  | 0.5  | 27       ------------------[73      ( 22 Oct 2019 00:00 )  3.9  | x    | 30             ABG - ( 22 Oct 2019 20:03 )  pH: 7.49  /  pCO2: 33    /  pO2: 296   / HCO3: 25    / Base Excess: 2.3   /  SaO2: 99        ABG - ( 22 Oct 2019 06:42 )  pH: 7.50  /  pCO2: 41    /  pO2: 73    / HCO3: 32    / Base Excess: 7.6   /  SaO2: 95        ABG - ( 22 Oct 2019 01:38 )  pH: 7.53  /  pCO2: 38    /  pO2: 98    / HCO3: 32    / Base Excess: 8.8   /  SaO2: 98        Coags:     14.00  ----< 1.22    ( 22 Oct 2019 00:00 )     29.3        CARDIAC MARKERS ( 22 Oct 2019 00:00 )  x     / 0.06 ng/mL / x     / x     / x          Urinalysis Basic - ( 21 Oct 2019 22:20 )    Color: Colorless / Appearance: Clear / S.009 / pH: x  Gluc: x / Ketone: Small  / Bili: Negative / Urobili: <2 mg/dL   Blood: x / Protein: Trace / Nitrite: Negative   Leuk Esterase: Negative / RBC: x / WBC x   Sq Epi: x / Non Sq Epi: x / Bacteria: x        GI PCR Panel, Stool (collected 20 Oct 2019 21:00)  Source: .Stool Feces  Final Report (21 Oct 2019 09:41):    GI PCR Results: NOT detected    *******Please Note:*******    GI panel PCR evaluates for:    Campylobacter, Plesiomonas shigelloides, Salmonella,    Vibrio, Yersinia enterocolitica, Enteroaggregative    Escherichia coli (EAEC), Enteropathogenic E.coli (EPEC),    Enterotoxigenic E. coli (ETEC) lt/st, Shiga-like    toxin-producing E. coli (STEC) stx1/stx2,    Shigella/ Enteroinvasive E. coli (EIEC), Cryptosporidium,    Cyclospora cayetanensis, Entamoeba histolytica,    Giardia lamblia, Adenovirus F 40/41, Astrovirus,    Norovirus GI/GII, Rotavirus A, Sapovirus    Culture - Stool (collected 20 Oct 2019 21:00)  Source: .Stool Feces  Preliminary Report (22 Oct 2019 07:33):    No enteric gram negative rods isolated    No enteric pathogens to date: Final culture pending    GI PCR Panel, Stool (collected 20 Oct 2019 01:02)  Source: .Stool Feces  Final Report (21 Oct 2019 23:54):    GI PCR Results: NOT detected    *******Please Note:*******    GI panel PCR evaluates for:    Campylobacter, Plesiomonas shigelloides, Salmonella,    Vibrio, Yersinia enterocolitica, Enteroaggregative    Escherichia coli (EAEC), Enteropathogenic E.coli (EPEC),    Enterotoxigenic E. coli (ETEC) lt/st, Shiga-like    toxin-producing E. coli (STEC) stx1/stx2,    Shigella/ Enteroinvasive E. coli (EIEC), Cryptosporidium,    Cyclospora cayetanensis, Entamoeba histolytica,    Giardia lamblia, Adenovirus F 40/41, Astrovirus,    Norovirus GI/GII, Rotavirus A, Sapovirus      CT/XRAY/ECHO/TCD/EEG  --------------------------------------------------------------------------------------  < from: CT Angio Abdomen and Pelvis w/ IV Cont (10.22.19 @ 10:13) >  1.  Complete occlusion of the proximal SMA with reconstitution of flow in   mid SMA and patent diminutive branches.    2.  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 Major branches of the   celiac trunk are patent, but diminutive.    3.  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    4.  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    5.  Unchanged portal venous gas and small bowel pneumatosis.    < end of copied text >    --------------------------------------------------------------------------------------  Admit Diagnosis: ABDOMINAL PAIN; DIARRHEA; UNABLE TO EAT

## 2019-10-23 NOTE — CHART NOTE - NSCHARTNOTEFT_GEN_A_CORE
Post Operative Check    Patient is post op from a exploratory laparotomy with small bowel resection and anastomosis and a left External Iliac to SMA bypass with PTFE. Patient remains intubated with vent settings 40/5. On precedex, low dose levo, NS@125.     Vitals    T(C): 36.9 (10-23-19 @ 00:00), Max: 36.9 (10-22-19 @ 12:16)  HR: 54 (10-23-19 @ 01:45) (54 - 126)  BP: 106/53 (10-23-19 @ 00:00) (102/54 - 172/80)  RR: 10 (10-23-19 @ 01:45) (10 - 36)  SpO2: 99% (10-23-19 @ 01:45) (94% - 100%)  Wt(kg): --      10-21 @ 07:01  -  10-22 @ 07:00  --------------------------------------------------------  IN:    IV PiggyBack: 400 mL    lactated ringers.: 525 mL    niCARdipine Infusion: 87.5 mL  Total IN: 1012.5 mL    OUT:    Voided: 350 mL  Total OUT: 350 mL    Total NET: 662.5 mL      10-22 @ 07:01  -  10-23 @ 02:16  --------------------------------------------------------  IN:    dexmedetomidine Infusion: 14.4 mL    dexmedetomidine Infusion: 23.3 mL    IV PiggyBack: 100 mL    lactated ringers.: 525 mL    niCARdipine Infusion: 87.5 mL    norepinephrine Infusion: 115.6 mL    sodium chloride 0.9%.: 500 mL  Total IN: 1365.8 mL    OUT:    Indwelling Catheter - Urethral: 50 mL    Voided: 650 mL  Total OUT: 700 mL    Total NET: 665.8 mL          Physical Exam  General: NAD AAOx3   Cards: RRR S1S2  Resp: CTAB  Abdomen: midline dressing dry and intact, abdomen soft, non distended   : rudolph   Ext: b/l feet cool but equal in temperature. No change in skin colour, no rigidity     Pulses:   Left: DP and PT dopplerable   Right: DP and PT dopplerable     Labs  Labs:  CAPILLARY BLOOD GLUCOSE      POCT Blood Glucose.: 136 mg/dL (22 Oct 2019 22:16)  POCT Blood Glucose.: 161 mg/dL (22 Oct 2019 12:09)                          14.6   15.01 )-----------( 273      ( 22 Oct 2019 22:54 )             41.8       Auto Neutrophil %: 82.3 % (10-22-19 @ 22:54)  Band Neutrophils %: 8.8 % (10-22-19 @ 22:54)    10    133<L>  |  96<L>  |  10  ----------------------------<  141<H>  4.3   |  22  |  0.6<L>      Calcium, Total Serum: 8.6 mg/dL (10-22-19 @ 22:54)      LFTs:             6.8  | 0.5  | 27       ------------------[73      ( 22 Oct 2019 00:00 )  3.9  | x    | 30          Lipase:x      Amylase:x         Blood Gas Arterial, Lactate: 3.0 mmoL/L (10-22-19 @ 22:21)  Blood Gas Arterial, Lactate: 2.6 mmoL/L (10-22-19 @ 20:03)  Blood Gas Arterial, Lactate: 1.2 mmoL/L (10-22-19 @ 06:42)  Lactate, Blood: 1.7 mmol/L (10-22-19 @ 04:40)  Blood Gas Arterial, Lactate: 1.2 mmoL/L (10-22-19 @ 01:38)  Lactate, Blood: 1.4 mmol/L (10-22-19 @ 00:00)    ABG - ( 22 Oct 2019 22:21 )  pH: 7.41  /  pCO2: 40    /  pO2: 379   / HCO3: 25    / Base Excess: 0.2   /  SaO2: 100             ABG - ( 22 Oct 2019 20:03 )  pH: 7.49  /  pCO2: 33    /  pO2: 296   / HCO3: 25    / Base Excess: 2.3   /  SaO2: 99              ABG - ( 22 Oct 2019 06:42 )  pH: 7.50  /  pCO2: 41    /  pO2: 73    / HCO3: 32    / Base Excess: 7.6   /  SaO2: 95                Coags:     14.00  ----< 1.22    ( 22 Oct 2019 00:00 )     29.3        CARDIAC MARKERS ( 22 Oct 2019 22:54 )  x     / 0.05 ng/mL / 58 U/L / x     / 4.7 ng/mL  CARDIAC MARKERS ( 22 Oct 2019 00:00 )  x     / 0.06 ng/mL / x     / x     / x          Serum Pro-Brain Natriuretic Peptide: 2374 pg/mL (10-19-19 @ 16:45)      Urinalysis Basic - ( 21 Oct 2019 22:20 )    Color: Colorless / Appearance: Clear / S.009 / pH: x  Gluc: x / Ketone: Small  / Bili: Negative / Urobili: <2 mg/dL   Blood: x / Protein: Trace / Nitrite: Negative   Leuk Esterase: Negative / RBC: x / WBC x   Sq Epi: x / Non Sq Epi: x / Bacteria: x        GI PCR Panel, Stool (collected 20 Oct 2019 21:00)  Source: .Stool Feces  Final Report (21 Oct 2019 09:41):    GI PCR Results: NOT detected    *******Please Note:*******    GI panel PCR evaluates for:    Campylobacter, Plesiomonas shigelloides, Salmonella,    Vibrio, Yersinia enterocolitica, Enteroaggregative    Escherichia coli (EAEC), Enteropathogenic E.coli (EPEC),    Enterotoxigenic E. coli (ETEC) lt/st, Shiga-like    toxin-producing E. coli (STEC) stx1/stx2,    Shigella/ Enteroinvasive E. coli (EIEC), Cryptosporidium,    Cyclospora cayetanensis, Entamoeba histolytica,    Giardia lamblia, Adenovirus F 40/41, Astrovirus,    Norovirus GI/GII, Rotavirus A, Sapovirus    Culture - Stool (collected 20 Oct 2019 21:00)  Source: .Stool Feces  Preliminary Report (22 Oct 2019 07:33):    No enteric gram negative rods isolated    No enteric pathogens to date: Final culture pending          Radiology: no post op studies       Patient is a 84y old Male s/p exploratory laparotomy with small bowel resection and anastomosis and a left External Iliac to SMA bypass with PTFE    Plan:  - ICU admission   - vascular checks  - hemodynamic monitoring  - rudolph   - intubation   - sedation

## 2019-10-23 NOTE — CHART NOTE - NSCHARTNOTEFT_GEN_A_CORE
Post Operative Check    Patient is post op from a exploratory laparotomy with small bowel resection and anastomosis and a left External Iliac to SMA bypass with PTFE. Patient remains intubated with vent settings 40/5. On precedex, low dose levo, NS@125.     Vitals  T(C): 36.9 (10-23-19 @ 00:00), Max: 36.9 (10-22-19 @ 12:16)  HR: 54 (10-23-19 @ 01:45) (54 - 126)  BP: 106/53 (10-23-19 @ 00:00) (102/54 - 172/80)  RR: 10 (10-23-19 @ 01:45) (10 - 36)  SpO2: 99% (10-23-19 @ 01:45) (94% - 100%)    10-21 @ 07:01  -  10-22 @ 07:00  --------------------------------------------------------  IN:    IV PiggyBack: 400 mL    lactated ringers.: 525 mL    niCARdipine Infusion: 87.5 mL  Total IN: 1012.5 mL    OUT:    Voided: 350 mL  Total OUT: 350 mL    Total NET: 662.5 mL    10-22 @ 07:01  -  10-23 @ 02:16  --------------------------------------------------------  IN:    dexmedetomidine Infusion: 14.4 mL    dexmedetomidine Infusion: 23.3 mL    IV PiggyBack: 100 mL    lactated ringers.: 525 mL    niCARdipine Infusion: 87.5 mL    norepinephrine Infusion: 115.6 mL    sodium chloride 0.9%.: 500 mL  Total IN: 1365.8 mL    OUT:    Indwelling Catheter - Urethral: 50 mL    Voided: 650 mL  Total OUT: 700 mL    Total NET: 665.8 mL    Physical Exam  General: NAD AAOx3   Cards: RRR S1S2  Resp: CTAB  Abdomen: midline dressing dry and intact, abdomen soft, non distended   : rudolph   Ext: b/l feet cool but equal in temperature. No change in skin colour, no rigidity   Pulses:   Left: DP and PT dopplerable   Right: DP and PT dopplerable     Labs:  CAPILLARY BLOOD GLUCOSE  POCT Blood Glucose.: 136 mg/dL (22 Oct 2019 22:16)  POCT Blood Glucose.: 161 mg/dL (22 Oct 2019 12:09)                        14.6   15.01 )-----------( 273      ( 22 Oct 2019 22:54 )             41.8       Auto Neutrophil %: 82.3 % (10-22-19 @ 22:54)  Band Neutrophils %: 8.8 % (10-22-19 @ 22:54)    10    133<L>  |  96<L>  |  10  ----------------------------<  141<H>  4.3   |  22  |  0.6<L>    Calcium, Total Serum: 8.6 mg/dL (10-22-19 @ 22:54)    LFTs:             6.8  | 0.5  | 27       ------------------[73      ( 22 Oct 2019 00:00 )  3.9  | x    | 30          Blood Gas Arterial, Lactate: 3.0 mmoL/L (10-22-19 @ 22:21)  Blood Gas Arterial, Lactate: 2.6 mmoL/L (10-22-19 @ 20:03)  Blood Gas Arterial, Lactate: 1.2 mmoL/L (10-22-19 @ 06:42)  Lactate, Blood: 1.7 mmol/L (10-22-19 @ 04:40)  Blood Gas Arterial, Lactate: 1.2 mmoL/L (10-22-19 @ 01:38)  Lactate, Blood: 1.4 mmol/L (10-22-19 @ 00:00)    ABG - ( 22 Oct 2019 22:21 )  pH: 7.41  /  pCO2: 40    /  pO2: 379   / HCO3: 25    / Base Excess: 0.2   /  SaO2: 100       ABG - ( 22 Oct 2019 20:03 )  pH: 7.49  /  pCO2: 33    /  pO2: 296   / HCO3: 25    / Base Excess: 2.3   /  SaO2: 99        ABG - ( 22 Oct 2019 06:42 )  pH: 7.50  /  pCO2: 41    /  pO2: 73    / HCO3: 32    / Base Excess: 7.6   /  SaO2: 95        Coags:     14.00  ----< 1.22    ( 22 Oct 2019 00:00 )     29.3      CARDIAC MARKERS ( 22 Oct 2019 22:54 )  x     / 0.05 ng/mL / 58 U/L / x     / 4.7 ng/mL  CARDIAC MARKERS ( 22 Oct 2019 00:00 )  x     / 0.06 ng/mL / x     / x     / x        Serum Pro-Brain Natriuretic Peptide: 2374 pg/mL (10-19-19 @ 16:45)    Urinalysis Basic - ( 21 Oct 2019 22:20 )    Color: Colorless / Appearance: Clear / S.009 / pH: x  Gluc: x / Ketone: Small  / Bili: Negative / Urobili: <2 mg/dL   Blood: x / Protein: Trace / Nitrite: Negative   Leuk Esterase: Negative / RBC: x / WBC x   Sq Epi: x / Non Sq Epi: x / Bacteria: x    GI PCR Panel, Stool (collected 20 Oct 2019 21:00)  Source: .Stool Feces  Final Report (21 Oct 2019 09:41):    GI PCR Results: NOT detected    *******Please Note:*******    GI panel PCR evaluates for:    Campylobacter, Plesiomonas shigelloides, Salmonella,    Vibrio, Yersinia enterocolitica, Enteroaggregative    Escherichia coli (EAEC), Enteropathogenic E.coli (EPEC),    Enterotoxigenic E. coli (ETEC) lt/st, Shiga-like    toxin-producing E. coli (STEC) stx1/stx2,    Shigella/ Enteroinvasive E. coli (EIEC), Cryptosporidium,    Cyclospora cayetanensis, Entamoeba histolytica,    Giardia lamblia, Adenovirus F 40/41, Astrovirus,    Norovirus GI/GII, Rotavirus A, Sapovirus    Culture - Stool (collected 20 Oct 2019 21:00)  Source: .Stool Feces  Preliminary Report (22 Oct 2019 07:33):    No enteric gram negative rods isolated    No enteric pathogens to date: Final culture pending    Radiology: no post op studies     Patient is a 84y old Male s/p exploratory laparotomy with small bowel resection and anastomosis and a left External Iliac to SMA bypass with PTFE    Plan:  - ICU admission   - vascular checks  - hemodynamic monitoring  - rudolph   - intubation   - sedation

## 2019-10-23 NOTE — PROGRESS NOTE ADULT - SUBJECTIVE AND OBJECTIVE BOX
GENERAL SURGERY PROGRESS NOTE     LAKSHMI JUDD  84y  Male  Hospital day :4d  POD:  Procedure: Small bowel resection with anastomosis  Exploratory laparotomy  Bypass of superior mesenteric artery    OVERNIGHT EVENTS: s/p exploratory laparotomy with small bowel resection and anastomosis and a left External Iliac to SMA bypass with PTFE.     Patient remains intubated with vent settings 40/5. On precedex, low dose levo, NS@125.       T(F): 98.2 (10-23-19 @ 04:00), Max: 98.5 (10-22-19 @ 12:16)  HR: 62 (10-23-19 @ 04:00) (54 - 126)  BP: 106/53 (10-23-19 @ 00:00) (102/54 - 169/75)  ABP: 104/50 (10-23-19 @ 04:00) (92/48 - 126/54)  ABP(mean): 68 (10-23-19 @ 04:00) (62 - 76)  RR: 14 (10-23-19 @ 04:00) (10 - 30)  SpO2: 99% (10-23-19 @ 04:00) (94% - 100%)    DIET/FLUIDS: sodium chloride 0.9% Bolus 250 milliLiter(s) IV Bolus once  sodium chloride 0.9% Bolus 500 milliLiter(s) IV Bolus once  sodium chloride 0.9%. 1000 milliLiter(s) IV Continuous <Continuous>    URINE:    Indwelling Urethral Catheter:     Connect To:  Straight Drainage/[place]    Indication:  Perioperative use for Selected Surgical Procedures    Additional Instructions:  DO NOT REMOVE without a discontinuation order (10-22-19 @ 22:21)    GI proph:  pantoprazole  Injectable 40 milliGRAM(s) IV Push daily    AC/ proph: heparin  Injectable 5000 Unit(s) SubCutaneous every 8 hours    ABx: cefepime   IVPB 2000 milliGRAM(s) IV Intermittent every 8 hours  cefepime   IVPB      metroNIDAZOLE  IVPB 500 milliGRAM(s) IV Intermittent every 8 hours      PHYSICAL EXAM:  GENERAL: NAD, well-appearing  CHEST/LUNG: Clear to auscultation bilaterally  HEART: Regular rate and rhythm  ABDOMEN: midline dressing dry and intact, abdomen soft, non distended   EXTREMITIES:  No clubbing, cyanosis, or edema, DP and PT pulses dopplerable b/l      LABS  Labs:  CAPILLARY BLOOD GLUCOSE      POCT Blood Glucose.: 136 mg/dL (22 Oct 2019 22:16)  POCT Blood Glucose.: 161 mg/dL (22 Oct 2019 12:09)                          14.0   13.44 )-----------( 275      ( 23 Oct 2019 04:10 )             40.1       Auto Neutrophil %: 83.4 % (10-23-19 @ 04:10)  Auto Immature Granulocyte %: 0.7 % (10-23-19 @ 04:10)  Auto Neutrophil %: 82.3 % (10-22-19 @ 22:54)  Band Neutrophils %: 8.8 % (10-22-19 @ 22:54)    10-23    136  |  102  |  14  ----------------------------<  155<H>  4.2   |  23  |  0.7      Calcium, Total Serum: 7.8 mg/dL (10-23-19 @ 04:10)      LFTs:             6.8  | 0.5  | 27       ------------------[73      ( 22 Oct 2019 00:00 )  3.9  | x    | 30          Lipase:x      Amylase:x         Blood Gas Arterial, Lactate: 1.0 mmoL/L (10-23-19 @ 04:33)  Blood Gas Arterial, Lactate: 3.0 mmoL/L (10-22-19 @ 22:21)  Blood Gas Arterial, Lactate: 2.6 mmoL/L (10-22-19 @ 20:03)  Blood Gas Arterial, Lactate: 1.2 mmoL/L (10-22-19 @ 06:42)  Lactate, Blood: 1.7 mmol/L (10-22-19 @ 04:40)  Blood Gas Arterial, Lactate: 1.2 mmoL/L (10-22-19 @ 01:38)  Lactate, Blood: 1.4 mmol/L (10-22-19 @ 00:00)    ABG - ( 23 Oct 2019 04:33 )  pH: 7.44  /  pCO2: 37    /  pO2: 195   / HCO3: 25    / Base Excess: 1.4   /  SaO2: 100             ABG - ( 22 Oct 2019 22:21 )  pH: 7.41  /  pCO2: 40    /  pO2: 379   / HCO3: 25    / Base Excess: 0.2   /  SaO2: 100             ABG - ( 22 Oct 2019 20:03 )  pH: 7.49  /  pCO2: 33    /  pO2: 296   / HCO3: 25    / Base Excess: 2.3   /  SaO2: 99                Coags:     14.00  ----< 1.22    ( 22 Oct 2019 00:00 )     29.3        CARDIAC MARKERS ( 22 Oct 2019 22:54 )  x     / 0.05 ng/mL / 58 U/L / x     / 4.7 ng/mL  CARDIAC MARKERS ( 22 Oct 2019 00:00 )  x     / 0.06 ng/mL / x     / x     / x          Serum Pro-Brain Natriuretic Peptide: 2374 pg/mL (10-19-19 @ 16:45)      Urinalysis Basic - ( 21 Oct 2019 22:20 )    Color: Colorless / Appearance: Clear / S.009 / pH: x  Gluc: x / Ketone: Small  / Bili: Negative / Urobili: <2 mg/dL   Blood: x / Protein: Trace / Nitrite: Negative   Leuk Esterase: Negative / RBC: x / WBC x   Sq Epi: x / Non Sq Epi: x / Bacteria: x        GI PCR Panel, Stool (collected 20 Oct 2019 21:00)  Source: .Stool Feces  Final Report (21 Oct 2019 09:41):    GI PCR Results: NOT detected    *******Please Note:*******    GI panel PCR evaluates for:    Campylobacter, Plesiomonas shigelloides, Salmonella,    Vibrio, Yersinia enterocolitica, Enteroaggregative    Escherichia coli (EAEC), Enteropathogenic E.coli (EPEC),    Enterotoxigenic E. coli (ETEC) lt/st, Shiga-like    toxin-producing E. coli (STEC) stx1/stx2,    Shigella/ Enteroinvasive E. coli (EIEC), Cryptosporidium,    Cyclospora cayetanensis, Entamoeba histolytica,    Giardia lamblia, Adenovirus F 40/41, Astrovirus,    Norovirus GI/GII, Rotavirus A, Sapovirus    Culture - Stool (collected 20 Oct 2019 21:00)  Source: .Stool Feces  Preliminary Report (22 Oct 2019 07:33):    No enteric gram negative rods isolated    No enteric pathogens to date: Final culture pending          RADIOLOGY & ADDITIONAL TESTS:  < from: Xray Chest 1 View- PORTABLE-Urgent (10.22.19 @ 13:56) >  Impression:      New right IJ central venous catheter in proper position.    No pneumothorax.    < end of copied text >

## 2019-10-24 NOTE — PROGRESS NOTE ADULT - SUBJECTIVE AND OBJECTIVE BOX
LAKSHMI JUDD  [id number]  84y Male    Indication for ICU admission: S/p exploratory laparotomy, SBR with primary anastomosis, left external iliac to SMA bypass with PTFE for mesenteric ischemia  Admit Date:10-19-19  ICU Date: 10-22-19  OR Date: 10-21-19    iodinated radiocontrast agents (Hives)    PAST MEDICAL & SURGICAL HISTORY:  Neuropathy  TIA (transient ischemic attack)  Left carotid stenosis  Lyme disease  Hypothyroidism  Coronary artery disease  No significant past surgical history    Home Medications:  acetaminophen 325 mg oral tablet: 2 tab(s) orally every 6 hours, As needed, Moderate Pain (4 - 6) (10 Oct 2019 09:54)  [place] Thyroid 120 mg oral tablet: 1 tab(s) orally once a day (26 Sep 2019 11:56)  aspirin 81 mg oral tablet, chewable: 1 tab(s) orally once a day (26 Sep 2019 11:56)  clopidogrel 75 mg oral tablet: 1 tab(s) orally once a day (02 Oct 2019 13:26)  Crestor 20 mg oral tablet: 1 tab(s) orally once a day (at bedtime) (26 Sep 2019 11:55)  levothyroxine 200 mcg (0.2 mg) oral tablet: 1 tab(s) orally once a day (10 Oct 2019 09:54)  lisinopril 20 mg oral tablet: 1 tab(s) orally once a day (26 Sep 2019 11:55)  sucralfate 1 g oral tablet: 1 tab(s) orally every 12 hours (10 Oct 2019 09:54)    24HRS EVENT:  Overnight: nicom 15%, bolused 500cc    NEURO:     Intubated, sedated with precedex, following commands    Pain-controlled with fentanyl 25mcg q6    RESP:     Intubated on /10/40/5    No chronic dx, maintain sat>95%    AM CXR, AM ABG    ABG 7.41/40/379/25 O2 sat 100, lact 3.0    CARDS:     septic shock - levo weaning down, prn nicom     S/p NSTEMI 10/1/19 s/p staged PCI with DULCE x 2 in pLCx and OM2- holding ASA, plavix    Hx of HTN, CAD- restarted asa/plavix/statin via ngt    CE 0.05>0.06>0.06, plateau no longer following    Daily EKG    Echo : EF 60%, sclerotic aortic valve, with normal opening, G1DD    GI/NUTR:     S/p ex-lap, SBR, L external iliac to SMA bypass with PTFE    Diet, NPO    PPI    Senna    /RENAL:     Cr 0.5, rudolph    Monitor lytes, replete PRN    Lytes-Na 132 // K 4.2 // Phos -- //  Mag 1.6    HEME/ONC:     Monitor Hgb- 16.0 g/dL (15.6 g/dL)    HSQ    ID:     Cefepime, flagyl    Afebrile, monitor for signs of infection    WBC Count: 11.3>18.3>15.96     ENDO:    Hypothyroidism- synthroid 100mcg IV      mg/dL, 161      DVT Prophylaxis: heparin  Injectable 5000 Unit(s) SubCutaneous every 8 hours  GI Prophylaxis:pantoprazole  Injectable 40 milliGRAM(s) IV Push daily    ***Tubes/Lines/Drains  ***  LINES/DRAINS: PIV, R IJ, L Hermelinda Mack LAKSHMI JUDD  [id number]  84y Male    Indication for ICU admission: S/p exploratory laparotomy, SBR with primary anastomosis, left external iliac to SMA bypass with PTFE for mesenteric ischemia  Admit Date:10-19-19  ICU Date: 10-22-19  OR Date: 10-21-19    iodinated radiocontrast agents (Hives)    PAST MEDICAL & SURGICAL HISTORY:  Neuropathy  TIA (transient ischemic attack)  Left carotid stenosis  Lyme disease  Hypothyroidism  Coronary artery disease  No significant past surgical history    Home Medications:  acetaminophen 325 mg oral tablet: 2 tab(s) orally every 6 hours, As needed, Moderate Pain (4 - 6) (10 Oct 2019 09:54)  [place] Thyroid 120 mg oral tablet: 1 tab(s) orally once a day (26 Sep 2019 11:56)  aspirin 81 mg oral tablet, chewable: 1 tab(s) orally once a day (26 Sep 2019 11:56)  clopidogrel 75 mg oral tablet: 1 tab(s) orally once a day (02 Oct 2019 13:26)  Crestor 20 mg oral tablet: 1 tab(s) orally once a day (at bedtime) (26 Sep 2019 11:55)  levothyroxine 200 mcg (0.2 mg) oral tablet: 1 tab(s) orally once a day (10 Oct 2019 09:54)  lisinopril 20 mg oral tablet: 1 tab(s) orally once a day (26 Sep 2019 11:55)  sucralfate 1 g oral tablet: 1 tab(s) orally every 12 hours (10 Oct 2019 09:54)    24HRS EVENT:  Overnight: nicom 15%, bolused 500cc. 1 liter total overnight for decreased UOP 15 cc/hr yesterday to 30 cc/hr today    NEURO:     Intubated, sedated with precedex, following commands    Pain-controlled with fentanyl 25mcg q6    RESP:     Intubated on /10/40/5    No chronic dx, maintain sat>95%    AM CXR, AM ABG    ABG 7.41/40/379/25 O2 sat 100, lact 3.0    CARDS:     septic shock - levo weaning down, prn nicom     S/p NSTEMI 10/1/19 s/p staged PCI with DULCE x 2 in pLCx and OM2- holding ASA, plavix    Hx of HTN, CAD- restarted asa/plavix/statin via ngt    CE 0.05>0.06>0.06, plateau no longer following    Daily EKG    Echo : EF 60%, sclerotic aortic valve, with normal opening, G1DD    GI/NUTR:     S/p ex-lap, SBR, L external iliac to SMA bypass with PTFE    Diet, NPO    PPI    Senna    /RENAL:     Cr 0.5, rudolph    Monitor lytes, replete PRN    Lytes-Na 132 // K 4.2 // Phos -- //  Mag 1.6    HEME/ONC:     Monitor Hgb- 16.0 g/dL (15.6 g/dL)    HSQ    ID:     Cefepime, flagyl    Afebrile, monitor for signs of infection    WBC Count: 11.3>18.3>15.96     ENDO:    Hypothyroidism- synthroid 100mcg IV      mg/dL, 161      DVT Prophylaxis: heparin  Injectable 5000 Unit(s) SubCutaneous every 8 hours  GI Prophylaxis:pantoprazole  Injectable 40 milliGRAM(s) IV Push daily    ***Tubes/Lines/Drains  ***  LINES/DRAINS: PIV, R IJ, L Hermelinda Mack

## 2019-10-24 NOTE — PROGRESS NOTE ADULT - ASSESSMENT
Assessment:  Patient is a 84y old Male s/p exploratory laparotomy with small bowel resection and anastomosis and a left External Iliac to SMA bypass with PTFE    Plan:  - wean off levo as tolerated   - wean off sedation as tolerated   - Strict I&O  - hemodynamic monitoring  - Vitals monitoring   - Follow lactate   - Mangement per SICU

## 2019-10-24 NOTE — PROGRESS NOTE ADULT - ASSESSMENT
Assessment & Plan    84y Male s/p exploratory laparotomy, SBR with primary anastomosis, left external iliac to SMA bypass with PTFE for mesenteric ischemia    NEURO:     Intubated, sedated with precedex, following commands    Pain-controlled with fentanyl 25mcg q6    RESP:     Intubated on /10/40/5    No chronic dx, maintain sat>95%    AM CXR, AM ABG    ABG    CARDS:     S/p NSTEMI 10/1/19 s/p staged PCI with DULCE x 2 in pLCx and OM2- holding ASA, plavix    Hx of HTN, CAD- holding home lisinopril, statin    Keep normotensive, maintain MAP>65    Wean levophed     GI/NUTR:     S/p ex-lap, SBR, L external iliac to SMA bypass with PTFE    Diet, NPO    PPI    /RENAL:     Cr 0.5, rudolph    Monitor lytes, replete PRN    HEME/ONC:     Monitor Hgb    HSQ    ID:     Cefepime, flagyl    Afebrile, monitor for signs of infection    ENDO:    Hypothyroidism- synthroid 100mcg IV     FS    LINES/DRAINS: PIV, R IJ, L Hermelinda Mack   DISPO: SICU Assessment & Plan    84y Male s/p exploratory laparotomy, SBR with primary anastomosis, left external iliac to SMA bypass with PTFE for mesenteric ischemia    NEURO:     Intubated, sedated with precedex, following commands    Pain-controlled with fentanyl 25mcg q6    RESP:     Intubated on /10/40/5    No chronic dx, maintain sat>95%    AM CXR, AM ABG    7.4/33/154/21    CARDS:     S/p NSTEMI 10/1/19 s/p staged PCI with DULCE x 2 in pLCx and OM2-     ASA/Plavix    Hx of HTN, CAD- atorvastatin. holding home lisinopril    Keep normotensive, maintain MAP>65    Wean levophed 0.0629 today - decreased from yesterday    GI/NUTR:     S/p ex-lap, SBR, L external iliac to SMA bypass with PTFE    Diet, NPO    PPI    /RENAL:     Cr 0.6, rudolph    Monitor lytes, replete PRN    HEME/ONC:     Monitor Hgb 11.5 from 14 - repeat at 11 am    HSQ    ID:     Cefepime, flagyl    Afebrile, monitor for signs of infection    ENDO:    Hypothyroidism- synthroid 100mcg IV     FS    LINES/DRAINS: PIV, R IJ, L Hermelinda Mack   DISPO: SICU Assessment & Plan    84y Male s/p exploratory laparotomy, SBR with primary anastomosis, left external iliac to SMA bypass with PTFE for mesenteric ischemia    NEURO:     Intubated, sedated with precedex, following commands    Pain-controlled with fentanyl 25mcg q6    RESP:     Intubated on /10/40/5    No chronic dx, maintain sat>95%    AM CXR, AM ABG    7.4/33/154/21    CARDS:     S/p NSTEMI 10/1/19 s/p staged PCI with DULCE x 2 in pLCx and OM2-     ASA/Plavix    Hx of HTN, CAD- atorvastatin. holding home lisinopril    Keep normotensive, maintain MAP>65    Wean levophed 0.0629 today - decreased from yesterday    GI/NUTR:     S/p ex-lap, SBR, L external iliac to SMA bypass with PTFE    Diet, NPO    PPI    /RENAL:     Cr 0.6, rudolph - UOP increased to 30cc/hr today    Monitor lytes, replete PRN    HEME/ONC:     Monitor Hgb 11.5 from 14 - repeat at 11 am    HSQ    ID:     Cefepime, flagyl    Afebrile, monitor for signs of infection    ENDO:    Hypothyroidism- synthroid 100mcg IV     FS    LINES/DRAINS: PIV, R IJ, L Hermelinda Mack   DISPO: SICU Assessment & Plan    84y Male s/p exploratory laparotomy, SBR with primary anastomosis, left external iliac to SMA bypass with PTFE for mesenteric ischemia    NEURO:     Intubated, sedated with precedex, following commands    Pain-controlled with fentanyl 25mcg q6    RESP:     Intubated on /10/40/5    No chronic dx, maintain sat>95%    AM CXR, AM ABG    7.4/33/154/21    CARDS:     S/p NSTEMI 10/1/19 s/p staged PCI with DULCE x 2 in pLCx and OM2-     ASA/Plavix    Hx of HTN, CAD- atorvastatin. holding home lisinopril    Keep normotensive, maintain MAP>65    Wean levophed 0.0629 today - decreased from yesterday    GI/NUTR:     S/p ex-lap, SBR, L external iliac to SMA bypass with PTFE    Diet, NPO    PPI    /RENAL:     Cr 0.6, rudolph - UOP increased to 30cc/hr today    Monitor lytes, replete PRN    HEME/ONC:     Monitor Hgb 11.5 from 14 - repeat     HSQ    ID:     Cefepime, flagyl    Afebrile, monitor for signs of infection    ENDO:    Hypothyroidism- synthroid 100mcg IV     FS    LINES/DRAINS: PIV, R IJ, L Hermelinda Mack   DISPO: SICU

## 2019-10-24 NOTE — PROGRESS NOTE ADULT - SUBJECTIVE AND OBJECTIVE BOX
GENERAL SURGERY PROGRESS NOTE     LAKSHMI JUDD  84y  Male  Hospital day :5d  POD:  Procedure: Small bowel resection with anastomosis  Exploratory laparotomy  Bypass of superior mesenteric artery    OVERNIGHT EVENTS: Patient on minimal levo for pressor support, given 1 liter of fluids overnight, on 0.5 of precedex. Minimal vent settings.     T(F): 97.6 (10-24-19 @ 04:00), Max: 98.3 (10-23-19 @ 20:00)  HR: 61 (10-24-19 @ 07:56) (54 - 86)  BP: 127/47 (10-24-19 @ 06:30) (98/44 - 144/57)  ABP: 132/52 (10-24-19 @ 07:00) (88/52 - 146/56)  ABP(mean): 76 (10-24-19 @ 07:00) (56 - 86)  RR: 17 (10-24-19 @ 07:00) (12 - 20)  SpO2: 99% (10-24-19 @ 07:56) (99% - 100%)    DIET/FLUIDS: sodium chloride 0.9%. 1000 milliLiter(s) IV Continuous <Continuous>    NG:   10-23-19 @ 07:01  -  10-24-19 @ 07:00  --------------------------------------------------------  OUT: 100 mL      URINE:   10-23-19 @ 07:01  -  10-24-19 @ 07:00  --------------------------------------------------------  OUT: 580 mL     Indwelling Urethral Catheter:     Connect To:  Straight Drainage/[place]    Indication:  Urine Output Monitoring in Critically Ill    Additional Instructions:  Continue current renew klaudia (10-23-19 @ 14:04)    GI proph:  pantoprazole  Injectable 40 milliGRAM(s) IV Push daily    AC/ proph: aspirin  chewable 81 milliGRAM(s) Oral daily  heparin  Injectable 5000 Unit(s) SubCutaneous every 8 hours    ABx: cefepime   IVPB 2000 milliGRAM(s) IV Intermittent every 8 hours  cefepime   IVPB      metroNIDAZOLE  IVPB 500 milliGRAM(s) IV Intermittent every 8 hours      PHYSICAL EXAM:  GENERAL: NAD, well-appearing  CHEST/LUNG: Clear to auscultation bilaterally  HEART: Regular rate and rhythm  ABDOMEN: Soft, Nontender, Nondistended;   EXTREMITIES:  No clubbing, cyanosis, or edema      LABS               10.4   11.58 )-----------( 224      ( 24 Oct 2019 08:31 )             31.7       10-24    137  |  106  |  18  ----------------------------<  118<H>  4.1   |  19  |  0.6<L>      Calcium, Total Serum: 7.2 mg/dL (10-24-19 @ 00:00)      LFTs:             3.8  | 0.6  | 43       ------------------[47      ( 24 Oct 2019 00:00 )  2.0  | 0.3  | 58          Lipase:x      Amylase:x         Blood Gas Arterial, Lactate: 0.6 mmoL/L (10-24-19 @ 03:53)  Blood Gas Arterial, Lactate: 1.0 mmoL/L (10-23-19 @ 09:05)  Blood Gas Arterial, Lactate: 1.0 mmoL/L (10-23-19 @ 04:33)  Blood Gas Arterial, Lactate: 3.0 mmoL/L (10-22-19 @ 22:21)  Blood Gas Arterial, Lactate: 2.6 mmoL/L (10-22-19 @ 20:03)  Blood Gas Arterial, Lactate: 1.2 mmoL/L (10-22-19 @ 06:42)  Lactate, Blood: 1.7 mmol/L (10-22-19 @ 04:40)  Blood Gas Arterial, Lactate: 1.2 mmoL/L (10-22-19 @ 01:38)  Lactate, Blood: 1.4 mmol/L (10-22-19 @ 00:00)    ABG - ( 24 Oct 2019 03:53 )  pH: 7.40  /  pCO2: 33    /  pO2: 154   / HCO3: 21    / Base Excess: -3.3  /  SaO2: 99              ABG - ( 23 Oct 2019 09:05 )  pH: 7.44  /  pCO2: 35    /  pO2: 167   / HCO3: 24    / Base Excess: 0.5   /  SaO2: 99              ABG - ( 23 Oct 2019 04:33 )  pH: 7.44  /  pCO2: 37    /  pO2: 195   / HCO3: 25    / Base Excess: 1.4   /  SaO2: 100         Coags:     16.90  ----< 1.48    ( 24 Oct 2019 00:00 )     29.2        CARDIAC MARKERS ( 23 Oct 2019 11:26 )  x     / 0.06 ng/mL / x     / x     / x      CARDIAC MARKERS ( 23 Oct 2019 04:10 )  x     / 0.06 ng/mL / x     / x     / x      CARDIAC MARKERS ( 22 Oct 2019 22:54 )  x     / 0.05 ng/mL / 58 U/L / x     / 4.7 ng/mL      Serum Pro-Brain Natriuretic Peptide: 2374 pg/mL (10-19-19 @ 16:45)    RADIOLOGY & ADDITIONAL TESTS:    *No new imaging

## 2019-10-24 NOTE — PROGRESS NOTE ADULT - ATTENDING COMMENTS
I personally provided over 30 minutes of direct critical care to this patient.  I examined the patient with the PA and resident and discussed my plan with them.    pt continues to be nicom positive and is getting resuscitation and coming down on the levo, now minimal dose  awake and alert, following commands and on precedex  hb downtrending but level adequate, no evidence of persistent bleeding with physiologic improvement  discontinue abx today, low suspicion for infectious source, pathology vascular/ischemic

## 2019-10-24 NOTE — PROGRESS NOTE ADULT - ATTENDING COMMENTS
Stable and afebrile.  On pressors, coming down on the levo.  On the vent, stable settings.  NGT minimal output.  Abdomen less distended. Wound intact.  HB lower. No evidence of active bleeding.  Good UO.    a/p:  Progressing well.  Wean off ventilator.  NPO, NGT.  Continue ASA and Plavix.  Abx: cefepime and Falgyl

## 2019-10-25 NOTE — PROGRESS NOTE ADULT - ATTENDING COMMENTS
I personally examined this patient with the PA and resident and discussed my plan with them.    pain controlled  saturating on room air  receiving asa plavix for benny  echo performed with good ef, no hypokinesis  awawiting primary for decision to feed  rudolph out, voiding, will perform pvr  anemia of acute blood loss now equilibrated and stable  abx per vascular  restarting home meds today lisinopril metoprolol  will deline today  possible downgrade tomorrow if remains stable

## 2019-10-25 NOTE — DIETITIAN INITIAL EVALUATION ADULT. - ADD RECOMMEND
When medically feasible, pt to start clear liquid and advance as tolerated per surgery team. Goal diet is DASH/TLC with LOW FIBER.

## 2019-10-25 NOTE — PROGRESS NOTE ADULT - ATTENDING COMMENTS
Stable and extubated.  NGT removed today.  Feels better with some incisional pain.  Wound intact.  WC 12.  Lactate normal.    a/p:  progressing well.  NPO, ice chips only.  OOB, IS.  Lasix as per SICU.  ASA and Plavix.  Heparin SQ.

## 2019-10-25 NOTE — DIETITIAN INITIAL EVALUATION ADULT. - CONTINUE CURRENT NUTRITION CARE PLAN
pt to consume and tolerate >75% of all meals and snacks once diet resumes upon f/u in 4 days. Meals and snacks. RD to monitor diet order, energy intake, NFPF (appetite, wt trend, PO tolerance)

## 2019-10-25 NOTE — PROGRESS NOTE ADULT - ASSESSMENT
Patient is a 84y old Male s/p exploratory laparotomy with small bowel resection and anastomosis and a left External Iliac to SMA bypass with PTFE    Plan:   - f/u voiding   - monitor BP off levo   - ICU management

## 2019-10-25 NOTE — DIETITIAN INITIAL EVALUATION ADULT. - ENERGY NEEDS
[id number] kcal/day (MSJ x 1.1-1.2)  84-92 g/day (1.0-1.1 g/kg of ABW)  1ml/kcal or per ICU team on fluids

## 2019-10-25 NOTE — PROGRESS NOTE ADULT - ASSESSMENT
Assessment & Plan    84y Male s/p exploratory laparotomy, SBR with primary anastomosis, left external iliac to SMA bypass with PTFE for mesenteric ischemia    NEURO:     moving all extremities, no focal neurologic deficits     Pain-controlled with ATC tylenol, dilaudid prn    RESP:     Extubated to face mask, weaned to NC satting well    No chronic dx, maintain sat>95%    AM CXR    CARDS:     S/p NSTEMI 10/1/19 s/p staged PCI with DULCE x 2 in pLCx and OM2-  continue ASA/Plavix    Keep normotensive, maintain MAP>65    GI/NUTR:     S/p ex-lap, SBR, L external iliac to SMA bypass with PTFE    Diet, NPO    PPI    /RENAL:     Cr 0.6, rudolph discontinued    Monitor lytes, replete PRN    HEME/ONC:     Monitor Hgb     HSQ    ID:     Cefepime, flagyl d/c'ed    Afebrile, monitor for signs of infection    ENDO:    Hypothyroidism- synthroid 100mcg IV     FS    LINES/DRAINS: PIV, R IJ, L Hermelinda Mack   DISPO: SICU

## 2019-10-25 NOTE — PROGRESS NOTE ADULT - SUBJECTIVE AND OBJECTIVE BOX
LAKSHMI JUDD  [id number]  84y Male    Indication for ICU admission: S/p exploratory laparotomy, SBR with primary anastomosis, left external iliac to SMA bypass with PTFE for mesenteric ischemia  Admit Date:10-19-19  ICU Date: 10-22-19  OR Date: 10-21-19    iodinated radiocontrast agents (Hives)    PAST MEDICAL & SURGICAL HISTORY:  Neuropathy  TIA (transient ischemic attack)  Left carotid stenosis  Lyme disease  Hypothyroidism  Coronary artery disease  No significant past surgical history    Home Medications:  acetaminophen 325 mg oral tablet: 2 tab(s) orally every 6 hours, As needed, Moderate Pain (4 - 6) (10 Oct 2019 09:54)  Harper Thyroid 120 mg oral tablet: 1 tab(s) orally once a day (26 Sep 2019 11:56)  aspirin 81 mg oral tablet, chewable: 1 tab(s) orally once a day (26 Sep 2019 11:56)  clopidogrel 75 mg oral tablet: 1 tab(s) orally once a day (02 Oct 2019 13:26)  Crestor 20 mg oral tablet: 1 tab(s) orally once a day (at bedtime) (26 Sep 2019 11:55)  levothyroxine 200 mcg (0.2 mg) oral tablet: 1 tab(s) orally once a day (10 Oct 2019 09:54)  lisinopril 20 mg oral tablet: 1 tab(s) orally once a day (26 Sep 2019 11:55)  sucralfate 1 g oral tablet: 1 tab(s) orally every 12 hours (10 Oct 2019 09:54)    24HRS EVENT: Rudolph discotinued at midnight, pending trial of void. Remains off levo. Passed SBT and extubated, weaned to NC satting well.     NEURO:     moving all extremities, no focal neurologic deficits     Pain-controlled with ATC tylenol, dilaudid prn    RESP:     Extubated to face mask, weaned to NC satting well    No chronic dx, maintain sat>95%    AM CXR      CARDS:     septic shock - resolved    S/p NSTEMI 10/1/19 s/p staged PCI with DULCE x 2 in pLCx and OM2- restarted ASA, plavix    Hx of HTN, CAD-  asa/plavix/statin     CE 0.05>0.06>0.06, plateau no longer following    Echo : EF 60%, sclerotic aortic valve, with normal opening, G1DD    GI/NUTR:     S/p ex-lap, SBR, L external iliac to SMA bypass with PTFE    Diet, NPO     PPI, ?bowel regimen    /RENAL:     rudolph in place 30cc/hr    d/c rudolph @ [place], f/u TOV    Cr 0.5, rudolph    Monitor lytes, replete PRN    HEME/ONC:     Monitor down trending     HSQ    ID:     Cefepime, flagyl d/george    Afebrile, monitor for signs of infection    WBC Count: downtrending to 11.5     ENDO:    Hypothyroidism- synthroid 100mcg IV      mg/dL, 161      DVT Prophylaxis: heparin  Injectable 5000 Unit(s) SubCutaneous every 8 hours  GI Prophylaxis:pantoprazole  Injectable 40 milliGRAM(s) IV Push daily    ***Tubes/Lines/Drains  ***  LINES/DRAINS: PORFIRIO R GARFIELD, Hermelinda Allen

## 2019-10-25 NOTE — DIETITIAN INITIAL EVALUATION ADULT. - REASON INDICATOR FOR ASSESSMENT
NPO/Clear - pt was initially NPO then intubated late Wed-Thurs this week. Now Extubated, but NPO. Alert and oriented. Son at bedside. Surgical incision to skin. No edema. LBM unknown.

## 2019-10-25 NOTE — PROGRESS NOTE ADULT - SUBJECTIVE AND OBJECTIVE BOX
GENERAL SURGERY PROGRESS NOTE     LAKSHMI JUDD  84y  Male  Hospital day :6d  POD:  Procedure: Small bowel resection with anastomosis  Exploratory laparotomy  Bypass of superior mesenteric artery    OVERNIGHT EVENTS: Extubated, off levo with stable BP, rudolph is out.     T(F): 98.6 (10-25-19 @ 00:30), Max: 98.8 (10-24-19 @ 20:00)  HR: 74 (10-25-19 @ 05:30) (50 - 86)  BP: 128/57 (10-24-19 @ 21:30) (105/48 - 140/63)  ABP: 148/54 (10-25-19 @ 05:30) (102/52 - 178/70)  ABP(mean): 84 (10-25-19 @ 05:30) (64 - 108)  RR: 18 (10-25-19 @ 05:30) (15 - 26)  SpO2: 100% (10-25-19 @ 05:30) (97% - 100%)    DIET/FLUIDS: sodium chloride 0.9%. 1000 milliLiter(s) IV Continuous <Continuous>    NG:   10-23-19 @ 07:01  -  10-24-19 @ 07:00  --------------------------------------------------------  OUT: 100 mL                                                                             URINE:   10-23-19 @ 07:01  -  10-24-19 @ 07:00  --------------------------------------------------------  OUT: 580 mL       GI proph:  pantoprazole  Injectable 40 milliGRAM(s) IV Push daily    AC/ proph: aspirin  chewable 81 milliGRAM(s) Oral daily  heparin  Injectable 5000 Unit(s) SubCutaneous every 8 hours    ABx:     PHYSICAL EXAM:  GENERAL: NAD, well-appearing  CHEST/LUNG: Clear to auscultation bilaterally  HEART: Regular rate and rhythm  ABDOMEN:  midline dressing dry and intact, abdomen soft, non distended, midline tender  EXTREMITIES:  No clubbing, cyanosis, or edema, DP and PT pulses dopplerable b/l      LABS  Labs:  CAPILLARY BLOOD GLUCOSE                              11.0   12.44 )-----------( 187      ( 25 Oct 2019 00:25 )             32.6         10-25    139  |  108  |  13  ----------------------------<  76  3.9   |  18  |  0.5<L>      Calcium, Total Serum: 7.2 mg/dL (10-25-19 @ 00:25)      LFTs:             3.8  | 0.6  | 43       ------------------[47      ( 24 Oct 2019 00:00 )  2.0  | 0.3  | 58          Lipase:x      Amylase:x         Blood Gas Arterial, Lactate: 0.5 mmoL/L (10-24-19 @ 14:24)  Blood Gas Arterial, Lactate: 0.5 mmoL/L (10-24-19 @ 12:13)  Blood Gas Arterial, Lactate: 0.5 mmoL/L (10-24-19 @ 11:20)  Blood Gas Arterial, Lactate: 0.6 mmoL/L (10-24-19 @ 03:53)  Blood Gas Arterial, Lactate: 1.0 mmoL/L (10-23-19 @ 09:05)  Blood Gas Arterial, Lactate: 1.0 mmoL/L (10-23-19 @ 04:33)  Blood Gas Arterial, Lactate: 3.0 mmoL/L (10-22-19 @ 22:21)  Blood Gas Arterial, Lactate: 2.6 mmoL/L (10-22-19 @ 20:03)  Blood Gas Arterial, Lactate: 1.2 mmoL/L (10-22-19 @ 06:42)    ABG - ( 24 Oct 2019 14:24 )  pH: 7.42  /  pCO2: 33    /  pO2: 112   / HCO3: 21    / Base Excess: -2.7  /  SaO2: 98              ABG - ( 24 Oct 2019 12:13 )  pH: 7.40  /  pCO2: 33    /  pO2: 122   / HCO3: 21    / Base Excess: -3.5  /  SaO2: 99              ABG - ( 24 Oct 2019 11:20 )  pH: 7.42  /  pCO2: 33    /  pO2: 149   / HCO3: 21    / Base Excess: -2.9  /  SaO2: 99                Coags:     17.00  ----< 1.48    ( 25 Oct 2019 00:25 )     42.2        CARDIAC MARKERS ( 23 Oct 2019 11:26 )  x     / 0.06 ng/mL / x     / x     / x          Serum Pro-Brain Natriuretic Peptide: 2374 pg/mL (10-19-19 @ 16:45)              RADIOLOGY & ADDITIONAL TESTS:      A/P

## 2019-10-25 NOTE — DIETITIAN INITIAL EVALUATION ADULT. - OTHER INFO
p/w diarrhea and abd pain. Pt was here 10/1 for NSTEMI s/p 3 stents and readmitted 5 days later for colitis, d/c'd on 10/8 on abx. Sx now following s/p SMALL bowel resection w/ anastomosis, ex lap, bypass of superior mesenteric artery. Extubated. f/u TOV. F/u labs. Vascular sx consulted.

## 2019-10-25 NOTE — DIETITIAN INITIAL EVALUATION ADULT. - PERTINENT MEDS FT
aspirin, clopidogrel, heparin, flagyl, amiodarone, hydromorphone, atorvastatin, levothyroxine, protonix

## 2019-10-25 NOTE — PROGRESS NOTE ADULT - SUBJECTIVE AND OBJECTIVE BOX
GENERAL SURGERY PROGRESS NOTE     LAKSHMI JUDD  84y  Male  Hospital day :6d  POD:  Procedure: Small bowel resection with anastomosis  Exploratory laparotomy  Bypass of superior mesenteric artery    OVERNIGHT EVENTS: No acute events overnight, rudolph is out, patient remains extubated     T(F): 98.6 (10-25-19 @ 00:30), Max: 98.8 (10-24-19 @ 20:00)  HR: 80 (10-25-19 @ 04:30) (50 - 86)  BP: 128/57 (10-24-19 @ 21:30) (105/48 - 140/63)  ABP: 146/56 (10-25-19 @ 04:30) (102/52 - 178/70)  ABP(mean): 86 (10-25-19 @ 04:30) (64 - 108)  RR: 21 (10-25-19 @ 04:30) (15 - 26)  SpO2: 100% (10-25-19 @ 04:30) (97% - 100%)    DIET/FLUIDS: sodium chloride 0.9%. 1000 milliLiter(s) IV Continuous <Continuous>    NG:   10-23-19 @ 07:01  -  10-24-19 @ 07:00  --------------------------------------------------------  OUT: 100 mL    URINE:   10-23-19 @ 07:01  -  10-24-19 @ 07:00  --------------------------------------------------------  OUT: 580 mL       GI proph:  pantoprazole  Injectable 40 milliGRAM(s) IV Push daily    AC/ proph: aspirin  chewable 81 milliGRAM(s) Oral daily  heparin  Injectable 5000 Unit(s) SubCutaneous every 8 hours    ABx:     PHYSICAL EXAM:  GENERAL: NAD, well-appearing  CHEST/LUNG: Clear to auscultation bilaterally  HEART: Regular rate and rhythm  ABDOMEN: Soft, distender, mildly tender  EXTREMITIES:  No clubbing, cyanosis, or edema      LABS                        11.0   12.44 )-----------( 187      ( 25 Oct 2019 00:25 )             32.6         10-25    139  |  108  |  13  ----------------------------<  76  3.9   |  18  |  0.5<L>      Calcium, Total Serum: 7.2 mg/dL (10-25-19 @ 00:25)      LFTs:             3.8  | 0.6  | 43       ------------------[47      ( 24 Oct 2019 00:00 )  2.0  | 0.3  | 58          Lipase:x      Amylase:x         Blood Gas Arterial, Lactate: 0.5 mmoL/L (10-24-19 @ 14:24)  Blood Gas Arterial, Lactate: 0.5 mmoL/L (10-24-19 @ 12:13)  Blood Gas Arterial, Lactate: 0.5 mmoL/L (10-24-19 @ 11:20)  Blood Gas Arterial, Lactate: 0.6 mmoL/L (10-24-19 @ 03:53)  Blood Gas Arterial, Lactate: 1.0 mmoL/L (10-23-19 @ 09:05)  Blood Gas Arterial, Lactate: 1.0 mmoL/L (10-23-19 @ 04:33)  Blood Gas Arterial, Lactate: 3.0 mmoL/L (10-22-19 @ 22:21)  Blood Gas Arterial, Lactate: 2.6 mmoL/L (10-22-19 @ 20:03)  Blood Gas Arterial, Lactate: 1.2 mmoL/L (10-22-19 @ 06:42)    ABG - ( 24 Oct 2019 14:24 )  pH: 7.42  /  pCO2: 33    /  pO2: 112   / HCO3: 21    / Base Excess: -2.7  /  SaO2: 98          ABG - ( 24 Oct 2019 12:13 )  pH: 7.40  /  pCO2: 33    /  pO2: 122   / HCO3: 21    / Base Excess: -3.5  /  SaO2: 99          ABG - ( 24 Oct 2019 11:20 )  pH: 7.42  /  pCO2: 33    /  pO2: 149   / HCO3: 21    / Base Excess: -2.9  /  SaO2: 99            Coags:     17.00  ----< 1.48    ( 25 Oct 2019 00:25 )     42.2        CARDIAC MARKERS ( 23 Oct 2019 11:26 )  x     / 0.06 ng/mL / x     / x     / x          Serum Pro-Brain Natriuretic Peptide: 2374 pg/mL (10-19-19 @ 16:45)              RADIOLOGY & ADDITIONAL TESTS:      A/P

## 2019-10-25 NOTE — DIETITIAN INITIAL EVALUATION ADULT. - DIET TYPE
AT HOME pt eats a generalized diet. He was admitted to hospital 10/1 and 10/8 and was given abx course since 10/8 and since then have been having diarrhea. UBW of 195# 1 month ago, and now 186.5#, which is 4.3% of weight loss in 1 month (8.5#). But pt is unable to confirm exactly how much less he has been eating PTA v.s. baseline. Pt was still eating, but not significantly less than usual. Otherwise, takes fish oil, CoQ10, B complex, and probiotics. Started drinking Ensure. NKFA.

## 2019-10-25 NOTE — PROGRESS NOTE ADULT - ASSESSMENT
Patient is a 84y old Male s/p exploratory laparotomy with small bowel resection and anastomosis and a left External Iliac to SMA bypass with PTFE    Plan:  - extubated monitor respiratory status  - F/u TOV   - Strict I&O  - hemodynamic monitoring  - Vitals monitoring   - Follow lactate   - Mangement per SICU

## 2019-10-26 NOTE — PROGRESS NOTE ADULT - SUBJECTIVE AND OBJECTIVE BOX
GENERAL SURGERY PROGRESS NOTE     LAKSHMI JUDD  84y  Male  POD: 4  Hospital day: 8   Procedure: Small bowel resection with anastomosis  Exploratory laparotomy  Bypass of superior mesenteric artery    OVERNIGHT EVENTS: Patient remains extubated, central line removed, peripheral IV access. NG removed yesterday, he is tolerating sips and chips, voiding spontaneously, patient has not had a bowel movement.     T(F): 97.5 (10-25-19 @ 20:00), Max: 97.5 (10-25-19 @ 04:00)  HR: 74 (10-26-19 @ 00:00) (72 - 88)  BP: 120/53 (10-26-19 @ 00:00) (120/53 - 158/82)  ABP: 176/66 (10-25-19 @ 11:00) (118/42 - 178/70)  ABP(mean): 102 (10-25-19 @ 11:00) (64 - 118)  RR: 21 (10-26-19 @ 00:00) (17 - 31)  SpO2: 98% (10-26-19 @ 00:00) (97% - 100%)    DIET/FLUIDS: sodium chloride 0.9%. 1000 milliLiter(s) IV Continuous <Continuous>    NG:   10-24-19 @ 07:01  -  10-25-19 @ 07:00  --------------------------------------------------------  OUT: 225 mL    URINE:   10-24-19 @ 07:01  -  10-25-19 @ 07:00  --------------------------------------------------------  OUT: 960 mL     GI proph:  pantoprazole  Injectable 40 milliGRAM(s) IV Push daily    AC/ proph: aspirin  chewable 81 milliGRAM(s) Oral daily  heparin  Injectable 5000 Unit(s) SubCutaneous every 8 hours    ABx: cefepime   IVPB 2000 milliGRAM(s) IV Intermittent every 8 hours  metroNIDAZOLE  IVPB 500 milliGRAM(s) IV Intermittent every 8 hours    PHYSICAL EXAM:  GENERAL: NAD, well-appearing  CHEST/LUNG: Clear to auscultation bilaterally  HEART: Regular rate and rhythm  ABDOMEN: Soft, Nontender, Nondistended;   EXTREMITIES:  No clubbing, cyanosis, or edema    LABS                        11.0   12.44 )-----------( 187      ( 25 Oct 2019 00:25 )             32.6         10-25    139  |  108  |  13  ----------------------------<  76  3.9   |  18  |  0.5<L>    LFTs:    Blood Gas Arterial, Lactate: 0.5 mmoL/L (10-24-19 @ 14:24)  Blood Gas Arterial, Lactate: 0.5 mmoL/L (10-24-19 @ 12:13)  Blood Gas Arterial, Lactate: 0.5 mmoL/L (10-24-19 @ 11:20)  Blood Gas Arterial, Lactate: 0.6 mmoL/L (10-24-19 @ 03:53)  Blood Gas Arterial, Lactate: 1.0 mmoL/L (10-23-19 @ 09:05)  Blood Gas Arterial, Lactate: 1.0 mmoL/L (10-23-19 @ 04:33)    ABG - ( 24 Oct 2019 14:24 )  pH: 7.42  /  pCO2: 33    /  pO2: 112   / HCO3: 21    / Base Excess: -2.7  /  SaO2: 98        ABG - ( 24 Oct 2019 12:13 )  pH: 7.40  /  pCO2: 33    /  pO2: 122   / HCO3: 21    / Base Excess: -3.5  /  SaO2: 99        ABG - ( 24 Oct 2019 11:20 )  pH: 7.42  /  pCO2: 33    /  pO2: 149   / HCO3: 21    / Base Excess: -2.9  /  SaO2: 99        Coags:     17.00  ----< 1.48    ( 25 Oct 2019 00:25 )     42.2      Serum Pro-Brain Natriuretic Peptide: 2374 pg/mL (10-19-19 @ 16:45)    RADIOLOGY & ADDITIONAL TESTS:  < from: Xray Chest 1 View- PORTABLE-Routine (10.25.19 @ 05:17) >  Impression:      No radiographic evidence of acute cardiopulmonary disease.    < end of copied text >

## 2019-10-26 NOTE — PROGRESS NOTE ADULT - ASSESSMENT
Patient is a 84y old Male s/p exploratory laparotomy with small bowel resection and anastomosis and a left External Iliac to SMA bypass with PTFE    Plan:  - extubated monitor respiratory status  - tolerating sips and chips, possible advancement of diet   - Strict I&O  - hemodynamic monitoring  - Vitals monitoring   - Follow lactate   - Mangement per SICU, likely downgrade tomorrow

## 2019-10-26 NOTE — PROGRESS NOTE ADULT - ATTENDING COMMENTS
Stable and afebrile.  No n/v.  WC normal.  Hb stable.  No flatus or BMs.  Abdomen soft, NT, less distended. BS hypoactive. wound intact.    a/p:  Progressing well.  OOB, IS, Clears today.  Lasix and abx as per SICU.  To 4B if he is stable.

## 2019-10-26 NOTE — PROGRESS NOTE ADULT - ATTENDING COMMENTS
I personally examined this patient with the PA and resident and discussed my plan with them.    abdominal pain improved  asa plavix on, home htn meds on  pt voiding  will start diet  midline by us today  the patient is hd stable and can be downgraded to the floor

## 2019-10-26 NOTE — PROGRESS NOTE ADULT - ASSESSMENT
Assessment & Plan    84y Male s/p exploratory laparotomy, SBR with primary anastomosis, left external iliac to SMA bypass with PTFE for mesenteric ischemia    NEURO:    moving all extremities, no focal neurologic deficits     Pain-controlled with ATC tylenol, dilaudid prn    RESP:     RA    No chronic dx, maintain sat>95%    AM CXR      CARDS:     S/p NSTEMI 10/1/19 s/p staged PCI with DULCE x 2 in pLCx and OM2- restarted ASA, plavix    Hx of HTN: home lisiniprol, metoprolol and amlodipine    Echo : EF 60%, sclerotic aortic valve, with normal opening, G1DD    GI/NUTR:     S/p ex-lap, SBR, L external iliac to SMA bypass with PTFE    Diet, NPO with sips and chips    PPI    /RENAL:     No rudolph, voiding    Cr 0.5    Monitor lytes, replete PRN    HEME/ONC:     Monitor down trending     HSQ    ID:     Cefepime, flagyl restarted as per primary team for ppx    Afebrile, monitor for signs of infection    ENDO:    Hypothyroidism- synthroid 100mcg IV

## 2019-10-26 NOTE — PROGRESS NOTE ADULT - SUBJECTIVE AND OBJECTIVE BOX
GENERAL SURGERY PROGRESS NOTE     LAKSHMI JUDD  84y  Male  Hospital day :7d  POD:  Procedure: Small bowel resection with anastomosis  Exploratory laparotomy  Bypass of superior mesenteric artery    OVERNIGHT EVENTS:  Extubated, off levophed. Tolerating sips and chips. Central line and NGT removed. Voiding.     T(F): 97.2 (10-26-19 @ 04:00), Max: 97.5 (10-25-19 @ 20:00)  HR: 82 (10-26-19 @ 04:00) (72 - 88)  BP: 117/51 (10-26-19 @ 04:00) (117/51 - 158/82)  ABP: 176/66 (10-25-19 @ 11:00) (134/74 - 178/70)  ABP(mean): 102 (10-25-19 @ 11:00) (84 - 118)  RR: 21 (10-26-19 @ 04:00) (18 - 31)  SpO2: 97% (10-26-19 @ 04:00) (97% - 100%)    DIET/FLUIDS: potassium phosphate IVPB 30 milliMole(s) IV Intermittent once  sodium chloride 0.9%. 1000 milliLiter(s) IV Continuous <Continuous>    NG:   10-24-19 @ 07:01  -  10-25-19 @ 07:00  --------------------------------------------------------  OUT: 225 mL    URINE:   10-24-19 @ 07:01  -  10-25-19 @ 07:00  --------------------------------------------------------  OUT: 960 mL    GI proph:  pantoprazole  Injectable 40 milliGRAM(s) IV Push daily    AC/ proph: aspirin  chewable 81 milliGRAM(s) Oral daily  heparin  Injectable 5000 Unit(s) SubCutaneous every 8 hours    ABx: cefepime   IVPB 2000 milliGRAM(s) IV Intermittent every 8 hours  metroNIDAZOLE  IVPB 500 milliGRAM(s) IV Intermittent every 8 hours    PHYSICAL EXAM:  GENERAL: NAD, well-appearing  CHEST/LUNG: Clear to auscultation bilaterally  HEART: Regular rate and rhythm  ABDOMEN:  midline dressing dry and intact, abdomen soft, non distended, midline tender  EXTREMITIES:  No clubbing, cyanosis, or edema, DP and PT pulses dopplerable b/l    LABS  Labs:  CAPILLARY BLOOD GLUCOSE                   10.9   10.54 )-----------( 222      ( 26 Oct 2019 02:48 )             33.1       Auto Neutrophil %: 82.8 % (10-26-19 @ 02:48)  Auto Immature Granulocyte %: 1.1 % (10-26-19 @ 02:48)    10-26    142  |  108  |  12  ----------------------------<  60<L>  3.9   |  19  |  <0.5<L>    Calcium, Total Serum: 7.5 mg/dL (10-26-19 @ 02:48)    Blood Gas Arterial, Lactate: 0.5 mmoL/L (10-24-19 @ 14:24)  Blood Gas Arterial, Lactate: 0.5 mmoL/L (10-24-19 @ 12:13)  Blood Gas Arterial, Lactate: 0.5 mmoL/L (10-24-19 @ 11:20)  Blood Gas Arterial, Lactate: 0.6 mmoL/L (10-24-19 @ 03:53)  Blood Gas Arterial, Lactate: 1.0 mmoL/L (10-23-19 @ 09:05)    ABG - ( 24 Oct 2019 14:24 )  pH: 7.42  /  pCO2: 33    /  pO2: 112   / HCO3: 21    / Base Excess: -2.7  /  SaO2: 98        ABG - ( 24 Oct 2019 12:13 )  pH: 7.40  /  pCO2: 33    /  pO2: 122   / HCO3: 21    / Base Excess: -3.5  /  SaO2: 99        ABG - ( 24 Oct 2019 11:20 )  pH: 7.42  /  pCO2: 33    /  pO2: 149   / HCO3: 21    / Base Excess: -2.9  /  SaO2: 99        Coags:     17.00  ----< 1.48    ( 25 Oct 2019 00:25 )     42.2      Serum Pro-Brain Natriuretic Peptide: 2374 pg/mL (10-19-19 @ 16:45)

## 2019-10-26 NOTE — PROGRESS NOTE ADULT - SUBJECTIVE AND OBJECTIVE BOX
LAKSHMI JUDD  [id number]  84y Male    Indication for ICU admission: S/p exploratory laparotomy, SBR with primary anastomosis, left external iliac to SMA bypass with PTFE for mesenteric ischemia  Admit Date:10-19-19  ICU Date: 10-22-19  OR Date: 10-21-19    iodinated radiocontrast agents (Hives)    PAST MEDICAL & SURGICAL HISTORY:  Neuropathy  TIA (transient ischemic attack)  Left carotid stenosis  Lyme disease  Hypothyroidism  Coronary artery disease  No significant past surgical history    Home Medications:  acetaminophen 325 mg oral tablet: 2 tab(s) orally every 6 hours, As needed, Moderate Pain (4 - 6) (10 Oct 2019 09:54)  [place] Thyroid 120 mg oral tablet: 1 tab(s) orally once a day (26 Sep 2019 11:56)  aspirin 81 mg oral tablet, chewable: 1 tab(s) orally once a day (26 Sep 2019 11:56)  clopidogrel 75 mg oral tablet: 1 tab(s) orally once a day (02 Oct 2019 13:26)  Crestor 20 mg oral tablet: 1 tab(s) orally once a day (at bedtime) (26 Sep 2019 11:55)  levothyroxine 200 mcg (0.2 mg) oral tablet: 1 tab(s) orally once a day (10 Oct 2019 09:54)  lisinopril 20 mg oral tablet: 1 tab(s) orally once a day (26 Sep 2019 11:55)  sucralfate 1 g oral tablet: 1 tab(s) orally every 12 hours (10 Oct 2019 09:54)    24HRS EVENT: No acute overnight events. Tolerating sips and chips, remains of vasopressors.     NEURO:     moving all extremities, no focal neurologic deficits     Pain-controlled with ATC tylenol, dilaudid prn    RESP:     Extubated to face mask, weaned to NC satting well    No chronic dx, maintain sat>95%    AM CXR      CARDS:     septic shock - resolved    S/p NSTEMI 10/1/19 s/p staged PCI with DULCE x 2 in pLCx and OM2- restarted ASA, plavix    Hx of HTN: home lisiniprol, metoprolol and amlodipine    Echo : EF 60%, sclerotic aortic valve, with normal opening, G1DD    GI/NUTR:     S/p ex-lap, SBR, L external iliac to SMA bypass with PTFE    Diet, NPO with sips and chips    PPI    /RENAL:     No rudolph, voiding    Cr 0.5    Monitor lytes, replete PRN    HEME/ONC:     Monitor down trending     HSQ    ID:     Cefepime, flagyl restarted as per primary team for ppx    Afebrile, monitor for signs of infection    WBC     ENDO:    Hypothyroidism- synthroid 100mcg IV      mg/dL, 161      DVT Prophylaxis: heparin  Injectable 5000 Unit(s) SubCutaneous every 8 hours  GI Prophylaxis:pantoprazole  Injectable 40 milliGRAM(s) IV Push daily    ***Tubes/Lines/Drains  ***  LINES/DRAINS: PIV LAKSHMI JUDD  [id number]  84y Male    Indication for ICU admission: S/p exploratory laparotomy, SBR with primary anastomosis, left external iliac to SMA bypass with PTFE for mesenteric ischemia  Admit Date:10-19-19  ICU Date: 10-22-19  OR Date: 10-21-19    iodinated radiocontrast agents (Hives)    PAST MEDICAL & SURGICAL HISTORY:  Neuropathy  TIA (transient ischemic attack)  Left carotid stenosis  Lyme disease  Hypothyroidism  Coronary artery disease  No significant past surgical history    Home Medications:  acetaminophen 325 mg oral tablet: 2 tab(s) orally every 6 hours, As needed, Moderate Pain (4 - 6) (10 Oct 2019 09:54)  [place] Thyroid 120 mg oral tablet: 1 tab(s) orally once a day (26 Sep 2019 11:56)  aspirin 81 mg oral tablet, chewable: 1 tab(s) orally once a day (26 Sep 2019 11:56)  clopidogrel 75 mg oral tablet: 1 tab(s) orally once a day (02 Oct 2019 13:26)  Crestor 20 mg oral tablet: 1 tab(s) orally once a day (at bedtime) (26 Sep 2019 11:55)  levothyroxine 200 mcg (0.2 mg) oral tablet: 1 tab(s) orally once a day (10 Oct 2019 09:54)  lisinopril 20 mg oral tablet: 1 tab(s) orally once a day (26 Sep 2019 11:55)  sucralfate 1 g oral tablet: 1 tab(s) orally every 12 hours (10 Oct 2019 09:54)    24HRS EVENT: No acute overnight events. Tolerating sips and chips, remains of vasopressors.     NEURO:     moving all extremities, no focal neurologic deficits     Pain-controlled with ATC tylenol, dilaudid prn    RESP:     Extubated to face mask, weaned to NC satting well    No chronic dx, maintain sat>95%    AM CXR      CARDS:     septic shock - resolved    S/p NSTEMI 10/1/19 s/p staged PCI with DULCE x 2 in pLCx and OM2- restarted ASA, plavix    Hx of HTN: home lisiniprol, metoprolol and amlodipine    Echo : EF 60%, sclerotic aortic valve, with normal opening, G1DD    GI/NUTR:     S/p ex-lap, SBR, L external iliac to SMA bypass with PTFE    Diet, NPO with sips and chips    PPI    /RENAL:     No rudolph, voiding    Cr 0.5    Monitor lytes, replete PRN    HEME/ONC:     Monitor down trending     HSQ    ID:     Cefepime, flagyl restarted as per primary team for ppx    Afebrile, monitor for signs of infection    WBC     ENDO:    Hypothyroidism- synthroid 100mcg IV      mg/dL, 161      DVT Prophylaxis: heparin  Injectable 5000 Unit(s) SubCutaneous every 8 hours  GI Prophylaxis:pantoprazole  Injectable 40 milliGRAM(s) IV Push daily    ***Tubes/Lines/Drains  ***  LINES/DRAINS: PIV       Daily     Daily     Diet, Clear Liquid (10-26-19 @ 11:50)      CURRENT MEDS:  Neurologic Medications  acetaminophen   Tablet .. 650 milliGRAM(s) Oral every 6 hours  HYDROmorphone  Injectable 0.25 milliGRAM(s) IV Push every 4 hours PRN Severe Pain (7 - 10)    Respiratory Medications    Cardiovascular Medications  aMIOdarone    Tablet 200 milliGRAM(s) Oral daily  lisinopril 20 milliGRAM(s) Oral daily  metoprolol tartrate 12.5 milliGRAM(s) Oral every 12 hours  tamsulosin 0.4 milliGRAM(s) Oral at bedtime    Gastrointestinal Medications  pantoprazole    Tablet 40 milliGRAM(s) Oral before breakfast    Genitourinary Medications    Hematologic/Oncologic Medications  aspirin  chewable 81 milliGRAM(s) Oral daily  clopidogrel Tablet 75 milliGRAM(s) Oral daily  heparin  Injectable 5000 Unit(s) SubCutaneous every 8 hours    Antimicrobial/Immunologic Medications  cefepime   IVPB 2000 milliGRAM(s) IV Intermittent every 8 hours  metroNIDAZOLE    Tablet 500 milliGRAM(s) Oral every 8 hours    Endocrine/Metabolic Medications  atorvastatin 80 milliGRAM(s) Oral at bedtime  levothyroxine Injectable 100 MICROGram(s) IV Push at bedtime    Topical/Other Medications  chlorhexidine 4% Liquid 1 Application(s) Topical <User Schedule>      ICU Vital Signs Last 24 Hrs  T(C): 36.4 (26 Oct 2019 20:00), Max: 36.4 (26 Oct 2019 20:00)  T(F): 97.5 (26 Oct 2019 20:00), Max: 97.5 (26 Oct 2019 20:00)  HR: 72 (26 Oct 2019 20:00) (68 - 96)  BP: 145/60 (26 Oct 2019 20:00) (93/57 - 152/62)  BP(mean): 82 (26 Oct 2019 20:00) (63 - 97)  ABP: --  ABP(mean): --  RR: 27 (26 Oct 2019 20:00) (20 - 29)  SpO2: 99% (26 Oct 2019 20:00) (97% - 99%)      Adult Advanced Hemodynamics Last 24 Hrs  CVP(mm Hg): --  CVP(cm H2O): --  CO: --  CI: --  PA: --  PA(mean): --  PCWP: --  SVR: --  SVRI: --  PVR: --  PVRI: --          I&O's Summary    25 Oct 2019 07:01  -  26 Oct 2019 07:00  --------------------------------------------------------  IN: 2100 mL / OUT: 1150 mL / NET: 950 mL    26 Oct 2019 07:01  -  26 Oct 2019 20:08  --------------------------------------------------------  IN: 0 mL / OUT: 425 mL / NET: -425 mL      I&O's Detail    25 Oct 2019 07:01  -  26 Oct 2019 07:00  --------------------------------------------------------  IN:    IV PiggyBack: 300 mL    sodium chloride 0.9%: 1800 mL  Total IN: 2100 mL    OUT:    Incontinent per Condom Catheter: 1150 mL  Total OUT: 1150 mL    Total NET: 950 mL      26 Oct 2019 07:01  -  26 Oct 2019 20:08  --------------------------------------------------------  IN:  Total IN: 0 mL    OUT:    Voided: 425 mL  Total OUT: 425 mL    Total NET: -425 mL          PHYSICAL EXAM:    General/Neuro  a&0x3    Lungs:    Normal expansion/effort.     Cardiovascular : S1, S2.  Regular rate and rhythm.    GI: midline incision c/d/i    Derm: Good skin turgor, no skin breakdown.      :     Voiding    CXR:   < from: Xray Chest 1 View- PORTABLE-Routine (10.26.19 @ 06:26) >  Impression:      Increased bibasilar opacities/effusions. No pneumothorax.    < end of copied text >          LABS:  CAPILLARY BLOOD GLUCOSE                              10.9   10.54 )-----------( 222      ( 26 Oct 2019 02:48 )             33.1       10-26    142  |  108  |  12  ----------------------------<  60<L>  3.9   |  19  |  <0.5<L>    Ca    7.5<L>      26 Oct 2019 02:48  Phos  1.8     10-26  Mg     2.0     10-26        PT/INR - ( 25 Oct 2019 00:25 )   PT: 17.00 sec;   INR: 1.48 ratio         PTT - ( 25 Oct 2019 00:25 )  PTT:42.2 sec

## 2019-10-26 NOTE — PROGRESS NOTE ADULT - ASSESSMENT
Patient is a 84y old Male s/p exploratory laparotomy with small bowel resection and anastomosis and a left External Iliac to SMA bypass with PTFE.    Plan:   - encourage ambulation, IS  - care per SICU

## 2019-10-27 NOTE — PROGRESS NOTE ADULT - ASSESSMENT
Patient is a 84y old Male s/p exploratory laparotomy with small bowel resection and anastomosis and a left External Iliac to SMA bypass with PTFE    Plan:  - extubated monitor respiratory status  - tolerating clears, possible advancement of diet   - Strict I&O  - hemodynamic monitoring  - continue cefepime/ flagyl Patient is a 84y old Male s/p exploratory laparotomy with small bowel resection and anastomosis and a left External Iliac to SMA bypass with PTFE    Plan:  - extubated monitor respiratory status  - tolerating clears, possible advancement of diet   - Strict I&O  - hemodynamic monitoring  - continue cefepime/ flagyl  - F/U vascular recs

## 2019-10-27 NOTE — PROGRESS NOTE ADULT - ATTENDING COMMENTS
Stable and afebrile.  No n/v. Tolerates clears.  passing gas and had a BM.  Abdomen soft, distended, NT.  wound intact.  Anasarca.  WC normal. Hb stable.    a/p:  progressing well.  Fulls and ensure today.  d/c IVF.  OOB, IS, PT.  SW for d/c planning.  Continue Plavix and ASA.

## 2019-10-27 NOTE — PROGRESS NOTE ADULT - ASSESSMENT
Patient is a 84y old Male s/p exploratory laparotomy with small bowel resection and anastomosis and a left External Iliac to SMA bypass with PTFE.    Plan:   - encourage ambulation, IS  - Strict I&O  - hemodynamic monitoring  - Monitor pain

## 2019-10-27 NOTE — PROGRESS NOTE ADULT - SUBJECTIVE AND OBJECTIVE BOX
GENERAL SURGERY PROGRESS NOTE     LAKSHMI JUDD  [address] [place] [address] day :8d  POD:  Procedure: Small bowel resection with anastomosis  Exploratory laparotomy  Bypass of superior mesenteric artery    Surgical Attending: Reymundo Nieves  Overnight events: Downgraded overnight. No acute events. Pain controlled    T(F): 96.5 (10-27-19 @ 00:00), Max: 97.5 (10-26-19 @ 20:00)  HR: 81 (10-27-19 @ 00:00) (68 - 96)  BP: 163/68 (10-27-19 @ 00:00) (93/57 - 163/68)  ABP: --  ABP(mean): --  RR: 23 (10-27-19 @ 00:00) (20 - 29)  SpO2: 99% (10-26-19 @ 20:00) (97% - 99%)      10-25-19 @ 07:01  -  10-26-19 @ 07:00  --------------------------------------------------------  IN:    IV PiggyBack: 300 mL    sodium chloride 0.9%: 1800 mL  Total IN: 2100 mL    OUT:    Incontinent per Condom Catheter: 1150 mL  Total OUT: 1150 mL    Total NET: 950 mL      10-26-19 @ 07:01  -  10-27-19 @ 03:42  --------------------------------------------------------  IN:  Total IN: 0 mL    OUT:    Voided: 425 mL  Total OUT: 425 mL    Total NET: -425 mL        DIET/FLUIDS: sodium phosphate IVPB 30 milliMole(s) IV Intermittent once    NG:                                                                                DRAINS:     BM:     EMESIS:     URINE:   10-25-19 @ 07:01  -  10-26-19 @ 07:00  --------------------------------------------------------  OUT: 1150 mL       GI proph:  pantoprazole    Tablet 40 milliGRAM(s) Oral before breakfast    AC/ proph: aspirin  chewable 81 milliGRAM(s) Oral daily  heparin  Injectable 5000 Unit(s) SubCutaneous every 8 hours    ABx: cefepime   IVPB 2000 milliGRAM(s) IV Intermittent every 8 hours  metroNIDAZOLE    Tablet 500 milliGRAM(s) Oral every 8 hours      PHYSICAL EXAM:  GENERAL: NAD, well-appearing  CHEST/LUNG: Clear to auscultation bilaterally  HEART: Regular rate and rhythm  ABDOMEN:  midline dressing dry and intact, abdomen soft, non distended, midline tender  EXTREMITIES:  No clubbing, cyanosis, or edema, DP and PT pulses dopplerable b/l      LABS  Labs:  CAPILLARY BLOOD GLUCOSE                              10.9   10.54 )-----------( 222      ( 26 Oct 2019 02:48 )             33.1         10-26    142  |  108  |  12  ----------------------------<  60<L>  3.9   |  19  |  <0.5<L>          LFTs:     Blood Gas Arterial, Lactate: 0.5 mmoL/L (10-24-19 @ 14:24)  Blood Gas Arterial, Lactate: 0.5 mmoL/L (10-24-19 @ 12:13)  Blood Gas Arterial, Lactate: 0.5 mmoL/L (10-24-19 @ 11:20)  Blood Gas Arterial, Lactate: 0.6 mmoL/L (10-24-19 @ 03:53)    ABG - ( 24 Oct 2019 14:24 )  pH: 7.42  /  pCO2: 33    /  pO2: 112   / HCO3: 21    / Base Excess: -2.7  /  SaO2: 98              ABG - ( 24 Oct 2019 12:13 )  pH: 7.40  /  pCO2: 33    /  pO2: 122   / HCO3: 21    / Base Excess: -3.5  /  SaO2: 99              ABG - ( 24 Oct 2019 11:20 )  pH: 7.42  /  pCO2: 33    /  pO2: 149   / HCO3: 21    / Base Excess: -2.9  /  SaO2: 99                Coags:        Serum Pro-Brain Natriuretic Peptide: 2374 pg/mL (10-19-19 @ 16:45)

## 2019-10-27 NOTE — PROGRESS NOTE ADULT - SUBJECTIVE AND OBJECTIVE BOX
GENERAL SURGERY PROGRESS NOTE     LAKSHMI JUDD  84y  Male  Hospital day :8d  POD:  Procedure: Small bowel resection with anastomosis  Exploratory laparotomy  Bypass of superior mesenteric artery    OVERNIGHT EVENTS: Downgraded. No acute events    T(F): 97.5 (10-26-19 @ 20:00), Max: 97.5 (10-26-19 @ 20:00)  HR: 72 (10-26-19 @ 20:00) (68 - 96)  BP: 145/60 (10-26-19 @ 20:00) (93/57 - 152/62)  RR: 27 (10-26-19 @ 20:00) (20 - 29)  SpO2: 99% (10-26-19 @ 20:00) (97% - 99%)      URINE:   10-25-19 @ 07:01  -  10-26-19 @ 07:00  --------------------------------------------------------  OUT: 1150 mL       GI proph:  pantoprazole    Tablet 40 milliGRAM(s) Oral before breakfast    AC/ proph: aspirin  chewable 81 milliGRAM(s) Oral daily  heparin  Injectable 5000 Unit(s) SubCutaneous every 8 hours    ABx: cefepime   IVPB 2000 milliGRAM(s) IV Intermittent every 8 hours  metroNIDAZOLE    Tablet 500 milliGRAM(s) Oral every 8 hours      PHYSICAL EXAM:  GENERAL: NAD  CHEST/LUNG: Clear to auscultation bilaterally  HEART: Regular rate and rhythm  ABDOMEN: Soft, Nontender, Nondistended;   EXTREMITIES:  No clubbing, cyanosis, or edema      LABS  Labs:  CAPILLARY BLOOD GLUCOSE                              10.9   10.54 )-----------( 222      ( 26 Oct 2019 02:48 )             33.1       Auto Neutrophil %: 82.8 % (10-26-19 @ 02:48)  Auto Immature Granulocyte %: 1.1 % (10-26-19 @ 02:48)    10-26    142  |  108  |  12  ----------------------------<  60<L>  3.9   |  19  |  <0.5<L>      Calcium, Total Serum: 7.5 mg/dL (10-26-19 @ 02:48)      LFTs:     Blood Gas Arterial, Lactate: 0.5 mmoL/L (10-24-19 @ 14:24)  Blood Gas Arterial, Lactate: 0.5 mmoL/L (10-24-19 @ 12:13)  Blood Gas Arterial, Lactate: 0.5 mmoL/L (10-24-19 @ 11:20)  Blood Gas Arterial, Lactate: 0.6 mmoL/L (10-24-19 @ 03:53)    ABG - ( 24 Oct 2019 14:24 )  pH: 7.42  /  pCO2: 33    /  pO2: 112   / HCO3: 21    / Base Excess: -2.7  /  SaO2: 98              ABG - ( 24 Oct 2019 12:13 )  pH: 7.40  /  pCO2: 33    /  pO2: 122   / HCO3: 21    / Base Excess: -3.5  /  SaO2: 99              ABG - ( 24 Oct 2019 11:20 )  pH: 7.42  /  pCO2: 33    /  pO2: 149   / HCO3: 21    / Base Excess: -2.9  /  SaO2: 99                Coags:        Serum Pro-Brain Natriuretic Peptide: 2374 pg/mL (10-19-19 @ 16:45)              RADIOLOGY & ADDITIONAL TESTS:      < from: Xray Chest 1 View- PORTABLE-Routine (10.26.19 @ 06:26) >  Impression:      Increased bibasilar opacities/effusions. No pneumothorax.    < end of copied text >

## 2019-10-28 NOTE — PROGRESS NOTE ADULT - ASSESSMENT
Patient is a 84y old Male s/p exploratory laparotomy with small bowel resection and anastomosis and a left External Iliac to SMA bypass with PTFE    Plan:  - Multiple bloody bowel movements with drop in hgb, I unit given at 11:30 PM   - Repeat cbc at 11 AM   - tolerating fulls, possible advancement of diet   - Strict I&O  - hemodynamic monitoring  - F/U vascular recs

## 2019-10-28 NOTE — CONSULT NOTE ADULT - CONSULT REASON
Abd pain
Abdominal pain, pneumatosis and portal venous gas
CC: Weakness
GIB
S/p exploratory laparotomy, SBR with primary anastomosis, left external iliac to SMA bypass with PTFE for mesenteric ischemia
recent MI
d
acute on chronic mesenteric ischemia, worsening abdominal pain

## 2019-10-28 NOTE — CHART NOTE - NSCHARTNOTEFT_GEN_A_CORE
Massive upper GI bleeding.  Transfused 3 units since yesterday.  CT scan with a questionable blush in the stomach.  GI to scope the patient today. benefits of the EGD outweighs risk of perforation.  d/w Dr. Rodriguez.

## 2019-10-28 NOTE — PROGRESS NOTE ADULT - ATTENDING COMMENTS
Stable and afebrile.  Lower GI bleeding last night.  Transfused 3 units.  Abdomen soft, NT, ND, BS present.  wound intact.    HB 7.7    a/p;  Postop GI bleeding.  Hold Plavix, asa and heparin SQ.  CT angio.  GI consult.  NPO.  SICU consult.

## 2019-10-28 NOTE — PROGRESS NOTE ADULT - ASSESSMENT
83yo M with recent PCI for NSTEMI,  S/p exploratory laparotomy, SBR with primary anastomosis, left external iliac to SMA bypass with PTFE for mesenteric ischemia, with melanotic stool and hemoglobin drop, responding appropriately to transfusions, slowed frequency, no hemodynamic instability       - f/u CTA A/P, suspect anastomotic bleed. Per vascular prelim read, graft patent.   - f/u GI recommendations  - serial Hgb, monitor hemodynamics and reconsult as needed. Monitor lactate and serial abdominal exams   - f/u neurology for foot drop and fall- per wife, had been seen for eval prior to this event; Lyme hx  - per family, has been getting "Chelations" for 30 yrs in [place] and takes multiple vitamins and supplements (unknown gingko etc)   - f/u stool studies     Discussed with  Gave  No indication for ICU upgrade at this time as HR 80-90s, SBPs 115, no AMS, Hgb 9.6 post transfusion with slowed stools.   Informed primary team

## 2019-10-28 NOTE — CONSULT NOTE ADULT - ASSESSMENT
83 yo M w/ NSTEMI s/p PCI w/ DULCE x2 1 month ago on DAPT presents found to have ischemic small bowel s/p exploratory laparotomy with small bowel resection and anastomosis and a left External Iliac to SMA bypass with PTFE, hospital course complicated by GIB    -monitor vitals  -maintain large bore IVs x2  -NPO  -IVF  -serial CBC, maintain active type and screen  -transfuse to goal of 8, given CAD  -would hold pharmacological DVT ppx and use SCDs for now  -would hold DAPT if active bleeding from GI stand point, consult cardiology for management  -obtain CTA A/P    PENDING ATTENDING ASSESSMENT 83 yo M w/ NSTEMI s/p PCI w/ DULCE x2 1 month ago on DAPT presents found to have ischemic small bowel s/p exploratory laparotomy with small bowel resection and anastomosis and a left External Iliac to SMA bypass with PTFE, hospital course complicated by GIB    -responded appropriately to 2u PRBC this am, it is likely bleeding has stopped, f/u CTA  -given recent small bowel resection and anastomosis, endoscopic procedure is risky and contraindicated, would recommend conservative management  -repeat CTA if signs of active bleeding     PENDING ATTENDING ASSESSMENT 85 yo M w/ NSTEMI s/p PCI w/ DULCE x2 1 month ago on DAPT presents found to have ischemic small bowel s/p exploratory laparotomy with small bowel resection and anastomosis and a left External Iliac to SMA bypass with PTFE, hospital course complicated by GIB    -CTA suggestive of possible active extravasation of lesser curvature of stomach, study is indeterminate and not definitive  -obtain stat CBC and serial CBC q6h  -responded appropriately to 2u PRBC this am, it is possible bleeding has stopped  -given recent small bowel resection and anastomosis, endoscopic procedure is at increased risk of perforation d/t air insufflation, if active bleeding consult IR for embolization  -plan discussed with primary team 83 yo M w/ NSTEMI s/p PCI w/ DULCE x2 1 month ago on DAPT presents found to have ischemic small bowel s/p exploratory laparotomy with small bowel resection and anastomosis and a left External Iliac to SMA bypass with PTFE, hospital course complicated by GIB    -CTA suggestive of possible active extravasation of lesser curvature of stomach, study is indeterminate and not definitive  -obtain stat CBC and serial CBC q6h  -responded appropriately to 2u PRBC this am, it is possible bleeding has stopped  -given recent small bowel resection and anastomosis, endoscopic procedure is at increased risk of perforation d/t air insufflation, if active bleeding consult IR for embolization  -monitor vitals, please call if becoming hemodynamically unstable  -plan discussed with primary team 85 yo M w/ NSTEMI s/p PCI w/ DULCE x2 1 month ago on DAPT presents found to have ischemic small bowel s/p exploratory laparotomy with small bowel resection and anastomosis and a left External Iliac to SMA bypass with PTFE, hospital course complicated by GIB    -CTA suggestive of active extravasation of lesser curvature of stomach  -repeat stat hgb shows drop of hgb by 1gm since noon  -discussed w/ surgery team, given recent small bowel resection and anastomosis, endoscopic procedure is at increased risk of perforation d/t air insufflation, risk and benefit explained to patient and family bedside, patient is agreeable to proceed with endoscopy  -NPO of add-on EGD today  -serial CBC q6h  -hemodynamic monitoring  -plan discussed with primary team 83 yo M w/ NSTEMI s/p PCI w/ DULCE x2 1 month ago on DAPT presents found to have ischemic small bowel s/p exploratory laparotomy with small bowel resection and anastomosis and a left External Iliac to SMA bypass with PTFE, hospital course complicated by GIB    -CTA suggestive of possible active extravasation of lesser curvature of stomach  -repeat stat hgb shows drop of hgb by 1gm since noon  -discussed w/ surgery team, given recent small bowel resection and anastomosis, endoscopic procedure is at increased risk of perforation d/t air insufflation. Risk of bleeding is also above average given DAPT. Nevertheless, the presence of possible active bleeding in the stomach makes the benefits outweigh the risk. Risk and benefit explained to patient and family bedside, patient is agreeable to proceed with endoscopy  -NPO   - urgent endoscopy today  - PPI Gtt  -serial CBC q6h  -hemodynamic monitoring  -plan discussed with primary team

## 2019-10-28 NOTE — PROGRESS NOTE ADULT - SUBJECTIVE AND OBJECTIVE BOX
GENERAL SURGERY PROGRESS NOTE     LAKSHMI JUDD  84y  Male  Hospital day :9d  POD:  Procedure: Small bowel resection with anastomosis  Exploratory laparotomy  Bypass of superior mesenteric artery    OVERNIGHT EVENTS: No acute events. Tolerating fulls. Passing gas and having BMs.     T(F): 96.5 (10-28-19 @ 00:00), Max: 96.5 (10-28-19 @ 00:00)  HR: 90 (10-28-19 @ 00:00) (86 - 101)  BP: 111/59 (10-28-19 @ 00:00) (94/53 - 127/58)  ABP: --  ABP(mean): --  RR: 21 (10-28-19 @ 00:00) (18 - 21)  SpO2: 100% (10-27-19 @ 10:31) (100% - 100%)       GI proph:  pantoprazole    Tablet 40 milliGRAM(s) Oral before breakfast    AC/ proph: aspirin  chewable 81 milliGRAM(s) Oral daily  heparin  Injectable 5000 Unit(s) SubCutaneous every 8 hours    ABx:     PHYSICAL EXAM:  GENERAL: NAD, well-appearing  CHEST/LUNG: Clear to auscultation bilaterally  HEART: Regular rate and rhythm  ABDOMEN: midline wound clean and intact, closed with staples, Soft, Nontender, Nondistended;   EXTREMITIES:  No clubbing, cyanosis, or edema, DP and PT pulses dopplerable b/l      LABS  Labs:  CAPILLARY BLOOD GLUCOSE                              8.0    10.58 )-----------( 258      ( 27 Oct 2019 17:30 )             24.3         10-27    141  |  107  |  19  ----------------------------<  170<H>  3.7   |  23  |  0.5<L>      Magnesium, Serum: 1.8 mg/dL (10-27-19 @ 12:30)      LFTs:       ABG - ( 24 Oct 2019 14:24 )  pH: 7.42  /  pCO2: 33    /  pO2: 112   / HCO3: 21    / Base Excess: -2.7  /  SaO2: 98              ABG - ( 24 Oct 2019 12:13 )  pH: 7.40  /  pCO2: 33    /  pO2: 122   / HCO3: 21    / Base Excess: -3.5  /  SaO2: 99              ABG - ( 24 Oct 2019 11:20 )  pH: 7.42  /  pCO2: 33    /  pO2: 149   / HCO3: 21    / Base Excess: -2.9  /  SaO2: 99                Coags:        Serum Pro-Brain Natriuretic Peptide: 2374 pg/mL (10-19-19 @ 16:45)              RADIOLOGY & ADDITIONAL TESTS:      A/P GENERAL SURGERY PROGRESS NOTE     LAKSHMI JUDD  84y  Male  Hospital day :9d  POD:  Procedure: Small bowel resection with anastomosis  Exploratory laparotomy  Bypass of superior mesenteric artery    OVERNIGHT EVENTS: Having bloody BMs, received 2U. Tolerating fulls. Passing gas and having BMs.     T(F): 96.5 (10-28-19 @ 00:00), Max: 96.5 (10-28-19 @ 00:00)  HR: 90 (10-28-19 @ 00:00) (86 - 101)  BP: 111/59 (10-28-19 @ 00:00) (94/53 - 127/58)  ABP: --  ABP(mean): --  RR: 21 (10-28-19 @ 00:00) (18 - 21)  SpO2: 100% (10-27-19 @ 10:31) (100% - 100%)       GI proph:  pantoprazole    Tablet 40 milliGRAM(s) Oral before breakfast    AC/ proph: aspirin  chewable 81 milliGRAM(s) Oral daily  heparin  Injectable 5000 Unit(s) SubCutaneous every 8 hours    ABx:     PHYSICAL EXAM:  GENERAL: NAD, well-appearing  CHEST/LUNG: Clear to auscultation bilaterally  HEART: Regular rate and rhythm  ABDOMEN: midline wound clean and intact, closed with staples, Soft, Nontender, Nondistended;   EXTREMITIES:  No clubbing, cyanosis, or edema, DP and PT pulses dopplerable b/l      LABS  Labs:  CAPILLARY BLOOD GLUCOSE                              8.0    10.58 )-----------( 258      ( 27 Oct 2019 17:30 )             24.3         10-27    141  |  107  |  19  ----------------------------<  170<H>  3.7   |  23  |  0.5<L>      Magnesium, Serum: 1.8 mg/dL (10-27-19 @ 12:30)      LFTs:       ABG - ( 24 Oct 2019 14:24 )  pH: 7.42  /  pCO2: 33    /  pO2: 112   / HCO3: 21    / Base Excess: -2.7  /  SaO2: 98              ABG - ( 24 Oct 2019 12:13 )  pH: 7.40  /  pCO2: 33    /  pO2: 122   / HCO3: 21    / Base Excess: -3.5  /  SaO2: 99              ABG - ( 24 Oct 2019 11:20 )  pH: 7.42  /  pCO2: 33    /  pO2: 149   / HCO3: 21    / Base Excess: -2.9  /  SaO2: 99                Coags:        Serum Pro-Brain Natriuretic Peptide: 2374 pg/mL (10-19-19 @ 16:45)              RADIOLOGY & ADDITIONAL TESTS:      A/P

## 2019-10-28 NOTE — CONSULT NOTE ADULT - CONSULT REQUESTED DATE/TIME
20-Oct-2019 08:18
21-Oct-2019 11:54
22-Oct-2019 00:23
22-Oct-2019 01:19
22-Oct-2019 22:31
28-Oct-2019 09:34
22-Oct-2019 07:52
28-Oct-2019 22:17

## 2019-10-28 NOTE — CONSULT NOTE ADULT - SUBJECTIVE AND OBJECTIVE BOX
Gastroenterology Consultation:    Patient is a 84y old  Male who presents with a chief complaint of diarrhea (28 Oct 2019 05:07)    Admitted on: 10-19-19  HPI:  84y Male with PMH significant for hypothyroidism, HTN, CAD, recent admission for NSTEMI 10/1/19 s/p staged PCI with DULCE x 2 in pLCx and OM2 (on ASA, plavix), presents this admission with continued abdominal pain for 2-3 weeks. Patient had been evaluated on 10/8 for enteritis and colitis and sent home with PO antibiotics (cipro, flagyl), however returned to ED for increasing abdominal discomfort, diffuse, nausea but no emesis, diarrhea. Denied melena or BRBPR. WBC 12 on presentation, lact 1.6. General surgery and vascular surgery consulted after CT scan was performed showing SMA occlusion, a proximal loop of SB with pneumatosis and portal venous gas-- findings concerning for ischemic bowel. 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 Layton and Dr. Nieves).     S/p intubation, extubated 10/24. ASA and Plavix 10/20-10/21, resumed 10/23. Baseline hgb 14, hgb ~11 10/24-10/26, 10/27 8.5->8, 10/28 7.7 s/p 1u PRBC. 2 more units of PRBC ordered 10/28 am, stat CTA a/p pending. Cardiology recommending continuing DAPT as of 10/23, reconsult pending. SubQ heparin since 10/20. Clear liquid diet started 10/26, advanced to full liquid 10/27. 13 recorded BM in last 24 hrs, bloody.      Prior records Reviewed (Y/N): Y  History obtained from person other than patient (Y/N): N    Prior EGD: last one 3 years ago in [place] normal per patient  Prior Colonoscopy: last one 3 years ago in [place] normal per patient      PAST MEDICAL & SURGICAL HISTORY:  Neuropathy  TIA (transient ischemic attack)  Left carotid stenosis  Lyme disease  Hypothyroidism  Coronary artery disease  No significant past surgical history      FAMILY HISTORY:      Social History:  Tobacco: neg  Alcohol: social  Drugs: neg    Home Medications:  acetaminophen 325 mg oral tablet: 2 tab(s) orally every 6 hours, As needed, Moderate Pain (4 - 6) (10 Oct 2019 09:54)  [place] Thyroid 120 mg oral tablet: 1 tab(s) orally once a day (26 Sep 2019 11:56)  aspirin 81 mg oral tablet, chewable: 1 tab(s) orally once a day (26 Sep 2019 11:56)  clopidogrel 75 mg oral tablet: 1 tab(s) orally once a day (02 Oct 2019 13:26)  Crestor 20 mg oral tablet: 1 tab(s) orally once a day (at bedtime) (26 Sep 2019 11:55)  levothyroxine 200 mcg (0.2 mg) oral tablet: 1 tab(s) orally once a day (10 Oct 2019 09:54)  lisinopril 20 mg oral tablet: 1 tab(s) orally once a day (26 Sep 2019 11:55)  sucralfate 1 g oral tablet: 1 tab(s) orally every 12 hours (10 Oct 2019 09:54)    MEDICATIONS  (STANDING):  aMIOdarone    Tablet 200 milliGRAM(s) Oral daily  aspirin  chewable 81 milliGRAM(s) Oral daily  atorvastatin 80 milliGRAM(s) Oral at bedtime  cefepime   IVPB 2000 milliGRAM(s) IV Intermittent once  cefepime   IVPB 2000 milliGRAM(s) IV Intermittent every 8 hours  cefepime   IVPB      chlorhexidine 4% Liquid 1 Application(s) Topical <User Schedule>  clopidogrel Tablet 75 milliGRAM(s) Oral daily  diphenhydrAMINE   Injectable 50 milliGRAM(s) IV Push once  furosemide    Tablet 20 milliGRAM(s) Oral daily  heparin  Injectable 5000 Unit(s) SubCutaneous every 8 hours  hydrocortisone sodium succinate Injectable 200 milliGRAM(s) IV Push once  levothyroxine 200 MICROGram(s) Oral at bedtime  lisinopril 20 milliGRAM(s) Oral daily  melatonin 5 milliGRAM(s) Oral at bedtime  metoprolol tartrate 12.5 milliGRAM(s) Oral every 12 hours  metroNIDAZOLE  IVPB      metroNIDAZOLE  IVPB 500 milliGRAM(s) IV Intermittent once  metroNIDAZOLE  IVPB 500 milliGRAM(s) IV Intermittent every 8 hours  pantoprazole  Injectable 40 milliGRAM(s) IV Push every 12 hours  potassium phosphate IVPB 30 milliMole(s) IV Intermittent once  tamsulosin 0.4 milliGRAM(s) Oral at bedtime    MEDICATIONS  (PRN):  acetaminophen   Tablet .. 650 milliGRAM(s) Oral every 6 hours PRN Mild Pain (1 - 3)  HYDROmorphone  Injectable 0.25 milliGRAM(s) IV Push every 4 hours PRN Severe Pain (7 - 10)      Allergies  iodinated radiocontrast agents (Hives)      Review of Systems:   Constitutional:  No Fever, No Chills  ENT/Mouth:  No Hearing Changes,  No Difficulty Swallowing  Eyes:  No Eye Pain, No Vision Changes  Cardiovascular:  No Chest Pain, No Palpitations  Respiratory:  No Cough, No Dyspnea  Gastrointestinal:  As described in HPI  Musculoskeletal:  No Joint Swelling, No Back Pain  Skin:  No Skin Lesions, No Jaundice  Neuro:  No Syncope, No Dizziness  Heme/Lymph:  No Bruising, No Bleeding.          Physical Examination:  T(C): 36.2 (10-28-19 @ 08:25), Max: 36.2 (10-28-19 @ 08:00)  HR: 95 (10-28-19 @ 08:25) (86 - 101)  BP: 115/55 (10-28-19 @ 08:25) (94/53 - 127/58)  RR: 18 (10-28-19 @ 08:25) (18 - 21)  SpO2: 96% (10-28-19 @ 08:00) (96% - 100%)      10-26-19 @ 07:01  -  10-27-19 @ 07:00  --------------------------------------------------------  IN: 0 mL / OUT: 425 mL / NET: -425 mL    10-27-19 @ 07:01  -  10-28-19 @ 07:00  --------------------------------------------------------  IN: 1160 mL / OUT: 2000 mL / NET: -840 mL        Constitutional: No acute distress.  Eyes:. Conjunctivae are clear, Sclera is non-icteric.  Ears Nose and Throat: The external ears are normal appearing,  Oral mucosa is pink and moist.  Respiratory:  No signs of respiratory distress. Lung sounds are clear bilaterally.  Cardiovascular:  S1 S2, Regular rate and rhythm.  GI: Abdomen is soft, symmetric, and non-tender without distention. There are no visible lesions or scars. Bowel sounds are present and normoactive in all four quadrants. No masses, hepatomegaly, or splenomegaly are noted.   Neuro: No Tremor, No involuntary movements  Skin: No rashes, No Jaundice.          Data: (reviewed by attending)                        7.7    11.05 )-----------( 220      ( 28 Oct 2019 02:39 )             22.8     Hgb Trend:  7.7  10-28-19 @ 02:39  8.0  10-27-19 @ 17:30  8.5  10-27-19 @ 12:00  10.9  10-26-19 @ 02:48      10-27-19 @ 07:01  -  10-28-19 @ 07:00  --------------------------------------------------------  IN: 320 mL      10-28    140  |  106  |  22<H>  ----------------------------<  150<H>  3.5   |  25  |  0.5<L>    Ca    7.1<L>      28 Oct 2019 02:39  Phos  2.0     10-28  Mg     1.8     10-28      Liver panel trend:  TBili 0.6   /   AST 43   /   ALT 58   /   AlkP 47   /   Tptn 3.8   /   Alb 2.0    /   DBili 0.3      10-24  TBili 0.5   /   AST 27   /   ALT 30   /   AlkP 73   /   Tptn 6.8   /   Alb 3.9    /   DBili --      10-22  TBili 0.4   /   AST 28   /   ALT 34   /   AlkP 62   /   Tptn 6.7   /   Alb 3.8    /   DBili <0.2      10-19      Microbiology  GI PCR Panel, Stool (10.20.19 @ 21:00)    Specimen Source: .Stool Feces    Culture Results:   GI PCR Results: NOT detected  *******Please Note:*******  GI panel PCR evaluates for:  Campylobacter, Plesiomonas shigelloides, Salmonella,  Vibrio, Yersinia enterocolitica, Enteroaggregative  Escherichia coli (EAEC), Enteropathogenic E.coli (EPEC),  Enterotoxigenic E. coli (ETEC) lt/st, Shiga-like  toxin-producing E. coli (STEC) stx1/stx2,  Shigella/ Enteroinvasive E. coli (EIEC), Cryptosporidium,  Cyclospora cayetanensis, Entamoeba histolytica,  Giardia lamblia, Adenovirus F 40/41, Astrovirus,  Norovirus GI/GII, Rotavirus A, Sapovirus          Radiology:(reviewed by attending)  < from: CT Angio Abdomen and Pelvis w/ IV Cont (10.22.19 @ 10:13) >  IMPRESSION:    1.  Complete occlusion of the proximal SMA with reconstitution of flow in   mid SMA and patent diminutive branches.    2.  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 Major branches of the   celiac trunk are patent, but diminutive.    3.  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    4.  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    5.  Unchanged portal venous gas and small bowel pneumatosis.    6.  Other stable/nonacute findings as described above.    < end of copied text > Gastroenterology Consultation:    Patient is a 84y old  Male who presents with a chief complaint of diarrhea (28 Oct 2019 05:07)    Admitted on: 10-19-19  HPI:  84y Male with PMH significant for hypothyroidism, HTN, CAD, recent admission for NSTEMI 10/1/19 s/p staged PCI with DULCE x 2 in pLCx and OM2 (on ASA, plavix), presents this admission with continued abdominal pain for 2-3 weeks. Patient had been evaluated on 10/8 for enteritis and colitis and sent home with PO antibiotics (cipro, flagyl), however returned to ED for increasing abdominal discomfort, diffuse, nausea but no emesis, diarrhea. Denied melena or BRBPR. WBC 12 on presentation, lact 1.6. General surgery and vascular surgery consulted after CT scan was performed showing SMA occlusion, a proximal loop of SB with pneumatosis and portal venous gas-- findings concerning for ischemic bowel. 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 Layton and Dr. Nieves).     S/p intubation, extubated 10/24. ASA and Plavix 10/20-10/21, resumed 10/23. Baseline hgb 14, hgb ~11 10/24-10/26, 10/27 8.5->8, 10/28 7.7 s/p 1u PRBC. 2 more units of PRBC ordered 10/28 am, stat CTA a/p pending. Cardiology recommending continuing DAPT as of 10/23, reconsult pending. SubQ heparin since 10/20. Clear liquid diet started 10/26, advanced to full liquid 10/27. 13 recorded BM in last 24 hrs, bloody.      Prior records Reviewed (Y/N): Y  History obtained from person other than patient (Y/N): N    Prior EGD: last one 3 years ago in [place] normal per patient  Prior Colonoscopy: last one 3 years ago in [place] normal per patient      PAST MEDICAL & SURGICAL HISTORY:  Neuropathy  TIA (transient ischemic attack)  Left carotid stenosis  Lyme disease  Hypothyroidism  Coronary artery disease  No significant past surgical history      FAMILY HISTORY:      Social History:  Tobacco: neg  Alcohol: social  Drugs: neg    Home Medications:  acetaminophen 325 mg oral tablet: 2 tab(s) orally every 6 hours, As needed, Moderate Pain (4 - 6) (10 Oct 2019 09:54)  [place] Thyroid 120 mg oral tablet: 1 tab(s) orally once a day (26 Sep 2019 11:56)  aspirin 81 mg oral tablet, chewable: 1 tab(s) orally once a day (26 Sep 2019 11:56)  clopidogrel 75 mg oral tablet: 1 tab(s) orally once a day (02 Oct 2019 13:26)  Crestor 20 mg oral tablet: 1 tab(s) orally once a day (at bedtime) (26 Sep 2019 11:55)  levothyroxine 200 mcg (0.2 mg) oral tablet: 1 tab(s) orally once a day (10 Oct 2019 09:54)  lisinopril 20 mg oral tablet: 1 tab(s) orally once a day (26 Sep 2019 11:55)  sucralfate 1 g oral tablet: 1 tab(s) orally every 12 hours (10 Oct 2019 09:54)    MEDICATIONS  (STANDING):  aMIOdarone    Tablet 200 milliGRAM(s) Oral daily  aspirin  chewable 81 milliGRAM(s) Oral daily  atorvastatin 80 milliGRAM(s) Oral at bedtime  cefepime   IVPB 2000 milliGRAM(s) IV Intermittent once  cefepime   IVPB 2000 milliGRAM(s) IV Intermittent every 8 hours  cefepime   IVPB      chlorhexidine 4% Liquid 1 Application(s) Topical <User Schedule>  clopidogrel Tablet 75 milliGRAM(s) Oral daily  diphenhydrAMINE   Injectable 50 milliGRAM(s) IV Push once  furosemide    Tablet 20 milliGRAM(s) Oral daily  heparin  Injectable 5000 Unit(s) SubCutaneous every 8 hours  hydrocortisone sodium succinate Injectable 200 milliGRAM(s) IV Push once  levothyroxine 200 MICROGram(s) Oral at bedtime  lisinopril 20 milliGRAM(s) Oral daily  melatonin 5 milliGRAM(s) Oral at bedtime  metoprolol tartrate 12.5 milliGRAM(s) Oral every 12 hours  metroNIDAZOLE  IVPB      metroNIDAZOLE  IVPB 500 milliGRAM(s) IV Intermittent once  metroNIDAZOLE  IVPB 500 milliGRAM(s) IV Intermittent every 8 hours  pantoprazole  Injectable 40 milliGRAM(s) IV Push every 12 hours  potassium phosphate IVPB 30 milliMole(s) IV Intermittent once  tamsulosin 0.4 milliGRAM(s) Oral at bedtime    MEDICATIONS  (PRN):  acetaminophen   Tablet .. 650 milliGRAM(s) Oral every 6 hours PRN Mild Pain (1 - 3)  HYDROmorphone  Injectable 0.25 milliGRAM(s) IV Push every 4 hours PRN Severe Pain (7 - 10)      Allergies  iodinated radiocontrast agents (Hives)      Review of Systems:   Constitutional:  No Fever, No Chills  ENT/Mouth:  No Hearing Changes,  No Difficulty Swallowing  Eyes:  No Eye Pain, No Vision Changes  Cardiovascular:  No Chest Pain, No Palpitations  Respiratory:  No Cough, No Dyspnea  Gastrointestinal:  mild abdominal pain, bloody BM  Musculoskeletal:  No Joint Swelling, No Back Pain  Skin:  No Skin Lesions, No Jaundice  Neuro:  No Syncope, No Dizziness  Heme/Lymph:  No Bruising, No Bleeding.          Physical Examination:  T(C): 36.2 (10-28-19 @ 08:25), Max: 36.2 (10-28-19 @ 08:00)  HR: 95 (10-28-19 @ 08:25) (86 - 101)  BP: 115/55 (10-28-19 @ 08:25) (94/53 - 127/58)  RR: 18 (10-28-19 @ 08:25) (18 - 21)  SpO2: 96% (10-28-19 @ 08:00) (96% - 100%)      10-26-19 @ 07:01  -  10-27-19 @ 07:00  --------------------------------------------------------  IN: 0 mL / OUT: 425 mL / NET: -425 mL    10-27-19 @ 07:01  -  10-28-19 @ 07:00  --------------------------------------------------------  IN: 1160 mL / OUT: 2000 mL / NET: -840 mL        Constitutional: No acute distress.  Eyes:. Conjunctivae are clear, Sclera is non-icteric.  Ears Nose and Throat: The external ears are normal appearing,  Oral mucosa is pink and moist.  Respiratory:  No signs of respiratory distress. Lung sounds are clear bilaterally.  Cardiovascular:  S1 S2, Regular rate and rhythm.  GI: Abdomen is soft, symmetric, and non-tender without distention. midline incision clean dry intact and healing well. Dark red blood seen on toilet wall  Neuro: No Tremor, No involuntary movements  Skin: No rashes, No Jaundice.          Data: (reviewed by attending)                        7.7    11.05 )-----------( 220      ( 28 Oct 2019 02:39 )             22.8     Hgb Trend:  7.7  10-28-19 @ 02:39  8.0  10-27-19 @ 17:30  8.5  10-27-19 @ 12:00  10.9  10-26-19 @ 02:48      10-27-19 @ 07:01  -  10-28-19 @ 07:00  --------------------------------------------------------  IN: 320 mL      10-28    140  |  106  |  22<H>  ----------------------------<  150<H>  3.5   |  25  |  0.5<L>    Ca    7.1<L>      28 Oct 2019 02:39  Phos  2.0     10-28  Mg     1.8     10-28      Liver panel trend:  TBili 0.6   /   AST 43   /   ALT 58   /   AlkP 47   /   Tptn 3.8   /   Alb 2.0    /   DBili 0.3      10-24  TBili 0.5   /   AST 27   /   ALT 30   /   AlkP 73   /   Tptn 6.8   /   Alb 3.9    /   DBili --      10-22  TBili 0.4   /   AST 28   /   ALT 34   /   AlkP 62   /   Tptn 6.7   /   Alb 3.8    /   DBili <0.2      10-19      Microbiology  GI PCR Panel, Stool (10.20.19 @ 21:00)    Specimen Source: .Stool Feces    Culture Results:   GI PCR Results: NOT detected  *******Please Note:*******  GI panel PCR evaluates for:  Campylobacter, Plesiomonas shigelloides, Salmonella,  Vibrio, Yersinia enterocolitica, Enteroaggregative  Escherichia coli (EAEC), Enteropathogenic E.coli (EPEC),  Enterotoxigenic E. coli (ETEC) lt/st, Shiga-like  toxin-producing E. coli (STEC) stx1/stx2,  Shigella/ Enteroinvasive E. coli (EIEC), Cryptosporidium,  Cyclospora cayetanensis, Entamoeba histolytica,  Giardia lamblia, Adenovirus F 40/41, Astrovirus,  Norovirus GI/GII, Rotavirus A, Sapovirus          Radiology:(reviewed by attending)  < from: CT Angio Abdomen and Pelvis w/ IV Cont (10.22.19 @ 10:13) >  IMPRESSION:    1.  Complete occlusion of the proximal SMA with reconstitution of flow in   mid SMA and patent diminutive branches.    2.  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 Major branches of the   celiac trunk are patent, but diminutive.    3.  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    4.  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    5.  Unchanged portal venous gas and small bowel pneumatosis.    6.  Other stable/nonacute findings as described above.    < end of copied text > Gastroenterology Consultation:    Patient is a 84y old  Male who presents with a chief complaint of diarrhea (28 Oct 2019 05:07)    Admitted on: 10-19-19  HPI:  84y Male with PMH significant for hypothyroidism, HTN, CAD, recent admission for NSTEMI 10/1/19 s/p staged PCI with DULCE x 2 in pLCx and OM2 (on ASA, plavix), presents this admission with continued abdominal pain for 2-3 weeks. Patient had been evaluated on 10/8 for enteritis and colitis and sent home with PO antibiotics (cipro, flagyl), however returned to ED for increasing abdominal discomfort, diffuse, nausea but no emesis, diarrhea. Denied melena or BRBPR. WBC 12 on presentation, lact 1.6. General surgery and vascular surgery consulted after CT scan was performed showing SMA occlusion, a proximal loop of SB with pneumatosis and portal venous gas-- findings concerning for ischemic bowel. 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 Layton and Dr. Nieves).     S/p intubation, extubated 10/24. ASA and Plavix 10/20-10/21, resumed 10/23. Baseline hgb 14, hgb ~11 10/24-10/26, 10/27 8.5->8, 10/28 7.7 s/p 1u PRBC. 2 more units of PRBC ordered 10/28 am, stat CTA a/p pending. Cardiology recommending continuing DAPT as of 10/23, reconsult pending. SubQ heparin since 10/20. Clear liquid diet started 10/26, advanced to full liquid 10/27. 13 recorded BM in last 24 hrs, bloody.      Prior records Reviewed (Y/N): Y  History obtained from person other than patient (Y/N):  Y (Dr. nieves, family at bedside)    Prior EGD: last one 3 years ago in [place] normal per patient  Prior Colonoscopy: last one 3 years ago in [place] normal per patient      PAST MEDICAL & SURGICAL HISTORY:  Neuropathy  TIA (transient ischemic attack)  Left carotid stenosis  Lyme disease  Hypothyroidism  Coronary artery disease  No significant past surgical history      FAMILY HISTORY:  noncontributory    Social History:  Tobacco: neg  Alcohol: social  Drugs: neg    Home Medications:  acetaminophen 325 mg oral tablet: 2 tab(s) orally every 6 hours, As needed, Moderate Pain (4 - 6) (10 Oct 2019 09:54)  [place] Thyroid 120 mg oral tablet: 1 tab(s) orally once a day (26 Sep 2019 11:56)  aspirin 81 mg oral tablet, chewable: 1 tab(s) orally once a day (26 Sep 2019 11:56)  clopidogrel 75 mg oral tablet: 1 tab(s) orally once a day (02 Oct 2019 13:26)  Crestor 20 mg oral tablet: 1 tab(s) orally once a day (at bedtime) (26 Sep 2019 11:55)  levothyroxine 200 mcg (0.2 mg) oral tablet: 1 tab(s) orally once a day (10 Oct 2019 09:54)  lisinopril 20 mg oral tablet: 1 tab(s) orally once a day (26 Sep 2019 11:55)  sucralfate 1 g oral tablet: 1 tab(s) orally every 12 hours (10 Oct 2019 09:54)    MEDICATIONS  (STANDING):  aMIOdarone    Tablet 200 milliGRAM(s) Oral daily  aspirin  chewable 81 milliGRAM(s) Oral daily  atorvastatin 80 milliGRAM(s) Oral at bedtime  cefepime   IVPB 2000 milliGRAM(s) IV Intermittent once  cefepime   IVPB 2000 milliGRAM(s) IV Intermittent every 8 hours  cefepime   IVPB      chlorhexidine 4% Liquid 1 Application(s) Topical <User Schedule>  clopidogrel Tablet 75 milliGRAM(s) Oral daily  diphenhydrAMINE   Injectable 50 milliGRAM(s) IV Push once  furosemide    Tablet 20 milliGRAM(s) Oral daily  heparin  Injectable 5000 Unit(s) SubCutaneous every 8 hours  hydrocortisone sodium succinate Injectable 200 milliGRAM(s) IV Push once  levothyroxine 200 MICROGram(s) Oral at bedtime  lisinopril 20 milliGRAM(s) Oral daily  melatonin 5 milliGRAM(s) Oral at bedtime  metoprolol tartrate 12.5 milliGRAM(s) Oral every 12 hours  metroNIDAZOLE  IVPB      metroNIDAZOLE  IVPB 500 milliGRAM(s) IV Intermittent once  metroNIDAZOLE  IVPB 500 milliGRAM(s) IV Intermittent every 8 hours  pantoprazole  Injectable 40 milliGRAM(s) IV Push every 12 hours  potassium phosphate IVPB 30 milliMole(s) IV Intermittent once  tamsulosin 0.4 milliGRAM(s) Oral at bedtime    MEDICATIONS  (PRN):  acetaminophen   Tablet .. 650 milliGRAM(s) Oral every 6 hours PRN Mild Pain (1 - 3)  HYDROmorphone  Injectable 0.25 milliGRAM(s) IV Push every 4 hours PRN Severe Pain (7 - 10)      Allergies  iodinated radiocontrast agents (Hives)      Review of Systems:   Constitutional:  No Fever, No Chills  ENT/Mouth:  No Hearing Changes,  No Difficulty Swallowing  Eyes:  No Eye Pain, No Vision Changes  Cardiovascular:  No Chest Pain, No Palpitations  Respiratory:  No Cough, No Dyspnea  Gastrointestinal:  mild abdominal pain, bloody BM  Musculoskeletal:  No Joint Swelling, No Back Pain  Skin:  No Skin Lesions, No Jaundice  Neuro:  No Syncope, No Dizziness  Heme/Lymph:  No Bruising, No Bleeding.          Physical Examination:  T(C): 36.2 (10-28-19 @ 08:25), Max: 36.2 (10-28-19 @ 08:00)  HR: 95 (10-28-19 @ 08:25) (86 - 101)  BP: 115/55 (10-28-19 @ 08:25) (94/53 - 127/58)  RR: 18 (10-28-19 @ 08:25) (18 - 21)  SpO2: 96% (10-28-19 @ 08:00) (96% - 100%)      10-26-19 @ 07:01  -  10-27-19 @ 07:00  --------------------------------------------------------  IN: 0 mL / OUT: 425 mL / NET: -425 mL    10-27-19 @ 07:01  -  10-28-19 @ 07:00  --------------------------------------------------------  IN: 1160 mL / OUT: 2000 mL / NET: -840 mL        Constitutional: No acute distress.  Eyes:. Conjunctivae are clear, Sclera is non-icteric.  Ears Nose and Throat: The external ears are normal appearing,  Oral mucosa is pink and moist.  Respiratory:  No signs of respiratory distress. Lung sounds are clear bilaterally.  Cardiovascular:  S1 S2, Regular rate and rhythm.  GI: Abdomen is soft, symmetric, and non-tender without distention. midline incision clean dry intact and healing well. Dark red blood seen on toilet wall  Neuro: No Tremor, No involuntary movements  Skin: No rashes, No Jaundice.          Data: (reviewed by attending)                        7.7    11.05 )-----------( 220      ( 28 Oct 2019 02:39 )             22.8     Hgb Trend:  7.7  10-28-19 @ 02:39  8.0  10-27-19 @ 17:30  8.5  10-27-19 @ 12:00  10.9  10-26-19 @ 02:48      10-27-19 @ 07:01  -  10-28-19 @ 07:00  --------------------------------------------------------  IN: 320 mL      10-28    140  |  106  |  22<H>  ----------------------------<  150<H>  3.5   |  25  |  0.5<L>    Ca    7.1<L>      28 Oct 2019 02:39  Phos  2.0     10-28  Mg     1.8     10-28      Liver panel trend:  TBili 0.6   /   AST 43   /   ALT 58   /   AlkP 47   /   Tptn 3.8   /   Alb 2.0    /   DBili 0.3      10-24  TBili 0.5   /   AST 27   /   ALT 30   /   AlkP 73   /   Tptn 6.8   /   Alb 3.9    /   DBili --      10-22  TBili 0.4   /   AST 28   /   ALT 34   /   AlkP 62   /   Tptn 6.7   /   Alb 3.8    /   DBili <0.2      10-19      Microbiology  GI PCR Panel, Stool (10.20.19 @ 21:00)    Specimen Source: .Stool Feces    Culture Results:   GI PCR Results: NOT detected  *******Please Note:*******  GI panel PCR evaluates for:  Campylobacter, Plesiomonas shigelloides, Salmonella,  Vibrio, Yersinia enterocolitica, Enteroaggregative  Escherichia coli (EAEC), Enteropathogenic E.coli (EPEC),  Enterotoxigenic E. coli (ETEC) lt/st, Shiga-like  toxin-producing E. coli (STEC) stx1/stx2,  Shigella/ Enteroinvasive E. coli (EIEC), Cryptosporidium,  Cyclospora cayetanensis, Entamoeba histolytica,  Giardia lamblia, Adenovirus F 40/41, Astrovirus,  Norovirus GI/GII, Rotavirus A, Sapovirus          Radiology:(reviewed by attending)  < from: CT Angio Abdomen and Pelvis w/ IV Cont (10.22.19 @ 10:13) >  IMPRESSION:    1.  Complete occlusion of the proximal SMA with reconstitution of flow in   mid SMA and patent diminutive branches.    2.  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 Major branches of the   celiac trunk are patent, but diminutive.    3.  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    4.  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    5.  Unchanged portal venous gas and small bowel pneumatosis.    6.  Other stable/nonacute findings as described above.    < end of copied text >

## 2019-10-28 NOTE — CHART NOTE - NSCHARTNOTEFT_GEN_A_CORE
PACU ANESTHESIA ADMISSION NOTE      Procedure: EGD for GI bleed  Post op diagnosis:  Ischemic bowel disease  Mesenteric ischemia      __x__  Intubated  TV:_____500_       Rate: _10_____      FiO2: 60______    ____  Patent Airway      Full return of protective reflexes    ___  Full recovery from anesthesia / back to baseline status    Vitals:  T(C): 35.6 (10-28-19 @ 18:23), Max: 36.2 (10-28-19 @ 08:00)  HR: 99  BP:105/65  RR: CMV)  SpO2: 100% (10-28-19 @ 18:23) (96% - 100%)    Mental Status:  ____ Awake   _____ Alert   _____ Drowsy   ___x __ Sedated    Nausea/Vomiting:  _x___  NO       ______Yes,   See Post - Op Orders         Pain Scale (0-10):  __0___    Treatment: _x___ None    ____x  See Post - Op/PCA Orders    Post - Operative Fluids:   ____ Oral   ____ See Post - Op Orders    Plan: Discharge:   ____Home       _____Floor     ___x __Critical Care    _____  Other:_________________    Comments:  No anesthesia issues or complications noted. Pt's condition guarded on pressor for blood pressure support ICU resident given the sign out care transferred to ICU team   .

## 2019-10-28 NOTE — PROGRESS NOTE ADULT - SUBJECTIVE AND OBJECTIVE BOX
GENERAL SURGERY PROGRESS NOTE     LAKSHMI JUDD  84y  Male  Hospital day :9d  POD:  Procedure: Small bowel resection with anastomosis  Exploratory laparotomy  Bypass of superior mesenteric artery    OVERNIGHT EVENTS: Multiple bloody bowel movements yesterday, hgb dropped from 10.9 to 7.7 in 24 hrs. Started on lasix for edema.     T(F): 96.5 (10-28-19 @ 00:00), Max: 96.5 (10-28-19 @ 00:00)  HR: 90 (10-28-19 @ 00:00) (86 - 101)  BP: 111/59 (10-28-19 @ 00:00) (94/53 - 127/58)  RR: 21 (10-28-19 @ 00:00) (18 - 21)  SpO2: 100% (10-27-19 @ 10:31) (100% - 100%)    DIET/FLUIDS: potassium phosphate IVPB 30 milliMole(s) IV Intermittent once    GI proph:  pantoprazole    Tablet 40 milliGRAM(s) Oral before breakfast    AC/ proph: aspirin  chewable 81 milliGRAM(s) Oral daily  heparin  Injectable 5000 Unit(s) SubCutaneous every 8 hours    PHYSICAL EXAM:  GENERAL: NAD, well-appearing  CHEST/LUNG: Clear to auscultation bilaterally  HEART: Regular rate and rhythm  ABDOMEN: Soft, Nontender, Nondistended;   EXTREMITIES:  No clubbing, cyanosis, or edema    LABS               7.7    11.05 )-----------( 220      ( 28 Oct 2019 02:39 )             22.8         10-28    140  |  106  |  22<H>  ----------------------------<  150<H>  3.5   |  25  |  0.5<L>      Calcium, Total Serum: 7.1 mg/dL (10-28-19 @ 02:39)    ABG - ( 24 Oct 2019 14:24 )  pH: 7.42  /  pCO2: 33    /  pO2: 112   / HCO3: 21    / Base Excess: -2.7  /  SaO2: 98          ABG - ( 24 Oct 2019 12:13 )  pH: 7.40  /  pCO2: 33    /  pO2: 122   / HCO3: 21    / Base Excess: -3.5  /  SaO2: 99          ABG - ( 24 Oct 2019 11:20 )  pH: 7.42  /  pCO2: 33    /  pO2: 149   / HCO3: 21    / Base Excess: -2.9  /  SaO2: 99          Serum Pro-Brain Natriuretic Peptide: 2374 pg/mL (10-19-19 @ 16:45)      RADIOLOGY & ADDITIONAL TESTS:  < from: Xray Chest 1 View- PORTABLE-Routine (10.27.19 @ 08:30) >  Impression:      Improving bilateral opacity/effusions.    < end of copied text >

## 2019-10-28 NOTE — CONSULT NOTE ADULT - ASSESSMENT
ASSESSMENT:  84M s/p exploratory laparotomy, SBR with primary anastomosis, left external iliac to SMA bypass with PTFE for mesenteric ischemia, presents with UGI bleed s/p EGD    PLAN:    NEURO:     Intubated, sedated on precedex    Pain-controlled with ATC tylenol, fentanyl 25mcg IV q6 prn    RESP:     Intubated /10/60/5    ABG 7.46/35/179/25 lact 1.2    No chronic dx, maintain sat>95%    AM CXR    AM ABG      CARDS:     Hypovolemic shock- on vasopressin, neosynephrine infusions    S/p NSTEMI 10/1/19 s/p staged PCI with DULCE x 2 in pLCx and OM2- restarted ASA, plavix    Hx of HTN- home lisiniprol, metoprolol (held)    V-tach during last admission- was started on PO amiodarone 200mg daily    Echo 10/23- EF 60%, sclerotic aortic valve, with normal opening, G1DD    EKG- NSR    GI/NUTR:     S/p ex-lap, SBR, L external iliac to SMA bypass with PTFE 10/22    S/p EGD 10/28    Diet, NPO    PPI infusion    /RENAL:     Shen, monitor urine output    Cr 0.5    Monitor lytes, replete PRN    Lytes-Na 140 // K 3.5 // Phos 2.0 //  Mag 1.8    HEME/ONC:     Monitor Hgb- 8.6    Holding HSQ    ID:     Cefepime, flagyl restarted as per primary team    Afebrile, monitor for signs of infection    WBC 12.75    ENDO:    Hypothyroidism- synthroid 100mcg IV      mg/dL, 161 ASSESSMENT:  84M s/p exploratory laparotomy, SBR with primary anastomosis, left external iliac to SMA bypass with PTFE for mesenteric ischemia, presents with UGI bleed s/p EGD    PLAN:    NEURO:     Intubated, sedated on precedex    Pain-controlled with ATC tylenol, fentanyl 25mcg IV q6 prn    RESP:     Intubated /10/50/5    ABG 7.46/35/179/25 lact 1.2    No chronic dx, maintain sat>95%    AM CXR    AM ABG      CARDS:     Hypovolemic shock- on vasopressin, neosynephrine infusions    S/p NSTEMI 10/1/19 s/p staged PCI with DULCE x 2 in pLCx and OM2- ASA, plavix held    Hx of HTN- home lisinopril metoprolol held    V-tach during last admission- continue with PO amiodarone 200mg daily    Echo 10/23- EF 60%, sclerotic aortic valve, with normal opening, G1DD    EKG- NSR    GI/NUTR:     S/p ex-lap, SBR, L external iliac to SMA bypass with PTFE 10/22    S/p EGD 10/28    Diet, NPO    PPI infusion    /RENAL:     Shen, monitor urine output    Cr 0.6    Monitor lytes, replete PRN    Lytes-Na 143 // K 4.4 // Phos 3.1 //  Mag 1.8    HEME/ONC:     Monitor Hgb- 8.6    Holding HSQ    ID:     Cefepime, flagyl restarted as per primary team    Afebrile, monitor for signs of infection    WBC 12.75    ENDO:    Hypothyroidism- synthroid 100mcg IV     , 162

## 2019-10-28 NOTE — PROGRESS NOTE ADULT - SUBJECTIVE AND OBJECTIVE BOX
Recalled regarding evaluation for upgrade for melanotic stools, hemoglobin drop.     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 He was brought to the OR 10/22 for ex lap with small bowel resection and primary anastomosis for necrotic bowel and left external iliac to SMA bypass with PTFE. He was downgraded from the ICU 10/26 after he was extubated and weaned off of pressors, tolerating CLD, and voiding. He was restarted on aspirin and Plavix.   On the floor, he was tolerating fulls and ambulating, getting Lasix for volume overload, and then developed multiple loose bowel movements, initially green then turning bloody. He had 13 recorded BMs, decreased to 3 since this MN. He states he had abdominal pain after being transferred. He states these bowel movements are not similar to the diarrhea he had prior. He has hemorrhoids, but denies blood in the toilet bowl or on the stool but rather mixed with stool. SQH, Plavix, and aspirin were held, and he was transfused 1u pRBCs overnight an an additional 2u pRBCs this morning for Hemoglobin 10.9-> 8.5-> 8.0->(1u) 7.7-> (2u)-> 9.6. His HR 80-90s, SBPs 115, decreased from prior baseline SBPs 140s.  He has been made NPO with plans for CTA A/P to eval for GI bleed.         T(F): 96.1 (10-28-19 @ 16:54), Max: 97.1 (10-28-19 @ 08:00)  HR: 110 (10-28-19 @ 17:25) (89 - 110)  BP: 85/49 (10-28-19 @ 17:25) (85/49 - 117/55)  ABP: --  ABP(mean): --  RR: 18 (10-28-19 @ 17:25) (18 - 21)  SpO2: 100% (10-28-19 @ 16:54) (96% - 100%)    DIET/FLUIDS: sodium chloride 0.9%. 1000 milliLiter(s) IV Continuous <Continuous>     GI proph:  pantoprazole  Injectable 40 milliGRAM(s) IV Push every 12 hours    AC/ proph:   ABx: cefepime   IVPB 2000 milliGRAM(s) IV Intermittent every 8 hours  cefepime   IVPB      metroNIDAZOLE  IVPB      metroNIDAZOLE  IVPB 500 milliGRAM(s) IV Intermittent every 8 hours      PHYSICAL EXAM:  GENERAL: NAD, well-appearing, conversational.   CHEST/LUNG: On 2L NC, Clear to auscultation bilaterally, no adventitious sounds  HEART: Regular rate and rhythm, no noted murmurs.   ABDOMEN: Soft, minimally tender, nondistended. Staples in place on midline incision, minimally tender to palpation, no rebound or guarding. Mild ecchymosis scattered across abdomen.   EXTREMITIES:  No clubbing, cyanosis, or edema. Motor and sensory intact       LABS  CAPILLARY BLOOD GLUCOSE             Complete Blood Count (10.28.19 @ 11:23)    Nucleated RBC: 0 /100 WBCs    WBC Count: 12.91 K/uL    RBC Count: 3.19 M/uL    Hemoglobin: 9.6 g/dL    Hematocrit: 28.7 %    Mean Cell Volume: 90.0 fL    Mean Cell Hemoglobin: 30.1 pg    Mean Cell Hemoglobin Conc: 33.4 g/dL    Red Cell Distrib Width: 14.9 %    Platelet Count - Automated: 220 K/uL      10-28    140  |  106  |  22<H>  ----------------------------<  150<H>  3.5   |  25  |  0.5<L>      Calcium, Total Serum: 7.1 mg/dL (10-28-19 @ 02:39)      LFTs:       ABG - ( 24 Oct 2019 14:24 )  pH: 7.42  /  pCO2: 33    /  pO2: 112   / HCO3: 21    / Base Excess: -2.7  /  SaO2: 98              ABG - ( 24 Oct 2019 12:13 )  pH: 7.40  /  pCO2: 33    /  pO2: 122   / HCO3: 21    / Base Excess: -3.5  /  SaO2: 99              ABG - ( 24 Oct 2019 11:20 )  pH: 7.42  /  pCO2: 33    /  pO2: 149   / HCO3: 21    / Base Excess: -2.9  /  SaO2: 99                Coags:        Serum Pro-Brain Natriuretic Peptide: 2374 pg/mL (10-19-19 @ 16:45)              RADIOLOGY & ADDITIONAL TESTS:  < from: Xray Chest 1 View- PORTABLE-Routine (10.27.19 @ 08:30) >    Impression:      Improving bilateral opacity/effusions.    < end of copied text >    CTA A/P < from: CT Angio Abdomen and Pelvis w/ IV Cont (10.28.19 @ 12:37) >    IMPRESSION:  Transient focus of arterial hyperenhancement along the wall of the lesser   curvature of the stomach without pooling seen on delayed venous phase   images, indeterminate - may represent a focus of active bleeding - but   not definite. Correlate for history of melena/signs of upper GI bleeding.    No findings elsewhere of an active GI bleed.     Occluded proximal SMA. Interval placement of SMA bypass graft to the left   external iliac artery, which is patent. Additional Vascular findings as   detailed in the body the report.    < end of copied text >

## 2019-10-28 NOTE — PROGRESS NOTE ADULT - ASSESSMENT
Patient is a 84y old Male s/p exploratory laparotomy with small bowel resection and anastomosis and a left External Iliac to SMA bypass with PTFE.    Plan:   - pain control   - PT   - advance diet per Blue team Patient is a 84y old Male s/p exploratory laparotomy with small bowel resection and anastomosis and a left External Iliac to SMA bypass with PTFE.    Plan:   - trend Hb  - pain control   - PT   - advance diet per Blue team

## 2019-10-28 NOTE — CONSULT NOTE ADULT - SUBJECTIVE AND OBJECTIVE BOX
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 He was brought to the OR 10/22 for ex lap with small bowel resection and primary anastomosis for necrotic bowel and left external iliac to SMA bypass with PTFE. He was downgraded from the ICU 10/26 after he was extubated and weaned off of pressors, tolerating CLD, and voiding. He was restarted on aspirin and Plavix.     On the floor, he was tolerating fulls and ambulating, getting Lasix for volume overload, and then developed multiple loose bowel movements, initially green then turning bloody. He had 13 recorded BMs, decreased to 3 since this MN, BRBPR. He states he had abdominal pain after being transferred. SQH, Plavix, and aspirin were held, and he was transfused 3u pRBCs for Hemoglobin 10.9-> 8.5-> 8.0->(1u) 7.7-> (2u)-> 9.6. His HR 80-90s, SBPs 115, decreased from prior baseline SBPs 140s.  He was made NPO. CTA A/P showed transient focus of arterial hyper-enhancement along lesser curvature of stomach, suspicious for active bleeding, patent bypass with flow to SMA. GI was consulted for urgent EGD.     Procedure: EGD  Procedure time: 45 min       EBL: minimal            IV Fluids:  mL         UOP: None        Procedure Findings-  ·	Small ulcer identified in GE junction extending into cardia  ·	S/p injection with epinephrine and 1 clip placed  ·	Patient was intubated with awake fiberoptic given history of difficult intubation  ·	Remained intubated post-procedure, as per anesthesia for possible future procedure  ·	Patient on precedex, vasopressin, and neosynephrine infusions    PAST MEDICAL & SURGICAL HISTORY:  Neuropathy  TIA (transient ischemic attack)  Left carotid stenosis  Lyme disease  Hypothyroidism  Coronary artery disease  No significant past surgical history    Home Meds:   Home Medications:  acetaminophen 325 mg oral tablet: 2 tab(s) orally every 6 hours, As needed, Moderate Pain (4 - 6) (10 Oct 2019 09:54)  [place] Thyroid 120 mg oral tablet: 1 tab(s) orally once a day (26 Sep 2019 11:56)  aspirin 81 mg oral tablet, chewable: 1 tab(s) orally once a day (26 Sep 2019 11:56)  clopidogrel 75 mg oral tablet: 1 tab(s) orally once a day (02 Oct 2019 13:26)  Crestor 20 mg oral tablet: 1 tab(s) orally once a day (at bedtime) (26 Sep 2019 11:55)  levothyroxine 200 mcg (0.2 mg) oral tablet: 1 tab(s) orally once a day (10 Oct 2019 09:54)  lisinopril 20 mg oral tablet: 1 tab(s) orally once a day (26 Sep 2019 11:55)  sucralfate 1 g oral tablet: 1 tab(s) orally every 12 hours (10 Oct 2019 09:54)    Allergies  iodinated radiocontrast agents (Hives)    Advanced Directives: Full Code    CURRENT MEDICATIONS:   acetaminophen   Tablet .. 650 milliGRAM(s) Oral every 6 hours PRN  aMIOdarone    Tablet 200 milliGRAM(s) Oral daily  atorvastatin 80 milliGRAM(s) Oral at bedtime  cefepime   IVPB 2000 milliGRAM(s) IV Intermittent every 8 hours  cefepime   IVPB      chlorhexidine 0.12% Liquid 15 milliLiter(s) Oral Mucosa every 12 hours  chlorhexidine 4% Liquid 1 Application(s) Topical <User Schedule>  dexMEDEtomidine Infusion 0.2 MICROgram(s)/kG/Hr (4.24 mL/Hr) IV Continuous <Continuous>  furosemide    Tablet 20 milliGRAM(s) Oral daily  HYDROmorphone  Injectable 0.25 milliGRAM(s) IV Push every 4 hours PRN  lactated ringers. 1000 milliLiter(s) (100 mL/Hr) IV Continuous <Continuous>  levothyroxine 200 MICROGram(s) Oral at bedtime  lisinopril 20 milliGRAM(s) Oral daily  melatonin 5 milliGRAM(s) Oral at bedtime  metoprolol tartrate 12.5 milliGRAM(s) Oral every 12 hours  metroNIDAZOLE  IVPB      metroNIDAZOLE  IVPB 500 milliGRAM(s) IV Intermittent every 8 hours  ondansetron Injectable 4 milliGRAM(s) IV Push once PRN  pantoprazole Infusion 8 mG/Hr (10 mL/Hr) IV Continuous <Continuous>  phenylephrine    Infusion 0.629 MICROgram(s)/kG/Min (10 mL/Hr) IV Continuous <Continuous>  sodium chloride 0.9%. 1000 milliLiter(s) (75 mL/Hr) IV Continuous <Continuous>  tamsulosin 0.4 milliGRAM(s) Oral at bedtime  vasopressin Infusion 0.083 Unit(s)/Min (5 mL/Hr) IV Continuous <Continuous>    VITAL SIGNS, INS/OUTS (Last 24hours):    ICU Vital Signs Last 24 Hrs  T(C): 36.5 (28 Oct 2019 20:37), Max: 36.5 (28 Oct 2019 20:37)  T(F): 97.7 (28 Oct 2019 20:37), Max: 97.7 (28 Oct 2019 20:37)  HR: 104 (28 Oct 2019 21:22) (84 - 110)  BP: 124/71 (28 Oct 2019 21:22) (85/49 - 159/54)  RR: 13 (28 Oct 2019 21:22) (11 - 22)  SpO2: 100% (28 Oct 2019 21:22) (96% - 100%)    I&O's Summary    27 Oct 2019 07:01  -  28 Oct 2019 07:00  --------------------------------------------------------  IN: 1160 mL / OUT: 2000 mL / NET: -840 mL    28 Oct 2019 07:01  -  28 Oct 2019 21:49  --------------------------------------------------------  IN: 0 mL / OUT: 450 mL / NET: -450 mL      Height (cm): 172.72 (10-28-19)  Weight (kg): 84.8 (10-28-19)  BMI (kg/m2): 28.4 (10-28-19)  BSA (m2): 1.99 (10-28-19)    Physical Exam:  ---------------------------------------------------------------------------------------  RASS:   GCS:    E/M/V  Exam: sedated, intubated, no focal deficits    RESPIRATORY:  Intubated  Lungs clear to auscultation b/l, Normal expansion/effort  Mechanical Ventilation: Mode: AC/ CMV (Assist Control/ Continuous Mandatory Ventilation)  RR (machine): 10  TV (machine): 500  FiO2: 40  PEEP: 5  ITime: 1  MAP: 9.3  PIP: 22    CARDIOVASCULAR:   S1/S2.  RRR  No peripheral edema    GASTROINTESTINAL:  Abdomen soft, non-tender, non-distended  Incision site CDI staples in place    MUSCULOSKELETAL:  Extremities warm, pink, well-perfused.  Palpable/Doppler pulses signals  b/l    DERM:  No skin breakdown     LABS  --------------------------------------------------------------------------------------  CAPILLARY BLOOD GLUCOSE                              8.6    12.75 )-----------( 238      ( 28 Oct 2019 15:50 )             25.5         10-28    140  |  106  |  22<H>  ----------------------------<  150<H>  3.5   |  25  |  0.5<L>      Calcium, Total Serum: 7.1 mg/dL (10-28-19 @ 02:39)      LFTs:    ABG - ( 28 Oct 2019 21:00 )  pH: 7.46  /  pCO2: 35    /  pO2: 179   / HCO3: 25    / Base Excess: 1.3   /  SaO2: 99        ABG - ( 24 Oct 2019 14:24 )  pH: 7.42  /  pCO2: 33    /  pO2: 112   / HCO3: 21    / Base Excess: -2.7  /  SaO2: 98        ABG - ( 24 Oct 2019 12:13 )  pH: 7.40  /  pCO2: 33    /  pO2: 122   / HCO3: 21    / Base Excess: -3.5  /  SaO2: 99        CT/XRAY/ECHO/TCD/EEG  --------------------------------------------------------------------------------------  < from: CT Angio Abdomen and Pelvis w/ IV Cont (10.28.19 @ 12:37) >  Transient focus of arterial hyperenhancement along the wall of the lesser   curvature of the stomach without pooling seen on delayed venous phase   images, indeterminate - may represent a focus of active bleeding - but   not definite. Correlate for history of melena/signs of upper GI bleeding.    No findings elsewhere of an active GI bleed.     Occluded proximal SMA. Interval placement of SMA bypass graft to the left   external iliac artery, which is patent. Additional Vascular findings as   detailed in the body the report.    < end of copied text >    --------------------------------------------------------------------------------------  Admit Diagnosis: ABDOMINAL PAIN; DIARRHEA; UNABLE TO EAT

## 2019-10-29 NOTE — PROGRESS NOTE ADULT - ASSESSMENT
ASSESSMENT:  84M s/p exploratory laparotomy, SBR with primary anastomosis, left external iliac to SMA bypass with PTFE for mesenteric ischemia, presents with UGI bleed s/p EGD    PLAN:    NEURO:     Intubated, sedated on precedex    Pain-controlled with ATC tylenol, fentanyl 25mcg IV q6 prn    RESP:     Intubated /10/50/5    ABG 7.46/35/179/25 lact 1.2    No chronic dx, maintain sat>95%    AM CXR    AM ABG      CARDS:     Hypovolemic shock- on vasopressin, neosynephrine infusions    S/p NSTEMI 10/1/19 s/p staged PCI with DULCE x 2 in pLCx and OM2- ASA, plavix held    Hx of HTN- home lisinopril metoprolol held    V-tach during last admission- continue with PO amiodarone 200mg daily    Echo 10/23- EF 60%, sclerotic aortic valve, with normal opening, G1DD    EKG- NSR    GI/NUTR:     S/p ex-lap, SBR, L external iliac to SMA bypass with PTFE 10/22    S/p EGD 10/28    Diet, NPO    PPI infusion    /RENAL:     Shen, monitor urine output    Cr 0.6    Monitor lytes, replete PRN    Lytes-Na 143 // K 4.4 // Phos 3.1 //  Mag 1.8    HEME/ONC:     Monitor Hgb- 8.6    Holding HSQ    ID:     Cefepime, flagyl restarted as per primary team    Afebrile, monitor for signs of infection    WBC 12.75    ENDO:    Hypothyroidism- synthroid 100mcg IV     , 162 ASSESSMENT:  84M s/p exploratory laparotomy, SBR with primary anastomosis, left external iliac to SMA bypass with PTFE for mesenteric ischemia, presents with UGI bleed s/p EGD    PLAN:    NEURO:     Intubated, sedated on precedex    Pain-controlled with ATC tylenol, fentanyl 25mcg IV q6 prn    RESP:     Intubated /10/40/5    AM ABG 7.48/33/205/25 BE 1.4, O2 sat 100% lact 0.8 (1.2)    No chronic dx    AM CXR      CARDS:     Hypovolemic shock- on neosynephrine infusion (coming down, 0.8)    S/p NSTEMI 10/1/19 s/p staged PCI with DULCE x 2 in pLCx and OM2- ASA, plavix held    Hx of HTN- home lisinopril, metoprolol held    V-tach during last admission- continue with PO amiodarone 200mg daily    Echo 10/23- EF 60%, sclerotic aortic valve, with normal opening, G1DD    EKG- NSR    GI/NUTR:     S/p ex-lap, SBR, L external iliac to SMA bypass with PTFE 10/22  S/p EGD 10/28- Small ulcer identified in GE junction extending into cardia  S/p injection with epinephrine and 1 clip placed    Diet, NPO    NG tube    PPI infusion    /RENAL:     Shen, monitor urine output- 100cc/hr    Cr 0.6     Monitor lytes, replete PRN    Lytes-Na 143 // K 4.4 // Phos 3.1 //  Mag 1.8    HEME/ONC:     Monitor Hgb- 8.6>7.6 (serial CBCs)    Holding HSQ    ID:     Cefepime, flagyl restarted as per primary team    Afebrile, monitor for signs of infection    WBC 12.75>17.9>18.8    ENDO:    Hypothyroidism- synthroid 100mcg IV     , 162

## 2019-10-29 NOTE — PROGRESS NOTE ADULT - ASSESSMENT
Impression:  UGIB s/p PRBC transfusion, s/p EGD yesterday -> gastric ulcer, s/p EPi injection and hemostatic clip; currently in ICU, intubated, on pressor support   Recent acute mesenteric ischemia s/p small bowel resection and SMA bypass  Recent NSTEMI s/p PCI    Plan:  Aspirin (DE) restarted as per Surgery; can switch to oral ASA 81mg when pt tolerates PO  Discussed with SICU team -> planned for extubation today after SBT  Discussed with GI team -> Ok to restart plavix   monitor serial CBCs  restart Metoprolol when BP/HR is tolerated   d/w Dr. Chavez

## 2019-10-29 NOTE — PROGRESS NOTE ADULT - ASSESSMENT
Patient is a 84y old Male s/p exploratory laparotomy with small bowel resection and anastomosis and a left External Iliac to SMA bypass with PTFE. s/p GI bleed with ulcer noted in GE junction s/p 1 clip placement.     Plan:   - trend Hb  - pain control   - No acute vascular surgical intervention  - SICU level of care

## 2019-10-29 NOTE — PROGRESS NOTE ADULT - ATTENDING COMMENTS
Stable and afebrile.  On the vent after EGD.  EGD with ulcer in proximal stomach, clipped and injected epi.  HB stable after EGD.  NGT no blood.  Appears pale.  wound intact.  CT yesterday with patent graft.    a/p:  Progressing well.  wean of vent.  clears post extubation.  remove NGT.  Continue ASA, hold Plavix today.

## 2019-10-29 NOTE — CHART NOTE - NSCHARTNOTEFT_GEN_A_CORE
Registered Dietitian Follow-Up    ***Scroll to the bottom for RD recommendation***    Patient Profile Reviewed                           Yes [x]   No []  Nutrition History Previously Obtained        Yes [x]  No []          PERTINENT SUBJECTIVE INFORMATION (LATEST AS OF TODAY):  - pt is now intubated to ventilator, been NPO since admission (10 days now).   - pt is awake, /58, MAP 74, Ve 8.41  - Pt is on IV protonix, IVF        PERTINENT MEDICAL INFORMATIONS:  (1) Multiple dark BM yesterday with downtrending hgb, s/p 3u PRBC. CTA shows bypass w/ SMA.   (2) S/p EGD and GI which showed an ulcer at the GE junction with blood filled stomach. Sx is following.  (3) Pt remains intubated after the endo.        DIET ORDER:  NPO        ANTHROPOMETRICS:  - Ht.  172.7cm  - Wt.  (10/25): 84.8kg  (10/29): 97kg - pt does have 1+ edema but unlikely over 10kg weight gain. Will monitor.  - BMI. 28.6 from admission  - IBW       PERTINENT LAB DATA:   10/29: h/h 7.6/28.1, BUN 32, Cr 0.6, Glucose 159, Ca 7,1  PERTINENT MEDS:  abx, flagyl, levothyroxine, acetaminophen, amiodarone, atorvastatin      PHYSICAL FINDINGS  - APPEARANCE:        pt appears awake, intubated to ventilator. 1+ R arm and L wrist edema  - GI FUNCTION:        LBM 10/29 per EMR  - TUBES:                     ?OGT  - ORAL/MOUTH:      NPO  - SKIN:                        surgical incision        NUTRITION REQUIREMENTS  WEIGHT USED:                          ABW used was 84.8kg (per 10/25 wt)  ESTIMATED ENERGY NEEDS:       CONTINUE [  ]      ADJUST [ x ] - now that intubated    ESTIMATED ENERGY NEEDS:         1622 kcal/day ([id number])  ESTIMATED PROTEIN NEEDS:        102-120 g/day (1.2-1.4 g/kg of ABW)  ESTIMATED FLUID NEEDS:             per ICU team    CURRENT NUTRIENT NEEDS:     NPO          [ x ] PREVIOUS NUTRITION DIAGNOSIS:   (1) inadequate protein-energy intake             [ x ] ONGOING        [  ] RESOLVED            PATIENT INTERVENTION:    [  ] ORAL        [ x] EN/TF     GOAL/EXPECTED OUTCOME:     pt to meet and tolerate >85% of estimated kcal/protein via TF upon f/u in 3 days when TF initiated.   INDICATOR/MONITORING:       RD to monitor diet order, energy intake, body composition, nutrition focused physical findings (EN tolerance, electrolytes)  NUTRITION INTERVENTION:        Enteral nutrition. medical food supplement    RECS: (1) If patient to start feeding, start slow at OSMOLITE 1.0 at 20cc/hr, increase by 5cc q4hr or as tolerated to GOAL of 60cc/hr, with No-Carb Prosource TF pk x4/day. This regimen at GOAL gives a total of 1660kcal/ 107g protein/ 1210mL free water, additional FLUSHES per ICU team. Registered Dietitian Follow-Up    ***Scroll to the bottom for RD recommendation***    Patient Profile Reviewed                           Yes [x]   No []  Nutrition History Previously Obtained        Yes [x]  No []          PERTINENT SUBJECTIVE INFORMATION (LATEST AS OF TODAY):  - pt is now intubated to ventilator, been NPO since admission (10 days now).   - pt is awake, /58, MAP 74, Ve 8.41  - Pt is on IV protonix, IVF        PERTINENT MEDICAL INFORMATIONS:  (1) Multiple dark BM yesterday with downtrending hgb, s/p 3u PRBC. CTA shows bypass w/ SMA.   (2) S/p EGD and GI which showed an ulcer at the GE junction with blood filled stomach. Sx is following.  (3) Pt remains intubated after the endo.        DIET ORDER:  NPO        ANTHROPOMETRICS:  - Ht.  172.7cm  - Wt.  (10/25): 84.8kg  (10/29): 97kg - pt does have 1+ edema but unlikely over 10kg weight gain. Will monitor.  - BMI. 28.6 from admission  - IBW       PERTINENT LAB DATA:   10/29: h/h 7.6/28.1, BUN 32, Cr 0.6, Glucose 159, Ca 7,1  PERTINENT MEDS:  abx, flagyl, levothyroxine, acetaminophen, amiodarone, atorvastatin      PHYSICAL FINDINGS  - APPEARANCE:        pt appears awake, intubated to ventilator. 1+ R arm and L wrist edema  - GI FUNCTION:        LBM 10/29 per EMR  - TUBES:                     ?OGT  - ORAL/MOUTH:      NPO  - SKIN:                        surgical incision        NUTRITION REQUIREMENTS  WEIGHT USED:                          ABW used was 84.8kg (per 10/25 wt)  ESTIMATED ENERGY NEEDS:       CONTINUE [  ]      ADJUST [ x ] - now that intubated    ESTIMATED ENERGY NEEDS:         1622 kcal/day ([id number])  ESTIMATED PROTEIN NEEDS:        102-120 g/day (1.2-1.4 g/kg of ABW)  ESTIMATED FLUID NEEDS:             per ICU team    CURRENT NUTRIENT NEEDS:     NPO          [ x ] PREVIOUS NUTRITION DIAGNOSIS:   (1) inadequate protein-energy intake             [ x ] ONGOING        [  ] RESOLVED            PATIENT INTERVENTION:    [  ] ORAL        [ x] EN/TF     GOAL/EXPECTED OUTCOME:     pt to meet and tolerate >85% of estimated kcal/protein via TF upon f/u in 3 days when TF initiated.   INDICATOR/MONITORING:       RD to monitor diet order, energy intake, body composition, nutrition focused physical findings (EN tolerance, electrolytes)  NUTRITION INTERVENTION:        Enteral nutrition. medical food supplement    RECS: (1) If patient to start feeding, start slow at OSMOLITE 1.0 at 20cc/hr, increase by 5cc q4hr or as tolerated to GOAL of 60cc/hr, with No-Carb Prosource TF pk x4/day. This regimen at GOAL gives a total of 1660kcal/ 107g protein/ 1210mL free water, additional FLUSHES per ICU team. (2) If patient to continue NPO, since today is already the 10th NPO day, consult NST for PPN.

## 2019-10-29 NOTE — PROGRESS NOTE ADULT - SUBJECTIVE AND OBJECTIVE BOX
GENERAL SURGERY PROGRESS NOTE     LAKSHMI JUDD  84y  Male  Hospital day :10d  POD:  Procedure: Small bowel resection with anastomosis  Exploratory laparotomy  Bypass of superior mesenteric artery    OVERNIGHT EVENTS: Patient had multiple dark BM yesterday with downtrending Hgb requiring 3 uPRBC. CT angio was suspicious for bleed in stomach. EGD found ulcer at GE junction extending into cardia, used epi and 1 clip. Patient remained intubated after endoscopy and was upgraded to ICU. NGT, triple lumen and A line placed in ICU. Hgb from 8.5 -> 7.6    T(F): 99 (10-29-19 @ 02:30), Max: 99 (10-29-19 @ 02:30)  HR: 56 (10-29-19 @ 03:00) (56 - 110)  BP: 151/67 (10-29-19 @ 03:00) (85/49 - 159/54)  ABP: 111/64 (10-29-19 @ 00:28) (111/64 - 111/64)  ABP(mean): --  RR: 13 (10-29-19 @ 03:00) (11 - 22)  SpO2: 100% (10-29-19 @ 03:00) (96% - 100%)    DIET/FLUIDS: sodium chloride 0.9%. 1000 milliLiter(s) IV Continuous <Continuous>    URINE:    Indwelling Urethral Catheter:     Connect To:  Straight Drainage/[place]    Indication:  Perioperative use for Selected Surgical Procedures (10-28-19 @ 23:52)    GI proph:  pantoprazole Infusion 8 mG/Hr IV Continuous <Continuous>    AC/ proph:   ABx: cefepime   IVPB 2000 milliGRAM(s) IV Intermittent every 8 hours  cefepime   IVPB      metroNIDAZOLE  IVPB      metroNIDAZOLE  IVPB 500 milliGRAM(s) IV Intermittent every 8 hours      PHYSICAL EXAM:  GENERAL: intubated, sedated  CHEST/LUNG: Clear to auscultation bilaterally  HEART: Regular rate and rhythm  ABDOMEN: Soft, Nondistended  EXTREMITIES:  No clubbing, cyanosis, or edema    Labs:               7.5    17.94 )-----------( 292      ( 29 Oct 2019 00:00 )             22.1       Auto Neutrophil %: 85.2 % (10-29-19 @ 00:00)  Band Neutrophils %: 1.7 % (10-29-19 @ 00:00)    10-29    143  |  109  |  32<H>  ----------------------------<  159<H>  4.4   |  22  |  0.6<L>    Calcium, Total Serum: 7.1 mg/dL (10-29-19 @ 00:00)    Blood Gas Arterial, Lactate: 1.2 mmoL/L (10-28-19 @ 21:00)    ABG - ( 28 Oct 2019 21:00 )  pH: 7.46  /  pCO2: 35    /  pO2: 179   / HCO3: 25    / Base Excess: 1.3   /  SaO2: 99        ABG - ( 24 Oct 2019 14:24 )  pH: 7.42  /  pCO2: 33    /  pO2: 112   / HCO3: 21    / Base Excess: -2.7  /  SaO2: 98        ABG - ( 24 Oct 2019 12:13 )  pH: 7.40  /  pCO2: 33    /  pO2: 122   / HCO3: 21    / Base Excess: -3.5  /  SaO2: 99        Coags:     19.80  ----< 1.73    ( 29 Oct 2019 00:00 )     22.4      CARDIAC MARKERS ( 29 Oct 2019 00:00 )  x     / <0.01 ng/mL / x     / x     / x        Serum Pro-Brain Natriuretic Peptide: 2374 pg/mL (10-19-19 @ 16:45)    RADIOLOGY & ADDITIONAL TESTS:    < from: CT Angio Abdomen and Pelvis w/ IV Cont (10.28.19 @ 12:37) >  IMPRESSION:  Transient focus of arterial hyperenhancement along the wall of the lesser   curvature of the stomach without pooling seen on delayed venous phase   images, indeterminate - may represent a focus of active bleeding - but   not definite. Correlate for history of melena/signs of upper GI bleeding.  No findings elsewhere of an active GI bleed.   Occluded proximal SMA. Interval placement of SMA bypass graft to the left   external iliac artery, which is patent. Additional Vascular findings as   detailed in the body the report.  Post-surgical changes.  < end of copied text >

## 2019-10-29 NOTE — PROGRESS NOTE ADULT - ASSESSMENT
83 yo M w/ NSTEMI s/p PCI w/ DULCE x2 1 month ago on DAPT presents found to have ischemic small bowel s/p exploratory laparotomy with small bowel resection and anastomosis and a left External Iliac to SMA bypass with PTFE, hospital course complicated by GIB    #Acute UGIB d/t gastric ulcer s/p clipping and epi  -hgb stable overnight, no report of dark BM  -Protonix BID  -can start clears if extubated 85 yo M w/ NSTEMI s/p PCI w/ DULCE x2 1 month ago on DAPT presents found to have ischemic small bowel s/p exploratory laparotomy with small bowel resection and anastomosis and a left External Iliac to SMA bypass with PTFE, hospital course complicated by GIB    #Acute UGIB d/t gastric ulcer s/p clipping and epi  -monitor CBC and hemodynamics  -hgb stable overnight, no report of dark BM  -Protonix BID  -can start clears if extubated 85 yo M w/ NSTEMI s/p PCI w/ DULCE x2 1 month ago on DAPT presents found to have ischemic small bowel s/p exploratory laparotomy with small bowel resection and anastomosis and a left External Iliac to SMA bypass with PTFE, hospital course complicated by GIB    #Acute UGIB d/t gastric ulcer s/p clipping and epi  -monitor CBC and hemodynamics  -hgb stable overnight, no report of bloody BM  -Protonix BID  -can start clears if extubated 85 yo M w/ NSTEMI s/p PCI w/ DULCE x2 1 month ago on DAPT presents found to have ischemic small bowel s/p exploratory laparotomy with small bowel resection and anastomosis and a left External Iliac to SMA bypass with PTFE, hospital course complicated by GIB    #Acute UGIB d/t gastric ulcer s/p clipping and epi  -monitor CBC q 6 hours and hemodynamics  -hgb stable overnight, no report of bloody BM  -Protonix BID  - no need for octreotide  -can start clears if extubated  - advance diet as tolerated

## 2019-10-29 NOTE — PROGRESS NOTE ADULT - SUBJECTIVE AND OBJECTIVE BOX
LAKSHMI JUDD  84y  Male    Allergy: iodinated radiocontrast agents (Hives)      Patient is a 84y old  Male who presents with a chief complaint of diarrhea (29 Oct 2019 08:57)      INTERVAL HPI/OVERNIGHT EVENTS:  seen and examined pt at bedside. pt is awake, alert, intubated, in ICU. on pressor support, PPI and IV fluids. pt had UGIB and underwent EGD yesterday.     REVIEW OF SYSTEMS:  All other review of systems negative    PAST MEDICAL & SURGICAL HISTORY:  Neuropathy  TIA (transient ischemic attack)  Left carotid stenosis  Lyme disease  Hypothyroidism  Coronary artery disease  No significant past surgical history      Vital Signs Last 24 Hrs  T(C): 36 (29 Oct 2019 08:00), Max: 37.2 (29 Oct 2019 02:30)  T(F): 96.8 (29 Oct 2019 08:00), Max: 99 (29 Oct 2019 02:30)  HR: 60 (29 Oct 2019 10:30) (50 - 110)  BP: 99/48 (29 Oct 2019 10:15) (76/39 - 159/54)  BP(mean): 64 (29 Oct 2019 10:15) (49 - 91)  RR: 25 (29 Oct 2019 10:30) (10 - 25)  SpO2: 100% (29 Oct 2019 10:30) (88% - 100%)    I&O's Summary    28 Oct 2019 07:01  -  29 Oct 2019 07:00  --------------------------------------------------------  IN: 1366.3 mL / OUT: 985 mL / NET: 381.3 mL    29 Oct 2019 07:01  -  29 Oct 2019 11:45  --------------------------------------------------------  IN: 384.8 mL / OUT: 50 mL / NET: 334.8 mL        Home Medications:  acetaminophen 325 mg oral tablet: 2 tab(s) orally every 6 hours, As needed, Moderate Pain (4 - 6) (10 Oct 2019 09:54)  [place] Thyroid 120 mg oral tablet: 1 tab(s) orally once a day (26 Sep 2019 11:56)  aspirin 81 mg oral tablet, chewable: 1 tab(s) orally once a day (26 Sep 2019 11:56)  clopidogrel 75 mg oral tablet: 1 tab(s) orally once a day (02 Oct 2019 13:26)  Crestor 20 mg oral tablet: 1 tab(s) orally once a day (at bedtime) (26 Sep 2019 11:55)  levothyroxine 200 mcg (0.2 mg) oral tablet: 1 tab(s) orally once a day (10 Oct 2019 09:54)  lisinopril 20 mg oral tablet: 1 tab(s) orally once a day (26 Sep 2019 11:55)  sucralfate 1 g oral tablet: 1 tab(s) orally every 12 hours (10 Oct 2019 09:54)      PHYSICAL EXAM:  GENERAL: NAD, intubated   HEENT: no pallor/icterus  NECK: supple  NERVOUS SYSTEM:  intubated, awake, alert   PULMONARY: + b/l air entry   CARDIOVASCULAR: Regular rate and rhythm; No murmurs, rubs, or gallops  GI: Soft, Nontender, Nondistended; Bowel sounds present  EXTREMITIES:  mild LE pitting edema b/l       LABS                        7.6    18.83 )-----------( 309      ( 29 Oct 2019 05:00 )             22.1     10-29    143  |  109  |  32<H>  ----------------------------<  159<H>  4.4   |  22  |  0.6<L>    Ca    7.1<L>      29 Oct 2019 00:00  Phos  3.1     10-29  Mg     1.8     10-29      CARDIAC MARKERS ( 29 Oct 2019 05:00 )  x     / 0.02 ng/mL / x     / x     / x      CARDIAC MARKERS ( 29 Oct 2019 00:00 )  x     / <0.01 ng/mL / x     / x     / x            PT/INR - ( 29 Oct 2019 00:00 )   PT: 19.80 sec;   INR: 1.73 ratio         PTT - ( 29 Oct 2019 00:00 )  PTT:22.4 sec    RADIOLOGY & ADDITIONAL TESTS:      MEDICATIONS  (STANDING):  aMIOdarone    Tablet 200 milliGRAM(s) Oral daily  aspirin Suppository 300 milliGRAM(s) Rectal daily  atorvastatin 80 milliGRAM(s) Oral at bedtime  cefepime   IVPB 2000 milliGRAM(s) IV Intermittent every 8 hours  cefepime   IVPB      chlorhexidine 4% Liquid 1 Application(s) Topical <User Schedule>  dexMEDEtomidine Infusion 0.1 MICROgram(s)/kG/Hr (2.12 mL/Hr) IV Continuous <Continuous>  lactated ringers Bolus 250 milliLiter(s) IV Bolus once  levothyroxine Injectable 100 MICROGram(s) IV Push at bedtime  metoprolol tartrate 12.5 milliGRAM(s) Enteral Tube two times a day  metroNIDAZOLE  IVPB      metroNIDAZOLE  IVPB 500 milliGRAM(s) IV Intermittent every 8 hours  pantoprazole Infusion 8 mG/Hr (10 mL/Hr) IV Continuous <Continuous>  phenylephrine    Infusion 0.629 MICROgram(s)/kG/Min (10 mL/Hr) IV Continuous <Continuous>  sodium chloride 0.9%. 1000 milliLiter(s) (75 mL/Hr) IV Continuous <Continuous>  tamsulosin 0.4 milliGRAM(s) Oral at bedtime    MEDICATIONS  (PRN):  acetaminophen   Tablet .. 650 milliGRAM(s) Oral every 6 hours PRN Mild Pain (1 - 3)  fentaNYL    Injectable 25 MICROGram(s) IV Push every 6 hours PRN Moderate Pain (4 - 6)      < from: 12 Lead ECG (10.28.19 @ 23:46) >  Diagnosis Line Normal sinus rhythmwith sinus arrhythmia  Low voltage QRS  T wave abnormality, consider anterior ischemia  Abnormal ECG    < end of copied text >    < from: Transthoracic Echocardiogram (10.23.19 @ 11:14) >    Summary:   1. LV Ejection Fraction by Salgado's Method with a biplane EF of 60 %.   2. Sclerotic aortic valve with normal opening.   3. Borderline dilatation of the aortic root.   4. Spectral Doppler shows impaired relaxation pattern of left   ventricular myocardial filling (Grade I diastolic dysfunction).    < end of copied text >    < from: EGD (10.28.19 @ 12:00) >    Impressions:    Ulcer in the GE junction extending into cardia.    Old blood found in the stomach.    Old blood in the duodenum.     < end of copied text >

## 2019-10-29 NOTE — PROCEDURAL SAFETY CHECKLIST WITH OR WITHOUT SEDATION - NSPROCEDPERFORMDFREE_GEN_ALL_CORE
"Requested Prescriptions   Pending Prescriptions Disp Refills     warfarin (COUMADIN) 2.5 MG tablet [Pharmacy Med Name: WARFARIN 2.5MG      TAB] 90 tablet 0     Sig: TAKE 2 TABLETS BY MOUTH ONCE DAILY -  NO  MAINTENANCE  ESTABLISHED  OR  AS  DIRECTED  BY  ANTICOAGULATION  CLINIC    Vitamin K Antagonists Failed    7/29/2018  6:47 PM       Failed - INR is within goal in the past 6 weeks    Confirm INR is within goal in the past 6 weeks.     Recent Labs   Lab Test 07/26/18   INR  2.3*                      Passed - Recent (12 mo) or future (30 days) visit within the authorizing provider's specialty    Patient had office visit in the last 12 months or has a visit in the next 30 days with authorizing provider or within the authorizing provider's specialty.  See \"Patient Info\" tab in inbasket, or \"Choose Columns\" in Meds & Orders section of the refill encounter.           Passed - Patient is 18 years of age or older        WARFARIN SODIUM PO  Last Written Prescription Date:  04/20/2018  Last Fill Quantity: ?,  # refills: ?   Last office visit: 6/21/2018 with prescribing provider:  VICKEY Gross   Future Office Visit:   Next 5 appointments (look out 90 days)     Aug 23, 2018  9:00 AM CDT   Office Visit with Katie Gross MD   [facility]    [address] [place] [id number] [phone]                 [place] APONTE (R) (M)    "
multi lumen
Left radial arterial line insertion
central line access

## 2019-10-29 NOTE — PROCEDURE NOTE - NSPROCDETAILS_GEN_ALL_CORE
hemostasis with direct pressure, dressing applied/positive blood return obtained via catheter/sutured in place/location identified, draped/prepped, sterile technique used, needle inserted/introduced/connected to a pressurized flush line/ultrasound guidance/all materials/supplies accounted for at end of procedure
sterile dressing applied/guidewire recovered/sterile technique, catheter placed/ultrasound guidance/lumen(s) aspirated and flushed
guidewire recovered/ultrasound guidance/lumen(s) aspirated and flushed/sterile dressing applied/sterile technique, catheter placed

## 2019-10-29 NOTE — PROGRESS NOTE ADULT - SUBJECTIVE AND OBJECTIVE BOX
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 He was brought to the OR 10/22 for ex lap with small bowel resection and primary anastomosis for necrotic bowel and left external iliac to SMA bypass with PTFE. He was downgraded from the ICU 10/26 after he was extubated and weaned off of pressors, tolerating CLD, and voiding. He was restarted on aspirin and Plavix.     On the floor, he was tolerating fulls and ambulating, getting Lasix for volume overload, and then developed multiple loose bowel movements, initially green then turning bloody. He had 13 recorded BMs, decreased to 3 since this MN, BRBPR. He states he had abdominal pain after being transferred. SQH, Plavix, and aspirin were held, and he was transfused 3u pRBCs for Hemoglobin 10.9-> 8.5-> 8.0->(1u) 7.7-> (2u)-> 9.6. His HR 80-90s, SBPs 115, decreased from prior baseline SBPs 140s.  He was made NPO. CTA A/P showed transient focus of arterial hyper-enhancement along lesser curvature of stomach, suspicious for active bleeding, patent bypass with flow to SMA. GI was consulted for urgent EGD.     Procedure: EGD  Procedure time: 45 min       EBL: minimal            IV Fluids:  mL         UOP: None        Procedure Findings-  ·	Small ulcer identified in GE junction extending into cardia  ·	S/p injection with epinephrine and 1 clip placed  ·	Patient was intubated with awake fiberoptic given history of difficult intubation  ·	Remained intubated post-procedure, as per anesthesia for possible future procedure  ·	Patient on precedex, vasopressin, and neosynephrine infusions    PAST MEDICAL & SURGICAL HISTORY:  Neuropathy  TIA (transient ischemic attack)  Left carotid stenosis  Lyme disease  Hypothyroidism  Coronary artery disease  No significant past surgical history    Home Meds:   Home Medications:  acetaminophen 325 mg oral tablet: 2 tab(s) orally every 6 hours, As needed, Moderate Pain (4 - 6) (10 Oct 2019 09:54)  [place] Thyroid 120 mg oral tablet: 1 tab(s) orally once a day (26 Sep 2019 11:56)  aspirin 81 mg oral tablet, chewable: 1 tab(s) orally once a day (26 Sep 2019 11:56)  clopidogrel 75 mg oral tablet: 1 tab(s) orally once a day (02 Oct 2019 13:26)  Crestor 20 mg oral tablet: 1 tab(s) orally once a day (at bedtime) (26 Sep 2019 11:55)  levothyroxine 200 mcg (0.2 mg) oral tablet: 1 tab(s) orally once a day (10 Oct 2019 09:54)  lisinopril 20 mg oral tablet: 1 tab(s) orally once a day (26 Sep 2019 11:55)  sucralfate 1 g oral tablet: 1 tab(s) orally every 12 hours (10 Oct 2019 09:54)    Allergies  iodinated radiocontrast agents (Hives)    Advanced Directives: Full Code    CURRENT MEDICATIONS:   acetaminophen   Tablet .. 650 milliGRAM(s) Oral every 6 hours PRN  aMIOdarone    Tablet 200 milliGRAM(s) Oral daily  atorvastatin 80 milliGRAM(s) Oral at bedtime  cefepime   IVPB 2000 milliGRAM(s) IV Intermittent every 8 hours  cefepime   IVPB      chlorhexidine 0.12% Liquid 15 milliLiter(s) Oral Mucosa every 12 hours  chlorhexidine 4% Liquid 1 Application(s) Topical <User Schedule>  dexMEDEtomidine Infusion 0.2 MICROgram(s)/kG/Hr (4.24 mL/Hr) IV Continuous <Continuous>  furosemide    Tablet 20 milliGRAM(s) Oral daily  HYDROmorphone  Injectable 0.25 milliGRAM(s) IV Push every 4 hours PRN  lactated ringers. 1000 milliLiter(s) (100 mL/Hr) IV Continuous <Continuous>  levothyroxine 200 MICROGram(s) Oral at bedtime  lisinopril 20 milliGRAM(s) Oral daily  melatonin 5 milliGRAM(s) Oral at bedtime  metoprolol tartrate 12.5 milliGRAM(s) Oral every 12 hours  metroNIDAZOLE  IVPB      metroNIDAZOLE  IVPB 500 milliGRAM(s) IV Intermittent every 8 hours  ondansetron Injectable 4 milliGRAM(s) IV Push once PRN  pantoprazole Infusion 8 mG/Hr (10 mL/Hr) IV Continuous <Continuous>  phenylephrine    Infusion 0.629 MICROgram(s)/kG/Min (10 mL/Hr) IV Continuous <Continuous>  sodium chloride 0.9%. 1000 milliLiter(s) (75 mL/Hr) IV Continuous <Continuous>  tamsulosin 0.4 milliGRAM(s) Oral at bedtime  vasopressin Infusion 0.083 Unit(s)/Min (5 mL/Hr) IV Continuous <Continuous>    VITAL SIGNS, INS/OUTS (Last 24hours):    ICU Vital Signs Last 24 Hrs  T(C): 36.5 (28 Oct 2019 20:37), Max: 36.5 (28 Oct 2019 20:37)  T(F): 97.7 (28 Oct 2019 20:37), Max: 97.7 (28 Oct 2019 20:37)  HR: 104 (28 Oct 2019 21:22) (84 - 110)  BP: 124/71 (28 Oct 2019 21:22) (85/49 - 159/54)  RR: 13 (28 Oct 2019 21:22) (11 - 22)  SpO2: 100% (28 Oct 2019 21:22) (96% - 100%)    I&O's Summary    27 Oct 2019 07:01  -  28 Oct 2019 07:00  --------------------------------------------------------  IN: 1160 mL / OUT: 2000 mL / NET: -840 mL    28 Oct 2019 07:01  -  28 Oct 2019 21:49  --------------------------------------------------------  IN: 0 mL / OUT: 450 mL / NET: -450 mL      Height (cm): 172.72 (10-28-19)  Weight (kg): 84.8 (10-28-19)  BMI (kg/m2): 28.4 (10-28-19)  BSA (m2): 1.99 (10-28-19)    Physical Exam:  ---------------------------------------------------------------------------------------  RASS:   GCS:    E/M/V  Exam: sedated, intubated, no focal deficits    RESPIRATORY:  Intubated  Lungs clear to auscultation b/l, Normal expansion/effort  Mechanical Ventilation: Mode: AC/ CMV (Assist Control/ Continuous Mandatory Ventilation)  RR (machine): 10  TV (machine): 500  FiO2: 40  PEEP: 5  ITime: 1  MAP: 9.3  PIP: 22    CARDIOVASCULAR:   S1/S2.  RRR  No peripheral edema    GASTROINTESTINAL:  Abdomen soft, non-tender, non-distended  Incision site CDI staples in place    MUSCULOSKELETAL:  Extremities warm, pink, well-perfused.  Palpable/Doppler pulses signals  b/l    DERM:  No skin breakdown     LABS  --------------------------------------------------------------------------------------  CAPILLARY BLOOD GLUCOSE                              8.6    12.75 )-----------( 238      ( 28 Oct 2019 15:50 )             25.5         10-28    140  |  106  |  22<H>  ----------------------------<  150<H>  3.5   |  25  |  0.5<L>      Calcium, Total Serum: 7.1 mg/dL (10-28-19 @ 02:39)      LFTs:    ABG - ( 28 Oct 2019 21:00 )  pH: 7.46  /  pCO2: 35    /  pO2: 179   / HCO3: 25    / Base Excess: 1.3   /  SaO2: 99        ABG - ( 24 Oct 2019 14:24 )  pH: 7.42  /  pCO2: 33    /  pO2: 112   / HCO3: 21    / Base Excess: -2.7  /  SaO2: 98        ABG - ( 24 Oct 2019 12:13 )  pH: 7.40  /  pCO2: 33    /  pO2: 122   / HCO3: 21    / Base Excess: -3.5  /  SaO2: 99        CT/XRAY/ECHO/TCD/EEG  --------------------------------------------------------------------------------------  < from: CT Angio Abdomen and Pelvis w/ IV Cont (10.28.19 @ 12:37) >  Transient focus of arterial hyperenhancement along the wall of the lesser   curvature of the stomach without pooling seen on delayed venous phase   images, indeterminate - may represent a focus of active bleeding - but   not definite. Correlate for history of melena/signs of upper GI bleeding.    No findings elsewhere of an active GI bleed.     Occluded proximal SMA. Interval placement of SMA bypass graft to the left   external iliac artery, which is patent. Additional Vascular findings as   detailed in the body the report.    < end of copied text >    --------------------------------------------------------------------------------------  Admit Diagnosis: ABDOMINAL PAIN; DIARRHEA; UNABLE TO EAT SICU Consultation Note  ======================================================================================================  LAKSHMI JUDD  [id number]    HPI:  84y Male PMH significant for hypothyroidism, HTN, CAD, recent admission for NSTEMI 10/1/19 s/p staged PCI with DULCE x 2 in pLCx and OM2 (on ASA, plavix) originally presented to the ED 10/19 with increasing abdominal pain after being discharged for PCI with DULCE for NSTEMI, with post diarrhea on and off of antibiotics, 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 He was brought to the OR 10/22 for ex lap with small bowel resection and primary anastomosis for necrotic bowel and left external iliac to SMA bypass with PTFE. He was downgraded from the ICU 10/26 after he was extubated and weaned off of pressors, tolerating CLD, and voiding. He was restarted on aspirin and Plavix.     On the floor, he was tolerating fulls and ambulating, getting Lasix for volume overload, and then developed multiple loose bowel movements, initially green then turning bloody. He had 13 recorded BMs, decreased to 3 since this MN, BRBPR. He states he had abdominal pain after being transferred. SQH, Plavix, and aspirin were held, and he was transfused 3u pRBCs for Hemoglobin 10.9-> 8.5-> 8.0->(1u) 7.7-> (2u)-> 9.6. His HR 80-90s, SBPs 115, decreased from prior baseline SBPs 140s.  He was made NPO. CTA A/P showed transient focus of arterial hyper-enhancement along lesser curvature of stomach, suspicious for active bleeding, patent bypass with flow to SMA. GI was consulted for urgent EGD.     Procedure: EGD  Procedure time: 45 min       EBL: minimal            IV Fluids:  mL         UOP: None        Procedure Findings-  ·	Small ulcer identified in GE junction extending into cardia  ·	S/p injection with epinephrine and 1 clip placed  ·	Patient was intubated with awake fiberoptic given history of difficult intubation  ·	Remained intubated post-procedure, as per anesthesia for possible future procedure  ·	Patient on precedex, vasopressin, and neosynephrine infusions    PAST MEDICAL & SURGICAL HISTORY:  Neuropathy  TIA (transient ischemic attack)  Left carotid stenosis  Lyme disease  Hypothyroidism  Coronary artery disease  No significant past surgical history    Home Meds:   Home Medications:  acetaminophen 325 mg oral tablet: 2 tab(s) orally every 6 hours, As needed, Moderate Pain (4 - 6) (10 Oct 2019 09:54)  [place] Thyroid 120 mg oral tablet: 1 tab(s) orally once a day (26 Sep 2019 11:56)  aspirin 81 mg oral tablet, chewable: 1 tab(s) orally once a day (26 Sep 2019 11:56)  clopidogrel 75 mg oral tablet: 1 tab(s) orally once a day (02 Oct 2019 13:26)  Crestor 20 mg oral tablet: 1 tab(s) orally once a day (at bedtime) (26 Sep 2019 11:55)  levothyroxine 200 mcg (0.2 mg) oral tablet: 1 tab(s) orally once a day (10 Oct 2019 09:54)  lisinopril 20 mg oral tablet: 1 tab(s) orally once a day (26 Sep 2019 11:55)  sucralfate 1 g oral tablet: 1 tab(s) orally every 12 hours (10 Oct 2019 09:54)    Allergies  iodinated radiocontrast agents (Hives)    Advanced Directives: Full Code    CURRENT MEDICATIONS:   acetaminophen   Tablet .. 650 milliGRAM(s) Oral every 6 hours PRN  aMIOdarone    Tablet 200 milliGRAM(s) Oral daily  atorvastatin 80 milliGRAM(s) Oral at bedtime  cefepime   IVPB 2000 milliGRAM(s) IV Intermittent every 8 hours  cefepime   IVPB      chlorhexidine 0.12% Liquid 15 milliLiter(s) Oral Mucosa every 12 hours  chlorhexidine 4% Liquid 1 Application(s) Topical <User Schedule>  dexMEDEtomidine Infusion 0.2 MICROgram(s)/kG/Hr (4.24 mL/Hr) IV Continuous <Continuous>  furosemide    Tablet 20 milliGRAM(s) Oral daily  HYDROmorphone  Injectable 0.25 milliGRAM(s) IV Push every 4 hours PRN  lactated ringers. 1000 milliLiter(s) (100 mL/Hr) IV Continuous <Continuous>  levothyroxine 200 MICROGram(s) Oral at bedtime  lisinopril 20 milliGRAM(s) Oral daily  melatonin 5 milliGRAM(s) Oral at bedtime  metoprolol tartrate 12.5 milliGRAM(s) Oral every 12 hours  metroNIDAZOLE  IVPB      metroNIDAZOLE  IVPB 500 milliGRAM(s) IV Intermittent every 8 hours  ondansetron Injectable 4 milliGRAM(s) IV Push once PRN  pantoprazole Infusion 8 mG/Hr (10 mL/Hr) IV Continuous <Continuous>  phenylephrine    Infusion 0.629 MICROgram(s)/kG/Min (10 mL/Hr) IV Continuous <Continuous>  sodium chloride 0.9%. 1000 milliLiter(s) (75 mL/Hr) IV Continuous <Continuous>  tamsulosin 0.4 milliGRAM(s) Oral at bedtime  vasopressin Infusion 0.083 Unit(s)/Min (5 mL/Hr) IV Continuous <Continuous>    VITAL SIGNS, INS/OUTS (Last 24hours):    ICU Vital Signs Last 24 Hrs  T(C): 36.5 (28 Oct 2019 20:37), Max: 36.5 (28 Oct 2019 20:37)  T(F): 97.7 (28 Oct 2019 20:37), Max: 97.7 (28 Oct 2019 20:37)  HR: 104 (28 Oct 2019 21:22) (84 - 110)  BP: 124/71 (28 Oct 2019 21:22) (85/49 - 159/54)  RR: 13 (28 Oct 2019 21:22) (11 - 22)  SpO2: 100% (28 Oct 2019 21:22) (96% - 100%)    I&O's Summary    27 Oct 2019 07:01  -  28 Oct 2019 07:00  --------------------------------------------------------  IN: 1160 mL / OUT: 2000 mL / NET: -840 mL    28 Oct 2019 07:01  -  28 Oct 2019 21:49  --------------------------------------------------------  IN: 0 mL / OUT: 450 mL / NET: -450 mL      Height (cm): 172.72 (10-28-19)  Weight (kg): 84.8 (10-28-19)  BMI (kg/m2): 28.4 (10-28-19)  BSA (m2): 1.99 (10-28-19)    Physical Exam:  ---------------------------------------------------------------------------------------  RASS:   GCS:    E/M/V  Exam: sedated, intubated, no focal deficits    RESPIRATORY:  Intubated  Lungs clear to auscultation b/l, Normal expansion/effort  Mechanical Ventilation: Mode: AC/ CMV (Assist Control/ Continuous Mandatory Ventilation)  RR (machine): 10  TV (machine): 500  FiO2: 40  PEEP: 5  ITime: 1  MAP: 9.3  PIP: 22    CARDIOVASCULAR:   S1/S2.  RRR  No peripheral edema    GASTROINTESTINAL:  Abdomen soft, non-tender, non-distended  Incision site CDI staples in place    MUSCULOSKELETAL:  Extremities warm, pink, well-perfused.  Palpable/Doppler pulses signals  b/l    DERM:  No skin breakdown     LABS  --------------------------------------------------------------------------------------  CAPILLARY BLOOD GLUCOSE                              8.6    12.75 )-----------( 238      ( 28 Oct 2019 15:50 )             25.5         10-28    140  |  106  |  22<H>  ----------------------------<  150<H>  3.5   |  25  |  0.5<L>      Calcium, Total Serum: 7.1 mg/dL (10-28-19 @ 02:39)      LFTs:    ABG - ( 28 Oct 2019 21:00 )  pH: 7.46  /  pCO2: 35    /  pO2: 179   / HCO3: 25    / Base Excess: 1.3   /  SaO2: 99        ABG - ( 24 Oct 2019 14:24 )  pH: 7.42  /  pCO2: 33    /  pO2: 112   / HCO3: 21    / Base Excess: -2.7  /  SaO2: 98        ABG - ( 24 Oct 2019 12:13 )  pH: 7.40  /  pCO2: 33    /  pO2: 122   / HCO3: 21    / Base Excess: -3.5  /  SaO2: 99        CT/XRAY/ECHO/TCD/EEG  --------------------------------------------------------------------------------------  < from: CT Angio Abdomen and Pelvis w/ IV Cont (10.28.19 @ 12:37) >  Transient focus of arterial hyperenhancement along the wall of the lesser   curvature of the stomach without pooling seen on delayed venous phase   images, indeterminate - may represent a focus of active bleeding - but   not definite. Correlate for history of melena/signs of upper GI bleeding.    No findings elsewhere of an active GI bleed.     Occluded proximal SMA. Interval placement of SMA bypass graft to the left   external iliac artery, which is patent. Additional Vascular findings as   detailed in the body the report.    < end of copied text >    --------------------------------------------------------------------------------------  Admit Diagnosis: ABDOMINAL PAIN; DIARRHEA; UNABLE TO EAT

## 2019-10-29 NOTE — PROGRESS NOTE ADULT - ATTENDING COMMENTS
UGI bleed  requiring EGD and clipping   of bleeding vessel   post hemorrhagic anemia   left intubated post procedure   admit to SICU   SBT and extubation   PPI drip   Clears

## 2019-10-29 NOTE — PROGRESS NOTE ADULT - SUBJECTIVE AND OBJECTIVE BOX
Gastroenterology Consultation:    Patient is a 84y old  Male who presents with a chief complaint of diarrhea (29 Oct 2019 05:21)      Admitted on: 10-19-19  HPI:  84 year history of HTN, hiatal hernia, hypercholesteremia, and CAD s/p multiple stents presents with abdominal pain, and diarrhea.  patient was admitted on Oct 1st for  NSTEMI and had 3 stents placed.   Readmitted after 5 days for colitis and discharged on oct 8 on po flagyl and cipro.  Has been doing relatively well until one day after discharge when diarrhea started again, associated with diffuse abdominal discomfort, called his gastroenterologists who recommended to stop Abx, diarrhea  improved after stopping the ABx but never resolved, complains of nausea with no vomiting, denies blood with stool, denies dysphagia, fever, chills. Donald chest pain or Shortness of breath, urinary symptoms, fever or chills. no recent travel   In Ed   T(C): 36.6 T(F): 97.8   HR: 79   BP: 191/91   RR: 18  SpO2: 97% (19 Oct 2019 22:47)      Prior records Reviewed (Y/N):  History obtained from person other than patient (Y/N):    Prior EGD:  Prior Colonoscopy:      PAST MEDICAL & SURGICAL HISTORY:  Neuropathy  TIA (transient ischemic attack)  Left carotid stenosis  Lyme disease  Hypothyroidism  Coronary artery disease  No significant past surgical history      FAMILY HISTORY:      Social History:  Tobacco:  Alcohol:  Drugs:    Home Medications:  acetaminophen 325 mg oral tablet: 2 tab(s) orally every 6 hours, As needed, Moderate Pain (4 - 6) (10 Oct 2019 09:54)  [place] Thyroid 120 mg oral tablet: 1 tab(s) orally once a day (26 Sep 2019 11:56)  aspirin 81 mg oral tablet, chewable: 1 tab(s) orally once a day (26 Sep 2019 11:56)  clopidogrel 75 mg oral tablet: 1 tab(s) orally once a day (02 Oct 2019 13:26)  Crestor 20 mg oral tablet: 1 tab(s) orally once a day (at bedtime) (26 Sep 2019 11:55)  levothyroxine 200 mcg (0.2 mg) oral tablet: 1 tab(s) orally once a day (10 Oct 2019 09:54)  lisinopril 20 mg oral tablet: 1 tab(s) orally once a day (26 Sep 2019 11:55)  sucralfate 1 g oral tablet: 1 tab(s) orally every 12 hours (10 Oct 2019 09:54)    MEDICATIONS  (STANDING):  aMIOdarone    Tablet 200 milliGRAM(s) Oral daily  aspirin Suppository 300 milliGRAM(s) Rectal daily  atorvastatin 80 milliGRAM(s) Oral at bedtime  cefepime   IVPB 2000 milliGRAM(s) IV Intermittent every 8 hours  cefepime   IVPB      chlorhexidine 4% Liquid 1 Application(s) Topical <User Schedule>  dexMEDEtomidine Infusion 0.1 MICROgram(s)/kG/Hr (2.12 mL/Hr) IV Continuous <Continuous>  levothyroxine Injectable 100 MICROGram(s) IV Push at bedtime  metroNIDAZOLE  IVPB      metroNIDAZOLE  IVPB 500 milliGRAM(s) IV Intermittent every 8 hours  pantoprazole Infusion 8 mG/Hr (10 mL/Hr) IV Continuous <Continuous>  phenylephrine    Infusion 0.629 MICROgram(s)/kG/Min (10 mL/Hr) IV Continuous <Continuous>  sodium chloride 0.9%. 1000 milliLiter(s) (75 mL/Hr) IV Continuous <Continuous>  tamsulosin 0.4 milliGRAM(s) Oral at bedtime    MEDICATIONS  (PRN):  acetaminophen   Tablet .. 650 milliGRAM(s) Oral every 6 hours PRN Mild Pain (1 - 3)  fentaNYL    Injectable 25 MICROGram(s) IV Push every 6 hours PRN Moderate Pain (4 - 6)      Allergies  iodinated radiocontrast agents (Hives)      Review of Systems:   Constitutional:  No Fever, No Chills  ENT/Mouth:  No Hearing Changes,  No Difficulty Swallowing  Eyes:  No Eye Pain, No Vision Changes  Cardiovascular:  No Chest Pain, No Palpitations  Respiratory:  No Cough, No Dyspnea  Gastrointestinal:  As described in HPI  Musculoskeletal:  No Joint Swelling, No Back Pain  Skin:  No Skin Lesions, No Jaundice  Neuro:  No Syncope, No Dizziness  Heme/Lymph:  No Bruising, No Bleeding.          Physical Examination:  T(C): 36 (10-29-19 @ 08:00), Max: 37.2 (10-29-19 @ 02:30)  HR: 52 (10-29-19 @ 08:30) (50 - 110)  BP: 109/53 (10-29-19 @ 04:30) (81/42 - 159/54)  RR: 11 (10-29-19 @ 08:30) (10 - 22)  SpO2: 100% (10-29-19 @ 08:30) (98% - 100%)  Height (cm): 172.7 (10-29-19 @ 02:30)  Weight (kg): 97 (10-29-19 @ 02:30)    10-27-19 @ 07:01  -  10-28-19 @ 07:00  --------------------------------------------------------  IN: 1160 mL / OUT: 2000 mL / NET: -840 mL    10-28-19 @ 07:01  -  10-29-19 @ 07:00  --------------------------------------------------------  IN: 1366.3 mL / OUT: 985 mL / NET: 381.3 mL    10-29-19 @ 07:01  -  10-29-19 @ 08:58  --------------------------------------------------------  IN: 96.2 mL / OUT: 50 mL / NET: 46.2 mL        Constitutional: No acute distress.  Eyes:. Conjunctivae are clear, Sclera is non-icteric.  Ears Nose and Throat: The external ears are normal appearing,  Oral mucosa is pink and moist.  Respiratory:  No signs of respiratory distress. Lung sounds are clear bilaterally.  Cardiovascular:  S1 S2, Regular rate and rhythm.  GI: Abdomen is soft, symmetric, and non-tender without distention. There are no visible lesions or scars. Bowel sounds are present and normoactive in all four quadrants. No masses, hepatomegaly, or splenomegaly are noted.   Neuro: No Tremor, No involuntary movements  Skin: No rashes, No Jaundice.          Data: (reviewed by attending)                        7.6    18.83 )-----------( 309      ( 29 Oct 2019 05:00 )             22.1     Hgb Trend:  7.6  10-29-19 @ 05:00  7.5  10-29-19 @ 00:00  8.6  10-28-19 @ 15:50  9.6  10-28-19 @ 11:23  7.7  10-28-19 @ 02:39  8.0  10-27-19 @ 17:30  8.5  10-27-19 @ 12:00      10-27-19 @ 07:01  -  10-28-19 @ 07:00  --------------------------------------------------------  IN: 320 mL      10-29    143  |  109  |  32<H>  ----------------------------<  159<H>  4.4   |  22  |  0.6<L>    Ca    7.1<L>      29 Oct 2019 00:00  Phos  3.1     10-29  Mg     1.8     10-29      Liver panel trend:  TBili 0.6   /   AST 43   /   ALT 58   /   AlkP 47   /   Tptn 3.8   /   Alb 2.0    /   DBili 0.3      10-24  TBili 0.5   /   AST 27   /   ALT 30   /   AlkP 73   /   Tptn 6.8   /   Alb 3.9    /   DBili --      10-22  TBili 0.4   /   AST 28   /   ALT 34   /   AlkP 62   /   Tptn 6.7   /   Alb 3.8    /   DBili <0.2      10-19      PT/INR - ( 29 Oct 2019 00:00 )   PT: 19.80 sec;   INR: 1.73 ratio         PTT - ( 29 Oct 2019 00:00 )  PTT:22.4 sec        Radiology:(reviewed by attending)  CT Angio Abdomen and Pelvis w/ IV Cont:   EXAM:  CT ANGIO ABD PELV (W)AW IC            PROCEDURE DATE:  10/28/2019            INTERPRETATION:  CLINICAL HISTORY: GI bleeding    TECHNIQUE: CT angiogram of the abdomen pelvis is performed with and   without intravenous contrast per GI bleed protocol. 100 cc of Optiray was   administered. Coronal and sagittal reformatted images and 3-D   reconstruction is performed.    COMPARISON: Recent CT dated 10/22/2019    FINDINGS:    Study is limited by streak artifact due to patient's arm positioning.    GI/VASCULAR:   Transient focus of arterial hyperenhancement along the wall of the lesser   curvature of the stomach (series 5 image 46) without pooling seen on   delayed venous phase images, indeterminate. No findings elsewhere to   suggest active GI bleed.    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 Interval placement of SMA bypass graft to   the left external iliac artery, which is patent.  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  Severe stenosis of the origin of the bilateral renal arteries.  Extensive atherosclerotic disease of the aorta with several penetrating   atherosclerotic ulcers in the upper abdomen again noted.    LUNG BASES: Moderate bilateral pleural effusions, right greater than   left. Adjacent atelectasis. 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    HEPATOBILIARY:  Unremarkable    SPLEEN: Unremarkable    PANCREAS: Unremarkable    ADRENAL GLANDS: Unremarkable    KIDNEYS: Left lower pole renal cyst. No hydronephrosis.    ABDOMINOPELVIC NODES: No lymphadenopathy    PELVIC ORGANS: Unremarkable.    PERITONEUM /MESENTERY/BOWEL: Interval small bowel resection for ischemic   bowel with small bowel anastomoses in the right lower quadrant. No   findings to suggest bowel obstruction. Trace postoperative   pneumoperitoneum. Small ascites.    BONES/SOFT TISSUES:Post surgical changes in the anterior abdominal wall.   Body wall edema. Osteopenia. Degenerative changes of the spine.      IMPRESSION:  Transient focus of arterial hyperenhancement along the wall of the lesser   curvature of the stomach without pooling seen on delayed venous phase   images, indeterminate - may represent a focus of active bleeding - but   not definite. Correlate for history of melena/signs of upper GI bleeding.    No findings elsewhere of an active GI bleed.     Occluded proximal SMA. Interval placement of SMA bypass graft to the left   external iliac artery, which is patent. Additional Vascular findings as   detailed in the body the report.    Post-surgical changes.                         VASILE AVILA M.D., ATTENDING RADIOLOGIST  This document has been electronically signed. Oct 28 2019  2:26PM             (10-28-19 @ 12:37) Gastroenterology Progress Note:    Patient is a 84y old  Male who presents with a chief complaint of diarrhea (29 Oct 2019 05:21)      Admitted on: 10-19-19      Overnight Events:  Pt underwent urgent EGD yesterday, showed one ulcer at GE junction extending into cardia, s/p 1 clip + epi. He was intubated for procedure, remains intubated this am. Needed vasopressin and phenylephrine, vasopressin weaned off this am. Remains on Protonix drip. No report of bloody BM.      PAST MEDICAL & SURGICAL HISTORY:  Neuropathy  TIA (transient ischemic attack)  Left carotid stenosis  Lyme disease  Hypothyroidism  Coronary artery disease  No significant past surgical history      Home Medications:  acetaminophen 325 mg oral tablet: 2 tab(s) orally every 6 hours, As needed, Moderate Pain (4 - 6) (10 Oct 2019 09:54)  [place] Thyroid 120 mg oral tablet: 1 tab(s) orally once a day (26 Sep 2019 11:56)  aspirin 81 mg oral tablet, chewable: 1 tab(s) orally once a day (26 Sep 2019 11:56)  clopidogrel 75 mg oral tablet: 1 tab(s) orally once a day (02 Oct 2019 13:26)  Crestor 20 mg oral tablet: 1 tab(s) orally once a day (at bedtime) (26 Sep 2019 11:55)  levothyroxine 200 mcg (0.2 mg) oral tablet: 1 tab(s) orally once a day (10 Oct 2019 09:54)  lisinopril 20 mg oral tablet: 1 tab(s) orally once a day (26 Sep 2019 11:55)  sucralfate 1 g oral tablet: 1 tab(s) orally every 12 hours (10 Oct 2019 09:54)    MEDICATIONS  (STANDING):  aMIOdarone    Tablet 200 milliGRAM(s) Oral daily  aspirin Suppository 300 milliGRAM(s) Rectal daily  atorvastatin 80 milliGRAM(s) Oral at bedtime  cefepime   IVPB 2000 milliGRAM(s) IV Intermittent every 8 hours  cefepime   IVPB      chlorhexidine 4% Liquid 1 Application(s) Topical <User Schedule>  dexMEDEtomidine Infusion 0.1 MICROgram(s)/kG/Hr (2.12 mL/Hr) IV Continuous <Continuous>  levothyroxine Injectable 100 MICROGram(s) IV Push at bedtime  metroNIDAZOLE  IVPB      metroNIDAZOLE  IVPB 500 milliGRAM(s) IV Intermittent every 8 hours  pantoprazole Infusion 8 mG/Hr (10 mL/Hr) IV Continuous <Continuous>  phenylephrine    Infusion 0.629 MICROgram(s)/kG/Min (10 mL/Hr) IV Continuous <Continuous>  sodium chloride 0.9%. 1000 milliLiter(s) (75 mL/Hr) IV Continuous <Continuous>  tamsulosin 0.4 milliGRAM(s) Oral at bedtime    MEDICATIONS  (PRN):  acetaminophen   Tablet .. 650 milliGRAM(s) Oral every 6 hours PRN Mild Pain (1 - 3)  fentaNYL    Injectable 25 MICROGram(s) IV Push every 6 hours PRN Moderate Pain (4 - 6)      Allergies  iodinated radiocontrast agents (Hives)      Review of Systems:   Constitutional:  No Fever, No Chills  ENT/Mouth:  No Hearing Changes,  No Difficulty Swallowing  Eyes:  No Eye Pain, No Vision Changes  Cardiovascular:  No Chest Pain, No Palpitations  Respiratory:  No Cough, No Dyspnea  Gastrointestinal:  As described in HPI  Musculoskeletal:  No Joint Swelling, No Back Pain  Skin:  No Skin Lesions, No Jaundice  Neuro:  No Syncope, No Dizziness  Heme/Lymph:  No Bruising, No Bleeding.          Physical Examination:  T(C): 36 (10-29-19 @ 08:00), Max: 37.2 (10-29-19 @ 02:30)  HR: 52 (10-29-19 @ 08:30) (50 - 110)  BP: 109/53 (10-29-19 @ 04:30) (81/42 - 159/54)  RR: 11 (10-29-19 @ 08:30) (10 - 22)  SpO2: 100% (10-29-19 @ 08:30) (98% - 100%)  Height (cm): 172.7 (10-29-19 @ 02:30)  Weight (kg): 97 (10-29-19 @ 02:30)    10-27-19 @ 07:01  -  10-28-19 @ 07:00  --------------------------------------------------------  IN: 1160 mL / OUT: 2000 mL / NET: -840 mL    10-28-19 @ 07:01  -  10-29-19 @ 07:00  --------------------------------------------------------  IN: 1366.3 mL / OUT: 985 mL / NET: 381.3 mL    10-29-19 @ 07:01  -  10-29-19 @ 08:58  --------------------------------------------------------  IN: 96.2 mL / OUT: 50 mL / NET: 46.2 mL        Constitutional: No acute distress.  Eyes:. Conjunctivae are clear, Sclera is non-icteric.  Ears Nose and Throat: The external ears are normal appearing,  Oral mucosa is pink and moist.  Respiratory:  No signs of respiratory distress. Lung sounds are clear bilaterally.  Cardiovascular:  S1 S2, Regular rate and rhythm.  GI: Abdomen is soft, symmetric, and non-tender without distention. There are no visible lesions or scars. Bowel sounds are present and normoactive in all four quadrants. No masses, hepatomegaly, or splenomegaly are noted.   Neuro: No Tremor, No involuntary movements  Skin: No rashes, No Jaundice.          Data: (reviewed by attending)                        7.6    18.83 )-----------( 309      ( 29 Oct 2019 05:00 )             22.1     Hgb Trend:  7.6  10-29-19 @ 05:00  7.5  10-29-19 @ 00:00  8.6  10-28-19 @ 15:50  9.6  10-28-19 @ 11:23  7.7  10-28-19 @ 02:39  8.0  10-27-19 @ 17:30  8.5  10-27-19 @ 12:00      10-27-19 @ 07:01  -  10-28-19 @ 07:00  --------------------------------------------------------  IN: 320 mL      10-29    143  |  109  |  32<H>  ----------------------------<  159<H>  4.4   |  22  |  0.6<L>    Ca    7.1<L>      29 Oct 2019 00:00  Phos  3.1     10-29  Mg     1.8     10-29      Liver panel trend:  TBili 0.6   /   AST 43   /   ALT 58   /   AlkP 47   /   Tptn 3.8   /   Alb 2.0    /   DBili 0.3      10-24  TBili 0.5   /   AST 27   /   ALT 30   /   AlkP 73   /   Tptn 6.8   /   Alb 3.9    /   DBili --      10-22  TBili 0.4   /   AST 28   /   ALT 34   /   AlkP 62   /   Tptn 6.7   /   Alb 3.8    /   DBili <0.2      10-19      PT/INR - ( 29 Oct 2019 00:00 )   PT: 19.80 sec;   INR: 1.73 ratio         PTT - ( 29 Oct 2019 00:00 )  PTT:22.4 sec        Radiology:(reviewed by attending)  CT Angio Abdomen and Pelvis w/ IV Cont:   EXAM:  CT ANGIO ABD PELV (W)AW IC            PROCEDURE DATE:  10/28/2019            INTERPRETATION:  CLINICAL HISTORY: GI bleeding    TECHNIQUE: CT angiogram of the abdomen pelvis is performed with and   without intravenous contrast per GI bleed protocol. 100 cc of Optiray was   administered. Coronal and sagittal reformatted images and 3-D   reconstruction is performed.    COMPARISON: Recent CT dated 10/22/2019    FINDINGS:    Study is limited by streak artifact due to patient's arm positioning.    GI/VASCULAR:   Transient focus of arterial hyperenhancement along the wall of the lesser   curvature of the stomach (series 5 image 46) without pooling seen on   delayed venous phase images, indeterminate. No findings elsewhere to   suggest active GI bleed.    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 Interval placement of SMA bypass graft to   the left external iliac artery, which is patent.  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  Severe stenosis of the origin of the bilateral renal arteries.  Extensive atherosclerotic disease of the aorta with several penetrating   atherosclerotic ulcers in the upper abdomen again noted.    LUNG BASES: Moderate bilateral pleural effusions, right greater than   left. Adjacent atelectasis. 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    HEPATOBILIARY:  Unremarkable    SPLEEN: Unremarkable    PANCREAS: Unremarkable    ADRENAL GLANDS: Unremarkable    KIDNEYS: Left lower pole renal cyst. No hydronephrosis.    ABDOMINOPELVIC NODES: No lymphadenopathy    PELVIC ORGANS: Unremarkable.    PERITONEUM /MESENTERY/BOWEL: Interval small bowel resection for ischemic   bowel with small bowel anastomoses in the right lower quadrant. No   findings to suggest bowel obstruction. Trace postoperative   pneumoperitoneum. Small ascites.    BONES/SOFT TISSUES:Post surgical changes in the anterior abdominal wall.   Body wall edema. Osteopenia. Degenerative changes of the spine.      IMPRESSION:  Transient focus of arterial hyperenhancement along the wall of the lesser   curvature of the stomach without pooling seen on delayed venous phase   images, indeterminate - may represent a focus of active bleeding - but   not definite. Correlate for history of melena/signs of upper GI bleeding.    No findings elsewhere of an active GI bleed.     Occluded proximal SMA. Interval placement of SMA bypass graft to the left   external iliac artery, which is patent. Additional Vascular findings as   detailed in the body the report.    Post-surgical changes.                         VASILE AVILA M.D., ATTENDING RADIOLOGIST  This document has been electronically signed. Oct 28 2019  2:26PM             (10-28-19 @ 12:37) Gastroenterology Progress Note:    Patient is a 84y old  Male who presents with a chief complaint of diarrhea (29 Oct 2019 05:21)      Admitted on: 10-19-19      Overnight Events:  Pt underwent urgent EGD yesterday, showed one ulcer at GE junction extending into cardia, s/p 1 clip + epi. He was intubated for procedure, remains intubated this am. Needed vasopressin and phenylephrine, vasopressin weaned off this am. Remains on Protonix drip. No report of bloody BM.      PAST MEDICAL & SURGICAL HISTORY:  Neuropathy  TIA (transient ischemic attack)  Left carotid stenosis  Lyme disease  Hypothyroidism  Coronary artery disease  No significant past surgical history      Home Medications:  acetaminophen 325 mg oral tablet: 2 tab(s) orally every 6 hours, As needed, Moderate Pain (4 - 6) (10 Oct 2019 09:54)  [place] Thyroid 120 mg oral tablet: 1 tab(s) orally once a day (26 Sep 2019 11:56)  aspirin 81 mg oral tablet, chewable: 1 tab(s) orally once a day (26 Sep 2019 11:56)  clopidogrel 75 mg oral tablet: 1 tab(s) orally once a day (02 Oct 2019 13:26)  Crestor 20 mg oral tablet: 1 tab(s) orally once a day (at bedtime) (26 Sep 2019 11:55)  levothyroxine 200 mcg (0.2 mg) oral tablet: 1 tab(s) orally once a day (10 Oct 2019 09:54)  lisinopril 20 mg oral tablet: 1 tab(s) orally once a day (26 Sep 2019 11:55)  sucralfate 1 g oral tablet: 1 tab(s) orally every 12 hours (10 Oct 2019 09:54)    MEDICATIONS  (STANDING):  aMIOdarone    Tablet 200 milliGRAM(s) Oral daily  aspirin Suppository 300 milliGRAM(s) Rectal daily  atorvastatin 80 milliGRAM(s) Oral at bedtime  cefepime   IVPB 2000 milliGRAM(s) IV Intermittent every 8 hours  cefepime   IVPB      chlorhexidine 4% Liquid 1 Application(s) Topical <User Schedule>  dexMEDEtomidine Infusion 0.1 MICROgram(s)/kG/Hr (2.12 mL/Hr) IV Continuous <Continuous>  levothyroxine Injectable 100 MICROGram(s) IV Push at bedtime  metroNIDAZOLE  IVPB      metroNIDAZOLE  IVPB 500 milliGRAM(s) IV Intermittent every 8 hours  pantoprazole Infusion 8 mG/Hr (10 mL/Hr) IV Continuous <Continuous>  phenylephrine    Infusion 0.629 MICROgram(s)/kG/Min (10 mL/Hr) IV Continuous <Continuous>  sodium chloride 0.9%. 1000 milliLiter(s) (75 mL/Hr) IV Continuous <Continuous>  tamsulosin 0.4 milliGRAM(s) Oral at bedtime    MEDICATIONS  (PRN):  acetaminophen   Tablet .. 650 milliGRAM(s) Oral every 6 hours PRN Mild Pain (1 - 3)  fentaNYL    Injectable 25 MICROGram(s) IV Push every 6 hours PRN Moderate Pain (4 - 6)      Allergies  iodinated radiocontrast agents (Hives)        Physical Examination:  T(C): 36 (10-29-19 @ 08:00), Max: 37.2 (10-29-19 @ 02:30)  HR: 52 (10-29-19 @ 08:30) (50 - 110)  BP: 109/53 (10-29-19 @ 04:30) (81/42 - 159/54)  RR: 11 (10-29-19 @ 08:30) (10 - 22)  SpO2: 100% (10-29-19 @ 08:30) (98% - 100%)  Height (cm): 172.7 (10-29-19 @ 02:30)  Weight (kg): 97 (10-29-19 @ 02:30)    10-27-19 @ 07:01  -  10-28-19 @ 07:00  --------------------------------------------------------  IN: 1160 mL / OUT: 2000 mL / NET: -840 mL    10-28-19 @ 07:01  -  10-29-19 @ 07:00  --------------------------------------------------------  IN: 1366.3 mL / OUT: 985 mL / NET: 381.3 mL    10-29-19 @ 07:01  -  10-29-19 @ 08:58  --------------------------------------------------------  IN: 96.2 mL / OUT: 50 mL / NET: 46.2 mL        Constitutional: No acute distress. Intubated  Respiratory:  No signs of respiratory distress. Lung sounds are clear bilaterally.  Cardiovascular:  S1 S2, Regular rate and rhythm.  GI: Abdomen is soft, symmetric, and non-tender without distention. Midline incision clean and dry.         Data: (reviewed by attending)                        7.6    18.83 )-----------( 309      ( 29 Oct 2019 05:00 )             22.1     Hgb Trend:  7.6  10-29-19 @ 05:00  7.5  10-29-19 @ 00:00  8.6  10-28-19 @ 15:50  9.6  10-28-19 @ 11:23  7.7  10-28-19 @ 02:39  8.0  10-27-19 @ 17:30  8.5  10-27-19 @ 12:00      10-27-19 @ 07:01  -  10-28-19 @ 07:00  --------------------------------------------------------  IN: 320 mL      10-29    143  |  109  |  32<H>  ----------------------------<  159<H>  4.4   |  22  |  0.6<L>    Ca    7.1<L>      29 Oct 2019 00:00  Phos  3.1     10-29  Mg     1.8     10-29      Liver panel trend:  TBili 0.6   /   AST 43   /   ALT 58   /   AlkP 47   /   Tptn 3.8   /   Alb 2.0    /   DBili 0.3      10-24  TBili 0.5   /   AST 27   /   ALT 30   /   AlkP 73   /   Tptn 6.8   /   Alb 3.9    /   DBili --      10-22  TBili 0.4   /   AST 28   /   ALT 34   /   AlkP 62   /   Tptn 6.7   /   Alb 3.8    /   DBili <0.2      10-19      PT/INR - ( 29 Oct 2019 00:00 )   PT: 19.80 sec;   INR: 1.73 ratio         PTT - ( 29 Oct 2019 00:00 )  PTT:22.4 sec        Radiology:(reviewed by attending)  CT Angio Abdomen and Pelvis w/ IV Cont:   EXAM:  CT ANGIO ABD PELV (W)AW IC            PROCEDURE DATE:  10/28/2019            INTERPRETATION:  CLINICAL HISTORY: GI bleeding    TECHNIQUE: CT angiogram of the abdomen pelvis is performed with and   without intravenous contrast per GI bleed protocol. 100 cc of Optiray was   administered. Coronal and sagittal reformatted images and 3-D   reconstruction is performed.    COMPARISON: Recent CT dated 10/22/2019    FINDINGS:    Study is limited by streak artifact due to patient's arm positioning.    GI/VASCULAR:   Transient focus of arterial hyperenhancement along the wall of the lesser   curvature of the stomach (series 5 image 46) without pooling seen on   delayed venous phase images, indeterminate. No findings elsewhere to   suggest active GI bleed.    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 Interval placement of SMA bypass graft to   the left external iliac artery, which is patent.  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  Severe stenosis of the origin of the bilateral renal arteries.  Extensive atherosclerotic disease of the aorta with several penetrating   atherosclerotic ulcers in the upper abdomen again noted.    LUNG BASES: Moderate bilateral pleural effusions, right greater than   left. Adjacent atelectasis. 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    HEPATOBILIARY:  Unremarkable    SPLEEN: Unremarkable    PANCREAS: Unremarkable    ADRENAL GLANDS: Unremarkable    KIDNEYS: Left lower pole renal cyst. No hydronephrosis.    ABDOMINOPELVIC NODES: No lymphadenopathy    PELVIC ORGANS: Unremarkable.    PERITONEUM /MESENTERY/BOWEL: Interval small bowel resection for ischemic   bowel with small bowel anastomoses in the right lower quadrant. No   findings to suggest bowel obstruction. Trace postoperative   pneumoperitoneum. Small ascites.    BONES/SOFT TISSUES:Post surgical changes in the anterior abdominal wall.   Body wall edema. Osteopenia. Degenerative changes of the spine.      IMPRESSION:  Transient focus of arterial hyperenhancement along the wall of the lesser   curvature of the stomach without pooling seen on delayed venous phase   images, indeterminate - may represent a focus of active bleeding - but   not definite. Correlate for history of melena/signs of upper GI bleeding.    No findings elsewhere of an active GI bleed.     Occluded proximal SMA. Interval placement of SMA bypass graft to the left   external iliac artery, which is patent. Additional Vascular findings as   detailed in the body the report.    Post-surgical changes.                         VASILE AVILA M.D., ATTENDING RADIOLOGIST  This document has been electronically signed. Oct 28 2019  2:26PM             (10-28-19 @ 12:37) Gastroenterology Progress Note:    Patient is a 84y old  Male who presents with a chief complaint of diarrhea (29 Oct 2019 05:21)      Admitted on: 10-19-19      Overnight Events:  Pt underwent urgent EGD yesterday, showed one ulcer at GE junction extending into cardia, s/p 1 clip + epi. He was intubated for procedure, remains intubated this am. Needed vasopressin and phenylephrine, vasopressin weaned off this am. Remains on Protonix drip. No report of bloody BM.      PAST MEDICAL & SURGICAL HISTORY:  Neuropathy  TIA (transient ischemic attack)  Left carotid stenosis  Lyme disease  Hypothyroidism  Coronary artery disease  No significant past surgical history      Home Medications:  acetaminophen 325 mg oral tablet: 2 tab(s) orally every 6 hours, As needed, Moderate Pain (4 - 6) (10 Oct 2019 09:54)  [place] Thyroid 120 mg oral tablet: 1 tab(s) orally once a day (26 Sep 2019 11:56)  aspirin 81 mg oral tablet, chewable: 1 tab(s) orally once a day (26 Sep 2019 11:56)  clopidogrel 75 mg oral tablet: 1 tab(s) orally once a day (02 Oct 2019 13:26)  Crestor 20 mg oral tablet: 1 tab(s) orally once a day (at bedtime) (26 Sep 2019 11:55)  levothyroxine 200 mcg (0.2 mg) oral tablet: 1 tab(s) orally once a day (10 Oct 2019 09:54)  lisinopril 20 mg oral tablet: 1 tab(s) orally once a day (26 Sep 2019 11:55)  sucralfate 1 g oral tablet: 1 tab(s) orally every 12 hours (10 Oct 2019 09:54)    MEDICATIONS  (STANDING):  aMIOdarone    Tablet 200 milliGRAM(s) Oral daily  aspirin Suppository 300 milliGRAM(s) Rectal daily  atorvastatin 80 milliGRAM(s) Oral at bedtime  cefepime   IVPB 2000 milliGRAM(s) IV Intermittent every 8 hours  cefepime   IVPB      chlorhexidine 4% Liquid 1 Application(s) Topical <User Schedule>  dexMEDEtomidine Infusion 0.1 MICROgram(s)/kG/Hr (2.12 mL/Hr) IV Continuous <Continuous>  levothyroxine Injectable 100 MICROGram(s) IV Push at bedtime  metroNIDAZOLE  IVPB      metroNIDAZOLE  IVPB 500 milliGRAM(s) IV Intermittent every 8 hours  pantoprazole Infusion 8 mG/Hr (10 mL/Hr) IV Continuous <Continuous>  phenylephrine    Infusion 0.629 MICROgram(s)/kG/Min (10 mL/Hr) IV Continuous <Continuous>  sodium chloride 0.9%. 1000 milliLiter(s) (75 mL/Hr) IV Continuous <Continuous>  tamsulosin 0.4 milliGRAM(s) Oral at bedtime    MEDICATIONS  (PRN):  acetaminophen   Tablet .. 650 milliGRAM(s) Oral every 6 hours PRN Mild Pain (1 - 3)  fentaNYL    Injectable 25 MICROGram(s) IV Push every 6 hours PRN Moderate Pain (4 - 6)      Allergies  iodinated radiocontrast agents (Hives)    Review of symptoms   UTO    Physical Examination:  T(C): 36 (10-29-19 @ 08:00), Max: 37.2 (10-29-19 @ 02:30)  HR: 52 (10-29-19 @ 08:30) (50 - 110)  BP: 109/53 (10-29-19 @ 04:30) (81/42 - 159/54)  RR: 11 (10-29-19 @ 08:30) (10 - 22)  SpO2: 100% (10-29-19 @ 08:30) (98% - 100%)  Height (cm): 172.7 (10-29-19 @ 02:30)  Weight (kg): 97 (10-29-19 @ 02:30)    10-27-19 @ 07:01  -  10-28-19 @ 07:00  --------------------------------------------------------  IN: 1160 mL / OUT: 2000 mL / NET: -840 mL    10-28-19 @ 07:01  -  10-29-19 @ 07:00  --------------------------------------------------------  IN: 1366.3 mL / OUT: 985 mL / NET: 381.3 mL    10-29-19 @ 07:01  -  10-29-19 @ 08:58  --------------------------------------------------------  IN: 96.2 mL / OUT: 50 mL / NET: 46.2 mL        Constitutional: No acute distress. Intubated  Respiratory:  No signs of respiratory distress. Lung sounds are clear bilaterally.  Cardiovascular:  S1 S2, Regular rate and rhythm.  GI: Abdomen is softnd non-tender without distention. Midline incision clean and dry.         Data: (reviewed by attending)                        7.6    18.83 )-----------( 309      ( 29 Oct 2019 05:00 )             22.1     Hgb Trend:  7.6  10-29-19 @ 05:00  7.5  10-29-19 @ 00:00  8.6  10-28-19 @ 15:50  9.6  10-28-19 @ 11:23  7.7  10-28-19 @ 02:39  8.0  10-27-19 @ 17:30  8.5  10-27-19 @ 12:00      10-27-19 @ 07:01  -  10-28-19 @ 07:00  --------------------------------------------------------  IN: 320 mL      10-29    143  |  109  |  32<H>  ----------------------------<  159<H>  4.4   |  22  |  0.6<L>    Ca    7.1<L>      29 Oct 2019 00:00  Phos  3.1     10-29  Mg     1.8     10-29      Liver panel trend:  TBili 0.6   /   AST 43   /   ALT 58   /   AlkP 47   /   Tptn 3.8   /   Alb 2.0    /   DBili 0.3      10-24  TBili 0.5   /   AST 27   /   ALT 30   /   AlkP 73   /   Tptn 6.8   /   Alb 3.9    /   DBili --      10-22  TBili 0.4   /   AST 28   /   ALT 34   /   AlkP 62   /   Tptn 6.7   /   Alb 3.8    /   DBili <0.2      10-19      PT/INR - ( 29 Oct 2019 00:00 )   PT: 19.80 sec;   INR: 1.73 ratio         PTT - ( 29 Oct 2019 00:00 )  PTT:22.4 sec        Radiology:(reviewed by attending)  CT Angio Abdomen and Pelvis w/ IV Cont:   EXAM:  CT ANGIO ABD PELV (W)AW IC            PROCEDURE DATE:  10/28/2019            INTERPRETATION:  CLINICAL HISTORY: GI bleeding    TECHNIQUE: CT angiogram of the abdomen pelvis is performed with and   without intravenous contrast per GI bleed protocol. 100 cc of Optiray was   administered. Coronal and sagittal reformatted images and 3-D   reconstruction is performed.    COMPARISON: Recent CT dated 10/22/2019    FINDINGS:    Study is limited by streak artifact due to patient's arm positioning.    GI/VASCULAR:   Transient focus of arterial hyperenhancement along the wall of the lesser   curvature of the stomach (series 5 image 46) without pooling seen on   delayed venous phase images, indeterminate. No findings elsewhere to   suggest active GI bleed.    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 Interval placement of SMA bypass graft to   the left external iliac artery, which is patent.  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  Severe stenosis of the origin of the bilateral renal arteries.  Extensive atherosclerotic disease of the aorta with several penetrating   atherosclerotic ulcers in the upper abdomen again noted.    LUNG BASES: Moderate bilateral pleural effusions, right greater than   left. Adjacent atelectasis. 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    HEPATOBILIARY:  Unremarkable    SPLEEN: Unremarkable    PANCREAS: Unremarkable    ADRENAL GLANDS: Unremarkable    KIDNEYS: Left lower pole renal cyst. No hydronephrosis.    ABDOMINOPELVIC NODES: No lymphadenopathy    PELVIC ORGANS: Unremarkable.    PERITONEUM /MESENTERY/BOWEL: Interval small bowel resection for ischemic   bowel with small bowel anastomoses in the right lower quadrant. No   findings to suggest bowel obstruction. Trace postoperative   pneumoperitoneum. Small ascites.    BONES/SOFT TISSUES:Post surgical changes in the anterior abdominal wall.   Body wall edema. Osteopenia. Degenerative changes of the spine.      IMPRESSION:  Transient focus of arterial hyperenhancement along the wall of the lesser   curvature of the stomach without pooling seen on delayed venous phase   images, indeterminate - may represent a focus of active bleeding - but   not definite. Correlate for history of melena/signs of upper GI bleeding.    No findings elsewhere of an active GI bleed.     Occluded proximal SMA. Interval placement of SMA bypass graft to the left   external iliac artery, which is patent. Additional Vascular findings as   detailed in the body the report.    Post-surgical changes.                         VASILE AVILA M.D., ATTENDING RADIOLOGIST  This document has been electronically signed. Oct 28 2019  2:26PM             (10-28-19 @ 12:37)

## 2019-10-29 NOTE — PROGRESS NOTE ADULT - SUBJECTIVE AND OBJECTIVE BOX
VASCULAR SURGERY PROGRESS NOTE     LAKSHMI JUDD  [address] [place] [address] day :10d  POD:  Procedure: Small bowel resection with anastomosis  Exploratory laparotomy  Bypass of superior mesenteric artery    Surgical Attending: Dr. Layton  Overnight events:  Multiple dark bloody bowel movements yesterday with downtrending Hg. s/p 3U pRBC's. CTA shows patent bypass with flow to SMA. Had an EDG with GI which showed an ulcer at the GE junction with a blood fulled stomach. 1 clip was placed and patient remained intubated and subsequently upgraded back to ICU    T(F): 99 (10-29-19 @ 02:30), Max: 99 (10-29-19 @ 02:30)  HR: 56 (10-29-19 @ 03:00) (56 - 110)  BP: 151/67 (10-29-19 @ 03:00) (85/49 - 159/54)  ABP: 111/64 (10-29-19 @ 00:28) (111/64 - 111/64)  ABP(mean): --  RR: 13 (10-29-19 @ 03:00) (11 - 22)  SpO2: 100% (10-29-19 @ 03:00) (96% - 100%)      10-27-19 @ 07:01  -  10-28-19 @ 07:00  --------------------------------------------------------  IN:    Oral Fluid: 840 mL    Packed Red Blood Cells: 320 mL  Total IN: 1160 mL    OUT:    Voided: 2000 mL  Total OUT: 2000 mL    Total NET: -840 mL      10-28-19 @ 07:01  -  10-29-19 @ 05:21  --------------------------------------------------------  IN:    dexmedetomidine Infusion: 75.7 mL    lactated ringers.: 50 mL    pantoprazole Infusion: 10 mL    phenylephrine   Infusion: 157.8 mL    sodium chloride 0.9%.: 450 mL    vasopressin Infusion: 3 mL  Total IN: 746.5 mL    OUT:    Indwelling Catheter - Urethral: 100 mL    Voided: 775 mL  Total OUT: 875 mL    Total NET: -128.5 mL        DIET/FLUIDS: sodium chloride 0.9%. 1000 milliLiter(s) IV Continuous <Continuous>    NG:                                                                                DRAINS:     BM:     EMESIS:     URINE:    Indwelling Urethral Catheter:     Connect To:  Straight Drainage/[place]    Indication:  Perioperative use for Selected Surgical Procedures (10-28-19 @ 23:52)    GI proph:  pantoprazole Infusion 8 mG/Hr IV Continuous <Continuous>    AC/ proph:   ABx: cefepime   IVPB 2000 milliGRAM(s) IV Intermittent every 8 hours  cefepime   IVPB      metroNIDAZOLE  IVPB      metroNIDAZOLE  IVPB 500 milliGRAM(s) IV Intermittent every 8 hours      PHYSICAL EXAM:  GENERAL: intubated, sedated  CHEST/LUNG: Clear to auscultation bilaterally  HEART: Regular rate and rhythm  ABDOMEN: midline wound clean and intact, Soft, Nontender, Nondistended;   EXTREMITIES:  No clubbing, cyanosis, or edema, DP and PT pulses dopplerable b/l      LABS  Labs:  CAPILLARY BLOOD GLUCOSE                              7.5    17.94 )-----------( 292      ( 29 Oct 2019 00:00 )             22.1       Auto Neutrophil %: 85.2 % (10-29-19 @ 00:00)  Band Neutrophils %: 1.7 % (10-29-19 @ 00:00)    10-29    143  |  109  |  32<H>  ----------------------------<  159<H>  4.4   |  22  |  0.6<L>      Calcium, Total Serum: 7.1 mg/dL (10-29-19 @ 00:00)      LFTs:     Blood Gas Arterial, Lactate: 0.8 mmoL/L (10-29-19 @ 04:20)  Blood Gas Arterial, Lactate: 1.2 mmoL/L (10-28-19 @ 21:00)    ABG - ( 29 Oct 2019 04:20 )  pH: 7.48  /  pCO2: 33    /  pO2: 205   / HCO3: 25    / Base Excess: 1.4   /  SaO2: 100             ABG - ( 28 Oct 2019 21:00 )  pH: 7.46  /  pCO2: 35    /  pO2: 179   / HCO3: 25    / Base Excess: 1.3   /  SaO2: 99              ABG - ( 24 Oct 2019 14:24 )  pH: 7.42  /  pCO2: 33    /  pO2: 112   / HCO3: 21    / Base Excess: -2.7  /  SaO2: 98                Coags:     19.80  ----< 1.73    ( 29 Oct 2019 00:00 )     22.4        CARDIAC MARKERS ( 29 Oct 2019 00:00 )  x     / <0.01 ng/mL / x     / x     / x          Serum Pro-Brain Natriuretic Peptide: 2374 pg/mL (10-19-19 @ 16:45)              RADIOLOGY & ADDITIONAL TESTS:  < from: CT Angio Abdomen and Pelvis w/ IV Cont (10.28.19 @ 12:37) >    IMPRESSION:  Transient focus of arterial hyperenhancement along the wall of the lesser   curvature of the stomach without pooling seen on delayed venous phase   images, indeterminate - may represent a focus of active bleeding - but   not definite. Correlate for history of melena/signs of upper GI bleeding.    No findings elsewhere of an active GI bleed.     Occluded proximal SMA. Interval placement of SMA bypass graft to the left   external iliac artery, which is patent. Additional Vascular findings as   detailed in the body the report.    Post-surgical changes.       < end of copied text >

## 2019-10-29 NOTE — PROGRESS NOTE ADULT - ASSESSMENT
Assessment:  Patient is a 84y old Male s/p exploratory laparotomy with small bowel resection and anastomosis and a left External Iliac to SMA bypass with PTFE    Plan:  - management per SICU  - serial CBC  - poss SBT today  - PTX gtt  - hold HSQ, ASA, Plavix

## 2019-10-30 NOTE — PROGRESS NOTE ADULT - SUBJECTIVE AND OBJECTIVE BOX
GENERAL SURGERY PROGRESS NOTE     LAKSHMI JUDD  84y  Male  Hospital day :11d  POD:  Procedure: Small bowel resection with anastomosis  Exploratory laparotomy  Bypass of superior mesenteric artery    OVERNIGHT EVENTS: Trending cardiac enzymes, started back on home BP meds, started back on ASA 81 by mouth    T(F): 98.3 (10-29-19 @ 20:00), Max: 98.3 (10-29-19 @ 20:00)  HR: 76 (10-30-19 @ 00:00) (50 - 94)  BP: 130/59 (10-30-19 @ 00:00) (76/39 - 134/60)  ABP: 148/54 (10-30-19 @ 00:00) (78/42 - 180/82)  ABP(mean): 84 (10-30-19 @ 00:00) (52 - 174)  RR: 21 (10-30-19 @ 00:00) (7 - 25)  SpO2: 100% (10-30-19 @ 00:00) (88% - 100%)    DIET/FLUIDS: lactated ringers Bolus 250 milliLiter(s) IV Bolus once  potassium phosphate IVPB 15 milliMole(s) IV Intermittent once  sodium chloride 0.9%. 1000 milliLiter(s) IV Continuous <Continuous>    NG:   10-28-19 @ 07:01  -  10-29-19 @ 07:00  --------------------------------------------------------  OUT: 0 mL    URINE:   10-28-19 @ 07:01  -  10-29-19 @ 07:00  --------------------------------------------------------  OUT: 210 mL     Indwelling Urethral Catheter:     Connect To:  Straight Drainage/[place]    Indication:  Perioperative use for Selected Surgical Procedures (10-29-19 @ 07:03)    GI proph:  pantoprazole Infusion 8 mG/Hr IV Continuous <Continuous>    AC/ proph: aspirin  chewable 81 milliGRAM(s) Oral daily    ABx: cefepime   IVPB 2000 milliGRAM(s) IV Intermittent every 8 hours  cefepime   IVPB      metroNIDAZOLE  IVPB      metroNIDAZOLE  IVPB 500 milliGRAM(s) IV Intermittent every 8 hours      PHYSICAL EXAM:  GENERAL: NAD, well-appearing  CHEST/LUNG: Clear to auscultation bilaterally  HEART: Regular rate and rhythm  ABDOMEN: Soft, Nontender, Nondistended;   EXTREMITIES:  No clubbing, cyanosis, or edema    LABS                            6.4    13.00 )-----------( 271      ( 30 Oct 2019 01:30 )             19.1         10-30    143  |  111<H>  |  28<H>  ----------------------------<  89  4.1   |  23  |  <0.5<L>      Calcium, Total Serum: 7.0 mg/dL (10-30-19 @ 00:00)      LFTs:    Blood Gas Arterial, Lactate: 1.3 mmoL/L (10-29-19 @ 11:31)  Blood Gas Arterial, Lactate: 1.0 mmoL/L (10-29-19 @ 09:26)  Blood Gas Arterial, Lactate: 0.8 mmoL/L (10-29-19 @ 04:20)  Blood Gas Arterial, Lactate: 1.2 mmoL/L (10-28-19 @ 21:00)    ABG - ( 29 Oct 2019 11:31 )  pH: 7.53  /  pCO2: 27    /  pO2: 183   / HCO3: 23    / Base Excess: 0.7   /  SaO2: 100         ABG - ( 29 Oct 2019 09:26 )  pH: 7.50  /  pCO2: 31    /  pO2: 136   / HCO3: 24    / Base Excess: 1.0   /  SaO2: 100         ABG - ( 29 Oct 2019 04:20 )  pH: 7.48  /  pCO2: 33    /  pO2: 205   / HCO3: 25    / Base Excess: 1.4   /  SaO2: 100         Coags:     18.10  ----< 1.58    ( 30 Oct 2019 00:00 )     23.4        CARDIAC MARKERS ( 30 Oct 2019 00:00 )  x     / 0.03 ng/mL / 77 U/L / x     / 3.8 ng/mL  CARDIAC MARKERS ( 29 Oct 2019 17:00 )  x     / 0.03 ng/mL / x     / x     / x      CARDIAC MARKERS ( 29 Oct 2019 05:00 )  x     / 0.02 ng/mL / x     / x     / x      CARDIAC MARKERS ( 29 Oct 2019 00:00 )  x     / <0.01 ng/mL / x     / x     / x          RADIOLOGY & ADDITIONAL TESTS:  < from: Xray Chest 1 View-PORTABLE IMMEDIATE (10.29.19 @ 01:50) >  Impression:      Bilateral opacifications and effusions. Support devices as described.    Catheter for technique without difference.    < end of copied text >

## 2019-10-30 NOTE — PROGRESS NOTE ADULT - ASSESSMENT
Patient is a 84y old Male s/p exploratory laparotomy with small bowel resection and anastomosis and a left External Iliac to SMA bypass with PTFE    Plan:  - extubated today   - management per SICU  - troponins trending slightly up   - serial CBC  - PTX gtt  - hold HSQ, Plavix Patient is a 84y old Male s/p exploratory laparotomy with small bowel resection and anastomosis and a left External Iliac to SMA bypass with PTFE    Plan:  - extubated today   - management per SICU  - troponins trending slightly up   - serial CBC  - PTX gtt  - restart Plavix

## 2019-10-30 NOTE — PROGRESS NOTE ADULT - SUBJECTIVE AND OBJECTIVE BOX
Gastroenterology progress note:     Patient is a 84y old  Male who presents with a chief complaint of diarrhea (30 Oct 2019 08:09)       Admitted on: 10-19-19    We are following the patient for gastric ulcer bleed.     Interval History: Patient is still NPO, NG tube draining bilious fluid, no blood. No BM overnight. Pressors were stopped and also extubated yesterday. No abdominal pain/vomiting and overall he feels better and wants to eat.  He received one unit of blood overnight.    Patient's medical problems are improving   Prior records reviewed (Y/N): Y  History obtained from someone other than patient (Y/N): N      PAST MEDICAL & SURGICAL HISTORY:  Neuropathy  TIA (transient ischemic attack)  Left carotid stenosis  Lyme disease  Hypothyroidism  Coronary artery disease  No significant past surgical history      MEDICATIONS  (STANDING):  aMIOdarone    Tablet 200 milliGRAM(s) Oral daily  aspirin  chewable 81 milliGRAM(s) Oral daily  atorvastatin 80 milliGRAM(s) Oral at bedtime  cefepime   IVPB 2000 milliGRAM(s) IV Intermittent every 8 hours  cefepime   IVPB      chlorhexidine 4% Liquid 1 Application(s) Topical <User Schedule>  clopidogrel Tablet 75 milliGRAM(s) Oral daily  levothyroxine Injectable 100 MICROGram(s) IV Push at bedtime  lisinopril 20 milliGRAM(s) Oral daily  metoprolol tartrate 12.5 milliGRAM(s) Enteral Tube two times a day  metroNIDAZOLE  IVPB      metroNIDAZOLE  IVPB 500 milliGRAM(s) IV Intermittent every 8 hours  pantoprazole  Injectable 40 milliGRAM(s) IV Push every 12 hours  senna 2 Tablet(s) Oral at bedtime  tamsulosin 0.4 milliGRAM(s) Oral at bedtime    MEDICATIONS  (PRN):  acetaminophen   Tablet .. 650 milliGRAM(s) Oral every 6 hours PRN Mild Pain (1 - 3)  oxyCODONE    IR 5 milliGRAM(s) Oral every 6 hours PRN Moderate Pain (4 - 6)  oxyCODONE    IR 10 milliGRAM(s) Oral every 6 hours PRN Severe Pain (7 - 10)      Allergies  iodinated radiocontrast agents (Hives)      Review of Systems:   Cardiovascular:  No Chest Pain, No Palpitations  Respiratory:  No Cough, No Dyspnea  Gastrointestinal:  As described in HPI    Physical Examination:  T(C): 35.8 (10-30-19 @ 12:00), Max: 36.8 (10-29-19 @ 20:00)  HR: 76 (10-30-19 @ 14:30) (68 - 94)  BP: 140/59 (10-30-19 @ 14:30) (108/51 - 141/65)  RR: 22 (10-30-19 @ 14:30) (7 - 26)  SpO2: 100% (10-30-19 @ 14:30) (98% - 100%)      10-29-19 @ 07:01  -  10-30-19 @ 07:00  --------------------------------------------------------  IN: 2938.8 mL / OUT: 1397 mL / NET: 1541.8 mL    10-30-19 @ 07:01  -  10-30-19 @ 15:07  --------------------------------------------------------  IN: 320 mL / OUT: 340 mL / NET: -20 mL      Constitutional: No acute distress.  Respiratory:  No signs of respiratory distress. Lung sounds are clear bilaterally.  Cardiovascular:  S1 S2, Regular rate and rhythm.  Abdominal: Abdomen is soft, symmetric, and non-tender without distention. There are no visible lesions or scars. Bowel sounds are present and normoactive in all four quadrants. No masses, hepatomegaly, or splenomegaly are noted.   Skin: No rashes, No Jaundice.        Data: (reviewed by attending)                        7.5    13.14 )-----------( 257      ( 30 Oct 2019 10:58 )             23.6     Hgb trend:  7.5  10-30-19 @ 10:58  7.5  10-30-19 @ 07:30  6.4  10-30-19 @ 01:30  6.3  10-30-19 @ 00:00  7.2  10-29-19 @ 17:00  7.6  10-29-19 @ 05:00  7.5  10-29-19 @ 00:00  8.6  10-28-19 @ 15:50  9.6  10-28-19 @ 11:23  7.7  10-28-19 @ 02:39  8.0  10-27-19 @ 17:30      10-27-19 @ 07:01  -  10-28-19 @ 07:00  --------------------------------------------------------  IN: 320 mL    10-29-19 @ 07:01  -  10-30-19 @ 07:00  --------------------------------------------------------  IN: 333 mL      10-30    144  |  111<H>  |  22<H>  ----------------------------<  84  3.8   |  23  |  <0.5<L>    Ca    7.0<L>      30 Oct 2019 10:58  Phos  2.4     10-30  Mg     2.1     10-30      Liver panel trend:  TBili 0.6   /   AST 43   /   ALT 58   /   AlkP 47   /   Tptn 3.8   /   Alb 2.0    /   DBili 0.3      10-24  TBili 0.5   /   AST 27   /   ALT 30   /   AlkP 73   /   Tptn 6.8   /   Alb 3.9    /   DBili --      10-22      PT/INR - ( 30 Oct 2019 00:00 )   PT: 18.10 sec;   INR: 1.58 ratio         PTT - ( 30 Oct 2019 00:00 )  PTT:23.4 sec       Radiology: (reviewed by attending) Gastroenterology progress note:     Patient is a 84y old  Male who presents with a chief complaint of diarrhea (30 Oct 2019 08:09)       Admitted on: 10-19-19    We are following the patient for gastric ulcer bleed.     Interval History: Patient is still NPO, NG tube draining bilious fluid, no blood. No BM overnight. Pressors were stopped and also extubated yesterday. No abdominal pain/vomiting and overall he feels better and wants to eat.  He received one unit of blood overnight.    Patient's medical problems are improving   Prior records reviewed (Y/N): Y  History obtained from someone other than patient (Y/N): N      PAST MEDICAL & SURGICAL HISTORY:  Neuropathy  TIA (transient ischemic attack)  Left carotid stenosis  Lyme disease  Hypothyroidism  Coronary artery disease  No significant past surgical history      MEDICATIONS  (STANDING):  aMIOdarone    Tablet 200 milliGRAM(s) Oral daily  aspirin  chewable 81 milliGRAM(s) Oral daily  atorvastatin 80 milliGRAM(s) Oral at bedtime  cefepime   IVPB 2000 milliGRAM(s) IV Intermittent every 8 hours  cefepime   IVPB      chlorhexidine 4% Liquid 1 Application(s) Topical <User Schedule>  clopidogrel Tablet 75 milliGRAM(s) Oral daily  levothyroxine Injectable 100 MICROGram(s) IV Push at bedtime  lisinopril 20 milliGRAM(s) Oral daily  metoprolol tartrate 12.5 milliGRAM(s) Enteral Tube two times a day  metroNIDAZOLE  IVPB      metroNIDAZOLE  IVPB 500 milliGRAM(s) IV Intermittent every 8 hours  pantoprazole  Injectable 40 milliGRAM(s) IV Push every 12 hours  senna 2 Tablet(s) Oral at bedtime  tamsulosin 0.4 milliGRAM(s) Oral at bedtime    MEDICATIONS  (PRN):  acetaminophen   Tablet .. 650 milliGRAM(s) Oral every 6 hours PRN Mild Pain (1 - 3)  oxyCODONE    IR 5 milliGRAM(s) Oral every 6 hours PRN Moderate Pain (4 - 6)  oxyCODONE    IR 10 milliGRAM(s) Oral every 6 hours PRN Severe Pain (7 - 10)      Allergies  iodinated radiocontrast agents (Hives)      Review of Systems:   Cardiovascular:  No Chest Pain, No Palpitations  Respiratory:  No Cough, No Dyspnea  Gastrointestinal:  As described in HPI    Physical Examination:  T(C): 35.8 (10-30-19 @ 12:00), Max: 36.8 (10-29-19 @ 20:00)  HR: 76 (10-30-19 @ 14:30) (68 - 94)  BP: 140/59 (10-30-19 @ 14:30) (108/51 - 141/65)  RR: 22 (10-30-19 @ 14:30) (7 - 26)  SpO2: 100% (10-30-19 @ 14:30) (98% - 100%)      10-29-19 @ 07:01  -  10-30-19 @ 07:00  --------------------------------------------------------  IN: 2938.8 mL / OUT: 1397 mL / NET: 1541.8 mL    10-30-19 @ 07:01  -  10-30-19 @ 15:07  --------------------------------------------------------  IN: 320 mL / OUT: 340 mL / NET: -20 mL      Constitutional: No acute distress.  Respiratory:  No signs of respiratory distress. Lung sounds are clear bilaterally.  Cardiovascular:  S1 S2, Regular rate and rhythm.  Abdominal: Abdomen is soft, symmetric, and non-tender without distention. There are no visible lesions or scars. Bowel sounds are present and normoactive in all four quadrants. No masses, hepatomegaly, or splenomegaly are noted.   Skin: No rashes, No Jaundice.        Data: (reviewed by attending)                        7.5    13.14 )-----------( 257      ( 30 Oct 2019 10:58 )             23.6     Hgb trend:  7.5  10-30-19 @ 10:58  7.5  10-30-19 @ 07:30  6.4  10-30-19 @ 01:30  6.3  10-30-19 @ 00:00  7.2  10-29-19 @ 17:00  7.6  10-29-19 @ 05:00  7.5  10-29-19 @ 00:00  8.6  10-28-19 @ 15:50  9.6  10-28-19 @ 11:23  7.7  10-28-19 @ 02:39  8.0  10-27-19 @ 17:30      10-27-19 @ 07:01  -  10-28-19 @ 07:00  --------------------------------------------------------  IN: 320 mL    10-29-19 @ 07:01  -  10-30-19 @ 07:00  --------------------------------------------------------  IN: 333 mL      10-30    144  |  111<H>  |  22<H>  ----------------------------<  84  3.8   |  23  |  <0.5<L>    Ca    7.0<L>      30 Oct 2019 10:58  Phos  2.4     10-30  Mg     2.1     10-30      Liver panel trend:  TBili 0.6   /   AST 43   /   ALT 58   /   AlkP 47   /   Tptn 3.8   /   Alb 2.0    /   DBili 0.3      10-24  TBili 0.5   /   AST 27   /   ALT 30   /   AlkP 73   /   Tptn 6.8   /   Alb 3.9    /   DBili --      10-22      PT/INR - ( 30 Oct 2019 00:00 )   PT: 18.10 sec;   INR: 1.58 ratio         PTT - ( 30 Oct 2019 00:00 )  PTT:23.4 sec

## 2019-10-30 NOTE — PROGRESS NOTE ADULT - ATTENDING COMMENTS
UGI bleed s/p EGD and clipping   stable overnight   no further bleeding   d/c NGT   clears   d/c PPI drip and switch po bId   OOBTC   posable transfer to floor later today .

## 2019-10-30 NOTE — PROGRESS NOTE ADULT - SUBJECTIVE AND OBJECTIVE BOX
GENERAL SURGERY PROGRESS NOTE     LAKSHMI JUDD  84y  Male  Hospital day :11d  POD:  Procedure: Small bowel resection with anastomosis  Exploratory laparotomy  Bypass of superior mesenteric artery    OVERNIGHT EVENTS: Hb was 6.4 overnight --> 1U PRBC --> Hb 7.5. Restarted on ASA and home BP meds (metoprolol). Off pressors, extubated.     Patient started on ASA, metoprolol. Passed SBT and extubated. Off vasopressors. Hgb 6.4 overnight, transfused 1 unit pRBC      T(F): 96.5 (10-30-19 @ 04:00), Max: 98.3 (10-29-19 @ 20:00)  HR: 68 (10-30-19 @ 07:00) (52 - 94)  BP: 123/51 (10-30-19 @ 07:00) (76/39 - 132/60)  ABP: 152/68 (10-30-19 @ 07:00) (80/52 - 164/42)  ABP(mean): 96 (10-30-19 @ 07:00) (62 - 122)  RR: 22 (10-30-19 @ 07:00) (7 - 25)  SpO2: 100% (10-30-19 @ 07:00) (88% - 100%)    DIET/FLUIDS: lactated ringers Bolus 250 milliLiter(s) IV Bolus once  sodium chloride 0.9%. 1000 milliLiter(s) IV Continuous <Continuous>    NG:   10-29-19 @ 07:01  -  10-30-19 @ 07:00  --------------------------------------------------------  OUT: 100 mL                                                                                 URINE:   10-29-19 @ 07:01  -  10-30-19 @ 07:00  --------------------------------------------------------  OUT: 1297 mL       GI proph:  pantoprazole Infusion 8 mG/Hr IV Continuous <Continuous>    AC/ proph: aspirin  chewable 81 milliGRAM(s) Oral daily    ABx: cefepime   IVPB 2000 milliGRAM(s) IV Intermittent every 8 hours  cefepime   IVPB      metroNIDAZOLE  IVPB      metroNIDAZOLE  IVPB 500 milliGRAM(s) IV Intermittent every 8 hours      PHYSICAL EXAM:  GENERAL: NAD, well-appearing  CHEST/LUNG: Clear to auscultation bilaterally  HEART: Regular rate and rhythm  ABDOMEN: midline incision closed with staples, clean and intact, Nontender, Nondistended;   EXTREMITIES:  DP and PT pulses dopplerable b/l, No clubbing, cyanosis, or edema     LABS  Labs:  CAPILLARY BLOOD GLUCOSE                              7.5    11.90 )-----------( 247      ( 30 Oct 2019 07:30 )             22.9         10-30    143  |  111<H>  |  28<H>  ----------------------------<  89  4.1   |  23  |  <0.5<L>      Calcium, Total Serum: 7.0 mg/dL (10-30-19 @ 00:00)      LFTs:     Blood Gas Arterial, Lactate: 1.3 mmoL/L (10-29-19 @ 11:31)  Blood Gas Arterial, Lactate: 1.0 mmoL/L (10-29-19 @ 09:26)  Blood Gas Arterial, Lactate: 0.8 mmoL/L (10-29-19 @ 04:20)  Blood Gas Arterial, Lactate: 1.2 mmoL/L (10-28-19 @ 21:00)    ABG - ( 29 Oct 2019 11:31 )  pH: 7.53  /  pCO2: 27    /  pO2: 183   / HCO3: 23    / Base Excess: 0.7   /  SaO2: 100             ABG - ( 29 Oct 2019 09:26 )  pH: 7.50  /  pCO2: 31    /  pO2: 136   / HCO3: 24    / Base Excess: 1.0   /  SaO2: 100             ABG - ( 29 Oct 2019 04:20 )  pH: 7.48  /  pCO2: 33    /  pO2: 205   / HCO3: 25    / Base Excess: 1.4   /  SaO2: 100               Coags:     18.10  ----< 1.58    ( 30 Oct 2019 00:00 )     23.4        CARDIAC MARKERS ( 30 Oct 2019 00:00 )  x     / 0.03 ng/mL / 77 U/L / x     / 3.8 ng/mL  CARDIAC MARKERS ( 29 Oct 2019 17:00 )  x     / 0.03 ng/mL / x     / x     / x      CARDIAC MARKERS ( 29 Oct 2019 05:00 )  x     / 0.02 ng/mL / x     / x     / x      CARDIAC MARKERS ( 29 Oct 2019 00:00 )  x     / <0.01 ng/mL / x     / x     / x                      RADIOLOGY & ADDITIONAL TESTS:    < from: Xray Chest 1 View-PORTABLE IMMEDIATE (10.29.19 @ 01:50) >  Impression:      Bilateral opacifications and effusions. Support devices as described.    Catheter for technique without difference.    < end of copied text >

## 2019-10-30 NOTE — PROGRESS NOTE ADULT - ASSESSMENT
83 yo M w/ NSTEMI s/p PCI w/ DULCE x2 1 month ago on DAPT presents found to have ischemic small bowel s/p exploratory laparotomy with small bowel resection and anastomosis and a left External Iliac to SMA bypass with PTFE, hospital course complicated by GIB.   Pt underwent urgent EGD on 10/28/2019, showed one ulcer at GE junction extending into cardia, s/p 1 clip + epi.     #Acute UGIB d/t gastric ulcer s/p clipping and epi: resolved  Hemodynamically stable  received 1 unit overnight.    Rec:  -Maintain active type and screen.  -keep Hg > 8.  -monitor CBC q12H.  -Protonix 40mg BID  -can start clear liquids.  -If hg drops further without blood in NG tube please get CTA for R/O retroperitoneal or other source of GIB.  -If NG tube fluid has concern for bleeding please call us stat. 85 yo M w/ NSTEMI s/p PCI w/ DULCE x2 1 month ago on DAPT presents found to have ischemic small bowel s/p exploratory laparotomy with small bowel resection and anastomosis and a left External Iliac to SMA bypass with PTFE, hospital course complicated by GIB.   Pt underwent urgent EGD on 10/28/2019, showed one ulcer at GE junction extending into cardia, s/p 1 clip + epi.     #Acute UGIB d/t gastric ulcer s/p clipping and epi: resolved  Hemodynamically stable  hgb dropped last night without any overt bleeding seen per rectum or via NGT  received 1 unit overnight, improved appropriately with tranfusion    Rec:  -Maintain active type and screen.  -keep Hg > 8.  -monitor CBC q12H.  -Protonix 40mg BID  -can start clear liquids (if ok with surgery team)  -If hg drops further without blood in NG tube please get CTA for R/O retroperitoneal or other source of GIB.  -If NG tube fluid has concern for bleeding please call us stat.

## 2019-10-30 NOTE — PROGRESS NOTE ADULT - ASSESSMENT
Patient is a 84y old Male s/p exploratory laparotomy with small bowel resection and anastomosis and a left External Iliac to SMA bypass with PTFE. s/p GI bleed with ulcer noted in GE junction s/p 1 clip placement.     Plan:   - Trend Hb: 6.4-->6.3-->1U--> 7.5  - f/u trops  - PTX gtt  - ICU management

## 2019-10-30 NOTE — PROGRESS NOTE ADULT - SUBJECTIVE AND OBJECTIVE BOX
LAKSHMI JUDD  [id number]  84y Male    Indication for ICU admission: S/p exploratory laparotomy, SBR with primary anastomosis, left external iliac to SMA bypass with PTFE for mesenteric ischemia, reupgrade for UGIB  Admit Date:10-19-19  ICU Date: 10-22-19, 10-28-19  OR Date: 10-21-19    iodinated radiocontrast agents (Hives)    PAST MEDICAL & SURGICAL HISTORY:  Neuropathy  TIA (transient ischemic attack)  Left carotid stenosis  Lyme disease  Hypothyroidism  Coronary artery disease  No significant past surgical history    Home Medications:  acetaminophen 325 mg oral tablet: 2 tab(s) orally every 6 hours, As needed, Moderate Pain (4 - 6) (10 Oct 2019 09:54)  [place] Thyroid 120 mg oral tablet: 1 tab(s) orally once a day (26 Sep 2019 11:56)  aspirin 81 mg oral tablet, chewable: 1 tab(s) orally once a day (26 Sep 2019 11:56)  clopidogrel 75 mg oral tablet: 1 tab(s) orally once a day (02 Oct 2019 13:26)  Crestor 20 mg oral tablet: 1 tab(s) orally once a day (at bedtime) (26 Sep 2019 11:55)  levothyroxine 200 mcg (0.2 mg) oral tablet: 1 tab(s) orally once a day (10 Oct 2019 09:54)  lisinopril 20 mg oral tablet: 1 tab(s) orally once a day (26 Sep 2019 11:55)  sucralfate 1 g oral tablet: 1 tab(s) orally every 12 hours (10 Oct 2019 09:54)    24HRS EVENT: Patient started on ASA, metoprolol. Passed SBT and extubated. Off vasopressors. Hgb 6.4 overnight, transfused 1 unit pRBC    NEURO:      off sedation, extubated     Pain-controlled with ATC tylenol, oxy prn     RESP:     No chronic dx, maintain sat>95%    Passed SBT, extubated, on non-rebreather wean to NC, IS       CARDS:     Hypovolemic shock- resolved, off pressors    S/p NSTEMI 10/1/19 s/p staged PCI with DULCE x 2 in pLCx and OM2- restarted ASA, holding Plavix as per primary team    Hx of HTN- holding home lisinopril, restarted metoprolol     V-tach during last admission- continue with PO amiodarone 200mg daily    Echo 10/23- EF 60%, sclerotic aortic valve, with normal opening, G1DD    EKG- NSR    - CE trending up .01>.02>. 03 EKG shows T wave inversions in     anterolateral leads, Qtc 516 and CE @ 2330 F/u EKG in AM    GI/NUTR:     S/p ex-lap, SBR, L external iliac to SMA bypass with PTFE 10/22    S/p EGD 10/28    Diet, NPO    PPI infusion til 10/30 9pm then changed to BID x1 wk then daily    /RENAL:     Rudolph, good 40-50cc/h    Cr 0.6    Monitor lytes, replete PRN    HEME/ONC:     Monitor Hgb- 8.6>7.5>7.6> 7.2>6.4 (transfusing 1unit, f/u CBC at 6am)    Holding HSQ as per primary team, on ASA     ID:     Cefepime, flagyl restarted as per primary team (Abx x1 wk)    Afebrile, monitor for signs of infection    WBC 12.>19>13    ENDO:    Hypothyroidism- synthroid 100mcg IV     FS     Lines:  rudolph  TLC  NGT    ROS:    [ ] A ten-point review of systems was otherwise negative except as noted.  [X ] Due to altered mental status/intubation, subjective information were not able to be obtained from the patient. History was obtained, to the extent possible, from review of the chart and collateral sources of information. LAKSHMI JUDD  [id number]  84y Male    Indication for ICU admission: S/p exploratory laparotomy, SBR with primary anastomosis, left external iliac to SMA bypass with PTFE for mesenteric ischemia, reupgrade for UGIB  Admit Date:10-19-19  ICU Date: 10-22-19, 10-28-19  OR Date: 10-21-19    iodinated radiocontrast agents (Hives)    PAST MEDICAL & SURGICAL HISTORY:  Neuropathy  TIA (transient ischemic attack)  Left carotid stenosis  Lyme disease  Hypothyroidism  Coronary artery disease  No significant past surgical history    Home Medications:  acetaminophen 325 mg oral tablet: 2 tab(s) orally every 6 hours, As needed, Moderate Pain (4 - 6) (10 Oct 2019 09:54)  [place] Thyroid 120 mg oral tablet: 1 tab(s) orally once a day (26 Sep 2019 11:56)  aspirin 81 mg oral tablet, chewable: 1 tab(s) orally once a day (26 Sep 2019 11:56)  clopidogrel 75 mg oral tablet: 1 tab(s) orally once a day (02 Oct 2019 13:26)  Crestor 20 mg oral tablet: 1 tab(s) orally once a day (at bedtime) (26 Sep 2019 11:55)  levothyroxine 200 mcg (0.2 mg) oral tablet: 1 tab(s) orally once a day (10 Oct 2019 09:54)  lisinopril 20 mg oral tablet: 1 tab(s) orally once a day (26 Sep 2019 11:55)  sucralfate 1 g oral tablet: 1 tab(s) orally every 12 hours (10 Oct 2019 09:54)    24HRS EVENT: Patient started on ASA, metoprolol. Passed SBT and extubated. Off vasopressors. Hgb 6.4 overnight, transfused 1 unit pRBC    NEURO:      off sedation, extubated     Pain-controlled with ATC tylenol, oxy prn     RESP:     No chronic dx, maintain sat>95%    Passed SBT, extubated, on non-rebreather wean to NC, IS       CARDS:     Hypovolemic shock- resolved, off pressors    S/p NSTEMI 10/1/19 s/p staged PCI with DULCE x 2 in pLCx and OM2- restarted ASA, holding Plavix as per primary team    Hx of HTN- holding home lisinopril, restarted metoprolol     V-tach during last admission- continue with PO amiodarone 200mg daily    Echo 10/23- EF 60%, sclerotic aortic valve, with normal opening, G1DD    EKG- NSR    - CE trending up .01>.02>. 03 EKG shows T wave inversions in     anterolateral leads, Qtc 516 and CE @ 2330 F/u EKG in AM    GI/NUTR:     S/p ex-lap, SBR, L external iliac to SMA bypass with PTFE 10/22    S/p EGD 10/28    Diet, NPO    PPI infusion til 10/30 9pm then changed to BID x1 wk then daily    /RENAL:     Rudolph, good 40-50cc/h    Cr 0.6    Monitor lytes, replete PRN    HEME/ONC:     Monitor Hgb- 8.6>7.5>7.6> 7.2>6.4 (transfusing 1unit, f/u CBC at 6am)    Holding HSQ as per primary team, on ASA     ID:     Cefepime, flagyl restarted as per primary team (Abx x1 wk)    Afebrile, monitor for signs of infection    WBC 12.>19>13    ENDO:    Hypothyroidism- synthroid 100mcg IV     FS     Lines:  rudolph  TLC  NGT    ROS:    [ ] A ten-point review of systems was otherwise negative except as noted.  [X ] Due to altered mental status/intubation, subjective information were not able to be obtained from the patient. History was obtained, to the extent possible, from review of the chart and collateral sources of information.    Daily     Daily Weight in k.6 (30 Oct 2019 06:00)    Diet, NPO:   Except Medications (10-28-19 @ 09:50)      CURRENT MEDS:  Neurologic Medications  acetaminophen   Tablet .. 650 milliGRAM(s) Oral every 6 hours PRN Mild Pain (1 - 3)  oxyCODONE    IR 5 milliGRAM(s) Oral every 6 hours PRN Moderate Pain (4 - 6)  oxyCODONE    IR 10 milliGRAM(s) Oral every 6 hours PRN Severe Pain (7 - 10)    Respiratory Medications    Cardiovascular Medications  aMIOdarone    Tablet 200 milliGRAM(s) Oral daily  metoprolol tartrate 12.5 milliGRAM(s) Enteral Tube two times a day  tamsulosin 0.4 milliGRAM(s) Oral at bedtime    Gastrointestinal Medications  lactated ringers Bolus 250 milliLiter(s) IV Bolus once  pantoprazole Infusion 8 mG/Hr IV Continuous <Continuous>  sodium chloride 0.9%. 1000 milliLiter(s) IV Continuous <Continuous>    Genitourinary Medications    Hematologic/Oncologic Medications  aspirin  chewable 81 milliGRAM(s) Oral daily    Antimicrobial/Immunologic Medications  cefepime   IVPB 2000 milliGRAM(s) IV Intermittent every 8 hours  cefepime   IVPB      metroNIDAZOLE  IVPB      metroNIDAZOLE  IVPB 500 milliGRAM(s) IV Intermittent every 8 hours    Endocrine/Metabolic Medications  atorvastatin 80 milliGRAM(s) Oral at bedtime  levothyroxine Injectable 100 MICROGram(s) IV Push at bedtime    Topical/Other Medications  chlorhexidine 4% Liquid 1 Application(s) Topical <User Schedule>      ICU Vital Signs Last 24 Hrs  T(C): 35.8 (30 Oct 2019 04:00), Max: 36.8 (29 Oct 2019 20:00)  T(F): 96.5 (30 Oct 2019 04:00), Max: 98.3 (29 Oct 2019 20:00)  HR: 68 (30 Oct 2019 07:00) (52 - 94)  BP: 123/51 (30 Oct 2019 07:00) (76/39 - 132/60)  BP(mean): 75 (30 Oct 2019 07:00) (49 - 98)  ABP: 152/68 (30 Oct 2019 07:00) (80/52 - 164/42)  ABP(mean): 96 (30 Oct 2019 07:00) (62 - 122)  RR: 22 (30 Oct 2019 07:00) (7 - 25)  SpO2: 100% (30 Oct 2019 07:00) (88% - 100%)    Mode: CPAP with PS  FiO2: 40  PEEP: 5  PS: 8    ABG - ( 29 Oct 2019 11:31 )  pH, Arterial: 7.53  pH, Blood: x     /  pCO2: 27    /  pO2: 183   / HCO3: 23    / Base Excess: 0.7   /  SaO2: 100        I&O's Summary    29 Oct 2019 07:01  -  30 Oct 2019 07:00  --------------------------------------------------------  IN: 2938.8 mL / OUT: 1397 mL / NET: 1541.8 mL      I&O's Detail    29 Oct 2019 07:01  -  30 Oct 2019 07:00  --------------------------------------------------------  IN:    IV PiggyBack: 600 mL    Packed Red Blood Cells: 333 mL    pantoprazole Infusion: 230 mL    phenylephrine   Infusion: 50.8 mL    sodium chloride 0.9%.: 1725 mL  Total IN: 2938.8 mL    OUT:    Indwelling Catheter - Urethral: 1297 mL    Nasoenteral Tube: 100 mL  Total OUT: 1397 mL    Total NET: 1541.8 mL      PHYSICAL EXAM:    General/Neuro  GCS: 15    = E   / V   / M      Deficits:  alert & oriented x 3, no focal deficits    Lungs: clear to auscultation, Normal expansion/effort.     Cardiovascular : S1, S2.  Regular rate and rhythm.   Cardiac Rhythm: Normal Sinus Rhythm    GI: Abdomen soft, Non-tender, Non-distended.      Extremities: Extremities warm, pink, well-perfused. Pulses:Rt     Lt    Derm: Good skin turgor, no skin breakdown.      :  Rudolph catheter in place.      Imaging:     LABS:  CAPILLARY BLOOD GLUCOSE                          7.5    11.90 )-----------( 247      ( 30 Oct 2019 07:30 )             22.9       10-30    143  |  111<H>  |  28<H>  ----------------------------<  89  4.1   |  23  |  <0.5<L>    Ca    7.0<L>      30 Oct 2019 00:00  Phos  2.0     10-30  Mg     1.8     10-30        PT/INR - ( 30 Oct 2019 00:00 )   PT: 18.10 sec;   INR: 1.58 ratio    PTT - ( 30 Oct 2019 00:00 )  PTT:23.4 sec    CARDIAC MARKERS ( 30 Oct 2019 00:00 )  x     / 0.03 ng/mL / 77 U/L / x     / 3.8 ng/mL  CARDIAC MARKERS ( 29 Oct 2019 17:00 )  x     / 0.03 ng/mL / x     / x     / x      CARDIAC MARKERS ( 29 Oct 2019 05:00 )  x     / 0.02 ng/mL / x     / x     / x      CARDIAC MARKERS ( 29 Oct 2019 00:00 )  x     / <0.01 ng/mL / x     / x     / x

## 2019-10-30 NOTE — PROGRESS NOTE ADULT - ASSESSMENT
ASSESSMENT:  84M s/p exploratory laparotomy, SBR with primary anastomosis, left external iliac to SMA bypass with PTFE for mesenteric ischemia, presents with UGI bleed s/p EGD    NEURO:      off sedation, extubated     Pain-controlled with ATC tylenol, oxy prn     RESP:     No chronic dx, maintain sat>95%   , IS       CARDS:     S/p NSTEMI 10/1/19 s/p staged PCI with DULCE x 2 in pLCx and OM2- restarted ASA, holding Plavix as per primary team    Hx of HTN- holding home lisinopril, restarted metoprolol     V-tach during last admission- continue with PO amiodarone 200mg daily    Echo 10/23- EF 60%, sclerotic aortic valve, with normal opening, G1DD    EKG- NSR    - CE    GI/NUTR:     S/p ex-lap, SBR, L external iliac to SMA bypass with PTFE 10/22    S/p EGD 10/28    Diet, NPO    PPI infusion til 10/30  then  BID x1 wk then daily    /RENAL:     Shen    Monitor lytes, replete PRN    HEME/ONC:     Monitor Hgb-     Holding HSQ as per primary team, on ASA     ID:     Cefepime, flagyl     Afebrile, monitor for signs of infection    WBC    ENDO:    Hypothyroidism- synthroid 100mcg IV     FS

## 2019-10-30 NOTE — CHART NOTE - NSCHARTNOTEFT_GEN_A_CORE
84M s/p exploratory laparotomy, SBR with primary anastomosis, left external iliac to SMA bypass with PTFE for mesenteric ischemia, presents with UGI bleed s/p EGD    NEURO:     off sedation, extubated     Pain-controlled with ATC tylenol, oxy prn     RESP:     No chronic dx, maintain sat>95%    IS       CARDS:     S/p NSTEMI 10/1/19 s/p staged PCI with DULCE x 2 in pLCx and OM2- restarted ASA, holding Plavix as per primary team    Hx of HTN- holding home lisinopril, restarted metoprolol     V-tach during last admission- continue with PO amiodarone 200mg daily    Echo 10/23- EF 60%, sclerotic aortic valve, with normal opening, G1DD    EKG- NSR    - CE X3    GI/NUTR:     S/p ex-lap, SBR, L external iliac to SMA bypass with PTFE 10/22    S/p EGD 10/28    Diet, NPO    PPI infusion til 10/30 then BID x1 wk then daily    /RENAL:     Shen    Monitor lytes, replete PRN    HEME/ONC:     Monitor Hgb-     Holding HSQ as per primary team, on ASA     ID:     Cefepime, flagyl     Afebrile, monitor for signs of infection    WBC 13>11>13    ENDO:    Hypothyroidism- synthroid 200mcg po    FS, 136 SICU Transfer Note:    Transfer from: SICU  Transfer to:  ( x) Surgery     SICU COURSE:  84M s/p exploratory laparotomy, SBR with primary anastomosis, left external iliac to SMA bypass with PTFE for mesenteric ischemia, presents with UGI bleed s/p EGD with GE junction ulcer clipping, received 1 uPRBC in ICU for Hg 6.4, rpt 7.5 x2      10/30/19   PPI 40 BID. Protonix gtt d/c'd   Start Plavix   Restarted lisinopril 20  On bowel reg  D/c Shen, D/c NGT, Start clears  If stable d/c [place]/ TLC and D/G in PM  CBC 7.5 > 7.5    10/29  -reupgraded overnight  -started ASA (hold plavix), restarted metoprolol  -SBT> passed, extubated   -off gricelda gtt since 12:45pm  -elevated trop to 0.03 @ 6pm, f/u stat EKG shows T wave inversions in anterolateral leads, Qtc 516 and CE @ 2330, F/u EKG in AM  -hgb downtrended 7.6>7.2      PAST MEDICAL & SURGICAL HISTORY:  Neuropathy  TIA (transient ischemic attack)  Left carotid stenosis  Lyme disease  Hypothyroidism  Coronary artery disease  No significant past surgical history    Allergies    iodinated radiocontrast agents (Hives)    Intolerances      MEDICATIONS  (STANDING):  aMIOdarone    Tablet 200 milliGRAM(s) Oral daily  aspirin  chewable 81 milliGRAM(s) Oral daily  atorvastatin 80 milliGRAM(s) Oral at bedtime  cefepime   IVPB 2000 milliGRAM(s) IV Intermittent every 8 hours  cefepime   IVPB      chlorhexidine 4% Liquid 1 Application(s) Topical <User Schedule>  clopidogrel Tablet 75 milliGRAM(s) Oral daily  levothyroxine 200 MICROGram(s) Oral daily  lisinopril 20 milliGRAM(s) Oral daily  metoprolol tartrate 12.5 milliGRAM(s) Oral every 12 hours  metroNIDAZOLE  IVPB      metroNIDAZOLE  IVPB 500 milliGRAM(s) IV Intermittent every 8 hours  pantoprazole  Injectable 40 milliGRAM(s) IV Push every 12 hours  senna 2 Tablet(s) Oral at bedtime  tamsulosin 0.4 milliGRAM(s) Oral at bedtime    MEDICATIONS  (PRN):  acetaminophen   Tablet .. 650 milliGRAM(s) Oral every 6 hours PRN Mild Pain (1 - 3)        Vital Signs Last 24 Hrs  T(C): 35.8 (30 Oct 2019 12:00), Max: 36.8 (29 Oct 2019 20:00)  T(F): 96.4 (30 Oct 2019 12:00), Max: 98.3 (29 Oct 2019 20:00)  HR: 76 (30 Oct 2019 15:00) (68 - 84)  BP: 132/55 (30 Oct 2019 15:00) (113/45 - 141/65)  BP(mean): 79 (30 Oct 2019 15:00) (61 - 106)  RR: 21 (30 Oct 2019 15:00) (13 - 26)  SpO2: 100% (30 Oct 2019 15:00) (100% - 100%)  I&O's Summary    29 Oct 2019 07:01  -  30 Oct 2019 07:00  --------------------------------------------------------  IN: 2938.8 mL / OUT: 1397 mL / NET: 1541.8 mL    30 Oct 2019 07:01  -  30 Oct 2019 18:19  --------------------------------------------------------  IN: 320 mL / OUT: 540 mL / NET: -220 mL        LABS                                            7.5                   Neurophils% (auto):   x      (10-30 @ 10:58):    13.14)-----------(257          Lymphocytes% (auto):  x                                             23.6                   Eosinphils% (auto):   x        Manual%: Neutrophils x    ; Lymphocytes x    ; Eosinophils x    ; Bands%: x    ; Blasts x                                    144    |  111    |  22                  Calcium: 7.0   / iCa: x      (10-30 @ 10:58)    ----------------------------<  84        Magnesium: 2.1                              3.8     |  23     |  <0.5             Phosphorous: 2.4        ( 10-30 @ 00:00 )   PT: 18.10 sec;   INR: 1.58 ratio  aPTT: 23.4 sec        ASSESSMENT/PLAN: 84yMale S/p exploratory laparotomy, SBR with primary anastomosis, left external iliac to SMA bypass with PTFE for mesenteric ischemia, reupgrade for UGIB     NEURO:     off sedation, extubated     Pain-controlled with ATC tylenol, oxy prn     RESP:     No chronic dx, maintain sat>95%    IS       CARDS:     S/p NSTEMI 10/1/19 s/p staged PCI with DULCE x 2 in pLCx and OM2- restarted ASA, holding Plavix as per primary team    Hx of HTN- holding home lisinopril, restarted metoprolol     V-tach during last admission- continue with PO amiodarone 200mg daily    Echo 10/23- EF 60%, sclerotic aortic valve, with normal opening, G1DD    EKG- NSR    (-) CE X3    GI/NUTR:     S/p ex-lap, SBR, L external iliac to SMA bypass with PTFE 10/22    S/p EGD 10/28    Diet, NPO    PPI infusion d/c'd today transitioned to BID x1 wk then daily     /RENAL:     Klaudia d/c'd, passed TOV    Monitor lytes, replete PRN    HEME/ONC:     Monitor Hgb- 7.5>7.5    Holding HSQ as per primary team, on ASA, restarted plavix today    ID:     Cefepime, flagyl     Afebrile, monitor for signs of infection    WBC 13>11>13    ENDO:    Hypothyroidism- synthroid 200mcg PO    FS, 136      LINES: d/c'd TLC, klaudia dasilva today, PIVs        FOLLOW UP:  -monitor cbc  -home dose metoprolol 25bid, currently on 12.5 BID  -2330 labs  -advance diet   -end of abx course      Signed out to Tish Herbert MD  @ [phone] on 10/30/19

## 2019-10-30 NOTE — PROGRESS NOTE ADULT - ATTENDING COMMENTS
Stable and afebrile.  No active bleeding. NGT no output.  Hb stable.  wound intact.  Passed gas.  WC 13.    a/p:  Progressing well.  PPI BID.  Clears and d/c NGT today.  Downgrade as per ICU.  OOB, IS.  ASA and Plavix.

## 2019-10-31 NOTE — PROGRESS NOTE ADULT - ASSESSMENT
Patient is a 84y old Male s/p exploratory laparotomy with small bowel resection and anastomosis and a left External Iliac to SMA bypass with PTFE    Plan:  - Downgraded from SICU yesterday   - troponins trending slightly up   - serial CBC  - ASA, Plavix   - cefepime and flagyl

## 2019-10-31 NOTE — PROGRESS NOTE ADULT - SUBJECTIVE AND OBJECTIVE BOX
GENERAL SURGERY PROGRESS NOTE     LAKSHMI JUDD  84y  Male  Hospital day :12d  POD:  Procedure: Small bowel resection with anastomosis  Exploratory laparotomy  Bypass of superior mesenteric artery    OVERNIGHT EVENTS:    T(F): 96.5 (10-31-19 @ 00:00), Max: 96.9 (10-30-19 @ 16:00)  HR: 83 (10-31-19 @ 00:00) (68 - 90)  BP: 131/60 (10-31-19 @ 00:00) (113/45 - 141/65)  ABP: 176/60 (10-30-19 @ 13:00) (152/68 - 178/58)  ABP(mean): 96 (10-30-19 @ 13:00) (80 - 96)  RR: 22 (10-31-19 @ 00:00) (20 - 28)  SpO2: 100% (10-30-19 @ 19:30) (100% - 100%)    DIET/FLUIDS:   NG:   10-29-19 @ 07:01  -  10-30-19 @ 07:00  --------------------------------------------------------  OUT: 100 mL    URINE:   10-29-19 @ 07:01  -  10-30-19 @ 07:00  --------------------------------------------------------  OUT: 1297 mL       GI proph:  pantoprazole  Injectable 40 milliGRAM(s) IV Push every 12 hours    AC/ proph: aspirin  chewable 81 milliGRAM(s) Oral daily    ABx: cefepime   IVPB 2000 milliGRAM(s) IV Intermittent every 8 hours  cefepime   IVPB      metroNIDAZOLE  IVPB      metroNIDAZOLE  IVPB 500 milliGRAM(s) IV Intermittent every 8 hours    PHYSICAL EXAM:  GENERAL: NAD, well-appearing  CHEST/LUNG: Clear to auscultation bilaterally  HEART: Regular rate and rhythm  ABDOMEN: Soft, Nontender, Nondistended;   EXTREMITIES:  No clubbing, cyanosis, or edema    LABS    POCT Blood Glucose.: 106 mg/dL (30 Oct 2019 21:34)                          7.5    13.14 )-----------( 257      ( 30 Oct 2019 10:58 )             23.6         10-30    144  |  111<H>  |  22<H>  ----------------------------<  84  3.8   |  23  |  <0.5<L>      Calcium, Total Serum: 7.0 mg/dL (10-30-19 @ 10:58)    LFTs:     Blood Gas Arterial, Lactate: 1.3 mmoL/L (10-29-19 @ 11:31)  Blood Gas Arterial, Lactate: 1.0 mmoL/L (10-29-19 @ 09:26)  Blood Gas Arterial, Lactate: 0.8 mmoL/L (10-29-19 @ 04:20)  Blood Gas Arterial, Lactate: 1.2 mmoL/L (10-28-19 @ 21:00)    ABG - ( 29 Oct 2019 11:31 )  pH: 7.53  /  pCO2: 27    /  pO2: 183   / HCO3: 23    / Base Excess: 0.7   /  SaO2: 100         ABG - ( 29 Oct 2019 09:26 )  pH: 7.50  /  pCO2: 31    /  pO2: 136   / HCO3: 24    / Base Excess: 1.0   /  SaO2: 100         ABG - ( 29 Oct 2019 04:20 )  pH: 7.48  /  pCO2: 33    /  pO2: 205   / HCO3: 25    / Base Excess: 1.4   /  SaO2: 100         Coags:     18.10  ----< 1.58    ( 30 Oct 2019 00:00 )     23.4        CARDIAC MARKERS ( 30 Oct 2019 00:00 )  x     / 0.03 ng/mL / 77 U/L / x     / 3.8 ng/mL  CARDIAC MARKERS ( 29 Oct 2019 17:00 )  x     / 0.03 ng/mL / x     / x     / x                      RADIOLOGY & ADDITIONAL TESTS:      A/P GENERAL SURGERY PROGRESS NOTE     LAKSHMI JUDD  84y  Male  Hospital day: 13  POD: 9   Procedure: Small bowel resection with anastomosis  Exploratory laparotomy  Bypass of superior mesenteric artery    OVERNIGHT EVENTS: Patient had BM (melena) x 2 yesterday, remains vitally stable.     T(F): 96.5 (10-31-19 @ 00:00), Max: 96.9 (10-30-19 @ 16:00)  HR: 83 (10-31-19 @ 00:00) (68 - 90)  BP: 131/60 (10-31-19 @ 00:00) (113/45 - 141/65)  ABP: 176/60 (10-30-19 @ 13:00) (152/68 - 178/58)  ABP(mean): 96 (10-30-19 @ 13:00) (80 - 96)  RR: 22 (10-31-19 @ 00:00) (20 - 28)  SpO2: 100% (10-30-19 @ 19:30) (100% - 100%)    DIET/FLUIDS:   NG:   10-29-19 @ 07:01  -  10-30-19 @ 07:00  --------------------------------------------------------  OUT: 100 mL    URINE:   10-29-19 @ 07:01  -  10-30-19 @ 07:00  --------------------------------------------------------  OUT: 1297 mL       GI proph:  pantoprazole  Injectable 40 milliGRAM(s) IV Push every 12 hours    AC/ proph: aspirin  chewable 81 milliGRAM(s) Oral daily    ABx: cefepime   IVPB 2000 milliGRAM(s) IV Intermittent every 8 hours  cefepime   IVPB      metroNIDAZOLE  IVPB      metroNIDAZOLE  IVPB 500 milliGRAM(s) IV Intermittent every 8 hours    PHYSICAL EXAM:  GENERAL: NAD, well-appearing  CHEST/LUNG: Clear to auscultation bilaterally  HEART: Regular rate and rhythm  ABDOMEN: Soft, Nontender, Nondistended;   EXTREMITIES:  No clubbing, cyanosis, or edema    LABS    POCT Blood Glucose.: 106 mg/dL (30 Oct 2019 21:34)                          7.5    13.14 )-----------( 257      ( 30 Oct 2019 10:58 )             23.6         10-30    144  |  111<H>  |  22<H>  ----------------------------<  84  3.8   |  23  |  <0.5<L>      Calcium, Total Serum: 7.0 mg/dL (10-30-19 @ 10:58)    LFTs:     Blood Gas Arterial, Lactate: 1.3 mmoL/L (10-29-19 @ 11:31)  Blood Gas Arterial, Lactate: 1.0 mmoL/L (10-29-19 @ 09:26)  Blood Gas Arterial, Lactate: 0.8 mmoL/L (10-29-19 @ 04:20)  Blood Gas Arterial, Lactate: 1.2 mmoL/L (10-28-19 @ 21:00)    ABG - ( 29 Oct 2019 11:31 )  pH: 7.53  /  pCO2: 27    /  pO2: 183   / HCO3: 23    / Base Excess: 0.7   /  SaO2: 100         ABG - ( 29 Oct 2019 09:26 )  pH: 7.50  /  pCO2: 31    /  pO2: 136   / HCO3: 24    / Base Excess: 1.0   /  SaO2: 100         ABG - ( 29 Oct 2019 04:20 )  pH: 7.48  /  pCO2: 33    /  pO2: 205   / HCO3: 25    / Base Excess: 1.4   /  SaO2: 100         Coags:     18.10  ----< 1.58    ( 30 Oct 2019 00:00 )     23.4        CARDIAC MARKERS ( 30 Oct 2019 00:00 )  x     / 0.03 ng/mL / 77 U/L / x     / 3.8 ng/mL  CARDIAC MARKERS ( 29 Oct 2019 17:00 )  x     / 0.03 ng/mL / x     / x     / x        RADIOLOGY & ADDITIONAL TESTS:  < from: Xray Chest 1 View- PORTABLE-Routine (10.30.19 @ 06:13) >  Impression:      Bilateral pleural effusions, right and left.    < end of copied text >

## 2019-10-31 NOTE — PROGRESS NOTE ADULT - ASSESSMENT
Impression:  UGIB s/p PRBC transfusion, s/p EGD-> gastric ulcer, s/p EPi injection and hemostatic clip  Pt was downgraded to medical floor. Had drop in Hb and got 1 PRBC transfusion yesterday. Hb stable today.   Recent acute mesenteric ischemia s/p small bowel resection and SMA bypass  Recent NSTEMI s/p PCI  HTN    Plan:  Discussed with GI team and surgery  cont ASA, plavix, statin, amiodarone, lisinopril   monitor CBC and monitor for any recurrence of GIB  can increase metoprolol to 25 mg BID for HTN control   d/w Dr. Chavez

## 2019-10-31 NOTE — PROGRESS NOTE ADULT - ASSESSMENT
83 yo M w/ NSTEMI s/p PCI w/ DULCE x2 1 month ago on DAPT presents found to have ischemic small bowel s/p exploratory laparotomy with small bowel resection and anastomosis and a left External Iliac to SMA bypass with PTFE, hospital course complicated by GIB.   Pt underwent urgent EGD on 10/28/2019, showed one ulcer at GE junction extending into cardia, s/p 1 clip + epi.     #Acute UGIB d/t gastric ulcer s/p clipping and epi: resolved  Hemodynamically stable  No active GI bleed  Hg trending up.  Currently on DAPT      Rec:  -oral protonix 40mg BID.  -can start regular diet (if ok with surgery team)  -Repeat EGD needed to confirm healing after 2 months as outpatient.  -[place] smoking, NSAIDS and alcohol. 83 yo M w/ NSTEMI s/p PCI w/ DULCE x2 1 month ago on DAPT presents found to have ischemic small bowel s/p exploratory laparotomy with small bowel resection and anastomosis and a left External Iliac to SMA bypass with PTFE, hospital course complicated by GIB.   Pt underwent urgent EGD on 10/28/2019, showed one ulcer at GE junction extending into cardia, s/p 1 clip + epi.     #Acute UGIB d/t gastric ulcer s/p clipping and epi: resolved  Hemodynamically stable  No active GI bleed  Hg trending up.  Currently on DAPT      Rec:  -oral protonix 40mg BID.  -daily cbc  -Diet as per surgery team  -Repeat EGD needed to confirm healing after 2 months as outpatient.  -Avoid smoking, NSAIDS and alcohol.

## 2019-10-31 NOTE — PROGRESS NOTE ADULT - ATTENDING COMMENTS
stable and afebrile.  Progressing well.  Tolerates PO.  No evidence of active bleeding.  wound intact, abd soft, NT, ND.    WC 13    a/p:  progressing well.  No active bleeding. PPI BID.  Regular diet.  d/c IVF.  Continue ASA and Plavix.  PT, IS, OOB.  SW on d/c planning for SNF / Rehab.

## 2019-10-31 NOTE — PROGRESS NOTE ADULT - SUBJECTIVE AND OBJECTIVE BOX
LAKSHMI JUDD  84y  Male    Allergy: iodinated radiocontrast agents (Hives)      Patient is a 84y old  Male who presents with a chief complaint of diarrhea (31 Oct 2019 11:59)      INTERVAL HPI/ OVERNIGHT EVENTS:  seen and examined pt at bedside. pt had dark bowel movement last night. Hb stable. denies any CP/SOB.       REVIEW OF SYSTEMS:  All other review of systems negative      PAST MEDICAL & SURGICAL HISTORY:  Neuropathy  TIA (transient ischemic attack)  Left carotid stenosis  Lyme disease  Hypothyroidism  Coronary artery disease  No significant past surgical history      Vital Signs Last 24 Hrs  T(C): 35.6 (31 Oct 2019 08:00), Max: 36.1 (30 Oct 2019 16:00)  T(F): 96.1 (31 Oct 2019 08:00), Max: 96.9 (30 Oct 2019 16:00)  HR: 91 (31 Oct 2019 09:24) (76 - 91)  BP: 149/79 (31 Oct 2019 09:24) (122/52 - 170/72)  BP(mean): 73 (30 Oct 2019 18:00) (63 - 106)  RR: 20 (31 Oct 2019 08:00) (20 - 28)  SpO2: 100% (30 Oct 2019 19:30) (100% - 100%)    I&O's Summary    30 Oct 2019 07:01  -  31 Oct 2019 07:00  --------------------------------------------------------  IN: 320 mL / OUT: 1142 mL / NET: -822 mL        Home Medications:  acetaminophen 325 mg oral tablet: 2 tab(s) orally every 6 hours, As needed, Moderate Pain (4 - 6) (10 Oct 2019 09:54)  [place] Thyroid 120 mg oral tablet: 1 tab(s) orally once a day (26 Sep 2019 11:56)  aspirin 81 mg oral tablet, chewable: 1 tab(s) orally once a day (26 Sep 2019 11:56)  clopidogrel 75 mg oral tablet: 1 tab(s) orally once a day (02 Oct 2019 13:26)  Crestor 20 mg oral tablet: 1 tab(s) orally once a day (at bedtime) (26 Sep 2019 11:55)  levothyroxine 200 mcg (0.2 mg) oral tablet: 1 tab(s) orally once a day (10 Oct 2019 09:54)  lisinopril 20 mg oral tablet: 1 tab(s) orally once a day (26 Sep 2019 11:55)  sucralfate 1 g oral tablet: 1 tab(s) orally every 12 hours (10 Oct 2019 09:54)      PHYSICAL EXAM:  GENERAL: NAD  HEENT: no pallor/icterus  NECK: supple  NERVOUS SYSTEM:  Alert & Oriented X3  PULMONARY: CTA b/l   CARDIOVASCULAR: Regular rate and rhythm; No murmurs, rubs, or gallops  GI: Soft, Nontender, Nondistended; Bowel sounds present  EXTREMITIES:  1+ b/l pitting edema       LABS                        8.2    14.02 )-----------( 292      ( 31 Oct 2019 06:10 )             25.2     10-31    142  |  109  |  18  ----------------------------<  139<H>  3.6   |  25  |  <0.5<L>    Ca    7.3<L>      31 Oct 2019 06:10  Phos  2.4     10-30  Mg     2.1     10-30      CARDIAC MARKERS ( 30 Oct 2019 00:00 )  x     / 0.03 ng/mL / 77 U/L / x     / 3.8 ng/mL  CARDIAC MARKERS ( 29 Oct 2019 17:00 )  x     / 0.03 ng/mL / x     / x     / x            PT/INR - ( 30 Oct 2019 00:00 )   PT: 18.10 sec;   INR: 1.58 ratio         PTT - ( 30 Oct 2019 00:00 )  PTT:23.4 sec    RADIOLOGY & ADDITIONAL TESTS:      MEDICATIONS  (STANDING):  acetaminophen   Tablet .. 650 milliGRAM(s) Oral daily  aMIOdarone    Tablet 200 milliGRAM(s) Oral daily  aspirin  chewable 81 milliGRAM(s) Oral daily  atorvastatin 80 milliGRAM(s) Oral at bedtime  cefepime   IVPB 2000 milliGRAM(s) IV Intermittent every 8 hours  cefepime   IVPB      chlorhexidine 4% Liquid 1 Application(s) Topical <User Schedule>  clopidogrel Tablet 75 milliGRAM(s) Oral daily  levothyroxine 200 MICROGram(s) Oral daily  lisinopril 20 milliGRAM(s) Oral daily  metoprolol tartrate 12.5 milliGRAM(s) Oral every 12 hours  metroNIDAZOLE  IVPB      metroNIDAZOLE  IVPB 500 milliGRAM(s) IV Intermittent every 8 hours  pantoprazole  Injectable 40 milliGRAM(s) IV Push every 12 hours  senna 2 Tablet(s) Oral at bedtime  tamsulosin 0.4 milliGRAM(s) Oral at bedtime  tamsulosin 0.4 milliGRAM(s) Oral at bedtime    MEDICATIONS  (PRN):  acetaminophen   Tablet .. 650 milliGRAM(s) Oral every 6 hours PRN Mild Pain (1 - 3)  diphenhydrAMINE 25 milliGRAM(s) Oral at bedtime PRN Insomnia          < from: 12 Lead ECG (10.30.19 @ 02:54) >  Diagnosis Line Normal sinus rhythm  T wave abnormality, consider anterior ischemia  Prolonged QT  Abnormal ECG    < end of copied text >    < from: Transthoracic Echocardiogram (10.23.19 @ 11:14) >    Summary:   1. LV Ejection Fraction by Salgado's Method with a biplane EF of 60 %.   2. Sclerotic aortic valve with normal opening.   3. Borderline dilatation of the aortic root.   4. Spectral Doppler shows impaired relaxation pattern of left   ventricular myocardial filling (Grade I diastolic dysfunction).    < end of copied text >    < from: Xray Chest 1 View- PORTABLE-Routine (10.30.19 @ 06:13) >  Impression:      Bilateral pleural effusions, right and left.    < end of copied text >

## 2019-10-31 NOTE — PROGRESS NOTE ADULT - SUBJECTIVE AND OBJECTIVE BOX
Gastroenterology progress note:     Patient is a 84y old  Male who presents with a chief complaint of diarrhea (31 Oct 2019 05:36)       Admitted on: 10-19-19    We are following the patient for gastric ulcer bleed     Interval History: Patient was downgraded to regular medical floor. Had one BM early morning, nurse reported to be dark stool but no jose blood. patient did not see by himself. No nausea, vomiting, abdominal pain. NG tube is out and patient is tolerating clear liquids.    Patient's medical problems are improving   Prior records reviewed (Y/N): Y  History obtained from someone other than patient (Y/N): N      PAST MEDICAL & SURGICAL HISTORY:  Neuropathy  TIA (transient ischemic attack)  Left carotid stenosis  Lyme disease  Hypothyroidism  Coronary artery disease  No significant past surgical history      MEDICATIONS  (STANDING):  acetaminophen   Tablet .. 650 milliGRAM(s) Oral daily  aMIOdarone    Tablet 200 milliGRAM(s) Oral daily  aspirin  chewable 81 milliGRAM(s) Oral daily  atorvastatin 80 milliGRAM(s) Oral at bedtime  cefepime   IVPB 2000 milliGRAM(s) IV Intermittent every 8 hours  cefepime   IVPB      chlorhexidine 4% Liquid 1 Application(s) Topical <User Schedule>  clopidogrel Tablet 75 milliGRAM(s) Oral daily  levothyroxine 200 MICROGram(s) Oral daily  lisinopril 20 milliGRAM(s) Oral daily  metoprolol tartrate 12.5 milliGRAM(s) Oral every 12 hours  metroNIDAZOLE  IVPB      metroNIDAZOLE  IVPB 500 milliGRAM(s) IV Intermittent every 8 hours  pantoprazole  Injectable 40 milliGRAM(s) IV Push every 12 hours  senna 2 Tablet(s) Oral at bedtime  tamsulosin 0.4 milliGRAM(s) Oral at bedtime  tamsulosin 0.4 milliGRAM(s) Oral at bedtime    MEDICATIONS  (PRN):  acetaminophen   Tablet .. 650 milliGRAM(s) Oral every 6 hours PRN Mild Pain (1 - 3)  diphenhydrAMINE 25 milliGRAM(s) Oral at bedtime PRN Insomnia      Allergies  iodinated radiocontrast agents (Hives)      Review of Systems:   Cardiovascular:  No Chest Pain, No Palpitations  Respiratory:  No Cough, No Dyspnea  Gastrointestinal:  As described in HPI    Physical Examination:  T(C): 35.6 (10-31-19 @ 08:00), Max: 36.1 (10-30-19 @ 16:00)  HR: 91 (10-31-19 @ 09:24) (76 - 91)  BP: 149/79 (10-31-19 @ 09:24) (122/52 - 170/72)  RR: 20 (10-31-19 @ 08:00) (20 - 28)  SpO2: 100% (10-30-19 @ 19:30) (100% - 100%)      10-30-19 @ 07:01  -  10-31-19 @ 07:00  --------------------------------------------------------  IN: 320 mL / OUT: 1142 mL / NET: -822 mL      Constitutional: No acute distress.  Respiratory:  No signs of respiratory distress. Lung sounds are clear bilaterally.  Cardiovascular:  S1 S2, Regular rate and rhythm.  Abdominal: Abdomen is soft, symmetric, and non-tender without distention. There are no visible lesions or scars. Bowel sounds are present and normoactive in all four quadrants. No masses, hepatomegaly, or splenomegaly are noted.   Skin: No rashes, No Jaundice.        Data: (reviewed by attending)                        8.2    14.02 )-----------( 292      ( 31 Oct 2019 06:10 )             25.2     Hgb trend:  8.2  10-31-19 @ 06:10  7.5  10-30-19 @ 10:58  7.5  10-30-19 @ 07:30  6.4  10-30-19 @ 01:30  6.3  10-30-19 @ 00:00  7.2  10-29-19 @ 17:00  7.6  10-29-19 @ 05:00  7.5  10-29-19 @ 00:00  8.6  10-28-19 @ 15:50      10-29-19 @ 07:01  -  10-30-19 @ 07:00  --------------------------------------------------------  IN: 333 mL      10-31    142  |  109  |  18  ----------------------------<  139<H>  3.6   |  25  |  <0.5<L>    Ca    7.3<L>      31 Oct 2019 06:10  Phos  2.4     10-30  Mg     2.1     10-30      Liver panel trend:  TBili 0.6   /   AST 43   /   ALT 58   /   AlkP 47   /   Tptn 3.8   /   Alb 2.0    /   DBili 0.3      10-24  TBili 0.5   /   AST 27   /   ALT 30   /   AlkP 73   /   Tptn 6.8   /   Alb 3.9    /   DBili --      10-22      PT/INR - ( 30 Oct 2019 00:00 )   PT: 18.10 sec;   INR: 1.58 ratio         PTT - ( 30 Oct 2019 00:00 )  PTT:23.4 sec       Radiology: (reviewed by attending) Gastroenterology progress note:     Patient is a 84y old  Male who presents with a chief complaint of diarrhea (31 Oct 2019 05:36)       Admitted on: 10-19-19    We are following the patient for gastric ulcer bleed     Interval History: Patient was downgraded to regular medical floor. Had one BM early morning, nurse reported to be dark stool but no jose blood. patient did not see by himself. No nausea, vomiting, abdominal pain. NG tube is out and patient is tolerating clear liquids.    Patient's medical problems are improving   Prior records reviewed (Y/N): Y  History obtained from someone other than patient (Y/N): N      PAST MEDICAL & SURGICAL HISTORY:  Neuropathy  TIA (transient ischemic attack)  Left carotid stenosis  Lyme disease  Hypothyroidism  Coronary artery disease  No significant past surgical history      MEDICATIONS  (STANDING):  acetaminophen   Tablet .. 650 milliGRAM(s) Oral daily  aMIOdarone    Tablet 200 milliGRAM(s) Oral daily  aspirin  chewable 81 milliGRAM(s) Oral daily  atorvastatin 80 milliGRAM(s) Oral at bedtime  cefepime   IVPB 2000 milliGRAM(s) IV Intermittent every 8 hours  cefepime   IVPB      chlorhexidine 4% Liquid 1 Application(s) Topical <User Schedule>  clopidogrel Tablet 75 milliGRAM(s) Oral daily  levothyroxine 200 MICROGram(s) Oral daily  lisinopril 20 milliGRAM(s) Oral daily  metoprolol tartrate 12.5 milliGRAM(s) Oral every 12 hours  metroNIDAZOLE  IVPB      metroNIDAZOLE  IVPB 500 milliGRAM(s) IV Intermittent every 8 hours  pantoprazole  Injectable 40 milliGRAM(s) IV Push every 12 hours  senna 2 Tablet(s) Oral at bedtime  tamsulosin 0.4 milliGRAM(s) Oral at bedtime  tamsulosin 0.4 milliGRAM(s) Oral at bedtime    MEDICATIONS  (PRN):  acetaminophen   Tablet .. 650 milliGRAM(s) Oral every 6 hours PRN Mild Pain (1 - 3)  diphenhydrAMINE 25 milliGRAM(s) Oral at bedtime PRN Insomnia      Allergies  iodinated radiocontrast agents (Hives)      Review of Systems:   Cardiovascular:  No Chest Pain, No Palpitations  Respiratory:  No Cough, No Dyspnea  Gastrointestinal:  As described in HPI    Physical Examination:  T(C): 35.6 (10-31-19 @ 08:00), Max: 36.1 (10-30-19 @ 16:00)  HR: 91 (10-31-19 @ 09:24) (76 - 91)  BP: 149/79 (10-31-19 @ 09:24) (122/52 - 170/72)  RR: 20 (10-31-19 @ 08:00) (20 - 28)  SpO2: 100% (10-30-19 @ 19:30) (100% - 100%)      10-30-19 @ 07:01  -  10-31-19 @ 07:00  --------------------------------------------------------  IN: 320 mL / OUT: 1142 mL / NET: -822 mL      Constitutional: No acute distress.  Respiratory:  No signs of respiratory distress. Lung sounds are clear bilaterally.  Cardiovascular:  S1 S2, Regular rate and rhythm.  Abdominal: Abdomen is soft, symmetric, and non-tender without distention. There are no visible lesions or scars. Bowel sounds are present and normoactive in all four quadrants. No masses, hepatomegaly, or splenomegaly are noted.   Skin: No rashes, No Jaundice.        Data: (reviewed by attending)                        8.2    14.02 )-----------( 292      ( 31 Oct 2019 06:10 )             25.2     Hgb trend:  8.2  10-31-19 @ 06:10  7.5  10-30-19 @ 10:58  7.5  10-30-19 @ 07:30  6.4  10-30-19 @ 01:30  6.3  10-30-19 @ 00:00  7.2  10-29-19 @ 17:00  7.6  10-29-19 @ 05:00  7.5  10-29-19 @ 00:00  8.6  10-28-19 @ 15:50      10-29-19 @ 07:01  -  10-30-19 @ 07:00  --------------------------------------------------------  IN: 333 mL      10-31    142  |  109  |  18  ----------------------------<  139<H>  3.6   |  25  |  <0.5<L>    Ca    7.3<L>      31 Oct 2019 06:10  Phos  2.4     10-30  Mg     2.1     10-30      Liver panel trend:  TBili 0.6   /   AST 43   /   ALT 58   /   AlkP 47   /   Tptn 3.8   /   Alb 2.0    /   DBili 0.3      10-24  TBili 0.5   /   AST 27   /   ALT 30   /   AlkP 73   /   Tptn 6.8   /   Alb 3.9    /   DBili --      10-22      PT/INR - ( 30 Oct 2019 00:00 )   PT: 18.10 sec;   INR: 1.58 ratio         PTT - ( 30 Oct 2019 00:00 )  PTT:23.4 sec

## 2019-10-31 NOTE — PROGRESS NOTE ADULT - SUBJECTIVE AND OBJECTIVE BOX
GENERAL SURGERY PROGRESS NOTE     LAKSHMI JUDD  84y  Male  Hospital day :12d  POD:  Procedure: Small bowel resection with anastomosis  Exploratory laparotomy  Bypass of superior mesenteric artery    OVERNIGHT EVENTS: Patient downgraded. 2BMs with blood.     T(F): 96.5 (10-31-19 @ 00:00), Max: 96.9 (10-30-19 @ 16:00)  HR: 89 (10-31-19 @ 05:00) (68 - 90)  BP: 170/72 (10-31-19 @ 05:00) (113/45 - 170/72)  ABP: 176/60 (10-30-19 @ 13:00) (152/68 - 178/58)  ABP(mean): 96 (10-30-19 @ 13:00) (80 - 96)  RR: 22 (10-31-19 @ 00:00) (20 - 28)  SpO2: 100% (10-30-19 @ 19:30) (100% - 100%)    DIET/FLUIDS:   NG:   10-29-19 @ 07:01  -  10-30-19 @ 07:00  --------------------------------------------------------  OUT: 100 mL      URINE:   10-29-19 @ 07:01  -  10-30-19 @ 07:00  --------------------------------------------------------  OUT: 1297 mL       GI proph:  pantoprazole  Injectable 40 milliGRAM(s) IV Push every 12 hours    AC/ proph: aspirin  chewable 81 milliGRAM(s) Oral daily    ABx: cefepime   IVPB 2000 milliGRAM(s) IV Intermittent every 8 hours  cefepime   IVPB      metroNIDAZOLE  IVPB      metroNIDAZOLE  IVPB 500 milliGRAM(s) IV Intermittent every 8 hours      PHYSICAL EXAM:  GENERAL: NAD, well-appearing  CHEST/LUNG: Clear to auscultation bilaterally  HEART: Regular rate and rhythm  ABDOMEN: midline incision closed with staples, clean and intact, Nontender, Nondistended;   EXTREMITIES:  DP and PT pulses dopplerable b/l, No clubbing, cyanosis, or edema       LABS  Labs:  CAPILLARY BLOOD GLUCOSE      POCT Blood Glucose.: 106 mg/dL (30 Oct 2019 21:34)                          7.5    13.14 )-----------( 257      ( 30 Oct 2019 10:58 )             23.6         10-30    144  |  111<H>  |  22<H>  ----------------------------<  84  3.8   |  23  |  <0.5<L>      Calcium, Total Serum: 7.0 mg/dL (10-30-19 @ 10:58)      LFTs:     Blood Gas Arterial, Lactate: 1.3 mmoL/L (10-29-19 @ 11:31)  Blood Gas Arterial, Lactate: 1.0 mmoL/L (10-29-19 @ 09:26)  Blood Gas Arterial, Lactate: 0.8 mmoL/L (10-29-19 @ 04:20)  Blood Gas Arterial, Lactate: 1.2 mmoL/L (10-28-19 @ 21:00)    ABG - ( 29 Oct 2019 11:31 )  pH: 7.53  /  pCO2: 27    /  pO2: 183   / HCO3: 23    / Base Excess: 0.7   /  SaO2: 100             ABG - ( 29 Oct 2019 09:26 )  pH: 7.50  /  pCO2: 31    /  pO2: 136   / HCO3: 24    / Base Excess: 1.0   /  SaO2: 100             ABG - ( 29 Oct 2019 04:20 )  pH: 7.48  /  pCO2: 33    /  pO2: 205   / HCO3: 25    / Base Excess: 1.4   /  SaO2: 100               Coags:     18.10  ----< 1.58    ( 30 Oct 2019 00:00 )     23.4        CARDIAC MARKERS ( 30 Oct 2019 00:00 )  x     / 0.03 ng/mL / 77 U/L / x     / 3.8 ng/mL  CARDIAC MARKERS ( 29 Oct 2019 17:00 )  x     / 0.03 ng/mL / x     / x     / x              RADIOLOGY & ADDITIONAL TESTS:  < from: Xray Chest 1 View- PORTABLE-Routine (10.30.19 @ 06:13) >  Impression:      Bilateral pleural effusions, right and left.    < end of copied text >

## 2019-10-31 NOTE — PROGRESS NOTE ADULT - ASSESSMENT
Patient is a 84y old Male s/p exploratory laparotomy with small bowel resection and anastomosis and a left External Iliac to SMA bypass with PTFE. s/p GI bleed with ulcer noted in GE junction s/p 1 clip placement. Continues to have blood in BMs. however he is vitally stable and Hb 7.5x2, f/u 4:30 AM labs.     Plan:   - f/u 4:30 Hb  - CLD   - PTX  - management per blue team

## 2019-11-01 NOTE — PROGRESS NOTE ADULT - SUBJECTIVE AND OBJECTIVE BOX
GENERAL SURGERY PROGRESS NOTE     LAKSHMI JUDD  84y  Male  Hospital day :13d  Procedure: Small bowel resection with anastomosis  Exploratory laparotomy  Bypass of superior mesenteric artery    OVERNIGHT EVENTS: No acute events overnight    T(F): 97.1 (11-01-19 @ 00:00), Max: 97.4 (10-31-19 @ 17:24)  HR: 79 (11-01-19 @ 00:00) (76 - 91)  BP: 153/69 (11-01-19 @ 00:00) (136/64 - 170/72)  RR: 20 (11-01-19 @ 00:00) (20 - 20)  SpO2: 95% (10-31-19 @ 17:24) (95% - 95%)    DIET/FLUIDS: potassium chloride  20 mEq/100 mL IVPB 20 milliEquivalent(s) IV Intermittent once  potassium phosphate IVPB 30 milliMole(s) IV Intermittent once    BM:   10-30-19 @ 07:01  -  10-31-19 @ 07:00  --------------------------------------------------------  OUT: 2 mL    EMESIS:     URINE:   10-30-19 @ 07:01  -  10-31-19 @ 07:00  --------------------------------------------------------  OUT: 940 mL     GI proph:  pantoprazole  Injectable 40 milliGRAM(s) IV Push every 12 hours    AC/ proph: aspirin  chewable 81 milliGRAM(s) Oral daily    ABx: cefepime   IVPB 2000 milliGRAM(s) IV Intermittent every 8 hours  cefepime   IVPB      metroNIDAZOLE  IVPB      metroNIDAZOLE  IVPB 500 milliGRAM(s) IV Intermittent every 8 hours    PHYSICAL EXAM:  GENERAL: NAD, well-appearing  CHEST/LUNG: Clear to auscultation bilaterally  HEART: Regular rate and rhythm  ABDOMEN: Soft, Nontender, Nondistended;   EXTREMITIES:  No clubbing, cyanosis, or edema    LABS                        7.4    12.93 )-----------( 293      ( 31 Oct 2019 20:36 )             22.6       Auto Neutrophil %: 81.3 % (10-31-19 @ 20:36)  Auto Immature Granulocyte %: 5.9 % (10-31-19 @ 20:36)    10-31    142  |  109  |  16  ----------------------------<  112<H>  3.3<L>   |  25  |  <0.5<L>      Calcium, Total Serum: 7.0 mg/dL (10-31-19 @ 20:36)      LFTs:    Blood Gas Arterial, Lactate: 1.3 mmoL/L (10-29-19 @ 11:31)  Blood Gas Arterial, Lactate: 1.0 mmoL/L (10-29-19 @ 09:26)    ABG - ( 29 Oct 2019 11:31 )  pH: 7.53  /  pCO2: 27    /  pO2: 183   / HCO3: 23    / Base Excess: 0.7   /  SaO2: 100         ABG - ( 29 Oct 2019 09:26 )  pH: 7.50  /  pCO2: 31    /  pO2: 136   / HCO3: 24    / Base Excess: 1.0   /  SaO2: 100         ABG - ( 29 Oct 2019 04:20 )  pH: 7.48  /  pCO2: 33    /  pO2: 205   / HCO3: 25    / Base Excess: 1.4   /  SaO2: 100         RADIOLOGY & ADDITIONAL TESTS:  < from: Xray Chest 1 View- PORTABLE-Routine (10.31.19 @ 08:07) >  Impression:      Stable bilateral airspace opacities, right greater than left.     < end of copied text >

## 2019-11-01 NOTE — CHART NOTE - NSCHARTNOTEFT_GEN_A_CORE
Registered Dietitian Follow-Up     Patient Profile Reviewed                           Yes [x]   No []     Nutrition History Previously Obtained        Yes [x]  No []       Pertinent Subjective Information: Pt was OOB to chair at time of RD visit, states that his appetite is improving slowly, currently consuming a little less than half of his meal trays + 1 Ensure Enlive daily. Encouraged pt to drink Ensure Enlive BID for adequate nutrition, pt and pt's wife in agreement.      Pertinent Medical Interventions: PT and OOB. SW working on rehab/SNF for early next week.      Diet order: Regular + Ensure Enlive TID     Anthropometrics:  - Ht. 68"  - Wt. 101.6kg (10/30)--wt gain unlikely 2/2 just fluids despite 2+ edema, will monitor wt trends closely   - Wt Hx  (10/25): 84.8kg  (10/29): 97kg   - BMI: 32.5  - IBW     Pertinent Lab Data: (10/31) H/H 7.4/22.6, K+ 3.3, Cr. <0.5, Glu 112      Pertinent Meds: plavix, potassium phosphate IV, lopressor, tamsulosin, lipitor, synthroid, lisinopril, protonix, senna, amiodarone     Physical Findings:  - Appearance: alert and oriented; 2+ generalized edema as well as to B/L arm   - GI function: LBM 10/31   - Oral/Mouth cavity: none reported   - Skin: surgical incision      Nutrition Requirements  Weight Used: current ABW of 84.8kg; needs continued from RD note on 10/25     Calories: 6546-8034 kcal/day (MSJ x 1.1-1.2)  Protein: 84-92 g/day (1.0-1.1 g/kg of ABW)  Fluid: 1ml/kcal or per ICU team on fluids     Nutrient Intake: not meeting kcal/pro needs at this time      Previous Nutrition Diagnosis:  inadequate protein-energy intake (improving)      Nutrition Intervention: meals and snacks, medical food supplements    Recommendations:   1. Continue current diet order and oral supplementation      Goal/Expected Outcome: Pt to consume >75% of meals, snacks, and supplements over the next 4 days      Indicator/Monitoring: RD to monitor energy intake, body composition, glucose/electrolyte profiles, nutrition focused physical findings

## 2019-11-01 NOTE — PROGRESS NOTE ADULT - ASSESSMENT
Patient is a 84y old Male s/p exploratory laparotomy with small bowel resection and anastomosis and a left External Iliac to SMA bypass with PTFE    Plan:  - Hgb downtrend 7.4 from 8.2   - ASA, Plavix   - cefepime/flagyl for Abx coverage   - Regular diet + ensures   - Declined PT, will reevaluate today   - Per cards: continue ASA, plavix, amlodipine, metoprolol (increased to 25 BID yesterday)   - Per GI: repeat EGD in 2 months

## 2019-11-01 NOTE — PHYSICAL THERAPY INITIAL EVALUATION ADULT - GENERAL OBSERVATIONS, REHAB EVAL
Pt was approached for PT initial evaluation. Pt's wife decline PT at this time, stated Pt is in deep sleep. unable to participate in PT at this time. requesting to schedule for tomorrow morning. Nurse was present. PT will follow up.
10:30- 11:00. chart reviewed. Pt received sitting at B/S chair, alert, oriented, able to follow multi-step instructions and agreeable to PT evaluation. wife was present, + IV, +2 B/L LE pitting edema, + catheter, stable vitals 111/55 HR 85. denies pain or discomfort, NAD.

## 2019-11-01 NOTE — PHYSICAL THERAPY INITIAL EVALUATION ADULT - PERTINENT HX OF CURRENT PROBLEM, REHAB EVAL
84-year-old right-handed  man with history of HTN, hiatal hernia, hypercholesteremia, and CAD s/p multiple stents presents with abdominal pain, and diarrhea.

## 2019-11-01 NOTE — PROGRESS NOTE ADULT - ATTENDING COMMENTS
Stable and afebrile.  Passing gas.  Voiding.  WC 13.  No n/v.  Tolerates PO.  abdomen soft, NT, ND wound intact.    a/p:  progressing well.  Regular diet.  Ensure.  Continue asa and plavix.  PT and OOB.  SW working on rehab / SNF for early next week.

## 2019-11-02 NOTE — PROGRESS NOTE ADULT - ASSESSMENT
Assessment:  84y Male patient admitted S/P exploratory laparotomy with small bowel resection and anastomosis and a left External Iliac to SMA bypass with PTFE , with the above physical exam, labs, and imaging findings.    Plan:  -Pain control as needed  -Hemodynamic monitoring as per routine  -Encourage ambulation and incentive spirometer use (10x/hr when awake)  -GI and DVT prophylaxis  -Check and replete CBC and BMP q daily  -Strict input and output monitoring  -Regular diet  -EGD in 2 month with GI  -Dispo planning    Date/Time: 11-02-19 @ 05:03

## 2019-11-02 NOTE — PROGRESS NOTE ADULT - ATTENDING COMMENTS
Stable and afebrile.  No bloody BM overnight.  No n/v.  Labs stable.  Abdomen soft, NT, ND, BS present.  wound intact.    a/p:  Progressing well.  Repeat labs today.  Regular diet.  OOB, PT, IS.  ASA and Plavix.  will be ready for rehab next Monday.

## 2019-11-02 NOTE — PROGRESS NOTE ADULT - SUBJECTIVE AND OBJECTIVE BOX
Progress Note: Surgery  Patient: LAKSHMI JUDD , 84y (1935)Male   MRN: [id number]  Location: [address]  Visit: 10-19-19 Inpatient  Date: 11-02-19 @ 05:03    Procedure/Diagnosis: Small bowel resection with anastomosis  Exploratory laparotomy  Bypass of superior mesenteric artery  Events over 24h: No acute event overnight. No new complaint.     Vitals: T(F): 96.5 (11-02-19 @ 00:00), Max: 97 (11-01-19 @ 07:20)  HR: 78 (11-02-19 @ 00:00)  BP: 124/60 (11-02-19 @ 00:00) (124/56 - 138/58)  RR: 18 (11-02-19 @ 00:00)  SpO2: 95% (11-01-19 @ 18:00)      Diet: Diet, Regular:   Supplement Feeding Modality:  Oral  Ensure Enlive Cans or Servings Per Day:  3       Frequency:  Three Times a day (10-31-19 @ 18:34)    IV Fluid: potassium phosphate IVPB 30 milliMole(s) IV Intermittent once      In:   10-31-19 @ 07:01  -  11-01-19 @ 07:00  --------------------------------------------------------  IN: 590 mL    11-01-19 @ 07:01  -  11-02-19 @ 05:03  --------------------------------------------------------  IN: 0 mL      Out:   10-31-19 @ 07:01  -  11-01-19 @ 07:00  --------------------------------------------------------  OUT:    Incontinent per Condom Catheter: 200 mL    Indwelling Catheter - Urethral: 300 mL    Voided: 400 mL  Total OUT: 900 mL      11-01-19 @ 07:01  -  11-02-19 @ 05:03  --------------------------------------------------------  OUT:    Incontinent per Condom Catheter: 800 mL    Stool: 1 mL  Total OUT: 801 mL        Net:   10-31-19 @ 07:01  -  11-01-19 @ 07:00  --------------------------------------------------------  NET: -310 mL    11-01-19 @ 07:01  -  11-02-19 @ 05:03  --------------------------------------------------------  NET: -801 mL        Physical Examination:  General Appearance: NAD, alert and cooperative  HEENT: NCAT, WNL  Heart: S1 and S2. No murmurs. Rhythm is regular.  Lungs: Clear to auscultation BL without rales, rhonchi, wheezing, crackles or diminished breath sounds.  Abdomen: Positive bowel sounds. Soft, nondistended, nontender    Medications: [Standing]  acetaminophen   Tablet .. 650 milliGRAM(s) Oral daily  aMIOdarone    Tablet 200 milliGRAM(s) Oral daily  aspirin  chewable 81 milliGRAM(s) Oral daily  atorvastatin 80 milliGRAM(s) Oral at bedtime  chlorhexidine 4% Liquid 1 Application(s) Topical <User Schedule>  clopidogrel Tablet 75 milliGRAM(s) Oral daily  levothyroxine 200 MICROGram(s) Oral daily  lisinopril 20 milliGRAM(s) Oral daily  metoprolol tartrate 25 milliGRAM(s) Oral two times a day  pantoprazole  Injectable 40 milliGRAM(s) IV Push every 12 hours  potassium phosphate IVPB 30 milliMole(s) IV Intermittent once  senna 2 Tablet(s) Oral at bedtime  sucralfate suspension 1 Gram(s) Oral every 12 hours  tamsulosin 0.4 milliGRAM(s) Oral at bedtime  tamsulosin 0.4 milliGRAM(s) Oral at bedtime    Medications:[PRN]  acetaminophen   Tablet .. 650 milliGRAM(s) Oral every 6 hours PRN  diphenhydrAMINE 25 milliGRAM(s) Oral at bedtime PRN    Labs:                        8.3    15.67 )-----------( 342      ( 01 Nov 2019 18:53 )             26.3   10-31    142  |  109  |  16  ----------------------------<  112<H>  3.3<L>   |  25  |  <0.5<L>    Ca    7.0<L>      31 Oct 2019 20:36  Phos  1.7     10-31  Mg     1.9     10-31        Micro/Urine:    Imaging:  None/24h

## 2019-11-03 NOTE — DISCHARGE NOTE NURSING/CASE MANAGEMENT/SOCIAL WORK - PATIENT PORTAL LINK FT
You can access the FollowMyHealth Patient Portal offered by [facility] by registering at the following website: [email or web address]. By joining [facility]’s FollowMyHealth portal, you will also be able to view your health information using other applications (apps) compatible with our system.

## 2019-11-03 NOTE — PROGRESS NOTE ADULT - ASSESSMENT
Assessment:  84y Male patient admitted S/P exploratory laparotomy with small bowel resection and anastomosis and a left External Iliac to SMA bypass with PTFE , with the above physical exam, labs, and imaging findings.    Plan   -Trend H/H  -Monitor for Bloody BM   -lasix PRN   -Strict input and output monitoring  -Regular diet  -Staple removal monday  -EGD in 2 month with GI  -Dispo planning

## 2019-11-03 NOTE — PROGRESS NOTE ADULT - ATTENDING COMMENTS
Stable and afebrile.  No bloody BM overnight.  No n/v.  Labs stable.  Abdomen soft, NT, ND, BS present.  wound intact.    a/p:  Progressing well.  Repeat labs today.  Regular diet.  OOB, PT, IS.  ASA and Plavix.  will be ready for rehab soon

## 2019-11-03 NOTE — PROGRESS NOTE ADULT - SUBJECTIVE AND OBJECTIVE BOX
GENERAL SURGERY PROGRESS NOTE     LAKSHMI JUDD  [address] [place] [address] day :15d  POD:  Procedure: Small bowel resection with anastomosis  Exploratory laparotomy  Bypass of superior mesenteric artery    Surgical Attending: Reymundo Nieves  Overnight events: No acute events overnight , receiving IV lasix for congestion. No episodes of bloody BM. Vitals stable.       T(F): 97.1 (11-03-19 @ 00:00), Max: 97.5 (11-02-19 @ 16:28)  HR: 82 (11-03-19 @ 00:00) (71 - 85)  BP: 137/82 (11-03-19 @ 00:00) (129/64 - 137/82)  ABP: --  ABP(mean): --  RR: 18 (11-03-19 @ 00:00) (18 - 18)  SpO2: --      11-01-19 @ 07:01  -  11-02-19 @ 07:00  --------------------------------------------------------  IN:  Total IN: 0 mL    OUT:    Incontinent per Condom Catheter: 1400 mL    Stool: 1 mL  Total OUT: 1401 mL    Total NET: -1401 mL      11-02-19 @ 08:01  -  11-03-19 @ 01:08  --------------------------------------------------------  IN:    Oral Fluid: 590 mL  Total IN: 590 mL    OUT:    Incontinent per Condom Catheter: 400 mL    Indwelling Catheter - Urethral: 400 mL  Total OUT: 800 mL    Total NET: -210 mL    BM:   11-01-19 @ 07:01  -  11-02-19 @ 07:00  --------------------------------------------------------  OUT: 1 mL      EMESIS:     URINE:   11-01-19 @ 07:01  -  11-02-19 @ 07:00  --------------------------------------------------------  OUT: 1400 mL       GI proph:  pantoprazole  Injectable 40 milliGRAM(s) IV Push every 12 hours    AC/ proph: aspirin  chewable 81 milliGRAM(s) Oral daily    ABx:     PHYSICAL EXAM:  GENERAL: NAD, well-appearing  CHEST/LUNG: Clear to auscultation bilaterally  HEART: Regular rate and rhythm  ABDOMEN: midline incision closed with staples, clean and intact, Nontender, Nondistended;   EXTREMITIES:  No clubbing, cyanosis, or edema      LABS  Labs:  CAPILLARY BLOOD GLUCOSE                              8.3    15.67 )-----------( 342      ( 01 Nov 2019 18:53 )             26.3         11-02    142  |  106  |  12  ----------------------------<  107<H>  4.1   |  24  |  0.5<L>      Calcium, Total Serum: 7.4 mg/dL (11-02-19 @ 22:10)      LFTs:       ABG - ( 29 Oct 2019 11:31 )  pH: 7.53  /  pCO2: 27    /  pO2: 183   / HCO3: 23    / Base Excess: 0.7   /  SaO2: 100             ABG - ( 29 Oct 2019 09:26 )  pH: 7.50  /  pCO2: 31    /  pO2: 136   / HCO3: 24    / Base Excess: 1.0   /  SaO2: 100             ABG - ( 29 Oct 2019 04:20 )  pH: 7.48  /  pCO2: 33    /  pO2: 205   / HCO3: 25    / Base Excess: 1.4   /  SaO2: 100

## 2019-11-04 NOTE — PROGRESS NOTE ADULT - SUBJECTIVE AND OBJECTIVE BOX
GENERAL SURGERY PROGRESS NOTE     LAKSHMI JUDD  84y  Male  Hospital day :16  Procedure: Small bowel resection with anastomosis  Exploratory laparotomy  Bypass of superior mesenteric artery    OVERNIGHT EVENTS: No acute events overnight, patient ambulating, no bloody movements overnight    T(F): 96.3 (11-04-19 @ 00:00), Max: 96.5 (11-03-19 @ 15:45)  HR: 75 (11-04-19 @ 00:00) (75 - 91)  BP: 132/63 (11-04-19 @ 00:00) (119/72 - 145/63)  RR: 18 (11-04-19 @ 00:00) (18 - 18)    DIET/FLUIDS: magnesium sulfate  IVPB 2 Gram(s) IV Intermittent once  potassium chloride  20 mEq/100 mL IVPB 20 milliEquivalent(s) IV Intermittent every 2 hours    URINE:   11-02-19 @ 08:01  -  11-03-19 @ 07:00  --------------------------------------------------------  OUT: 1800 mL    GI proph:  pantoprazole  Injectable 40 milliGRAM(s) IV Push every 12 hours    AC/ proph: aspirin  chewable 81 milliGRAM(s) Oral daily    PHYSICAL EXAM:  GENERAL: NAD, well-appearing  CHEST/LUNG: Clear to auscultation bilaterally  HEART: Regular rate and rhythm  ABDOMEN: Soft, Nontender, Nondistended;   EXTREMITIES:  No clubbing, cyanosis, or edema    LABS                        7.9    11.06 )-----------( 301      ( 03 Nov 2019 21:45 )             25.2         11-03    141  |  103  |  12  ----------------------------<  132<H>  3.4<L>   |  29  |  <0.5<L>      Calcium, Total Serum: 7.1 mg/dL (11-03-19 @ 21:45)    ABG - ( 29 Oct 2019 11:31 )  pH: 7.53  /  pCO2: 27    /  pO2: 183   / HCO3: 23    / Base Excess: 0.7   /  SaO2: 100       ABG - ( 29 Oct 2019 09:26 )  pH: 7.50  /  pCO2: 31    /  pO2: 136   / HCO3: 24    / Base Excess: 1.0   /  SaO2: 100       ABG - ( 29 Oct 2019 04:20 )  pH: 7.48  /  pCO2: 33    /  pO2: 205   / HCO3: 25    / Base Excess: 1.4   /  SaO2: 100         RADIOLOGY & ADDITIONAL TESTS:  *No new imaging

## 2019-11-04 NOTE — PROGRESS NOTE ADULT - ASSESSMENT
84y Male patient admitted S/P exploratory laparotomy with small bowel resection and anastomosis and a left External Iliac to SMA bypass with PTFE , with the above physical exam, labs, and imaging findings.    Plan   -Trend H/H  -Monitor for Bloody BM   -lasix PRN   -Strict input and output monitoring  -Regular diet  -EGD in 2 month with GI  -Dispo planning (likely rehab)

## 2019-11-04 NOTE — PROGRESS NOTE ADULT - ATTENDING COMMENTS
Stable and afebrile.  Tolerates PO.  Condom catheter in place.  Limited ambulation. To chair for few hours yesterday.  Wound intact. Staples will be removed today.  WC 11.  HB 7.9    a/p:  Progressing well.  Continue PPI, Sucralfate.  Regular diet.  Lasix 20 mg Po daily.  Continue ASA and Plavix.  d/c to SNF today, clinically ready.

## 2019-11-05 NOTE — DISCHARGE NOTE PROVIDER - NSDCFUADDINST_GEN_ALL_CORE_FT
You are being discharged from [facility]. Please contact the phone number provided and schedule a follow up appointment with Dr. Nieves as previously discussed in 2 weeks. Please also call the number provided and schedule a follow up appointment with Dr. Layton from Vascular Surgery. Please continue your home medications and start taking Protonix twice a day, Lasix, and Sucralfate as prescribed in the medication reconciliation. If you have any further questions about your care, please do not hesitate to contact Dr. Nieves's clinic. You are being discharged from [facility]. Please contact the phone number provided and schedule a follow up appointment with Dr. Nieves as previously discussed in 2 weeks. Please also call the number provided and schedule a follow up appointment with Dr. Layton from Vascular Surgery. Please scheduled a follow-up appointment with your home Cardiologist. Please schedule a follow-up appointment with Gastroenterology, it is recommended that a repeat EGD be performed in two months. Please continue your home medications and start taking Protonix twice a day, Lasix, and Sucralfate as prescribed in the medication reconciliation. If you have any further questions about your care, please do not hesitate to contact Dr. Nieves's clinic.

## 2019-11-05 NOTE — DISCHARGE NOTE PROVIDER - CARE PROVIDERS DIRECT ADDRESSES
[email or web address] [facility] [email or web address]

## 2019-11-05 NOTE — PROGRESS NOTE ADULT - SUBJECTIVE AND OBJECTIVE BOX
GENERAL SURGERY PROGRESS NOTE     LAKSHMI JUDD  84y  Male  Hospital day: 18d  POD: 14  Procedure: Small bowel resection with anastomosis  Exploratory laparotomy  Bypass of superior mesenteric artery    OVERNIGHT EVENTS: staples were removed 11/4 with a small area of the incision below the umbilicus being open. Wound will be packed daily, rest of wound covered with steri strips. Patient was complaining of L flank pain radiating to back and neck making it difficult for him to ambulate. CT C/A/P revealed a small fluid collection around the tail of the pancreas. Lipase 178 (previously 57). Patient also had ECG which was unchanged from previous. Trop 0.03 (previously 0.03). Patient continues to tolerate diet without nausea or vomiting and pass gas and have BM.     T(F): 97.1 (11-04-19 @ 23:39), Max: 97.2 (11-04-19 @ 08:00)  HR: 73 (11-04-19 @ 23:39) (71 - 87)  BP: 125/89 (11-04-19 @ 23:39) (122/63 - 157/70)  RR: 18 (11-04-19 @ 23:39) (18 - 18)    URINE:   11-03-19 @ 07:01  -  11-04-19 @ 07:00  --------------------------------------------------------  OUT: 350 mL     GI proph:  pantoprazole  Injectable 40 milliGRAM(s) IV Push every 12 hours    AC/ proph: aspirin  chewable 81 milliGRAM(s) Oral daily    PHYSICAL EXAM:  GENERAL: NAD, well-appearing  CHEST/LUNG: Clear to auscultation bilaterally  HEART: Regular rate and rhythm  ABDOMEN: Soft, tender in L flank, Nondistended; well healing midline would without signs of infection, small area with packing below the umbilicus  EXTREMITIES:  No clubbing, cyanosis, or edema    Labs:               7.3    10.29 )-----------( 277      ( 04 Nov 2019 22:30 )             23.4       11-04    138  |  102  |  15  ----------------------------<  140<H>  4.0   |  29  |  <0.5<L>    Calcium, Total Serum: 7.4 mg/dL (11-04-19 @ 22:30)    Lipase:178      ABG - ( 29 Oct 2019 11:31 )  pH: 7.53  /  pCO2: 27    /  pO2: 183   / HCO3: 23    / Base Excess: 0.7   /  SaO2: 100       ABG - ( 29 Oct 2019 09:26 )  pH: 7.50  /  pCO2: 31    /  pO2: 136   / HCO3: 24    / Base Excess: 1.0   /  SaO2: 100       ABG - ( 29 Oct 2019 04:20 )  pH: 7.48  /  pCO2: 33    /  pO2: 205   / HCO3: 25    / Base Excess: 1.4   /  SaO2: 100       CARDIAC MARKERS ( 04 Nov 2019 22:30 )  x     / x     / 88 U/L / x     / 2.4 ng/mL    RADIOLOGY & ADDITIONAL TESTS:    < from: CT Abdomen and Pelvis w/ IV Cont (11.04.19 @ 17:15) >  IMPRESSION:   1.  Increased bilateral pleural effusions, greater on the right.  2.  Increased small amount of abdominopelvic ascites, with a more focal   area of fluid in the left upper abdomen measuring 3.3 cm. Findings may be   post surgical in etiology.  3.  Post small bowel resection with surgical anastomosis in the right   lower quadrant. Patent SMA to left external iliac artery graft.  < end of copied text >

## 2019-11-05 NOTE — DISCHARGE NOTE PROVIDER - PROVIDER TOKENS
PROVIDER:[TOKEN:[[id number]]] PROVIDER:[TOKEN:[[id number]]],PROVIDER:[TOKEN:[[id number]]] PROVIDER:[TOKEN:[[id number]]],PROVIDER:[TOKEN:[[id number]]],PROVIDER:[TOKEN:[[id number]]],PROVIDER:[TOKEN:[[id number]]]

## 2019-11-05 NOTE — PROGRESS NOTE ADULT - PROVIDER SPECIALTY LIST ADULT
Cardiology
Gastroenterology
Hospitalist
Hospitalist
Internal Medicine
Internal Medicine
SICU
Surgery
Vascular Surgery
Cardiology
SICU
Surgery
Cardiology
Internal Medicine
SICU
Surgery
Vascular Surgery
Vascular Surgery

## 2019-11-05 NOTE — DISCHARGE NOTE PROVIDER - NSDCMRMEDTOKEN_GEN_ALL_CORE_FT
acetaminophen 325 mg oral tablet: 2 tab(s) orally every 6 hours, As needed, Moderate Pain (4 - 6)  amiodarone 200 mg oral tablet: 1 tab(s) orally 2 times a day   [place] Thyroid 120 mg oral tablet: 1 tab(s) orally once a day  aspirin 81 mg oral tablet, chewable: 1 tab(s) orally once a day  clopidogrel 75 mg oral tablet: 1 tab(s) orally once a day  Crestor 20 mg oral tablet: 1 tab(s) orally once a day (at bedtime)  levothyroxine 200 mcg (0.2 mg) oral tablet: 1 tab(s) orally once a day  lisinopril 20 mg oral tablet: 1 tab(s) orally once a day  metoprolol tartrate 25 mg oral tablet: 0.5 tab(s) orally 2 times a day   12.5 mg per day  pantoprazole 40 mg oral delayed release tablet: 1 tab(s) orally once a day   simethicone 80 mg oral tablet: 1 tab(s) orally once a day   sucralfate 1 g oral tablet: 1 tab(s) orally every 12 hours acetaminophen 325 mg oral tablet: 2 tab(s) orally every 6 hours, As needed, Moderate Pain (4 - 6)  amiodarone 200 mg oral tablet: 1 tab(s) orally 2 times a day   [place] Thyroid 120 mg oral tablet: 1 tab(s) orally once a day  aspirin 81 mg oral tablet, chewable: 1 tab(s) orally once a day  clopidogrel 75 mg oral tablet: 1 tab(s) orally once a day  Crestor 20 mg oral tablet: 1 tab(s) orally once a day (at bedtime)  levothyroxine 200 mcg (0.2 mg) oral tablet: 1 tab(s) orally once a day  lisinopril 20 mg oral tablet: 1 tab(s) orally once a day  metoprolol tartrate 25 mg oral tablet: 0.5 tab(s) orally 2 times a day   12.5 mg per day  pantoprazole 40 mg oral delayed release tablet: 1 tab(s) orally once a day   simethicone 80 mg oral tablet: 1 tab(s) orally once a day   sucralfate 1 g oral tablet: 1 tab(s) orally every 12 hours acetaminophen 325 mg oral tablet: 2 tab(s) orally every 6 hours, As needed, Moderate Pain (4 - 6)  amiodarone 200 mg oral tablet: 1 tab(s) orally 2 times a day   [place] Thyroid 120 mg oral tablet: 1 tab(s) orally once a day  aspirin 81 mg oral tablet, chewable: 1 tab(s) orally once a day  clopidogrel 75 mg oral tablet: 1 tab(s) orally once a day  Crestor 20 mg oral tablet: 1 tab(s) orally once a day (at bedtime)  furosemide 20 mg oral tablet: 1 tab(s) orally once a day  levothyroxine 200 mcg (0.2 mg) oral tablet: 1 tab(s) orally once a day  lisinopril 20 mg oral tablet: 1 tab(s) orally once a day  metoprolol tartrate 25 mg oral tablet: 0.5 tab(s) orally 2 times a day   12.5 mg per day  Protonix 40 mg oral delayed release tablet: 1 tab(s) orally 2 times a day  sucralfate 1 g oral tablet: 1 tab(s) orally every 12 hours acetaminophen 325 mg oral tablet: 2 tab(s) orally every 6 hours, As needed, Moderate Pain (4 - 6)  amiodarone 200 mg oral tablet: 1 tab(s) orally 2 times a day   [place] Thyroid 120 mg oral tablet: 1 tab(s) orally once a day  aspirin 81 mg oral tablet, chewable: 1 tab(s) orally once a day  clopidogrel 75 mg oral tablet: 1 tab(s) orally once a day  Crestor 20 mg oral tablet: 1 tab(s) orally once a day (at bedtime)  furosemide 20 mg oral tablet: 1 tab(s) orally once a day  levothyroxine 200 mcg (0.2 mg) oral tablet: 1 tab(s) orally once a day  lisinopril 20 mg oral tablet: 1 tab(s) orally once a day  metoprolol tartrate 25 mg oral tablet: 0.5 tab(s) orally 2 times a day   12.5 mg per day  Protonix 40 mg oral delayed release tablet: 1 tab(s) orally 2 times a day  sucralfate 1 g oral tablet: 1 tab(s) orally every 12 hours

## 2019-11-05 NOTE — PROGRESS NOTE ADULT - ATTENDING COMMENTS
Stable and afebrile.  CT scan with bilateral pleural effusions R>L.  Peripancreatic fluid. No hematomas or collections.  Tolerate SBO.  Feels betetr, less SOB.    a/p:  Progressed well.  Lasix, POI and sucralfate in addition to his preop meds.  ASA and Plavix.  d/c to SNF today.  followup in my office in 2 weeks. Stable and afebrile.  CT scan with bilateral pleural effusions R>L.  Peripancreatic fluid. No hematomas or collections.  Tolerate SBO.  Feels better, less SOB.    a/p:  Progressed well.  Lasix, POI and sucralfate in addition to his preop meds.  ASA and Plavix.  d/c to SNF today.  followup in my office in 2 weeks.

## 2019-11-05 NOTE — DISCHARGE NOTE PROVIDER - NSDCCPCAREPLAN_GEN_ALL_CORE_FT
PRINCIPAL DISCHARGE DIAGNOSIS  Diagnosis: Abdominal pain  Assessment and Plan of Treatment:       SECONDARY DISCHARGE DIAGNOSES  Diagnosis: Unable to eat  Assessment and Plan of Treatment:     Diagnosis: Diarrhea  Assessment and Plan of Treatment:

## 2019-11-05 NOTE — PROGRESS NOTE ADULT - ASSESSMENT
Assessment:  84y Male patient admitted S/P exploratory laparotomy with small bowel resection and anastomosis and a left External Iliac to SMA bypass with PTFE    Plan   -Daily packing changes  -20gm Lasix PO daily  -Continue ASA/Plavix  -EGD in 2 month with GI  -Dispo planning (likely rehab)

## 2019-11-05 NOTE — DISCHARGE NOTE PROVIDER - CARE PROVIDER_API CALL
Reymundo Nieves)  Surgery  [address] [place] [address], [address] [place] [address]  Phone: [phone]  Fax: [phone]  Follow Up Time: Reymundo Nieves)  Surgery  [address], [address]rd [place] [address]  Phone: [phone]  Fax: [phone]  Follow Up Time:     Armand Layton)  Surgery; Vascular Surgery  [address] [place] [address], [place] [address]  Phone: [phone]  Fax: [phone]  Follow Up Time: Reymundo Nieves)  Surgery  [address], [address]rd [place] [address]  Phone: [phone]  Fax: [phone]  Follow Up Time:     Armand Layton)  Surgery; Vascular Surgery  [address] [facility], [place] [address]  Phone: [phone]  Fax: [phone]  Follow Up Time:     Cesar Rodriguez)  Gastroenterology  [address] [place] [address]  Phone: [phone]  Fax: [phone]  Follow Up Time:     James Chavez)  Cardiovascular Disease; Interventional Cardiology  [address] [place] [address]  Phone: [phone]  Fax: [phone]  Follow Up Time:

## 2019-11-05 NOTE — DISCHARGE NOTE PROVIDER - HOSPITAL COURSE
84M history below notable for  CAD s/p multiple stents presents with abdominal pain, and diarrhea., patient was admitted on Oct 1st for  NSTEMI and had 3 stents placed.     Readmitted after 5 days for colitis and discharged on Oct 8 on po flagyl and cipro. One day after discharge the diarrhea started again, associated with diffuse abdominal discomfort. He called his gastroenterologist who recommended to stop Abx. The diarrhea did not resolve. He presented to the hospital on 10/19 and he was admitted to medical service, due to worsening abdominal symptoms. 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 Patient was taken to the OR on 10/22 with general and vascular surgery. He underwent exploratory laparotomy with a  left external iliac to SMA bypass with PTFE and small bowel resection with primary anastomosis, 80 cm of patchy necrotic bowel resected. Patient was upgraded to the SICU after the procedures. He was restarted on his aspirin and plavix POD1 due to recent stent placement. He was extubated on 10/24. He was downgraded to the floor on 10/26. On the floor, he was tolerating fulls and ambulating, getting Lasix for volume overload, and then developed multiple loose bowel movements, initially green then turning bloody. He had 13 recorded BMs. He states he had abdominal pain after being transferred. He states these bowel movements are not similar to the diarrhea he had prior. He has hemorrhoids, but denies blood in the toilet bowl or on the stool but rather mixed with stool. SQH, Plavix, and aspirin were held, and he was transfused 1u pRBCs overnight an an additional 2u pRBCs this morning for Hemoglobin 10.9-> 8.5-> 8.0->(1u) 7.7-> (2u)-> 9.6. His HR 80-90s, SBPs 115, decreased from prior baseline SBPs 140s.  He has been made NPO and upgraded back to the SICU. CTA performed was suggestive of active extravasation of lesser curvature of the stomach. Endoscopy performed, small ulcer identified in GE junction, epi was injected and 1 clip was placed. He remained intubated post procedure and started on pressors for hypotension. Pressors weaned and patient extubated and stable, he was downgraded to the floor again on 10/30. He was restarted on aspirin and plavix and home HTN medications. He had no further evidence of GI bleed. Seen by home Cardiologist Dr. Chavez on admission, recommended home HTN and AC as well as increase in home metoprolol. 84M history notable for CAD s/p multiple stents presents with abdominal pain, and diarrhea., patient was admitted on Oct 1st for  NSTEMI and had 3 stents placed.     Readmitted after 5 days for colitis and discharged on Oct 8 on po flagyl and cipro. One day after discharge the diarrhea started again, associated with diffuse abdominal discomfort. He called his gastroenterologist who recommended to stop Abx. The diarrhea did not resolve. He presented to the hospital on 10/19 and he was admitted to medical service, due to worsening abdominal symptoms. 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 Patient was taken to the OR on 10/22 with general and vascular surgery. He underwent exploratory laparotomy with a  left external iliac to SMA bypass with PTFE and small bowel resection with primary anastomosis, 80 cm of patchy necrotic bowel resected. Patient was upgraded to the SICU after the procedures. He was restarted on his aspirin and plavix POD1 due to recent stent placement. He was extubated on 10/24. He was downgraded to the floor on 10/26. On the floor, he was tolerating fulls and ambulating, getting Lasix for volume overload, and then developed multiple loose bowel movements, initially green then turning bloody. He had 13 recorded BMs. He states he had abdominal pain after being transferred. He states these bowel movements are not similar to the diarrhea he had prior. He has hemorrhoids, but denies blood in the toilet bowl or on the stool but rather mixed with stool. SQH, Plavix, and aspirin were held, and he was transfused 1u pRBCs overnight an an additional 2u pRBCs this morning for Hemoglobin 10.9-> 8.5-> 8.0->(1u) 7.7-> (2u)-> 9.6. His HR 80-90s, SBPs 115, decreased from prior baseline SBPs 140s.  He has been made NPO and upgraded back to the SICU. CTA performed was suggestive of active extravasation of lesser curvature of the stomach. Endoscopy performed, small ulcer identified in GE junction, epi was injected and 1 clip was placed. He remained intubated post procedure and started on pressors for hypotension. Pressors weaned and patient extubated and stable, he was downgraded to the floor again on 10/30. He was restarted on aspirin and plavix and home HTN medications. He had no further evidence of GI bleed. Seen by home Cardiologist Dr. Chavez on admission, recommended home HTN and AC as well as increase in home metoprolol. Gastroenterology recommended Protonix BID and EGD 2 months as outpatient to confirm healing. Patient seen by Physical Therapy and Physiatry, recommended to go to rehab facility after discharge. He is currently stable, tolerating a regular diet with ensures with no nausea or vomiting, no evidence of bloody bowel movements, ambulating, and pain is well controlled. He is ready for discharge with outpatient follow-up in clinic with Dr. Nieves.

## 2019-11-05 NOTE — PROGRESS NOTE ADULT - REASON FOR ADMISSION
diarrhea

## 2019-11-05 NOTE — DISCHARGE NOTE PROVIDER - NSDCFUSCHEDAPPT_GEN_ALL_CORE_FT
LAKSHMI JUDD ; 01/14/2020 ; [facility] [id number] LAKSHMI JUDD ; 01/14/2020 ; [facility] [id number] LAKSHMI JUDD ; 01/14/2020 ; [facility] [id number] LAKSHMI JUDD ; 01/14/2020 ; [facility] [id number] j[address]any

## 2019-11-05 NOTE — PROGRESS NOTE ADULT - NSHPATTENDINGPLANDISCUSS_GEN_ALL_CORE
SICU team
patient
patient and SICU Team
patient and wife
patient, son and wife
son and SICU team
wife and son, SICU.
patient
patient and ICU
patient and daughter
primary and SICU Team
Dr. Nieves

## 2019-11-11 NOTE — H&P ADULT - HISTORY OF PRESENT ILLNESS
85 yo M yo with PMH of CAD s/p NSTEMI Oct 1 w/ pci and stent x3, HTN ,DLD, Hypothyroidism presenting for for SOB.     pt states he has had 2 days sob and wheezing. [facility] which showed fluid in the lungs. pt w/ significant LE edema b/l. no fever, chills, productive cough. pt endorsing L lower posterior rib pain worse w/ palpation. pt denies significant abdominal pain, but some discomfort w/ palpation. pt states he has had BM. pt s/p lasix and nebs pta. pt states nebs did not help.    patient was admitted on Oct 1st for  NSTEMI and had 3 stents placed. pt also with recent complicated admission for abdominal pain, colitis and found to have mesenteric ischemia s/p exploratory laparotomy with a left external iliac to SMA bypass and 80 cm small bowel resection with primary anastomosis. admission also was complicated by UGIB requiring endoscopic intervention for bleeding gastric ulcer.     in the ED, pt received IV Lasix 40 mg , 2 gm MgSo4, benadryl.  CTA chest and abdomen ruled out PE, but showing bilateral pleural effusion. 83 yo M yo with PMH of CAD s/p NSTEMI Oct 1 w/ pci and stent x3, HTN ,DLD, Hypothyroidism presenting for for SOB. at the time of the interview pt was placed on BiPAP and collateral history obtained from [facility]. pt endorses progressive dyspnea and wheezing at [facility] where he was discharged for short term rehab. also endorses bilateral LE swelling and fatigue. CXR at that time showed left sided small pleural effusion. pt denies fever, chills, productive cough, abdominal pain, chest pain, palpitation or change in bowel/urinary habits.     to note, patient was admitted on Oct 1st for NSTEMI and had 3 stents placed. pt also with recent complicated admission in october for abdominal pain, colitis and found to have mesenteric ischemia s/p exploratory laparotomy with a left external iliac to SMA bypass and 80 cm small bowel resection with primary anastomosis. admission also was complicated by UGIB requiring endoscopic intervention for bleeding gastric ulcer.     Vital Signs in the ED:  T(F): 97.2 (11 Nov 2019 07:58), Max: 98 (10 Nov 2019 23:44)  HR: 85 (11 Nov 2019 08:15) (85 - 106)  BP: 142/63 (11 Nov 2019 07:58) (115/65 - 142/63)  RR: 20 (11 Nov 2019 07:58) (18 - 22)  SpO2: 100% (11 Nov 2019 08:15) (96% - 100%)    in the ED, pt received IV Lasix 40 mg , 2 gm MgSo4, benadryl.  CTA chest and abdomen ruled out PE, but showing bilateral pleural effusion. 83 yo M yo with PMH of CAD s/p NSTEMI Oct 1 w/ pci and stent x3, HTN ,DLD, Hypothyroidism presenting for SOB. at the time of the interview pt was placed on BiPAP and collateral history obtained from [facility] records. pt endorses progressive dyspnea and wheezing at [facility] where he was discharged for short term rehab. also endorses bilateral LE swelling and fatigue. CXR at that time showed left sided small pleural effusion. pt denies fever, chills, productive cough, abdominal pain, chest pain, palpitation or change in bowel/urinary habits.     to note, patient was admitted on Oct 1st for NSTEMI and had 3 stents placed. pt also with recent complicated admission in october for abdominal pain, colitis and found to have mesenteric ischemia s/p exploratory laparotomy with a left external iliac to SMA bypass and 80 cm small bowel resection with primary anastomosis. admission also was complicated by UGIB requiring endoscopic intervention for bleeding gastric ulcer.     Vital Signs in the ED:  T(F): 97.2 (11 Nov 2019 07:58), Max: 98 (10 Nov 2019 23:44)  HR: 85 (11 Nov 2019 08:15) (85 - 106)  BP: 142/63 (11 Nov 2019 07:58) (115/65 - 142/63)  RR: 20 (11 Nov 2019 07:58) (18 - 22)  SpO2: 100% (11 Nov 2019 08:15) (96% - 100%)    in the ED, pt received IV Lasix 40 mg , 2 gm MgSo4, benadryl.  CTA chest and abdomen ruled out PE, but showing bilateral pleural effusion.

## 2019-11-11 NOTE — H&P ADULT - NSHPLABSRESULTS_GEN_ALL_CORE
Complete Blood Count + Automated Diff (11.11.19 @ 00:21)    WBC Count: 5.22 K/uL    RBC Count: 3.08 M/uL    Hemoglobin: 8.9 g/dL    Hematocrit: 29.2 %    Mean Cell Volume: 94.8 fL    Mean Cell Hemoglobin: 28.9 pg    Mean Cell Hemoglobin Conc: 30.5 g/dL    Red Cell Distrib Width: 21.5 %    Platelet Count - Automated: 279 K/uL    Auto Neutrophil #: 4.36 K/uL    Auto Lymphocyte #: 0.40 K/uL    Auto Monocyte #: 0.40 K/uL    Auto Eosinophil #: 0.01 K/uL    Auto Basophil #: 0.01 K/uL    Auto Neutrophil %: 83.4: Differential percentages must be correlated with absolute numbers for  clinical significance. %    Auto Lymphocyte %: 7.7 %    Auto Monocyte %: 7.7 %    Auto Eosinophil %: 0.2 %    Auto Basophil %: 0.2 %    Auto Immature Granulocyte %: 0.8 %    Nucleated RBC: 0 /100 WBCs    Comprehensive Metabolic Panel (11.11.19 @ 00:21)    Sodium, Serum: 137 mmol/L    Potassium, Serum: 4.1 mmol/L    Chloride, Serum: 95 mmol/L    Carbon Dioxide, Serum: 28 mmol/L    Anion Gap, Serum: 14 mmol/L    Blood Urea Nitrogen, Serum: 10 mg/dL    Creatinine, Serum: 0.5 mg/dL    Glucose, Serum: 120 mg/dL    Calcium, Total Serum: 7.7 mg/dL    Protein Total, Serum: 4.9 g/dL    Albumin, Serum: 2.6 g/dL    Bilirubin Total, Serum: 0.4 mg/dL    Alkaline Phosphatase, Serum: 78 U/L    Aspartate Aminotransferase (AST/SGOT): 57 U/L    Alanine Aminotransferase (ALT/SGPT): 46 U/L    eGFR if Non : 101: Interpretative comment  The units for eGFR are mL/min/1.73M2 (normalized body surface area). The  eGFR is calculated from a serum creatinine using the CKD-EPI equation.  Other variables required for calculation are race, age and sex. Among  patients with chronic kidney disease (CKD), the eGFR is useful in  determining the stage of disease according to KDOQI CKD classification.  All eGFR results are reported numerically with the following  interpretation.          GFR                    With                 Without     (ml/min/1.73 m2)    Kidney Damage       Kidney Damage        >= 90                    Stage 1                     Normal        60-89                    Stage 2                     Decreased GFR        30-59     Stage 3                     Stage 3        15-29                    Stage 4                     Stage 4        < 15                      Stage 5                     Stage 5  Each stage of CKD assumes that the associated GFR level has been in  effect for at least 3 months. Determination of stages one and two (with  eGFR > 59 ml/min/m2) requires estimation of kidney damage for at least 3  months as defined by structural or functional abnormalities.  Limitations: All estimates of GFR will be less accurate for patients at  extremes of muscle mass (including but not limited to frail elderly,  critically ill, or cancer patients), those with unusual diets, and those  with conditions associated with reduced secretion or extrarenal  elimination of creatinine. The eGFR equation is not recommended for use  in patients with unstable creatinine levels. mL/min/1.73M2    eGFR if African American: 117 mL/min/1.73M2    < from: CT Angio Chest w/ IV Cont (11.11.19 @ 05:10) >    IMPRESSION:     No evidence of pulmonary embolus.    Otherwise examination is unchanged since 11/4/2019.    Additional Findings/Recommendations After Attending Radiologist Review:    Persistent focal dilatation of the distal pancreatic duct 1 cm with   discontinuity at the pancreatic tail tip and ill-defined fluid in the   gastrosplenic ligament, correlate for pancreatic duct leakage/injury.    < end of copied text >    < from: Transthoracic Echocardiogram (10.23.19 @ 11:14) >    Summary:   1. LV Ejection Fraction by Salgado's Method with a biplane EF of 60 %.   2. Sclerotic aortic valve with normal opening.   3. Borderline dilatation of the aortic root.   4. Spectral Doppler shows impaired relaxation pattern of left   ventricular myocardial filling (Grade I diastolic dysfunction).    < end of copied text > Complete Blood Count + Automated Diff (11.11.19 @ 00:21)    WBC Count: 5.22 K/uL    RBC Count: 3.08 M/uL    Hemoglobin: 8.9 g/dL    Hematocrit: 29.2 %    Mean Cell Volume: 94.8 fL    Mean Cell Hemoglobin: 28.9 pg    Mean Cell Hemoglobin Conc: 30.5 g/dL    Red Cell Distrib Width: 21.5 %    Platelet Count - Automated: 279 K/uL    Auto Neutrophil #: 4.36 K/uL    Auto Lymphocyte #: 0.40 K/uL    Auto Monocyte #: 0.40 K/uL    Auto Eosinophil #: 0.01 K/uL    Auto Basophil #: 0.01 K/uL    Auto Neutrophil %: 83.4: Differential percentages must be correlated with absolute numbers for  clinical significance. %    Auto Lymphocyte %: 7.7 %    Auto Monocyte %: 7.7 %    Auto Eosinophil %: 0.2 %    Auto Basophil %: 0.2 %    Auto Immature Granulocyte %: 0.8 %    Nucleated RBC: 0 /100 WBCs    Comprehensive Metabolic Panel (11.11.19 @ 00:21)    Sodium, Serum: 137 mmol/L    Potassium, Serum: 4.1 mmol/L    Chloride, Serum: 95 mmol/L    Carbon Dioxide, Serum: 28 mmol/L    Anion Gap, Serum: 14 mmol/L    Blood Urea Nitrogen, Serum: 10 mg/dL    Creatinine, Serum: 0.5 mg/dL    Glucose, Serum: 120 mg/dL    Calcium, Total Serum: 7.7 mg/dL    Protein Total, Serum: 4.9 g/dL    Albumin, Serum: 2.6 g/dL    Bilirubin Total, Serum: 0.4 mg/dL    Alkaline Phosphatase, Serum: 78 U/L    Aspartate Aminotransferase (AST/SGOT): 57 U/L    Alanine Aminotransferase (ALT/SGPT): 46 U/L    eGFR if Non : 101: Interpretative comment  The units for eGFR are mL/min/1.73M2 (normalized body surface area). The  eGFR is calculated from a serum creatinine using the CKD-EPI equation.  Other variables required for calculation are race, age and sex. Among  patients with chronic kidney disease (CKD), the eGFR is useful in  determining the stage of disease according to KDOQI CKD classification.  All eGFR results are reported numerically with the following  interpretation.          GFR                    With                 Without     (ml/min/1.73 m2)    Kidney Damage       Kidney Damage        >= 90                    Stage 1                     Normal        60-89                    Stage 2                     Decreased GFR        30-59     Stage 3                     Stage 3        15-29                    Stage 4                     Stage 4        < 15                      Stage 5                     Stage 5  Each stage of CKD assumes that the associated GFR level has been in  effect for at least 3 months. Determination of stages one and two (with  eGFR > 59 ml/min/m2) requires estimation of kidney damage for at least 3  months as defined by structural or functional abnormalities.  Limitations: All estimates of GFR will be less accurate for patients at  extremes of muscle mass (including but not limited to frail elderly,  critically ill, or cancer patients), those with unusual diets, and those  with conditions associated with reduced secretion or extrarenal  elimination of creatinine. The eGFR equation is not recommended for use  in patients with unstable creatinine levels. mL/min/1.73M2    eGFR if African American: 117 mL/min/1.73M2    < from: CT Angio Chest w/ IV Cont (11.11.19 @ 05:10) >    IMPRESSION:     No evidence of pulmonary embolus.    Otherwise examination is unchanged since 11/4/2019.    Additional Findings/Recommendations After Attending Radiologist Review:    Persistent focal dilatation of the distal pancreatic duct 1 cm with   discontinuity at the pancreatic tail tip and ill-defined fluid in the   gastrosplenic ligament, correlate for pancreatic duct leakage/injury.    < end of copied text >    < from: Transthoracic Echocardiogram (10.23.19 @ 11:14) >    Summary:   1. LV Ejection Fraction by Salgado's Method with a biplane EF of 60 %.   2. Sclerotic aortic valve with normal opening.   3. Borderline dilatation of the aortic root.   4. Spectral Doppler shows impaired relaxation pattern of left   ventricular myocardial filling (Grade I diastolic dysfunction).    cxr-b/l pleural effusions/atelectasis (unchanged from prior film)  ekg: nsr, no acute ischemi Complete Blood Count + Automated Diff (11.11.19 @ 00:21)    WBC Count: 5.22 K/uL    RBC Count: 3.08 M/uL    Hemoglobin: 8.9 g/dL    Hematocrit: 29.2 %    Mean Cell Volume: 94.8 fL    Mean Cell Hemoglobin: 28.9 pg    Mean Cell Hemoglobin Conc: 30.5 g/dL    Red Cell Distrib Width: 21.5 %    Platelet Count - Automated: 279 K/uL    Auto Neutrophil #: 4.36 K/uL    Auto Lymphocyte #: 0.40 K/uL    Auto Monocyte #: 0.40 K/uL    Auto Eosinophil #: 0.01 K/uL    Auto Basophil #: 0.01 K/uL    Auto Neutrophil %: 83.4: Differential percentages must be correlated with absolute numbers for  clinical significance. %    Auto Lymphocyte %: 7.7 %    Auto Monocyte %: 7.7 %    Auto Eosinophil %: 0.2 %    Auto Basophil %: 0.2 %    Auto Immature Granulocyte %: 0.8 %    Nucleated RBC: 0 /100 WBCs    Comprehensive Metabolic Panel (11.11.19 @ 00:21)    Sodium, Serum: 137 mmol/L    Potassium, Serum: 4.1 mmol/L    Chloride, Serum: 95 mmol/L    Carbon Dioxide, Serum: 28 mmol/L    Anion Gap, Serum: 14 mmol/L    Blood Urea Nitrogen, Serum: 10 mg/dL    Creatinine, Serum: 0.5 mg/dL    Glucose, Serum: 120 mg/dL    Calcium, Total Serum: 7.7 mg/dL    Protein Total, Serum: 4.9 g/dL    Albumin, Serum: 2.6 g/dL    Bilirubin Total, Serum: 0.4 mg/dL    Alkaline Phosphatase, Serum: 78 U/L    Aspartate Aminotransferase (AST/SGOT): 57 U/L    Alanine Aminotransferase (ALT/SGPT): 46 U/L    eGFR if Non : 101: Interpretative comment  The units for eGFR are mL/min/1.73M2 (normalized body surface area). The  eGFR is calculated from a serum creatinine using the CKD-EPI equation.  Other variables required for calculation are race, age and sex. Among  patients with chronic kidney disease (CKD), the eGFR is useful in  determining the stage of disease according to KDOQI CKD classification.  All eGFR results are reported numerically with the following  interpretation.          GFR                    With                 Without     (ml/min/1.73 m2)    Kidney Damage       Kidney Damage        >= 90                    Stage 1                     Normal        60-89                    Stage 2                     Decreased GFR        30-59     Stage 3                     Stage 3        15-29                    Stage 4                     Stage 4        < 15                      Stage 5                     Stage 5  Each stage of CKD assumes that the associated GFR level has been in  effect for at least 3 months. Determination of stages one and two (with  eGFR > 59 ml/min/m2) requires estimation of kidney damage for at least 3  months as defined by structural or functional abnormalities.  Limitations: All estimates of GFR will be less accurate for patients at  extremes of muscle mass (including but not limited to frail elderly,  critically ill, or cancer patients), those with unusual diets, and those  with conditions associated with reduced secretion or extrarenal  elimination of creatinine. The eGFR equation is not recommended for use  in patients with unstable creatinine levels. mL/min/1.73M2    eGFR if African American: 117 mL/min/1.73M2    < from: CT Angio Chest w/ IV Cont (11.11.19 @ 05:10) >    IMPRESSION:     No evidence of pulmonary embolus.    Otherwise examination is unchanged since 11/4/2019.    Additional Findings/Recommendations After Attending Radiologist Review:    Persistent focal dilatation of the distal pancreatic duct 1 cm with   discontinuity at the pancreatic tail tip and ill-defined fluid in the   gastrosplenic ligament, correlate for pancreatic duct leakage/injury.    < from: Transthoracic Echocardiogram (10.23.19 @ 11:14) >    Summary:   1. LV Ejection Fraction by Salgado's Method with a biplane EF of 60 %.   2. Sclerotic aortic valve with normal opening.   3. Borderline dilatation of the aortic root.   4. Spectral Doppler shows impaired relaxation pattern of left   ventricular myocardial filling (Grade I diastolic dysfunction).    cxr-b/l pleural effusions/atelectasis (unchanged from prior film)  ekg: nsr, no acute ischemia (int by me)

## 2019-11-11 NOTE — CONSULT NOTE ADULT - SUBJECTIVE AND OBJECTIVE BOX
Patient is a 84y old  Male who presents with a chief complaint of SOB (11 Nov 2019 08:03)      HPI:  83 yo M yo with PMH of CAD s/p NSTEMI Oct 1 w/ pci and stent x3, HTN ,DLD, Hypothyroidism presenting for SOB. At the time of the interview pt was placed on BiPAP and collateral history obtained from NH records. Pt endorses progressive dyspnea and wheezing at [facility] where he was discharged for short term rehab. Also endorses bilateral LE swelling and fatigue. CXR at that time showed left sided small pleural effusion. Pt denies fever, chills, productive cough, abdominal pain, chest pain, palpitation or change in bowel/urinary habits.     Of note, patient was admitted on Oct 1st for NSTEMI and had 3 stents placed. pt also with recent complicated admission in october for abdominal pain, colitis and found to have mesenteric ischemia s/p exploratory laparotomy with a left external iliac to SMA bypass and 80 cm small bowel resection with primary anastomosis. admission also was complicated by UGIB requiring endoscopic intervention for bleeding gastric ulcer. Pt had difficulty breathing with lower extremity swelling as well but now his symptoms are progressively getting worse so he came to ER.    Vital Signs in the ED:  T(F): 97.2 (11 Nov 2019 07:58), Max: 98 (10 Nov 2019 23:44)  HR: 85 (11 Nov 2019 08:15) (85 - 106)  BP: 142/63 (11 Nov 2019 07:58) (115/65 - 142/63)  RR: 20 (11 Nov 2019 07:58) (18 - 22)  SpO2: 100% (11 Nov 2019 08:15) (96% - 100%)    in the ED, pt received IV Lasix 40 mg , 2 gm MgSo4, benadryl.  CTA chest and abdomen ruled out PE, but showing bilateral pleural effusion. (11 Nov 2019 08:03)      PAST MEDICAL & SURGICAL HISTORY:  Neuropathy  TIA (transient ischemic attack)  Left carotid stenosis  Lyme disease  Hypothyroidism  Coronary artery disease  History of mesenteric infarction      MEDICATIONS  (STANDING):  aMIOdarone    Tablet 200 milliGRAM(s) Oral daily  aspirin  chewable 81 milliGRAM(s) Oral daily  atorvastatin 40 milliGRAM(s) Oral at bedtime  chlorhexidine 4% Liquid 1 Application(s) Topical <User Schedule>  clopidogrel Tablet 75 milliGRAM(s) Oral daily  enoxaparin Injectable 40 milliGRAM(s) SubCutaneous daily  furosemide   Injectable 40 milliGRAM(s) IV Push every 12 hours  levothyroxine 300 MICROGram(s) Oral daily  lisinopril 20 milliGRAM(s) Oral daily  metoprolol tartrate 12.5 milliGRAM(s) Oral two times a day  pantoprazole    Tablet 40 milliGRAM(s) Oral every 12 hours  sucralfate 1 Gram(s) Oral every 12 hours    MEDICATIONS  (PRN):  acetaminophen   Tablet .. 650 milliGRAM(s) Oral every 6 hours PRN Temp greater or equal to 38C (100.4F), Mild Pain (1 - 3)      FAMILY HISTORY:      Review of Systems:  CONSTITUTIONAL: No fever, weight loss, or fatigue  EYES: No eye pain, visual disturbances, or discharge  ENMT:  No difficulty hearing, tinnitus, vertigo; No sinus or throat pain  NECK: No pain or stiffness  BREASTS: No pain, masses, or nipple discharge  RESPIRATORY: No cough, wheezing, chills or hemoptysis; No shortness of breath  CARDIOVASCULAR: No chest pain, palpitations, dizziness, or leg swelling  GASTROINTESTINAL: No abdominal or epigastric pain. No nausea, vomiting, or hematemesis; No diarrhea or constipation. No melena or hematochezia.  GENITOURINARY: No dysuria, frequency, hematuria, or incontinence  NEUROLOGICAL: No headaches, memory loss, loss of strength, numbness, or tremors  SKIN: No itching, burning, rashes, or lesions   LYMPH NODES: No enlarged glands  ENDOCRINE: No heat or cold intolerance; No hair loss  MUSCULOSKELETAL: No joint pain or swelling; No muscle, back, or extremity pain  PSYCHIATRIC: No depression, anxiety, mood swings, or difficulty sleeping  HEME/LYMPH: No easy bruising, or bleeding gums  ALLERY AND IMMUNOLOGIC: No hives or eczema    SOCIAL HISTORY:    Vital Signs Last 24 Hrs  T(C): 36.2 (11 Nov 2019 07:58), Max: 36.7 (10 Nov 2019 23:44)  T(F): 97.2 (11 Nov 2019 07:58), Max: 98 (10 Nov 2019 23:44)  HR: 85 (11 Nov 2019 08:15) (85 - 106)  BP: 142/63 (11 Nov 2019 07:58) (115/65 - 142/63)  BP(mean): --  RR: 20 (11 Nov 2019 07:58) (18 - 22)  SpO2: 100% (11 Nov 2019 08:15) (96% - 100%)    Physical Exam:  GENERAL: NAD, on supplemental oxygen  HEAD:  NCAT  EYES: EOMI, PERRLA, conjunctiva clear  ENMT: No tonsillar erythema, exudates, or enlargement; Moist mucous membranes, Good dentition, No lesions  NECK: Supple, No JVD, Normal thyroid  NERVOUS SYSTEM: AAOX4, Good concentration; Motor Strength 5/5 B/L upper and lower extremities; DTRs 2+ intact and symmetric  CHEST/LUNG: b/l wheezes, decreased air entry RLB  HEART: +s1s2 RRR no m/g/r  ABDOMEN: soft, NT/ND (+) bs, no HSM  EXTREMITIES:  + Peripheral Pulses. B/L LE edema ++  LYMPH: No lymphadenopathy noted  SKIN: No rashes or lesions    ECG:< from: 12 Lead ECG (11.11.19 @ 00:59) >  Diagnosis Line Normal sinus rhythm  Anterior infarct , age undetermined  Abnormal ECG    < end of copied text >      ECHO:    I&O's Detail      LABS:                        8.9    5.22  )-----------( 279      ( 11 Nov 2019 00:21 )             29.2     11-11    137  |  95<L>  |  10  ----------------------------<  120<H>  4.1   |  28  |  0.5<L>    Ca    7.7<L>      11 Nov 2019 00:21  Phos  2.8     11-11  Mg     1.5     11-11    TPro  4.9<L>  /  Alb  2.6<L>  /  TBili  0.4  /  DBili  x   /  AST  57<H>  /  ALT  46<H>  /  AlkPhos  78  11-11    CARDIAC MARKERS ( 11 Nov 2019 00:21 )  x     / 0.04 ng/mL / x     / x     / x              I&O's Summary    BNPSerum Pro-Brain Natriuretic Peptide: 2456 pg/mL (11-11 @ 00:21)      RADIOLOGY & ADDITIONAL STUDIES:  < from: CT Abdomen and Pelvis w/ Oral Cont and w/ IV Cont (11.11.19 @ 05:15) >  IMPRESSION:     No evidence of pulmonary embolus.    Otherwise examination is unchanged since 11/4/2019.    Additional Findings/Recommendations After Attending Radiologist Review:    Persistent focal dilatation of the distal pancreatic duct 1 cm with   discontinuity at the pancreatic tail tip and ill-defined fluid in the   gastrosplenic ligament, correlate for pancreatic duct leakage/injury.    < end of copied text >  < from: CT Angio Chest w/ IV Cont (11.11.19 @ 05:10) >  IMPRESSION:     No evidence of pulmonary embolus.    Otherwise examination is unchanged since 11/4/2019.    Additional Findings/Recommendations After Attending Radiologist Review:    Persistent focal dilatation of the distal pancreatic duct 1 cm with   discontinuity at the pancreatic tail tip and ill-defined fluid in the   gastrosplenic ligament, correlate for pancreatic duct leakage/injury.    < end of copied text >  < from: Transthoracic Echocardiogram (10.23.19 @ 11:14) >  Summary:   1. LV Ejection Fraction by Salgado's Method with a biplane EF of 60 %.   2. Sclerotic aortic valve with normal opening.   3. Borderline dilatation of the aortic root.   4. Spectral Doppler shows impaired relaxation pattern of left   ventricular myocardial filling (Grade I diastolic dysfunction).    < end of copied text >  < from: Cardiac Cath Lab - Adult (10.01.19 @ 10:48) >    CONTRAST GIVEN: Omnipaque 150 ml.         CORONARY CIRCULATION: The coronary circulation is right dominant. There was    1-vessel coronary artery disease (circumflex). Left main: Normal. LAD: The    vessel was medium sized and heavily calcified. Proximal LAD: There was a    tubular 0 % stenosis at the site of a prior stent. Mid LAD: Angiography    showed moderate atherosclerosiswith no clinical lesions appreciated.    Circumflex: The vessel was medium sized. Proximal circumflex: There was a    tubular 95 % stenosis at a site with no prior intervention. The lesion was    calcified. There was MAUREEN grade 3 flow through the vessel (brisk flow). An    intervention was performed. 2nd obtuse marginal: There was a tubular 99 %    stenosis at a site with no prior intervention. The lesion was calcified.    RCA: This vessel was not injected.         COMPLICATIONS: No complicationsoccurred during the cath lab visit.         IMPRESSIONS: There is significant single vessel coronary artery disease.         RECOMMENDATIONS:    Patient management should include aggressive medical therapy, close    monitoring of BUN and creatinine, and aggressive risk factor modification.    The patient should follow a low fat and low calorie diet.    Recommend PCI immediately following this diagnostic procedure.         DISPOSITION: The patient left the catheterization laboratory in stable    condition. The patient was upgraded to inpatient status following the    procedure. The patient has been instructed to call the procedural    cardiologist immediately if symptoms recur, or should there be any    problems with the puncture site, such asbleeding, swelling, pain or signs    of infection. The patient has been instructed to follow up with the    procedural cardiologist in the near future.    < end of copied text >

## 2019-11-11 NOTE — CONSULT NOTE ADULT - ASSESSMENT
IMPRESSION  SOB 2/2 pulmonary edema/ Pleural effusion  H/o mesenteric ischemia   H/o Afib not on AC 2/2 h/o GIB    PLAN  Aggressive diuresis - monitor BP and renal function  Repeat CXR am  BiPAP prn and at night  Supplemental oxygen to keep sat >92 %  Repeat Lactate   Cardio recs  HOB elevation 45 degrees  DVT ppx  Plan of care d/w patient and wife at bedside IMPRESSION    SOB 2/2 pulmonary edema/ Pleural effusion  H/o mesenteric ischemia   H/o Afib not on AC 2/2 h/o GIB    PLAN    Keep diuresis/ iv lasix q 12h, keep neg balance  Repeat CXR in am  BiPAP prn and at night  Supplemental oxygen to keep sat >92 %  echo  Cardio f/up  HOB elevation 45 degrees  DVT ppx  Plan of care d/w patient and wife at bedside

## 2019-11-11 NOTE — ED PROVIDER NOTE - CARE PLAN
Assessment and plan of treatment:	pt w/ significant fluid overload, although somewhat improving since discharge. pt denies black/bloody stool. no chest pain. will get basic labs, trop/ekg assess for acs, bnp/xr assess fluid overload/effusion.  will get cta chest r/o PE as pt not on AC, recent hospitalization, will get ctap given some ttp on exam although dressing c/d/i, not peritoneal Principal Discharge DX:	Hypervolemia, unspecified hypervolemia type  Assessment and plan of treatment:	pt w/ significant fluid overload, although somewhat improving since discharge. pt denies black/bloody stool. no chest pain. will get basic labs, trop/ekg assess for acs, bnp/xr assess fluid overload/effusion.  will get cta chest r/o PE as pt not on AC, recent hospitalization, will get ctap given some ttp on exam although dressing c/d/i, not peritoneal  Secondary Diagnosis:	Dyspnea, unspecified type  Secondary Diagnosis:	Peripheral edema  Secondary Diagnosis:	Pleural effusion

## 2019-11-11 NOTE — ED PROVIDER NOTE - PHYSICAL EXAMINATION
PHYSICAL EXAM:    Constitutional: awake, alert, NAD  Eyes: EOMI, no conj injection  HENT: NC AT  Back: no c/t/l spine ttp  Respiratory: increased WOB, w/ diffuse crackles, end exp wheeze  Cardiovascular: RRR nml S1S2  Gastrointestinal: soft, no masses, mild diffuse tenderness, nondistended. No guarding or rebound. surgical site c/d/i  Extremities: significant b/l LE edema  Neurological: AAOx3, CN II-XII grossly intact, no focal numbness or weakness  Skin: no rash  Musculoskeletal: no gross deformity

## 2019-11-11 NOTE — H&P ADULT - ASSESSMENT
83 yo M yo with PMH of CAD s/p NSTEMI Oct 1 w/ pci and stent x3, HTN ,DLD, Hypothyroidism presenting for for SOB. 85 yo M yo with PMH of CAD s/p NSTEMI Oct 1 w/ pci and stent x3, HTN ,DLD, Hypothyroidism presenting for for SOB.    # Dyspnea most likely secondary to diastolic CHF exacerbation:   - CT chest is negative for PE, but with bilateral pleural effusion  - Echo 10/23 with normal LVEF, grade 1 diastolic dysfunction   - will give Lasix 40 mg IV BID for today (home dose is 40 mg PO daily)  - will consider thoracentesis if effusion not resolving  - strict Is and Os, fluid and salt restriction 83 yo M yo with PMH of CAD s/p NSTEMI Oct 1 w/ pci and stent x3, HTN ,DLD, Hypothyroidism presenting for for SOB.    # Dyspnea most likely secondary to diastolic CHF exacerbation:   - pt now on BiPAP 12/6, 40% FiO2, will wean oxygen as tolerated  - VBG with Co2 59  - CT chest is negative for PE, but with bilateral pleural effusion  - Echo 10/23 with normal LVEF (improved from 35% in september grade 1 diastolic dysfunction   - EKG with no new ischemic changes  - BNP 2300, at baseline  - Troponin 0.04, denies chest pain, will not trend  - will give Lasix 40 mg IV BID for today (home dose is 40 mg PO daily)  - will consider thoracentesis if effusion not resolving  - strict Is and Os, fluid and salt restriction  - will give Mg, monitor electrolytes     # CAD, HTN, h/o V tach:  - oral amio and metoprolol  - c/w aspirin, Plavix, statin and lisinopril    # H/O gastric ulcer s/p epi injection and clip: - c/w PPI and sucralfate   # Hypothyroidism - c/w levothyroxine     DVT PPX : Lovenox  GI ppx: PPI  DASH diet   Full code  From [facility] 83 yo M yo with PMH of CAD s/p NSTEMI Oct 1 w/ pci and stent x3, HTN ,DLD, Hypothyroidism presenting for for SOB.    # Dyspnea most likely secondary to diastolic CHF exacerbation:   - pt now on BiPAP 12/6, 40% FiO2, will wean oxygen as tolerated  - VBG with Co2 59  - CT chest is negative for PE, but with bilateral pleural effusion  - Echo 10/23 with normal LVEF (improved from 35% in september grade 1 diastolic dysfunction   - EKG with no new ischemic changes  - BNP 2300, at baseline  - Troponin 0.04, denies chest pain, will not trend  - will give Lasix 40 mg IV BID for today (home dose is 40 mg PO daily)  - will consider thoracentesis if effusion not resolving  - strict Is and Os, fluid and salt restriction  - will give Mg, monitor electrolytes     # CAD, HTN, h/o V tach:  - oral amio and metoprolol  - c/w aspirin, Plavix, statin and lisinopril    # H/O chronic normocytic anemia with gastric ulcer s/p epi injection and clip: - c/w PPI and sucralfate, H/H at baseline  # Hypothyroidism - c/w levothyroxine     DVT PPX : Lovenox  GI ppx: PPI  DASH diet   Full code  From [facility] 83 yo M yo with PMH of CAD s/p NSTEMI Oct 1 w/ pci and stent x3, HTN ,DLD, Hypothyroidism presenting for for SOB.    # Acute Diastolic CHF Exacerbation, Suspected  # Acute Hypoxic Respiratory Failure  - pt now on BiPAP 12/6, 40% FiO2, will wean oxygen as tolerated  - VBG with Co2 59  - CT chest is negative for PE, but with bilateral pleural effusion  - Echo 10/23 with normal LVEF (improved from 35% in september grade 1 diastolic dysfunction   - EKG with no new ischemic changes  - BNP 2300, at baseline  - Troponin 0.04, denies chest pain, will not trend; at baseline  - will give Lasix 40 mg IV BID for today (home dose is 40 mg PO daily)  - will consider thoracentesis if effusion not resolving  - strict Is and Os, fluid and salt restriction    # Magnesium Deficiency  - will give Mg, monitor electrolytes     # CAD, HTN, h/o V tach:  - oral amio and metoprolol  - c/w aspirin, Plavix, statin and lisinopril    # H/O chronic normocytic anemia with gastric ulcer s/p epi injection and clip: - c/w PPI and sucralfate, H/H at baseline  # Hypothyroidism - c/w levothyroxine     DVT PPX : Lovenox  GI ppx: PPI  DASH diet   Full code  From [place]

## 2019-11-11 NOTE — H&P ADULT - ATTENDING COMMENTS
as above post my edits    goals of care are for aggressive management but will need close monitoring

## 2019-11-11 NOTE — ED PROVIDER NOTE - PLAN OF CARE
pt w/ significant fluid overload, although somewhat improving since discharge. pt denies black/bloody stool. no chest pain. will get basic labs, trop/ekg assess for acs, bnp/xr assess fluid overload/effusion.  will get cta chest r/o PE as pt not on AC, recent hospitalization, will get ctap given some ttp on exam although dressing c/d/i, not peritoneal

## 2019-11-11 NOTE — H&P ADULT - NSHPPHYSICALEXAM_GEN_ALL_CORE
PHYSICAL EXAM:  GENERAL: NAD, lying in bed comfortably  HEAD:  Atraumatic, Normocephalic  EYES: EOMI, PERRLA, conjunctiva and sclera clear  ENT: Moist mucous membranes  NECK: Supple, No JVD  CHEST/LUNG: Clear to auscultation bilaterally; No rales, rhonchi, wheezing, or rubs. Unlabored respirations  HEART: Regular rate and rhythm; No murmurs, rubs, or gallops  ABDOMEN: Bowel sounds present; Soft, Nontender, Nondistended. No hepatomegally  EXTREMITIES:  2+ Peripheral Pulses, brisk capillary refill. No clubbing, cyanosis, or edema  NERVOUS SYSTEM:  Alert & Oriented X3, speech clear. No deficits   MSK: FROM all 4 extremities, full and equal strength  SKIN: No rashes or lesions GENERAL: in mid distress, on BiPAP  CHEST/LUNG: decreased breath sounds bilaterally, no wheezes, mild crackles bilaterally   HEART: Regular rate and rhythm; No murmurs, rubs, or gallops  ABDOMEN: Bowel sounds present; Soft, Nontender, Nondistended.   EXTREMITIES:  2+ Peripheral Pulses, +3 LE edema up to the knees bilaterally   NERVOUS SYSTEM:  Alert & Oriented X3, speech clear. No deficits   MSK: FROM all 4 extremities, full and equal strength GENERAL: in mild distress, on BiPAP  OPHTHO: EOMIanicteric sclerae  PULM: decreased breath sounds bilaterally, no wheezes, mild crackles bilaterally   CV: Regular rate and rhythm; No murmurs, rubs, or gallops  GI: Bowel sounds present; Soft, Nontender, Nondistended.   EXTREMITIES:  2+ Peripheral Pulses, +3 LE edema up to the knees bilaterally   NERVOUS SYSTEM:   5/5 B/L, NO SENSORY DEFICITS  MSK: FROM all 4 extremities, full and equal strength  PSYCH: A X 0 X 3

## 2019-11-11 NOTE — CONSULT NOTE ADULT - ASSESSMENT
83 yo M yo with PMH of CAD s/p NSTEMI Oct 1 w/ pci and stent x3, HTN ,DLD, Hypothyroidism presenting for SOB. Cardio consulted for worsening SOB with pulmonary effusions mostly on the right side and lower extremity edema.     # Progressively worsening shortness of breath likely from pulmonary effusions and elevated right hemidiaphragm  - Last echo showed EF wnl previously 35% prior to PCI and grade 1 diastolic dysfunction  - pt has diffuse anasarca with albumin of 2.6, encourage PO intake/nutrition eval  - c/w I/V diuresis for now, pulm eval for possible right sided chronic effusion  - c/w metoprolol and lisinopril   - strict Is and Os, daily weight, fluid restriction  - incentive spirometry for elevated right hemidiaphragm  - Consider outpt PFTs.     # Elevated cardiac enzymes  - no chest pain, EKG shows old ST changes  - f/u repeat CE  - c/w aspirin, plavix, BB and statins 85 yo M yo with PMH of CAD s/p NSTEMI Oct 1 w/ pci and stent x3, HTN ,DLD, Hypothyroidism presenting for SOB. Cardio consulted for worsening SOB with pulmonary effusions mostly on the right side and lower extremity edema.     # Progressively worsening shortness of breath likely from pleural effusions and elevated right hemidiaphragm with atelectasis  - Last echo showed EF 60 % (previously 35% maximus PCI period) and grade 1 diastolic dysfunction  - pt has diffuse anasarca with albumin of 2.6, encourage PO intake/nutrition eval  - c/w I/V diuresis for now Lasix 40mg BID, pulm eval for possible thora of right sided chronic effusion  - c/w metoprolol and lisinopril   - strict Is and Os, daily weight, fluid restriction, monitor BUN/creatinine/electrolytes daily   - incentive spirometry for elevated right hemidiaphragm  - Consider outpt PFTs.   - Hb better now compared to prior admission    # CAD s/p PCI  - CE trending down compared to prior admission   - no chest pain, EKG shows old ST changes  - f/u repeat CE  - c/w aspirin, plavix, BB and statins  - please discontinue Amiodarone it was started post PCI for Vtach. repeat TFTs    Case discussed with attending. 83 yo M yo with PMH of CAD s/p NSTEMI Oct 1 w/ pci and stent x3, HTN ,DLD, Hypothyroidism presenting for SOB. Cardio consulted for worsening SOB with pulmonary effusions mostly on the right side and lower extremity edema.     # Progressively worsening shortness of breath likely from pleural effusions and elevated right hemidiaphragm with atelectasis  - Last echo showed EF 60 % (previously 35% maximus PCI period) and grade 1 diastolic dysfunction  - pt has diffuse anasarca with albumin of 2.6, encourage PO intake/nutrition eval  - c/w I/V diuresis for now Lasix 40mg BID, pulm eval for possible thoracocentesis of right sided chronic effusion  - c/w metoprolol and lisinopril   - strict Is and Os, daily weight, fluid restriction, monitor BUN/creatinine/electrolytes daily   - incentive spirometry for elevated right hemidiaphragm  - Consider outpt PFTs.   - Hb better now compared to prior admission    # CAD s/p PCI  - CE trending down compared to prior admission   - Hgb is up from discharge to 8.9 (7.6)  - no chest pain, EKG shows old ST changes  - continue diuresis and continue fu of renal fx and H&H.   - c/w aspirin, plavix, BB and statins  - please discontinue Amiodarone it was started post PCI for Vtach. repeat TFTs    Case discussed with attending.

## 2019-11-11 NOTE — ED ADULT NURSE NOTE - OBJECTIVE STATEMENT
pt is an  85 y/o male BIBA from [facility] for SOB and pitting edema for the last "few days" as per family at bedside. Pt denies fever/chills.

## 2019-11-11 NOTE — ED PROVIDER NOTE - PROGRESS NOTE DETAILS
bipap called for increased WOB. pt improving w/ bipap. after an hour on bipap, trial off bipap attempted and pt became tachypnic.  ct w/o acute findings, likely fluid overload. pt w/ significant pleural effusion. lasix ordered. will admit d/w pulm fellow [place]- states admit tele

## 2019-11-11 NOTE — ED PROVIDER NOTE - OBJECTIVE STATEMENT
83 yo m hx CAD s/p NSTEMI Oct 1 w/ pci and stent x3, htn, hld, hypothyroid, recently d/c from hospital for acute mesenteric ischaemia req SB resection, UGIB s/p EGD here for SOB. pt states he has had 2 days sob and wheezing. at [facility], pt had XR which showed fluid in the lungs. pt w/ significant LE edema b/l. no fever, chills, productive cough. pt endorsing L lower posterior rib pain worse w/ palpation. pt denies significant abdominal pain, but some discomfort w/ palpation. pt states he has had BM. pt s/p lasix and nebs pta. pt states nebs did not help.

## 2019-11-11 NOTE — ED PROVIDER NOTE - NS ED ROS FT
Constitutional: no fever or rigors  Eyes: no eye redness, acute visual change  ENMT: no ear pain, no throat pain  Card: no chest pain, no palpitations  Pulm: no productive cough, pos shortness of breath  GI: no abdominal pain, nausea or vomiting  : no dysuria or hematuria  MSK: no limitation in range of motion, no neck pain  Skin: no rash, no abrasion  Neuro: no numbness, no weakness  Heme/Onc: no easy bruising, no bleeding tendency   Allergic: no hives, no throat swelling

## 2019-11-11 NOTE — CONSULT NOTE ADULT - SUBJECTIVE AND OBJECTIVE BOX
Patient is a 84y old  Male who presents with a chief complaint of SOB (11 Nov 2019 08:03)      HPI:  83 yo M yo with PMH of CAD s/p NSTEMI Oct 1 w/ pci and stent x3, HTN ,DLD, Hypothyroidism presenting for SOB. at the time of the interview pt was placed on BiPAP and collateral history obtained from NH records. pt endorses progressive dyspnea and wheezing at [facility] where he was discharged for short term rehab. also endorses bilateral LE swelling and fatigue.pt denies fever, chills, productive cough, abdominal pain, chest pain, palpitation or change in bowel/urinary habits.     to note, patient was admitted on Oct 1st for NSTEMI and had 3 stents placed. pt also with recent complicated admission in october for abdominal pain, colitis and found to have mesenteric ischemia s/p exploratory laparotomy with a left external iliac to SMA bypass and 80 cm small bowel resection with primary anastomosis. admission also was complicated by UGIB requiring endoscopic intervention for bleeding gastric ulcer.     Vital Signs in the ED:  T(F): 97.2 (11 Nov 2019 07:58), Max: 98 (10 Nov 2019 23:44)  HR: 85 (11 Nov 2019 08:15) (85 - 106)  BP: 142/63 (11 Nov 2019 07:58) (115/65 - 142/63)  RR: 20 (11 Nov 2019 07:58) (18 - 22)  SpO2: 100% (11 Nov 2019 08:15) (96% - 100%)    in the ED, pt received IV Lasix 40 mg , 2 gm MgSo4, benadryl.  CTA chest and abdomen ruled out PE, but showing bilateral pleural effusion. (11 Nov 2019 08:03)  Pulmonary consulted for SOB and pleural effusion    PAST MEDICAL & SURGICAL HISTORY:  Neuropathy  TIA (transient ischemic attack)  Left carotid stenosis  Lyme disease  Hypothyroidism  Coronary artery disease  History of mesenteric infarction      SOCIAL HX:   Smoking    denies                     ETOH   denies                        FAMILY HISTORY:  :  No known cardiovacular family hisotry     ROS:  See HPI     Allergies    iodinated radiocontrast agents (Hives)    Intolerances          PHYSICAL EXAM    ICU Vital Signs Last 24 Hrs  T(C): 36.2 (11 Nov 2019 07:58), Max: 36.7 (10 Nov 2019 23:44)  T(F): 97.2 (11 Nov 2019 07:58), Max: 98 (10 Nov 2019 23:44)  HR: 85 (11 Nov 2019 08:15) (85 - 106)  BP: 142/63 (11 Nov 2019 07:58) (115/65 - 142/63)  BP(mean): --  ABP: --  ABP(mean): --  RR: 20 (11 Nov 2019 07:58) (18 - 22)  SpO2: 100% (11 Nov 2019 08:15) (96% - 100%)      General: In NAD   HEENT:  TERRI              Lungs: dec BS b/l   Cardiovascular: Regular  Gastrointestinal: Soft, Positive BS  Musculoskeletal: No clubbing.  Moves all extremities.  Skin: Warm.  Intact  Neurological: No motor or sensory deficit         LABS:                          8.9    5.22  )-----------( 279      ( 11 Nov 2019 00:21 )             29.2                                               11-11    137  |  95<L>  |  10  ----------------------------<  120<H>  4.1   |  28  |  0.5<L>    Ca    7.7<L>      11 Nov 2019 00:21  Phos  2.8     11-11  Mg     1.5     11-11    TPro  4.9<L>  /  Alb  2.6<L>  /  TBili  0.4  /  DBili  x   /  AST  57<H>  /  ALT  46<H>  /  AlkPhos  78  11-11                                                 CARDIAC MARKERS ( 11 Nov 2019 00:21 )  x     / 0.04 ng/mL / x     / x     / x                                                LIVER FUNCTIONS - ( 11 Nov 2019 00:21 )  Alb: 2.6 g/dL / Pro: 4.9 g/dL / ALK PHOS: 78 U/L / ALT: 46 U/L / AST: 57 U/L / GGT: x                                                                                                                                       X-Rays                                                                                     ECHO     MEDICATIONS  (STANDING):  aMIOdarone    Tablet 200 milliGRAM(s) Oral daily  aspirin  chewable 81 milliGRAM(s) Oral daily  atorvastatin 40 milliGRAM(s) Oral at bedtime  chlorhexidine 4% Liquid 1 Application(s) Topical <User Schedule>  clopidogrel Tablet 75 milliGRAM(s) Oral daily  enoxaparin Injectable 40 milliGRAM(s) SubCutaneous daily  furosemide   Injectable 40 milliGRAM(s) IV Push every 12 hours  levothyroxine 300 MICROGram(s) Oral daily  lisinopril 20 milliGRAM(s) Oral daily  metoprolol tartrate 12.5 milliGRAM(s) Oral two times a day  pantoprazole    Tablet 40 milliGRAM(s) Oral every 12 hours  sucralfate 1 Gram(s) Oral every 12 hours    MEDICATIONS  (PRN):  acetaminophen   Tablet .. 650 milliGRAM(s) Oral every 6 hours PRN Temp greater or equal to 38C (100.4F), Mild Pain (1 - 3)      < from: CT Abdomen and Pelvis w/ Oral Cont and w/ IV Cont (11.11.19 @ 05:15) >    No evidence of pulmonary embolus.    Otherwise examination is unchanged since 11/4/2019.    Additional Findings/Recommendations After Attending Radiologist Review:    Persistent focal dilatation of the distal pancreatic duct 1 cm with   discontinuity at the pancreatic tail tip and ill-defined fluid in the   gastrosplenic ligament, correlate for pancreatic duct leakage/injury.      < end of copied text > Patient is a 84y old  Male who presents with a chief complaint of SOB (11 Nov 2019 08:03)      HPI:  85 yo M yo with PMH of CAD s/p NSTEMI Oct 1 w/ pci and stent x3, HTN ,DLD, Hypothyroidism presenting for SOB. at the time of the interview pt was placed on BiPAP and collateral history obtained from [facility] records. pt endorses progressive dyspnea and wheezing at [facility] where he was discharged for short term rehab. also endorses bilateral LE swelling and fatigue.pt denies fever, chills, productive cough, abdominal pain, chest pain, palpitation or change in bowel/urinary habits.     to note, patient was admitted on Oct 1st for NSTEMI and had 3 stents placed. pt also with recent complicated admission in october for abdominal pain, colitis and found to have mesenteric ischemia s/p exploratory laparotomy with a left external iliac to SMA bypass and 80 cm small bowel resection with primary anastomosis. admission also was complicated by UGIB requiring endoscopic intervention for bleeding gastric ulcer.     Vital Signs in the ED:  T(F): 97.2 (11 Nov 2019 07:58), Max: 98 (10 Nov 2019 23:44)  HR: 85 (11 Nov 2019 08:15) (85 - 106)  BP: 142/63 (11 Nov 2019 07:58) (115/65 - 142/63)  RR: 20 (11 Nov 2019 07:58) (18 - 22)  SpO2: 100% (11 Nov 2019 08:15) (96% - 100%)    in the ED, pt received IV Lasix 40 mg , 2 gm MgSo4, benadryl.  CTA chest and abdomen ruled out PE, but showing bilateral pleural effusion. (11 Nov 2019 08:03)  Pulmonary consulted for SOB and pleural effusion    PAST MEDICAL & SURGICAL HISTORY:  Neuropathy  TIA (transient ischemic attack)  Left carotid stenosis  Lyme disease  Hypothyroidism  Coronary artery disease  History of mesenteric infarction      SOCIAL HX:   Smoking    denies                     ETOH   denies                        FAMILY HISTORY:  :  No known cardiovacular family hisotry     ROS:  See HPI     Allergies    iodinated radiocontrast agents (Hives)    Intolerances          PHYSICAL EXAM    ICU Vital Signs Last 24 Hrs  T(C): 36.2 (11 Nov 2019 07:58), Max: 36.7 (10 Nov 2019 23:44)  T(F): 97.2 (11 Nov 2019 07:58), Max: 98 (10 Nov 2019 23:44)  HR: 85 (11 Nov 2019 08:15) (85 - 106)  BP: 142/63 (11 Nov 2019 07:58) (115/65 - 142/63)  RR: 20 (11 Nov 2019 07:58) (18 - 22)  SpO2: 100% (11 Nov 2019 08:15) (96% - 100%)      General: In NAD   HEENT:  TERRI              Lungs: dec BS b/l   Cardiovascular: POOJA 3/6  Gastrointestinal: Soft, Positive BS  Musculoskeletal: SWELLING +  Skin: Warm.  Intact  Neurological: No motor or sensory deficit         LABS:                          8.9    5.22  )-----------( 279      ( 11 Nov 2019 00:21 )             29.2                                               11-11    137  |  95<L>  |  10  ----------------------------<  120<H>  4.1   |  28  |  0.5<L>    Ca    7.7<L>      11 Nov 2019 00:21  Phos  2.8     11-11  Mg     1.5     11-11    TPro  4.9<L>  /  Alb  2.6<L>  /  TBili  0.4  /  DBili  x   /  AST  57<H>  /  ALT  46<H>  /  AlkPhos  78  11-11                                                 CARDIAC MARKERS ( 11 Nov 2019 00:21 )  x     / 0.04 ng/mL / x     / x     / x                                                LIVER FUNCTIONS - ( 11 Nov 2019 00:21 )  Alb: 2.6 g/dL / Pro: 4.9 g/dL / ALK PHOS: 78 U/L / ALT: 46 U/L / AST: 57 U/L / GGT: x                                                                                                                                       X-Rays   REVIEWED    MEDICATIONS  (STANDING):  aMIOdarone    Tablet 200 milliGRAM(s) Oral daily  aspirin  chewable 81 milliGRAM(s) Oral daily  atorvastatin 40 milliGRAM(s) Oral at bedtime  chlorhexidine 4% Liquid 1 Application(s) Topical <User Schedule>  clopidogrel Tablet 75 milliGRAM(s) Oral daily  enoxaparin Injectable 40 milliGRAM(s) SubCutaneous daily  furosemide   Injectable 40 milliGRAM(s) IV Push every 12 hours  levothyroxine 300 MICROGram(s) Oral daily  lisinopril 20 milliGRAM(s) Oral daily  metoprolol tartrate 12.5 milliGRAM(s) Oral two times a day  pantoprazole    Tablet 40 milliGRAM(s) Oral every 12 hours  sucralfate 1 Gram(s) Oral every 12 hours    MEDICATIONS  (PRN):  acetaminophen   Tablet .. 650 milliGRAM(s) Oral every 6 hours PRN Temp greater or equal to 38C (100.4F), Mild Pain (1 - 3)      < from: CT Abdomen and Pelvis w/ Oral Cont and w/ IV Cont (11.11.19 @ 05:15) >    No evidence of pulmonary embolus.    Otherwise examination is unchanged since 11/4/2019.    Additional Findings/Recommendations After Attending Radiologist Review:    Persistent focal dilatation of the distal pancreatic duct 1 cm with   discontinuity at the pancreatic tail tip and ill-defined fluid in the   gastrosplenic ligament, correlate for pancreatic duct leakage/injury.      < end of copied text >

## 2019-11-11 NOTE — ED PROVIDER NOTE - CLINICAL SUMMARY MEDICAL DECISION MAKING FREE TEXT BOX
pt here for worsening dyspnea at rest. very fluid overloaded w/ large pulm effusions. trop elevated but baseline for patient. ekg w/o acute changes

## 2019-11-12 NOTE — PROGRESS NOTE ADULT - ASSESSMENT
h/o CAD/PCI  Plural effusion  Normal LV function    c/w IV diuretics  I < O  Monitor serum lytes, creatinene  Replete K  c/w ACEI, BB, ASA, Plavix  Pulmonary follow up

## 2019-11-12 NOTE — PROGRESS NOTE ADULT - ASSESSMENT
IMPRESSION    SOB 2/2 pulmonary edema/ Pleural effusion  H/o mesenteric ischemia   H/o Afib not on AC 2/2 h/o GIB    PLAN    Keep diuresis/ iv lasix q 12h, keep neg balance today (no thora)  BiPAP prn and at night  Supplemental oxygen to keep sat >92 %  LE doppler  echo  Cardio f/up  HOB elevation 45 degrees  DVT ppx

## 2019-11-12 NOTE — PROGRESS NOTE ADULT - SUBJECTIVE AND OBJECTIVE BOX
LAKSHMI JUDD 84y Male  MRN#: [id number]       SUBJECTIVE  84 year old male with PMH of CAD s/p NSTEMI Oct 1 w/ pci and stent x3, HTN ,DLD, Hypothyroidism presenting for SOB. He continue to be feeling SOB, denies chest pain,racing of heart or abdominal pain. No overnight events noted.     OBJECTIVE  PAST MEDICAL & SURGICAL HISTORY  Neuropathy  TIA (transient ischemic attack)  Left carotid stenosis  Lyme disease  Hypothyroidism  Coronary artery disease  History of mesenteric infarction    ALLERGIES:  iodinated radiocontrast agents (Hives)    MEDICATIONS:  STANDING MEDICATIONS  aMIOdarone    Tablet 200 milliGRAM(s) Oral daily  aspirin  chewable 81 milliGRAM(s) Oral daily  atorvastatin 40 milliGRAM(s) Oral at bedtime  chlorhexidine 4% Liquid 1 Application(s) Topical <User Schedule>  clopidogrel Tablet 75 milliGRAM(s) Oral daily  enoxaparin Injectable 40 milliGRAM(s) SubCutaneous daily  furosemide   Injectable 40 milliGRAM(s) IV Push every 12 hours  levothyroxine 300 MICROGram(s) Oral daily  lisinopril 20 milliGRAM(s) Oral daily  magnesium sulfate  IVPB 2 Gram(s) IV Intermittent once  metoprolol tartrate 12.5 milliGRAM(s) Oral two times a day  pantoprazole    Tablet 40 milliGRAM(s) Oral every 12 hours  sucralfate 1 Gram(s) Oral every 12 hours    PRN MEDICATIONS  acetaminophen   Tablet .. 650 milliGRAM(s) Oral every 6 hours PRN      VITAL SIGNS: Last 24 Hours  T(C): 36.1 (12 Nov 2019 07:59), Max: 36.4 (11 Nov 2019 23:52)  T(F): 97 (12 Nov 2019 07:59), Max: 97.6 (11 Nov 2019 23:52)  HR: 80 (12 Nov 2019 07:59) (75 - 93)  BP: 129/60 (12 Nov 2019 07:59) (129/60 - 156/73)  BP(mean): --  RR: 16 (12 Nov 2019 07:59) (16 - 22)  SpO2: 100% (12 Nov 2019 07:59) (99% - 100%)    LABS:                        7.7    4.97  )-----------( 230      ( 12 Nov 2019 05:39 )             25.3     11-12    135  |  93<L>  |  8<L>  ----------------------------<  87  3.6   |  30  |  <0.5<L>    Ca    7.4<L>      12 Nov 2019 05:39  Phos  2.8     11-11  Mg     1.6     11-12    TPro  4.9<L>  /  Alb  2.6<L>  /  TBili  0.4  /  DBili  x   /  AST  57<H>  /  ALT  46<H>  /  AlkPhos  78  11-11          Lactate, Blood: 2.0 mmol/L (11-11-19 @ 16:59)      CARDIAC MARKERS ( 11 Nov 2019 00:21 )  x     / 0.04 ng/mL / x     / x     / x          RADIOLOGY:  < from: Xray Chest 1 View- PORTABLE-Routine (11.12.19 @ 07:19) >  Impression:      Right greater than left pleural effusion and underlying opacification.    Follow-up as needed.    < end of copied text >      PHYSICAL EXAM:  GENERAL: NAD, AAOx3, on nasal canula  HEENT:  Atraumatic, Normocephalic  PULMONARY: shallow breaths, crackles bilaterally  CARDIOVASCULAR: Regular rate and rhythm  GASTROINTESTINAL: Soft, Nontender  EXTREMITIES:  2+ Peripheral Pulses, +1 edema  NEUROLOGY: non-focal  SKIN: No rashes or lesions    ADMISSION SUMMARY  Patient is a 84y old Male who presents with a chief complaint of SOB (12 Nov 2019 09:03)    ASSESSMENT & PLAN    # Acute Diastolic CHF Exacerbation with Acute Hypoxic Respiratory Failure  - BiPAP PRN and at night.   - CT chest is negative for PE, but with bilateral pleural effusion  - EKG with no new ischemic changes  - BNP 2300, at baseline  - Troponin 0.04, denies chest pain, will not trend; at baseline  -Continue with IV lasix to maintain negative balance.   - strict Is and Os, fluid and salt restriction    # CAD, HTN, h/o V tach:  - oral amio and metoprolol  - c/w aspirin, Plavix, statin and lisinopril    # H/O chronic normocytic anemia with gastric ulcer s/p epi injection and clip:   - c/w PPI and sucralfate, H/H at baseline  -Monitor CBC    # Hypothyroidism   - c/w levothyroxine     DVT PPX : Lovenox LAKSHMI JUDD 84y Male  MRN#: [id number]       SUBJECTIVE  84 year old male with PMH of CAD s/p NSTEMI Oct 1 w/ pci and stent x3, HTN ,DLD, Hypothyroidism presenting for SOB. He continue to be feeling SOB, denies chest pain,racing of heart or abdominal pain. No overnight events noted.     OBJECTIVE  PAST MEDICAL & SURGICAL HISTORY  Neuropathy  TIA (transient ischemic attack)  Left carotid stenosis  Lyme disease  Hypothyroidism  Coronary artery disease  History of mesenteric infarction    ALLERGIES:  iodinated radiocontrast agents (Hives)    MEDICATIONS:  STANDING MEDICATIONS  aMIOdarone    Tablet 200 milliGRAM(s) Oral daily  aspirin  chewable 81 milliGRAM(s) Oral daily  atorvastatin 40 milliGRAM(s) Oral at bedtime  chlorhexidine 4% Liquid 1 Application(s) Topical <User Schedule>  clopidogrel Tablet 75 milliGRAM(s) Oral daily  enoxaparin Injectable 40 milliGRAM(s) SubCutaneous daily  furosemide   Injectable 40 milliGRAM(s) IV Push every 12 hours  levothyroxine 300 MICROGram(s) Oral daily  lisinopril 20 milliGRAM(s) Oral daily  magnesium sulfate  IVPB 2 Gram(s) IV Intermittent once  metoprolol tartrate 12.5 milliGRAM(s) Oral two times a day  pantoprazole    Tablet 40 milliGRAM(s) Oral every 12 hours  sucralfate 1 Gram(s) Oral every 12 hours    PRN MEDICATIONS  acetaminophen   Tablet .. 650 milliGRAM(s) Oral every 6 hours PRN      VITAL SIGNS: Last 24 Hours  T(C): 36.1 (12 Nov 2019 07:59), Max: 36.4 (11 Nov 2019 23:52)  T(F): 97 (12 Nov 2019 07:59), Max: 97.6 (11 Nov 2019 23:52)  HR: 80 (12 Nov 2019 07:59) (75 - 93)  BP: 129/60 (12 Nov 2019 07:59) (129/60 - 156/73)  BP(mean): --  RR: 16 (12 Nov 2019 07:59) (16 - 22)  SpO2: 100% (12 Nov 2019 07:59) (99% - 100%)    LABS:                        7.7    4.97  )-----------( 230      ( 12 Nov 2019 05:39 )             25.3     11-12    135  |  93<L>  |  8<L>  ----------------------------<  87  3.6   |  30  |  <0.5<L>    Ca    7.4<L>      12 Nov 2019 05:39  Phos  2.8     11-11  Mg     1.6     11-12    TPro  4.9<L>  /  Alb  2.6<L>  /  TBili  0.4  /  DBili  x   /  AST  57<H>  /  ALT  46<H>  /  AlkPhos  78  11-11          Lactate, Blood: 2.0 mmol/L (11-11-19 @ 16:59)      CARDIAC MARKERS ( 11 Nov 2019 00:21 )  x     / 0.04 ng/mL / x     / x     / x          RADIOLOGY:  < from: Xray Chest 1 View- PORTABLE-Routine (11.12.19 @ 07:19) >  Impression:      Right greater than left pleural effusion and underlying opacification.    Follow-up as needed.    < end of copied text >      PHYSICAL EXAM:  GENERAL: NAD, AAOx3, on nasal canula  HEENT:  Atraumatic, Normocephalic  PULMONARY: shallow breaths, crackles bilaterally  CARDIOVASCULAR: Regular rate and rhythm  GASTROINTESTINAL: Soft, Nontender  EXTREMITIES:  2+ Peripheral Pulses, +1 edema  NEUROLOGY: non-focal  SKIN: No rashes or lesions    ADMISSION SUMMARY  Patient is a 84y old Male who presents with a chief complaint of SOB (12 Nov 2019 09:03)    ASSESSMENT & PLAN    # Acute Diastolic CHF Exacerbation with Acute Hypoxic Respiratory Failure  - BiPAP PRN and at night.   - CT chest is negative for PE, but with bilateral pleural effusion  - EKG with no new ischemic changes  - BNP 2300, at baseline  - Troponin 0.04, denies chest pain, trend trop x 3  -Continue with IV lasix to maintain negative balance.   - strict Is and Os, fluid and salt restriction  -CXR shows improving effusion, repeat cxr in am, pt neg 2L  -fu cardiology    # CAD, HTN, h/o V tach:  - oral amio and metoprolol  - c/w aspirin, Plavix, statin and lisinopril    # H/O chronic normocytic anemia with gastric ulcer s/p epi injection and clip:   - c/w PPI and sucralfate, H/H at baseline  -Monitor CBC    # Hypothyroidism   - c/w levothyroxine     DVT PPX : Lovenox

## 2019-11-12 NOTE — PROGRESS NOTE ADULT - SUBJECTIVE AND OBJECTIVE BOX
INTERVAL EVENTS:  Lying in bed , feels better, good urine output    PAST MEDICAL & SURGICAL HISTORY:  Neuropathy  TIA (transient ischemic attack)  Left carotid stenosis  Lyme disease  Hypothyroidism  Coronary artery disease  History of mesenteric infarction  No significant past surgical history      MEDICATIONS  (STANDING):  aMIOdarone    Tablet 200 milliGRAM(s) Oral daily  aspirin  chewable 81 milliGRAM(s) Oral daily  atorvastatin 40 milliGRAM(s) Oral at bedtime  chlorhexidine 4% Liquid 1 Application(s) Topical <User Schedule>  clopidogrel Tablet 75 milliGRAM(s) Oral daily  enoxaparin Injectable 40 milliGRAM(s) SubCutaneous daily  furosemide   Injectable 40 milliGRAM(s) IV Push every 12 hours  levothyroxine 300 MICROGram(s) Oral daily  lisinopril 20 milliGRAM(s) Oral daily  magnesium sulfate  IVPB 2 Gram(s) IV Intermittent once  metoprolol tartrate 12.5 milliGRAM(s) Oral two times a day  pantoprazole    Tablet 40 milliGRAM(s) Oral every 12 hours  sucralfate 1 Gram(s) Oral every 12 hours    MEDICATIONS  (PRN):  acetaminophen   Tablet .. 650 milliGRAM(s) Oral every 6 hours PRN Temp greater or equal to 38C (100.4F), Mild Pain (1 - 3)      Vital Signs Last 24 Hrs  T(C): 36.7 (12 Nov 2019 15:21), Max: 36.7 (12 Nov 2019 15:21)  T(F): 98.1 (12 Nov 2019 15:21), Max: 98.1 (12 Nov 2019 15:21)  HR: 87 (12 Nov 2019 15:21) (75 - 93)  BP: 135/62 (12 Nov 2019 15:21) (129/60 - 156/73)  BP(mean): --  RR: 18 (12 Nov 2019 15:21) (16 - 20)  SpO2: 99% (12 Nov 2019 15:21) (99% - 100%)     PHYSICAL EXAM:  GEN: Awake, alert. NAD.   HEENT: NCAT, PERRL, EOMI. Mucosa moist. No JVD.  RESP: CTA b/l  CV: RRR. Normal S1/S2. No m/r/g.  ABD: Soft. NT/ND. BS+  EXT: Warm. No edema, clubbing, or cyanosis.   NEURO: AAOx3. No focal deficits.     LABS:                        7.7    4.97  )-----------( 230      ( 12 Nov 2019 05:39 )             25.3     11-12    135  |  93<L>  |  8<L>  ----------------------------<  87  3.6   |  30  |  <0.5<L>    Ca    7.4<L>      12 Nov 2019 05:39  Phos  2.8     11-11  Mg     1.6     11-12    TPro  4.9<L>  /  Alb  2.6<L>  /  TBili  0.4  /  DBili  x   /  AST  57<H>  /  ALT  46<H>  /  AlkPhos  78  11-11    CARDIAC MARKERS ( 11 Nov 2019 00:21 )  x     / 0.04 ng/mL / x     / x     / x              I&O's Summary    11 Nov 2019 07:01  -  12 Nov 2019 07:00  --------------------------------------------------------  IN: 0 mL / OUT: 2900 mL / NET: -2900 mL    12 Nov 2019 07:01  -  12 Nov 2019 16:01  --------------------------------------------------------  IN: 0 mL / OUT: 2200 mL / NET: -2200 mL      BNP  RADIOLOGY & ADDITIONAL STUDIES:    TELEMETRY:    EKG:

## 2019-11-12 NOTE — PROGRESS NOTE ADULT - SUBJECTIVE AND OBJECTIVE BOX
OVERNIGHT EVENTS:  feels better, good diuresis, cxr improved    Vital Signs Last 24 Hrs  T(C): 36.1 (12 Nov 2019 07:59), Max: 36.4 (11 Nov 2019 23:52)  T(F): 97 (12 Nov 2019 07:59), Max: 97.6 (11 Nov 2019 23:52)  HR: 80 (12 Nov 2019 07:59) (75 - 93)  BP: 129/60 (12 Nov 2019 07:59) (129/60 - 156/73)  RR: 16 (12 Nov 2019 07:59) (16 - 22)  SpO2: 100% (12 Nov 2019 07:59) (99% - 100%)    PHYSICAL EXAMINATION:    GENERAL: The patient is awake and alert in no apparent distress.     HEENT: Head is normocephalic and atraumatic. Extraocular muscles are intact. Mucous membranes are moist.    NECK: Supple.    LUNGS: dec bs both bases    HEART: Regular rate and rhythm without murmur.    ABDOMEN: Soft, nontender, and nondistended.      EXTREMITIES: swelling +    NEUROLOGIC: Grossly intact.    SKIN: No ulceration or induration present.      LABS:                        7.7    4.97  )-----------( 230      ( 12 Nov 2019 05:39 )             25.3     11-12    135  |  93<L>  |  8<L>  ----------------------------<  87  3.6   |  30  |  <0.5<L>    Ca    7.4<L>      12 Nov 2019 05:39  Phos  2.8     11-11  Mg     1.6     11-12    TPro  4.9<L>  /  Alb  2.6<L>  /  TBili  0.4  /  DBili  x   /  AST  57<H>  /  ALT  46<H>  /  AlkPhos  78  11-11          CARDIAC MARKERS ( 11 Nov 2019 00:21 )  x     / 0.04 ng/mL / x     / x     / x            Serum Pro-Brain Natriuretic Peptide: 2456 pg/mL (11-11-19 @ 00:21)    Lactate, Blood: 2.0 mmol/L (11-11-19 @ 16:59)          11-11-19 @ 07:01  -  11-12-19 @ 07:00  --------------------------------------------------------  IN: 0 mL / OUT: 2900 mL / NET: -2900 mL        MICROBIOLOGY:      MEDICATIONS  (STANDING):  aMIOdarone    Tablet 200 milliGRAM(s) Oral daily  aspirin  chewable 81 milliGRAM(s) Oral daily  atorvastatin 40 milliGRAM(s) Oral at bedtime  chlorhexidine 4% Liquid 1 Application(s) Topical <User Schedule>  clopidogrel Tablet 75 milliGRAM(s) Oral daily  enoxaparin Injectable 40 milliGRAM(s) SubCutaneous daily  furosemide   Injectable 40 milliGRAM(s) IV Push every 12 hours  levothyroxine 300 MICROGram(s) Oral daily  lisinopril 20 milliGRAM(s) Oral daily  metoprolol tartrate 12.5 milliGRAM(s) Oral two times a day  pantoprazole    Tablet 40 milliGRAM(s) Oral every 12 hours  sucralfate 1 Gram(s) Oral every 12 hours    MEDICATIONS  (PRN):  acetaminophen   Tablet .. 650 milliGRAM(s) Oral every 6 hours PRN Temp greater or equal to 38C (100.4F), Mild Pain (1 - 3)      RADIOLOGY & ADDITIONAL STUDIES:

## 2019-11-13 NOTE — PROGRESS NOTE ADULT - SUBJECTIVE AND OBJECTIVE BOX
OVERNIGHT EVENTS: feels better, decrease leg swelling , didnt tolerate bipap    Vital Signs Last 24 Hrs  T(C): 35.9 (13 Nov 2019 05:05), Max: 36.7 (12 Nov 2019 15:21)  T(F): 96.6 (13 Nov 2019 05:05), Max: 98.1 (12 Nov 2019 15:21)  HR: 78 (13 Nov 2019 05:05) (78 - 87)  BP: 148/67 (13 Nov 2019 05:05) (129/60 - 148/67)  RR: 20 (13 Nov 2019 05:05) (16 - 20)  SpO2: 95% (12 Nov 2019 20:27) (95% - 100%)    PHYSICAL EXAMINATION:    GENERAL: The patient is awake and alert in no apparent distress.     HEENT: Head is normocephalic and atraumatic. Extraocular muscles are intact. Mucous membranes are moist.    NECK: Supple.    LUNGS: dec bs both bases    HEART: Regular rate and rhythm without murmur.    ABDOMEN: Soft, nontender, and nondistended.      EXTREMITIES: swelling+    NEUROLOGIC: Grossly intact.    SKIN: No ulceration or induration present.      LABS:                        9.0    5.92  )-----------( 230      ( 12 Nov 2019 17:02 )             29.2     11-12    135  |  93<L>  |  8<L>  ----------------------------<  87  3.6   |  30  |  <0.5<L>    Ca    7.4<L>      12 Nov 2019 05:39  Mg     1.6     11-12            CARDIAC MARKERS ( 12 Nov 2019 17:02 )  x     / 0.06 ng/mL / x     / x     / 3.0 ng/mL        Serum Pro-Brain Natriuretic Peptide: 2456 pg/mL (11-11-19 @ 00:21)            11-12-19 @ 07:01  -  11-13-19 @ 07:00  --------------------------------------------------------  IN: 120 mL / OUT: 4000 mL / NET: -3880 mL        MICROBIOLOGY:      MEDICATIONS  (STANDING):  aMIOdarone    Tablet 200 milliGRAM(s) Oral daily  aspirin  chewable 81 milliGRAM(s) Oral daily  atorvastatin 40 milliGRAM(s) Oral at bedtime  chlorhexidine 4% Liquid 1 Application(s) Topical <User Schedule>  clopidogrel Tablet 75 milliGRAM(s) Oral daily  enoxaparin Injectable 40 milliGRAM(s) SubCutaneous daily  furosemide   Injectable 40 milliGRAM(s) IV Push every 12 hours  levothyroxine 300 MICROGram(s) Oral daily  lisinopril 20 milliGRAM(s) Oral daily  metoprolol tartrate 12.5 milliGRAM(s) Oral two times a day  pantoprazole    Tablet 40 milliGRAM(s) Oral every 12 hours  sucralfate 1 Gram(s) Oral every 12 hours    MEDICATIONS  (PRN):  acetaminophen   Tablet .. 650 milliGRAM(s) Oral every 6 hours PRN Temp greater or equal to 38C (100.4F), Mild Pain (1 - 3)      RADIOLOGY & ADDITIONAL STUDIES:

## 2019-11-13 NOTE — PROGRESS NOTE ADULT - SUBJECTIVE AND OBJECTIVE BOX
LAKSHMI JUDD  84y Male    CHIEF COMPLAINT:    Patient is a 84y old  Male who presents with a chief complaint of SOB (2019 10:36)      INTERVAL HPI/OVERNIGHT EVENTS:    Patient seen and examined. C/O sob and orthopnea. No cp. No cough or fever.     ROS: All other systems are negative.    Vital Signs:    T(F): 96.6 (19 @ 05:05), Max: 98.1 (19 @ 15:21)  HR: 78 (19 @ 05:05) (78 - 87)  BP: 148/67 (19 @ 05:05) (135/62 - 148/67)  RR: 20 (19 @ 05:05) (18 - 20)  SpO2: 95% (19 @ 20:27) (95% - 99%)  I&O's Summary    2019 07:01  -  2019 07:00  --------------------------------------------------------  IN: 120 mL / OUT: 4000 mL / NET: -3880 mL      Daily Height in cm: 172.72 (2019 20:48)    Daily Weight in k.2 (2019 09:12)  CAPILLARY BLOOD GLUCOSE          PHYSICAL EXAM:    GENERAL:  NAD  SKIN: No rashes or lesions  HENT: Atraumatic Normocephalic. PERRL. Moist membranes.  NECK: Supple, No JVD. No lymphadenopathy.  PULMONARY: BS are decrease in the lower chest post. B/L. No wheezing. No rales  CVS: Normal S1, S2. Rate and Rhythm are regular. No murmurs.  ABDOMEN/GI: Soft, Nontender, Nondistended; BS present  EXTREMITIES: Peripheral pulses intact. Trace edema B/L LE.  NEUROLOGIC:  No motor or sensory deficit.  PSYCH: Alert & oriented x 3    Consultant(s) Notes Reviewed:  [x ] YES  [ ] NO  Care Discussed with Consultants/Other Providers [ x] YES  [ ] NO    EKG reviewed  Telemetry reviewed    LABS:                        10.2   6.87  )-----------( 261      ( 2019 07:14 )             33.5     11    139  |  93<L>  |  9<L>  ----------------------------<  105<H>  3.4<L>   |  32  |  <0.5<L>    Ca    7.5<L>      2019 07:14  Mg     2.0         TPro  5.2<L>  /  Alb  2.6<L>  /  TBili  0.4  /  DBili  x   /  AST  50<H>  /  ALT  43<H>  /  AlkPhos  75        Serum Pro-Brain Natriuretic Peptide: 2456 pg/mL (19 @ 00:21)    Trop 0.06, CKMB 3.0, CK --, 19 @ 17:02  Trop 0.04, CKMB --, CK --, 19 @ 00:21        RADIOLOGY & ADDITIONAL TESTS:    < from: Transthoracic Echocardiogram (19 @ 11:59) >    Summary:   1. Left ventricular ejection fraction, by visual estimation, is 50 to   55%.   2. Mildly decreased global left ventricular systolic function.   3. Entire anterior septum, basal and mid anterior wall, and mid and   apical inferior septum are abnormal as described above.   4. Spectral Doppler shows impaired relaxation pattern of left   ventricular myocardial filling (Grade I diastolic dysfunction).   5. Limited study for evaluation of LV wall motion but mild hypokinesia   of ant septum/ant wall suggested on some views.   6. Mild mitral annular calcification.   7. No evidence of mitral valve regurgitation.   8. Structurally normal mitral valve, with normal leaflet excursion.   9. Sclerotic aortic valve with normal opening.  10. LA volume Index is 24.5 ml/m² ml/m2.    < end of copied text >    Imaging or report Personally Reviewed:  [ ] YES  [ ] NO    Medications:  Standing  aMIOdarone    Tablet 200 milliGRAM(s) Oral daily  aspirin  chewable 81 milliGRAM(s) Oral daily  atorvastatin 40 milliGRAM(s) Oral at bedtime  chlorhexidine 4% Liquid 1 Application(s) Topical <User Schedule>  clopidogrel Tablet 75 milliGRAM(s) Oral daily  enoxaparin Injectable 40 milliGRAM(s) SubCutaneous daily  furosemide   Injectable 40 milliGRAM(s) IV Push every 8 hours  levothyroxine 300 MICROGram(s) Oral daily  lisinopril 20 milliGRAM(s) Oral daily  metoprolol tartrate 12.5 milliGRAM(s) Oral two times a day  pantoprazole    Tablet 40 milliGRAM(s) Oral every 12 hours  sucralfate 1 Gram(s) Oral every 12 hours    PRN Meds  acetaminophen   Tablet .. 650 milliGRAM(s) Oral every 6 hours PRN  albuterol/ipratropium for Nebulization. 3 milliLiter(s) Nebulizer every 6 hours PRN      Case discussed with resident    Care discussed with pt/family

## 2019-11-13 NOTE — PROGRESS NOTE ADULT - ASSESSMENT
85 yo M yo with PMH of CAD s/p NSTEMI Oct 1 w/ pci and stent x3, HTN ,DLD, Hypothyroidism presenting for for progressively worsening SOB. No cough. No fever. No cp.     1.	Ac. HFpEF  2.	B/L pleural effusions.   3.	CAD S/P recent PCI  4.	HTN / DL  5.	Hypothyroidism         PLAN:    ·	Cont tele  ·	Increase Lasix to 40 mg ivp q 8H.   ·	Check i's and o's and daily wt.   ·	Low salt diet and water restriction to 1.5 L/C.   ·	ECHO reviewed. NLVF. EF 50-55%.  ·	Card eval noted  ·	Pulmonary F/U noted. Will D/W the pulmonary regarding Rt. Sided thoracentesis.   ·	Cont ASA, Plavix and his other meds.

## 2019-11-13 NOTE — PROGRESS NOTE ADULT - SUBJECTIVE AND OBJECTIVE BOX
LAKSHMI JUDD 84y Male  MRN#: [id number]     SUBJECTIVE  Patient is a 84y old Male who presents with a chief complaint of SOB (13 Nov 2019 07:10)      Today is hospital day 2d, and this morning he is lying in bed without distress. Reports improvement in shortness of breath and leg swelling. Feels weak, would like physical therapy. Abdomen feels sore, same as prior. Last bowel movement yesterday. Denies chest pain, palpitations.   No acute overnight events.     OBJECTIVE  PAST MEDICAL & SURGICAL HISTORY  Neuropathy  TIA (transient ischemic attack)  Left carotid stenosis  Lyme disease  Hypothyroidism  Coronary artery disease  History of mesenteric infarction    ALLERGIES:  iodinated radiocontrast agents (Hives)    MEDICATIONS:  STANDING MEDICATIONS  aMIOdarone    Tablet 200 milliGRAM(s) Oral daily  aspirin  chewable 81 milliGRAM(s) Oral daily  atorvastatin 40 milliGRAM(s) Oral at bedtime  chlorhexidine 4% Liquid 1 Application(s) Topical <User Schedule>  clopidogrel Tablet 75 milliGRAM(s) Oral daily  enoxaparin Injectable 40 milliGRAM(s) SubCutaneous daily  furosemide   Injectable 40 milliGRAM(s) IV Push every 8 hours  levothyroxine 300 MICROGram(s) Oral daily  lisinopril 20 milliGRAM(s) Oral daily  metoprolol tartrate 12.5 milliGRAM(s) Oral two times a day  pantoprazole    Tablet 40 milliGRAM(s) Oral every 12 hours  potassium chloride    Tablet ER 40 milliEquivalent(s) Oral once  sucralfate 1 Gram(s) Oral every 12 hours    PRN MEDICATIONS  acetaminophen   Tablet .. 650 milliGRAM(s) Oral every 6 hours PRN  albuterol/ipratropium for Nebulization. 3 milliLiter(s) Nebulizer every 6 hours PRN    HOME MEDICATIONS  Home Medications:  acetaminophen 325 mg oral tablet: 2 tab(s) orally every 6 hours, As needed, Moderate Pain (4 - 6) (10 Oct 2019 09:54)  aspirin 81 mg oral tablet, chewable: 1 tab(s) orally once a day (26 Sep 2019 11:56)  clopidogrel 75 mg oral tablet: 1 tab(s) orally once a day (02 Oct 2019 13:26)  Crestor 20 mg oral tablet: 1 tab(s) orally once a day (at bedtime) (26 Sep 2019 11:55)  furosemide 40 mg oral tablet: 1 tab(s) orally once a day (11 Nov 2019 08:39)  levothyroxine 300 mcg (0.3 mg) oral tablet: 1 tab(s) orally once a day (11 Nov 2019 08:38)  lisinopril 20 mg oral tablet: 1 tab(s) orally once a day (26 Sep 2019 11:55)  Protonix 40 mg oral delayed release tablet: 1 tab(s) orally 2 times a day (05 Nov 2019 13:36)  sucralfate 1 g oral tablet: 1 tab(s) orally every 12 hours (10 Oct 2019 09:54)      LABS:                        10.2   6.87  )-----------( 261      ( 13 Nov 2019 07:14 )             33.5     11-13    139  |  93<L>  |  9<L>  ----------------------------<  105<H>  3.4<L>   |  32  |  <0.5<L>    Ca    7.5<L>      13 Nov 2019 07:14  Mg     2.0     11-13    TPro  5.2<L>  /  Alb  2.6<L>  /  TBili  0.4  /  DBili  x   /  AST  50<H>  /  ALT  43<H>  /  AlkPhos  75  11-13    LIVER FUNCTIONS - ( 13 Nov 2019 07:14 )  Alb: 2.6 g/dL / Pro: 5.2 g/dL / ALK PHOS: 75 U/L / ALT: 43 U/L / AST: 50 U/L / GGT: x                 Troponin T, Serum: 0.06 ng/mL <HH> (11-12-19 @ 17:02)      CARDIAC MARKERS ( 12 Nov 2019 17:02 )  x     / 0.06 ng/mL / x     / x     / 3.0 ng/mL        PHYSICAL EXAM:  T(C): 35.9 (11-13-19 @ 05:05), Max: 36.7 (11-12-19 @ 15:21)  HR: 78 (11-13-19 @ 05:05) (78 - 87)  BP: 148/67 (11-13-19 @ 05:05) (135/62 - 148/67)  RR: 20 (11-13-19 @ 05:05) (18 - 20)  SpO2: 95% (11-12-19 @ 20:27) (95% - 99%)    GENERAL: NAD, well-developed, 84y sitting in bed   EENT: EOMI, conjunctiva and sclera clear, No nasal obstruction or discharge  RESPIRATORY: +mild expiratory wheeze bilaterally with decreased breath sounds at the bases. On NC, breathing comfortably.   CARDIOVASCULAR: Regular rate and rhythm; No murmurs, rubs, or gallops. 2+ pitting edema to the knees bilaterally  GASTROINTESTINAL: Abdomen Soft, Nondistended; Mild diffuse tenderness to palpation, surgical incision scars with steristrips and gauze clean, dry, intact   GENITOURINARY: +Shen draining clear pale yellow urine  MUSCULOSKELETAL:  No cyanosis, extremities grossly symmetrical  PSYCH: AAOx3, affect appropriate  NEUROLOGY: non-focal, cognition intact, MAEE    ADMISSION SUMMARY  Patient is a 84y old Male who presents with a chief complaint of SOB (13 Nov 2019 07:10)

## 2019-11-13 NOTE — PROGRESS NOTE ADULT - ASSESSMENT
83 yo M yo with PMH of CAD s/p NSTEMI Oct 1 w/ pci and stent x3, HTN ,DLD, Hypothyroidism presenting for shortness of breath, admitted with acute on chronic diastolic heart failure exacerbation.    # Acute on Chronic Diastolic CHF Exacerbation with Acute Hypoxic Respiratory Failure  -Cardiology and Pulm following  - CT chest is negative for PE, but with bilateral pleural effusion  - EKG with no new ischemic changes  - BNP 2500, close to 2300 during last admission  - Troponin 0.04 and stable, denies chest pain  - Home dose Lasix 40 PO daily  -- Increased IV Lasix to 40 IV q8h  -- F/u LE Duplex  -- Keep Shen for now for monitoring, TOV tomorrow  -- Compression ACE bandages, elevate legs in bed  -- strict Is and Os, fluid and salt restriction  -- BiPAP PRN and at night  -- No thoracentesis at this time, per pulmonology     # CAD, HTN, h/o V tach  -- oral amio and metoprolol  -- c/w aspirin, Plavix, statin and lisinopril    # Chronic normocytic anemia with gastric ulcer s/p epi injection and clip  -TIBC low, likely combination of recent bleed +Anemia of Chronic Disease  --c/w PPI and sucralfate, H/H at baseline  --Monitor CBC    # Hypothyroidism   -- c/w levothyroxine     DVT PPX : Lovenox  GI PPX: Not indicated  Diet: DASH  Activity: As tolerated, physical therapy to see  Disposition: Likely back to [facility]  CODE: FULL

## 2019-11-13 NOTE — PHYSICAL THERAPY INITIAL EVALUATION ADULT - SPECIFY REASON(S)
Chart reviewed. Pt. currently with multiple physicians present in room x10min. PT to follow as pt. available.

## 2019-11-13 NOTE — PROGRESS NOTE ADULT - ASSESSMENT
IMPRESSION    SOB 2/2 pulmonary edema/ Pleural effusion  H/o mesenteric ischemia   H/o Afib not on AC 2/2 h/o GIB    PLAN    Keep diuresis/ iv lasix q 12h, keep neg balance ( BP, creat, Na good)  BiPAP at night as tolerated  Supplemental oxygen to keep sat >92 %  LE doppler f/up  HOB elevation 45 degrees  DVT ppx  rehab eval  OOB to chair  pox ra

## 2019-11-14 NOTE — PROGRESS NOTE ADULT - ASSESSMENT
85 yo M yo with PMH of CAD s/p NSTEMI Oct 1 w/ pci and stent x3, HTN ,DLD, Hypothyroidism presenting for shortness of breath, admitted with acute on chronic diastolic heart failure exacerbation.    # Acute on Chronic Diastolic CHF Exacerbation with Acute Hypoxic Respiratory Failure  -Cardiology and Pulm following  - CT chest is negative for PE, but with bilateral pleural effusion  - EKG with no new ischemic changes  - BNP 2500, close to 2300 during last admission  - Troponin 0.04 and stable, denies chest pain  - LE Duplex neg  - Home dose Lasix 40 PO daily  -- Increased IV Lasix to 40 IV q8h  -- Keep Shen for now for monitoring  - Daily Chest X-Ray   -- Compression ACE bandages, elevate legs in bed  -- strict Is and Os, fluid and salt restriction  -- BiPAP PRN and at night  -- No thoracentesis at this time, per pulmonology     # CAD, HTN, h/o V tach  -- oral amio and metoprolol  -- c/w aspirin, Plavix, statin and lisinopril    # Chronic normocytic anemia with gastric ulcer s/p epi injection and clip  -TIBC low, likely combination of recent bleed +Anemia of Chronic Disease  --c/w PPI and sucralfate, H/H at baseline  --Monitor CBC    # Hypothyroidism   -- c/w levothyroxine     DVT PPX : Lovenox  GI PPX: Not indicated  Diet: DASH  Activity: As tolerated  Disposition: Likely back to [facility]  CODE: FULL

## 2019-11-14 NOTE — PHYSICAL THERAPY INITIAL EVALUATION ADULT - ADDITIONAL COMMENTS
pt was recently at [facility] for STR, pt enters home through the garage, 6 +13 steps inside

## 2019-11-14 NOTE — PHYSICAL THERAPY INITIAL EVALUATION ADULT - GENERAL OBSERVATIONS, REHAB EVAL
10:24-11:03 39 min  pt rec OOB in chair in NAD, PCAs at the b/s finishing with pt, pt agreeable to PT, pt stated he feels weak, wife at the b/s

## 2019-11-14 NOTE — PROGRESS NOTE ADULT - SUBJECTIVE AND OBJECTIVE BOX
OVERNIGHT EVENTS: still c.o SOB, used BIPAP overnight    Vital Signs Last 24 Hrs  T(C): 35.9 (14 Nov 2019 05:14), Max: 36.3 (13 Nov 2019 20:25)  T(F): 96.7 (14 Nov 2019 05:14), Max: 97.3 (13 Nov 2019 20:25)  HR: 75 (14 Nov 2019 05:14) (75 - 97)  BP: 134/69 (14 Nov 2019 05:14) (115/59 - 134/69)  RR: 18 (14 Nov 2019 05:14) (18 - 18)  SpO2: 98% (13 Nov 2019 19:51) (98% - 98%)    PHYSICAL EXAMINATION:    GENERAL: The patient is awake and alert in no apparent distress.     HEENT: Head is normocephalic and atraumatic. Extraocular muscles are intact. Mucous membranes are moist.    NECK: Supple.    LUNGS: dec bs r side    HEART: Regular rate and rhythm without murmur.    ABDOMEN: Soft, nontender, and nondistended.      EXTREMITIES: LE swelling improved    NEUROLOGIC: Grossly intact.    SKIN: No ulceration or induration present.      LABS:                        10.2   6.87  )-----------( 261      ( 13 Nov 2019 07:14 )             33.5     11-13    139  |  93<L>  |  9<L>  ----------------------------<  105<H>  3.4<L>   |  32  |  <0.5<L>    Ca    7.5<L>      13 Nov 2019 07:14  Mg     2.0     11-13    TPro  5.2<L>  /  Alb  2.6<L>  /  TBili  0.4  /  DBili  x   /  AST  50<H>  /  ALT  43<H>  /  AlkPhos  75  11-13          CARDIAC MARKERS ( 12 Nov 2019 17:02 )  x     / 0.06 ng/mL / x     / x     / 3.0 ng/mL                  11-12-19 @ 07:01  -  11-13-19 @ 07:00  --------------------------------------------------------  IN: 120 mL / OUT: 4000 mL / NET: -3880 mL    11-13-19 @ 07:01  -  11-14-19 @ 06:45  --------------------------------------------------------  IN: 880 mL / OUT: 2520 mL / NET: -1640 mL        MICROBIOLOGY:      MEDICATIONS  (STANDING):  aMIOdarone    Tablet 200 milliGRAM(s) Oral daily  aspirin  chewable 81 milliGRAM(s) Oral daily  atorvastatin 40 milliGRAM(s) Oral at bedtime  chlorhexidine 4% Liquid 1 Application(s) Topical <User Schedule>  clopidogrel Tablet 75 milliGRAM(s) Oral daily  enoxaparin Injectable 40 milliGRAM(s) SubCutaneous daily  furosemide   Injectable 40 milliGRAM(s) IV Push every 8 hours  levothyroxine 300 MICROGram(s) Oral daily  lisinopril 20 milliGRAM(s) Oral daily  metoprolol tartrate 12.5 milliGRAM(s) Oral two times a day  pantoprazole    Tablet 40 milliGRAM(s) Oral every 12 hours  sucralfate 1 Gram(s) Oral every 12 hours    MEDICATIONS  (PRN):  acetaminophen   Tablet .. 650 milliGRAM(s) Oral every 6 hours PRN Temp greater or equal to 38C (100.4F), Mild Pain (1 - 3)  albuterol/ipratropium for Nebulization. 3 milliLiter(s) Nebulizer every 6 hours PRN Shortness of Breath and/or Wheezing      RADIOLOGY & ADDITIONAL STUDIES:

## 2019-11-14 NOTE — PHYSICAL THERAPY INITIAL EVALUATION ADULT - GAIT DISTANCE, PT EVAL
pt able to stand for ~1-2 min with min A, pt able to shift weight side to side, but unable to amb 2* decreased standing tolerance

## 2019-11-14 NOTE — PHYSICAL THERAPY INITIAL EVALUATION ADULT - RANGE OF MOTION EXAMINATION, REHAB EVAL
R UE shldr flex <90* (pt stated he has RTC problem), elb/hand WFL, L UE WFL, B LE hip flex <90*, knee WFL, R ankle DF limited 2* h/o Lymes disease as per pt, L ankle WFL

## 2019-11-14 NOTE — PROGRESS NOTE ADULT - SUBJECTIVE AND OBJECTIVE BOX
LAKSHMI JUDD  84y Male    CHIEF COMPLAINT:    Patient is a 84y old  Male who presents with a chief complaint of SOB (2019 06:45)      INTERVAL HPI/OVERNIGHT EVENTS:    Patient seen and examined. Complains that he is still short of breath. No cough or fever. On Iv diuretics.     ROS: All other systems are negative.    Vital Signs:    T(F): 96.7 (19 @ 05:14), Max: 97.3 (19 @ 20:25)  HR: 75 (19 @ 05:14) (75 - 97)  BP: 134/69 (19 @ 05:14) (115/59 - 134/69)  RR: 18 (19 @ 05:14) (18 - 18)  SpO2: 98% (19 @ 19:51) (98% - 98%)  I&O's Summary    2019 07:  -  2019 07:00  --------------------------------------------------------  IN: 880 mL / OUT: 3620 mL / NET: -2740 mL    2019 07:  -  2019 10:51  --------------------------------------------------------  IN: 0 mL / OUT: 701 mL / NET: -701 mL      Daily     Daily Weight in k.3 (2019 05:14)  CAPILLARY BLOOD GLUCOSE          PHYSICAL EXAM:    GENERAL:  NAD  SKIN: No rashes or lesions  HENT: Atraumatic Normocephalic. PERRL. Moist membranes.  NECK: Supple, No JVD. No lymphadenopathy.  PULMONARY: BS are decreased in the Rt. lower half of the chest and Lt. Base. No wheezing. No rales  CVS: Normal S1, S2. Rate and Rhythm are regular. No murmurs.  ABDOMEN/GI: Soft, Nontender, Nondistended; BS present  EXTREMITIES: Peripheral pulses intact. 1+ pitting edema B/L LE.  NEUROLOGIC:  No motor or sensory deficit.  PSYCH: Alert & oriented x 3    Consultant(s) Notes Reviewed:  [x ] YES  [ ] NO  Care Discussed with Consultants/Other Providers [ x] YES  [ ] NO    EKG reviewed  Telemetry reviewed    LABS:                        8.3    6.22  )-----------( 186      ( 2019 05:56 )             27.3         137  |  93<L>  |  12  ----------------------------<  102<H>  3.8   |  31  |  0.5<L>    Ca    7.7<L>      2019 05:56  Mg     1.9         TPro  5.2<L>  /  Alb  2.6<L>  /  TBili  0.4  /  DBili  x   /  AST  50<H>  /  ALT  43<H>  /  AlkPhos  75        Serum Pro-Brain Natriuretic Peptide: 2456 pg/mL (19 @ 00:21)    Trop 0.06, CKMB 3.0, CK --, 19 @ 17:02        RADIOLOGY & ADDITIONAL TESTS:      Imaging or report Personally Reviewed:  [ ] YES  [ ] NO    Medications:  Standing  aMIOdarone    Tablet 200 milliGRAM(s) Oral daily  aspirin  chewable 81 milliGRAM(s) Oral daily  atorvastatin 40 milliGRAM(s) Oral at bedtime  chlorhexidine 4% Liquid 1 Application(s) Topical <User Schedule>  clopidogrel Tablet 75 milliGRAM(s) Oral daily  enoxaparin Injectable 40 milliGRAM(s) SubCutaneous daily  furosemide   Injectable 40 milliGRAM(s) IV Push every 8 hours  levothyroxine 300 MICROGram(s) Oral daily  lisinopril 20 milliGRAM(s) Oral daily  metoprolol tartrate 12.5 milliGRAM(s) Oral two times a day  pantoprazole    Tablet 40 milliGRAM(s) Oral every 12 hours  sucralfate 1 Gram(s) Oral every 12 hours    PRN Meds  acetaminophen   Tablet .. 650 milliGRAM(s) Oral every 6 hours PRN  albuterol/ipratropium for Nebulization. 3 milliLiter(s) Nebulizer every 6 hours PRN      Case discussed with resident    Care discussed with pt/family

## 2019-11-14 NOTE — CONSULT NOTE ADULT - ASSESSMENT

## 2019-11-14 NOTE — PHYSICAL THERAPY INITIAL EVALUATION ADULT - PERTINENT HX OF CURRENT PROBLEM, REHAB EVAL
pt adm for hypervolemia, dyspnea, peripheral edema, pleural effusion, h/o mesenteric infarction with abdominal sx

## 2019-11-14 NOTE — PROGRESS NOTE ADULT - ASSESSMENT
IMPRESSION    SOB 2/2 pulmonary edema/ Pleural effusion  H/o mesenteric ischemia   H/o Afib not on AC 2/2 h/o GIB    PLAN    Keep diuresiS, keep neg balance ( BP, creat, Na good). repeat CXR  R side us. thoracentesis  BiPAP at night as tolerated  Supplemental oxygen to keep sat >92 %  HOB elevation 45 degrees  DVT ppx  rehab eval  OOB to chair  pox ra

## 2019-11-14 NOTE — PROGRESS NOTE ADULT - SUBJECTIVE AND OBJECTIVE BOX
LAKSHMI JUDD 84y Male  MRN#: [id number]     SUBJECTIVE  Patient is a 84y old Male who presents with a chief complaint of SOB (14 Nov 2019 12:44)      Today is hospital day 3d, and this morning he is sitting in his chair without distress. No chest pain, shortness of breath, palpitations. Feels that his leg swelling is improving.   No acute overnight events.     OBJECTIVE  PAST MEDICAL & SURGICAL HISTORY  Neuropathy  TIA (transient ischemic attack)  Left carotid stenosis  Lyme disease  Hypothyroidism  Coronary artery disease  History of mesenteric infarction    ALLERGIES:  iodinated radiocontrast agents (Hives)    MEDICATIONS:  STANDING MEDICATIONS  aMIOdarone    Tablet 200 milliGRAM(s) Oral daily  aspirin  chewable 81 milliGRAM(s) Oral daily  atorvastatin 40 milliGRAM(s) Oral at bedtime  chlorhexidine 4% Liquid 1 Application(s) Topical <User Schedule>  clopidogrel Tablet 75 milliGRAM(s) Oral daily  enoxaparin Injectable 40 milliGRAM(s) SubCutaneous daily  furosemide   Injectable 40 milliGRAM(s) IV Push every 8 hours  levothyroxine 300 MICROGram(s) Oral daily  lisinopril 20 milliGRAM(s) Oral daily  metoprolol tartrate 12.5 milliGRAM(s) Oral two times a day  pantoprazole    Tablet 40 milliGRAM(s) Oral every 12 hours  sucralfate 1 Gram(s) Oral every 12 hours    PRN MEDICATIONS  acetaminophen   Tablet .. 650 milliGRAM(s) Oral every 6 hours PRN  albuterol/ipratropium for Nebulization. 3 milliLiter(s) Nebulizer every 6 hours PRN    HOME MEDICATIONS  Home Medications:  acetaminophen 325 mg oral tablet: 2 tab(s) orally every 6 hours, As needed, Moderate Pain (4 - 6) (10 Oct 2019 09:54)  aspirin 81 mg oral tablet, chewable: 1 tab(s) orally once a day (26 Sep 2019 11:56)  clopidogrel 75 mg oral tablet: 1 tab(s) orally once a day (02 Oct 2019 13:26)  Crestor 20 mg oral tablet: 1 tab(s) orally once a day (at bedtime) (26 Sep 2019 11:55)  furosemide 40 mg oral tablet: 1 tab(s) orally once a day (11 Nov 2019 08:39)  levothyroxine 300 mcg (0.3 mg) oral tablet: 1 tab(s) orally once a day (11 Nov 2019 08:38)  lisinopril 20 mg oral tablet: 1 tab(s) orally once a day (26 Sep 2019 11:55)  Protonix 40 mg oral delayed release tablet: 1 tab(s) orally 2 times a day (05 Nov 2019 13:36)  sucralfate 1 g oral tablet: 1 tab(s) orally every 12 hours (10 Oct 2019 09:54)      LABS:                        8.3    6.22  )-----------( 186      ( 14 Nov 2019 05:56 )             27.3     11-14    137  |  93<L>  |  12  ----------------------------<  102<H>  3.8   |  31  |  0.5<L>    Ca    7.7<L>      14 Nov 2019 05:56  Mg     1.9     11-14    TPro  5.2<L>  /  Alb  2.6<L>  /  TBili  0.4  /  DBili  x   /  AST  50<H>  /  ALT  43<H>  /  AlkPhos  75  11-13    LIVER FUNCTIONS - ( 13 Nov 2019 07:14 )  Alb: 2.6 g/dL / Pro: 5.2 g/dL / ALK PHOS: 75 U/L / ALT: 43 U/L / AST: 50 U/L / GGT: x                     CARDIAC MARKERS ( 12 Nov 2019 17:02 )  x     / 0.06 ng/mL / x     / x     / 3.0 ng/mL    PHYSICAL EXAM:  T(C): 36.1 (11-14-19 @ 13:30), Max: 36.3 (11-13-19 @ 20:25)  HR: 80 (11-14-19 @ 13:30) (75 - 97)  BP: 105/52 (11-14-19 @ 13:30) (105/52 - 134/69)  RR: 18 (11-14-19 @ 05:14) (18 - 18)  SpO2: 98% (11-13-19 @ 19:51) (98% - 98%)    GENERAL: NAD, well-developed, 84y sitting in bed   EENT: EOMI, conjunctiva and sclera clear, No nasal obstruction or discharge  RESPIRATORY: Decreased breath sounds at the bases. On NC, breathing comfortably.   CARDIOVASCULAR: Regular rate and rhythm; No murmurs, rubs, or gallops. ACE bandages on legs  GASTROINTESTINAL: Abdomen Soft, Nondistended; Mild diffuse tenderness to palpation  MUSCULOSKELETAL:  No cyanosis, extremities grossly symmetrical  PSYCH: AAOx3, affect appropriate  NEUROLOGY: non-focal, cognition intact, MAEE    ADMISSION SUMMARY  Patient is a 84y old Male who presents with a chief complaint of SOB (14 Nov 2019 12:44)

## 2019-11-14 NOTE — CONSULT NOTE ADULT - SUBJECTIVE AND OBJECTIVE BOX
HPI:  83 yo M yo with PMH of CAD s/p NSTEMI Oct 1 w/ pci and stent x3, HTN ,DLD, Hypothyroidism presenting for SOB. at the time of the interview pt was placed on BiPAP and collateral history obtained from NH records. pt endorses progressive dyspnea and wheezing at [facility] where he was discharged for short term rehab. also endorses bilateral LE swelling and fatigue. CXR at that time showed left sided small pleural effusion. pt denies fever, chills, productive cough, abdominal pain, chest pain, palpitation or change in bowel/urinary habits.     to note, patient was admitted on Oct 1st for NSTEMI and had 3 stents placed. pt also with recent complicated admission in october for abdominal pain, colitis and found to have mesenteric ischemia s/p exploratory laparotomy with a left external iliac to SMA bypass and 80 cm small bowel resection with primary anastomosis. admission also was complicated by UGIB requiring endoscopic intervention for bleeding gastric ulcer.     Vital Signs in the ED:  T(F): 97.2 (11 Nov 2019 07:58), Max: 98 (10 Nov 2019 23:44)  HR: 85 (11 Nov 2019 08:15) (85 - 106)  BP: 142/63 (11 Nov 2019 07:58) (115/65 - 142/63)  RR: 20 (11 Nov 2019 07:58) (18 - 22)  SpO2: 100% (11 Nov 2019 08:15) (96% - 100%)    in the ED, pt received IV Lasix 40 mg , 2 gm MgSo4, benadryl.  CTA chest and abdomen ruled out PE, but showing bilateral pleural effusion. (11 Nov 2019 08:03)      PAST MEDICAL & SURGICAL HISTORY:  Neuropathy  TIA (transient ischemic attack)  Left carotid stenosis  Lyme disease  Hypothyroidism  Coronary artery disease  History of mesenteric infarction      Hospital Course:    TODAY'S SUBJECTIVE & REVIEW OF SYMPTOMS:     Constitutional WNL   Cardio WNL   Resp WNL   GI WNL  Heme WNL  Endo WNL  Skin WNL  MSK Weakness  Neuro WNL  Cognitive WNL  Psych WNL      MEDICATIONS  (STANDING):  aMIOdarone    Tablet 200 milliGRAM(s) Oral daily  aspirin  chewable 81 milliGRAM(s) Oral daily  atorvastatin 40 milliGRAM(s) Oral at bedtime  chlorhexidine 4% Liquid 1 Application(s) Topical <User Schedule>  clopidogrel Tablet 75 milliGRAM(s) Oral daily  enoxaparin Injectable 40 milliGRAM(s) SubCutaneous daily  furosemide   Injectable 40 milliGRAM(s) IV Push every 8 hours  levothyroxine 300 MICROGram(s) Oral daily  lisinopril 20 milliGRAM(s) Oral daily  metoprolol tartrate 12.5 milliGRAM(s) Oral two times a day  pantoprazole    Tablet 40 milliGRAM(s) Oral every 12 hours  sucralfate 1 Gram(s) Oral every 12 hours    MEDICATIONS  (PRN):  acetaminophen   Tablet .. 650 milliGRAM(s) Oral every 6 hours PRN Temp greater or equal to 38C (100.4F), Mild Pain (1 - 3)  albuterol/ipratropium for Nebulization. 3 milliLiter(s) Nebulizer every 6 hours PRN Shortness of Breath and/or Wheezing      FAMILY HISTORY:      Allergies    iodinated radiocontrast agents (Hives)    Intolerances        SOCIAL HISTORY:    [  ] Etoh  [  ] Smoking  [  ] Substance abuse     Home Environment:  [  ] Home Alone  [x  ] Lives with Family  [  ] Home Health Aid    Dwelling:  [  ] Apartment  [ x ] Private House  [  ] Adult Home  [  ] Skilled Nursing Facility      [  ] Short Term  [  ] Long Term  [ x ] Stairs       Elevator [  ]    FUNCTIONAL STATUS PTA: (Check all that apply)  Ambulation: [ x  ]Independent    [  ] Dependent     [  ] Non-Ambulatory  Assistive Device: [  ] SA Cane  [  ]  Q Cane  [  ] Walker  [  ]  Wheelchair  ADL : [x  ] Independent  [  ]  Dependent       Vital Signs Last 24 Hrs  T(C): 35.9 (14 Nov 2019 05:14), Max: 36.3 (13 Nov 2019 20:25)  T(F): 96.7 (14 Nov 2019 05:14), Max: 97.3 (13 Nov 2019 20:25)  HR: 75 (14 Nov 2019 05:14) (75 - 97)  BP: 134/69 (14 Nov 2019 05:14) (115/59 - 134/69)  BP(mean): --  RR: 18 (14 Nov 2019 05:14) (18 - 18)  SpO2: 98% (13 Nov 2019 19:51) (98% - 98%)      PHYSICAL EXAM: Alert & Oriented X3  GENERAL: NAD, well-groomed, well-developed  HEAD:  Atraumatic, Normocephalic  CHEST/LUNG: decreased bs lung bases  HEART: S1S2+  ABDOMEN: Soft, Nontender  EXTREMITIES:  no calf tenderness    NERVOUS SYSTEM:  Cranial Nerves 2-12 intact [  ] Abnormal  [  ]  ROM: WFL all extremities [x  ]  Abnormal [  ]  Motor Strength: WFL all extremities  [  ]  Abnormal [ x ]4/5 all ext  Sensation: intact to light touch [ x ] Abnormal [  ]  Reflexes: Symmetric [  ]  Abnormal [  ]    FUNCTIONAL STATUS:  Bed Mobility: Independent [  ]  Supervision [  ]  Needs Assistance [x  ]  N/A [  ]  Transfers: Independent [  ]  Supervision [  ]  Needs Assistance [x  ]  N/A [  ]   Ambulation: Independent [  ]  Supervision [  ]  Needs Assistance [  ]  N/A [  ]  ADL: Independent [  ] Requires Assistance [  ] N/A [  ]      LABS:                        8.3    6.22  )-----------( 186      ( 14 Nov 2019 05:56 )             27.3     11-14    137  |  93<L>  |  12  ----------------------------<  102<H>  3.8   |  31  |  0.5<L>    Ca    7.7<L>      14 Nov 2019 05:56  Mg     1.9     11-14    TPro  5.2<L>  /  Alb  2.6<L>  /  TBili  0.4  /  DBili  x   /  AST  50<H>  /  ALT  43<H>  /  AlkPhos  75  11-13          RADIOLOGY & ADDITIONAL STUDIES:    Assesment:

## 2019-11-14 NOTE — PROGRESS NOTE ADULT - ASSESSMENT
83 yo M yo with PMH of CAD s/p NSTEMI Oct 1 w/ pci and stent x3, HTN ,DLD, Hypothyroidism presenting for for progressively worsening SOB. No cough. No fever. No cp.     1.	Ac. HFpEF  2.	B/L pleural effusions.   3.	CAD S/P recent PCI  4.	HTN / DL  5.	Hypothyroidism         PLAN:    ·	Cont tele  ·	In 2740 ml negative balance today  ·	Cont Lasix 40 mg ivp q 8H.   ·	Check i's and o's and daily wt.   ·	Low salt diet and water restriction to 1.5 L/C.   ·	Care D/W the pulmonary. Will do Rt. Sided therapeutic thoracentesis today.   ·	ECHO reviewed. NLVF. EF 50-55%.  ·	Card eval noted  ·	Cont ASA, Plavix and his other meds.

## 2019-11-15 NOTE — PROGRESS NOTE ADULT - ASSESSMENT
83 yo M yo with PMH of CAD s/p NSTEMI Oct 1 w/ pci and stent x3, HTN ,DLD, Hypothyroidism presenting for for progressively worsening SOB. No cough. No fever. No cp.     1.	Ac. HFpEF  2.	B/L pleural effusions.   3.	CAD S/P recent PCI  4.	HTN / DL  5.	Hypothyroidism         PLAN:    ·	Care D/W the pulmonary. No need for thoracentesis  ·	In 1450 ml negative balance today  ·	Cont Lasix 40 mg ivp q 8H and reevaluate in AM  ·	Check i's and o's and daily wt.   ·	Low salt diet and water restriction to 1.5 L/C.   ·	ECHO reviewed. NLVF. EF 50-55%.  ·	Card larry noted  ·	Cont ASA, Plavix and his other meds.     #Progress Note Handoff  Pending (specify):  Consults_________, Tests________, Test Results_______, Other___On IV diuretics______  Family discussion:  Disposition: Home___/SNF___/Other________/Unknown at this time___x_____

## 2019-11-15 NOTE — PROGRESS NOTE ADULT - SUBJECTIVE AND OBJECTIVE BOX
LAKSHMI JUDD  84y Male    CHIEF COMPLAINT:    Patient is a 84y old  Male who presents with a chief complaint of SOB (15 Nov 2019 12:04)      INTERVAL HPI/OVERNIGHT EVENTS:    Patient seen and examined. Feels better but still having sob. Last night had Rt upper abd/lower chest severe pain radiating to back. No pain today. No fever. No cough.     ROS: All other systems are negative.    Vital Signs:    T(F): 98.5 (11-15-19 @ 12:40), Max: 98.7 (19 @ 20:50)  HR: 97 (11-15-19 @ 12:40) (78 - 97)  BP: 120/66 (11-15-19 @ 12:40) (120/66 - 161/73)  RR: 17 (11-15-19 @ 12:40) (17 - 20)  SpO2: 95% (11-15-19 @ 10:20) (95% - 95%)  I&O's Summary    2019 07:  -  15 Nov 2019 07:00  --------------------------------------------------------  IN: 480 mL / OUT: 3253 mL / NET: -2773 mL    15 Nov 2019 07:01  -  15 Nov 2019 15:39  --------------------------------------------------------  IN: 0 mL / OUT: 1450 mL / NET: -1450 mL      Daily     Daily Weight in k (15 Nov 2019 12:44)  CAPILLARY BLOOD GLUCOSE          PHYSICAL EXAM:    GENERAL:  NAD  SKIN: No rashes or lesions  HENT: Atrumatic. Normocephalic. PERRL. Moist membranes.  NECK: Supple, No JVD. No lymphadenopathy.  PULMONARY: BS are decreased in the bases B/L. No wheezing. No rales  CVS: Normal S1, S2. Rate and Rhythm are regular. No murmurs.  ABDOMEN/GI: Soft, Nontender, Nondistended; BS present  EXTREMITIES: Peripheral pulses intact. No edema B/L LE.  NEUROLOGIC:  No motor or sensory deficit.  PSYCH: Alert & oriented x 3    Consultant(s) Notes Reviewed:  [x ] YES  [ ] NO  Care Discussed with Consultants/Other Providers [ x] YES  [ ] NO    EKG reviewed  Telemetry reviewed    LABS:                        9.5    6.97  )-----------( 273      ( 15 Nov 2019 06:30 )             31.2     11-15    138  |  91<L>  |  12  ----------------------------<  121<H>  3.2<L>   |  33<H>  |  <0.5<L>    Ca    7.7<L>      15 Nov 2019 06:30  Mg     1.6     11-15        Serum Pro-Brain Natriuretic Peptide: 2456 pg/mL (19 @ 00:21)    Trop 0.06, CKMB 3.0, CK --, 19 @ 17:02        RADIOLOGY & ADDITIONAL TESTS:      Imaging or report Personally Reviewed:  [ ] YES  [ ] NO    Medications:  Standing  aMIOdarone    Tablet 200 milliGRAM(s) Oral daily  aspirin  chewable 81 milliGRAM(s) Oral daily  atorvastatin 40 milliGRAM(s) Oral at bedtime  chlorhexidine 4% Liquid 1 Application(s) Topical <User Schedule>  clopidogrel Tablet 75 milliGRAM(s) Oral daily  enoxaparin Injectable 40 milliGRAM(s) SubCutaneous daily  furosemide   Injectable 40 milliGRAM(s) IV Push every 8 hours  guaiFENesin  milliGRAM(s) Oral every 12 hours  levothyroxine 300 MICROGram(s) Oral daily  lisinopril 20 milliGRAM(s) Oral daily  metoprolol tartrate 12.5 milliGRAM(s) Oral two times a day  pantoprazole    Tablet 40 milliGRAM(s) Oral every 12 hours  potassium chloride    Tablet ER 40 milliEquivalent(s) Oral every 4 hours  sucralfate 1 Gram(s) Oral every 12 hours    PRN Meds  acetaminophen   Tablet .. 650 milliGRAM(s) Oral every 6 hours PRN  albuterol/ipratropium for Nebulization. 3 milliLiter(s) Nebulizer every 6 hours PRN  ALPRAZolam 0.5 milliGRAM(s) Oral at bedtime PRN  methocarbamol 750 milliGRAM(s) Oral every 12 hours PRN      Case discussed with resident    Care discussed with pt/family

## 2019-11-15 NOTE — PROGRESS NOTE ADULT - SUBJECTIVE AND OBJECTIVE BOX
OVERNIGHT EVENTS: events noted, severe r sided cp ( lower) happened once before, patient had ct angio on 11/11 no PE, bed side us dec effusion r side    Vital Signs Last 24 Hrs  T(C): 36.9 (15 Nov 2019 05:21), Max: 37.1 (14 Nov 2019 20:50)  T(F): 98.5 (15 Nov 2019 05:21), Max: 98.7 (14 Nov 2019 20:50)  HR: 90 (15 Nov 2019 05:21) (78 - 90)  BP: 161/73 (15 Nov 2019 05:21) (105/52 - 161/73)  RR: 20 (15 Nov 2019 05:21) (18 - 20)  SpO2: 95% (14 Nov 2019 08:00) (95% - 95%)    PHYSICAL EXAMINATION:    GENERAL: The patient is awake and alert in no apparent distress.     HEENT: Head is normocephalic and atraumatic. Extraocular muscles are intact. Mucous membranes are moist.    NECK: Supple.    LUNGS: dec bs both bases    HEART: Regular rate and rhythm without murmur.    ABDOMEN: Soft, nontender, and nondistended.      EXTREMITIES: Without any cyanosis, clubbing, rash, lesions or edema.    NEUROLOGIC: Grossly intact.    SKIN: No ulceration or induration present.      LABS:                        8.3    6.22  )-----------( 186      ( 14 Nov 2019 05:56 )             27.3     11-14    137  |  93<L>  |  12  ----------------------------<  102<H>  3.8   |  31  |  0.5<L>    Ca    7.7<L>      14 Nov 2019 05:56  Mg     1.9     11-14 11-14-19 @ 07:01  -  11-15-19 @ 07:00  --------------------------------------------------------  IN: 480 mL / OUT: 3253 mL / NET: -2773 mL        MICROBIOLOGY:      MEDICATIONS  (STANDING):  aMIOdarone    Tablet 200 milliGRAM(s) Oral daily  aspirin  chewable 81 milliGRAM(s) Oral daily  atorvastatin 40 milliGRAM(s) Oral at bedtime  chlorhexidine 4% Liquid 1 Application(s) Topical <User Schedule>  clopidogrel Tablet 75 milliGRAM(s) Oral daily  enoxaparin Injectable 40 milliGRAM(s) SubCutaneous daily  furosemide   Injectable 40 milliGRAM(s) IV Push every 8 hours  levothyroxine 300 MICROGram(s) Oral daily  lisinopril 20 milliGRAM(s) Oral daily  metoprolol tartrate 12.5 milliGRAM(s) Oral two times a day  pantoprazole    Tablet 40 milliGRAM(s) Oral every 12 hours  sucralfate 1 Gram(s) Oral every 12 hours    MEDICATIONS  (PRN):  acetaminophen   Tablet .. 650 milliGRAM(s) Oral every 6 hours PRN Temp greater or equal to 38C (100.4F), Mild Pain (1 - 3)  albuterol/ipratropium for Nebulization. 3 milliLiter(s) Nebulizer every 6 hours PRN Shortness of Breath and/or Wheezing  ALPRAZolam 0.5 milliGRAM(s) Oral at bedtime PRN Sleep      RADIOLOGY & ADDITIONAL STUDIES:

## 2019-11-15 NOTE — DIETITIAN INITIAL EVALUATION ADULT. - FACTORS AFF FOOD INTAKE
Pt reports "lousy" appetite for the past few days. Reports he can't taste anything. Early in adm he was eating fine. Pt's wife at b/s reports he was eating normally PTA. She brings ensure high protein for him. Pt dislikes chocolate flavor Ensure. Agreeable to trial ensure compact vanilla flavor. At home pt follows regular diet, wife cooks for him. LBM 11/14. No GI distress. Denies chewing/swallowing difficulty. NKFA. Did not take nutrition supplements PTA.

## 2019-11-15 NOTE — PROGRESS NOTE ADULT - SUBJECTIVE AND OBJECTIVE BOX
LAKSHMI JUDD 84y Male  MRN#: [id number]     SUBJECTIVE  Patient is a 84y old Male who presents with a chief complaint of SOB (15 Nov 2019 07:18)      Today is hospital day 4d, and this morning he is lying in bed without distress. Breathing is a bit better but he feels frustrated because he had pain overnight and it took too long to get pain medications. Pain was in R side of abdomen, not able to describe quality of the pain, now is just "sore". No chest pain, palpitations.  No acute overnight events.     OBJECTIVE  PAST MEDICAL & SURGICAL HISTORY  Neuropathy  TIA (transient ischemic attack)  Left carotid stenosis  Lyme disease  Hypothyroidism  Coronary artery disease  History of mesenteric infarction    ALLERGIES:  iodinated radiocontrast agents (Hives)    MEDICATIONS:  STANDING MEDICATIONS  aMIOdarone    Tablet 200 milliGRAM(s) Oral daily  aspirin  chewable 81 milliGRAM(s) Oral daily  atorvastatin 40 milliGRAM(s) Oral at bedtime  chlorhexidine 4% Liquid 1 Application(s) Topical <User Schedule>  clopidogrel Tablet 75 milliGRAM(s) Oral daily  enoxaparin Injectable 40 milliGRAM(s) SubCutaneous daily  furosemide   Injectable 40 milliGRAM(s) IV Push every 8 hours  levothyroxine 300 MICROGram(s) Oral daily  lisinopril 20 milliGRAM(s) Oral daily  magnesium sulfate  IVPB 2 Gram(s) IV Intermittent once  metoprolol tartrate 12.5 milliGRAM(s) Oral two times a day  pantoprazole    Tablet 40 milliGRAM(s) Oral every 12 hours  potassium chloride    Tablet ER 40 milliEquivalent(s) Oral every 4 hours  sucralfate 1 Gram(s) Oral every 12 hours    PRN MEDICATIONS  acetaminophen   Tablet .. 650 milliGRAM(s) Oral every 6 hours PRN  albuterol/ipratropium for Nebulization. 3 milliLiter(s) Nebulizer every 6 hours PRN  ALPRAZolam 0.5 milliGRAM(s) Oral at bedtime PRN    HOME MEDICATIONS  Home Medications:  acetaminophen 325 mg oral tablet: 2 tab(s) orally every 6 hours, As needed, Moderate Pain (4 - 6) (10 Oct 2019 09:54)  aspirin 81 mg oral tablet, chewable: 1 tab(s) orally once a day (26 Sep 2019 11:56)  clopidogrel 75 mg oral tablet: 1 tab(s) orally once a day (02 Oct 2019 13:26)  Crestor 20 mg oral tablet: 1 tab(s) orally once a day (at bedtime) (26 Sep 2019 11:55)  furosemide 40 mg oral tablet: 1 tab(s) orally once a day (11 Nov 2019 08:39)  levothyroxine 300 mcg (0.3 mg) oral tablet: 1 tab(s) orally once a day (11 Nov 2019 08:38)  lisinopril 20 mg oral tablet: 1 tab(s) orally once a day (26 Sep 2019 11:55)  Protonix 40 mg oral delayed release tablet: 1 tab(s) orally 2 times a day (05 Nov 2019 13:36)  sucralfate 1 g oral tablet: 1 tab(s) orally every 12 hours (10 Oct 2019 09:54)      LABS:                        9.5    6.97  )-----------( 273      ( 15 Nov 2019 06:30 )             31.2     11-15    138  |  91<L>  |  12  ----------------------------<  121<H>  3.2<L>   |  33<H>  |  <0.5<L>    Ca    7.7<L>      15 Nov 2019 06:30  Mg     1.6     11-15      PHYSICAL EXAM:  T(C): 36.9 (11-15-19 @ 05:21), Max: 37.1 (11-14-19 @ 20:50)  HR: 90 (11-15-19 @ 05:21) (78 - 90)  BP: 161/73 (11-15-19 @ 05:21) (105/52 - 161/73)  RR: 20 (11-15-19 @ 05:21) (18 - 20)  SpO2: 95% (11-15-19 @ 10:20) (95% - 95%)    GENERAL: NAD, well-developed, 84y sitting in bed   EENT: EOMI, conjunctiva and sclera clear, No nasal obstruction or discharge  RESPIRATORY: Decreased breath sounds at the bases. On NC, breathing comfortably.   CARDIOVASCULAR: Regular rate and rhythm; No murmurs, rubs, or gallops. ACE bandages on legs  GASTROINTESTINAL: Abdomen Soft, Nondistended; Mild tenderness to palpation R side of abdomen  MUSCULOSKELETAL:  No cyanosis, extremities grossly symmetrical  PSYCH: AAOx3, affect appropriate  NEUROLOGY: non-focal, cognition intact, MAEE    ADMISSION SUMMARY  Patient is a 84y old Male who presents with a chief complaint of SOB (15 Nov 2019 07:18)

## 2019-11-15 NOTE — PROGRESS NOTE ADULT - ASSESSMENT
83 yo M yo with PMH of CAD s/p NSTEMI Oct 1 w/ pci and stent x3, HTN ,DLD, Hypothyroidism presenting for shortness of breath, admitted with acute on chronic diastolic heart failure exacerbation.    # Acute on Chronic Diastolic CHF Exacerbation with Acute Hypoxic Respiratory Failure  -Cardiology and Pulm following  - CT chest is negative for PE, but with bilateral pleural effusion  - EKG with no new ischemic changes  - BNP 2500, close to 2300 during last admission  - Troponin 0.04 and stable, denies chest pain  - LE Duplex neg  - Home dose Lasix 40 PO daily  -- IV Lasix to 40 IV q8h  --TOV  - Daily Chest X-Ray   -- Compression ACE bandages, elevate legs in bed  -- strict Is and Os, fluid and salt restriction  -- BiPAP PRN and at night, keep spO2 >92  -- No thoracentesis at this time, per pulmonology     # CAD, HTN, h/o V tach  -- oral amio and metoprolol  -- c/w aspirin, Plavix, statin and lisinopril    #Abdominal pain, likely MSK   -Methocarbamol trial    # Chronic normocytic anemia with gastric ulcer s/p epi injection and clip  -TIBC low, likely combination of recent bleed +Anemia of Chronic Disease  --c/w PPI and sucralfate, H/H at baseline  --Monitor CBC    # Hypothyroidism   -- c/w levothyroxine     DVT PPX : Lovenox  GI PPX: Not indicated  Diet: DASH  Activity: As tolerated  Disposition: Likely back to [facility]  CODE: FULL

## 2019-11-15 NOTE — DIETITIAN INITIAL EVALUATION ADULT. - OTHER INFO
Pt adm for SOB.  Acute on Chronic Diastolic CHF Exacerbation with Acute Hypoxic Respiratory Failure. Abdominal pain, likely MSK. Chronic normocytic anemia with gastric ulcer s/p epi injection and clip. Cardiology and Pulm following

## 2019-11-15 NOTE — PROGRESS NOTE ADULT - ASSESSMENT
IMPRESSION    SOB 2/2 pulmonary edema/ Pleural effusion  H/o mesenteric ischemia   H/o Afib not on AC 2/2 h/o GIB    PLAN    Keep diuresiS, keep neg balance   R side us, no thoracentesis  BiPAP at night as tolerated  Supplemental oxygen to keep sat >92 %  HOB elevation 45 degrees  DVT ppx  rehab eval  OOB to chair  pox ra  incentive spirometry

## 2019-11-15 NOTE — DIETITIAN INITIAL EVALUATION ADULT. - ENERGY NEEDS
estimated calorie needs: 1750 - 2100 kcals/day (25-30 gm/kg IBW for obesity)  estimated protein needs: 70 - 84 gms/day (1.0 - 1.2 gm/kg IBW for obesity)  estimated fluid needs: 1.2 L per LIP

## 2019-11-15 NOTE — DIETITIAN INITIAL EVALUATION ADULT. - ADD RECOMMEND
Consider Ensure compact BID (VAILLNA FLAVOR) in place of Ensure enlive. Continue DASH/TLC, 1.2 L fluid restrict diet. Consider Ensure compact BID (VANILLA FLAVOR) in place of Ensure enlive. Continue DASH/TLC, 1.2 L fluid restrict diet.

## 2019-11-16 NOTE — PROGRESS NOTE ADULT - SUBJECTIVE AND OBJECTIVE BOX
INTERVAL HPI/OVERNIGHT EVENTS:    SUBJECTIVE: Patient seen and examined at bedside.     no cp, abd pain, fever  +sob, +nonproductive cough, no orthopnea, pnd    OBJECTIVE:    VITAL SIGNS:  ICU Vital Signs Last 24 Hrs  T(C): 35.5 (16 Nov 2019 13:24), Max: 36.3 (15 Nov 2019 20:28)  T(F): 95.9 (16 Nov 2019 13:24), Max: 97.4 (15 Nov 2019 20:28)  HR: 88 (16 Nov 2019 13:24) (78 - 89)  BP: 107/57 (16 Nov 2019 13:24) (103/69 - 164/70)  BP(mean): --  ABP: --  ABP(mean): --  RR: 17 (16 Nov 2019 13:24) (17 - 18)  SpO2: --        11-15 @ 07:01  -  11-16 @ 07:00  --------------------------------------------------------  IN: 50 mL / OUT: 2550 mL / NET: -2500 mL    11-16 @ 07:01  - 11-16 @ 14:02  --------------------------------------------------------  IN: 600 mL / OUT: 1400 mL / NET: -800 mL      CAPILLARY BLOOD GLUCOSE          PHYSICAL EXAM:    General: NAD  HEENT: NC/AT; PERRL, clear conjunctiva  Neck: supple  Respiratory: decreased bs at bases L>R  Cardiovascular: +S1/S2; RRR  Abdomen: soft, NT/ND; +BS x4  Extremities: WWP, 2+ peripheral pulses b/l; no LE edema  Skin: normal color and turgor; no rash  Neurological:    MEDICATIONS:  MEDICATIONS  (STANDING):  aMIOdarone    Tablet 200 milliGRAM(s) Oral daily  aspirin  chewable 81 milliGRAM(s) Oral daily  atorvastatin 40 milliGRAM(s) Oral at bedtime  chlorhexidine 4% Liquid 1 Application(s) Topical <User Schedule>  clopidogrel Tablet 75 milliGRAM(s) Oral daily  enoxaparin Injectable 40 milliGRAM(s) SubCutaneous daily  furosemide    Tablet 40 milliGRAM(s) Oral three times a day  guaiFENesin  milliGRAM(s) Oral every 12 hours  levothyroxine 300 MICROGram(s) Oral daily  lisinopril 20 milliGRAM(s) Oral daily  metoprolol tartrate 12.5 milliGRAM(s) Oral two times a day  pantoprazole    Tablet 40 milliGRAM(s) Oral every 12 hours  potassium chloride    Tablet ER 40 milliEquivalent(s) Oral every 4 hours  sucralfate 1 Gram(s) Oral every 12 hours    MEDICATIONS  (PRN):  acetaminophen   Tablet .. 650 milliGRAM(s) Oral every 6 hours PRN Temp greater or equal to 38C (100.4F), Mild Pain (1 - 3)  albuterol/ipratropium for Nebulization. 3 milliLiter(s) Nebulizer every 6 hours PRN Shortness of Breath and/or Wheezing  ALPRAZolam 0.5 milliGRAM(s) Oral at bedtime PRN Sleep  methocarbamol 750 milliGRAM(s) Oral every 12 hours PRN muscle pain  oxycodone    5 mG/acetaminophen 325 mG 1 Tablet(s) Oral once PRN Severe Pain (7 - 10)      ALLERGIES:  Allergies    iodinated radiocontrast agents (Hives)    Intolerances        LABS:                        8.0    6.39  )-----------( 255      ( 16 Nov 2019 05:18 )             25.6     Hemoglobin: 8.0 g/dL (11-16 @ 05:18)  Hemoglobin: 9.5 g/dL (11-15 @ 06:30)  Hemoglobin: 8.3 g/dL (11-14 @ 05:56)  Hemoglobin: 10.2 g/dL (11-13 @ 07:14)  Hemoglobin: 9.0 g/dL (11-12 @ 17:02)    CBC Full  -  ( 16 Nov 2019 05:18 )  WBC Count : 6.39 K/uL  RBC Count : 2.78 M/uL  Hemoglobin : 8.0 g/dL  Hematocrit : 25.6 %  Platelet Count - Automated : 255 K/uL  Mean Cell Volume : 92.1 fL  Mean Cell Hemoglobin : 28.8 pg  Mean Cell Hemoglobin Concentration : 31.3 g/dL  Auto Neutrophil # : 4.87 K/uL  Auto Lymphocyte # : 1.07 K/uL  Auto Monocyte # : 0.31 K/uL  Auto Eosinophil # : 0.08 K/uL  Auto Basophil # : 0.01 K/uL  Auto Neutrophil % : 76.1 %  Auto Lymphocyte % : 16.7 %  Auto Monocyte % : 4.9 %  Auto Eosinophil % : 1.3 %  Auto Basophil % : 0.2 %    11-16    136  |  89<L>  |  9<L>  ----------------------------<  92  3.7   |  34<H>  |  <0.5<L>    Ca    7.6<L>      16 Nov 2019 05:18  Mg     1.9     11-16      Creatinine Trend: <0.5<--, <0.5<--, 0.5<--, <0.5<--, <0.5<--, 0.5<--        hs Troponin:              CSF:                      EKG:   MICROBIOLOGY:    IMAGING:      Labs, imaging, EKG personally reviewed    RADIOLOGY & ADDITIONAL TESTS: Reviewed.

## 2019-11-16 NOTE — PROGRESS NOTE ADULT - ASSESSMENT
84M PMHx CAD/NSTEMI s/p stent x3 10/2019, h/o VT, HTN, hypothyroidism here with acute hypoxic resp failure due to acute decompensated HFpEF.    #Acute hypoxic resp failure, now resolved, due to acute decompensated HFpEF  concern for overdiuresis, change lasix 40 iv tid to po 40 tid  pt pt and wife, he takes no lasix at home  bipap qhs, prn  tte with new wall motion abnl ?compared to tte in oct, will d/w cards  #CAD  dapt  lipitor 40  #H/o VT  amio 200  lopressor as below  #HTN  lisinopril 20  lopressor 12.5 bid  #Hypothyroidism  synthroid 300  #DVT ppx  lovenox    #Progress Note Handoff:  Pending (specify):  Consults_________, Tests________, Test Results_______, Other_____diuresis____  Family discussion:d/w pt and wife at bedside re: treatment plan, primary dx  Disposition: Home___/SNF___/Other________/Unknown at this time___x_____

## 2019-11-17 NOTE — PROGRESS NOTE ADULT - SUBJECTIVE AND OBJECTIVE BOX
LAKSHMI JUDD 84y Male  MRN#: [id number]     SUBJECTIVE  Patient is a 84y old Male who presents with a chief complaint of SOB (16 Nov 2019 14:02)      Today is hospital day 6d, and this morning he is lying in bed without distress. Feels "sore" in his abdomen. Last bowel movement two days ago. Not eating much because he has no appetite. Is passing gas. Denies nausea, vomiting. Denies chest pain, palpitations, shortness of breath. Legs are feeling significantly better and are much less swollen than on admission. Feels weak though and feels frustrated that he doesn't seem to be getting better overall.   No acute overnight events.     OBJECTIVE  PAST MEDICAL & SURGICAL HISTORY  Neuropathy  TIA (transient ischemic attack)  Left carotid stenosis  Lyme disease  Hypothyroidism  Coronary artery disease  History of mesenteric infarction    ALLERGIES:  iodinated radiocontrast agents (Hives)    MEDICATIONS:  STANDING MEDICATIONS  aMIOdarone    Tablet 200 milliGRAM(s) Oral daily  aspirin  chewable 81 milliGRAM(s) Oral daily  atorvastatin 40 milliGRAM(s) Oral at bedtime  chlorhexidine 4% Liquid 1 Application(s) Topical <User Schedule>  clopidogrel Tablet 75 milliGRAM(s) Oral daily  enoxaparin Injectable 40 milliGRAM(s) SubCutaneous daily  furosemide    Tablet 40 milliGRAM(s) Oral daily  guaiFENesin  milliGRAM(s) Oral every 12 hours  levothyroxine 300 MICROGram(s) Oral daily  lisinopril 20 milliGRAM(s) Oral daily  metoprolol tartrate 12.5 milliGRAM(s) Oral two times a day  pantoprazole    Tablet 40 milliGRAM(s) Oral every 12 hours  potassium chloride    Tablet ER 40 milliEquivalent(s) Oral every 4 hours  sucralfate 1 Gram(s) Oral every 12 hours    PRN MEDICATIONS  acetaminophen   Tablet .. 650 milliGRAM(s) Oral every 6 hours PRN  albuterol/ipratropium for Nebulization. 3 milliLiter(s) Nebulizer every 6 hours PRN  ALPRAZolam 0.5 milliGRAM(s) Oral at bedtime PRN  methocarbamol 750 milliGRAM(s) Oral every 12 hours PRN  oxycodone    5 mG/acetaminophen 325 mG 1 Tablet(s) Oral once PRN    HOME MEDICATIONS  Home Medications:  acetaminophen 325 mg oral tablet: 2 tab(s) orally every 6 hours, As needed, Moderate Pain (4 - 6) (10 Oct 2019 09:54)  aspirin 81 mg oral tablet, chewable: 1 tab(s) orally once a day (26 Sep 2019 11:56)  clopidogrel 75 mg oral tablet: 1 tab(s) orally once a day (02 Oct 2019 13:26)  Crestor 20 mg oral tablet: 1 tab(s) orally once a day (at bedtime) (26 Sep 2019 11:55)  furosemide 40 mg oral tablet: 1 tab(s) orally once a day (11 Nov 2019 08:39)  levothyroxine 300 mcg (0.3 mg) oral tablet: 1 tab(s) orally once a day (11 Nov 2019 08:38)  lisinopril 20 mg oral tablet: 1 tab(s) orally once a day (26 Sep 2019 11:55)  Protonix 40 mg oral delayed release tablet: 1 tab(s) orally 2 times a day (05 Nov 2019 13:36)  sucralfate 1 g oral tablet: 1 tab(s) orally every 12 hours (10 Oct 2019 09:54)      LABS:                        8.7    6.69  )-----------( 294      ( 17 Nov 2019 06:05 )             29.5     11-17    140  |  92<L>  |  10  ----------------------------<  106<H>  3.9   |  37<H>  |  0.5<L>    Ca    8.1<L>      17 Nov 2019 06:05  Mg     1.9     11-17      PHYSICAL EXAM:  T(C): 36 (11-16-19 @ 19:32), Max: 36 (11-16-19 @ 19:32)  HR: 80 (11-17-19 @ 04:14) (80 - 88)  BP: 145/67 (11-17-19 @ 04:14) (107/57 - 145/67)  RR: 18 (11-17-19 @ 04:14) (17 - 18)  SpO2: --    GENERAL: NAD, well-developed, 84y sitting up in bed   EENT: EOMI, conjunctiva and sclera clear, No nasal obstruction or discharge  RESPIRATORY: Decreased breath sounds at the bases. On NC, breathing comfortably.   CARDIOVASCULAR: Regular rate and rhythm; No murmurs, rubs, or gallops. ACE bandages on legs  GASTROINTESTINAL: Abdomen Soft, Nondistended; Mild tenderness to palpation diffusely. Steri strips clean, dry, intact, no erythema, no drainage, no induration  MUSCULOSKELETAL:  No cyanosis, extremities grossly symmetrical, +ACE bandages  PSYCH: AAOx3, affect appropriate  NEUROLOGY: non-focal, cognition intact, MAEE    ADMISSION SUMMARY  Patient is a 84y old Male who presents with a chief complaint of SOB (16 Nov 2019 14:02)

## 2019-11-17 NOTE — PROGRESS NOTE ADULT - ASSESSMENT
84M PMHx CAD/NSTEMI s/p stent x3 10/2019, h/o VT, HTN, hypothyroidism here with acute hypoxic resp failure due to acute decompensated HFpEF.    #Acute hypoxic resp failure, now resolved, due to acute decompensated HFpEF  bicarb increasing, decrease lasix 40 po tid to daily  satting well on ra  bipap qhs, prn  tte with new wall motion abnl ?compared to tte in oct, will d/w cards  #CAD  dapt  lipitor 40  #H/o VT  amio 200  lopressor as below  #HTN  lisinopril 20  lopressor 12.5 bid  #Hypothyroidism  synthroid 300  #DVT ppx  lovenox    #Progress Note Handoff:  Pending (specify):  Consults_________, Tests________, Test Results_______, Other_____trend bmp____  Family discussion:d/w pt and wife at bedside re: treatment plan, primary dx  Disposition: Home___/SNF___/Other________/Unknown at this time___x_____

## 2019-11-17 NOTE — PROGRESS NOTE ADULT - ASSESSMENT
85 yo M yo with PMH of CAD s/p NSTEMI Oct 1 w/ pci and stent x3, HTN ,DLD, Hypothyroidism presenting for shortness of breath, admitted with acute on chronic diastolic heart failure exacerbation.    # Acute on Chronic Diastolic CHF Exacerbation with Acute Hypoxic Respiratory Failure  -Cardiology and Pulm following  - CT chest is negative for PE, but with bilateral pleural effusion  - EKG with no new ischemic changes  - BNP 2500, close to 2300 during last admission  - Troponin 0.04 and stable, denies chest pain  - LE Duplex neg  -- IV Lasix changed to PO 40 q8h  - Daily Chest X-Ray   -- Compression ACE bandages, elevate legs in bed  -- strict Is and Os, fluid and salt restriction  -- BiPAP PRN and at night, keep spO2 >92  -- No thoracentesis at this time, per pulmonology     # CAD, HTN, h/o V tach  -- oral amio and metoprolol  -- c/w aspirin, Plavix, statin and lisinopril    #Abdominal pain   -Hx mesenteric ischemia October status post surgery  -Surgical sites healing well  -HDS and exam is benign but has not had bowel movement in 2 days, feels "sore"--exam had also been benign right before mesenteric ischemia was found  --KUB  --Simethicone trial  --Miralax    # Chronic normocytic anemia with gastric ulcer s/p epi injection and clip  -TIBC low, likely combination of recent bleed +Anemia of Chronic Disease  --c/w PPI and sucralfate, H/H at baseline  --Monitor CBC    # Hypothyroidism   -- c/w levothyroxine     DVT PPX : Lovenox  GI PPX: Not indicated  Diet: DASH  Activity: As tolerated  Disposition: Likely back to [facility]  CODE: FULL

## 2019-11-17 NOTE — CONSULT NOTE ADULT - SUBJECTIVE AND OBJECTIVE BOX
Gastroenterology Consultation:    Patient is a 84y old  Male who presents with a chief complaint of SOB (17 Nov 2019 10:58)      Admitted on: 11-11-19  HPI:  83 yo M yo with PMH of CAD s/p NSTEMI Oct 1 w/ pci and stent x3, HTN ,DLD, Hypothyroidism presenting for SOB. at the time of the interview pt was placed on BiPAP and collateral history obtained from [facility] records. pt endorses progressive dyspnea and wheezing at [facility] where he was discharged for short term rehab. also endorses bilateral LE swelling and fatigue. CXR at that time showed left sided small pleural effusion. pt denies fever, chills, productive cough, abdominal pain, chest pain, palpitation or change in bowel/urinary habits.     to note, patient was admitted on Oct 1st for NSTEMI and had 3 stents placed. pt also with recent complicated admission in october for abdominal pain, colitis and found to have mesenteric ischemia s/p exploratory laparotomy with a left external iliac to SMA bypass and 80 cm small bowel resection with primary anastomosis,  admission also was complicated by UGIB requiring endoscopic intervention for bleeding gastric ulcer.    GI are consulted now for fresh blood per rectum starting today , small in amount , on top of stool . Patient mentioned that he have a long history of hemorrhoids , and that he used to have daily suppositories , he had history of hemorrhoidal bleeding before which was resolved with these suppositories . Patient has bowel movement every other day , denies any melena , mentioned that its the first time they report fresh blood during hospitalization. Before he had melena in the previous admission for which he underwent EGD and bleeding was controlled. Patient has abdominal discomfort which he reports since the time of OR      Vital Signs in the ED:  T(F): 97.2 (11 Nov 2019 07:58), Max: 98 (10 Nov 2019 23:44)  HR: 85 (11 Nov 2019 08:15) (85 - 106)  BP: 142/63 (11 Nov 2019 07:58) (115/65 - 142/63)  RR: 20 (11 Nov 2019 07:58) (18 - 22)  SpO2: 100% (11 Nov 2019 08:15) (96% - 100%)    in the ED, pt received IV Lasix 40 mg , 2 gm MgSo4, benadryl.  CTA chest and abdomen ruled out PE, but showing bilateral pleural effusion. (11 Nov 2019 08:03)      Prior records Reviewed (Y/N):  History obtained from person other than patient (Y/N):    Prior EGD:< from: EGD (10.28.19 @ 12:00) >  Ulcer in the GE junction extending into cardia.    Old blood found in the stomach.    Old blood in the duodenum.     Prior Colonoscopy: 3 years ago in [place] ,and shoving multiple polyps according to the patient , report not available       PAST MEDICAL & SURGICAL HISTORY:  Neuropathy  TIA (transient ischemic attack)  Left carotid stenosis  Lyme disease  Hypothyroidism  Coronary artery disease  History of mesenteric infarction      FAMILY HISTORY: No pertinent family history       Social History:  Alcohol: social   Drugs: denies     Home Medications:  acetaminophen 325 mg oral tablet: 2 tab(s) orally every 6 hours, As needed, Moderate Pain (4 - 6) (10 Oct 2019 09:54)  aspirin 81 mg oral tablet, chewable: 1 tab(s) orally once a day (26 Sep 2019 11:56)  clopidogrel 75 mg oral tablet: 1 tab(s) orally once a day (02 Oct 2019 13:26)  Crestor 20 mg oral tablet: 1 tab(s) orally once a day (at bedtime) (26 Sep 2019 11:55)  furosemide 40 mg oral tablet: 1 tab(s) orally once a day (11 Nov 2019 08:39)  levothyroxine 300 mcg (0.3 mg) oral tablet: 1 tab(s) orally once a day (11 Nov 2019 08:38)  lisinopril 20 mg oral tablet: 1 tab(s) orally once a day (26 Sep 2019 11:55)  Protonix 40 mg oral delayed release tablet: 1 tab(s) orally 2 times a day (05 Nov 2019 13:36)  sucralfate 1 g oral tablet: 1 tab(s) orally every 12 hours (10 Oct 2019 09:54)    MEDICATIONS  (STANDING):  aMIOdarone    Tablet 200 milliGRAM(s) Oral daily  aspirin  chewable 81 milliGRAM(s) Oral daily  atorvastatin 40 milliGRAM(s) Oral at bedtime  chlorhexidine 4% Liquid 1 Application(s) Topical <User Schedule>  clopidogrel Tablet 75 milliGRAM(s) Oral daily  enoxaparin Injectable 40 milliGRAM(s) SubCutaneous daily  guaiFENesin  milliGRAM(s) Oral every 12 hours  levothyroxine 300 MICROGram(s) Oral daily  lisinopril 20 milliGRAM(s) Oral daily  metoprolol tartrate 12.5 milliGRAM(s) Oral two times a day  pantoprazole    Tablet 40 milliGRAM(s) Oral every 12 hours  potassium chloride    Tablet ER 40 milliEquivalent(s) Oral every 4 hours  simethicone 80 milliGRAM(s) Chew four times a day  sucralfate 1 Gram(s) Oral every 12 hours    MEDICATIONS  (PRN):  acetaminophen   Tablet .. 650 milliGRAM(s) Oral every 6 hours PRN Temp greater or equal to 38C (100.4F), Mild Pain (1 - 3)  albuterol/ipratropium for Nebulization. 3 milliLiter(s) Nebulizer every 6 hours PRN Shortness of Breath and/or Wheezing  ALPRAZolam 0.5 milliGRAM(s) Oral at bedtime PRN Sleep  methocarbamol 750 milliGRAM(s) Oral every 12 hours PRN muscle pain  oxycodone    5 mG/acetaminophen 325 mG 1 Tablet(s) Oral once PRN Severe Pain (7 - 10)      Allergies  iodinated radiocontrast agents (Hives)      Review of Systems:   Constitutional:  No Fever, No Chills  ENT/Mouth:  No Hearing Changes,  No Difficulty Swallowing  Eyes:  No Eye Pain, No Vision Changes  Cardiovascular:  No Chest Pain, No Palpitations  Respiratory:  No Cough, No Dyspnea  Gastrointestinal:  As described in HPI  Musculoskeletal:  No Joint Swelling, No Back Pain  Skin:  No Skin Lesions, No Jaundice  Neuro:  No Syncope, No Dizziness  Heme/Lymph:  No Bruising, No Bleeding.          Physical Examination:  T(C): 36.7 (11-17-19 @ 12:41), Max: 36.7 (11-17-19 @ 12:41)  HR: 82 (11-17-19 @ 12:41) (80 - 85)  BP: 132/61 (11-17-19 @ 12:41) (132/61 - 145/67)  RR: 61 (11-17-19 @ 12:41) (18 - 61)  SpO2: --      11-15-19 @ 07:01  -  11-16-19 @ 07:00  --------------------------------------------------------  IN: 50 mL / OUT: 2550 mL / NET: -2500 mL    11-16-19 @ 07:01  -  11-17-19 @ 07:00  --------------------------------------------------------  IN: 1020 mL / OUT: 2650 mL / NET: -1630 mL    11-17-19 @ 07:01  -  11-17-19 @ 16:27  --------------------------------------------------------  IN: 880 mL / OUT: 702 mL / NET: 178 mL        Constitutional: No acute distress.  Eyes:. Conjunctivae are clear, Sclera is non-icteric.  Ears Nose and Throat: The external ears are normal appearing,  Oral mucosa is pink and moist.  Respiratory:  No signs of respiratory distress. Lung sounds are clear bilaterally.  Cardiovascular:  S1 S2, Regular rate and rhythm.  GI: Abdomen is soft, symmetric, and non-tender without distention. Bowel sounds are present and normoactive in all four quadrants. No masses, hepatomegaly, or splenomegaly are noted.   Rectal exam: brown stool   Neuro: No Tremor, No involuntary movements  Skin: No rashes, No Jaundice.          Data: (reviewed by attending)                        8.7    6.69  )-----------( 294      ( 17 Nov 2019 06:05 )             29.5     Hgb Trend:  8.7  11-17-19 @ 06:05  8.0  11-16-19 @ 05:18  9.5  11-15-19 @ 06:30        11-17    140  |  92<L>  |  10  ----------------------------<  106<H>  3.9   |  37<H>  |  0.5<L>    Ca    8.1<L>      17 Nov 2019 06:05  Mg     1.9     11-17      Liver panel trend:  TBili 0.4   /   AST 50   /   ALT 43   /   AlkP 75   /   Tptn 5.2   /   Alb 2.6    /   DBili --      11-13  TBili 0.4   /   AST 57   /   ALT 46   /   AlkP 78   /   Tptn 4.9   /   Alb 2.6    /   DBili --      11-11              Radiology:(reviewed by attending)

## 2019-11-17 NOTE — CONSULT NOTE ADULT - ASSESSMENT
83 yo M yo with PMH of CAD s/p NSTEMI Oct 1 w/ pci and stent x3, HTN ,DLD, Hypothyroidism presenting for SOB. To note, patient was admitted on Oct 1st for NSTEMI and had 3 stents placed. pt also with recent complicated admission in october for abdominal pain, colitis and found to have mesenteric ischemia s/p exploratory laparotomy with a left external iliac to SMA bypass and 80 cm small bowel resection with primary anastomosis,  admission also was complicated by UGIB requiring endoscopic intervention for bleeding gastric ulcer. GI are consulted for now small amount of fresh blood per rectum    #fresh blood per rectum:   hx of hemorrhoids , r/o hemorrhoidal bleed versus other possible causes ( diverticular , malignancy versus others)  patient is hemodynamically sable   hgb is stable , no drop   patient on dual antiplatelet due to recent MI  patient had also recent bowel resection for mesenteric ischemia     REC:   trend cbc for now   keep hgb above 8  continue current therapy   2 large 18 gauge IV   active type and screen   Anusol suppository at night  for 10 days   Miralax once daily to assure one bowel movement everyday  will hold on colonoscopy for now and proceed with supportive treatment ( given the low rate of bleeding , being on dual antiplatelets for recent MI, and recent surgery) , unless patient have significant bleed with drop in hgb , then will consider colonoscopy on urgent bases 85 yo M yo with PMH of CAD s/p NSTEMI Oct 1 w/ pci and stent x3, HTN ,DLD, Hypothyroidism presenting for SOB. To note, patient was admitted on Oct 1st for NSTEMI and had 3 stents placed. pt also with recent complicated admission in october for abdominal pain, colitis and found to have mesenteric ischemia s/p exploratory laparotomy with a left external iliac to SMA bypass and 80 cm small bowel resection with primary anastomosis,  admission also was complicated by UGIB requiring endoscopic intervention for bleeding gastric ulcer. GI are consulted for now small amount of fresh blood per rectum    #fresh blood per rectum:   hx of hemorrhoids , r/o hemorrhoidal bleed versus other possible causes ( diverticular , malignancy versus others)  patient is hemodynamically sable   hgb is stable  patient on dual antiplatelet due to recent MI  patient had also recent bowel resection for mesenteric ischemia   KUB: non obstructive bowel pattern    REC:   trend cbc for now   keep hgb above 8  continue DAPT as recent MI history  Anusol suppository at night  for 10 days for hemorrhoids.  Miralax twice daily to assure one bowel movement everyday  Consider Colonoscopy as outpatient. 83 yo M yo with PMH of CAD s/p NSTEMI Oct 1 w/ pci and stent x3, HTN ,DLD, Hypothyroidism presenting for SOB. To note, patient was admitted on Oct 1st for NSTEMI and had 3 stents placed. pt also with recent complicated admission in october for abdominal pain, colitis and found to have mesenteric ischemia s/p exploratory laparotomy with a left external iliac to SMA bypass and 80 cm small bowel resection with primary anastomosis,  admission also was complicated by UGIB requiring endoscopic intervention for bleeding gastric ulcer. GI are consulted for now small amount of fresh blood per rectum on straining , with constipation    #fresh blood per rectum:   hx of hemorrhoids , r/o hemorrhoidal bleed versus other possible causes ( diverticular , malignancy versus others)  patient is hemodynamically sable   hgb is stable  patient on dual antiplatelet due to recent MI  patient had also recent bowel resection for mesenteric ischemia   KUB: non obstructive bowel pattern    REC:   trend cbc for now   keep hgb above 8  continue DAPT as recent MI history  Anusol suppository at night  for 10 days for hemorrhoids.  Colace  Defer colonoscopy in the absence of significant bleed in view of NSTEMI  Miralax twice daily to assure one bowel movement everyday  Consider Colonoscopy as outpatient.

## 2019-11-17 NOTE — PROGRESS NOTE ADULT - SUBJECTIVE AND OBJECTIVE BOX
INTERVAL HPI/OVERNIGHT EVENTS:    SUBJECTIVE: Patient seen and examined at bedside.     no cp, abd pain, fever  mild sob, +nonproductive cough, no orthopnea, pnd    OBJECTIVE:    VITAL SIGNS:  ICU Vital Signs Last 24 Hrs  T(C): 36 (16 Nov 2019 19:32), Max: 36 (16 Nov 2019 19:32)  T(F): 96.8 (16 Nov 2019 19:32), Max: 96.8 (16 Nov 2019 19:32)  HR: 80 (17 Nov 2019 04:14) (80 - 88)  BP: 145/67 (17 Nov 2019 04:14) (107/57 - 145/67)  BP(mean): --  ABP: --  ABP(mean): --  RR: 18 (17 Nov 2019 04:14) (17 - 18)  SpO2: --        11-16 @ 07:01  -  11-17 @ 07:00  --------------------------------------------------------  IN: 1020 mL / OUT: 2650 mL / NET: -1630 mL      CAPILLARY BLOOD GLUCOSE          PHYSICAL EXAM:    General: NAD  HEENT: NC/AT; PERRL, clear conjunctiva  Neck: supple  Respiratory: decreased bs at bases  Cardiovascular: +S1/S2; RRR  Abdomen: soft, NT/ND; +BS x4  Extremities: WWP, 2+ peripheral pulses b/l; no LE edema  Skin: normal color and turgor; no rash  Neurological:    MEDICATIONS:  MEDICATIONS  (STANDING):  aMIOdarone    Tablet 200 milliGRAM(s) Oral daily  aspirin  chewable 81 milliGRAM(s) Oral daily  atorvastatin 40 milliGRAM(s) Oral at bedtime  chlorhexidine 4% Liquid 1 Application(s) Topical <User Schedule>  clopidogrel Tablet 75 milliGRAM(s) Oral daily  enoxaparin Injectable 40 milliGRAM(s) SubCutaneous daily  furosemide    Tablet 40 milliGRAM(s) Oral daily  guaiFENesin  milliGRAM(s) Oral every 12 hours  levothyroxine 300 MICROGram(s) Oral daily  lisinopril 20 milliGRAM(s) Oral daily  metoprolol tartrate 12.5 milliGRAM(s) Oral two times a day  pantoprazole    Tablet 40 milliGRAM(s) Oral every 12 hours  potassium chloride    Tablet ER 40 milliEquivalent(s) Oral every 4 hours  simethicone 80 milliGRAM(s) Chew four times a day  sucralfate 1 Gram(s) Oral every 12 hours    MEDICATIONS  (PRN):  acetaminophen   Tablet .. 650 milliGRAM(s) Oral every 6 hours PRN Temp greater or equal to 38C (100.4F), Mild Pain (1 - 3)  albuterol/ipratropium for Nebulization. 3 milliLiter(s) Nebulizer every 6 hours PRN Shortness of Breath and/or Wheezing  ALPRAZolam 0.5 milliGRAM(s) Oral at bedtime PRN Sleep  methocarbamol 750 milliGRAM(s) Oral every 12 hours PRN muscle pain  oxycodone    5 mG/acetaminophen 325 mG 1 Tablet(s) Oral once PRN Severe Pain (7 - 10)      ALLERGIES:  Allergies    iodinated radiocontrast agents (Hives)    Intolerances        LABS:                        8.7    6.69  )-----------( 294      ( 17 Nov 2019 06:05 )             29.5     Hemoglobin: 8.7 g/dL (11-17 @ 06:05)  Hemoglobin: 8.0 g/dL (11-16 @ 05:18)  Hemoglobin: 9.5 g/dL (11-15 @ 06:30)  Hemoglobin: 8.3 g/dL (11-14 @ 05:56)  Hemoglobin: 10.2 g/dL (11-13 @ 07:14)    CBC Full  -  ( 17 Nov 2019 06:05 )  WBC Count : 6.69 K/uL  RBC Count : 3.15 M/uL  Hemoglobin : 8.7 g/dL  Hematocrit : 29.5 %  Platelet Count - Automated : 294 K/uL  Mean Cell Volume : 93.7 fL  Mean Cell Hemoglobin : 27.6 pg  Mean Cell Hemoglobin Concentration : 29.5 g/dL  Auto Neutrophil # : 5.06 K/uL  Auto Lymphocyte # : 1.19 K/uL  Auto Monocyte # : 0.34 K/uL  Auto Eosinophil # : 0.04 K/uL  Auto Basophil # : 0.01 K/uL  Auto Neutrophil % : 75.7 %  Auto Lymphocyte % : 17.8 %  Auto Monocyte % : 5.1 %  Auto Eosinophil % : 0.6 %  Auto Basophil % : 0.1 %    11-17    140  |  92<L>  |  10  ----------------------------<  106<H>  3.9   |  37<H>  |  0.5<L>    Ca    8.1<L>      17 Nov 2019 06:05  Mg     1.9     11-17      Creatinine Trend: 0.5<--, <0.5<--, <0.5<--, 0.5<--, <0.5<--, <0.5<--        hs Troponin:              CSF:                      EKG:   MICROBIOLOGY:    IMAGING:      Labs, imaging, EKG personally reviewed    RADIOLOGY & ADDITIONAL TESTS: Reviewed.

## 2019-11-18 NOTE — PROGRESS NOTE ADULT - ASSESSMENT
IMPRESSION    SOB 2/2 pulmonary edema/ Pleural effusion  H/o mesenteric ischemia s/p sx  H/o Afib not on AC 2/2 h/o GIB    PLAN    po lasix  BiPAP at night as tolerated  Supplemental oxygen to keep sat >92 %  HOB elevation 45 degrees  DVT ppx  rehab eval  OOB to chair  pox ra  incentive spirometry

## 2019-11-18 NOTE — CONSULT NOTE ADULT - SUBJECTIVE AND OBJECTIVE BOX
HPI:  83 yo M yo with PMH of CAD s/p NSTEMI Oct 1 w/ pci and stent x3, HTN ,DLD, Hypothyroidism presenting for SOB. at the time of the interview pt was placed on BiPAP and collateral history obtained from [facility] records. pt endorses progressive dyspnea and wheezing at [facility] where he was discharged for short term rehab. also endorses bilateral LE swelling and fatigue. CXR at that time showed left sided small pleural effusion. pt denies fever, chills, productive cough, abdominal pain, chest pain, palpitation or change in bowel/urinary habits.     to note, patient was admitted on Oct 1st for NSTEMI and had 3 stents placed. pt also with recent complicated admission in october for abdominal pain, colitis and found to have mesenteric ischemia s/p exploratory laparotomy with a left external iliac to SMA bypass and 80 cm small bowel resection with primary anastomosis. admission also was complicated by UGIB requiring endoscopic intervention for bleeding gastric ulcer.     Vital Signs in the ED:  T(F): 97.2 (11 Nov 2019 07:58), Max: 98 (10 Nov 2019 23:44)  HR: 85 (11 Nov 2019 08:15) (85 - 106)  BP: 142/63 (11 Nov 2019 07:58) (115/65 - 142/63)  RR: 20 (11 Nov 2019 07:58) (18 - 22)  SpO2: 100% (11 Nov 2019 08:15) (96% - 100%)    in the ED, pt received IV Lasix 40 mg , 2 gm MgSo4, benadryl.  CTA chest and abdomen ruled out PE, but showing bilateral pleural effusion. (11 Nov 2019 08:03)      PAST MEDICAL & SURGICAL HISTORY:  Neuropathy  TIA (transient ischemic attack)  Left carotid stenosis  Lyme disease  Hypothyroidism  Coronary artery disease  History of mesenteric infarction      Hospital Course:    TODAY'S SUBJECTIVE & REVIEW OF SYMPTOMS:     Constitutional WNL   Cardio WNL   Resp WNL   GI WNL  Heme WNL  Endo WNL  Skin WNL  MSK Weakness  Neuro WNL  Cognitive WNL  Psych WNL      MEDICATIONS  (STANDING):  aMIOdarone    Tablet 200 milliGRAM(s) Oral daily  aspirin  chewable 81 milliGRAM(s) Oral daily  atorvastatin 40 milliGRAM(s) Oral at bedtime  budesonide  80 MICROgram(s)/formoterol 4.5 MICROgram(s) Inhaler 2 Puff(s) Inhalation two times a day  chlorhexidine 4% Liquid 1 Application(s) Topical <User Schedule>  clopidogrel Tablet 75 milliGRAM(s) Oral daily  enoxaparin Injectable 40 milliGRAM(s) SubCutaneous daily  furosemide    Tablet 40 milliGRAM(s) Oral two times a day  levothyroxine 300 MICROGram(s) Oral daily  lisinopril 20 milliGRAM(s) Oral daily  magnesium oxide 400 milliGRAM(s) Oral three times a day with meals  metoprolol tartrate 12.5 milliGRAM(s) Oral two times a day  pantoprazole    Tablet 40 milliGRAM(s) Oral every 12 hours  potassium chloride    Tablet ER 40 milliEquivalent(s) Oral every 4 hours  predniSONE   Tablet 40 milliGRAM(s) Oral daily  simethicone 80 milliGRAM(s) Chew four times a day  sucralfate 1 Gram(s) Oral every 12 hours    MEDICATIONS  (PRN):  acetaminophen   Tablet .. 650 milliGRAM(s) Oral every 6 hours PRN Temp greater or equal to 38C (100.4F), Mild Pain (1 - 3)  albuterol/ipratropium for Nebulization. 3 milliLiter(s) Nebulizer every 6 hours PRN Shortness of Breath and/or Wheezing  ALPRAZolam 0.5 milliGRAM(s) Oral at bedtime PRN Sleep  guaiFENesin    Syrup 200 milliGRAM(s) Oral every 6 hours PRN secretions  methocarbamol 750 milliGRAM(s) Oral every 12 hours PRN muscle pain  oxycodone    5 mG/acetaminophen 325 mG 1 Tablet(s) Oral once PRN Severe Pain (7 - 10)      FAMILY HISTORY:      Allergies    iodinated radiocontrast agents (Hives)    Intolerances        SOCIAL HISTORY:    [  ] Etoh  [  ] Smoking  [  ] Substance abuse     Home Environment:  [  ] Home Alone  [  ] Lives with Family  [  ] Home Health Aid    Dwelling:  [  ] Apartment  [  ] Private House  [  ] Adult Home  [  ] Skilled Nursing Facility      [ x ] Short Term  [  ] Long Term  [  ] Stairs       Elevator [  ]    FUNCTIONAL STATUS PTA: (Check all that apply)  Ambulation: [   ]Independent    [ x ] Dependent     [  ] Non-Ambulatory  Assistive Device: [  ] SA Cane  [  ]  Q Cane  [ x ] Walker  [  ]  Wheelchair  ADL : [  ] Independent  [x  ]  Dependent       Vital Signs Last 24 Hrs  T(C): 36.2 (18 Nov 2019 12:48), Max: 36.8 (17 Nov 2019 20:38)  T(F): 97.1 (18 Nov 2019 12:48), Max: 98.3 (17 Nov 2019 20:38)  HR: 95 (18 Nov 2019 12:48) (72 - 95)  BP: 96/56 (18 Nov 2019 12:48) (96/56 - 127/72)  BP(mean): --  RR: 19 (18 Nov 2019 12:48) (19 - 20)  SpO2: 97% (18 Nov 2019 08:44) (95% - 97%)      PHYSICAL EXAM: Alert & Oriented X3  GENERAL: NAD  HEAD:  Atraumatic, Normocephalic  CHEST/LUNG: Clear   HEART: S1S2+  ABDOMEN: Soft, Nontender  EXTREMITIES:  no calf tenderness    NERVOUS SYSTEM:  Cranial Nerves 2-12 intact [  ] Abnormal  [  ]  ROM: WFL all extremities [x  ]  Abnormal [  ]  Motor Strength: WFL all extremities  [  ]  Abnormal [ x ]4/5 all ext  Sensation: intact to light touch [ x ] Abnormal [  ]  Reflexes: Symmetric [  ]  Abnormal [  ]    FUNCTIONAL STATUS:  Bed Mobility: Independent [  ]  Supervision [  ]  Needs Assistance [x  ]  N/A [  ]  Transfers: Independent [  ]  Supervision [  ]  Needs Assistance [x  ]  N/A [  ]   Ambulation: Independent [  ]  Supervision [  ]  Needs Assistance [  ]  N/A [  ]  ADL: Independent [  ] Requires Assistance [  ] N/A [  ]      LABS:                        7.6    12.77 )-----------( 405      ( 18 Nov 2019 11:55 )             25.3     11-18    138  |  91<L>  |  14  ----------------------------<  116<H>  3.9   |  34<H>  |  0.5<L>    Ca    8.0<L>      18 Nov 2019 05:58  Mg     1.7     11-18    TPro  5.2<L>  /  Alb  2.5<L>  /  TBili  0.3  /  DBili  x   /  AST  37  /  ALT  28  /  AlkPhos  63  11-18    PT/INR - ( 17 Nov 2019 16:57 )   PT: 13.30 sec;   INR: 1.16 ratio         PTT - ( 17 Nov 2019 16:57 )  PTT:27.4 sec      RADIOLOGY & ADDITIONAL STUDIES:    Assesment:

## 2019-11-18 NOTE — PROGRESS NOTE ADULT - ASSESSMENT
· Assessment		  85 yo M yo with PMH of CAD s/p NSTEMI Oct 1 w/ pci and stent x3, HTN ,DLD, Hypothyroidism presenting for shortness of breath, admitted with acute on chronic diastolic heart failure exacerbation.    # Acute on Chronic Diastolic CHF Exacerbation with Acute Hypoxic Respiratory Failure  - B/L wheezing , will start on lasix 40 bid, prednisone 40 bid and symbicort .  -Cardiology and Pulm following  - CT chest is negative for PE, but with bilateral pleural effusion  - EKG with no new ischemic changes  - BNP 2500, close to 2300 during last admission  - Troponin 0.04 and stable, denies chest pain  - LE Duplex neg  - IV Lasix changed to PO 40 q8h  - Daily Chest X-Ray   - strict Is and Os, fluid and salt restriction  - BiPAP PRN and at night, keep spO2 >92  - No thoracentesis at this time, per pulmonology     # CAD, HTN, h/o V tach  - oral amio and metoprolol  - c/w aspirin, Plavix, statin and lisinopril    #Abdominal pain   -Hx mesenteric ischemia October status post surgery  -Surgical sites healing well  -HDS and exam is benign but has not had bowel movement in 2 days, feels "sore"--exam had also been benign right before mesenteric ischemia was found  -KUB  -Simethicone trial  -Miralax    # Chronic normocytic anemia with gastric ulcer s/p epi injection and clip  -TIBC low, likely combination of recent bleed +Anemia of Chronic Disease  -c/w PPI and sucralfate, H/H at baseline  -Monitor CBC      hypomagnesemia :  - replete mg  - fu mg level AM    # Hypothyroidism   -- c/w levothyroxine     DVT PPX : Lovenox  GI PPX: Not indicated  Diet: DASH  Activity: As tolerated  Disposition: Likely back to [facility]  CODE: FULL

## 2019-11-18 NOTE — CONSULT NOTE ADULT - ASSESSMENT
IMPRESSION: Rehab of gait dysfunction    PRECAUTIONS: [  ] Cardiac  [  ] Respiratory  [  ] Seizures [  ] Contact Isolation  [  ] Droplet Isolation  [  ] Other    Weight Bearing Status:     RECOMMENDATION:    Out of Bed to Chair     DVT/Decubiti Prophylaxis    REHAB PLAN:     [ x  ] Bedside P/T 3-5 times a week   [   ]   Bedside O/T  2-3 times a week             [   ] No Rehab Therapy Indicated                   [   ]  Speech Therapy   Conditioning/ROM                                    ADL  Bed Mobility                                               Conditioning/ROM  Transfers                                                     Bed Mobility  Sitting /Standing Balance                         Transfers                                        Gait Training                                               Sitting/Standing Balance  Stair Training [   ]Applicable                    Home equipment Eval                                                                        Splinting  [   ] Only      GOALS:   ADL   [   ]   Independent                    Transfers  [ x  ] Independent                          Ambulation  [ x  ] Independent     [ x   ] With device                            [   ]  CG                                                         [   ]  CG                                                                  [   ] CG                            [    ] Min A                                                   [   ] Min A                                                              [   ] Min  A          DISCHARGE PLAN:   [   ]  Good candidate for Intensive Rehabilitation/Hospital based                                             Will tolerate 3hrs Intensive Rehab Daily                                       [ x   ]  Short Term Rehab in Skilled Nursing Facility                                       [    ]  Home with Outpatient or  services                                         [    ]  Possible Candidate for Intensive Hospital based Rehab

## 2019-11-18 NOTE — PROGRESS NOTE ADULT - SUBJECTIVE AND OBJECTIVE BOX
LAKSHMI JUDD  84y Male    CHIEF COMPLAINT:    Patient is a 84y old  Male who presents with a chief complaint of SOB (18 Nov 2019 14:02)      INTERVAL HPI/OVERNIGHT EVENTS:    Patient seen and examined. Having dry cough and wheezing. Had some rectal bleeding from hemorrhoids. No more bleeding now.     ROS: All other systems are negative.    Vital Signs:    T(F): 97.1 (11-18-19 @ 12:48), Max: 98.3 (11-17-19 @ 20:38)  HR: 95 (11-18-19 @ 12:48) (83 - 95)  BP: 96/56 (11-18-19 @ 12:48) (96/56 - 127/72)  RR: 19 (11-18-19 @ 12:48) (19 - 20)  SpO2: 97% (11-18-19 @ 08:44) (97% - 97%)  I&O's Summary    17 Nov 2019 07:01  -  18 Nov 2019 07:00  --------------------------------------------------------  IN: 880 mL / OUT: 1152 mL / NET: -272 mL    18 Nov 2019 07:01  -  18 Nov 2019 16:08  --------------------------------------------------------  IN: 360 mL / OUT: 0 mL / NET: 360 mL      Daily     Daily   CAPILLARY BLOOD GLUCOSE        PHYSICAL EXAM:    GENERAL:  NAD  SKIN: No rashes or lesions  HENT: Atraumatic Normocephalic. PERRL. Moist membranes.  NECK: Supple, No JVD. No lymphadenopathy.  PULMONARY: BS are decreased in the bases B/L. +ve wheezing. No rales  CVS: Normal S1, S2. Rate and Rhythm are regular. No murmurs.  ABDOMEN/GI: Soft, Nontender, Nondistended; BS present  EXTREMITIES: Peripheral pulses intact. No edema B/L LE.  NEUROLOGIC:  No motor or sensory deficit.  PSYCH: Alert & oriented x 3    Consultant(s) Notes Reviewed:  [x ] YES  [ ] NO  Care Discussed with Consultants/Other Providers [ x] YES  [ ] NO    EKG reviewed  Telemetry reviewed    LABS:                        7.6    12.77 )-----------( 405      ( 18 Nov 2019 11:55 )             25.3   Hemoglobin: 7.6 g/dL (11-18 @ 11:55)  Hemoglobin: 7.7 g/dL (11-18 @ 05:58)  Hemoglobin: 7.5 g/dL (11-17 @ 16:57)  Hemoglobin: 8.7 g/dL (11-17 @ 06:05)  Hemoglobin: 8.0 g/dL (11-16 @ 05:18)  Hemoglobin: 9.5 g/dL (11-15 @ 06:30)  Hemoglobin: 8.3 g/dL (11-14 @ 05:56)    11-18    138  |  91<L>  |  14  ----------------------------<  116<H>  3.9   |  34<H>  |  0.5<L>    Ca    8.0<L>      18 Nov 2019 05:58  Mg     1.7     11-18    TPro  5.2<L>  /  Alb  2.5<L>  /  TBili  0.3  /  DBili  x   /  AST  37  /  ALT  28  /  AlkPhos  63  11-18    PT/INR - ( 17 Nov 2019 16:57 )   PT: 13.30 sec;   INR: 1.16 ratio         PTT - ( 17 Nov 2019 16:57 )  PTT:27.4 sec          RADIOLOGY & ADDITIONAL TESTS:      Imaging or report Personally Reviewed:  [ ] YES  [ ] NO    Medications:  Standing  aMIOdarone    Tablet 200 milliGRAM(s) Oral daily  aspirin  chewable 81 milliGRAM(s) Oral daily  atorvastatin 40 milliGRAM(s) Oral at bedtime  budesonide  80 MICROgram(s)/formoterol 4.5 MICROgram(s) Inhaler 2 Puff(s) Inhalation two times a day  chlorhexidine 4% Liquid 1 Application(s) Topical <User Schedule>  clopidogrel Tablet 75 milliGRAM(s) Oral daily  enoxaparin Injectable 40 milliGRAM(s) SubCutaneous daily  furosemide    Tablet 40 milliGRAM(s) Oral two times a day  levothyroxine 300 MICROGram(s) Oral daily  lisinopril 20 milliGRAM(s) Oral daily  magnesium oxide 400 milliGRAM(s) Oral three times a day with meals  metoprolol tartrate 12.5 milliGRAM(s) Oral two times a day  pantoprazole    Tablet 40 milliGRAM(s) Oral every 12 hours  potassium chloride    Tablet ER 40 milliEquivalent(s) Oral every 4 hours  predniSONE   Tablet 40 milliGRAM(s) Oral daily  simethicone 80 milliGRAM(s) Chew four times a day  sucralfate 1 Gram(s) Oral every 12 hours    PRN Meds  acetaminophen   Tablet .. 650 milliGRAM(s) Oral every 6 hours PRN  albuterol/ipratropium for Nebulization. 3 milliLiter(s) Nebulizer every 6 hours PRN  ALPRAZolam 0.5 milliGRAM(s) Oral at bedtime PRN  guaiFENesin    Syrup 200 milliGRAM(s) Oral every 6 hours PRN  methocarbamol 750 milliGRAM(s) Oral every 12 hours PRN  oxycodone    5 mG/acetaminophen 325 mG 1 Tablet(s) Oral once PRN      Case discussed with resident    Care discussed with pt/family

## 2019-11-18 NOTE — PROGRESS NOTE ADULT - SUBJECTIVE AND OBJECTIVE BOX
OVERNIGHT EVENTS: events noted, occ cough, weak    Vital Signs Last 24 Hrs  T(C): 35.5 (18 Nov 2019 05:43), Max: 36.8 (17 Nov 2019 20:38)  T(F): 95.9 (18 Nov 2019 05:43), Max: 98.3 (17 Nov 2019 20:38)  HR: 88 (18 Nov 2019 05:43) (72 - 88)  BP: 117/56 (18 Nov 2019 05:43) (117/56 - 138/62)  RR: 20 (18 Nov 2019 05:43) (20 - 61)  SpO2: 95% (17 Nov 2019 15:00) (95% - 95%)    PHYSICAL EXAMINATION:    GENERAL: The patient is awake and alert in no apparent distress.     HEENT: Head is normocephalic and atraumatic. Extraocular muscles are intact. Mucous membranes are moist.    NECK: Supple.    LUNGS: dec bs both abses    HEART: Regular rate and rhythm without murmur.    ABDOMEN: Soft, nontender, and nondistended.      EXTREMITIES: Without any cyanosis, clubbing, rash, lesions or edema.    NEUROLOGIC: Grossly intact.    SKIN: No ulceration or induration present.      LABS:                        7.7    10.97 )-----------( 359      ( 18 Nov 2019 05:58 )             25.5     11-18    138  |  91<L>  |  14  ----------------------------<  116<H>  3.9   |  34<H>  |  0.5<L>    Ca    8.0<L>      18 Nov 2019 05:58  Mg     1.7     11-18    TPro  5.2<L>  /  Alb  2.5<L>  /  TBili  0.3  /  DBili  x   /  AST  37  /  ALT  28  /  AlkPhos  63  11-18    PT/INR - ( 17 Nov 2019 16:57 )   PT: 13.30 sec;   INR: 1.16 ratio         PTT - ( 17 Nov 2019 16:57 )  PTT:27.4 sec              Lactate, Blood: 1.9 mmol/L (11-17-19 @ 16:57)          11-17-19 @ 07:01  -  11-18-19 @ 07:00  --------------------------------------------------------  IN: 880 mL / OUT: 1152 mL / NET: -272 mL        MICROBIOLOGY:      MEDICATIONS  (STANDING):  aMIOdarone    Tablet 200 milliGRAM(s) Oral daily  aspirin  chewable 81 milliGRAM(s) Oral daily  atorvastatin 40 milliGRAM(s) Oral at bedtime  chlorhexidine 4% Liquid 1 Application(s) Topical <User Schedule>  clopidogrel Tablet 75 milliGRAM(s) Oral daily  enoxaparin Injectable 40 milliGRAM(s) SubCutaneous daily  levothyroxine 300 MICROGram(s) Oral daily  lisinopril 20 milliGRAM(s) Oral daily  metoprolol tartrate 12.5 milliGRAM(s) Oral two times a day  pantoprazole    Tablet 40 milliGRAM(s) Oral every 12 hours  potassium chloride    Tablet ER 40 milliEquivalent(s) Oral every 4 hours  simethicone 80 milliGRAM(s) Chew four times a day  sucralfate 1 Gram(s) Oral every 12 hours    MEDICATIONS  (PRN):  acetaminophen   Tablet .. 650 milliGRAM(s) Oral every 6 hours PRN Temp greater or equal to 38C (100.4F), Mild Pain (1 - 3)  albuterol/ipratropium for Nebulization. 3 milliLiter(s) Nebulizer every 6 hours PRN Shortness of Breath and/or Wheezing  ALPRAZolam 0.5 milliGRAM(s) Oral at bedtime PRN Sleep  guaiFENesin    Syrup 200 milliGRAM(s) Oral every 6 hours PRN secretions  methocarbamol 750 milliGRAM(s) Oral every 12 hours PRN muscle pain  oxycodone    5 mG/acetaminophen 325 mG 1 Tablet(s) Oral once PRN Severe Pain (7 - 10)      RADIOLOGY & ADDITIONAL STUDIES:

## 2019-11-18 NOTE — PROGRESS NOTE ADULT - SUBJECTIVE AND OBJECTIVE BOX
24H events:    Patient is a 84y old Male who presents with a chief complaint of SOB (18 Nov 2019 08:19)    Primary diagnosis of Hypervolemia, unspecified hypervolemia type     Today is hospital day 7d.    PAST MEDICAL & SURGICAL HISTORY  Neuropathy  TIA (transient ischemic attack)  Left carotid stenosis  Lyme disease  Hypothyroidism  Coronary artery disease  History of mesenteric infarction    SOCIAL HISTORY:  Negative for smoking/alcohol/drug use.     ALLERGIES:  iodinated radiocontrast agents (Hives)    MEDICATIONS:  STANDING MEDICATIONS  aMIOdarone    Tablet 200 milliGRAM(s) Oral daily  aspirin  chewable 81 milliGRAM(s) Oral daily  atorvastatin 40 milliGRAM(s) Oral at bedtime  budesonide  80 MICROgram(s)/formoterol 4.5 MICROgram(s) Inhaler 2 Puff(s) Inhalation two times a day  budesonide  80 MICROgram(s)/formoterol 4.5 MICROgram(s) Inhaler 2 Puff(s) Inhalation two times a day  chlorhexidine 4% Liquid 1 Application(s) Topical <User Schedule>  clopidogrel Tablet 75 milliGRAM(s) Oral daily  enoxaparin Injectable 40 milliGRAM(s) SubCutaneous daily  furosemide    Tablet 40 milliGRAM(s) Oral two times a day  levothyroxine 300 MICROGram(s) Oral daily  lisinopril 20 milliGRAM(s) Oral daily  magnesium sulfate  IVPB 2 Gram(s) IV Intermittent once  metoprolol tartrate 12.5 milliGRAM(s) Oral two times a day  pantoprazole    Tablet 40 milliGRAM(s) Oral every 12 hours  potassium chloride    Tablet ER 40 milliEquivalent(s) Oral every 4 hours  predniSONE   Tablet 40 milliGRAM(s) Oral daily  simethicone 80 milliGRAM(s) Chew four times a day  sucralfate 1 Gram(s) Oral every 12 hours    PRN MEDICATIONS  acetaminophen   Tablet .. 650 milliGRAM(s) Oral every 6 hours PRN  albuterol/ipratropium for Nebulization. 3 milliLiter(s) Nebulizer every 6 hours PRN  ALPRAZolam 0.5 milliGRAM(s) Oral at bedtime PRN  guaiFENesin    Syrup 200 milliGRAM(s) Oral every 6 hours PRN  methocarbamol 750 milliGRAM(s) Oral every 12 hours PRN  oxycodone    5 mG/acetaminophen 325 mG 1 Tablet(s) Oral once PRN    VITALS:   T(F): 95.9  HR: 88  BP: 117/56  RR: 20  SpO2: 97%    LABS:                        7.7    10.97 )-----------( 359      ( 18 Nov 2019 05:58 )             25.5     11-18    138  |  91<L>  |  14  ----------------------------<  116<H>  3.9   |  34<H>  |  0.5<L>    Ca    8.0<L>      18 Nov 2019 05:58  Mg     1.7     11-18    TPro  5.2<L>  /  Alb  2.5<L>  /  TBili  0.3  /  DBili  x   /  AST  37  /  ALT  28  /  AlkPhos  63  11-18    PT/INR - ( 17 Nov 2019 16:57 )   PT: 13.30 sec;   INR: 1.16 ratio         PTT - ( 17 Nov 2019 16:57 )  PTT:27.4 sec      Lactate, Blood: 1.9 mmol/L (11-17-19 @ 16:57)          RADIOLOGY:    < from: [facility] Duplex Lower Ext Vein Scan, Bilat (11.13.19 @ 08:01) >  Impression:    No evidence of deep venous thrombosis or superficial thrombophlebitis in   bilateral lower extremities.    < end of copied text >      < from: Xray Kidney Ureter Bladder (11.17.19 @ 12:43) >  Impression:  Nonobstructive bowel gas pattern.    < end of copied text >      PHYSICAL EXAM:  GENERAL: NAD, well-developed, 84y sitting up in bed   EENT: EOMI, conjunctiva and sclera clear, No nasal obstruction or discharge  RESPIRATORY: Decreased breath sounds at the bases, more prominent on right side .Bilateral wheezing .  On 2 L NC  CARDIOVASCULAR: Regular rate and rhythm; No murmurs, rubs, or gallops.   GASTROINTESTINAL: Abdomen Soft, Nondistended; Mild tenderness to palpation diffusely. Steri strips clean, dry, intact, no erythema, no drainage, no induration  MUSCULOSKELETAL:  No cyanosis/edema , extremities grossly symmetrical  PSYCH: AAOx3, affect appropriate  NEUROLOGY: non-focal, cognition intact

## 2019-11-18 NOTE — PROGRESS NOTE ADULT - ASSESSMENT
85 yo M yo with PMH of CAD s/p NSTEMI Oct 1 w/ pci and stent x3, HTN ,DLD, Hypothyroidism presenting for for progressively worsening SOB. No cough. No fever. No cp.     1.	Ac. HFpEF  2.	Ac. Hypoxic respiratory failure on admission due to #1  3.	B/L pleural effusions.   4.	CAD S/P recent PCI  5.	HTN / DL  6.	Hypothyroidism  7.	Rectal bleeding likely due to hemorrhoids.          PLAN:    ·	Transfuse him one unit of PRBC'S. Follow H/H  ·	Give him Venofer 300 mg IVPB daily x 3 doses.   ·	GI eval noted. Anusol suppository for hemorrhoids.   ·	Care D/W the pulmonary. No need for thoracentesis  ·	Start him on Symbicort twice a day and Prednisone 40 mg po daily  ·	Switch him to Lasix 40 mg po q 12h.   ·	Check i's and o's and daily wt.   ·	Low salt diet and water restriction to 1.5 L/C.   ·	ECHO reviewed. NLVF. EF 50-55%.  ·	Card eval noted  ·	Cont ASA, Plavix and his other meds.     #Progress Note Handoff  Pending (specify):  Consults_________, Tests________, Test Results_______, Other_D/C planning in 24 hrs._____  Family discussion: Plan of care D/W the wife.   Disposition: Home___/SNF___/Other________/Unknown at this time___x_____

## 2019-11-18 NOTE — PROCEDURE NOTE - NSPROCDETAILS_GEN_ALL_CORE
supine position/sterile dressing applied/ultrasound guidance/location identified, draped/prepped, sterile technique used

## 2019-11-19 NOTE — PROGRESS NOTE ADULT - ASSESSMENT
IMPRESSION    SOB 2/2 pulmonary edema/ Pleural effusion  H/o mesenteric ischemia s/p sx  H/o Afib not on AC 2/2  DEC HB    PLAN    po lasix  BiPAP at night as tolerated  Supplemental oxygen to keep sat >92 %  HOB elevation 45 degrees  TREND CBC  PROTONIX Q 12H  NEB AS NEEDED  DVT ppx  rehab eval  OOB to chair  pox ra  incentive spirometry

## 2019-11-19 NOTE — PROGRESS NOTE ADULT - SUBJECTIVE AND OBJECTIVE BOX
24H events:    Patient is a 84y old Male who presents with a chief complaint of SOB (19 Nov 2019 07:27)    Primary diagnosis of Hypervolemia    patient had no complaints today , breathing with no difficulties. he had one bloody bowel movement yesterday , got 2 PRBC's since he was dropping hgb .      Today is hospital day 8d.     PAST MEDICAL & SURGICAL HISTORY  Neuropathy  TIA (transient ischemic attack)  Left carotid stenosis  Lyme disease  Hypothyroidism  Coronary artery disease  History of mesenteric infarction    SOCIAL HISTORY:  Negative for smoking/alcohol/drug use.     ALLERGIES:  iodinated radiocontrast agents (Hives)    MEDICATIONS:  STANDING MEDICATIONS  aMIOdarone    Tablet 200 milliGRAM(s) Oral daily  aspirin  chewable 81 milliGRAM(s) Oral daily  atorvastatin 40 milliGRAM(s) Oral at bedtime  budesonide  80 MICROgram(s)/formoterol 4.5 MICROgram(s) Inhaler 2 Puff(s) Inhalation two times a day  chlorhexidine 4% Liquid 1 Application(s) Topical <User Schedule>  clopidogrel Tablet 75 milliGRAM(s) Oral daily  enoxaparin Injectable 40 milliGRAM(s) SubCutaneous daily  furosemide    Tablet 40 milliGRAM(s) Oral two times a day  iron sucrose IVPB 300 milliGRAM(s) IV Intermittent every 24 hours  levothyroxine 300 MICROGram(s) Oral daily  lisinopril 20 milliGRAM(s) Oral daily  magnesium oxide 400 milliGRAM(s) Oral three times a day with meals  metoprolol tartrate 12.5 milliGRAM(s) Oral two times a day  multivitamin 1 Tablet(s) Oral daily  nystatin    Suspension 370627 Unit(s) Oral two times a day  pantoprazole    Tablet 40 milliGRAM(s) Oral every 12 hours  polyethylene glycol 3350 17 Gram(s) Oral two times a day  potassium chloride    Tablet ER 40 milliEquivalent(s) Oral every 4 hours  predniSONE   Tablet 40 milliGRAM(s) Oral daily  simethicone 80 milliGRAM(s) Chew four times a day  sucralfate 1 Gram(s) Oral every 12 hours    PRN MEDICATIONS  acetaminophen   Tablet .. 650 milliGRAM(s) Oral every 6 hours PRN  albuterol/ipratropium for Nebulization. 3 milliLiter(s) Nebulizer every 6 hours PRN  ALPRAZolam 0.5 milliGRAM(s) Oral at bedtime PRN  guaiFENesin    Syrup 200 milliGRAM(s) Oral every 6 hours PRN  methocarbamol 750 milliGRAM(s) Oral every 12 hours PRN  oxycodone    5 mG/acetaminophen 325 mG 1 Tablet(s) Oral once PRN    VITALS:   T(F): 98.5  HR: 83  BP: 152/67  RR: 18  SpO2: 96%    LABS:                        6.8    10.07 )-----------( 330      ( 19 Nov 2019 00:19 )             22.1     11-18    138  |  91<L>  |  14  ----------------------------<  116<H>  3.9   |  34<H>  |  0.5<L>    Ca    8.0<L>      18 Nov 2019 05:58  Mg     1.7     11-18    TPro  5.2<L>  /  Alb  2.5<L>  /  TBili  0.3  /  DBili  x   /  AST  37  /  ALT  28  /  AlkPhos  63  11-18    PT/INR - ( 17 Nov 2019 16:57 )   PT: 13.30 sec;   INR: 1.16 ratio         PTT - ( 17 Nov 2019 16:57 )  PTT:27.4 sec          Culture - Other (collected 17 Nov 2019 16:51)  Source: .Other Abdominal wound  Preliminary Report (18 Nov 2019 18:58):    Numerous Staphylococcus aureus      RADIOLOGY:    < from: [facility] Duplex Lower Ext Vein Scan, Bilat (11.13.19 @ 08:01) >  Impression:    No evidence of deep venous thrombosis or superficial thrombophlebitis in   bilateral lower extremities.    < end of copied text >      < from: Xray Kidney Ureter Bladder (11.17.19 @ 12:43) >  Impression:  Nonobstructive bowel gas pattern.    < end of copied text >      PHYSICAL EXAM:  GENERAL: NAD, well-developed, 84y sitting up in bed   EENT: EOMI, conjunctiva and sclera clear, No nasal obstruction or discharge  RESPIRATORY: Decreased breath sounds at the bases, more prominent on right side .fine crackles at bases  .  On 2 L NC  CARDIOVASCULAR: Regular rate and rhythm; No murmurs, rubs, or gallops.   GASTROINTESTINAL: Abdomen Soft, Nondistended; Mild tenderness to palpation diffusely. Steri strips clean, dry, intact, no erythema, no drainage, no induration  MUSCULOSKELETAL:  No cyanosis/edema , extremities grossly symmetrical  PSYCH: AAOx3, affect appropriate  NEUROLOGY: non-focal, cognition intact          Assessment and Plan:   · Assessment		  85 yo M yo with PMH of CAD s/p NSTEMI Oct 1 w/ pci and stent x3, HTN ,DLD, Hypothyroidism presenting for shortness of breath, admitted with acute on chronic diastolic heart failure exacerbation.      #anemia:  - transfused 2 PRBC's  ang  lasix 40 iv after transfusion   - GI following    - one bloody bowel movement yesterday   - Monitor CBC    # Acute on Chronic Diastolic CHF Exacerbation with Acute Hypoxic Respiratory Failure  - B/L wheezing , will start on lasix 40 bid, prednisone 40 bid and symbicort .  -Cardiology and Pulm following  - CT chest is negative for PE, but with bilateral pleural effusion  - EKG with no new ischemic changes  - BNP 2500, close to 2300 during last admission  - Troponin 0.04 and stable, denies chest pain  - LE Duplex neg  - IV Lasix changed to PO 40 q12h  - Daily Chest X-Ray   - strict Is and Os, fluid and salt restriction  - BiPAP PRN and at night, keep spO2 >92  - No thoracentesis at this time, per pulmonology     # CAD, HTN, h/o V tach  - oral amio and metoprolol  - c/w aspirin, Plavix, statin and lisinopril    #Abdominal pain   - resolved   -Hx mesenteric ischemia October status post surgery  -Surgical sites healing well  -KUB wnl   -Miralax and hemmoroid suppositories         hypomagnesemia :  - replete mg  - fu mg level AM    # Hypothyroidism   -- c/w levothyroxine     DVT PPX : Lovenox  GI PPX: Not indicated  Diet: DASH  Activity: As tolerated  Disposition: Likely back to [facility]  CODE: FULL

## 2019-11-19 NOTE — PROGRESS NOTE ADULT - SUBJECTIVE AND OBJECTIVE BOX
INTERVAL EVENTS:    PAST MEDICAL & SURGICAL HISTORY:  Neuropathy  TIA (transient ischemic attack)  Left carotid stenosis  Lyme disease  Hypothyroidism  Coronary artery disease  History of mesenteric infarction  No significant past surgical history      MEDICATIONS  (STANDING):  aMIOdarone    Tablet 200 milliGRAM(s) Oral daily  aspirin  chewable 81 milliGRAM(s) Oral daily  atorvastatin 40 milliGRAM(s) Oral at bedtime  budesonide  80 MICROgram(s)/formoterol 4.5 MICROgram(s) Inhaler 2 Puff(s) Inhalation two times a day  chlorhexidine 4% Liquid 1 Application(s) Topical <User Schedule>  clopidogrel Tablet 75 milliGRAM(s) Oral daily  enoxaparin Injectable 40 milliGRAM(s) SubCutaneous daily  furosemide    Tablet 40 milliGRAM(s) Oral two times a day  iron sucrose IVPB 300 milliGRAM(s) IV Intermittent every 24 hours  levothyroxine 300 MICROGram(s) Oral daily  lisinopril 20 milliGRAM(s) Oral daily  magnesium oxide 400 milliGRAM(s) Oral three times a day with meals  magnesium sulfate  IVPB 2 Gram(s) IV Intermittent once  metoprolol tartrate 12.5 milliGRAM(s) Oral two times a day  multivitamin 1 Tablet(s) Oral daily  nystatin    Suspension 584601 Unit(s) Oral two times a day  pantoprazole    Tablet 40 milliGRAM(s) Oral every 12 hours  polyethylene glycol 3350 17 Gram(s) Oral two times a day  potassium chloride    Tablet ER 40 milliEquivalent(s) Oral every 4 hours  predniSONE   Tablet 40 milliGRAM(s) Oral daily  senna 2 Tablet(s) Oral at bedtime  simethicone 80 milliGRAM(s) Chew four times a day  sucralfate 1 Gram(s) Oral every 12 hours    MEDICATIONS  (PRN):  acetaminophen   Tablet .. 650 milliGRAM(s) Oral every 6 hours PRN Temp greater or equal to 38C (100.4F), Mild Pain (1 - 3)  albuterol/ipratropium for Nebulization. 3 milliLiter(s) Nebulizer every 6 hours PRN Shortness of Breath and/or Wheezing  ALPRAZolam 0.5 milliGRAM(s) Oral at bedtime PRN Sleep  guaiFENesin    Syrup 200 milliGRAM(s) Oral every 6 hours PRN secretions  methocarbamol 750 milliGRAM(s) Oral every 12 hours PRN muscle pain  oxycodone    5 mG/acetaminophen 325 mG 1 Tablet(s) Oral once PRN Severe Pain (7 - 10)      Vital Signs Last 24 Hrs  T(C): 35.8 (19 Nov 2019 12:45), Max: 37.4 (18 Nov 2019 20:33)  T(F): 96.5 (19 Nov 2019 12:45), Max: 99.4 (18 Nov 2019 20:33)  HR: 87 (19 Nov 2019 12:45) (83 - 88)  BP: 98/62 (19 Nov 2019 12:45) (98/62 - 152/67)  BP(mean): --  RR: 18 (19 Nov 2019 12:45) (16 - 18)  SpO2: 96% (19 Nov 2019 08:35) (96% - 96%)     PHYSICAL EXAM:  GEN: Awake, alert. NAD.   HEENT: NCAT, PERRL, EOMI. Mucosa moist. No JVD.  RESP: CTA b/l  CV: RRR. Normal S1/S2. No m/r/g.  ABD: Soft. NT/ND. BS+  EXT: Warm. No edema, clubbing, or cyanosis.   NEURO: AAOx3. No focal deficits.     LABS:                        8.8    9.87  )-----------( 362      ( 19 Nov 2019 11:15 )             27.0     11-18    138  |  91<L>  |  14  ----------------------------<  116<H>  3.9   |  34<H>  |  0.5<L>    Ca    8.0<L>      18 Nov 2019 05:58  Mg     1.7     11-18    TPro  5.2<L>  /  Alb  2.5<L>  /  TBili  0.3  /  DBili  x   /  AST  37  /  ALT  28  /  AlkPhos  63  11-18        PT/INR - ( 17 Nov 2019 16:57 )   PT: 13.30 sec;   INR: 1.16 ratio         PTT - ( 17 Nov 2019 16:57 )  PTT:27.4 sec    I&O's Summary    18 Nov 2019 07:01  -  19 Nov 2019 07:00  --------------------------------------------------------  IN: 360 mL / OUT: 1452 mL / NET: -1092 mL    19 Nov 2019 07:01  -  19 Nov 2019 15:57  --------------------------------------------------------  IN: 1347 mL / OUT: 2301 mL / NET: -954 mL      BNP  RADIOLOGY & ADDITIONAL STUDIES:    TELEMETRY:    EKG:

## 2019-11-19 NOTE — PROGRESS NOTE ADULT - SUBJECTIVE AND OBJECTIVE BOX
LAKSHMI JUDD  84y Male    CHIEF COMPLAINT:    Patient is a 84y old  Male who presents with a chief complaint of SOB (2019 13:30)      INTERVAL HPI/OVERNIGHT EVENTS:    Patient seen and examined. Had rectal bleeding again yesterday. Dropped Hb and was transfused 2 units of blood. Denies sob.     No cp    ROS: All other systems are negative.    Vital Signs:    T(F): 96.5 (19 @ 12:45), Max: 99.4 (19 @ 20:33)  HR: 87 (19 @ 12:45) (83 - 88)  BP: 98/62 (19 @ 12:45) (98/62 - 152/67)  RR: 18 (19 @ 12:45) (16 - 18)  SpO2: 96% (19 @ 08:35) (96% - 96%)  I&O's Summary    2019 07:  -  2019 07:00  --------------------------------------------------------  IN: 360 mL / OUT: 1452 mL / NET: -1092 mL    2019 07:01  -  2019 15:38  --------------------------------------------------------  IN: 1347 mL / OUT: 2301 mL / NET: -954 mL      Daily     Daily Weight in k.5 (2019 05:04)  CAPILLARY BLOOD GLUCOSE          PHYSICAL EXAM:    GENERAL:  NAD  SKIN: No rashes or lesions  HENT: Atraumatic Normocephalic. PERRL. Moist membranes.  NECK: Supple, No JVD. No lymphadenopathy.  PULMONARY: BS are decreased in the bases B/L. No wheezing. No rales  CVS: Normal S1, S2. Rate and Rhythm are regular. No murmurs.  ABDOMEN/GI: Soft, Nontender, Nondistended; BS present  EXTREMITIES: Peripheral pulses intact. No edema B/L LE.  NEUROLOGIC:  No motor or sensory deficit.  PSYCH: Alert & oriented x 3    Consultant(s) Notes Reviewed:  [x ] YES  [ ] NO  Care Discussed with Consultants/Other Providers [ x] YES  [ ] NO    EKG reviewed  Telemetry reviewed    LABS:                        8.8    9.87  )-----------( 362      ( 2019 11:15 )             27.0   Hemoglobin: 8.8 g/dL ( @ 11:15)  Hemoglobin: 6.8 g/dL ( @ 00:19)  Hemoglobin: 7.6 g/dL ( @ 11:55)  Hemoglobin: 7.7 g/dL ( @ 05:58)  Hemoglobin: 7.5 g/dL ( @ 16:57)  Hemoglobin: 8.7 g/dL ( @ 06:05)  Hemoglobin: 8.0 g/dL ( @ 05:18)  Hemoglobin: 9.5 g/dL (11-15 @ 06:30)        138  |  91<L>  |  14  ----------------------------<  116<H>  3.9   |  34<H>  |  0.5<L>    Ca    8.0<L>      2019 05:58  Mg     1.7         TPro  5.2<L>  /  Alb  2.5<L>  /  TBili  0.3  /  DBili  x   /  AST  37  /  ALT  28  /  AlkPhos  63      PT/INR - ( 2019 16:57 )   PT: 13.30 sec;   INR: 1.16 ratio         PTT - ( 2019 16:57 )  PTT:27.4 sec        Culture - Other (collected 2019 16:51)  Source: .Other Abdominal wound  Preliminary Report (2019 18:58):    Numerous Staphylococcus aureus        RADIOLOGY & ADDITIONAL TESTS:      Imaging or report Personally Reviewed:  [ ] YES  [ ] NO    Medications:  Standing  aMIOdarone    Tablet 200 milliGRAM(s) Oral daily  aspirin  chewable 81 milliGRAM(s) Oral daily  atorvastatin 40 milliGRAM(s) Oral at bedtime  budesonide  80 MICROgram(s)/formoterol 4.5 MICROgram(s) Inhaler 2 Puff(s) Inhalation two times a day  chlorhexidine 4% Liquid 1 Application(s) Topical <User Schedule>  clopidogrel Tablet 75 milliGRAM(s) Oral daily  enoxaparin Injectable 40 milliGRAM(s) SubCutaneous daily  furosemide    Tablet 40 milliGRAM(s) Oral two times a day  iron sucrose IVPB 300 milliGRAM(s) IV Intermittent every 24 hours  levothyroxine 300 MICROGram(s) Oral daily  lisinopril 20 milliGRAM(s) Oral daily  magnesium oxide 400 milliGRAM(s) Oral three times a day with meals  magnesium sulfate  IVPB 2 Gram(s) IV Intermittent once  metoprolol tartrate 12.5 milliGRAM(s) Oral two times a day  multivitamin 1 Tablet(s) Oral daily  nystatin    Suspension 287643 Unit(s) Oral two times a day  pantoprazole    Tablet 40 milliGRAM(s) Oral every 12 hours  polyethylene glycol 3350 17 Gram(s) Oral two times a day  potassium chloride    Tablet ER 40 milliEquivalent(s) Oral every 4 hours  predniSONE   Tablet 40 milliGRAM(s) Oral daily  senna 2 Tablet(s) Oral at bedtime  simethicone 80 milliGRAM(s) Chew four times a day  sucralfate 1 Gram(s) Oral every 12 hours    PRN Meds  acetaminophen   Tablet .. 650 milliGRAM(s) Oral every 6 hours PRN  albuterol/ipratropium for Nebulization. 3 milliLiter(s) Nebulizer every 6 hours PRN  ALPRAZolam 0.5 milliGRAM(s) Oral at bedtime PRN  guaiFENesin    Syrup 200 milliGRAM(s) Oral every 6 hours PRN  methocarbamol 750 milliGRAM(s) Oral every 12 hours PRN  oxycodone    5 mG/acetaminophen 325 mG 1 Tablet(s) Oral once PRN      Case discussed with resident    Care discussed with pt/family

## 2019-11-19 NOTE — CHART NOTE - NSCHARTNOTEFT_GEN_A_CORE
Registered Dietitian Follow-Up     Patient Profile Reviewed                           Yes []   No []     Nutrition History Previously Obtained        Yes []  No []       Pertinent Subjective Information: Pt. reports suboptimal PO intake continues as his sense of taste is diminished. Everything tastes bland to him. On and off abd discomfort with gas and pain.  Drinks 2 bottles of Ensure compact daily. Offered sample of Ensure pudding- pt. willing to tray (vanilla flavor only)      Pertinent Medical Interventions: anemia:- one bloody bowel movement yesterday - transfused 2 PRBC's. Acute on Chronic Diastolic CHF Exacerbation with Acute Hypoxic Respiratory Failure- BiPAP PRN- No thoracentesis at this time, per pulmonology.         Diet order: DASH/TLC, 1200ml fluid restr. Ensure compact BID.      Anthropometrics:  - Ht. 172.7cm  - Wt. 86.5kg on 11/19 vs. 93.5kg on 11/12- pt. noted on Lasix, will continues to monitor wt trends during LOS  - %wt change  - BMI 31.4  - IBW 154lbs       Pertinent Lab Data: (11/19) RBC 2.51, Hg 6.8, Hct 22.1  (11/18) creat 0.5, glu 116, Mg 1.7     Pertinent Meds: Lasix, Magox, Prednisone, Atorvastatin, Protonix, Carafate      Physical Findings:  - Appearance: AAOx4, 2+ L, R leg edema  - GI function: last BM 11/18 bloody  - Tubes: none noted  - Oral/Mouth cavity: denies symptoms   - Skin: intact (BS 13)      Nutrition Requirements (from RD note on 11/15)   Weight Used: ideal 70kg     Estimated Energy Needs    Continue [x]  Adjust [] 1750 - 2100 kcals/day (25-30 gm/kg IBW for obesity)  Adjusted Energy Recommendations:   kcal/day        Estimated Protein Needs    Continue [x]  Adjust [] 70 - 84 gms/day (1.0 - 1.2 gm/kg IBW for obesity)  Adjusted Protein Recommendations:   gm/day        Estimated Fluid Needs        Continue [x]  Adjust [] 1.2 L per LIP  Adjusted Fluid Recommendations:   mL/day     Nutrient Intake: 25-50% PO at meals         [] Previous Nutrition Diagnosis: Inadequate Oral Intake            [x] Ongoing          [] Resolved       Nutrition Intervention: meals and snacks, medical food supplement     Rec: Continue DASH/TLC, 1200ml fluid restr with Ensure compact BID. Add Ensure pudding BID.     Goal/Expected Outcome: In 3 days pt. to consume at least 75% PO and supplement      Indicator/Monitoring: energy intake, body composition, NFPF

## 2019-11-19 NOTE — PROGRESS NOTE ADULT - ASSESSMENT
85 yo M yo with PMH of CAD s/p NSTEMI Oct 1 w/ pci and stent x3, HTN ,DLD, Hypothyroidism presenting for SOB. To note, patient was admitted on Oct 1st for NSTEMI and had 3 stents placed. pt also with recent complicated admission in october for abdominal pain, colitis and found to have mesenteric ischemia s/p exploratory laparotomy with a left external iliac to SMA bypass and 80 cm small bowel resection with primary anastomosis,  admission also was complicated by UGIB requiring endoscopic intervention for bleeding gastric ulcer. GI are consulted for now small amount of fresh blood per rectum on straining , with constipation.    #fresh blood per rectum:   hx of hemorrhoids , r/o hemorrhoidal bleed versus other possible causes ( diverticular , malignancy versus others)  patient is hemodynamically sable   Drop in Hg  patient on dual antiplatelet due to recent MI  patient had also recent bowel resection for mesenteric ischemia     REC:   trend cbc for now   keep hgb above 8  continue DAPT as recent MI history  Miralax BID and add senna 2tabs at night to ensure one soft BM everyday.  Defer colonoscopy in view of NSTEMI and patient is currently on DAPT and also has AHRF secondary to B/L effusion and CHF (Diastolic)  Consider Colonoscopy as outpatient. 83 yo M yo with PMH of CAD s/p NSTEMI Oct 1 w/ pci and stent x3, HTN ,DLD, Hypothyroidism presenting for SOB. To note, patient was admitted on Oct 1st for NSTEMI and had 3 stents placed. pt also with recent complicated admission in october for abdominal pain, colitis and found to have mesenteric ischemia s/p exploratory laparotomy with a left external iliac to SMA bypass and 80 cm small bowel resection with primary anastomosis,  admission also was complicated by UGIB requiring endoscopic intervention for bleeding gastric ulcer. GI are consulted for now small amount of fresh blood per rectum on straining , with constipation.    #fresh blood per rectum:   hx of hemorrhoids , r/o hemorrhoidal bleed versus other possible causes ( diverticular , malignancy versus others)  patient is hemodynamically sable   Drop in Hg  patient on dual antiplatelet due to recent MI  patient had also recent bowel resection for mesenteric ischemia     REC:   trend cbc for now   keep hgb above 8  continue DAPT as recent MI history  Miralax BID and add senna 2tabs at night to ensure one soft BM everyday.  Please get cardiology and pulmonary clearance before discussing the need for EGD and Colonoscopy possibly this week,  Please keep him on clear liquids.

## 2019-11-19 NOTE — PROGRESS NOTE ADULT - SUBJECTIVE AND OBJECTIVE BOX
OVERNIGHT EVENTS: events noted, dec hb 6.8 s/p tx, BM yesterday ? no blood. cough    Vital Signs Last 24 Hrs  T(C): 35.7 (19 Nov 2019 05:04), Max: 37.4 (18 Nov 2019 20:33)  T(F): 96.3 (19 Nov 2019 05:04), Max: 99.4 (18 Nov 2019 20:33)  HR: 85 (19 Nov 2019 05:04) (85 - 95)  BP: 128/63 (19 Nov 2019 05:04) (96/56 - 140/65)  RR: 18 (19 Nov 2019 05:04) (16 - 19)  SpO2: 97% (18 Nov 2019 08:44) (97% - 97%)    PHYSICAL EXAMINATION:    GENERAL: The patient is awake and alert in no apparent distress.     HEENT: Head is normocephalic and atraumatic. Extraocular muscles are intact. Mucous membranes are moist.    NECK: Supple.    LUNGS: dec bs both bases    HEART: POOJA 3/6    ABDOMEN: Soft, nontender, and nondistended.      EXTREMITIES: Without any cyanosis, clubbing, rash, lesions or edema.    NEUROLOGIC: Grossly intact.    SKIN: No ulceration or induration present.      LABS:                        6.8    10.07 )-----------( 330      ( 19 Nov 2019 00:19 )             22.1     11-18    138  |  91<L>  |  14  ----------------------------<  116<H>  3.9   |  34<H>  |  0.5<L>    Ca    8.0<L>      18 Nov 2019 05:58  Mg     1.7     11-18    TPro  5.2<L>  /  Alb  2.5<L>  /  TBili  0.3  /  DBili  x   /  AST  37  /  ALT  28  /  AlkPhos  63  11-18    PT/INR - ( 17 Nov 2019 16:57 )   PT: 13.30 sec;   INR: 1.16 ratio         PTT - ( 17 Nov 2019 16:57 )  PTT:27.4 sec                      11-18-19 @ 07:01  -  11-19-19 @ 07:00  --------------------------------------------------------  IN: 360 mL / OUT: 1452 mL / NET: -1092 mL        MICROBIOLOGY:  Culture Results:   Numerous Staphylococcus aureus (11-17 @ 16:51)      MEDICATIONS  (STANDING):  aMIOdarone    Tablet 200 milliGRAM(s) Oral daily  aspirin  chewable 81 milliGRAM(s) Oral daily  atorvastatin 40 milliGRAM(s) Oral at bedtime  budesonide  80 MICROgram(s)/formoterol 4.5 MICROgram(s) Inhaler 2 Puff(s) Inhalation two times a day  chlorhexidine 4% Liquid 1 Application(s) Topical <User Schedule>  clopidogrel Tablet 75 milliGRAM(s) Oral daily  enoxaparin Injectable 40 milliGRAM(s) SubCutaneous daily  furosemide    Tablet 40 milliGRAM(s) Oral two times a day  iron sucrose IVPB 300 milliGRAM(s) IV Intermittent every 24 hours  levothyroxine 300 MICROGram(s) Oral daily  lisinopril 20 milliGRAM(s) Oral daily  magnesium oxide 400 milliGRAM(s) Oral three times a day with meals  metoprolol tartrate 12.5 milliGRAM(s) Oral two times a day  multivitamin 1 Tablet(s) Oral daily  pantoprazole    Tablet 40 milliGRAM(s) Oral every 12 hours  polyethylene glycol 3350 17 Gram(s) Oral two times a day  potassium chloride    Tablet ER 40 milliEquivalent(s) Oral every 4 hours  predniSONE   Tablet 40 milliGRAM(s) Oral daily  simethicone 80 milliGRAM(s) Chew four times a day  sucralfate 1 Gram(s) Oral every 12 hours    MEDICATIONS  (PRN):  acetaminophen   Tablet .. 650 milliGRAM(s) Oral every 6 hours PRN Temp greater or equal to 38C (100.4F), Mild Pain (1 - 3)  albuterol/ipratropium for Nebulization. 3 milliLiter(s) Nebulizer every 6 hours PRN Shortness of Breath and/or Wheezing  ALPRAZolam 0.5 milliGRAM(s) Oral at bedtime PRN Sleep  guaiFENesin    Syrup 200 milliGRAM(s) Oral every 6 hours PRN secretions  methocarbamol 750 milliGRAM(s) Oral every 12 hours PRN muscle pain  oxycodone    5 mG/acetaminophen 325 mG 1 Tablet(s) Oral once PRN Severe Pain (7 - 10)      RADIOLOGY & ADDITIONAL STUDIES:

## 2019-11-19 NOTE — PROGRESS NOTE ADULT - SUBJECTIVE AND OBJECTIVE BOX
Gastroenterology progress note:     Patient is a 84y old  Male who presents with a chief complaint of SOB.        Admitted on: 11-11-19    We are following the patient for hematochezia.     Interval History: Patient got a anusol supp, yesterday and half an hour after that had a large BM, brown with maroon color blood. He got 2 units of PRBC overnight. Patient is c/o rectal pain.    Patient's medical problems are stable   Prior records reviewed (Y/N): Y  History obtained from someone other than patient (Y/N): N      PAST MEDICAL & SURGICAL HISTORY:  Neuropathy  TIA (transient ischemic attack)  Left carotid stenosis  Lyme disease  Hypothyroidism  Coronary artery disease  History of mesenteric infarction      MEDICATIONS  (STANDING):  aMIOdarone    Tablet 200 milliGRAM(s) Oral daily  aspirin  chewable 81 milliGRAM(s) Oral daily  atorvastatin 40 milliGRAM(s) Oral at bedtime  budesonide  80 MICROgram(s)/formoterol 4.5 MICROgram(s) Inhaler 2 Puff(s) Inhalation two times a day  chlorhexidine 4% Liquid 1 Application(s) Topical <User Schedule>  clopidogrel Tablet 75 milliGRAM(s) Oral daily  enoxaparin Injectable 40 milliGRAM(s) SubCutaneous daily  furosemide    Tablet 40 milliGRAM(s) Oral two times a day  iron sucrose IVPB 300 milliGRAM(s) IV Intermittent every 24 hours  levothyroxine 300 MICROGram(s) Oral daily  lisinopril 20 milliGRAM(s) Oral daily  magnesium oxide 400 milliGRAM(s) Oral three times a day with meals  metoprolol tartrate 12.5 milliGRAM(s) Oral two times a day  multivitamin 1 Tablet(s) Oral daily  nystatin    Suspension 723951 Unit(s) Oral two times a day  pantoprazole    Tablet 40 milliGRAM(s) Oral every 12 hours  polyethylene glycol 3350 17 Gram(s) Oral two times a day  potassium chloride    Tablet ER 40 milliEquivalent(s) Oral every 4 hours  predniSONE   Tablet 40 milliGRAM(s) Oral daily  simethicone 80 milliGRAM(s) Chew four times a day  sucralfate 1 Gram(s) Oral every 12 hours    MEDICATIONS  (PRN):  acetaminophen   Tablet .. 650 milliGRAM(s) Oral every 6 hours PRN Temp greater or equal to 38C (100.4F), Mild Pain (1 - 3)  albuterol/ipratropium for Nebulization. 3 milliLiter(s) Nebulizer every 6 hours PRN Shortness of Breath and/or Wheezing  ALPRAZolam 0.5 milliGRAM(s) Oral at bedtime PRN Sleep  guaiFENesin    Syrup 200 milliGRAM(s) Oral every 6 hours PRN secretions  methocarbamol 750 milliGRAM(s) Oral every 12 hours PRN muscle pain  oxycodone    5 mG/acetaminophen 325 mG 1 Tablet(s) Oral once PRN Severe Pain (7 - 10)      Allergies  iodinated radiocontrast agents (Hives)      Review of Systems:   Cardiovascular:  No Chest Pain, No Palpitations  Respiratory:  Dyspnea+  Gastrointestinal:  As described in HPI    Physical Examination:  T(C): 35.8 (11-19-19 @ 12:45), Max: 37.4 (11-18-19 @ 20:33)  HR: 87 (11-19-19 @ 12:45) (83 - 88)  BP: 98/62 (11-19-19 @ 12:45) (98/62 - 152/67)  RR: 18 (11-19-19 @ 12:45) (16 - 18)  SpO2: 96% (11-19-19 @ 08:35) (96% - 96%)      11-18-19 @ 07:01  -  11-19-19 @ 07:00  --------------------------------------------------------  IN: 360 mL / OUT: 1452 mL / NET: -1092 mL    11-19-19 @ 07:01  -  11-19-19 @ 13:30  --------------------------------------------------------  IN: 657 mL / OUT: 0 mL / NET: 657 mL      Constitutional: No acute distress.  Respiratory: on NC, B/L decrease breath sounds.  Cardiovascular:  S1 S2, Regular rate and rhythm.  Abdominal: Abdomen is soft, symmetric, and non-tender without distention. Bowel sounds are present and normoactive in all four quadrants. No masses, hepatomegaly, or splenomegaly are noted.   SAM: brown stools with red blood tinge.  Skin: No rashes, No Jaundice.        Data: (reviewed by attending)                        8.8    9.87  )-----------( 362      ( 19 Nov 2019 11:15 )             27.0     Hgb trend:  8.8  11-19-19 @ 11:15  6.8  11-19-19 @ 00:19  7.6  11-18-19 @ 11:55  7.7  11-18-19 @ 05:58  7.5  11-17-19 @ 16:57  8.7  11-17-19 @ 06:05      11-19-19 @ 07:01  -  11-19-19 @ 13:30  --------------------------------------------------------  IN: 297 mL      11-18    138  |  91<L>  |  14  ----------------------------<  116<H>  3.9   |  34<H>  |  0.5<L>    Ca    8.0<L>      18 Nov 2019 05:58  Mg     1.7     11-18    TPro  5.2<L>  /  Alb  2.5<L>  /  TBili  0.3  /  DBili  x   /  AST  37  /  ALT  28  /  AlkPhos  63  11-18    Liver panel trend:  TBili 0.3   /   AST 37   /   ALT 28   /   AlkP 63   /   Tptn 5.2   /   Alb 2.5    /   DBili --      11-18  TBili 0.4   /   AST 50   /   ALT 43   /   AlkP 75   /   Tptn 5.2   /   Alb 2.6    /   DBili --      11-13  TBili 0.4   /   AST 57   /   ALT 46   /   AlkP 78   /   Tptn 4.9   /   Alb 2.6    /   DBili --      11-11      PT/INR - ( 17 Nov 2019 16:57 )   PT: 13.30 sec;   INR: 1.16 ratio         PTT - ( 17 Nov 2019 16:57 )  PTT:27.4 sec    Culture - Other (collected 17 Nov 2019 16:51)  Source: .Other Abdominal wound  Preliminary Report (18 Nov 2019 18:58):    Numerous Staphylococcus aureus         Radiology: (reviewed by attending)

## 2019-11-19 NOTE — PROGRESS NOTE ADULT - ASSESSMENT
h/o CAD/PCI  Plural effusion  Normal LV function  Anemia   Rectal bleed    c/w current management  Tx as needed  Avoid fluid overload  Pt intermediate risk for low risk procedure  will d/w attending h/o CAD/PCI  Plural effusion  Normal LV function  Anemia   Rectal bleed    c/w current management  Tx as needed  Avoid fluid overload  Pt intermediate risk for low risk procedure  agree

## 2019-11-19 NOTE — PROGRESS NOTE ADULT - ASSESSMENT
83 yo M yo with PMH of CAD s/p NSTEMI Oct 1 w/ pci and stent x3, HTN ,DLD, Hypothyroidism presenting for for progressively worsening SOB. No cough. No fever. No cp.     1.	Ac. HFpEF  2.	Ac. Hypoxic respiratory failure on admission due to #1  3.	B/L pleural effusions.   4.	CAD S/P recent PCI  5.	HTN / DL  6.	Hypothyroidism  7.	Rectal bleeding    8.	Hemorrhoids         PLAN:    ·	S/P 2 units of PRBC'S. Follow H/H. Keep Hb >8.  ·	Give Lasix 40 mg IVP after transfusing each unit.   ·	Care D/W the GI. Want cardiology and pulmonary clearance for possible colonoscopy  ·	Give him Venofer 300 mg IVPB daily x 3 doses.   ·	Anusol suppository for hemorrhoids.   ·	Care D/W the pulmonary. No need for thoracentesis  ·	Cont Symbicort twice a day and Prednisone 40 mg po daily for five days.   ·	Cont Lasix 40 mg po q 12h.   ·	Check i's and o's and daily wt.   ·	Low salt diet and water restriction to 1.5 L/C.   ·	ECHO reviewed. NLVF. EF 50-55%.  ·	Cont ASA, Plavix and his other meds.     #Progress Note Handoff  Pending (specify):  Consults_________, Tests________, Test Results_______, Other_GIB_____  Family discussion: Plan of care D/W the wife.   Disposition: Home___/SNF___/Other________/Unknown at this time___x_____

## 2019-11-20 NOTE — PROGRESS NOTE ADULT - ASSESSMENT
IMPRESSION    SOB 2/2 pulmonary edema/ Pleural effusion/ chronically elevated r hemidiaphragm  H/o mesenteric ischemia s/p sx  H/o Afib not on AC 2/2  DEC HB s/p tx    PLAN    hold lasix today tomorrow once daily  BiPAP at night   TREND CBC  dc rudolph  NEB AS NEEDED  DVT ppx  rehab eval  OOB to chair  pox ra  incentive spirometry IMPRESSION    SOB 2/2 pulmonary edema/ Pleural effusion/ chronically elevated r hemidiaphragm  H/o mesenteric ischemia s/p sx  H/o Afib not on AC 2/2  DEC HB s/p tx    PLAN    hold lasix today tomorrow once daily  BiPAP at night   TREND CBC  dc rudolph  NEB AS NEEDED  DVT ppx  rehab eval  OOB to chair  moderate risk for procedure  pox ra  incentive spirometry

## 2019-11-20 NOTE — PROGRESS NOTE ADULT - SUBJECTIVE AND OBJECTIVE BOX
Gastroenterology progress note:     Patient is a 84y old  Male who presents with a chief complaint of SOB    Admitted on: 11-11-19    We are following the patient for hematochezia.      Interval History: Patient had a small brown and burgundy color stool yesterday.     Patient's medical problems are improving  Prior records reviewed (Y/N): Y  History obtained from someone other than patient (Y/N):N      PAST MEDICAL & SURGICAL HISTORY:  Neuropathy  TIA (transient ischemic attack)  Left carotid stenosis  Lyme disease  Hypothyroidism  Coronary artery disease  History of mesenteric infarction      MEDICATIONS  (STANDING):  aMIOdarone    Tablet 200 milliGRAM(s) Oral daily  aspirin  chewable 81 milliGRAM(s) Oral daily  atorvastatin 40 milliGRAM(s) Oral at bedtime  bisacodyl 20 milliGRAM(s) Oral once  budesonide  80 MICROgram(s)/formoterol 4.5 MICROgram(s) Inhaler 2 Puff(s) Inhalation two times a day  chlorhexidine 4% Liquid 1 Application(s) Topical <User Schedule>  clopidogrel Tablet 75 milliGRAM(s) Oral daily  enoxaparin Injectable 40 milliGRAM(s) SubCutaneous daily  hydrocortisone 2.5% Rectal Cream 1 Application(s) Rectal two times a day  iron sucrose IVPB 300 milliGRAM(s) IV Intermittent every 24 hours  levothyroxine 300 MICROGram(s) Oral daily  lisinopril 20 milliGRAM(s) Oral daily  magnesium oxide 400 milliGRAM(s) Oral three times a day with meals  metoprolol tartrate 12.5 milliGRAM(s) Oral two times a day  multivitamin 1 Tablet(s) Oral daily  nystatin    Suspension 576089 Unit(s) Oral two times a day  pantoprazole    Tablet 40 milliGRAM(s) Oral every 12 hours  polyethylene glycol 3350 17 Gram(s) Oral two times a day  polyethylene glycol/electrolyte Solution. 4000 milliLiter(s) Oral once  potassium chloride    Tablet ER 40 milliEquivalent(s) Oral every 4 hours  predniSONE   Tablet 40 milliGRAM(s) Oral daily  senna 2 Tablet(s) Oral at bedtime  simethicone 80 milliGRAM(s) Chew four times a day  sucralfate 1 Gram(s) Oral every 12 hours    MEDICATIONS  (PRN):  acetaminophen   Tablet .. 650 milliGRAM(s) Oral every 6 hours PRN Temp greater or equal to 38C (100.4F), Mild Pain (1 - 3)  albuterol/ipratropium for Nebulization. 3 milliLiter(s) Nebulizer every 6 hours PRN Shortness of Breath and/or Wheezing  ALPRAZolam 0.5 milliGRAM(s) Oral at bedtime PRN Sleep  guaiFENesin    Syrup 200 milliGRAM(s) Oral every 6 hours PRN secretions  methocarbamol 750 milliGRAM(s) Oral every 12 hours PRN muscle pain      Allergies  iodinated radiocontrast agents (Hives)      Review of Systems:   Cardiovascular:  No Chest Pain, No Palpitations  Respiratory:  No Cough, No Dyspnea  Gastrointestinal:  As described in HPI    Physical Examination:  T(C): 35.8 (11-20-19 @ 05:14), Max: 35.8 (11-19-19 @ 12:45)  HR: 79 (11-20-19 @ 05:14) (79 - 89)  BP: 129/60 (11-20-19 @ 05:14) (98/62 - 159/73)  RR: 18 (11-20-19 @ 05:14) (18 - 18)  SpO2: 97% (11-19-19 @ 20:08) (90% - 97%)      11-19-19 @ 07:01  -  11-20-19 @ 07:00  --------------------------------------------------------  IN: 1947 mL / OUT: 3101 mL / NET: -1154 mL      Constitutional: No acute distress.  Respiratory:  On NC 2L, Lung sounds are clear bilaterally.  Cardiovascular:  S1 S2, Regular rate and rhythm.  Abdominal: Abdomen is soft, symmetric, and non-tender without distention. There are no visible lesions or scars. Bowel sounds are present and normoactive in all four quadrants. No masses, hepatomegaly, or splenomegaly are noted.   Skin: No rashes, No Jaundice.        Data: (reviewed by attending)                        8.3    9.11  )-----------( 381      ( 20 Nov 2019 11:37 )             26.6     Hgb trend:  8.3  11-20-19 @ 11:37  8.8  11-19-19 @ 11:15  6.8  11-19-19 @ 00:19  7.6  11-18-19 @ 11:55  7.7  11-18-19 @ 05:58  7.5  11-17-19 @ 16:57      11-19-19 @ 07:01  -  11-20-19 @ 07:00  --------------------------------------------------------  IN: 297 mL            Liver panel trend:  TBili 0.3   /   AST 37   /   ALT 28   /   AlkP 63   /   Tptn 5.2   /   Alb 2.5    /   DBili --      11-18  TBili 0.4   /   AST 50   /   ALT 43   /   AlkP 75   /   Tptn 5.2   /   Alb 2.6    /   DBili --      11-13  TBili 0.4   /   AST 57   /   ALT 46   /   AlkP 78   /   Tptn 4.9   /   Alb 2.6    /   DBili --      11-11          Culture - Other (collected 17 Nov 2019 16:51)  Source: .Other Abdominal wound  Final Report (19 Nov 2019 18:34):    Numerous Staphylococcus aureus  Organism: Staphylococcus aureus (19 Nov 2019 18:34)  Organism: Staphylococcus aureus (19 Nov 2019 18:34)         Radiology: (reviewed by attending)

## 2019-11-20 NOTE — PROGRESS NOTE ADULT - SUBJECTIVE AND OBJECTIVE BOX
24H events:    Patient is a 84y old Male who presents with a chief complaint of SOB (20 Nov 2019 08:30)    Primary diagnosis of Hypervolemia, unspecified hypervolemia type     patient was lying comfortably in bed, breathing fine , speaking full sentence .  no transfusion over night .     Today is hospital day 9d.     PAST MEDICAL & SURGICAL HISTORY  Neuropathy  TIA (transient ischemic attack)  Left carotid stenosis  Lyme disease  Hypothyroidism  Coronary artery disease  History of mesenteric infarction    SOCIAL HISTORY:  Negative for smoking/alcohol/drug use.     ALLERGIES:  iodinated radiocontrast agents (Hives)    MEDICATIONS:  STANDING MEDICATIONS  aMIOdarone    Tablet 200 milliGRAM(s) Oral daily  aspirin  chewable 81 milliGRAM(s) Oral daily  atorvastatin 40 milliGRAM(s) Oral at bedtime  budesonide  80 MICROgram(s)/formoterol 4.5 MICROgram(s) Inhaler 2 Puff(s) Inhalation two times a day  chlorhexidine 4% Liquid 1 Application(s) Topical <User Schedule>  clopidogrel Tablet 75 milliGRAM(s) Oral daily  enoxaparin Injectable 40 milliGRAM(s) SubCutaneous daily  hydrocortisone 2.5% Rectal Cream 1 Application(s) Rectal two times a day  iron sucrose IVPB 300 milliGRAM(s) IV Intermittent every 24 hours  levothyroxine 300 MICROGram(s) Oral daily  lisinopril 20 milliGRAM(s) Oral daily  magnesium oxide 400 milliGRAM(s) Oral three times a day with meals  metoprolol tartrate 12.5 milliGRAM(s) Oral two times a day  multivitamin 1 Tablet(s) Oral daily  nystatin    Suspension 351729 Unit(s) Oral two times a day  pantoprazole    Tablet 40 milliGRAM(s) Oral every 12 hours  polyethylene glycol 3350 17 Gram(s) Oral two times a day  potassium chloride    Tablet ER 40 milliEquivalent(s) Oral every 4 hours  predniSONE   Tablet 40 milliGRAM(s) Oral daily  senna 2 Tablet(s) Oral at bedtime  simethicone 80 milliGRAM(s) Chew four times a day  sucralfate 1 Gram(s) Oral every 12 hours    PRN MEDICATIONS  acetaminophen   Tablet .. 650 milliGRAM(s) Oral every 6 hours PRN  albuterol/ipratropium for Nebulization. 3 milliLiter(s) Nebulizer every 6 hours PRN  ALPRAZolam 0.5 milliGRAM(s) Oral at bedtime PRN  guaiFENesin    Syrup 200 milliGRAM(s) Oral every 6 hours PRN  methocarbamol 750 milliGRAM(s) Oral every 12 hours PRN    VITALS:   T(F): 96.5  HR: 79  BP: 129/60  RR: 18  SpO2: 97%    LABS:                        8.8    9.87  )-----------( 362      ( 19 Nov 2019 11:15 )             27.0                     Culture - Other (collected 17 Nov 2019 16:51)  Source: .Other Abdominal wound  Final Report (19 Nov 2019 18:34):    Numerous Staphylococcus aureus  Organism: Staphylococcus aureus (19 Nov 2019 18:34)  Organism: Staphylococcus aureus (19 Nov 2019 18:34)          RADIOLOGY:    < from: [facility] Duplex Lower Ext Vein Scan, Bilat (11.13.19 @ 08:01) >  Impression:    No evidence of deep venous thrombosis or superficial thrombophlebitis in   bilateral lower extremities.    < end of copied text >      < from: Xray Kidney Ureter Bladder (11.17.19 @ 12:43) >  Impression:  Nonobstructive bowel gas pattern.    < end of copied text >      PHYSICAL EXAM:  GENERAL: NAD, well-developed, 84y sitting up in bed   EENT: EOMI, conjunctiva and sclera clear, No nasal obstruction or discharge  RESPIRATORY: Decreased breath sounds at the bases, more prominent on right side .fine crackles at bases  .  On 2 L NC  CARDIOVASCULAR: Regular rate and rhythm; No murmurs, rubs, or gallops.   GASTROINTESTINAL: Abdomen Soft, Nondistended; Mild tenderness to palpation diffusely. Steri strips clean, dry, intact, no erythema, no drainage, no induration  MUSCULOSKELETAL:  No cyanosis/edema , extremities grossly symmetrical  PSYCH: AAOx3, affect appropriate  NEUROLOGY: non-focal, cognition intact          Assessment and Plan:   · Assessment		  85 yo M yo with PMH of CAD s/p NSTEMI Oct 1 w/ pci and stent x3, HTN ,DLD, Hypothyroidism presenting for shortness of breath, admitted with acute on chronic diastolic heart failure exacerbation.      #anemia:  - transfused 2 PRBC's on 711/19 and  lasix 40 iv after transfusion   - GI considering colonoscopy tomorrow after  cardio and pulm clearance  - one bloody bowel movement yesterday   - Monitor CBC    # Acute on Chronic Diastolic CHF Exacerbation with Acute Hypoxic Respiratory Failure  - B/L wheezing , will start on Lasix 40 bid, prednisone 40 bid and Symbicort .  -Cardiology following   - pulm: hold lasix for today then restart lasix 40 po od   - CT chest is negative for PE, but with bilateral pleural effusion  - EKG with no new ischemic changes  - BNP 2500, close to 2300 during last admission  - Troponin 0.04 and stable, denies chest pain  - LE Duplex neg    - Daily Chest X-Ray   - strict Is and Os, fluid and salt restriction  - BiPAP PRN and at night, keep spO2 >92  - No thoracentesis at this time, per pulmonology     # CAD, HTN, h/o V tach  - oral amio and metoprolol  - c/w aspirin, Plavix, statin and lisinopril    #Abdominal pain   - resolved   -Hx mesenteric ischemia October status post surgery  -Surgical sites healing well  -KUB wnl   -Miralax and hemmoroid suppositories       hypomagnesemia :  - replete mg  - fu mg level     # Hypothyroidism   -- c/w levothyroxine     DVT PPX : Lovenox  GI PPX: Not indicated  Diet: DASH  Activity: As tolerated  Disposition: Likely back to [facility]  CODE: FULL 24H events:    Patient is a 84y old Male who presents with a chief complaint of SOB (20 Nov 2019 08:30)    Primary diagnosis of Hypervolemia, unspecified hypervolemia type     patient was lying comfortably in bed, breathing fine , speaking full sentence .  no transfusion over night .     Today is hospital day 9d.     PAST MEDICAL & SURGICAL HISTORY  Neuropathy  TIA (transient ischemic attack)  Left carotid stenosis  Lyme disease  Hypothyroidism  Coronary artery disease  History of mesenteric infarction    SOCIAL HISTORY:  Negative for smoking/alcohol/drug use.     ALLERGIES:  iodinated radiocontrast agents (Hives)    MEDICATIONS:  STANDING MEDICATIONS  aMIOdarone    Tablet 200 milliGRAM(s) Oral daily  aspirin  chewable 81 milliGRAM(s) Oral daily  atorvastatin 40 milliGRAM(s) Oral at bedtime  budesonide  80 MICROgram(s)/formoterol 4.5 MICROgram(s) Inhaler 2 Puff(s) Inhalation two times a day  chlorhexidine 4% Liquid 1 Application(s) Topical <User Schedule>  clopidogrel Tablet 75 milliGRAM(s) Oral daily  enoxaparin Injectable 40 milliGRAM(s) SubCutaneous daily  hydrocortisone 2.5% Rectal Cream 1 Application(s) Rectal two times a day  iron sucrose IVPB 300 milliGRAM(s) IV Intermittent every 24 hours  levothyroxine 300 MICROGram(s) Oral daily  lisinopril 20 milliGRAM(s) Oral daily  magnesium oxide 400 milliGRAM(s) Oral three times a day with meals  metoprolol tartrate 12.5 milliGRAM(s) Oral two times a day  multivitamin 1 Tablet(s) Oral daily  nystatin    Suspension 369854 Unit(s) Oral two times a day  pantoprazole    Tablet 40 milliGRAM(s) Oral every 12 hours  polyethylene glycol 3350 17 Gram(s) Oral two times a day  potassium chloride    Tablet ER 40 milliEquivalent(s) Oral every 4 hours  predniSONE   Tablet 40 milliGRAM(s) Oral daily  senna 2 Tablet(s) Oral at bedtime  simethicone 80 milliGRAM(s) Chew four times a day  sucralfate 1 Gram(s) Oral every 12 hours    PRN MEDICATIONS  acetaminophen   Tablet .. 650 milliGRAM(s) Oral every 6 hours PRN  albuterol/ipratropium for Nebulization. 3 milliLiter(s) Nebulizer every 6 hours PRN  ALPRAZolam 0.5 milliGRAM(s) Oral at bedtime PRN  guaiFENesin    Syrup 200 milliGRAM(s) Oral every 6 hours PRN  methocarbamol 750 milliGRAM(s) Oral every 12 hours PRN    VITALS:   T(F): 96.5  HR: 79  BP: 129/60  RR: 18  SpO2: 97%    LABS:                        8.8    9.87  )-----------( 362      ( 19 Nov 2019 11:15 )             27.0                     Culture - Other (collected 17 Nov 2019 16:51)  Source: .Other Abdominal wound  Final Report (19 Nov 2019 18:34):    Numerous Staphylococcus aureus  Organism: Staphylococcus aureus (19 Nov 2019 18:34)  Organism: Staphylococcus aureus (19 Nov 2019 18:34)          RADIOLOGY:    < from: [facility] Duplex Lower Ext Vein Scan, Bilat (11.13.19 @ 08:01) >  Impression:    No evidence of deep venous thrombosis or superficial thrombophlebitis in   bilateral lower extremities.    < end of copied text >      < from: Xray Kidney Ureter Bladder (11.17.19 @ 12:43) >  Impression:  Nonobstructive bowel gas pattern.    < end of copied text >      PHYSICAL EXAM:  GENERAL: NAD, well-developed, 84y sitting up in bed   EENT: EOMI, conjunctiva and sclera clear, No nasal obstruction or discharge  RESPIRATORY: Decreased breath sounds at the bases, more prominent on right side .fine crackles at bases  .  On 2 L NC  CARDIOVASCULAR: Regular rate and rhythm; No murmurs, rubs, or gallops.   GASTROINTESTINAL: Abdomen Soft, Nondistended; Mild tenderness to palpation diffusely. Steri strips clean, dry, intact, no erythema, no drainage, no induration  MUSCULOSKELETAL:  No cyanosis/edema , extremities grossly symmetrical  PSYCH: AAOx3, affect appropriate  NEUROLOGY: non-focal, cognition intact          Assessment and Plan:   · Assessment		  83 yo M yo with PMH of CAD s/p NSTEMI Oct 1 w/ pci and stent x3, HTN ,DLD, Hypothyroidism presenting for shortness of breath, admitted with acute on chronic diastolic heart failure exacerbation.      #anemia:  - transfused 2 PRBC's on 711/19 and  lasix 40 iv after transfusion   - GI considering colonoscopy tomorrow after cardio and pulm clearance  - one bloody bowel movement yesterday   - Monitor CBC    # Acute on Chronic Diastolic CHF Exacerbation with Acute Hypoxic Respiratory Failure  - B/L wheezing , will start on Lasix 40 bid, prednisone 40 bid and Symbicort .  -Cardiology following   - pulm: hold lasix for today then restart lasix 40 po od   - CT chest is negative for PE, but with bilateral pleural effusion  - EKG with no new ischemic changes  - BNP 2500, close to 2300 during last admission  - Troponin 0.04 and stable, denies chest pain  - LE Duplex neg    - Daily Chest X-Ray   - strict Is and Os, fluid and salt restriction  - BiPAP PRN and at night, keep spO2 >92  - No thoracentesis at this time, per pulmonology     # CAD, HTN, h/o V tach  - oral amio and metoprolol  - c/w aspirin, Plavix, statin and lisinopril    #Abdominal pain   - resolved   -Hx mesenteric ischemia October status post surgery  -Surgical sites healing well  -KUB wnl   -Miralax and hemmoroid suppositories       hypomagnesemia :  - replete mg  - fu mg level     # Hypothyroidism   -- c/w levothyroxine     DVT PPX : Lovenox  GI PPX: Not indicated  Diet: DASH  Activity: As tolerated  Disposition: Likely back to [facility]  CODE: FULL 24H events:    Patient is a 84y old Male who presents with a chief complaint of SOB (20 Nov 2019 08:30)    Primary diagnosis of Hypervolemia, unspecified hypervolemia type     patient was lying comfortably in bed, breathing fine , speaking full sentence .  no transfusion over night .     Today is hospital day 9d.     PAST MEDICAL & SURGICAL HISTORY  Neuropathy  TIA (transient ischemic attack)  Left carotid stenosis  Lyme disease  Hypothyroidism  Coronary artery disease  History of mesenteric infarction    SOCIAL HISTORY:  Negative for smoking/alcohol/drug use.     ALLERGIES:  iodinated radiocontrast agents (Hives)    MEDICATIONS:  STANDING MEDICATIONS  aMIOdarone    Tablet 200 milliGRAM(s) Oral daily  aspirin  chewable 81 milliGRAM(s) Oral daily  atorvastatin 40 milliGRAM(s) Oral at bedtime  budesonide  80 MICROgram(s)/formoterol 4.5 MICROgram(s) Inhaler 2 Puff(s) Inhalation two times a day  chlorhexidine 4% Liquid 1 Application(s) Topical <User Schedule>  clopidogrel Tablet 75 milliGRAM(s) Oral daily  enoxaparin Injectable 40 milliGRAM(s) SubCutaneous daily  hydrocortisone 2.5% Rectal Cream 1 Application(s) Rectal two times a day  iron sucrose IVPB 300 milliGRAM(s) IV Intermittent every 24 hours  levothyroxine 300 MICROGram(s) Oral daily  lisinopril 20 milliGRAM(s) Oral daily  magnesium oxide 400 milliGRAM(s) Oral three times a day with meals  metoprolol tartrate 12.5 milliGRAM(s) Oral two times a day  multivitamin 1 Tablet(s) Oral daily  nystatin    Suspension 232582 Unit(s) Oral two times a day  pantoprazole    Tablet 40 milliGRAM(s) Oral every 12 hours  polyethylene glycol 3350 17 Gram(s) Oral two times a day  potassium chloride    Tablet ER 40 milliEquivalent(s) Oral every 4 hours  predniSONE   Tablet 40 milliGRAM(s) Oral daily  senna 2 Tablet(s) Oral at bedtime  simethicone 80 milliGRAM(s) Chew four times a day  sucralfate 1 Gram(s) Oral every 12 hours    PRN MEDICATIONS  acetaminophen   Tablet .. 650 milliGRAM(s) Oral every 6 hours PRN  albuterol/ipratropium for Nebulization. 3 milliLiter(s) Nebulizer every 6 hours PRN  ALPRAZolam 0.5 milliGRAM(s) Oral at bedtime PRN  guaiFENesin    Syrup 200 milliGRAM(s) Oral every 6 hours PRN  methocarbamol 750 milliGRAM(s) Oral every 12 hours PRN    VITALS:   T(F): 96.5  HR: 79  BP: 129/60  RR: 18  SpO2: 97%    LABS:                        8.8    9.87  )-----------( 362      ( 19 Nov 2019 11:15 )             27.0                     Culture - Other (collected 17 Nov 2019 16:51)  Source: .Other Abdominal wound  Final Report (19 Nov 2019 18:34):    Numerous Staphylococcus aureus  Organism: Staphylococcus aureus (19 Nov 2019 18:34)  Organism: Staphylococcus aureus (19 Nov 2019 18:34)          RADIOLOGY:    < from: [facility] Duplex Lower Ext Vein Scan, Bilat (11.13.19 @ 08:01) >  Impression:    No evidence of deep venous thrombosis or superficial thrombophlebitis in   bilateral lower extremities.    < end of copied text >      < from: Xray Kidney Ureter Bladder (11.17.19 @ 12:43) >  Impression:  Nonobstructive bowel gas pattern.    < end of copied text >      PHYSICAL EXAM:  GENERAL: NAD, well-developed, 84y sitting up in bed   EENT: EOMI, conjunctiva and sclera clear, No nasal obstruction or discharge  RESPIRATORY: Decreased breath sounds at the bases, more prominent on right side .fine crackles at bases  .  On 2 L NC  CARDIOVASCULAR: Regular rate and rhythm; No murmurs, rubs, or gallops.   GASTROINTESTINAL: Abdomen Soft, Nondistended; Mild tenderness to palpation diffusely. Steri strips clean, dry, intact, no erythema, no drainage, no induration  MUSCULOSKELETAL:  No cyanosis/edema , extremities grossly symmetrical  PSYCH: AAOx3, affect appropriate  NEUROLOGY: non-focal, cognition intact          Assessment and Plan:   · Assessment		  83 yo M yo with PMH of CAD s/p NSTEMI Oct 1 w/ pci and stent x3, HTN ,DLD, Hypothyroidism presenting for shortness of breath, admitted with acute on chronic diastolic heart failure exacerbation.      #anemia:  - transfused 2 PRBC's on 711/19 and  lasix 40 iv after transfusion   - GI considering colonoscopy tomorrow after cardio and pulm clearance, hold Lovenox dose Am tomorrow   - one bloody bowel movement yesterday   - Monitor CBC    # Acute on Chronic Diastolic CHF Exacerbation with Acute Hypoxic Respiratory Failure  - B/L wheezing , will start on Lasix 40 bid, prednisone 40 bid and Symbicort .  -Cardiology following   - pulm: hold lasix for today then restart lasix 40 po od   - CT chest is negative for PE, but with bilateral pleural effusion  - EKG with no new ischemic changes  - BNP 2500, close to 2300 during last admission  - Troponin 0.04 and stable, denies chest pain  - LE Duplex neg  - Daily Chest X-Ray   - strict Is and Os, fluid and salt restriction  - BiPAP PRN and at night, keep spO2 >92  - No thoracentesis at this time, per pulmonology     # CAD, HTN, h/o V tach  - oral amio and metoprolol  - c/w aspirin, Plavix, statin and lisinopril    #Abdominal pain   - resolved   -Hx mesenteric ischemia October status post surgery  -Surgical sites healing well  -KUB wnl   -Miralax and hemmoroid suppositories       hypomagnesemia :  - replete mg  - fu mg level     # Hypothyroidism   -- c/w levothyroxine     DVT PPX : Lovenox  GI PPX: Not indicated  Diet: DASH  Activity: As tolerated  Disposition: Likely back to [facility]  CODE: FULL 24H events:    Patient is a 84y old Male who presents with a chief complaint of SOB (20 Nov 2019 08:30)    Primary diagnosis of Hypervolemia, unspecified hypervolemia type     patient was lying comfortably in bed, breathing fine , speaking full sentence .  no transfusion over night .     Today is hospital day 9d.     PAST MEDICAL & SURGICAL HISTORY  Neuropathy  TIA (transient ischemic attack)  Left carotid stenosis  Lyme disease  Hypothyroidism  Coronary artery disease  History of mesenteric infarction    SOCIAL HISTORY:  Negative for smoking/alcohol/drug use.     ALLERGIES:  iodinated radiocontrast agents (Hives)    MEDICATIONS:  STANDING MEDICATIONS  aMIOdarone    Tablet 200 milliGRAM(s) Oral daily  aspirin  chewable 81 milliGRAM(s) Oral daily  atorvastatin 40 milliGRAM(s) Oral at bedtime  budesonide  80 MICROgram(s)/formoterol 4.5 MICROgram(s) Inhaler 2 Puff(s) Inhalation two times a day  chlorhexidine 4% Liquid 1 Application(s) Topical <User Schedule>  clopidogrel Tablet 75 milliGRAM(s) Oral daily  enoxaparin Injectable 40 milliGRAM(s) SubCutaneous daily  hydrocortisone 2.5% Rectal Cream 1 Application(s) Rectal two times a day  iron sucrose IVPB 300 milliGRAM(s) IV Intermittent every 24 hours  levothyroxine 300 MICROGram(s) Oral daily  lisinopril 20 milliGRAM(s) Oral daily  magnesium oxide 400 milliGRAM(s) Oral three times a day with meals  metoprolol tartrate 12.5 milliGRAM(s) Oral two times a day  multivitamin 1 Tablet(s) Oral daily  nystatin    Suspension 087828 Unit(s) Oral two times a day  pantoprazole    Tablet 40 milliGRAM(s) Oral every 12 hours  polyethylene glycol 3350 17 Gram(s) Oral two times a day  potassium chloride    Tablet ER 40 milliEquivalent(s) Oral every 4 hours  predniSONE   Tablet 40 milliGRAM(s) Oral daily  senna 2 Tablet(s) Oral at bedtime  simethicone 80 milliGRAM(s) Chew four times a day  sucralfate 1 Gram(s) Oral every 12 hours    PRN MEDICATIONS  acetaminophen   Tablet .. 650 milliGRAM(s) Oral every 6 hours PRN  albuterol/ipratropium for Nebulization. 3 milliLiter(s) Nebulizer every 6 hours PRN  ALPRAZolam 0.5 milliGRAM(s) Oral at bedtime PRN  guaiFENesin    Syrup 200 milliGRAM(s) Oral every 6 hours PRN  methocarbamol 750 milliGRAM(s) Oral every 12 hours PRN    VITALS:   T(F): 96.5  HR: 79  BP: 129/60  RR: 18  SpO2: 97%    LABS:                        8.8    9.87  )-----------( 362      ( 19 Nov 2019 11:15 )             27.0     Culture - Other (collected 17 Nov 2019 16:51)  Source: .Other Abdominal wound  Final Report (19 Nov 2019 18:34):    Numerous Staphylococcus aureus  Organism: Staphylococcus aureus (19 Nov 2019 18:34)  Organism: Staphylococcus aureus (19 Nov 2019 18:34)      RADIOLOGY:    < from: [facility] Duplex Lower Ext Vein Scan, Bilat (11.13.19 @ 08:01) >  Impression:    No evidence of deep venous thrombosis or superficial thrombophlebitis in   bilateral lower extremities.    < end of copied text >      < from: Xray Kidney Ureter Bladder (11.17.19 @ 12:43) >  Impression:  Nonobstructive bowel gas pattern.    < end of copied text >      PHYSICAL EXAM:  GENERAL: NAD, well-developed, 84y sitting up in bed   EENT: EOMI, conjunctiva and sclera clear, No nasal obstruction or discharge  RESPIRATORY: Decreased breath sounds at the bases, more prominent on right side .fine crackles at bases  .  On 2 L NC  CARDIOVASCULAR: Regular rate and rhythm; No murmurs, rubs, or gallops.   GASTROINTESTINAL: Abdomen Soft, Nondistended; Mild tenderness to palpation diffusely. Steri strips clean, dry, intact, no erythema, no drainage, no induration  MUSCULOSKELETAL:  No cyanosis/edema , extremities grossly symmetrical  PSYCH: AAOx3, affect appropriate  NEUROLOGY: non-focal, cognition intact      Assessment and Plan:   · Assessment		  83 yo M yo with PMH of CAD s/p NSTEMI Oct 1 w/ pci and stent x3, HTN ,DLD, Hypothyroidism presenting for shortness of breath, admitted with acute on chronic diastolic heart failure exacerbation.      #anemia:  - transfused 2 PRBC's on 711/19 and  lasix 40 iv after transfusion   - GI considering colonoscopy tomorrow after cardio and pulm clearance, hold Lovenox dose Am tomorrow   - one bloody bowel movement yesterday   - Monitor CBC    # Acute on Chronic Diastolic CHF Exacerbation with Acute Hypoxic Respiratory Failure  - B/L wheezing , will start on Lasix 40 bid, prednisone 40 bid and Symbicort .  -Cardiology following   - pulm: hold lasix for today then restart lasix 40 po od   - CT chest is negative for PE, but with bilateral pleural effusion  - EKG with no new ischemic changes  - BNP 2500, close to 2300 during last admission  - Troponin 0.04 and stable, denies chest pain  - LE Duplex neg  - Daily Chest X-Ray   - strict Is and Os, fluid and salt restriction  - BiPAP PRN and at night, keep spO2 >92  - No thoracentesis at this time, per pulmonology     # CAD, HTN, h/o V tach  - oral amio and metoprolol  - c/w aspirin, Plavix, statin and lisinopril    #Abdominal pain   - resolved   -Hx mesenteric ischemia October status post surgery  -Surgical sites healing well  -KUB wnl   -Miralax and hemmoroid suppositories       hypomagnesemia :  - replete mg  - fu mg level     # Hypothyroidism   -- c/w levothyroxine     DVT PPX : Lovenox  GI PPX: Not indicated  Diet: DASH  Activity: As tolerated  Disposition: Likely back to [facility]  CODE: FULL     Attending Attestation    Pt has been seen and examined. Case and Plan discussed at rounds and with GI Team / Pt and Wife at bedside.  Overall pt is hemodynamically stable with melena / Acute Blood Loss Anemia and going for EGD / Colonoscopy tomorrow.  Pt is on clears no red and will be NPO past MN and start golytely and Ducolax for bowel prep.  Pt with recent urinary retention from ?BPH - give TOV and start flomax 0.4mg po qd - will need outpt  follow up.  Notified Nurse to provide bedside comode for bowel prep convenience - pt request   Hold all AC   C/W all other meds and PPIs  Pulmonary and Cardiology pre-op evals done     Dispo; EDG/C-Scope in am / f/u CBC / BMP

## 2019-11-20 NOTE — PROGRESS NOTE ADULT - SUBJECTIVE AND OBJECTIVE BOX
OVERNIGHT EVENTS: events noted, s/p Tx, afebrile    Vital Signs Last 24 Hrs  T(C): 35.8 (20 Nov 2019 05:14), Max: 35.8 (19 Nov 2019 12:45)  T(F): 96.5 (20 Nov 2019 05:14), Max: 96.5 (19 Nov 2019 12:45)  HR: 79 (20 Nov 2019 05:14) (79 - 89)  BP: 129/60 (20 Nov 2019 05:14) (98/62 - 159/73)  RR: 18 (20 Nov 2019 05:14) (18 - 18)  SpO2: 97% (19 Nov 2019 20:08) (90% - 97%)    PHYSICAL EXAMINATION:    GENERAL: The patient is awake and alert in no apparent distress.     HEENT: Head is normocephalic and atraumatic. Extraocular muscles are intact. Mucous membranes are moist.    NECK: Supple.    LUNGS: dec bs both bases    HEART: Regular rate and rhythm without murmur.    ABDOMEN: Soft, nontender, and nondistended.      EXTREMITIES: Without any cyanosis, clubbing, rash, lesions or edema.    NEUROLOGIC: Grossly intact.    SKIN: No ulceration or induration present.      LABS:                        8.8    9.87  )-----------( 362      ( 19 Nov 2019 11:15 )             27.0                                 11-19-19 @ 07:01  -  11-20-19 @ 07:00  --------------------------------------------------------  IN: 1947 mL / OUT: 3101 mL / NET: -1154 mL        MICROBIOLOGY:  Culture Results:   Numerous Staphylococcus aureus (11-17 @ 16:51)      MEDICATIONS  (STANDING):  aMIOdarone    Tablet 200 milliGRAM(s) Oral daily  aspirin  chewable 81 milliGRAM(s) Oral daily  atorvastatin 40 milliGRAM(s) Oral at bedtime  budesonide  80 MICROgram(s)/formoterol 4.5 MICROgram(s) Inhaler 2 Puff(s) Inhalation two times a day  chlorhexidine 4% Liquid 1 Application(s) Topical <User Schedule>  clopidogrel Tablet 75 milliGRAM(s) Oral daily  enoxaparin Injectable 40 milliGRAM(s) SubCutaneous daily  furosemide    Tablet 40 milliGRAM(s) Oral two times a day  hydrocortisone 2.5% Rectal Cream 1 Application(s) Rectal two times a day  iron sucrose IVPB 300 milliGRAM(s) IV Intermittent every 24 hours  levothyroxine 300 MICROGram(s) Oral daily  lisinopril 20 milliGRAM(s) Oral daily  magnesium oxide 400 milliGRAM(s) Oral three times a day with meals  metoprolol tartrate 12.5 milliGRAM(s) Oral two times a day  multivitamin 1 Tablet(s) Oral daily  nystatin    Suspension 950879 Unit(s) Oral two times a day  pantoprazole    Tablet 40 milliGRAM(s) Oral every 12 hours  polyethylene glycol 3350 17 Gram(s) Oral two times a day  potassium chloride    Tablet ER 40 milliEquivalent(s) Oral every 4 hours  predniSONE   Tablet 40 milliGRAM(s) Oral daily  senna 2 Tablet(s) Oral at bedtime  simethicone 80 milliGRAM(s) Chew four times a day  sucralfate 1 Gram(s) Oral every 12 hours    MEDICATIONS  (PRN):  acetaminophen   Tablet .. 650 milliGRAM(s) Oral every 6 hours PRN Temp greater or equal to 38C (100.4F), Mild Pain (1 - 3)  albuterol/ipratropium for Nebulization. 3 milliLiter(s) Nebulizer every 6 hours PRN Shortness of Breath and/or Wheezing  ALPRAZolam 0.5 milliGRAM(s) Oral at bedtime PRN Sleep  guaiFENesin    Syrup 200 milliGRAM(s) Oral every 6 hours PRN secretions  methocarbamol 750 milliGRAM(s) Oral every 12 hours PRN muscle pain      RADIOLOGY & ADDITIONAL STUDIES:

## 2019-11-20 NOTE — PROGRESS NOTE ADULT - ASSESSMENT
83 yo M yo with PMH of CAD s/p NSTEMI Oct 1 w/ pci and stent x3, HTN ,DLD, Hypothyroidism presenting for SOB. To note, patient was admitted on Oct 1st for NSTEMI and had 3 stents placed. pt also with recent complicated admission in october for abdominal pain, colitis and found to have mesenteric ischemia s/p exploratory laparotomy with a left external iliac to SMA bypass and 80 cm small bowel resection with primary anastomosis,  admission also was complicated by UGIB requiring endoscopic intervention for bleeding gastric ulcer. GI are consulted for now small amount of fresh blood per rectum on straining , with constipation.    #fresh blood per rectum:   hx of hemorrhoids , r/o hemorrhoidal bleed versus other possible causes ( diverticular , malignancy versus others)  patient is hemodynamically sable   patient on dual antiplatelet due to recent MI      REC:   trend cbc for now   keep hgb above 8  cardiology and pulmonary cleared for EGD and Colonoscopy and mention as intermediate risk for low risk procedure.  Please keep him on clear liquids, start the Golytely and Dulcolax 20mg HS.  Keep him NPO after midnight for EGD and Colonoscopy tomorrow.  Hold the dose of lovenox in am.  Correct electrolytes and CBC.

## 2019-11-21 NOTE — CHART NOTE - NSCHARTNOTEFT_GEN_A_CORE
hospital course:  83 yo M yo with PMH of CAD s/p NSTEMI Oct 1 w/ pci and stent x3, HTN ,DLD, Hypothyroidism presenting for SOB. at the time of the interview pt was placed on BiPAP and collateral history obtained from NH records. pt endorses progressive dyspnea and wheezing at [facility] where he was discharged for short term rehab. also endorses bilateral LE swelling and fatigue. CXR at that time showed left sided small pleural effusion. pt denies fever, chills, productive cough, abdominal pain, chest pain, palpitation or change in bowel/urinary habits.     to note, patient was admitted on Oct 1st for NSTEMI and had 3 stents placed. pt also with recent complicated admission in october for abdominal pain, colitis and found to have mesenteric ischemia s/p exploratory laparotomy with a left external iliac to SMA bypass and 80 cm small bowel resection with primary anastomosis. admission also was complicated by UGIB requiring endoscopic intervention for bleeding gastric ulcer.       patient's respiratoy symptoms improved , still on NC , but found to have bloody BM, and drop in H&H, 2 units of PRBC transfused and GI consult were placed. EGD /colonoscopy  today showed small intestinal bleed . patient was given 1 additional unit of PRBC and stable for now. surgery on board . Dr Cortez's stopped the aspirin and kept the Lasix for now, to follow with . started in IV PPI .   - f/u cbc at 8 pm   - nidhi labs/ cxr     PAST MEDICAL & SURGICAL HISTORY  Neuropathy  TIA (transient ischemic attack)  Left carotid stenosis  Lyme disease  Hypothyroidism  Coronary artery disease  History of mesenteric infarction    SOCIAL HISTORY:  Negative for smoking/alcohol/drug use.     ALLERGIES:  iodinated radiocontrast agents (Hives)    MEDICATIONS:  STANDING MEDICATIONS  aMIOdarone    Tablet 200 milliGRAM(s) Oral daily  aspirin  chewable 81 milliGRAM(s) Oral daily  atorvastatin 40 milliGRAM(s) Oral at bedtime  budesonide  80 MICROgram(s)/formoterol 4.5 MICROgram(s) Inhaler 2 Puff(s) Inhalation two times a day  chlorhexidine 4% Liquid 1 Application(s) Topical <User Schedule>  clopidogrel Tablet 75 milliGRAM(s) Oral daily  furosemide    Tablet 40 milliGRAM(s) Oral daily  hydrocortisone 2.5% Rectal Cream 1 Application(s) Rectal two times a day  levothyroxine 300 MICROGram(s) Oral daily  lisinopril 20 milliGRAM(s) Oral daily  magnesium oxide 400 milliGRAM(s) Oral three times a day with meals  metoprolol tartrate 12.5 milliGRAM(s) Oral two times a day  multivitamin 1 Tablet(s) Oral daily  nystatin    Suspension 432436 Unit(s) Oral two times a day  pantoprazole    Tablet 40 milliGRAM(s) Oral every 12 hours  pantoprazole  Injectable 40 milliGRAM(s) IV Push two times a day  pantoprazole  Injectable 20 milliGRAM(s) IV Push once  polyethylene glycol 3350 17 Gram(s) Oral two times a day  potassium chloride    Tablet ER 40 milliEquivalent(s) Oral every 4 hours  predniSONE   Tablet 40 milliGRAM(s) Oral daily  senna 2 Tablet(s) Oral at bedtime  simethicone 80 milliGRAM(s) Chew four times a day  sucralfate 1 Gram(s) Oral every 12 hours    PRN MEDICATIONS  acetaminophen   Tablet .. 650 milliGRAM(s) Oral every 6 hours PRN  albuterol/ipratropium for Nebulization. 3 milliLiter(s) Nebulizer every 6 hours PRN  ALPRAZolam 0.5 milliGRAM(s) Oral at bedtime PRN  guaiFENesin    Syrup 200 milliGRAM(s) Oral every 6 hours PRN  methocarbamol 750 milliGRAM(s) Oral every 12 hours PRN    VITALS:   T(F): 97.2  HR: 78  BP: 154/69  RR: 18  SpO2: 96%    LABS:                        8.3    9.11  )-----------( 381      ( 20 Nov 2019 11:37 )             26.6     11-20    137  |  91<L>  |  14  ----------------------------<  149<H>  3.6   |  34<H>  |  0.5<L>    Ca    7.6<L>      20 Nov 2019 11:37  Mg     2.1     11-20      RADIOLOGY:    < from: [facility] Duplex Lower Ext Vein Scan, Bilat (11.13.19 @ 08:01) >  Impression:    No evidence of deep venous thrombosis or superficial thrombophlebitis in   bilateral lower extremities.    < end of copied text >      < from: Xray Kidney Ureter Bladder (11.17.19 @ 12:43) >  Impression:  Nonobstructive bowel gas pattern.    < end of copied text >      PHYSICAL EXAM:  GENERAL: NAD, well-developed, 84y sitting up in bed   EENT: EOMI, conjunctiva and sclera clear, No nasal obstruction or discharge  RESPIRATORY: Decreased breath sounds at the bases, more prominent on right side .fine crackles at bases  .  On 2 L NC  CARDIOVASCULAR: Regular rate and rhythm; No murmurs, rubs, or gallops.   GASTROINTESTINAL: Abdomen Soft, Nondistended; Mild tenderness to palpation diffusely. Steri strips clean, dry, intact, no erythema, no drainage, no induration  MUSCULOSKELETAL:  No cyanosis/edema , extremities grossly symmetrical  PSYCH: AAOx3, affect appropriate  NEUROLOGY: non-focal, cognition intact      Assessment and Plan:   · Assessment    83 yo M yo with PMH of CAD s/p NSTEMI Oct 1 w/ pci and stent x3, HTN ,DLD, Hypothyroidism presenting for shortness of breath, admitted with acute on chronic diastolic heart failure exacerbation.      #anemia:  - transfused 2 PRBC's on 711/19  and  lasix 40 iv after transfusion  - 1 prbc on 11/21  after EGD   - colonoscopy/endoscopy done today : most likely small bowel bleeding   - FU surgery consult   - critical care consult   - Protonix IV 40 BID   - Monitor CBC    # Acute on Chronic Diastolic CHF Exacerbation with Acute Hypoxic Respiratory Failure  - B/L wheezing , will start on Lasix 40 bid, prednisone 40 bid and Symbicort .  -Cardiology following   - restart lasix 40 po od   - CT chest is negative for PE, but with bilateral pleural effusion  - EKG with no new ischemic changes  - BNP 2500, close to 2300 during last admission  - Troponin 0.04 and stable, denies chest pain  - LE Duplex neg  - Daily Chest X-Ray   - strict Is and Os, fluid and salt restriction  - BiPAP PRN and at night, keep spO2 >92  - No thoracentesis at this time, per pulmonology     # CAD, HTN, h/o V tach  - oral amio and metoprolol  - c/w aspirin, Plavix, statin and lisinopril    #Abdominal pain   - resolved   -Hx mesenteric ischemia October status post surgery  -Surgical sites healing well  -KUB wnl   -Miralax and hemorrhoid  suppositories       # hypomagnesemia :  - replete mg  - fu mg level     # Hypothyroidism   -- c/w levothyroxine     DVT PPX : Lovenox  GI PPX: protonix IV   Diet: DASH  Activity: As tolerated  Disposition: Likely back to [facility]  CODE: FULL

## 2019-11-21 NOTE — PROGRESS NOTE ADULT - SUBJECTIVE AND OBJECTIVE BOX
24H events:    Patient is a 84y old Male who presents with a chief complaint of SOB (20 Nov 2019 12:33)    Primary diagnosis of Hypervolemia, unspecified hypervolemia type     Today is hospital day 10d.     PAST MEDICAL & SURGICAL HISTORY  Neuropathy  TIA (transient ischemic attack)  Left carotid stenosis  Lyme disease  Hypothyroidism  Coronary artery disease  History of mesenteric infarction    SOCIAL HISTORY:  Negative for smoking/alcohol/drug use.     ALLERGIES:  iodinated radiocontrast agents (Hives)    MEDICATIONS:  STANDING MEDICATIONS  aMIOdarone    Tablet 200 milliGRAM(s) Oral daily  aspirin  chewable 81 milliGRAM(s) Oral daily  atorvastatin 40 milliGRAM(s) Oral at bedtime  budesonide  80 MICROgram(s)/formoterol 4.5 MICROgram(s) Inhaler 2 Puff(s) Inhalation two times a day  chlorhexidine 4% Liquid 1 Application(s) Topical <User Schedule>  clopidogrel Tablet 75 milliGRAM(s) Oral daily  furosemide    Tablet 40 milliGRAM(s) Oral daily  hydrocortisone 2.5% Rectal Cream 1 Application(s) Rectal two times a day  levothyroxine 300 MICROGram(s) Oral daily  lisinopril 20 milliGRAM(s) Oral daily  magnesium oxide 400 milliGRAM(s) Oral three times a day with meals  metoprolol tartrate 12.5 milliGRAM(s) Oral two times a day  multivitamin 1 Tablet(s) Oral daily  nystatin    Suspension 050554 Unit(s) Oral two times a day  pantoprazole    Tablet 40 milliGRAM(s) Oral every 12 hours  pantoprazole  Injectable 40 milliGRAM(s) IV Push two times a day  pantoprazole  Injectable 20 milliGRAM(s) IV Push once  polyethylene glycol 3350 17 Gram(s) Oral two times a day  potassium chloride    Tablet ER 40 milliEquivalent(s) Oral every 4 hours  predniSONE   Tablet 40 milliGRAM(s) Oral daily  senna 2 Tablet(s) Oral at bedtime  simethicone 80 milliGRAM(s) Chew four times a day  sucralfate 1 Gram(s) Oral every 12 hours    PRN MEDICATIONS  acetaminophen   Tablet .. 650 milliGRAM(s) Oral every 6 hours PRN  albuterol/ipratropium for Nebulization. 3 milliLiter(s) Nebulizer every 6 hours PRN  ALPRAZolam 0.5 milliGRAM(s) Oral at bedtime PRN  guaiFENesin    Syrup 200 milliGRAM(s) Oral every 6 hours PRN  methocarbamol 750 milliGRAM(s) Oral every 12 hours PRN    VITALS:   T(F): 97.2  HR: 78  BP: 154/69  RR: 18  SpO2: 96%    LABS:                        8.3    9.11  )-----------( 381      ( 20 Nov 2019 11:37 )             26.6     11-20    137  |  91<L>  |  14  ----------------------------<  149<H>  3.6   |  34<H>  |  0.5<L>    Ca    7.6<L>      20 Nov 2019 11:37  Mg     2.1     11-20      RADIOLOGY:    < from: [facility] Duplex Lower Ext Vein Scan, Bilat (11.13.19 @ 08:01) >  Impression:    No evidence of deep venous thrombosis or superficial thrombophlebitis in   bilateral lower extremities.    < end of copied text >      < from: Xray Kidney Ureter Bladder (11.17.19 @ 12:43) >  Impression:  Nonobstructive bowel gas pattern.    < end of copied text >      PHYSICAL EXAM:  GENERAL: NAD, well-developed, 84y sitting up in bed   EENT: EOMI, conjunctiva and sclera clear, No nasal obstruction or discharge  RESPIRATORY: Decreased breath sounds at the bases, more prominent on right side .fine crackles at bases  .  On 2 L NC  CARDIOVASCULAR: Regular rate and rhythm; No murmurs, rubs, or gallops.   GASTROINTESTINAL: Abdomen Soft, Nondistended; Mild tenderness to palpation diffusely. Steri strips clean, dry, intact, no erythema, no drainage, no induration  MUSCULOSKELETAL:  No cyanosis/edema , extremities grossly symmetrical  PSYCH: AAOx3, affect appropriate  NEUROLOGY: non-focal, cognition intact      Assessment and Plan:   · Assessment		  83 yo M yo with PMH of CAD s/p NSTEMI Oct 1 w/ pci and stent x3, HTN ,DLD, Hypothyroidism presenting for shortness of breath, admitted with acute on chronic diastolic heart failure exacerbation.      #anemia:  - transfused 2 PRBC's on 711/19 and  lasix 40 iv after transfusion   - GI considering colonoscopy/endoscopy today   - Monitor CBC    # Acute on Chronic Diastolic CHF Exacerbation with Acute Hypoxic Respiratory Failure  - B/L wheezing , will start on Lasix 40 bid, prednisone 40 bid and Symbicort .  -Cardiology following   - restart lasix 40 po od   - CT chest is negative for PE, but with bilateral pleural effusion  - EKG with no new ischemic changes  - BNP 2500, close to 2300 during last admission  - Troponin 0.04 and stable, denies chest pain  - LE Duplex neg  - Daily Chest X-Ray   - strict Is and Os, fluid and salt restriction  - BiPAP PRN and at night, keep spO2 >92  - No thoracentesis at this time, per pulmonology     # CAD, HTN, h/o V tach  - oral amio and metoprolol  - c/w aspirin, Plavix, statin and lisinopril    #Abdominal pain   - resolved   -Hx mesenteric ischemia October status post surgery  -Surgical sites healing well  -KUB wnl   -Miralax and hemmoroid suppositories       # hypomagnesemia :  - replete mg  - fu mg level     # Hypothyroidism   -- c/w levothyroxine     DVT PPX : Lovenox  GI PPX: Not indicated  Diet: DASH  Activity: As tolerated  Disposition: Likely back to [facility]  CODE: FULL 24H events:    Patient is a 84y old Male who presents with a chief complaint of SOB (20 Nov 2019 12:33)    Primary diagnosis of Hypervolemia, unspecified hypervolemia type     Today is hospital day 10d.     PAST MEDICAL & SURGICAL HISTORY  Neuropathy  TIA (transient ischemic attack)  Left carotid stenosis  Lyme disease  Hypothyroidism  Coronary artery disease  History of mesenteric infarction    SOCIAL HISTORY:  Negative for smoking/alcohol/drug use.     ALLERGIES:  iodinated radiocontrast agents (Hives)    MEDICATIONS:  STANDING MEDICATIONS  aMIOdarone    Tablet 200 milliGRAM(s) Oral daily  aspirin  chewable 81 milliGRAM(s) Oral daily  atorvastatin 40 milliGRAM(s) Oral at bedtime  budesonide  80 MICROgram(s)/formoterol 4.5 MICROgram(s) Inhaler 2 Puff(s) Inhalation two times a day  chlorhexidine 4% Liquid 1 Application(s) Topical <User Schedule>  clopidogrel Tablet 75 milliGRAM(s) Oral daily  furosemide    Tablet 40 milliGRAM(s) Oral daily  hydrocortisone 2.5% Rectal Cream 1 Application(s) Rectal two times a day  levothyroxine 300 MICROGram(s) Oral daily  lisinopril 20 milliGRAM(s) Oral daily  magnesium oxide 400 milliGRAM(s) Oral three times a day with meals  metoprolol tartrate 12.5 milliGRAM(s) Oral two times a day  multivitamin 1 Tablet(s) Oral daily  nystatin    Suspension 950079 Unit(s) Oral two times a day  pantoprazole    Tablet 40 milliGRAM(s) Oral every 12 hours  pantoprazole  Injectable 40 milliGRAM(s) IV Push two times a day  pantoprazole  Injectable 20 milliGRAM(s) IV Push once  polyethylene glycol 3350 17 Gram(s) Oral two times a day  potassium chloride    Tablet ER 40 milliEquivalent(s) Oral every 4 hours  predniSONE   Tablet 40 milliGRAM(s) Oral daily  senna 2 Tablet(s) Oral at bedtime  simethicone 80 milliGRAM(s) Chew four times a day  sucralfate 1 Gram(s) Oral every 12 hours    PRN MEDICATIONS  acetaminophen   Tablet .. 650 milliGRAM(s) Oral every 6 hours PRN  albuterol/ipratropium for Nebulization. 3 milliLiter(s) Nebulizer every 6 hours PRN  ALPRAZolam 0.5 milliGRAM(s) Oral at bedtime PRN  guaiFENesin    Syrup 200 milliGRAM(s) Oral every 6 hours PRN  methocarbamol 750 milliGRAM(s) Oral every 12 hours PRN    VITALS:   T(F): 97.2  HR: 78  BP: 154/69  RR: 18  SpO2: 96%    LABS:                        8.3    9.11  )-----------( 381      ( 20 Nov 2019 11:37 )             26.6     11-20    137  |  91<L>  |  14  ----------------------------<  149<H>  3.6   |  34<H>  |  0.5<L>    Ca    7.6<L>      20 Nov 2019 11:37  Mg     2.1     11-20      RADIOLOGY:    < from: [facility] Duplex Lower Ext Vein Scan, Bilat (11.13.19 @ 08:01) >  Impression:    No evidence of deep venous thrombosis or superficial thrombophlebitis in   bilateral lower extremities.    < end of copied text >      < from: Xray Kidney Ureter Bladder (11.17.19 @ 12:43) >  Impression:  Nonobstructive bowel gas pattern.    < end of copied text >      PHYSICAL EXAM:  GENERAL: NAD, well-developed, 84y sitting up in bed   EENT: EOMI, conjunctiva and sclera clear, No nasal obstruction or discharge  RESPIRATORY: Decreased breath sounds at the bases, more prominent on right side .fine crackles at bases  .  On 2 L NC  CARDIOVASCULAR: Regular rate and rhythm; No murmurs, rubs, or gallops.   GASTROINTESTINAL: Abdomen Soft, Nondistended; Mild tenderness to palpation diffusely. Steri strips clean, dry, intact, no erythema, no drainage, no induration  MUSCULOSKELETAL:  No cyanosis/edema , extremities grossly symmetrical  PSYCH: AAOx3, affect appropriate  NEUROLOGY: non-focal, cognition intact      Assessment and Plan:   · Assessment		  83 yo M yo with PMH of CAD s/p NSTEMI Oct 1 w/ pci and stent x3, HTN ,DLD, Hypothyroidism presenting for shortness of breath, admitted with acute on chronic diastolic heart failure exacerbation.      #anemia:  - transfused 2 PRBC's on 711/19  and  lasix 40 iv after transfusion  - 1 prbc on 11/21  after EGD   - colonoscopy/endoscopy done today : most likely small bowel bleeding   - FU surgery consult   - critical care consult   - Protonix IV 40 BID   - Monitor CBC    # Acute on Chronic Diastolic CHF Exacerbation with Acute Hypoxic Respiratory Failure  - B/L wheezing , will start on Lasix 40 bid, prednisone 40 bid and Symbicort .  -Cardiology following   - restart lasix 40 po od   - CT chest is negative for PE, but with bilateral pleural effusion  - EKG with no new ischemic changes  - BNP 2500, close to 2300 during last admission  - Troponin 0.04 and stable, denies chest pain  - LE Duplex neg  - Daily Chest X-Ray   - strict Is and Os, fluid and salt restriction  - BiPAP PRN and at night, keep spO2 >92  - No thoracentesis at this time, per pulmonology     # CAD, HTN, h/o V tach  - oral amio and metoprolol  - c/w aspirin, Plavix, statin and lisinopril    #Abdominal pain   - resolved   -Hx mesenteric ischemia October status post surgery  -Surgical sites healing well  -KUB wnl   -Miralax and hemorrhoid  suppositories       # hypomagnesemia :  - replete mg  - fu mg level     # Hypothyroidism   -- c/w levothyroxine     DVT PPX : Lovenox  GI PPX: protonix IV   Diet: DASH  Activity: As tolerated  Disposition: Likely back to [facility]  CODE: FULL

## 2019-11-21 NOTE — PROGRESS NOTE ADULT - SUBJECTIVE AND OBJECTIVE BOX
LAKSHMI JUDD  84y Male    CHIEF COMPLAINT:    Patient is a 84y old  Male who presents with a chief complaint of SOB (21 Nov 2019 09:49)      INTERVAL HPI/OVERNIGHT EVENTS:    Patient seen and examined. Continues to have on & off rectal bleeding. No sob. No cp. Scheduled for EGD/Colonoscopy today.       ROS: All other systems are negative.    Vital Signs:    T(F): 97.2 (11-21-19 @ 07:49), Max: 98 (11-21-19 @ 05:25)  HR: 78 (11-21-19 @ 07:49) (78 - 85)  BP: 154/69 (11-21-19 @ 07:49) (115/58 - 154/69)  RR: 18 (11-21-19 @ 07:49) (18 - 20)  SpO2: 96% (11-21-19 @ 07:49) (96% - 98%)  I&O's Summary    20 Nov 2019 07:01  -  21 Nov 2019 07:00  --------------------------------------------------------  IN: 0 mL / OUT: 1504 mL / NET: -1504 mL      Daily     Daily   CAPILLARY BLOOD GLUCOSE          PHYSICAL EXAM:    GENERAL:  NAD  SKIN: No rashes or lesions  HENT: Atraumatic Normocephalic. PERRL. Moist membranes.  NECK: Supple, No JVD. No lymphadenopathy.  PULMONARY: BS are decreased in the Rt. Lower chest and Lt. Base. No wheezing. No rales  CVS: Normal S1, S2. Rate and Rhythm are regular. No murmurs.  ABDOMEN/GI: Soft, Nontender, Nondistended; BS present  EXTREMITIES: Peripheral pulses intact. No edema B/L LE.  NEUROLOGIC:  No motor or sensory deficit.  PSYCH: Alert & oriented x 3    Consultant(s) Notes Reviewed:  [x ] YES  [ ] NO  Care Discussed with Consultants/Other Providers [ x] YES  [ ] NO    EKG reviewed  Telemetry reviewed    LABS:                        8.3    9.11  )-----------( 381      ( 20 Nov 2019 11:37 )             26.6   Hemoglobin: 8.3 g/dL (11-20 @ 11:37)  Hemoglobin: 8.8 g/dL (11-19 @ 11:15)  Hemoglobin: 6.8 g/dL (11-19 @ 00:19)  Hemoglobin: 7.6 g/dL (11-18 @ 11:55)  Hemoglobin: 7.7 g/dL (11-18 @ 05:58)  Hemoglobin: 7.5 g/dL (11-17 @ 16:57)  Hemoglobin: 8.7 g/dL (11-17 @ 06:05)    11-20    137  |  91<L>  |  14  ----------------------------<  149<H>  3.6   |  34<H>  |  0.5<L>    Ca    7.6<L>      20 Nov 2019 11:37  Mg     2.1     11-20                RADIOLOGY & ADDITIONAL TESTS:      Imaging or report Personally Reviewed:  [ ] YES  [ ] NO    Medications:  Standing  aMIOdarone    Tablet 200 milliGRAM(s) Oral daily  aspirin  chewable 81 milliGRAM(s) Oral daily  atorvastatin 40 milliGRAM(s) Oral at bedtime  budesonide  80 MICROgram(s)/formoterol 4.5 MICROgram(s) Inhaler 2 Puff(s) Inhalation two times a day  chlorhexidine 4% Liquid 1 Application(s) Topical <User Schedule>  clopidogrel Tablet 75 milliGRAM(s) Oral daily  furosemide    Tablet 40 milliGRAM(s) Oral daily  hydrocortisone 2.5% Rectal Cream 1 Application(s) Rectal two times a day  levothyroxine 300 MICROGram(s) Oral daily  lisinopril 20 milliGRAM(s) Oral daily  magnesium oxide 400 milliGRAM(s) Oral three times a day with meals  metoprolol tartrate 12.5 milliGRAM(s) Oral two times a day  multivitamin 1 Tablet(s) Oral daily  nystatin    Suspension 139341 Unit(s) Oral two times a day  pantoprazole    Tablet 40 milliGRAM(s) Oral every 12 hours  pantoprazole  Injectable 40 milliGRAM(s) IV Push two times a day  pantoprazole  Injectable 20 milliGRAM(s) IV Push once  polyethylene glycol 3350 17 Gram(s) Oral two times a day  potassium chloride    Tablet ER 40 milliEquivalent(s) Oral every 4 hours  predniSONE   Tablet 40 milliGRAM(s) Oral daily  senna 2 Tablet(s) Oral at bedtime  simethicone 80 milliGRAM(s) Chew four times a day  sucralfate 1 Gram(s) Oral every 12 hours    PRN Meds  acetaminophen   Tablet .. 650 milliGRAM(s) Oral every 6 hours PRN  albuterol/ipratropium for Nebulization. 3 milliLiter(s) Nebulizer every 6 hours PRN  ALPRAZolam 0.5 milliGRAM(s) Oral at bedtime PRN  guaiFENesin    Syrup 200 milliGRAM(s) Oral every 6 hours PRN  methocarbamol 750 milliGRAM(s) Oral every 12 hours PRN      Case discussed with resident    Care discussed with pt/family

## 2019-11-21 NOTE — PROGRESS NOTE ADULT - ASSESSMENT
83 yo M yo with PMH of CAD s/p NSTEMI Oct 1 w/ pci and stent x3, HTN ,DLD, Hypothyroidism presenting for for progressively worsening SOB. No cough. No fever. No cp.     1.	Ac. HFpEF  2.	Ac. Hypoxic respiratory failure on admission due to #1  3.	B/L pleural effusions.   4.	CAD S/P recent PCI  5.	HTN / DL  6.	Hypothyroidism  7.	Rectal bleeding    8.	Hemorrhoids         PLAN:    ·	Scheduled for EGD & Colonoscopy today. Cont NPO  ·	S/P 2 units of PRBC'S. Follow H/H. Keep Hb >8.  ·	S/P total 600 mg of Venofer.   ·	Anusol suppository for hemorrhoids.   ·	Repeat cxr showed B/L pleural effusions  ·	Care D/W the pulmonary. No need for thoracentesis  ·	Cont Symbicort twice a day. Can D/C Prednisone tomorrow.   ·	Cont Lasix 40 mg po daily.    ·	Check i's and o's and daily wt.   ·	Low salt diet and water restriction to 1.5 L/C.   ·	ECHO reviewed. NLVF. EF 50-55%.  ·	Cont ASA, Plavix and his other meds.     #Progress Note Handoff  Pending (specify):  Consults_________, Tests________, Test Results_______, Other_GIB_____  Family discussion: Plan of care D/W the wife.   Disposition: Home___/SNF___/Other________/Unknown at this time___x_____

## 2019-11-22 NOTE — PROGRESS NOTE ADULT - SUBJECTIVE AND OBJECTIVE BOX
LAKSHMI JUDD  84y Male    CHIEF COMPLAINT:    Patient is a 84y old  Male who presents with a chief complaint of SOB (2019 05:08)      INTERVAL HPI/OVERNIGHT EVENTS:    Patient seen and examined.    ROS: All other systems are negative.    Vital Signs:    T(F): 97.4 (19 @ 04:00), Max: 98.4 (19 @ 12:06)  HR: 70 (19 @ 06:00) (70 - 79)  BP: 139/52 (19 @ 06:00) (119/57 - 154/69)  RR: 27 (19 @ 06:00) (18 - 29)  SpO2: 95% (19 @ 06:00) (93% - 98%)  I&O's Summary    2019 07:01  -  2019 07:00  --------------------------------------------------------  IN: 490 mL / OUT: 850 mL / NET: -360 mL      Daily Height in cm: 172.72 (2019 00:45)    Daily Weight in k.9 (2019 05:00)  CAPILLARY BLOOD GLUCOSE      POCT Blood Glucose.: 158 mg/dL (2019 02:09)  POCT Blood Glucose.: 85 mg/dL (2019 00:46)      PHYSICAL EXAM:    GENERAL:  NAD  SKIN: No rashes or lesions  HENT: Atrumatic. Normocephalic. PERRL. Moist membranes.  NECK: Supple, No JVD. No lymphadenopathy.  PULMONARY: CTA B/L. No wheezing. No rales  CVS: Normal S1, S2. Rate and Rythm are regular. No murmurs.  ABDOMEN/GI: Soft, Nontender, Nondistended; BS present  EXTREMITIES: Peripheral pulses intact. No edema B/L LE.  NEUROLOGIC:  No motor or sensory deficit.  PSYCH: Alert & oriented x 3    Consultant(s) Notes Reviewed:  [x ] YES  [ ] NO  Care Discussed with Consultants/Other Providers [ x] YES  [ ] NO    EKG reviewed  Telemetry reviewed    LABS:                        8.4    9.64  )-----------( 401      ( 2019 04:23 )             27.3   Hemoglobin: 8.4 g/dL ( @ 04:23)  Hemoglobin: 8.8 g/dL ( @ 22:25)  Hemoglobin: 8.3 g/dL ( @ 11:37)  Hemoglobin: 8.8 g/dL ( @ 11:15)  Hemoglobin: 6.8 g/dL ( @ 00:19)  Hemoglobin: 7.6 g/dL ( @ 11:55)  Hemoglobin: 7.7 g/dL ( @ 05:58)  Hemoglobin: 7.5 g/dL ( @ 16:57)        138  |  96<L>  |  11  ----------------------------<  108<H>  3.8   |  30  |  <0.5<L>    Ca    8.0<L>      2019 04:23  Mg     2.2         TPro  5.3<L>  /  Alb  2.6<L>  /  TBili  0.5  /  DBili  x   /  AST  75<H>  /  ALT  99<H>  /  AlkPhos  87                RADIOLOGY & ADDITIONAL TESTS:    < from: Colonoscopy (19 @ 08:00) >  Impressions:    Internal hemorrhoids.    Large amount of maroon blood seen throughout the colon and also in the small  intestine. No site of active bleeding identified. .     < end of copied text >    Imaging or report Personally Reviewed:  [ ] YES  [ ] NO    Medications:  Standing  aMIOdarone    Tablet 200 milliGRAM(s) Oral daily  atorvastatin 40 milliGRAM(s) Oral at bedtime  budesonide  80 MICROgram(s)/formoterol 4.5 MICROgram(s) Inhaler 2 Puff(s) Inhalation two times a day  chlorhexidine 4% Liquid 1 Application(s) Topical <User Schedule>  clopidogrel Tablet 75 milliGRAM(s) Oral daily  furosemide    Tablet 40 milliGRAM(s) Oral daily  hydrocortisone 2.5% Rectal Cream 1 Application(s) Rectal two times a day  levothyroxine 300 MICROGram(s) Oral daily  lisinopril 20 milliGRAM(s) Oral daily  magnesium oxide 400 milliGRAM(s) Oral three times a day with meals  metoprolol tartrate 12.5 milliGRAM(s) Oral two times a day  multivitamin 1 Tablet(s) Oral daily  nystatin    Suspension 270134 Unit(s) Oral two times a day  pantoprazole  Injectable 40 milliGRAM(s) IV Push two times a day  polyethylene glycol 3350 17 Gram(s) Oral two times a day  potassium chloride    Tablet ER 40 milliEquivalent(s) Oral every 4 hours  predniSONE   Tablet 40 milliGRAM(s) Oral daily  senna 2 Tablet(s) Oral at bedtime  simethicone 80 milliGRAM(s) Chew four times a day  sucralfate 1 Gram(s) Oral every 12 hours    PRN Meds  acetaminophen   Tablet .. 650 milliGRAM(s) Oral every 6 hours PRN  albuterol/ipratropium for Nebulization. 3 milliLiter(s) Nebulizer every 6 hours PRN  guaiFENesin    Syrup 200 milliGRAM(s) Oral every 6 hours PRN  methocarbamol 750 milliGRAM(s) Oral every 12 hours PRN      Case discussed with resident    Care discussed with pt/family LAKSHMI JUDD  84y Male    CHIEF COMPLAINT:    Patient is a 84y old  Male who presents with a chief complaint of SOB (2019 05:08)      INTERVAL HPI/OVERNIGHT EVENTS:    Patient seen and examined. No BM today. So far no rectal bleeding. Received one unit of PRBC'S. No N/V. No sob. No cp    ROS: All other systems are negative.    Vital Signs:    T(F): 97.4 (19 @ 04:00), Max: 98.4 (19 @ 12:06)  HR: 70 (19 @ 06:00) (70 - 79)  BP: 139/52 (19 @ 06:00) (119/57 - 154/69)  RR: 27 (19 @ 06:00) (18 - 29)  SpO2: 95% (19 @ 06:00) (93% - 98%)  I&O's Summary    2019 07:01  -  2019 07:00  --------------------------------------------------------  IN: 490 mL / OUT: 850 mL / NET: -360 mL      Daily Height in cm: 172.72 (2019 00:45)    Daily Weight in k.9 (2019 05:00)  CAPILLARY BLOOD GLUCOSE      POCT Blood Glucose.: 158 mg/dL (2019 02:09)  POCT Blood Glucose.: 85 mg/dL (2019 00:46)      PHYSICAL EXAM:    GENERAL:  NAD  SKIN: No rashes or lesions  HENT: Atraumatic Normocephalic. PERRL. Moist membranes.  NECK: Supple, No JVD. No lymphadenopathy.  PULMONARY: CTA B/L. No wheezing. No rales  CVS: Normal S1, S2. Rate and Rhythm are regular. No murmurs.  ABDOMEN/GI: Soft, Nontender, Nondistended; BS present  EXTREMITIES: Peripheral pulses intact. No edema B/L LE.  NEUROLOGIC:  No motor or sensory deficit.  PSYCH: Alert & oriented x 3    Consultant(s) Notes Reviewed:  [x ] YES  [ ] NO  Care Discussed with Consultants/Other Providers [ x] YES  [ ] NO    EKG reviewed  Telemetry reviewed    LABS:                        8.4    9.64  )-----------( 401      ( 2019 04:23 )             27.3   Hemoglobin: 8.4 g/dL ( @ 04:23)  Hemoglobin: 8.8 g/dL ( @ 22:25)  Hemoglobin: 8.3 g/dL ( @ 11:37)  Hemoglobin: 8.8 g/dL ( @ 11:15)  Hemoglobin: 6.8 g/dL ( @ 00:19)  Hemoglobin: 7.6 g/dL ( @ 11:55)  Hemoglobin: 7.7 g/dL ( @ 05:58)  Hemoglobin: 7.5 g/dL ( @ 16:57)        138  |  96<L>  |  11  ----------------------------<  108<H>  3.8   |  30  |  <0.5<L>    Ca    8.0<L>      2019 04:23  Mg     2.2         TPro  5.3<L>  /  Alb  2.6<L>  /  TBili  0.5  /  DBili  x   /  AST  75<H>  /  ALT  99<H>  /  AlkPhos  87                RADIOLOGY & ADDITIONAL TESTS:    < from: Colonoscopy (19 @ 08:00) >  Impressions:    Internal hemorrhoids.    Large amount of maroon blood seen throughout the colon and also in the small  intestine. No site of active bleeding identified. .     < end of copied text >    Imaging or report Personally Reviewed:  [ ] YES  [ ] NO    Medications:  Standing  aMIOdarone    Tablet 200 milliGRAM(s) Oral daily  atorvastatin 40 milliGRAM(s) Oral at bedtime  budesonide  80 MICROgram(s)/formoterol 4.5 MICROgram(s) Inhaler 2 Puff(s) Inhalation two times a day  chlorhexidine 4% Liquid 1 Application(s) Topical <User Schedule>  clopidogrel Tablet 75 milliGRAM(s) Oral daily  furosemide    Tablet 40 milliGRAM(s) Oral daily  hydrocortisone 2.5% Rectal Cream 1 Application(s) Rectal two times a day  levothyroxine 300 MICROGram(s) Oral daily  lisinopril 20 milliGRAM(s) Oral daily  magnesium oxide 400 milliGRAM(s) Oral three times a day with meals  metoprolol tartrate 12.5 milliGRAM(s) Oral two times a day  multivitamin 1 Tablet(s) Oral daily  nystatin    Suspension 321052 Unit(s) Oral two times a day  pantoprazole  Injectable 40 milliGRAM(s) IV Push two times a day  polyethylene glycol 3350 17 Gram(s) Oral two times a day  potassium chloride    Tablet ER 40 milliEquivalent(s) Oral every 4 hours  predniSONE   Tablet 40 milliGRAM(s) Oral daily  senna 2 Tablet(s) Oral at bedtime  simethicone 80 milliGRAM(s) Chew four times a day  sucralfate 1 Gram(s) Oral every 12 hours    PRN Meds  acetaminophen   Tablet .. 650 milliGRAM(s) Oral every 6 hours PRN  albuterol/ipratropium for Nebulization. 3 milliLiter(s) Nebulizer every 6 hours PRN  guaiFENesin    Syrup 200 milliGRAM(s) Oral every 6 hours PRN  methocarbamol 750 milliGRAM(s) Oral every 12 hours PRN      Case discussed with resident    Care discussed with pt/family LAKSHMI JUDD  84y Male    CHIEF COMPLAINT:    Patient is a 84y old  Male who presents with a chief complaint of SOB (2019 05:08)      INTERVAL HPI/OVERNIGHT EVENTS:    Patient seen and examined. No BM today. So far no rectal bleeding. Received one unit of PRBC'S. No N/V. No sob. No cp    ROS: All other systems are negative.    Vital Signs:    T(F): 97.4 (19 @ 04:00), Max: 98.4 (19 @ 12:06)  HR: 70 (19 @ 06:00) (70 - 79)  BP: 139/52 (19 @ 06:00) (119/57 - 154/69)  RR: 27 (19 @ 06:00) (18 - 29)  SpO2: 95% (19 @ 06:00) (93% - 98%)  I&O's Summary    2019 07:01  -  2019 07:00  --------------------------------------------------------  IN: 490 mL / OUT: 850 mL / NET: -360 mL      Daily Height in cm: 172.72 (2019 00:45)    Daily Weight in k.9 (2019 05:00)  CAPILLARY BLOOD GLUCOSE      POCT Blood Glucose.: 158 mg/dL (2019 02:09)  POCT Blood Glucose.: 85 mg/dL (2019 00:46)      PHYSICAL EXAM:    GENERAL:  NAD  SKIN: No rashes or lesions  HENT: Atraumatic Normocephalic. PERRL. Moist membranes.  NECK: Supple, No JVD. No lymphadenopathy.  PULMONARY: CTA B/L. No wheezing. No rales  CVS: Normal S1, S2. Rate and Rhythm are regular. No murmurs.  ABDOMEN/GI: Soft, Nontender, Nondistended; BS present. Open wound in the lower abdomen.  EXTREMITIES: Peripheral pulses intact. No edema B/L LE.  NEUROLOGIC:  No motor or sensory deficit.  PSYCH: Alert & oriented x 3    Consultant(s) Notes Reviewed:  [x ] YES  [ ] NO  Care Discussed with Consultants/Other Providers [ x] YES  [ ] NO    EKG reviewed  Telemetry reviewed    LABS:                        8.4    9.64  )-----------( 401      ( 2019 04:23 )             27.3   Hemoglobin: 8.4 g/dL ( @ 04:23)  Hemoglobin: 8.8 g/dL ( @ 22:25)  Hemoglobin: 8.3 g/dL ( @ 11:37)  Hemoglobin: 8.8 g/dL ( @ 11:15)  Hemoglobin: 6.8 g/dL ( @ 00:19)  Hemoglobin: 7.6 g/dL ( @ 11:55)  Hemoglobin: 7.7 g/dL ( @ 05:58)  Hemoglobin: 7.5 g/dL ( @ 16:57)        138  |  96<L>  |  11  ----------------------------<  108<H>  3.8   |  30  |  <0.5<L>    Ca    8.0<L>      2019 04:23  Mg     2.2         TPro  5.3<L>  /  Alb  2.6<L>  /  TBili  0.5  /  DBili  x   /  AST  75<H>  /  ALT  99<H>  /  AlkPhos  87                RADIOLOGY & ADDITIONAL TESTS:    < from: Colonoscopy (19 @ 08:00) >  Impressions:    Internal hemorrhoids.    Large amount of maroon blood seen throughout the colon and also in the small  intestine. No site of active bleeding identified. .     < end of copied text >    Imaging or report Personally Reviewed:  [ ] YES  [ ] NO    Medications:  Standing  aMIOdarone    Tablet 200 milliGRAM(s) Oral daily  atorvastatin 40 milliGRAM(s) Oral at bedtime  budesonide  80 MICROgram(s)/formoterol 4.5 MICROgram(s) Inhaler 2 Puff(s) Inhalation two times a day  chlorhexidine 4% Liquid 1 Application(s) Topical <User Schedule>  clopidogrel Tablet 75 milliGRAM(s) Oral daily  furosemide    Tablet 40 milliGRAM(s) Oral daily  hydrocortisone 2.5% Rectal Cream 1 Application(s) Rectal two times a day  levothyroxine 300 MICROGram(s) Oral daily  lisinopril 20 milliGRAM(s) Oral daily  magnesium oxide 400 milliGRAM(s) Oral three times a day with meals  metoprolol tartrate 12.5 milliGRAM(s) Oral two times a day  multivitamin 1 Tablet(s) Oral daily  nystatin    Suspension 290351 Unit(s) Oral two times a day  pantoprazole  Injectable 40 milliGRAM(s) IV Push two times a day  polyethylene glycol 3350 17 Gram(s) Oral two times a day  potassium chloride    Tablet ER 40 milliEquivalent(s) Oral every 4 hours  predniSONE   Tablet 40 milliGRAM(s) Oral daily  senna 2 Tablet(s) Oral at bedtime  simethicone 80 milliGRAM(s) Chew four times a day  sucralfate 1 Gram(s) Oral every 12 hours    PRN Meds  acetaminophen   Tablet .. 650 milliGRAM(s) Oral every 6 hours PRN  albuterol/ipratropium for Nebulization. 3 milliLiter(s) Nebulizer every 6 hours PRN  guaiFENesin    Syrup 200 milliGRAM(s) Oral every 6 hours PRN  methocarbamol 750 milliGRAM(s) Oral every 12 hours PRN      Case discussed with resident    Care discussed with pt/family

## 2019-11-22 NOTE — PROGRESS NOTE ADULT - ASSESSMENT
IMPRESSION    SOB 2/2 pulmonary edema/ Pleural effusion/ chronically elevated r hemidiaphragm  H/o mesenteric ischemia s/p sx  H/o Afib not on AC 2/2  DEC HB s/p tx  Complex peripancreatic and sub capsular collection.     PLAN    Keep on Lasix for now. Keep 40 mg Q24.   BiPAP at night   GI follow up for capsule today.  Surgery follow up for wound care and collection.   TREND CBC  dc rudolph  NEB AS NEEDED  Seq for DVT ppx  rehab eval  OOB to chair  incentive spirometry. IMPRESSION    SOB 2/2 pulmonary edema/ Pleural effusion/ chronically elevated r hemidiaphragm  H/o mesenteric ischemia s/p sx  H/o Afib not on AC 2/2  DEC HB s/p tx  Complex peripancreatic and sub capsular collection.     PLAN  Right lung USG  Keep on Lasix for now. Keep 40 mg Q24.   BiPAP at night   GI follow up for capsule today.  Surgery follow up for wound care and collection.   TREND CBC  dc rudolph  NEB AS NEEDED  Seq for DVT ppx  rehab eval  OOB to chair  incentive spirometry. IMPRESSION    SOB Pleural effusion/ chronically elevated r hemidiaphragm now RA 95%  H/o mesenteric ischemia s/p sx  H/o Afib not on AC 2/2  DEC HB s/p tx on plavix  s.p recent stent  Complex peripancreatic and sub capsular collection.     PLAN  Right/ L lung USG  Keep on Lasix for now. Keep 40 mg Q24.   BiPAP at night   GI follow up for capsule today.  Surgery follow up for wound care and collection.   ABX per ID  TREND CBC  dc rudolph  NEB AS NEEDED  Seq for DVT ppx  rehab eval  OOB to chair  incentive spirometry.

## 2019-11-22 NOTE — PROGRESS NOTE ADULT - SUBJECTIVE AND OBJECTIVE BOX
Patient is a 84y old  Male who presents with a chief complaint of SOB (2019 07:37)        Over Night Events: Upgarded from 3c for lower gi bleed. in last 24 hrs received 1 PRBC and 1 Platelet No gross bleed right now. No BM        ROS:  See HPI    PHYSICAL EXAM    ICU Vital Signs Last 24 Hrs  T(C): 36.3 (2019 04:00), Max: 36.9 (2019 12:06)  T(F): 97.4 (2019 04:00), Max: 98.4 (2019 12:06)  HR: 70 (2019 06:00) (70 - 79)  BP: 139/52 (2019 06:00) (119/57 - 153/67)  BP(mean): 83 (2019 06:00) (83 - 98)    RR: 27 (2019 06:00) (18 - 29)  SpO2: 95% (2019 06:00) (93% - 98%)      General:  HEENT: TERRI             Lymphatic system: No cervical LN   Lungs: Bilateral BS  Cardiovascular: Regular   Gastrointestinal: Soft, Positive BS, Surgical wound.   Extremities: No clubbing.  Moves extremities.  Full Range of motion   Skin: Warm, intact  Neurological: No motor or sensory deficit       19 @ 07:01  -  19 @ 07:00  --------------------------------------------------------  IN:    IV PiggyBack: 250 mL    Oral Fluid: 240 mL  Total IN: 490 mL    OUT:    Incontinent per Collection Ba mL    Voided: 400 mL  Total OUT: 850 mL    Total NET: -360 mL      LABS:                            8.4    9.64  )-----------( 401      ( 2019 04:23 )             27.3                                                   138  |  96<L>  |  11  ----------------------------<  108<H>  3.8   |  30  |  <0.5<L>    Ca    8.0<L>      2019 04:23  Mg     2.2         TPro  5.3<L>  /  Alb  2.6<L>  /  TBili  0.5  /  DBili  x   /  AST  75<H>  /  ALT  99<H>  /  AlkPhos  87                                              LIVER FUNCTIONS - ( 2019 04:23 )  Alb: 2.6 g/dL / Pro: 5.3 g/dL / ALK PHOS: 87 U/L / ALT: 99 U/L / AST: 75 U/L / GGT: x                                                    MEDICATIONS  (STANDING):  aMIOdarone    Tablet 200 milliGRAM(s) Oral daily  atorvastatin 40 milliGRAM(s) Oral at bedtime  budesonide  80 MICROgram(s)/formoterol 4.5 MICROgram(s) Inhaler 2 Puff(s) Inhalation two times a day  chlorhexidine 4% Liquid 1 Application(s) Topical <User Schedule>  clopidogrel Tablet 75 milliGRAM(s) Oral daily  furosemide    Tablet 40 milliGRAM(s) Oral two times a day  hydrocortisone 2.5% Rectal Cream 1 Application(s) Rectal two times a day  levothyroxine 300 MICROGram(s) Oral daily  lisinopril 20 milliGRAM(s) Oral daily  magnesium oxide 400 milliGRAM(s) Oral three times a day with meals  metoprolol tartrate 12.5 milliGRAM(s) Oral two times a day  multivitamin 1 Tablet(s) Oral daily  nystatin    Suspension 867052 Unit(s) Oral two times a day  pantoprazole  Injectable 40 milliGRAM(s) IV Push two times a day  polyethylene glycol 3350 17 Gram(s) Oral two times a day  potassium chloride    Tablet ER 40 milliEquivalent(s) Oral every 4 hours  senna 2 Tablet(s) Oral at bedtime  simethicone 80 milliGRAM(s) Chew four times a day  sucralfate 1 Gram(s) Oral every 12 hours    MEDICATIONS  (PRN):  acetaminophen   Tablet .. 650 milliGRAM(s) Oral every 6 hours PRN Temp greater or equal to 38C (100.4F), Mild Pain (1 - 3)  albuterol/ipratropium for Nebulization. 3 milliLiter(s) Nebulizer every 6 hours PRN Shortness of Breath and/or Wheezing  guaiFENesin    Syrup 200 milliGRAM(s) Oral every 6 hours PRN secretions  methocarbamol 750 milliGRAM(s) Oral every 12 hours PRN muscle pain      Xrays:  BL effusion.                                                                                    ECHO Patient is a 84y old  Male who presents with a chief complaint of SOB (2019 07:37)        Over Night Events: Upgarded from 3c for gi bleed. in last 24 hrs received 3 PRBC and 1 Platelet No gross bleed right now. No BM, S/P CT A/P        ROS:  See HPI    PHYSICAL EXAM    ICU Vital Signs Last 24 Hrs  T(C): 36.3 (2019 04:00), Max: 36.9 (2019 12:06)  T(F): 97.4 (2019 04:00), Max: 98.4 (2019 12:06)  HR: 70 (2019 06:00) (70 - 79)  BP: 139/52 (2019 06:00) (119/57 - 153/67)  BP(mean): 83 (2019 06:00) (83 - 98)  RR: 27 (2019 06:00) (18 - 29)  SpO2: 95% (2019 06:00) (93% - 98%)      General:  HEENT: TERRI             Lymphatic system: No cervical LN   Lungs: Bilateral BS  Cardiovascular: Regular   Gastrointestinal: Soft, Positive BS, Surgical wound, minimal drainage  Extremities: No clubbing.  Moves extremities.  Full Range of motion   Skin: Warm, intact  Neurological: No motor or sensory deficit       19 @ 07:01  -  19 @ 07:00  --------------------------------------------------------  IN:    IV PiggyBack: 250 mL    Oral Fluid: 240 mL  Total IN: 490 mL    OUT:    Incontinent per Collection Ba mL    Voided: 400 mL  Total OUT: 850 mL    Total NET: -360 mL      LABS:                            8.4    9.64  )-----------( 401      ( 2019 04:23 )             27.3                                                   138  |  96<L>  |  11  ----------------------------<  108<H>  3.8   |  30  |  <0.5<L>    Ca    8.0<L>      2019 04:23  Mg     2.2         TPro  5.3<L>  /  Alb  2.6<L>  /  TBili  0.5  /  DBili  x   /  AST  75<H>  /  ALT  99<H>  /  AlkPhos  87                                              LIVER FUNCTIONS - ( 2019 04:23 )  Alb: 2.6 g/dL / Pro: 5.3 g/dL / ALK PHOS: 87 U/L / ALT: 99 U/L / AST: 75 U/L / GGT: x                                                    MEDICATIONS  (STANDING):  aMIOdarone    Tablet 200 milliGRAM(s) Oral daily  atorvastatin 40 milliGRAM(s) Oral at bedtime  budesonide  80 MICROgram(s)/formoterol 4.5 MICROgram(s) Inhaler 2 Puff(s) Inhalation two times a day  chlorhexidine 4% Liquid 1 Application(s) Topical <User Schedule>  clopidogrel Tablet 75 milliGRAM(s) Oral daily  furosemide    Tablet 40 milliGRAM(s) Oral two times a day  hydrocortisone 2.5% Rectal Cream 1 Application(s) Rectal two times a day  levothyroxine 300 MICROGram(s) Oral daily  lisinopril 20 milliGRAM(s) Oral daily  magnesium oxide 400 milliGRAM(s) Oral three times a day with meals  metoprolol tartrate 12.5 milliGRAM(s) Oral two times a day  multivitamin 1 Tablet(s) Oral daily  nystatin    Suspension 646109 Unit(s) Oral two times a day  pantoprazole  Injectable 40 milliGRAM(s) IV Push two times a day  polyethylene glycol 3350 17 Gram(s) Oral two times a day  potassium chloride    Tablet ER 40 milliEquivalent(s) Oral every 4 hours  senna 2 Tablet(s) Oral at bedtime  simethicone 80 milliGRAM(s) Chew four times a day  sucralfate 1 Gram(s) Oral every 12 hours    MEDICATIONS  (PRN):  acetaminophen   Tablet .. 650 milliGRAM(s) Oral every 6 hours PRN Temp greater or equal to 38C (100.4F), Mild Pain (1 - 3)  albuterol/ipratropium for Nebulization. 3 milliLiter(s) Nebulizer every 6 hours PRN Shortness of Breath and/or Wheezing  guaiFENesin    Syrup 200 milliGRAM(s) Oral every 6 hours PRN secretions  methocarbamol 750 milliGRAM(s) Oral every 12 hours PRN muscle pain      Xrays:  BL effusion/ elevated r hemidiaphragm

## 2019-11-22 NOTE — PROGRESS NOTE ADULT - ASSESSMENT
85 yo M yo with PMH of CAD s/p NSTEMI Oct 1 w/ pci and stent x3, HTN ,DLD, Hypothyroidism presenting for for progressively worsening SOB. No cough. No fever. No cp.     1.	Ac. HFpEF  2.	Ac. Hypoxic respiratory failure on admission due to #1  3.	B/L pleural effusions.   4.	CAD S/P recent PCI  5.	HTN / DL  6.	Hypothyroidism  7.	Rectal bleeding    8.	Hemorrhoids         PLAN:    ·	Colonoscopy showed large amount of maroon colored blood throughout the colon and small intestine. CTA colon was negative. No active bleeding site was found. Capsule enteroscopy as per GI  ·	S/P 2 units of PRBC'S. Follow H/H. Keep Hb >8. Transfuse as needed  ·	S/P total 600 mg of Venofer.   ·	Anusol suppository for hemorrhoids.   ·	Repeat cxr showed B/L pleural effusions  ·	Care D/W the pulmonary. No need for thoracentesis  ·	Cont Symbicort twice a day.   ·	Increase Lasix to 40 mg po twice a day.   ·	Check i's and o's and daily wt.   ·	Low salt diet and water restriction to 1.5 L/C.   ·	ECHO reviewed. NLVF. EF 50-55%.  ·	Cont Plavix. ASA is on hold.     #Progress Note Handoff  Pending (specify):  Consults_________, Tests________, Test Results_______, Other_GIB_____  Family discussion: Plan of care D/W the wife.   Disposition: Home___/SNF___/Other________/Unknown at this time___x_____ 85 yo M yo with PMH of CAD s/p NSTEMI Oct 1 w/ pci and stent x3, HTN ,DLD, Hypothyroidism presenting for for progressively worsening SOB. No cough. No fever. No cp.     1.	Ac. HFpEF  2.	Ac. Hypoxic respiratory failure on admission due to #1  3.	B/L pleural effusions.   4.	CAD S/P recent PCI  5.	HTN / DL  6.	Hypothyroidism  7.	Rectal bleeding    8.	Hemorrhoids  9.	Lower abdomen surgical wound.          PLAN:    ·	Received one more unit of PRBC'S yesterday. H/H looks stable. Follow CBC  ·	Colonoscopy showed large amount of maroon colored blood throughout the colon and small intestine. CTA colon was negative. No active bleeding site was found. Capsule enteroscopy today as per GI  ·	Keep NPO for now  ·	S/P total 3 units of PRBC'S. Follow H/H. Keep Hb >8. Transfuse as needed  ·	S/P total 600 mg of Venofer.   ·	Anusol suppository for hemorrhoids.   ·	Repeat cxr showed B/L pleural effusions  ·	Care D/W the pulmonary. No need for thoracentesis  ·	Cont Symbicort twice a day.   ·	Increase Lasix to 40 mg po twice a day.   ·	Check i's and o's and daily wt.   ·	Low salt diet and water restriction to 1.5 L/C.   ·	ECHO reviewed. NLVF. EF 50-55%.  ·	Cont Plavix. ASA is on hold.   ·	Care D/W the wife.     #Progress Note Handoff  Pending (specify):  Consults_________, Tests________, Test Results_______, Other_GIB_____  Family discussion: Plan of care D/W the wife.   Disposition: Home___/SNF___/Other________/Unknown at this time___x_____

## 2019-11-22 NOTE — PROGRESS NOTE ADULT - ASSESSMENT
85 yo M yo with PMH of CAD s/p NSTEMI Oct 1 w/ pci and stent x3, HTN ,DLD, Hypothyroidism presenting for shortness of breath, admitted with acute on chronic diastolic heart failure exacerbation.      #anemia:  - transfused 2 PRBC's on 711/19  and  lasix 40 iv after transfusion  - 1 prbc on 11/21  after EGD   - colonoscopy/endoscopy done today : most likely small bowel bleeding   - FU surgery consult   - critical care consult   - Protonix IV 40 BID   - Monitor CBC    # Acute on Chronic Diastolic CHF Exacerbation with Acute Hypoxic Respiratory Failure  - B/L wheezing , will start on Lasix 40 bid, prednisone 40 bid and Symbicort .  -Cardiology following   - restart lasix 40 po od   - CT chest is negative for PE, but with bilateral pleural effusion  - EKG with no new ischemic changes  - BNP 2500, close to 2300 during last admission  - Troponin 0.04 and stable, denies chest pain  - LE Duplex neg  - Daily Chest X-Ray   - strict Is and Os, fluid and salt restriction  - BiPAP PRN and at night, keep spO2 >92  - No thoracentesis at this time, per pulmonology     # CAD, HTN, h/o V tach  - oral amio and metoprolol  - c/w aspirin, Plavix, statin and lisinopril    #Abdominal pain   - resolved   -Hx mesenteric ischemia October status post surgery  -Surgical sites healing well  -KUB wnl   -Miralax and hemorrhoid  suppositories       # hypomagnesemia :  - replete mg  - fu mg level     # Hypothyroidism   -- c/w levothyroxine     DVT PPX : Lovenox  GI PPX: protonix IV   Diet: DASH  Activity: As tolerated  Disposition: Likely back to [facility]  CODE: FULL. 85 yo M yo with PMH of CAD s/p NSTEMI Oct 1 w/ pci and stent x3, HTN ,DLD, Hypothyroidism presenting for shortness of breath, admitted with acute on chronic diastolic heart failure exacerbation.      #anemia:  - received total of 5 units of PRBCs so far   - 1 prbc on 11/21  after EGD   - colonoscopy/endoscopy done today : most likely small bowel bleeding   - FU surgery consult. They were called this morning to come and follow up on the abdominal scar   - Protonix IV 40 BID   - Monitor CBC q 8 hours    # Acute on Chronic Diastolic CHF Exacerbation with Acute Hypoxic Respiratory Failure  - B/L wheezing , will start on Lasix 40 bid, prednisone 40 bid and Symbicort .  -Cardiology following   - restart lasix 40 po od   - CT chest is negative for PE, but with bilateral pleural effusion  - EKG with no new ischemic changes  - BNP 2500, close to 2300 during last admission  - Troponin 0.04 and stable, denies chest pain  - LE Duplex neg  - Daily Chest X-Ray   - strict Is and Os, fluid and salt restriction  - BiPAP PRN and at night, keep spO2 >92  - No thoracentesis at this time, per pulmonology as the SpO2 is 94-95 on room air    # CAD, HTN, h/o V tach  - oral amio and metoprolol  - c/w Plavix, statin and lisinopril  - Hold aspirin as per GI Recommendation.    #Abdominal pain   - resolved   -Hx mesenteric ischemia October status post surgery  -Surgical sites healing well  -KUB wnl   -Miralax and hemorrhoid  suppositories       # hypomagnesemia :  - replete mg  - fu mg level     # Hypothyroidism   -- c/w levothyroxine       DVT PPX : N/A  GI PPX: protonix IV   Diet: DASH  Activity: As tolerated  Disposition: Likely back to [facility]  CODE: FULL. 83 yo M yo with PMH of CAD s/p NSTEMI Oct 1 w/ pci and stent x3, HTN ,DLD, Hypothyroidism presenting for shortness of breath, admitted with acute on chronic diastolic heart failure exacerbation.      #anemia:  - received total of 5 units of PRBCs so far   - 1 prbc on 11/21  after EGD   - colonoscopy/endoscopy done today : most likely small bowel bleeding. CTA was negative for active bleeding. The pt is having capsule endoscopy today.   - FU surgery consult. They were called this morning to come and follow up on the abdominal scar   - Protonix IV 40 BID   - Monitor CBC q 8 hours    # Acute on Chronic Diastolic CHF Exacerbation with Acute Hypoxic Respiratory Failure  - B/L wheezing , will start on Lasix 40 bid, prednisone 40 bid and Symbicort .  -Cardiology following   - restart lasix 40 po od   - CT chest is negative for PE, but with bilateral pleural effusion  - EKG with no new ischemic changes  - BNP 2500, close to 2300 during last admission  - Troponin 0.04 and stable, denies chest pain  - LE Duplex neg  - Daily Chest X-Ray   - strict Is and Os, fluid and salt restriction  - BiPAP PRN and at night, keep spO2 >92  - No thoracentesis at this time, per pulmonology as the SpO2 is 94-95 on room air    # CAD, HTN, h/o V tach  - oral amio and metoprolol  - c/w Plavix, statin and lisinopril  - Hold aspirin as per GI Recommendation.    #Abdominal pain   - resolved   -Hx mesenteric ischemia October status post surgery  -Surgical sites healing well  -KUB wnl   -Miralax and hemorrhoid  suppositories       # hypomagnesemia :  - replete mg  - fu mg level     # Hypothyroidism   -- c/w levothyroxine       DVT PPX : N/A  GI PPX: protonix IV   Diet: DASH  Activity: As tolerated  Disposition: Likely back to [facility]  CODE: FULL.

## 2019-11-22 NOTE — PROGRESS NOTE ADULT - SUBJECTIVE AND OBJECTIVE BOX
24H events:    Patient is a 84y old Male who presents with a chief complaint of SOB (22 Nov 2019 08:34)    Primary diagnosis of Hypervolemia, unspecified hypervolemia type     Today is hospital day 11d.     PAST MEDICAL & SURGICAL HISTORY  Neuropathy  TIA (transient ischemic attack)  Left carotid stenosis  Lyme disease  Hypothyroidism  Coronary artery disease  History of mesenteric infarction    SOCIAL HISTORY:  Negative for smoking/alcohol/drug use.     ALLERGIES:  iodinated radiocontrast agents (Hives)    MEDICATIONS:  STANDING MEDICATIONS  aMIOdarone    Tablet 200 milliGRAM(s) Oral daily  atorvastatin 40 milliGRAM(s) Oral at bedtime  budesonide  80 MICROgram(s)/formoterol 4.5 MICROgram(s) Inhaler 2 Puff(s) Inhalation two times a day  chlorhexidine 4% Liquid 1 Application(s) Topical <User Schedule>  clopidogrel Tablet 75 milliGRAM(s) Oral daily  furosemide    Tablet 40 milliGRAM(s) Oral two times a day  hydrocortisone 2.5% Rectal Cream 1 Application(s) Rectal two times a day  levothyroxine 300 MICROGram(s) Oral daily  lisinopril 20 milliGRAM(s) Oral daily  magnesium oxide 400 milliGRAM(s) Oral three times a day with meals  metoprolol tartrate 12.5 milliGRAM(s) Oral two times a day  multivitamin 1 Tablet(s) Oral daily  nystatin    Suspension 597095 Unit(s) Oral two times a day  pantoprazole  Injectable 40 milliGRAM(s) IV Push two times a day  polyethylene glycol 3350 17 Gram(s) Oral two times a day  potassium chloride    Tablet ER 40 milliEquivalent(s) Oral every 4 hours  senna 2 Tablet(s) Oral at bedtime  simethicone 80 milliGRAM(s) Chew four times a day  sucralfate 1 Gram(s) Oral every 12 hours    PRN MEDICATIONS  acetaminophen   Tablet .. 650 milliGRAM(s) Oral every 6 hours PRN  albuterol/ipratropium for Nebulization. 3 milliLiter(s) Nebulizer every 6 hours PRN  guaiFENesin    Syrup 200 milliGRAM(s) Oral every 6 hours PRN  methocarbamol 750 milliGRAM(s) Oral every 12 hours PRN    VITALS:   T(F): 97.4  HR: 82  BP: 170/86  RR: 20  SpO2: 100%    LABS:                        8.4    9.64  )-----------( 401      ( 22 Nov 2019 04:23 )             27.3     11-22    138  |  96<L>  |  11  ----------------------------<  108<H>  3.8   |  30  |  <0.5<L>    Ca    8.0<L>      22 Nov 2019 04:23  Mg     2.2     11-22    TPro  5.3<L>  /  Alb  2.6<L>  /  TBili  0.5  /  DBili  x   /  AST  75<H>  /  ALT  99<H>  /  AlkPhos  87  11-22                  RADIOLOGY:    PHYSICAL EXAM:  GENERAL: NAD, well-groomed, well-developed  HEAD:  Atraumatic, Normocephalic  EYES: EOMI, PERRLA, conjunctiva and sclera clear  ENMT: No tonsillar erythema, exudates, or enlargement; Moist mucous membranes, Good dentition, No lesions  NECK: Supple, No JVD, Normal thyroid  NERVOUS SYSTEM:  Alert & Oriented X3, Good concentration; Motor Strength 5/5 B/L upper and lower extremities; DTRs 2+ intact and symmetric  CHEST/LUNG: Clear to percussion bilaterally; No rales, rhonchi, wheezing, or rubs  HEART: Regular rate and rhythm; No murmurs, rubs, or gallops  ABDOMEN: Soft, Nontender, Nondistended; Bowel sounds present  EXTREMITIES:  2+ Peripheral Pulses, No clubbing, cyanosis, or edema  LYMPH: No lymphadenopathy noted  SKIN: No rashes or lesions 24H events:    Patient is a 84y old Male who presents with a chief complaint of SOB (22 Nov 2019 08:34)    Primary diagnosis of Hypervolemia, unspecified hypervolemia type     Today is hospital day 11d.     PAST MEDICAL & SURGICAL HISTORY  Neuropathy  TIA (transient ischemic attack)  Left carotid stenosis  Lyme disease  Hypothyroidism  Coronary artery disease  History of mesenteric infarction    SOCIAL HISTORY:  Negative for smoking/alcohol/drug use.     ALLERGIES:  iodinated radiocontrast agents (Hives)    MEDICATIONS:  STANDING MEDICATIONS  aMIOdarone    Tablet 200 milliGRAM(s) Oral daily  atorvastatin 40 milliGRAM(s) Oral at bedtime  budesonide  80 MICROgram(s)/formoterol 4.5 MICROgram(s) Inhaler 2 Puff(s) Inhalation two times a day  chlorhexidine 4% Liquid 1 Application(s) Topical <User Schedule>  clopidogrel Tablet 75 milliGRAM(s) Oral daily  furosemide    Tablet 40 milliGRAM(s) Oral two times a day  hydrocortisone 2.5% Rectal Cream 1 Application(s) Rectal two times a day  levothyroxine 300 MICROGram(s) Oral daily  lisinopril 20 milliGRAM(s) Oral daily  magnesium oxide 400 milliGRAM(s) Oral three times a day with meals  metoprolol tartrate 12.5 milliGRAM(s) Oral two times a day  multivitamin 1 Tablet(s) Oral daily  nystatin    Suspension 890067 Unit(s) Oral two times a day  pantoprazole  Injectable 40 milliGRAM(s) IV Push two times a day  polyethylene glycol 3350 17 Gram(s) Oral two times a day  potassium chloride    Tablet ER 40 milliEquivalent(s) Oral every 4 hours  senna 2 Tablet(s) Oral at bedtime  simethicone 80 milliGRAM(s) Chew four times a day  sucralfate 1 Gram(s) Oral every 12 hours    PRN MEDICATIONS  acetaminophen   Tablet .. 650 milliGRAM(s) Oral every 6 hours PRN  albuterol/ipratropium for Nebulization. 3 milliLiter(s) Nebulizer every 6 hours PRN  guaiFENesin    Syrup 200 milliGRAM(s) Oral every 6 hours PRN  methocarbamol 750 milliGRAM(s) Oral every 12 hours PRN    VITALS:   T(F): 97.4  HR: 82  BP: 170/86  RR: 20  SpO2: 100%    LABS:                        8.4    9.64  )-----------( 401      ( 22 Nov 2019 04:23 )             27.3     11-22    138  |  96<L>  |  11  ----------------------------<  108<H>  3.8   |  30  |  <0.5<L>    Ca    8.0<L>      22 Nov 2019 04:23  Mg     2.2     11-22    TPro  5.3<L>  /  Alb  2.6<L>  /  TBili  0.5  /  DBili  x   /  AST  75<H>  /  ALT  99<H>  /  AlkPhos  87  11-22                  RADIOLOGY:      PROCEDURE DATE:  11/22/2019            INTERPRETATION:  Clinical History / Reason for exam: Hematochezia.    TECHNIQUE: Multislice helical sections were obtained from the domes of   the diaphragm to the pubic symphysis prior to and following intravenous   administration of 100 cc of Optiray, utilizing CT angiogram protocol.   Coronal and sagittal reformatted images and 3-D reconstruction is   performed.    COMPARISON: CT abdomen and pelvis 11/11/2019.    FINDINGS:    VASCULATURE:  No evidence of gastrointestinal intraluminal contrast extravasation to   indicate an active GI bleed.    No evidence of aortic aneurysm or dissection. Diffuse atherosclerotic   disease the aorta with diffuse ectatic intramural thrombus. 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 Patent left   external iliac to SMA bypass graft.  Stable atherosclerotic disease   involving right renal ostium.    Lower chest: Bilateral moderate pleural effusions with surrounding   atelectasis. 2 mm subpleural left upper lobe nodule (6/11).      HEPATOBILIARY:  No suspicious parenchymal lesion or biliary ductal   dilatation.    SPLEEN: Multiple subcapsular fluid collections measuring up to 4.2 x 1.2   cm, increased from prior examination.    PANCREAS: Enlarging peripancreatic tail complex fluid collection are   noted measuring up to 8.7 x 4.2 cm (series 6 image 49).    ADRENAL GLANDS: Unremarkable.    KIDNEYS: Symmetric renal enhancement without hydronephrosis bilaterally.   Stable left renal cyst.    ABDOMINOPELVIC NODES: Stable..    PELVIC ORGANS: Stable prostatic calcifications    PERITONEUM /MESENTERY/BOWEL: Trace left lower quadrant ascites,   unchanged. No bowel obstruction or pneumoperitoneum    BONES/SOFT TISSUES:Right scrotal fluid collection measuring 4 x 4.2 cm   may represent large hydrocele, unchanged. Anterior abdominal wall   subcutaneous defect with few foci of air likely represents postoperative   changes. Multilevel degenerative changes of the spine.      IMPRESSION:    No evidence of active GI bleed.    Increased size of peripancreatic complex fluid collection measuring up to   8.7 x 4.2 cm.    Interval increase in size of perisplenic subcapsular fluid collections   measuring up to 4.2 x 1.2 cm.    Bilateral moderate pleural effusions, unchanged.                  MARCUS STEWART M.D., RESIDENT RADIOLOGIST  This document has been electronically signed.  ELLIOT LANDAU M.D., ATTENDING RADIOLOGIST  This document has been electronically signed. Nov 22 2019  6:48AM    PHYSICAL EXAM: 24H events:    Patient is a 84y old Male who presents with a chief complaint of SOB (22 Nov 2019 08:34)    Primary diagnosis of Hypervolemia, unspecified hypervolemia type     Today is hospital day 11d.     PAST MEDICAL & SURGICAL HISTORY  Neuropathy  TIA (transient ischemic attack)  Left carotid stenosis  Lyme disease  Hypothyroidism  Coronary artery disease  History of mesenteric infarction    SOCIAL HISTORY:  Negative for smoking/alcohol/drug use.     ALLERGIES:  iodinated radiocontrast agents (Hives)    MEDICATIONS:  STANDING MEDICATIONS  aMIOdarone    Tablet 200 milliGRAM(s) Oral daily  atorvastatin 40 milliGRAM(s) Oral at bedtime  budesonide  80 MICROgram(s)/formoterol 4.5 MICROgram(s) Inhaler 2 Puff(s) Inhalation two times a day  chlorhexidine 4% Liquid 1 Application(s) Topical <User Schedule>  clopidogrel Tablet 75 milliGRAM(s) Oral daily  furosemide    Tablet 40 milliGRAM(s) Oral two times a day  hydrocortisone 2.5% Rectal Cream 1 Application(s) Rectal two times a day  levothyroxine 300 MICROGram(s) Oral daily  lisinopril 20 milliGRAM(s) Oral daily  magnesium oxide 400 milliGRAM(s) Oral three times a day with meals  metoprolol tartrate 12.5 milliGRAM(s) Oral two times a day  multivitamin 1 Tablet(s) Oral daily  nystatin    Suspension 491117 Unit(s) Oral two times a day  pantoprazole  Injectable 40 milliGRAM(s) IV Push two times a day  polyethylene glycol 3350 17 Gram(s) Oral two times a day  potassium chloride    Tablet ER 40 milliEquivalent(s) Oral every 4 hours  senna 2 Tablet(s) Oral at bedtime  simethicone 80 milliGRAM(s) Chew four times a day  sucralfate 1 Gram(s) Oral every 12 hours    PRN MEDICATIONS  acetaminophen   Tablet .. 650 milliGRAM(s) Oral every 6 hours PRN  albuterol/ipratropium for Nebulization. 3 milliLiter(s) Nebulizer every 6 hours PRN  guaiFENesin    Syrup 200 milliGRAM(s) Oral every 6 hours PRN  methocarbamol 750 milliGRAM(s) Oral every 12 hours PRN    VITALS:   T(F): 97.4  HR: 82  BP: 170/86  RR: 20  SpO2: 100%    LABS:                        8.4    9.64  )-----------( 401      ( 22 Nov 2019 04:23 )             27.3     11-22    138  |  96<L>  |  11  ----------------------------<  108<H>  3.8   |  30  |  <0.5<L>    Ca    8.0<L>      22 Nov 2019 04:23  Mg     2.2     11-22    TPro  5.3<L>  /  Alb  2.6<L>  /  TBili  0.5  /  DBili  x   /  AST  75<H>  /  ALT  99<H>  /  AlkPhos  87  11-22                  RADIOLOGY:      PROCEDURE DATE:  11/22/2019            INTERPRETATION:  Clinical History / Reason for exam: Hematochezia.    TECHNIQUE: Multislice helical sections were obtained from the domes of   the diaphragm to the pubic symphysis prior to and following intravenous   administration of 100 cc of Optiray, utilizing CT angiogram protocol.   Coronal and sagittal reformatted images and 3-D reconstruction is   performed.    COMPARISON: CT abdomen and pelvis 11/11/2019.    FINDINGS:    VASCULATURE:  No evidence of gastrointestinal intraluminal contrast extravasation to   indicate an active GI bleed.    No evidence of aortic aneurysm or dissection. Diffuse atherosclerotic   disease the aorta with diffuse ectatic intramural thrombus. 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 Patent left   external iliac to SMA bypass graft.  Stable atherosclerotic disease   involving right renal ostium.    Lower chest: Bilateral moderate pleural effusions with surrounding   atelectasis. 2 mm subpleural left upper lobe nodule (6/11).      HEPATOBILIARY:  No suspicious parenchymal lesion or biliary ductal   dilatation.    SPLEEN: Multiple subcapsular fluid collections measuring up to 4.2 x 1.2   cm, increased from prior examination.    PANCREAS: Enlarging peripancreatic tail complex fluid collection are   noted measuring up to 8.7 x 4.2 cm (series 6 image 49).    ADRENAL GLANDS: Unremarkable.    KIDNEYS: Symmetric renal enhancement without hydronephrosis bilaterally.   Stable left renal cyst.    ABDOMINOPELVIC NODES: Stable..    PELVIC ORGANS: Stable prostatic calcifications    PERITONEUM /MESENTERY/BOWEL: Trace left lower quadrant ascites,   unchanged. No bowel obstruction or pneumoperitoneum    BONES/SOFT TISSUES:Right scrotal fluid collection measuring 4 x 4.2 cm   may represent large hydrocele, unchanged. Anterior abdominal wall   subcutaneous defect with few foci of air likely represents postoperative   changes. Multilevel degenerative changes of the spine.      IMPRESSION:    No evidence of active GI bleed.    Increased size of peripancreatic complex fluid collection measuring up to   8.7 x 4.2 cm.    Interval increase in size of perisplenic subcapsular fluid collections   measuring up to 4.2 x 1.2 cm.    Bilateral moderate pleural effusions, unchanged.                  MARCUS STEWART M.D., RESIDENT RADIOLOGIST  This document has been electronically signed.  ELLIOT LANDAU M.D., ATTENDING RADIOLOGIST  This document has been electronically signed. Nov 22 2019  6:48AM    PHYSICAL EXAM:  GENERAL: NAD, well-developed, 84y lying in bed   EENT: EOMI, conjunctiva and sclera clear, No nasal obstruction or discharge  RESPIRATORY: Decreased breath sounds at the bases, more prominent on right side .fine crackles at bases  .  On 2 L NC  CARDIOVASCULAR: Regular rate and rhythm; No murmurs, rubs, or gallops.   GASTROINTESTINAL: Abdomen Soft, Nondistended; Mild tenderness to palpation diffusely. Steri strips clean, dry, intact, no erythema, no drainage, no induration  MUSCULOSKELETAL:  No cyanosis/edema , extremities grossly symmetrical  PSYCH: AAOx3, affect appropriate  NEUROLOGY: non-focal, cognition intact

## 2019-11-22 NOTE — PROGRESS NOTE ADULT - SUBJECTIVE AND OBJECTIVE BOX
GENERAL SURGERY PROGRESS NOTE     LAKSHMI JUDD  84y  Male  Hospital day :11d    OVERNIGHT EVENTS: maroon blood found on colonoscopy yesterday, CTA to evaluate for possible GI bleed    T(F): 96 (11-22-19 @ 00:45), Max: 98.4 (11-21-19 @ 12:06)  HR: 72 (11-22-19 @ 03:00) (70 - 85)  BP: 135/59 (11-22-19 @ 03:00) (119/57 - 154/69)  ABP: --  ABP(mean): --  RR: 29 (11-22-19 @ 03:00) (18 - 29)  SpO2: 93% (11-22-19 @ 03:00) (93% - 98%)    DIET/FLUIDS: magnesium oxide 400 milliGRAM(s) Oral three times a day with meals  multivitamin 1 Tablet(s) Oral daily  potassium chloride    Tablet ER 40 milliEquivalent(s) Oral every 4 hours    NG:                                                                                DRAINS:     BM:   11-20-19 @ 07:01  -  11-21-19 @ 07:00  --------------------------------------------------------  OUT: 4 mL      EMESIS:     URINE:   11-20-19 @ 07:01  -  11-21-19 @ 07:00  --------------------------------------------------------  OUT: 750 mL       GI proph:  pantoprazole    Tablet 40 milliGRAM(s) Oral every 12 hours  pantoprazole  Injectable 40 milliGRAM(s) IV Push two times a day    AC/ proph:   ABx: nystatin    Suspension 788292 Unit(s) Oral two times a day      PHYSICAL EXAM:  GENERAL: NAD, well-appearing  CHEST/LUNG: Clear to auscultation bilaterally  HEART: Regular rate and rhythm  ABDOMEN: Soft, Nontender, Nondistended;   EXTREMITIES:  No clubbing, cyanosis, or edema      LABS  Labs:  CAPILLARY BLOOD GLUCOSE      POCT Blood Glucose.: 158 mg/dL (22 Nov 2019 02:09)  POCT Blood Glucose.: 85 mg/dL (22 Nov 2019 00:46)                          8.4    9.64  )-----------( 401      ( 22 Nov 2019 04:23 )             27.3       Auto Neutrophil %: 86.0 % (11-22-19 @ 04:23)  Auto Immature Granulocyte %: 2.0 % (11-22-19 @ 04:23)    11-20    137  |  91<L>  |  14  ----------------------------<  149<H>  3.6   |  34<H>  |  0.5<L>      RADIOLOGY & ADDITIONAL TESTS:  < from: CT Angio Abdomen and Pelvis w/ IV Cont (11.22.19 @ 02:00) >  IMPRESSION:    No evidence of active GI bleed.    Bilateral moderate pleural effusions, unchanged.    Interval enlargement of pancreatic tail fluid collections, some which are   new measuring up to 3.1 x 3.9 cm, may represent a pancreatic leak.    Multiple splenic fluid collection measuring up to 4.2 cm, likely simple   fluid collections.    Diffuse atherosclerotic disease of the aorta with multiple areas of   moderate to severe stenosis as above.    < end of copied text >

## 2019-11-23 NOTE — PROGRESS NOTE ADULT - SUBJECTIVE AND OBJECTIVE BOX
Patient is a 84y old  Male who presents with a chief complaint of SOB (2019 02:19)      Over Night Events:  Patient seen and examined.   feel better s/p capsule endo   H/H stable seen by KAEL avendaño     ROS:  See HPI    PHYSICAL EXAM    ICU Vital Signs Last 24 Hrs  T(C): 36.1 (2019 08:00), Max: 37.2 (2019 16:00)  T(F): 97 (2019 08:00), Max: 99 (2019 16:00)  HR: 78 (2019 08:00) (70 - 86)  BP: 151/59 (2019 08:00) (107/58 - 171/76)  BP(mean): 88 (2019 08:00) (67 - 137)  ABP: --  ABP(mean): --  RR: 27 (2019 08:00) (15 - 35)  SpO2: 99% (2019 08:00) (84% - 100%)      General: Aox3  HEENT:       thomas         Lymph Nodes: NO cervical LN   Lungs: Bilateral BS  Cardiovascular: Regular   Abdomen: Soft, Positive BS  Extremities: No clubbing   Skin: warm   Neurological: no focal deficit   Musculoskeletal: move all ext     I&O's Detail    2019 07:01  -  2019 07:00  --------------------------------------------------------  IN:    Oral Fluid: 840 mL  Total IN: 840 mL    OUT:    Incontinent per Collection Ba mL    Stool: 100 mL    Voided: 850 mL  Total OUT: 1850 mL    Total NET: -1010 mL      2019 07:01  -  2019 09:18  --------------------------------------------------------  IN:  Total IN: 0 mL    OUT:    Incontinent per Collection Ba mL  Total OUT: 300 mL    Total NET: -300 mL          LABS:                          9.4    11.78 )-----------( 428      ( 2019 01:29 )             29.4         2019 16:50    137    |  93     |  13     ----------------------------<  115    3.2     |  32     |  0.5      Ca    7.9        2019 16:50  Mg     2.2       2019 04:23    TPro  5.5    /  Alb  2.8    /  TBili  0.5    /  DBili  x      /  AST  51     /  ALT  84     /  AlkPhos  85     2019 16:50  Amylase x     lipase x                                                                                                                                                                                                                                         MEDICATIONS  (STANDING):  aMIOdarone    Tablet 200 milliGRAM(s) Oral daily  atorvastatin 40 milliGRAM(s) Oral at bedtime  budesonide  80 MICROgram(s)/formoterol 4.5 MICROgram(s) Inhaler 2 Puff(s) Inhalation two times a day  chlorhexidine 4% Liquid 1 Application(s) Topical <User Schedule>  clopidogrel Tablet 75 milliGRAM(s) Oral daily  furosemide    Tablet 40 milliGRAM(s) Oral two times a day  hydrocortisone 2.5% Rectal Cream 1 Application(s) Rectal two times a day  levothyroxine 300 MICROGram(s) Oral daily  lisinopril 20 milliGRAM(s) Oral daily  magnesium oxide 400 milliGRAM(s) Oral three times a day with meals  metoprolol tartrate 12.5 milliGRAM(s) Oral two times a day  multivitamin 1 Tablet(s) Oral daily  nystatin    Suspension 739637 Unit(s) Oral two times a day  pantoprazole  Injectable 40 milliGRAM(s) IV Push two times a day  potassium chloride    Tablet ER 40 milliEquivalent(s) Oral every 4 hours  potassium chloride  20 mEq/100 mL IVPB 20 milliEquivalent(s) IV Intermittent once  potassium chloride  20 mEq/100 mL IVPB 20 milliEquivalent(s) IV Intermittent once  senna 2 Tablet(s) Oral at bedtime  simethicone 80 milliGRAM(s) Chew four times a day  sucralfate 1 Gram(s) Oral every 12 hours    MEDICATIONS  (PRN):  acetaminophen   Tablet .. 650 milliGRAM(s) Oral every 6 hours PRN Temp greater or equal to 38C (100.4F), Mild Pain (1 - 3)  albuterol/ipratropium for Nebulization. 3 milliLiter(s) Nebulizer every 6 hours PRN Shortness of Breath and/or Wheezing  guaiFENesin    Syrup 200 milliGRAM(s) Oral every 6 hours PRN secretions  methocarbamol 750 milliGRAM(s) Oral every 12 hours PRN muscle pain          Xrays:  TLC:  OG:  ET tube:                                                                                    stable ?/ bibasilar effusion    ECHO:  [facility] ICU:

## 2019-11-23 NOTE — PROGRESS NOTE ADULT - SUBJECTIVE AND OBJECTIVE BOX
LAKSHMI JUDD  84y Male    INTERVAL HPI/OVERNIGHT EVENTS:    Wife at bedside. He c/o abdominal pain after dressing change by surgery. Percocet ordered - he stated Tylenol was ineffective.  No SOB.  Sitting in a chair.  No bloody BM per staff.    T(F): 97.1 (19 @ 12:00), Max: 99 (19 @ 16:00)  HR: 82 (19 @ 12:00) (70 - 86)  BP: 118/50 (19 @ 12:00) (118/50 - 171/76)  RR: 29 (19 @ 12:00) (15 - 35)  SpO2: 98% (19 @ 12:00) (97% - 100%) on RA    I&O's Summary    2019 07:  -  2019 07:00  --------------------------------------------------------  IN: 840 mL / OUT: 1850 mL / NET: -1010 mL    2019 07:01  -  2019 15:02  --------------------------------------------------------  IN: 590 mL / OUT: 700 mL / NET: -110 mL      Daily Weight in k.3 (2019 05:00)      PHYSICAL EXAM:  GENERAL: NAD  HEAD:  Normocephalic  EYES:  conjunctiva and sclera clear  ENMT: Moist mucous membranes  NECK: Supple, No JVD  NERVOUS SYSTEM:  Alert & Oriented X3, Good concentration  CHEST/LUNG: decreased BS at bases otherwise CTA  HEART: Regular rate and rhythm  ABDOMEN: Soft, mildly tender, no guarding, dressing in place  EXTREMITIES: No edema      Consultant(s) Notes Reviewed:  [x ] YES  [ ] NO  Care Discussed with Consultants/Other Providers [ x] YES  [ ] NO    MEDICATIONS  (STANDING):  aMIOdarone    Tablet 200 milliGRAM(s) Oral daily  atorvastatin 40 milliGRAM(s) Oral at bedtime  budesonide  80 MICROgram(s)/formoterol 4.5 MICROgram(s) Inhaler 2 Puff(s) Inhalation two times a day  chlorhexidine 4% Liquid 1 Application(s) Topical <User Schedule>  clopidogrel Tablet 75 milliGRAM(s) Oral daily  furosemide    Tablet 40 milliGRAM(s) Oral two times a day  hydrocortisone 2.5% Rectal Cream 1 Application(s) Rectal two times a day  levothyroxine 300 MICROGram(s) Oral daily  lisinopril 20 milliGRAM(s) Oral daily  magnesium oxide 400 milliGRAM(s) Oral three times a day with meals  metoprolol tartrate 12.5 milliGRAM(s) Oral two times a day  multivitamin 1 Tablet(s) Oral daily  nystatin    Suspension 895952 Unit(s) Oral two times a day  pantoprazole  Injectable 40 milliGRAM(s) IV Push two times a day  potassium chloride    Tablet ER 40 milliEquivalent(s) Oral every 4 hours  senna 2 Tablet(s) Oral at bedtime  simethicone 80 milliGRAM(s) Chew four times a day  sucralfate 1 Gram(s) Oral every 12 hours    MEDICATIONS  (PRN):  acetaminophen   Tablet .. 650 milliGRAM(s) Oral every 6 hours PRN Temp greater or equal to 38C (100.4F), Mild Pain (1 - 3)  albuterol/ipratropium for Nebulization. 3 milliLiter(s) Nebulizer every 6 hours PRN Shortness of Breath and/or Wheezing  guaiFENesin    Syrup 200 milliGRAM(s) Oral every 6 hours PRN secretions  methocarbamol 750 milliGRAM(s) Oral every 12 hours PRN muscle pain  oxycodone    5 mG/acetaminophen 325 mG 1 Tablet(s) Oral every 4 hours PRN Severe Pain (7 - 10)    Telemetry reviewed    LABS:                        9.4    11.78 )-----------( 428      ( 2019 01:29 )             29.4         139  |  98  |  14  ----------------------------<  137<H>  4.0   |  28  |  0.5<L>    Ca    7.7<L>      2019 11:03  Mg     2.2         TPro  5.4<L>  /  Alb  2.6<L>  /  TBili  0.5  /  DBili  x   /  AST  36  /  ALT  61<H>  /  AlkPhos  77                RADIOLOGY & ADDITIONAL TESTS:    Imaging or report Personally Reviewed:  [x ] YES  [ ] NO    < from: Colonoscopy (19 @ 08:00) >  Impressions:    Internal hemorrhoids.    Large amount of maroon blood seen throughout the colon and also in the small  intestine. No site of active bleeding identified. .     < end of copied text >    < from: CT Angio Abdomen and Pelvis w/ IV Cont (19 @ 02:00) >    IMPRESSION:    No evidence of active GI bleed.    Increased size of peripancreatic complex fluid collection measuring up to   8.7 x 4.2 cm.    Interval increase in size of perisplenic subcapsular fluid collections   measuring up to 4.2 x 1.2 cm.    Bilateral moderate pleural effusions, unchanged.    < end of copied text >    < from: Xray Chest 1 View- PORTABLE-Routine (19 @ 04:17) >  Impression:      Bibasilar opacities, unchanged.    < end of copied text >    < from: Transthoracic Echocardiogram (19 @ 11:59) >    Summary:   1. Left ventricular ejection fraction, by visual estimation, is 50 to   55%.   2. Mildly decreased global left ventricular systolic function.   3. Entire anterior septum, basal and mid anterior wall, and mid and   apical inferior septum are abnormal as described above.   4. Spectral Doppler shows impaired relaxation pattern of left   ventricular myocardial filling (Grade I diastolic dysfunction).   5. Limited study for evaluation of LV wall motion but mild hypokinesia   of ant septum/ant wall suggested on some views.   6. Mild mitral annular calcification.   7. No evidence of mitral valve regurgitation.   8. Structurally normal mitral valve, with normal leaflet excursion.   9. Sclerotic aortic valve with normal opening.  10. LA volume Index is 24.5 ml/m² ml/m2.      < end of copied text >      Case discussed with resident    Care discussed with pt and family

## 2019-11-23 NOTE — PROGRESS NOTE ADULT - ASSESSMENT
83 yo M yo with PMH of CAD s/p NSTEMI Oct 1 w/ pci and stent x3, HTN ,DLD, Hypothyroidism presenting for SOB. To note, patient was admitted on Oct 1st for NSTEMI and had 3 stents placed. pt also with recent complicated admission in october for abdominal pain, colitis and found to have mesenteric ischemia s/p exploratory laparotomy with a left external iliac to SMA bypass and 80 cm small bowel resection with primary anastomosis,  admission also was complicated by UGIB requiring endoscopic intervention for bleeding gastric ulcer. GI are consulted for now small amount of fresh blood per rectum on straining , with constipation.    #fresh blood per rectum: resolved   likely small bowel Bleed vs diverticular in R side Colon.  patient is hemodynamically sable   Hg is stable  Colonoscopy showed maroon color blood through out the colon and also in small bowel.      REC:   trend cbc every 12hrs  keep hgb above 8  Capsule endoscopy results pending.  c/w clear liquids and advance as tolerated.  Please call cardiology for management of DAPT in concern with recent Stent. 83 yo M yo with PMH of CAD s/p NSTEMI Oct 1 w/ pci and stent x3, HTN ,DLD, Hypothyroidism presenting for SOB. To note, patient was admitted on Oct 1st for NSTEMI and had 3 stents placed. pt also with recent complicated admission in october for abdominal pain, colitis and found to have mesenteric ischemia s/p exploratory laparotomy with a left external iliac to SMA bypass and 80 cm small bowel resection with primary anastomosis,  admission also was complicated by UGIB requiring endoscopic intervention for bleeding gastric ulcer. GI are consulted for now small amount of fresh blood per rectum on straining , with constipation.    #fresh blood per rectum: resolved   likely small bowel Bleed vs diverticular in R side Colon.  patient is hemodynamically sable   Hg is stable  Colonoscopy showed maroon color blood through out the colon and also in small bowel.      REC:   trend cbc every 12hrs  keep hgb above 8  Capsule endoscopy results pending.  c/w clear liquids and advance as tolerated.  Please call cardiology for management of DAPT in concern with recent Stent.  will f/up

## 2019-11-23 NOTE — PROGRESS NOTE ADULT - SUBJECTIVE AND OBJECTIVE BOX
GENERAL SURGERY PROGRESS NOTE     LAKSHMI JUDD  84y  Male  Hospital day :12d  POD:  Procedure:     OVERNIGHT EVENTS:  Continues to have bloody bowel movements, 3 overnight. Capsule endoscopy performed yesterday.     T(F): 97.1 (11-23-19 @ 00:00), Max: 99 (11-22-19 @ 16:00)  HR: 72 (11-23-19 @ 00:00) (70 - 82)  BP: 133/59 (11-23-19 @ 00:00) (107/58 - 171/76)  ABP: --  ABP(mean): --  RR: 26 (11-23-19 @ 00:00) (15 - 29)  SpO2: 99% (11-23-19 @ 00:00) (84% - 100%)    DIET/FLUIDS: magnesium oxide 400 milliGRAM(s) Oral three times a day with meals  multivitamin 1 Tablet(s) Oral daily  potassium chloride    Tablet ER 40 milliEquivalent(s) Oral every 4 hours    URINE:   11-21-19 @ 07:01  -  11-22-19 @ 07:00  --------------------------------------------------------  OUT: 450 mL      GI proph:  pantoprazole  Injectable 40 milliGRAM(s) IV Push two times a day  ABx: nystatin    Suspension 134770 Unit(s) Oral two times a day      PHYSICAL EXAM:  GENERAL: NAD, well-appearing  CHEST/LUNG: Clear to auscultation bilaterally  HEART: Regular rate and rhythm  ABDOMEN: Soft, Nontender, Nondistended; incision c/d/i  EXTREMITIES:  No clubbing, cyanosis, or edema    LABS  Labs:  CAPILLARY BLOOD GLUCOSE                  9.4    11.78 )-----------( 428      ( 23 Nov 2019 01:29 )             29.4       Auto Immature Granulocyte %: 1.4 % (11-23-19 @ 01:29)  Auto Neutrophil %: 82.4 % (11-23-19 @ 01:29)  Auto Immature Granulocyte %: 1.9 % (11-22-19 @ 16:47)  Auto Neutrophil %: 82.4 % (11-22-19 @ 16:47)  Auto Neutrophil %: 86.0 % (11-22-19 @ 04:23)  Auto Immature Granulocyte %: 2.0 % (11-22-19 @ 04:23)    11-22    137  |  93<L>  |  13  ----------------------------<  115<H>  3.2<L>   |  32  |  0.5<L>    Calcium, Total Serum: 7.9 mg/dL (11-22-19 @ 16:50)    LFTs:             5.5  | 0.5  | 51       ------------------[85      ( 22 Nov 2019 16:50 )  2.8  | x    | 84          	  RADIOLOGY:   < from: CT Angio Abdomen and Pelvis w/ IV Cont (11.22.19 @ 02:00) >  FINDINGS:    VASCULATURE:  No evidence of gastrointestinal intraluminal contrast extravasation to   indicate an active GI bleed.    No evidence of aortic aneurysm or dissection. Diffuse atherosclerotic   disease the aorta with diffuse ectatic intramural thrombus. 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 Patent left   external iliac to SMA bypass graft.  Stable atherosclerotic disease   involving right renal ostium.    Lower chest: Bilateral moderate pleural effusions with surrounding   atelectasis. 2 mm subpleural left upper lobe nodule (6/11).      HEPATOBILIARY:  No suspicious parenchymal lesion or biliary ductal   dilatation.    SPLEEN: Multiple subcapsular fluid collections measuring up to 4.2 x 1.2   cm, increased from prior examination.    PANCREAS: Enlarging peripancreatic tail complex fluid collection are   noted measuring up to 8.7 x 4.2 cm (series 6 image 49).    ADRENAL GLANDS: Unremarkable.    KIDNEYS: Symmetric renal enhancement without hydronephrosis bilaterally.   Stable left renal cyst.    ABDOMINOPELVIC NODES: Stable..    PELVIC ORGANS: Stable prostatic calcifications    PERITONEUM /MESENTERY/BOWEL: Trace left lower quadrant ascites,   unchanged. No bowel obstruction or pneumoperitoneum    BONES/SOFT TISSUES:Right scrotal fluid collection measuring 4 x 4.2 cm   may represent large hydrocele, unchanged. Anterior abdominal wall   subcutaneous defect with few foci of air likely represents postoperative   changes. Multilevel degenerative changes of the spine.    IMPRESSION:    No evidence of active GI bleed.    Increased size of peripancreatic complex fluid collection measuring up to   8.7 x 4.2 cm.    Interval increase in size of perisplenic subcapsular fluid collections   measuring up to 4.2 x 1.2 cm.    Bilateral moderate pleural effusions, unchanged.    < end of copied text > GENERAL SURGERY PROGRESS NOTE     LAKSHMI JUDD  84y  Male  Hospital day :12d  POD:  Procedure:     OVERNIGHT EVENTS:  Continues to have bloody bowel movements, 3 overnight. Capsule endoscopy performed yesterday.     T(F): 97.1 (11-23-19 @ 00:00), Max: 99 (11-22-19 @ 16:00)  HR: 72 (11-23-19 @ 00:00) (70 - 82)  BP: 133/59 (11-23-19 @ 00:00) (107/58 - 171/76)  RR: 26 (11-23-19 @ 00:00) (15 - 29)  SpO2: 99% (11-23-19 @ 00:00) (84% - 100%)    DIET/FLUIDS: magnesium oxide 400 milliGRAM(s) Oral three times a day with meals  multivitamin 1 Tablet(s) Oral daily  potassium chloride    Tablet ER 40 milliEquivalent(s) Oral every 4 hours    URINE:   11-21-19 @ 07:01  -  11-22-19 @ 07:00  --------------------------------------------------------  OUT: 450 mL      GI proph:  pantoprazole  Injectable 40 milliGRAM(s) IV Push two times a day  ABx: nystatin    Suspension [id number] Unit(s) Oral two times a day      PHYSICAL EXAM:  GENERAL: NAD, well-appearing  CHEST/LUNG: Clear to auscultation bilaterally  HEART: Regular rate and rhythm  ABDOMEN: Soft, Nontender, Nondistended; incision c/d/i  EXTREMITIES:  No clubbing, cyanosis, or edema    LABS  Labs:  CAPILLARY BLOOD GLUCOSE                  9.4    11.78 )-----------( 428      ( 23 Nov 2019 01:29 )             29.4       Auto Immature Granulocyte %: 1.4 % (11-23-19 @ 01:29)  Auto Neutrophil %: 82.4 % (11-23-19 @ 01:29)  Auto Immature Granulocyte %: 1.9 % (11-22-19 @ 16:47)  Auto Neutrophil %: 82.4 % (11-22-19 @ 16:47)  Auto Neutrophil %: 86.0 % (11-22-19 @ 04:23)  Auto Immature Granulocyte %: 2.0 % (11-22-19 @ 04:23)    11-22    137  |  93<L>  |  13  ----------------------------<  115<H>  3.2<L>   |  32  |  0.5<L>    Calcium, Total Serum: 7.9 mg/dL (11-22-19 @ 16:50)    LFTs:             5.5  | 0.5  | 51       ------------------[85      ( 22 Nov 2019 16:50 )  2.8  | x    | 84          	  RADIOLOGY:   < from: CT Angio Abdomen and Pelvis w/ IV Cont (11.22.19 @ 02:00) >  FINDINGS:    VASCULATURE:  No evidence of gastrointestinal intraluminal contrast extravasation to   indicate an active GI bleed.    No evidence of aortic aneurysm or dissection. Diffuse atherosclerotic   disease the aorta with diffuse ectatic intramural thrombus. 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 Patent left   external iliac to SMA bypass graft.  Stable atherosclerotic disease   involving right renal ostium.    Lower chest: Bilateral moderate pleural effusions with surrounding   atelectasis. 2 mm subpleural left upper lobe nodule (6/11).      HEPATOBILIARY:  No suspicious parenchymal lesion or biliary ductal   dilatation.    SPLEEN: Multiple subcapsular fluid collections measuring up to 4.2 x 1.2   cm, increased from prior examination.    PANCREAS: Enlarging peripancreatic tail complex fluid collection are   noted measuring up to 8.7 x 4.2 cm (series 6 image 49).    ADRENAL GLANDS: Unremarkable.    KIDNEYS: Symmetric renal enhancement without hydronephrosis bilaterally.   Stable left renal cyst.    ABDOMINOPELVIC NODES: Stable..    PELVIC ORGANS: Stable prostatic calcifications    PERITONEUM /MESENTERY/BOWEL: Trace left lower quadrant ascites,   unchanged. No bowel obstruction or pneumoperitoneum    BONES/SOFT TISSUES:Right scrotal fluid collection measuring 4 x 4.2 cm   may represent large hydrocele, unchanged. Anterior abdominal wall   subcutaneous defect with few foci of air likely represents postoperative   changes. Multilevel degenerative changes of the spine.    IMPRESSION:    No evidence of active GI bleed.    Increased size of peripancreatic complex fluid collection measuring up to   8.7 x 4.2 cm.    Interval increase in size of perisplenic subcapsular fluid collections   measuring up to 4.2 x 1.2 cm.    Bilateral moderate pleural effusions, unchanged.    < end of copied text >

## 2019-11-23 NOTE — CHART NOTE - NSCHARTNOTEFT_GEN_A_CORE
CCU DOWNGRADE NOTE:    84y Male transferred to floor from CCU    Patient is a 84y old Male who presents with a chief complaint of SOB (23 Nov 2019 15:02)    The patient is currently admitted for the primary diagnosis of Hypervolemia, unspecified hypervolemia type    The patient was admitted to the unit for (2) Days.    The patient was never intubated, and was never on pressors.    Disposition: Medical Floor     Code Status: FULL    CCU COURSE OF EVENTS:    83 yo M yo with PMH of CAD s/p NSTEMI Oct 1 w/ pci and stent x3, HTN ,DLD, Hypothyroidism presenting for SOB. To note, patient was admitted on Oct 1st for NSTEMI and had 3 stents placed. pt also with recent complicated admission in october for abdominal pain, colitis and found to have mesenteric ischemia s/p exploratory laparotomy with a left external iliac to SMA bypass and 80 cm small bowel resection with primary anastomosis,  admission also was complicated by UGIB requiring endoscopic intervention for bleeding gastric ulcer. GI are consulted for now small amount of fresh blood per rectum on straining , with constipation.    #fresh blood per rectum: resolved   likely small bowel Bleed vs diverticular in R side Colon.  patient is hemodynamically sable   Hg is stable  Colonoscopy showed maroon color blood through out the colon and also in small bowel.    For Follow-up  trend cbc every 12hrs  keep hgb above 8  Capsule endoscopy results pending.  c/w clear liquids and advance as tolerated.  Please call cardiology for management of DAPT in concern with recent Stent.  -------------------------------------------------------------------------------------------  Sign-out to:

## 2019-11-23 NOTE — PROGRESS NOTE ADULT - ASSESSMENT
85 y/o man with PMH of CAD s/p NSTEMI Oct 1 w/ PCI and stent x3, HTN ,dyslipidemia, Hypothyroidism, recent admission for mesenteric ischemia s/p exploratory laparotomy with a left external iliac to SMA bypass and 80 cm small bowel resection with primary anastomosis and upper GI bleed requiring endoscopic intervention for bleeding gastric ulcer presented for for progressively worsening SOB    1.	Acute HFpEF  2.	Acute Hypoxic respiratory failure on admission due to #1 - now resolved  3.	B/L pleural effusions   4.	CAD S/P recent PCI  5.	HTN   6.	Hypothyroidism  7.	Rectal bleeding    8.	Hemorrhoids  9.	Lower abdomen surgical wound         PLAN:    ·	Hgb stable after last transfusion - check CBC today   ·	Colonoscopy showed large amount of maroon colored blood throughout the colon and small intestine. CTA colon was negative. No active bleeding site was found. Capsule enteroscopy done and awaiting results  ·	on clear liquid diet  ·	S/P total 3 units of PRBC'S. Follow H/H. Keep Hb >8. Transfuse as needed  ·	S/P total 600 mg of Venofer.   ·	Anusol suppository for hemorrhoids.   ·	No need for thoracentesis per Pulm - continue diuresis with lasix PO  ·	Check i's and o's and daily wt.   ·	Low salt diet and water restriction to 1.5 L/C.   ·	Continue Plavix. ASA is on hold.   ·	surgery following for dressing changes  ·	    PROGRESS NOTE HANDOFF    Pending: CBC today and in AM    Family discussion: with wife at bedside    Disposition: to be determined

## 2019-11-23 NOTE — PROGRESS NOTE ADULT - ASSESSMENT
84M w/ PMH of CAD, NSTEMI s/p PCI and 3 stents, HTN, DLD, hypothyroidism, and mesenteric ischemia s/p ex lap w/ SBR, primary anastomosis, and left anterograde SMA bypass who originally presented with SOB, now presenting with BRBPR.     Plan:   - FU capsule endoscopy results  - trend Hb: 8.8 > 8.4 > 9.3 > 9.4  - continue to hold anticoagulation

## 2019-11-23 NOTE — PROGRESS NOTE ADULT - ASSESSMENT
IMPRESSION    SOB Pleural effusion/ chronically elevated r hemidiaphragm now RA 95%  H/o mesenteric ischemia s/p sx  H/o Afib not on AC 2/2  DEC HB s/p tx on plavix  s.p recent stent  Complex peripancreatic and sub capsular collection.     PLAN    Keep on Lasix for now. Keep 40 mg Q24.   BiPAP at night   GI follow up for capsule  Surgery follow up for wound care and collection.   ABX per ID and G sx   TREND CBC  NEB AS NEEDED  Seq for DVT ppx  rehab eval  OOB to chair  incentive spirometry.  follow h/h   downgrade to floor after BMP if stable   GI follow up   OOB to chair

## 2019-11-23 NOTE — PROGRESS NOTE ADULT - SUBJECTIVE AND OBJECTIVE BOX
Detail Level: [facility] Day # 12d    Gastroenterology team is following this patient for hematochezia    Interval Events: Patient had a Capsule endoscopy yesterday. He is on clear liquids. Last BM was today loose and brown, capsule not seen in stool yet. No abdominal pain.    PAST MEDICAL & SURGICAL HISTORY  Neuropathy  TIA (transient ischemic attack)  Left carotid stenosis  Lyme disease  Hypothyroidism  Coronary artery disease  History of mesenteric infarction      FAMILY HISTORY:  No significant FH    SOCIAL HISTORY:  Ex smoker      ALLERGIES:  iodinated radiocontrast agents (Hives)      MEDICATIONS:  MEDICATIONS  (STANDING):  aMIOdarone    Tablet 200 milliGRAM(s) Oral daily  atorvastatin 40 milliGRAM(s) Oral at bedtime  budesonide  80 MICROgram(s)/formoterol 4.5 MICROgram(s) Inhaler 2 Puff(s) Inhalation two times a day  chlorhexidine 4% Liquid 1 Application(s) Topical <User Schedule>  clopidogrel Tablet 75 milliGRAM(s) Oral daily  furosemide    Tablet 40 milliGRAM(s) Oral two times a day  hydrocortisone 2.5% Rectal Cream 1 Application(s) Rectal two times a day  levothyroxine 300 MICROGram(s) Oral daily  lisinopril 20 milliGRAM(s) Oral daily  magnesium oxide 400 milliGRAM(s) Oral three times a day with meals  metoprolol tartrate 12.5 milliGRAM(s) Oral two times a day  multivitamin 1 Tablet(s) Oral daily  nystatin    Suspension [id number] Unit(s) Oral two times a day  pantoprazole  Injectable 40 milliGRAM(s) IV Push two times a day  potassium chloride    Tablet ER 40 milliEquivalent(s) Oral every 4 hours  senna 2 Tablet(s) Oral at bedtime  simethicone 80 milliGRAM(s) Chew four times a day  sucralfate 1 Gram(s) Oral every 12 hours    MEDICATIONS  (PRN):  acetaminophen   Tablet .. 650 milliGRAM(s) Oral every 6 hours PRN Temp greater or equal to 38C (100.4F), Mild Pain (1 - 3)  albuterol/ipratropium for Nebulization. 3 milliLiter(s) Nebulizer every 6 hours PRN Shortness of Breath and/or Wheezing  guaiFENesin    Syrup 200 milliGRAM(s) Oral every 6 hours PRN secretions  methocarbamol 750 milliGRAM(s) Oral every 12 hours PRN muscle pain      REVIEW OF SYSTEMS:    CONSTITUTIONAL: No fever  EYES/ENT: No scleral icterus  RESPIRATORY: shortness of breath  CARDIOVASCULAR: No chest pain   GASTROINTESTINAL: See interval events  GENITOURINARY: No dysuria  NEUROLOGICAL: No dizziness  SKIN: No cyanosis        VITALS:   T(F): 97.1 (11-23 @ 12:00), Max: 99 (11-22 @ 16:00)  HR: 82 (11-23 @ 12:00) (70 - 86)  BP: 118/50 (11-23 @ 12:00) (107/58 - 171/76)  BP(mean): 75 (11-23 @ 12:00) (67 - 137)  RR: 29 (11-23 @ 12:00) (15 - 35)  SpO2: 98% (11-23 @ 12:00) (84% - 100%)    I&O's Summary    22 Nov 2019 07:01  -  23 Nov 2019 07:00  --------------------------------------------------------  IN: 840 mL / OUT: 1850 mL / NET: -1010 mL    23 Nov 2019 07:01  -  23 Nov 2019 14:00  --------------------------------------------------------  IN: 590 mL / OUT: 700 mL / NET: -110 mL        Physical Exam:  GENERAL: NAD  HEAD:  Atraumatic  EYES:  conjunctiva and sclera clear  NECK: No thyromegaly  CHEST/LUNG: No accessory use of muscle  HEART: S1S2 Normal  ABDOMEN: Soft, Nontender, Nondistended; Bowel sounds present, no guarding or rigidity.  EXTREMITIES:  No cyanosis  NEURO: AAOx3        LABS:                        9.4    11.78 )-----------( 428      ( 23 Nov 2019 01:29 )             29.4         LIVER FUNCTIONS - ( 23 Nov 2019 11:03 )  Alb: 2.6 g/dL / Pro: 5.4 g/dL / ALK PHOS: 77 U/L / ALT: 61 U/L / AST: 36 U/L / GGT: x           LIVER FUNCTIONS - ( 23 Nov 2019 11:03 )  Alb: 2.6 [3.5 - 5.2] / Pro: 5.4 [6.0 - 8.0] / ALK PHOS: 77 [30 - 115] / ALT: 61 [0 - 41] / AST: 36 [0 - 41] / GGT: x     LIVER FUNCTIONS - ( 22 Nov 2019 16:50 )  Alb: 2.8 [3.5 - 5.2] / Pro: 5.5 [6.0 - 8.0] / ALK PHOS: 85 [30 - 115] / ALT: 84 [0 - 41] / AST: 51 [0 - 41] / GGT: x     LIVER FUNCTIONS - ( 22 Nov 2019 04:23 )  Alb: 2.6 [3.5 - 5.2] / Pro: 5.3 [6.0 - 8.0] / ALK PHOS: 87 [30 - 115] / ALT: 99 [0 - 41] / AST: 75 [0 - 41] / GGT: x     LIVER FUNCTIONS - ( 18 Nov 2019 05:58 )  Alb: 2.5 [3.5 - 5.2] / Pro: 5.2 [6.0 - 8.0] / ALK PHOS: 63 [30 - 115] / ALT: 28 [0 - 41] / AST: 37 [0 - 41] / GGT: x       11-23    139  |  98  |  14  ----------------------------<  137<H>  4.0   |  28  |  0.5<L>    Ca    7.7<L>      23 Nov 2019 11:03  Mg     2.2     11-23 09-27 Chol 100 LDL 51 HDL 39<L> Trig 82

## 2019-11-24 NOTE — PROGRESS NOTE ADULT - SUBJECTIVE AND OBJECTIVE BOX
LAKSHMI JUDD  84y Male    INTERVAL HPI/OVERNIGHT EVENTS:    Pt with abdominal pain during dressing changes and when he coughs.  I spoke to the pt about using Percocet before the dressing change but he refuses.  No bloody BM.  Wife at bedside.  Case discussed with surgery attending today.    T(F): 96.8 (11-24-19 @ 05:49), Max: 97 (11-23-19 @ 16:00)  HR: 87 (11-24-19 @ 05:49) (76 - 87)  BP: 127/59 (11-24-19 @ 05:49) (119/61 - 142/60)  RR: 18 (11-24-19 @ 05:49) (18 - 27)  SpO2: 97% (11-23-19 @ 19:25) (97% - 97%) on RA    I&O's Summary    23 Nov 2019 07:01  -  24 Nov 2019 07:00  --------------------------------------------------------  IN: 590 mL / OUT: 700 mL / NET: -110 mL    24 Nov 2019 07:01  -  24 Nov 2019 12:36  --------------------------------------------------------  IN: 0 mL / OUT: 200 mL / NET: -200 mL        Daily Height in cm: 172.72 (23 Nov 2019 23:04)    Daily     CAPILLARY BLOOD GLUCOSE      POCT Blood Glucose.: 120 mg/dL (24 Nov 2019 11:39)  POCT Blood Glucose.: 113 mg/dL (24 Nov 2019 07:38)        PHYSICAL EXAM:  GENERAL: NAD  HEAD:  Normocephalic  EYES:  conjunctiva and sclera clear  ENMT: Moist mucous membranes  NECK: Supple, No JVD  NERVOUS SYSTEM:  Alert & Oriented X3, Good concentration  CHEST/LUNG: Clear to percussion bilaterally, decreased BS at bases  HEART: Regular rate and rhythm  ABDOMEN: Soft, tender at site of surgery, Nondistended, dressings in place  EXTREMITIES:  No edema      Consultant(s) Notes Reviewed:  [x ] YES  [ ] NO  Care Discussed with Consultants/Other Providers [ x] YES  [ ] NO    MEDICATIONS  (STANDING):  aMIOdarone    Tablet 200 milliGRAM(s) Oral daily  atorvastatin 40 milliGRAM(s) Oral at bedtime  budesonide  80 MICROgram(s)/formoterol 4.5 MICROgram(s) Inhaler 2 Puff(s) Inhalation two times a day  chlorhexidine 4% Liquid 1 Application(s) Topical <User Schedule>  clopidogrel Tablet 75 milliGRAM(s) Oral daily  furosemide    Tablet 40 milliGRAM(s) Oral two times a day  hydrocortisone 2.5% Rectal Cream 1 Application(s) Rectal two times a day  levothyroxine 300 MICROGram(s) Oral daily  lisinopril 20 milliGRAM(s) Oral daily  magnesium oxide 400 milliGRAM(s) Oral three times a day with meals  metoprolol tartrate 12.5 milliGRAM(s) Oral two times a day  multivitamin 1 Tablet(s) Oral daily  nystatin    Suspension 839727 Unit(s) Oral two times a day  pantoprazole  Injectable 40 milliGRAM(s) IV Push two times a day  potassium chloride    Tablet ER 40 milliEquivalent(s) Oral every 4 hours  senna 2 Tablet(s) Oral at bedtime  simethicone 80 milliGRAM(s) Chew four times a day  sucralfate 1 Gram(s) Oral every 12 hours    MEDICATIONS  (PRN):  acetaminophen   Tablet .. 650 milliGRAM(s) Oral every 6 hours PRN Temp greater or equal to 38C (100.4F), Mild Pain (1 - 3)  albuterol/ipratropium for Nebulization. 3 milliLiter(s) Nebulizer every 6 hours PRN Shortness of Breath and/or Wheezing  guaiFENesin    Syrup 200 milliGRAM(s) Oral every 6 hours PRN secretions  methocarbamol 750 milliGRAM(s) Oral every 12 hours PRN muscle pain  oxycodone    5 mG/acetaminophen 325 mG 1 Tablet(s) Oral every 4 hours PRN Severe Pain (7 - 10)    Telemetry reviewed    LABS:                        9.4    11.79 )-----------( 266      ( 24 Nov 2019 06:59 )             30.1     11-24    131<L>  |  93<L>  |  14  ----------------------------<  94  4.3   |  21  |  0.6<L>    Ca    7.4<L>      24 Nov 2019 06:59  Mg     2.2     11-24    TPro  5.4<L>  /  Alb  2.6<L>  /  TBili  0.5  /  DBili  x   /  AST  36  /  ALT  61<H>  /  AlkPhos  77  11-23            Culture - Other (collected 22 Nov 2019 18:10)  Source: .Other wound  Preliminary Report (23 Nov 2019 22:38):    Culture yields growth of greater than 3 colony types of    bacteria,  which may indicate contamination and normal valerie    Call client services within 7 days if further workup is clinically    indicated.    including Numerous Staphylococcus aureus        RADIOLOGY & ADDITIONAL TESTS:    Imaging or report Personally Reviewed:  [x ] YES  [ ] NO    CXR 11/24 reviewed: elevated right hemidiaphragm, decreased PVC      Care discussed with pt and family

## 2019-11-24 NOTE — PROGRESS NOTE ADULT - ASSESSMENT
83 y/o man with PMH of CAD s/p NSTEMI Oct 1 w/ PCI and stent x3, HTN ,dyslipidemia, Hypothyroidism, recent admission for mesenteric ischemia s/p exploratory laparotomy with a left external iliac to SMA bypass and 80 cm small bowel resection with primary anastomosis and upper GI bleed requiring endoscopic intervention for bleeding gastric ulcer presented for for progressively worsening SOB    1.	Acute HFpEF  2.	Acute Hypoxic respiratory failure on admission due to #1 - now resolved  3.	B/L pleural effusions - improved  4.	CAD S/P recent PCI  5.	HTN   6.	Hypothyroidism  7.	Rectal bleeding    8.	Hemorrhoids  9.	Lower abdomen surgical wound         PLAN:    ·	Hgb stable after last transfusion   ·	Colonoscopy showed large amount of maroon colored blood throughout the colon and small intestine. CTA colon was negative. No active bleeding site was found. Capsule enteroscopy done and awaiting results  ·	on full liquid diet  ·	S/P total 3 units of PRBC'S. Follow H/H. Keep Hb >8. Transfuse as needed  ·	S/P total 600 mg of Venofer.   ·	Anusol suppository for hemorrhoids.   ·	No need for thoracentesis per Pulm - continue diuresis with lasix PO  ·	Check i's and o's and daily wts.   ·	Low salt diet and water restriction to 1.5 L/C.   ·	Continue Plavix. ASA is on hold.   ·	surgery following for dressing changes  ·	GI f/u    PROGRESS NOTE HANDOFF    Pending: CBC, capsule endoscopy results    Family discussion: with wife at bedside    Disposition: to be determined, evaluate need for physical therapy

## 2019-11-24 NOTE — PROGRESS NOTE ADULT - ATTENDING COMMENTS
Patient seen and examined with surgery team on rounds and discussed management plans. Transferred from CCU. denies any active bleeding, poor apetite and po intake with mild abd distension , dressings dry lower abd, encouraged po intake and diet
Pt seen and examined at bedside. No cp. Sob improving.  New to me today. Chart reviewed  Vitals stable. Exam rales in lower bases.   plan as above  #Progress Note Handoff  Pending (specify):  Consults__Cardio, fu trops, continue lasix keep neg, cxr in am, may need PT dw SW_______, Tests________, Test Results_______, Other_________  Family discussion: plan dw family and pt and agreed to plan  Disposition: Home___/SNF___/Other________/Unknown at this time____x____
Stable and afebrile.  blood per rectum  CTA negative  Peripancreatic fluid.     received blood transfusion    a/p:  critical  hold antiplatelt/anticoag till h/h stable    GI for possible scope vs capsule
Patient seen and examined with surgery team on rounds in CCU and discussed management plans with patient.   Awaiting results of capsule endoscopy as to etiology of bleeding. No active bleeding now small blood tinged yesterday. Patient has not eaten for couple days pending investigations. Abd soft benign dressing in lower abd with steri strips upper abd, HGB stable. will monitor

## 2019-11-24 NOTE — PROGRESS NOTE ADULT - SUBJECTIVE AND OBJECTIVE BOX
GENERAL SURGERY PROGRESS NOTE     LAKSHMI JUDD  84y  Male  Hospital day :13d    OVERNIGHT EVENTS: Patient given capsule endoscopy on 11/21, still pending results from study. Downgraded yesterday, hemoglobin is stable at 10.61. Patient reports having non bloody bowel movements yesterday and passing flatus, no nausea or vomiting.     T(F): 96.8 (11-23-19 @ 23:04), Max: 97.1 (11-23-19 @ 12:00)  HR: 79 (11-23-19 @ 23:04) (74 - 86)  BP: 121/60 (11-23-19 @ 23:04) (118/50 - 151/59)  RR: 18 (11-23-19 @ 23:04) (18 - 35)  SpO2: 97% (11-23-19 @ 19:25) (97% - 100%)    DIET/FLUIDS: magnesium oxide 400 milliGRAM(s) Oral three times a day with meals  multivitamin 1 Tablet(s) Oral daily  potassium chloride    Tablet ER 40 milliEquivalent(s) Oral every 4 hours    BM:   11-22-19 @ 07:01  -  11-23-19 @ 07:00  --------------------------------------------------------  OUT: 100 mL    URINE:   11-22-19 @ 07:01  -  11-23-19 @ 07:00  --------------------------------------------------------  OUT: 900 mL       GI proph:  pantoprazole  Injectable 40 milliGRAM(s) IV Push two times a day    AC/ proph:   ABx: nystatin    Suspension 878328 Unit(s) Oral two times a day      PHYSICAL EXAM:  GENERAL: NAD, well-appearing  CHEST/LUNG: Clear to auscultation bilaterally  HEART: Regular rate and rhythm  ABDOMEN: Soft, mildly tender  EXTREMITIES:  No clubbing, cyanosis, or edema    LABS               10.4   10.61 )-----------( 400      ( 23 Nov 2019 17:00 )             34.5       Auto Immature Granulocyte %: 1.4 % (11-23-19 @ 01:29)  Auto Neutrophil %: 82.4 % (11-23-19 @ 01:29)    11-23    139  |  98  |  14  ----------------------------<  137<H>  4.0   |  28  |  0.5<L>      Magnesium, Serum: 2.2 mg/dL (11-23-19 @ 11:03)      LFTs:             5.4  | 0.5  | 36       ------------------[77      ( 23 Nov 2019 11:03 )  2.6  | x    | 61          Culture - Other (collected 22 Nov 2019 18:10)  Source: .Other wound  Preliminary Report (23 Nov 2019 22:38):    Culture yields growth of greater than 3 colony types of    bacteria,  which may indicate contamination and normal valerie    Call client services within 7 days if further workup is clinically    indicated.    including Numerous Staphylococcus aureus    RADIOLOGY & ADDITIONAL TESTS:  < from: Xray Chest 1 View- PORTABLE-Routine (11.23.19 @ 04:17) >    Impression:      Bibasilar opacities, unchanged.    < end of copied text >

## 2019-11-24 NOTE — PROGRESS NOTE ADULT - ASSESSMENT
84M w/ PMH of CAD, NSTEMI s/p PCI and 3 stents, HTN, DLD, hypothyroidism, and mesenteric ischemia s/p ex lap w/ SBR, primary anastomosis, and left anterograde SMA bypass who originally presented with SOB, now presenting with BRBPR.     Plan:   - F/U capsule endoscopy results  - trend Hb: 8.8 > 8.4 > 9.3 > 9.4 > 10.4   - BID dressing changes   - Monitor for bloody bowel movements/other signs of bleeding

## 2019-11-25 NOTE — PROGRESS NOTE ADULT - SUBJECTIVE AND OBJECTIVE BOX
*This is a Preliminary Note and will be edited*  SUBJECTIVE:  Pt. is 85 yo M yo with PMH of CAD s/p NSTEMI Oct 1 w/ pci and stent x3, Mesenteric Ischemia with L. Iliac-SMA bypass with SBR and anastomosis, HTN ,DLD, Hypothyroidism presenting for shortness of breath,.    Currently admitted to medicine with the primary diagnosis of Hypervolemia, unspecified hypervolemia type     Today is hospital day 14d. This morning he is resting comfortably in bed and reports no new** issues or overnight events.   Pt. denies HA, Cp, Abd Pain or discomfort.**  Pt. is urinating and stooling appropriately.**    PAST MEDICAL & SURGICAL HISTORY  Neuropathy  TIA (transient ischemic attack)  Left carotid stenosis  Lyme disease  Hypothyroidism  Coronary artery disease  History of mesenteric infarction    SOCIAL HISTORY:  Negative for smoking/alcohol/drug use.     ALLERGIES:  iodinated radiocontrast agents (Hives)    MEDICATIONS:  STANDING MEDICATIONS  acetaminophen   Tablet .. 650 milliGRAM(s) Oral every 6 hours PRN Temp greater or equal to 38C (100.4F), Mild Pain (1 - 3)  albuterol/ipratropium for Nebulization. 3 milliLiter(s) Nebulizer every 6 hours PRN Shortness of Breath and/or Wheezing  aMIOdarone    Tablet 200 milliGRAM(s) Oral daily  atorvastatin 40 milliGRAM(s) Oral at bedtime  budesonide  80 MICROgram(s)/formoterol 4.5 MICROgram(s) Inhaler 2 Puff(s) Inhalation two times a day  chlorhexidine 4% Liquid 1 Application(s) Topical <User Schedule>  clopidogrel Tablet 75 milliGRAM(s) Oral daily  furosemide    Tablet 40 milliGRAM(s) Oral two times a day  guaiFENesin    Syrup 200 milliGRAM(s) Oral every 6 hours PRN secretions  hydrocortisone 2.5% Rectal Cream 1 Application(s) Rectal two times a day  levothyroxine 300 MICROGram(s) Oral daily  lisinopril 20 milliGRAM(s) Oral daily  magnesium oxide 400 milliGRAM(s) Oral three times a day with meals  methocarbamol 750 milliGRAM(s) Oral every 12 hours PRN muscle pain  metoprolol tartrate 12.5 milliGRAM(s) Oral two times a day  multivitamin 1 Tablet(s) Oral daily  nystatin    Suspension 147946 Unit(s) Oral two times a day  oxycodone    5 mG/acetaminophen 325 mG 1 Tablet(s) Oral every 4 hours PRN Severe Pain (7 - 10)  pantoprazole  Injectable 40 milliGRAM(s) IV Push two times a day  senna 2 Tablet(s) Oral at bedtime  simethicone 80 milliGRAM(s) Chew four times a day  sucralfate 1 Gram(s) Oral every 12 hours    PRN MEDICATIONS  acetaminophen   Tablet .. 650 milliGRAM(s) Oral every 6 hours PRN  albuterol/ipratropium for Nebulization. 3 milliLiter(s) Nebulizer every 6 hours PRN  guaiFENesin    Syrup 200 milliGRAM(s) Oral every 6 hours PRN  methocarbamol 750 milliGRAM(s) Oral every 12 hours PRN  oxycodone    5 mG/acetaminophen 325 mG 1 Tablet(s) Oral every 4 hours PRN    VITALS:   T(F): 97  HR: 78 (74 - 92)  BP: 134/63 (105/51 - 134/63)  RR: 18 (18 - 18)  SpO2: --    LABS:                        9.6    11.73 )-----------( 311      ( 25 Nov 2019 05:56 )             31.3     11-24    131<L>  |  93<L>  |  14  ----------------------------<  94  4.3   |  21  |  0.6<L>    Ca    7.4<L>      24 Nov 2019 06:59  Mg     2.2     11-24    TPro  5.4<L>  /  Alb  2.6<L>  /  TBili  0.5  /  DBili  x   /  AST  36  /  ALT  61<H>  /  AlkPhos  77  11-23      Culture - Other (collected 22 Nov 2019 18:10)  Source: .Other wound  Final Report (24 Nov 2019 16:33):    Culture yields growth of greater than 3 colony types of    Call client services within 7 days if further workup is clinically    indicated.    including Numerous Staphylococcus aureus  Organism: Staphylococcus aureus (24 Nov 2019 16:33)  Organism: Staphylococcus aureus (24 Nov 2019 16:33)              RADIOLOGY:    PHYSICAL EXAM:  GEN: In no acute distress. Pt. is awake in bed able to have a conversation.  LUNGS: CTABL, Symmetrical inspiration, no increased work of breathing  HEART: +S1,S2, RRR, No murmurs, Rubs, Gallops   ABD: Bowel Sounds Present, Soft, non tender, non distended, no guarding, no rebound.   EXT: 2+ peripheral Pulses, no clubbing, no cyanosis, no Edema.   NEURO: AAOX3. No focal deficits. CN 2-12 Grossly intact. SUBJECTIVE:  Pt. is 85 yo M yo with PMH of CAD s/p NSTEMI Oct 1 w/ pci and stent x3, Mesenteric Ischemia with L. Iliac-SMA bypass with SBR and anastomosis, HTN ,DLD, Hypothyroidism presenting for shortness of breath,.    Currently admitted to medicine with the primary diagnosis of Hypervolemia, unspecified hypervolemia type     Today is hospital day 14d. This morning he is resting comfortably in bed and reports no new issues or overnight events.   Pt. denies HA, Cp. pt reports having abdominal pain around incision.  Pt. is urinating and reports passing a brown stool yesterday with no blood    PAST MEDICAL & SURGICAL HISTORY  Neuropathy  TIA (transient ischemic attack)  Left carotid stenosis  Lyme disease  Hypothyroidism  Coronary artery disease  History of mesenteric infarction    SOCIAL HISTORY:  Negative for smoking/alcohol/drug use.     ALLERGIES:  iodinated radiocontrast agents (Hives)    MEDICATIONS:  STANDING MEDICATIONS  acetaminophen   Tablet .. 650 milliGRAM(s) Oral every 6 hours PRN Temp greater or equal to 38C (100.4F), Mild Pain (1 - 3)  albuterol/ipratropium for Nebulization. 3 milliLiter(s) Nebulizer every 6 hours PRN Shortness of Breath and/or Wheezing  aMIOdarone    Tablet 200 milliGRAM(s) Oral daily  atorvastatin 40 milliGRAM(s) Oral at bedtime  budesonide  80 MICROgram(s)/formoterol 4.5 MICROgram(s) Inhaler 2 Puff(s) Inhalation two times a day  chlorhexidine 4% Liquid 1 Application(s) Topical <User Schedule>  clopidogrel Tablet 75 milliGRAM(s) Oral daily  furosemide    Tablet 40 milliGRAM(s) Oral two times a day  guaiFENesin    Syrup 200 milliGRAM(s) Oral every 6 hours PRN secretions  hydrocortisone 2.5% Rectal Cream 1 Application(s) Rectal two times a day  levothyroxine 300 MICROGram(s) Oral daily  lisinopril 20 milliGRAM(s) Oral daily  magnesium oxide 400 milliGRAM(s) Oral three times a day with meals  methocarbamol 750 milliGRAM(s) Oral every 12 hours PRN muscle pain  metoprolol tartrate 12.5 milliGRAM(s) Oral two times a day  multivitamin 1 Tablet(s) Oral daily  nystatin    Suspension 903930 Unit(s) Oral two times a day  oxycodone    5 mG/acetaminophen 325 mG 1 Tablet(s) Oral every 4 hours PRN Severe Pain (7 - 10)  pantoprazole  Injectable 40 milliGRAM(s) IV Push two times a day  senna 2 Tablet(s) Oral at bedtime  simethicone 80 milliGRAM(s) Chew four times a day  sucralfate 1 Gram(s) Oral every 12 hours    PRN MEDICATIONS  acetaminophen   Tablet .. 650 milliGRAM(s) Oral every 6 hours PRN  albuterol/ipratropium for Nebulization. 3 milliLiter(s) Nebulizer every 6 hours PRN  guaiFENesin    Syrup 200 milliGRAM(s) Oral every 6 hours PRN  methocarbamol 750 milliGRAM(s) Oral every 12 hours PRN  oxycodone    5 mG/acetaminophen 325 mG 1 Tablet(s) Oral every 4 hours PRN    VITALS:   T(F): 97  HR: 78 (74 - 92)  BP: 134/63 (105/51 - 134/63)  RR: 18 (18 - 18)  SpO2: --    LABS:                        9.6    11.73 )-----------( 311      ( 25 Nov 2019 05:56 )             31.3     11-24    131<L>  |  93<L>  |  14  ----------------------------<  94  4.3   |  21  |  0.6<L>    Ca    7.4<L>      24 Nov 2019 06:59  Mg     2.2     11-24    TPro  5.4<L>  /  Alb  2.6<L>  /  TBili  0.5  /  DBili  x   /  AST  36  /  ALT  61<H>  /  AlkPhos  77  11-23      Culture - Other (collected 22 Nov 2019 18:10)  Source: .Other wound  Final Report (24 Nov 2019 16:33):    Culture yields growth of greater than 3 colony types of    Call client services within 7 days if further workup is clinically    indicated.    including Numerous Staphylococcus aureus  Organism: Staphylococcus aureus (24 Nov 2019 16:33)  Organism: Staphylococcus aureus (24 Nov 2019 16:33)    RADIOLOGY:    PHYSICAL EXAM:  GEN: In no acute distress. Pt. is awake in bed able to have a conversation Pt give minimal word answers.  LUNGS: Pt has mild crackles at the base of BL lower lung fields,  Symmetrical inspiration, no increased work of breathing  HEART: +S1,S2, RRR, No murmurs, Rubs, Gallops   ABD: Bowel Sounds Present, Soft, non distended, no guarding, no rebound. Tender around Open incision with Packing inside. Packing wet and intact. No erythema pus or jose blood appreciated around wound.   EXT: 2+ peripheral Pulses, no clubbing, no cyanosis, no Edema.   NEURO: AAOX3. No focal deficits. CN 2-12 Grossly intact.

## 2019-11-25 NOTE — PROGRESS NOTE ADULT - NSHPATTENDINGPLANDISCUSS_GEN_ALL_CORE
TEAM
patient and surgery team
patient and surgical staff
care team
patient and family by bedside
resident
TEAM

## 2019-11-25 NOTE — PROGRESS NOTE ADULT - SUBJECTIVE AND OBJECTIVE BOX
LAKSHMI JUDD  84y Male    CHIEF COMPLAINT:    Patient is a 84y old  Male who presents with a chief complaint of SOB (25 Nov 2019 07:59)      INTERVAL HPI/OVERNIGHT EVENTS:    Patient seen and examined. No more rectal bleeding. Complains that he did not move his bowel for the last two days. No sob.     ROS: All other systems are negative.    Vital Signs:    T(F): 97 (11-25-19 @ 13:02), Max: 98.2 (11-24-19 @ 20:27)  HR: 88 (11-25-19 @ 13:02) (74 - 88)  BP: 120/59 (11-25-19 @ 13:02) (105/51 - 134/63)  RR: 18 (11-25-19 @ 13:02) (18 - 18)  SpO2: --  I&O's Summary    24 Nov 2019 07:01  -  25 Nov 2019 07:00  --------------------------------------------------------  IN: 0 mL / OUT: 690 mL / NET: -690 mL    25 Nov 2019 07:01  -  25 Nov 2019 15:28  --------------------------------------------------------  IN: 480 mL / OUT: 900 mL / NET: -420 mL      Daily     Daily   CAPILLARY BLOOD GLUCOSE      POCT Blood Glucose.: 121 mg/dL (25 Nov 2019 11:44)  POCT Blood Glucose.: 115 mg/dL (25 Nov 2019 07:49)  POCT Blood Glucose.: 101 mg/dL (24 Nov 2019 16:42)      PHYSICAL EXAM:    GENERAL:  NAD  SKIN: No rashes or lesions  HENT: Atraumatic Normocephalic. PERRL. Moist membranes.  NECK: Supple, No JVD. No lymphadenopathy.  PULMONARY: BS are decreased in the bases B/L. No wheezing. No rales  CVS: Normal S1, S2. Rate and Rhythm are regular. No murmurs.  ABDOMEN/GI: Soft, Nontender, Nondistended; BS present  EXTREMITIES: Peripheral pulses intact. No edema B/L LE.  NEUROLOGIC:  No motor or sensory deficit.  PSYCH: Alert & oriented x 3    Consultant(s) Notes Reviewed:  [x ] YES  [ ] NO  Care Discussed with Consultants/Other Providers [ x] YES  [ ] NO    EKG reviewed  Telemetry reviewed    LABS:                        9.6    11.73 )-----------( 311      ( 25 Nov 2019 05:56 )             31.3   Hemoglobin: 9.6 g/dL (11-25 @ 05:56)  Hemoglobin: 9.4 g/dL (11-24 @ 06:59)  Hemoglobin: 10.4 g/dL (11-23 @ 17:00)  Hemoglobin: 9.4 g/dL (11-23 @ 01:29)  Hemoglobin: 9.3 g/dL (11-22 @ 16:47)  Hemoglobin: 8.4 g/dL (11-22 @ 04:23)  Hemoglobin: 8.8 g/dL (11-21 @ 22:25)    11-24    131<L>  |  93<L>  |  14  ----------------------------<  94  4.3   |  21  |  0.6<L>    Ca    7.4<L>      24 Nov 2019 06:59  Mg     2.2     11-24              Culture - Other (collected 22 Nov 2019 18:10)  Source: .Other wound  Final Report (24 Nov 2019 16:33):    Culture yields growth of greater than 3 colony types of    Call client services within 7 days if further workup is clinically    indicated.    including Numerous Staphylococcus aureus  Organism: Staphylococcus aureus (24 Nov 2019 16:33)  Organism: Staphylococcus aureus (24 Nov 2019 16:33)        RADIOLOGY & ADDITIONAL TESTS:      Imaging or report Personally Reviewed:  [ ] YES  [ ] NO    Medications:  Standing  aMIOdarone    Tablet 200 milliGRAM(s) Oral daily  atorvastatin 40 milliGRAM(s) Oral at bedtime  budesonide  80 MICROgram(s)/formoterol 4.5 MICROgram(s) Inhaler 2 Puff(s) Inhalation two times a day  chlorhexidine 4% Liquid 1 Application(s) Topical <User Schedule>  clopidogrel Tablet 75 milliGRAM(s) Oral daily  furosemide    Tablet 40 milliGRAM(s) Oral daily  hydrocortisone 2.5% Rectal Cream 1 Application(s) Rectal two times a day  levothyroxine 300 MICROGram(s) Oral daily  lisinopril 20 milliGRAM(s) Oral daily  metoprolol tartrate 12.5 milliGRAM(s) Oral two times a day  multivitamin 1 Tablet(s) Oral daily  nystatin    Suspension 386003 Unit(s) Oral two times a day  pantoprazole  Injectable 40 milliGRAM(s) IV Push two times a day  senna 2 Tablet(s) Oral at bedtime  simethicone 80 milliGRAM(s) Chew four times a day  sucralfate 1 Gram(s) Oral every 12 hours    PRN Meds  acetaminophen   Tablet .. 650 milliGRAM(s) Oral every 6 hours PRN  albuterol/ipratropium for Nebulization. 3 milliLiter(s) Nebulizer every 6 hours PRN  guaiFENesin    Syrup 200 milliGRAM(s) Oral every 6 hours PRN  methocarbamol 750 milliGRAM(s) Oral every 12 hours PRN  oxycodone    5 mG/acetaminophen 325 mG 1 Tablet(s) Oral every 4 hours PRN      Case discussed with resident    Care discussed with pt/family

## 2019-11-25 NOTE — PROGRESS NOTE ADULT - ASSESSMENT
Assessment:  84M w/ PMH of CAD, NSTEMI s/p PCI and 3 stents, HTN, DLD, hypothyroidism, and mesenteric ischemia s/p ex lap w/ SBR, primary anastomosis, and left anterograde SMA bypass who originally presented with SOB, now presenting with BRBPR.     Plan:   - F/U capsule endoscopy results  - Monitor H/H  - BID dressing changes   - Monitor for bloody bowel movements/other signs of bleeding

## 2019-11-25 NOTE — PROGRESS NOTE ADULT - ASSESSMENT
IMPRESSION    SOB Pleural effusion/ chronically elevated r hemidiaphragm / RA 95%  H/o mesenteric ischemia s/p sx  H/o Afib not on AC 2/2  DEC HB s/p tx on plavix/ GI bleed sp colonoscopy/ CT angio/ capusle  s.p recent stent  Complex peripancreatic and sub capsular collection.     PLAN    lasix po  BiPAP at night   GI follow up   TREND CBC  NEB AS NEEDED  DVT ppx  rehab eval  OOB to chair  incentive spirometry.  OOB to chair   recall as needed

## 2019-11-25 NOTE — PROGRESS NOTE ADULT - ASSESSMENT
Pt. 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 pt also with recent complicated admission in october for abdominal pain, colitis and found to have mesenteric ischemia s/p exploratory laparotomy with a left external iliac to SMA bypass and 80 cm small bowel resection with primary anastomosis,  admission also was complicated by UGIB requiring endoscopic intervention for bleeding gastric ulcer. GI are consulted for now small amount of fresh blood per rectum on straining , with constipation.      # Anemia - Maroon Colored stool- S/P colonoscopy - Capsule Enteroscopy pending  - s/p 4 units this admission  - trend Hb: 9.3 > 9.4 > 10.4 >9.4>9.6  - Keep Hb >8. Transfuse as needed  - S/P total 600 mg of Venofer.   - Capsule Enteroscopy pending  - Protonix IV 40 BID   - Monitor CBC q12 hours    # Acute on Chronic Diastolic CHF Exacerbation with Acute Hypoxic Respiratory Failure  - B/L wheezing   - Continue with Lasix 40 qd, Symbicort, duoneb PRN, Bipap at night, Incentive Spirometry  -Cardiology following   - CT chest is negative for PE, but with bilateral pleural effusion  - EKG with no new ischemic changes  - BNP 2500, close to 2300 during last admission  - Troponin 0.04 and stable, denies chest pain  - LE Duplex neg  - Daily Chest X-Ray   - strict Is and Os, Salt and FLuid restriction 1.5 L/C.   - Daily Weights    # CAD, HTN, h/o V tach  - oral amio and metoprolol  - c/w ASA (Cleared by GI) Plavix, statin and lisinopril    #Abdominal pain - s/p L. Iliac-SMA bypass with SBR  - resolved   -Surgical sites healing well  -KUB wnl   -Miralax and hemorrhoid  suppositories       # hypomagnesemia - Repleted     # Hypothyroidism -- Continue with levothyroxine       DVT PPX : Sequentials  GI PPX: protonix IV   Diet: DASH/tlc - Advanced as per GI  Activity: As tolerated  Disposition: Likely back to [facility]  FULL CODE Pt. 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 pt also with recent complicated admission in october for abdominal pain, colitis and found to have mesenteric ischemia s/p exploratory laparotomy with a left external iliac to SMA bypass and 80 cm small bowel resection with primary anastomosis,  admission also was complicated by UGIB requiring endoscopic intervention for bleeding gastric ulcer. GI are consulted for now small amount of fresh blood per rectum on straining , with constipation.    # Anemia - Maroon Colored stool- S/P colonoscopy - Capsule Enteroscopy pending  - s/p 4 units this admission  - trend Hb: 9.3 > 9.4 > 10.4 >9.4>9.6  - Keep Hb >8. Transfuse as needed  - S/P total 600 mg of Venofer.   - Capsule Enteroscopy pending - Will start ASA after results back  - Protonix IV 40 BID   - Monitor CBC     # Acute on Chronic Diastolic CHF Exacerbation with Acute Hypoxic Respiratory Failure  - B/L wheezing   - Continue with Lasix 40 qd, Symbicort, duoneb PRN, Bipap at night, Incentive Spirometry  -Cardiology following   - CT chest is negative for PE, but with bilateral pleural effusion  - EKG with no new ischemic changes  - BNP 2500, close to 2300 during last admission  - Troponin 0.04 and stable, denies chest pain  - LE Duplex neg  - Daily Chest X-Ray   - strict Is and Os, Salt and FLuid restriction 1.5 L/C.   - Daily Weights    # CAD, HTN, h/o V tach  - oral amio and metoprolol  - c/w  Plavix, statin and lisinopril  - ASA (Cleared by GI) when Capsule comes back    #Abdominal pain - s/p L. Iliac-SMA bypass with SBR  - resolved   -Surgical sites healing well  -KUB wnl   -Miralax and hemorrhoid  suppositories     # hypomagnesemia - Repleted     # Hypothyroidism -- Continue with levothyroxine       DVT PPX : Sequentials  GI PPX: protonix IV   Diet: DASH/tlc - Advanced as per GI  Activity: As tolerated  Disposition: Likely back to [facility]  FULL CODE

## 2019-11-25 NOTE — PROGRESS NOTE ADULT - SUBJECTIVE AND OBJECTIVE BOX
GENERAL SURGERY PROGRESS NOTE     LAKSHMI JUDD  84y  Male  Hospital day: 15d    OVERNIGHT EVENTS: no acute events overnight, dressing was changed twice, wound irrigated with saline and repacked. Patient denies bloody BMs. Hgb 9.4 from 10.4 today.    T(F): 98.2 (11-24-19 @ 20:27), Max: 98.2 (11-24-19 @ 20:27)  HR: 74 (11-24-19 @ 20:27) (74 - 92)  BP: 105/51 (11-24-19 @ 20:27) (105/51 - 127/59)  RR: 18 (11-24-19 @ 20:27) (18 - 18)    DIET/FLUIDS: magnesium oxide 400 milliGRAM(s) Oral three times a day with meals  multivitamin 1 Tablet(s) Oral daily    URINE:   11-23-19 @ 07:01  -  11-24-19 @ 07:00  --------------------------------------------------------  OUT: 400 mL    GI proph:  pantoprazole  Injectable 40 milliGRAM(s) IV Push two times a day    AC/ proph:   ABx: nystatin    Suspension 222232 Unit(s) Oral two times a day    PHYSICAL EXAM:  GENERAL: NAD, well-appearing  CHEST/LUNG: Clear to auscultation bilaterally  HEART: Regular rate and rhythm  ABDOMEN: Soft, tender, Nondistended; open wound below umbilicus with purulent drainage  EXTREMITIES:  No clubbing, cyanosis, or edema    Labs:  CAPILLARY BLOOD GLUCOSE  POCT Blood Glucose.: 101 mg/dL (24 Nov 2019 16:42)  POCT Blood Glucose.: 120 mg/dL (24 Nov 2019 11:39)  POCT Blood Glucose.: 113 mg/dL (24 Nov 2019 07:38)                        9.4    11.79 )-----------( 266      ( 24 Nov 2019 06:59 )             30.1       Auto Neutrophil %: 83.5 % (11-24-19 @ 06:59)  Auto Immature Granulocyte %: 1.3 % (11-24-19 @ 06:59)    11-24    131<L>  |  93<L>  |  14  ----------------------------<  94  4.3   |  21  |  0.6<L>    Calcium, Total Serum: 7.4 mg/dL (11-24-19 @ 06:59)    LFTs:             5.4  | 0.5  | 36       ------------------[77      ( 23 Nov 2019 11:03 )  2.6  | x    | 61          Culture - Other (collected 22 Nov 2019 18:10)  Source: .Other wound  Final Report (24 Nov 2019 16:33):    Culture yields growth of greater than 3 colony types of    Call client services within 7 days if further workup is clinically    indicated.    including Numerous Staphylococcus aureus  Organism: Staphylococcus aureus (24 Nov 2019 16:33)  Organism: Staphylococcus aureus (24 Nov 2019 16:33)

## 2019-11-25 NOTE — PROGRESS NOTE ADULT - ASSESSMENT
83 y/o man with PMH of CAD s/p NSTEMI Oct 1 w/ PCI and stent x3, HTN ,dyslipidemia, Hypothyroidism, recent admission for mesenteric ischemia s/p exploratory laparotomy with a left external iliac to SMA bypass and 80 cm small bowel resection with primary anastomosis and upper GI bleed requiring endoscopic intervention for bleeding gastric ulcer presented for for progressively worsening SOB    1.	Acute HFpEF  2.	Acute Hypoxic respiratory failure on admission due to #1 - now resolved  3.	B/L pleural effusions - improved  4.	CAD S/P recent PCI  5.	HTN   6.	Hypothyroidism  7.	Rectal bleeding    8.	Hemorrhoids  9.	Lower abdomen surgical wound         PLAN:    ·	Check capsule enteroscopy result. Care D/W the GI  ·	Hgb stable after last transfusion   ·	Colonoscopy showed large amount of maroon colored blood throughout the colon and small intestine. CTA colon was negative. No active bleeding site was found. Capsule enteroscopy done and awaiting results  ·	on full liquid diet. Can advance to regular diet as per GI  ·	S/P total 3 units of PRBC'S. Follow H/H. Keep Hb >8. Transfuse as needed  ·	S/P total 600 mg of Venofer.   ·	Anusol suppository for hemorrhoids.   ·	No need for thoracentesis per Pulm - continue diuresis with lasix PO  ·	Check i's and o's and daily wts.   ·	Low salt diet and water restriction to 1.5 L/C.   ·	Continue Plavix. ASA is on hold.   ·	surgery following for dressing changes  ·	GI f/u    PROGRESS NOTE HANDOFF    Pending:  capsule endoscopy results    Family discussion: with wife at bedside    Disposition: to be determined, evaluate need for physical therapy

## 2019-11-25 NOTE — PROGRESS NOTE ADULT - SUBJECTIVE AND OBJECTIVE BOX
OVERNIGHT EVENTS: events noted, no BM for 3 days    Vital Signs Last 24 Hrs  T(C): 36.1 (25 Nov 2019 13:02), Max: 36.8 (24 Nov 2019 20:27)  T(F): 97 (25 Nov 2019 13:02), Max: 98.2 (24 Nov 2019 20:27)  HR: 88 (25 Nov 2019 13:02) (74 - 88)  BP: 120/59 (25 Nov 2019 13:02) (105/51 - 134/63)  RR: 18 (25 Nov 2019 13:02) (18 - 18)  SpO2: 96%    PHYSICAL EXAMINATION:    GENERAL: The patient is awake and alert in no apparent distress.     HEENT: Head is normocephalic and atraumatic. Extraocular muscles are intact. Mucous membranes are moist.    NECK: Supple.    LUNGS: dec bs both bases    HEART: Regular rate and rhythm without murmur.    ABDOMEN: Soft, nontender, and nondistended.      EXTREMITIES: Without any cyanosis, clubbing, rash, lesions or edema.    NEUROLOGIC: Grossly intact.    SKIN: No ulceration or induration present.      LABS:                        9.6    11.73 )-----------( 311      ( 25 Nov 2019 05:56 )             31.3     11-24    131<L>  |  93<L>  |  14  ----------------------------<  94  4.3   |  21  |  0.6<L>    Ca    7.4<L>      24 Nov 2019 06:59  Mg     2.2     11-24 11-24-19 @ 07:01  -  11-25-19 @ 07:00  --------------------------------------------------------  IN: 0 mL / OUT: 690 mL / NET: -690 mL    11-25-19 @ 07:01  -  11-25-19 @ 16:09  --------------------------------------------------------  IN: 480 mL / OUT: 900 mL / NET: -420 mL        MICROBIOLOGY:  Culture Results:   Culture yields growth of greater than 3 colony types of    Call client services within 7 days if further workup is clinically  indicated.  including Numerous Staphylococcus aureus (11-22 @ 18:10)      MEDICATIONS  (STANDING):  aMIOdarone    Tablet 200 milliGRAM(s) Oral daily  atorvastatin 40 milliGRAM(s) Oral at bedtime  budesonide  80 MICROgram(s)/formoterol 4.5 MICROgram(s) Inhaler 2 Puff(s) Inhalation two times a day  chlorhexidine 4% Liquid 1 Application(s) Topical <User Schedule>  clopidogrel Tablet 75 milliGRAM(s) Oral daily  furosemide    Tablet 40 milliGRAM(s) Oral daily  hydrocortisone 2.5% Rectal Cream 1 Application(s) Rectal two times a day  levothyroxine 300 MICROGram(s) Oral daily  lisinopril 20 milliGRAM(s) Oral daily  metoprolol tartrate 12.5 milliGRAM(s) Oral two times a day  multivitamin 1 Tablet(s) Oral daily  nystatin    Suspension [id number] Unit(s) Oral two times a day  pantoprazole  Injectable 40 milliGRAM(s) IV Push two times a day  senna 2 Tablet(s) Oral at bedtime  simethicone 80 milliGRAM(s) Chew four times a day  sucralfate 1 Gram(s) Oral every 12 hours    MEDICATIONS  (PRN):  acetaminophen   Tablet .. 650 milliGRAM(s) Oral every 6 hours PRN Temp greater or equal to 38C (100.4F), Mild Pain (1 - 3)  albuterol/ipratropium for Nebulization. 3 milliLiter(s) Nebulizer every 6 hours PRN Shortness of Breath and/or Wheezing  guaiFENesin    Syrup 200 milliGRAM(s) Oral every 6 hours PRN secretions  methocarbamol 750 milliGRAM(s) Oral every 12 hours PRN muscle pain  oxycodone    5 mG/acetaminophen 325 mG 1 Tablet(s) Oral every 4 hours PRN Severe Pain (7 - 10)      RADIOLOGY & ADDITIONAL STUDIES:

## 2019-11-26 NOTE — PROGRESS NOTE ADULT - ASSESSMENT
Pt. 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 pt also with recent complicated admission in october for abdominal pain, colitis and found to have mesenteric ischemia s/p exploratory laparotomy with a left external iliac to SMA bypass and 80 cm small bowel resection with primary anastomosis,  admission also was complicated by UGIB requiring endoscopic intervention for bleeding gastric ulcer. GI are consulted for now small amount of fresh blood per rectum on straining , with constipation.    # Anemia - Maroon Colored stool- S/P colonoscopy - Capsule Enteroscopy pending  - s/p 4 units this admission  - trend Hb: > 10.4 >9.4>9.6>9.9>10  - Keep Hb >8. Transfuse as needed  - S/P total 600 mg of Venofer.   - Capsule Enteroscopy - No blood appreciated - Poor Bowel prep - food in intestines.   - Protonix IV 40 BID   - Monitor CBC     # Acute on Chronic Diastolic CHF Exacerbation with Acute Hypoxic Respiratory Failure  - B/L wheezing   - Continue with Lasix 40 qd, Symbicort, duoneb PRN, Bipap at night, Incentive Spirometry  -Cardiology following   - CT chest is negative for PE, but with bilateral pleural effusion  - EKG with no new ischemic changes  - BNP 2500, close to 2300 during last admission  - Troponin 0.04 and stable, denies chest pain  - LE Duplex neg  - Daily Chest X-Ray   - strict Is and Os, Salt and FLuid restriction 1.5 L/C.   - Daily Weights    # CAD, HTN, h/o V tach  - oral amio and metoprolol  - c/w  Plavix, statin and lisinopril  - ASA (Cleared by GI)    #Abdominal pain - s/p L. Iliac-SMA bypass with SBR  - resolved   -Surgical sites healing well - Surgery Changing Dressing  - Repeat KUB to Locate Capsule  - Miralax and hemorrhoid  suppositories     # hypomagnesemia - Repleted     # Hypothyroidism -- Continue with levothyroxine       DVT PPX : Sequentials  GI PPX: protonix IV   Diet: DASH/tlc - Advanced as per GI  Activity: As tolerated  Disposition: Likely back to [facility]  FULL CODE

## 2019-11-26 NOTE — PROGRESS NOTE ADULT - SUBJECTIVE AND OBJECTIVE BOX
SUBJECTIVE:  Pt. is 85 yo M yo with PMH of CAD s/p NSTEMI Oct 1 w/ pci and stent x3, Mesenteric Ischemia with L. Iliac-SMA bypass with SBR and anastomosis, HTN ,DLD, Hypothyroidism presenting for shortness of breath,.    Currently admitted to medicine with the primary diagnosis of Hypervolemia, unspecified hypervolemia type     Today is hospital day 15d. This morning he is resting comfortably in bed and reports no new issues or overnight events.   Pt. denies HA, Cp. pt reports having abdominal pain around incision. Pt reports eating today with little pain. Pt reports wakling with PT, and fuid dripping from Abdomen.   Pt. is urinating     PAST MEDICAL & SURGICAL HISTORY  Neuropathy  TIA (transient ischemic attack)  Left carotid stenosis  Lyme disease  Hypothyroidism  Coronary artery disease  History of mesenteric infarction    SOCIAL HISTORY:  Negative for smoking/alcohol/drug use.     ALLERGIES:  iodinated radiocontrast agents (Hives)    MEDICATIONS:  STANDING MEDICATIONS  acetaminophen   Tablet .. 650 milliGRAM(s) Oral every 6 hours PRN Temp greater or equal to 38C (100.4F), Mild Pain (1 - 3)  albuterol/ipratropium for Nebulization. 3 milliLiter(s) Nebulizer every 6 hours PRN Shortness of Breath and/or Wheezing  aMIOdarone    Tablet 200 milliGRAM(s) Oral daily  atorvastatin 40 milliGRAM(s) Oral at bedtime  budesonide  80 MICROgram(s)/formoterol 4.5 MICROgram(s) Inhaler 2 Puff(s) Inhalation two times a day  chlorhexidine 4% Liquid 1 Application(s) Topical <User Schedule>  clopidogrel Tablet 75 milliGRAM(s) Oral daily  furosemide    Tablet 40 milliGRAM(s) Oral daily  guaiFENesin    Syrup 200 milliGRAM(s) Oral every 6 hours PRN secretions  hydrocortisone 2.5% Rectal Cream 1 Application(s) Rectal two times a day  levothyroxine 300 MICROGram(s) Oral daily  lisinopril 20 milliGRAM(s) Oral daily  methocarbamol 750 milliGRAM(s) Oral every 12 hours PRN muscle pain  metoprolol tartrate 12.5 milliGRAM(s) Oral two times a day  multivitamin 1 Tablet(s) Oral daily  nystatin    Suspension 463275 Unit(s) Oral two times a day  oxycodone    5 mG/acetaminophen 325 mG 1 Tablet(s) Oral every 4 hours PRN Severe Pain (7 - 10)  pantoprazole  Injectable 40 milliGRAM(s) IV Push two times a day  senna 2 Tablet(s) Oral at bedtime  simethicone 80 milliGRAM(s) Chew four times a day  sucralfate 1 Gram(s) Oral every 12 hours    PRN MEDICATIONS  acetaminophen   Tablet .. 650 milliGRAM(s) Oral every 6 hours PRN  albuterol/ipratropium for Nebulization. 3 milliLiter(s) Nebulizer every 6 hours PRN  guaiFENesin    Syrup 200 milliGRAM(s) Oral every 6 hours PRN  methocarbamol 750 milliGRAM(s) Oral every 12 hours PRN  oxycodone    5 mG/acetaminophen 325 mG 1 Tablet(s) Oral every 4 hours PRN    VITALS:   T(F): 98  HR: 74 (73 - 74)  BP: 132/53 (117/58 - 132/53)  RR: 18 (18 - 20)  SpO2: 95% (95% - 95%)    LABS:                        10.0   11.18 )-----------( 300      ( 26 Nov 2019 06:01 )             31.8     11-26    138  |  96<L>  |  7<L>  ----------------------------<  77  4.3   |  19  |  0.6<L>    Ca    7.8<L>      26 Nov 2019 06:01  Mg     2.4     11-26        RADIOLOGY:    PHYSICAL EXAM:  GEN: In no acute distress. Pt. is awake in bed able to have a conversation Pt give minimal word answers.  LUNGS: Pt has mild crackles at the base of BL lower lung fields,  Symmetrical inspiration, no increased work of breathing  HEART: +S1,S2, RRR, No murmurs, Rubs, Gallops   ABD: Bowel Sounds Present, Soft, non distended, no guarding, no rebound. Tender around Open incision with Packing inside. Packing wet and intact. No erythema pus or jose blood appreciated around wound.   EXT: 2+ peripheral Pulses, no clubbing, no cyanosis, no Edema.   NEURO: AAOX3. No focal deficits. CN 2-12 Grossly intact.

## 2019-11-26 NOTE — PROGRESS NOTE ADULT - ASSESSMENT
83 y/o man with PMH of CAD s/p NSTEMI Oct 1 w/ PCI and stent x3, HTN ,dyslipidemia, Hypothyroidism, recent admission for mesenteric ischemia s/p exploratory laparotomy with a left external iliac to SMA bypass and 80 cm small bowel resection with primary anastomosis and upper GI bleed requiring endoscopic intervention for bleeding gastric ulcer presented for for progressively worsening SOB    1.	Acute HFpEF  2.	Acute Hypoxic respiratory failure on admission due to #1 - now resolved  3.	B/L pleural effusions - improved  4.	CAD S/P recent PCI  5.	HTN   6.	Hypothyroidism  7.	Rectal bleeding    8.	Hemorrhoids  9.	Lower abdomen surgical wound         PLAN:    ·	Capsule enteroscopy result noted. Poor prep. Capsule did not reach caecum. Did not see any active bleeding.  Abdominal x-ray as per GI   ·	Hgb stable after last transfusion. Will restart ASA 81 mg po daily. Cont Plavix.   ·	Colonoscopy showed large amount of maroon colored blood throughout the colon and small intestine. CTA colon was negative. No active bleeding site was found.   ·	S/P total 3 units of PRBC'S. Follow H/H. Keep Hb >8. Transfuse as needed  ·	S/P total 600 mg of Venofer.   ·	Anusol suppository for hemorrhoids.   ·	No need for thoracentesis per Pulm - continue diuresis with lasix PO  ·	Check i's and o's and daily wts.   ·	Low salt diet and water restriction to 1.5 L/C.   ·	surgery following for dressing changes  ·	GI f/u    PROGRESS NOTE HANDOFF    Pending:  capsule endoscopy results    Family discussion: with wife at bedside    Disposition: to be determined, evaluate need for physical therapy 85 y/o man with PMH of CAD s/p NSTEMI Oct 1 w/ PCI and stent x3, HTN ,dyslipidemia, Hypothyroidism, recent admission for mesenteric ischemia s/p exploratory laparotomy with a left external iliac to SMA bypass and 80 cm small bowel resection with primary anastomosis and upper GI bleed requiring endoscopic intervention for bleeding gastric ulcer presented for for progressively worsening SOB    1.	Acute HFpEF  2.	Acute Hypoxic respiratory failure on admission due to #1 - now resolved  3.	B/L pleural effusions - improved  4.	CAD S/P recent PCI  5.	HTN   6.	Hypothyroidism  7.	Rectal bleeding    8.	Hemorrhoids  9.	Lower abdomen surgical wound         PLAN:    ·	Bleeding from the surgical wound, S/P packing. No more bleeding. Surgical F/U  ·	Capsule enteroscopy result noted. Poor prep. Capsule did not reach caecum. Did not see any active bleeding.  Abdominal x-ray as per GI   ·	Hgb stable after last transfusion. Will restart ASA 81 mg po daily. Cont Plavix.   ·	Colonoscopy showed large amount of maroon colored blood throughout the colon and small intestine. CTA colon was negative. No active bleeding site was found.   ·	S/P total 3 units of PRBC'S. Follow H/H. Keep Hb >8. Transfuse as needed  ·	S/P total 600 mg of Venofer.   ·	Anusol suppository for hemorrhoids.   ·	No need for thoracentesis per Pulm - continue diuresis with lasix PO  ·	Check i's and o's and daily wts.   ·	Low salt diet and water restriction to 1.5 L/C.   ·	surgery following for dressing changes  ·	GI f/u    PROGRESS NOTE HANDOFF    Pending:  Surgical F/U for bleeding from abdominal wound.     Family discussion: with wife at bedside    Disposition: to be determined, evaluate need for physical therapy

## 2019-11-26 NOTE — CHART NOTE - NSCHARTNOTEFT_GEN_A_CORE
Capsule Endoscopy: Capsule delayed in the stomach, No active bleeding, Overall poor prep. Capsule Endoscopy: Capsule delayed in the stomach, retained food in stoamch, no lesion in small bowel No active bleeding, capsule did not reach cecum.   Overall poor prep.    Rec:  Get Abdominal XR to see if capsule retention.

## 2019-11-26 NOTE — CHART NOTE - NSCHARTNOTEFT_GEN_A_CORE
Registered Dietitian Follow-Up     Patient Profile Reviewed                           Yes []   No []     Nutrition History Previously Obtained        Yes []  No []       Pertinent Subjective Information:      Pertinent Medical Interventions:  Anemia - Maroon Colored stool- S/P colonoscopy - Capsule Enteroscopy pending- s/p 4 units this admission.  Acute on Chronic Diastolic CHF Exacerbation with Acute Hypoxic Respiratory Failure-Cardiology following. - strict Is and Os, Salt and Fluid restriction. CAD, HTN, h/o V tach. Abdominal pain - s/p L. Iliac-SMA bypass with SBR- resolved, KUB wnl. Miralax and hemorrhoid  suppositories.       Diet order: DASH/TLC, 1200ml fluid restr, Ensure compact BID, Ensure pudding BID     Anthropometrics:  - Ht. 172.7cm  - Wt. 80.3kg today vs. 82.9kg on 11/22 vs. 86.5kg on 11/19 vs. 93.5kg on 11/12 appears trending down, pt. noted with edema, on/off diuresis during LOS and frequent NPO status, will continue to monitor wt trends   - %wt change  - BMI 26.9 (using 80.3kg)   - IBW 154lbs      Pertinent Lab Data: (11/26) WBC 11.18, RBC 3.39, Hg 10.0, Hct 31.8     Pertinent Meds: Nystatin, Lasix, Oxycodone, MVI, Atorvastatin, Carafate      Physical Findings:  - Appearance: AAOx4, 2+ L, R leg edema  - GI function: no symptoms noted, last BM 11/25  - Tubes: none noted  - Oral/Mouth cavity: no symptoms noted  - Skin: incision (BS 16)      Nutrition Requirements - new calculations today using lowest doc weight   Weight Used: 80.3kg      Estimated Energy Needs    Continue []  Adjust [x] 1760-1907kcal (MSJ x 1.2-1.3 AF   Adjusted Energy Recommendations:   kcal/day        Estimated Protein Needs    Continue []  Adjust [x] 80-96g (1-1.2g/kg CBW)  Adjusted Protein Recommendations:   gm/day        Estimated Fluid Needs        Continue []  Adjust [x] per LIP  Adjusted Fluid Recommendations:   mL/day     Nutrient Intake:         [] Previous Nutrition Diagnosis: Inadequate Oral Intake            [] Ongoing          [] Resolved       Nutrition Intervention: meals and snacks, medical food supplement    Rec: Continue DASH/TLC, 1200ml fluid restr diet order, Ensure compact BID, Ensure pudding BID as tolerated      Goal/Expected Outcome:      Indicator/Monitoring: diet order, energy intake, body composition, NFPF Registered Dietitian Follow-Up     Patient Profile Reviewed                           Yes [x]   No []     Nutrition History Previously Obtained        Yes [x]  No []       Pertinent Subjective Information: Pt. reports tolerating solid foods just fine at this time. Previously on clear and full fluid diet. Pt. does not like Ensure compact or Ensure pudding and requesting Ensure enlive instead. Will recommend daily (fluid rest considered).      Pertinent Medical Interventions:  Anemia - Maroon Colored stool- S/P colonoscopy - Capsule Enteroscopy pending- s/p 4 units this admission.  Acute on Chronic Diastolic CHF Exacerbation with Acute Hypoxic Respiratory Failure-Cardiology following. - strict Is and Os, Salt and Fluid restriction. CAD, HTN, h/o V tach. Abdominal pain - s/p L. Iliac-SMA bypass with SBR- resolved, KUB wnl. Miralax and hemorrhoid  suppositories.       Diet order: DASH/TLC, 1200ml fluid restr, Ensure compact BID, Ensure pudding BID     Anthropometrics:  - Ht. 172.7cm  - Wt. 80.3kg today vs. 82.9kg on 11/22 vs. 86.5kg on 11/19 vs. 93.5kg on 11/12 appears trending down, pt. noted with edema, on/off diuresis during LOS and frequent NPO status, will continue to monitor wt trends   - %wt change  - BMI 26.9 (using 80.3kg)   - IBW 154lbs      Pertinent Lab Data: (11/26) WBC 11.18, RBC 3.39, Hg 10.0, Hct 31.8     Pertinent Meds: Nystatin, Lasix, Oxycodone, MVI, Atorvastatin, Carafate      Physical Findings:  - Appearance: AAOx4, 2+ L, R leg edema  - GI function: no symptoms noted, last BM 11/25  - Tubes: none noted  - Oral/Mouth cavity: no symptoms noted  - Skin: incision (BS 16)      Nutrition Requirements - new calculations today using lowest doc weight   Weight Used: 80.3kg      Estimated Energy Needs    Continue []  Adjust [x] 1760-1907kcal (MSJ x 1.2-1.3 AF   Adjusted Energy Recommendations:   kcal/day        Estimated Protein Needs    Continue []  Adjust [x] 80-96g (1-1.2g/kg CBW)  Adjusted Protein Recommendations:   gm/day        Estimated Fluid Needs        Continue []  Adjust [x] per LIP  Adjusted Fluid Recommendations:   mL/day     Nutrient Intake: 50-75% PO at meals        [] Previous Nutrition Diagnosis: Inadequate Oral Intake- improving            [] Ongoing          [] Resolved       Nutrition Intervention: meals and snacks, medical food supplement    Rec: Continue DASH/TLC, 1200ml fluid restr diet order, discontinue Ensure compact BID, Ensure pudding and add Ensure enlive daily     Goal/Expected Outcome: in 3 days pt. to consistently consume % PO at meals      Indicator/Monitoring: diet order, energy intake, body composition, NFPF

## 2019-11-26 NOTE — PROGRESS NOTE ADULT - SUBJECTIVE AND OBJECTIVE BOX
LAKSHMI JUDD  84y Male    CHIEF COMPLAINT:    Patient is a 84y old  Male who presents with a chief complaint of SOB (26 Nov 2019 13:56)      INTERVAL HPI/OVERNIGHT EVENTS:    Patient seen and examined.    ROS: All other systems are negative.    Vital Signs:    T(F): 98 (11-26-19 @ 13:13), Max: 98 (11-26-19 @ 05:46)  HR: 82 (11-26-19 @ 13:13) (73 - 82)  BP: 120/54 (11-26-19 @ 13:13) (117/58 - 132/53)  RR: 18 (11-26-19 @ 13:13) (18 - 20)  SpO2: 95% (11-25-19 @ 20:00) (95% - 95%)  I&O's Summary    25 Nov 2019 07:01  -  26 Nov 2019 07:00  --------------------------------------------------------  IN: 480 mL / OUT: 1500 mL / NET: -1020 mL    26 Nov 2019 07:01  -  26 Nov 2019 15:26  --------------------------------------------------------  IN: 480 mL / OUT: 300 mL / NET: 180 mL      Daily     Daily   CAPILLARY BLOOD GLUCOSE      POCT Blood Glucose.: 125 mg/dL (26 Nov 2019 11:31)  POCT Blood Glucose.: 102 mg/dL (26 Nov 2019 07:50)  POCT Blood Glucose.: 107 mg/dL (25 Nov 2019 22:17)  POCT Blood Glucose.: 102 mg/dL (25 Nov 2019 16:56)      PHYSICAL EXAM:    GENERAL:  NAD  SKIN: No rashes or lesions  HENT: Atrumatic. Normocephalic. PERRL. Moist membranes.  NECK: Supple, No JVD. No lymphadenopathy.  PULMONARY: CTA B/L. No wheezing. No rales  CVS: Normal S1, S2. Rate and Rythm are regular. No murmurs.  ABDOMEN/GI: Soft, Nontender, Nondistended; BS present  EXTREMITIES: Peripheral pulses intact. No edema B/L LE.  NEUROLOGIC:  No motor or sensory deficit.  PSYCH: Alert & oriented x 3    Consultant(s) Notes Reviewed:  [x ] YES  [ ] NO  Care Discussed with Consultants/Other Providers [ x] YES  [ ] NO    EKG reviewed  Telemetry reviewed    LABS:                        10.0   11.18 )-----------( 300      ( 26 Nov 2019 06:01 )             31.8   Hemoglobin: 10.0 g/dL (11-26 @ 06:01)  Hemoglobin: 9.9 g/dL (11-25 @ 16:00)  Hemoglobin: 9.6 g/dL (11-25 @ 05:56)  Hemoglobin: 9.4 g/dL (11-24 @ 06:59)  Hemoglobin: 10.4 g/dL (11-23 @ 17:00)  Hemoglobin: 9.4 g/dL (11-23 @ 01:29)  Hemoglobin: 9.3 g/dL (11-22 @ 16:47)  Hemoglobin: 8.4 g/dL (11-22 @ 04:23)  Hemoglobin: 8.8 g/dL (11-21 @ 22:25)    11-26    138  |  96<L>  |  7<L>  ----------------------------<  77  4.3   |  19  |  0.6<L>    Ca    7.8<L>      26 Nov 2019 06:01  Mg     2.4     11-26                RADIOLOGY & ADDITIONAL TESTS:      Imaging or report Personally Reviewed:  [ ] YES  [ ] NO    Medications:  Standing  aMIOdarone    Tablet 200 milliGRAM(s) Oral daily  atorvastatin 40 milliGRAM(s) Oral at bedtime  budesonide  80 MICROgram(s)/formoterol 4.5 MICROgram(s) Inhaler 2 Puff(s) Inhalation two times a day  chlorhexidine 4% Liquid 1 Application(s) Topical <User Schedule>  clopidogrel Tablet 75 milliGRAM(s) Oral daily  furosemide    Tablet 40 milliGRAM(s) Oral daily  hydrocortisone 2.5% Rectal Cream 1 Application(s) Rectal two times a day  levothyroxine 300 MICROGram(s) Oral daily  lisinopril 20 milliGRAM(s) Oral daily  metoprolol tartrate 12.5 milliGRAM(s) Oral two times a day  multivitamin 1 Tablet(s) Oral daily  nystatin    Suspension 064912 Unit(s) Oral two times a day  pantoprazole  Injectable 40 milliGRAM(s) IV Push two times a day  senna 2 Tablet(s) Oral at bedtime  simethicone 80 milliGRAM(s) Chew four times a day  sucralfate 1 Gram(s) Oral every 12 hours    PRN Meds  acetaminophen   Tablet .. 650 milliGRAM(s) Oral every 6 hours PRN  albuterol/ipratropium for Nebulization. 3 milliLiter(s) Nebulizer every 6 hours PRN  guaiFENesin    Syrup 200 milliGRAM(s) Oral every 6 hours PRN  methocarbamol 750 milliGRAM(s) Oral every 12 hours PRN  oxycodone    5 mG/acetaminophen 325 mG 1 Tablet(s) Oral every 4 hours PRN      Case discussed with resident    Care discussed with pt/family LAKSHMI JUDD  84y Male    CHIEF COMPLAINT:    Patient is a 84y old  Male who presents with a chief complaint of SOB (26 Nov 2019 13:56)      INTERVAL HPI/OVERNIGHT EVENTS:    Patient seen and examined. Complains today while going to bathroom blood started dripping from the abdominal wound. No abdominal pain. No sob. No rectal bleeding.     ROS: All other systems are negative.    Vital Signs:    T(F): 98 (11-26-19 @ 13:13), Max: 98 (11-26-19 @ 05:46)  HR: 82 (11-26-19 @ 13:13) (73 - 82)  BP: 120/54 (11-26-19 @ 13:13) (117/58 - 132/53)  RR: 18 (11-26-19 @ 13:13) (18 - 20)  SpO2: 95% (11-25-19 @ 20:00) (95% - 95%)  I&O's Summary    25 Nov 2019 07:01  -  26 Nov 2019 07:00  --------------------------------------------------------  IN: 480 mL / OUT: 1500 mL / NET: -1020 mL    26 Nov 2019 07:01  -  26 Nov 2019 15:26  --------------------------------------------------------  IN: 480 mL / OUT: 300 mL / NET: 180 mL      Daily     Daily   CAPILLARY BLOOD GLUCOSE      POCT Blood Glucose.: 125 mg/dL (26 Nov 2019 11:31)  POCT Blood Glucose.: 102 mg/dL (26 Nov 2019 07:50)  POCT Blood Glucose.: 107 mg/dL (25 Nov 2019 22:17)  POCT Blood Glucose.: 102 mg/dL (25 Nov 2019 16:56)      PHYSICAL EXAM:    GENERAL:  NAD  SKIN: No rashes or lesions  HENT: Atraumatic Normocephalic. PERRL. Moist membranes.  NECK: Supple, No JVD. No lymphadenopathy.  PULMONARY: BS are decreased in the bases B/L. No wheezing. No rales  CVS: Normal S1, S2. Rate and Rhythm are regular. No murmurs.  ABDOMEN/GI: Soft, Nontender, Nondistended; BS present  EXTREMITIES: Peripheral pulses intact. No edema B/L LE.  NEUROLOGIC:  No motor or sensory deficit.  PSYCH: Alert & oriented x 3    Consultant(s) Notes Reviewed:  [x ] YES  [ ] NO  Care Discussed with Consultants/Other Providers [ x] YES  [ ] NO    EKG reviewed  Telemetry reviewed    LABS:                        10.0   11.18 )-----------( 300      ( 26 Nov 2019 06:01 )             31.8   Hemoglobin: 10.0 g/dL (11-26 @ 06:01)  Hemoglobin: 9.9 g/dL (11-25 @ 16:00)  Hemoglobin: 9.6 g/dL (11-25 @ 05:56)  Hemoglobin: 9.4 g/dL (11-24 @ 06:59)  Hemoglobin: 10.4 g/dL (11-23 @ 17:00)  Hemoglobin: 9.4 g/dL (11-23 @ 01:29)  Hemoglobin: 9.3 g/dL (11-22 @ 16:47)  Hemoglobin: 8.4 g/dL (11-22 @ 04:23)  Hemoglobin: 8.8 g/dL (11-21 @ 22:25)    11-26    138  |  96<L>  |  7<L>  ----------------------------<  77  4.3   |  19  |  0.6<L>    Ca    7.8<L>      26 Nov 2019 06:01  Mg     2.4     11-26                RADIOLOGY & ADDITIONAL TESTS:      Imaging or report Personally Reviewed:  [ ] YES  [ ] NO    Medications:  Standing  aMIOdarone    Tablet 200 milliGRAM(s) Oral daily  atorvastatin 40 milliGRAM(s) Oral at bedtime  budesonide  80 MICROgram(s)/formoterol 4.5 MICROgram(s) Inhaler 2 Puff(s) Inhalation two times a day  chlorhexidine 4% Liquid 1 Application(s) Topical <User Schedule>  clopidogrel Tablet 75 milliGRAM(s) Oral daily  furosemide    Tablet 40 milliGRAM(s) Oral daily  hydrocortisone 2.5% Rectal Cream 1 Application(s) Rectal two times a day  levothyroxine 300 MICROGram(s) Oral daily  lisinopril 20 milliGRAM(s) Oral daily  metoprolol tartrate 12.5 milliGRAM(s) Oral two times a day  multivitamin 1 Tablet(s) Oral daily  nystatin    Suspension 671884 Unit(s) Oral two times a day  pantoprazole  Injectable 40 milliGRAM(s) IV Push two times a day  senna 2 Tablet(s) Oral at bedtime  simethicone 80 milliGRAM(s) Chew four times a day  sucralfate 1 Gram(s) Oral every 12 hours    PRN Meds  acetaminophen   Tablet .. 650 milliGRAM(s) Oral every 6 hours PRN  albuterol/ipratropium for Nebulization. 3 milliLiter(s) Nebulizer every 6 hours PRN  guaiFENesin    Syrup 200 milliGRAM(s) Oral every 6 hours PRN  methocarbamol 750 milliGRAM(s) Oral every 12 hours PRN  oxycodone    5 mG/acetaminophen 325 mG 1 Tablet(s) Oral every 4 hours PRN      Case discussed with resident    Care discussed with pt/family

## 2019-11-27 NOTE — DISCHARGE NOTE PROVIDER - NSDCCPTREATMENT_GEN_ALL_CORE_FT
PRINCIPAL PROCEDURE  Procedure: Colonoscopy  Findings and Treatment: Yopu had a colonsocopy to find where the GI bleed was coming from There was no bleed identified on the colonscopy or the Capsule Enterescopy   SEEK IMMEDIATE MEDICAL CARE IF YOU HAVE ANY OF THE FOLLOWING SYMPTOMS: chest pain, shortness of breath, abdominal pain, sweating, vomiting, blood in vomit/bowel movements/urine, dizziness/lightheadedness, weakness or numbness to face/arm/leg, trouble speaking or understanding, facial droop

## 2019-11-27 NOTE — PROGRESS NOTE ADULT - ASSESSMENT
85 y/o man with PMH of CAD s/p NSTEMI Oct 1 w/ PCI and stent x3, HTN ,dyslipidemia, Hypothyroidism, recent admission for mesenteric ischemia s/p exploratory laparotomy with a left external iliac to SMA bypass and 80 cm small bowel resection with primary anastomosis and upper GI bleed requiring endoscopic intervention for bleeding gastric ulcer presented for for progressively worsening SOB    1.	Acute HFpEF  2.	Acute Hypoxic respiratory failure on admission due to #1 - now resolved  3.	B/L pleural effusions - improved  4.	CAD S/P recent PCI  5.	HTN   6.	Hypothyroidism  7.	Rectal bleeding    8.	Hemorrhoids  9.	Lower abdomen surgical wound         PLAN:    ·	Bleeding from the surgical wound, S/P packing. No more bleeding. Surgical F/U  ·	Capsule enteroscopy result noted. Poor prep. Capsule did not reach caecum. Did not see any active bleeding.  Abdominal x-ray as per GI   ·	Hgb stable after last transfusion. Will restart ASA 81 mg po daily. Cont Plavix.   ·	Colonoscopy showed large amount of maroon colored blood throughout the colon and small intestine. CTA colon was negative. No active bleeding site was found.   ·	S/P total 3 units of PRBC'S. Follow H/H. Keep Hb >8. Transfuse as needed  ·	S/P total 600 mg of Venofer.   ·	Anusol suppository for hemorrhoids.   ·	No need for thoracentesis per Pulm - continue diuresis with lasix PO  ·	Check i's and o's and daily wts.   ·	Low salt diet and water restriction to 1.5 L/C.   ·	surgery following for dressing changes  ·	GI f/u    PROGRESS NOTE HANDOFF    Pending:  Surgical F/U for bleeding from abdominal wound.     Family discussion: with wife at bedside    Disposition: to be determined, evaluate need for physical therapy 85 y/o man with PMH of CAD s/p NSTEMI Oct 1 w/ PCI and stent x3, HTN ,dyslipidemia, Hypothyroidism, recent admission for mesenteric ischemia s/p exploratory laparotomy with a left external iliac to SMA bypass and 80 cm small bowel resection with primary anastomosis and upper GI bleed requiring endoscopic intervention for bleeding gastric ulcer presented for for progressively worsening SOB    1.	Acute HFpEF  2.	Acute Hypoxic respiratory failure on admission due to #1 - now resolved  3.	B/L pleural effusions - improved  4.	CAD S/P recent PCI  5.	HTN   6.	Hypothyroidism  7.	Rectal bleeding    8.	Hemorrhoids  9.	Lower abdomen surgical wound         PLAN:    ·	No more bleeding from Surgical wound today. Called the surgical for dressing change and F/U  ·	Capsule enteroscopy result noted. Poor prep. Capsule did not reach caecum. Did not see any active bleeding.  Abdominal x-ray showed capsule in rectosigmoid colon.   ·	Care D/W the cardiology. Recommended to cont Plavix. Can stop ASA  ·	Colonoscopy showed large amount of maroon colored blood throughout the colon and small intestine. CTA colon was negative. No active bleeding site was found.   ·	Drop in H/H again today, likely due to bleeding from surgical wound yesterday.   ·	S/P total 3 units of PRBC'S during this admission Follow H/H. Keep Hb >8. Transfuse as needed  ·	S/P total 600 mg of Venofer.   ·	Anusol suppository for hemorrhoids.   ·	No need for thoracentesis per Pulm - continue diuresis with Lasix PO  ·	Check i's and o's and daily wts.   ·	Low salt diet and water restriction to 1.5 L/C.   ·	surgery following for dressing changes  ·	GI f/u    PROGRESS NOTE HANDOFF    Pending:  Surgical F/U for bleeding from abdominal wound. Drop in H/H. Monitor for 24 hrs.     Family discussion: with wife at bedside    Disposition: to be determined, evaluate need for physical therapy

## 2019-11-27 NOTE — DISCHARGE NOTE PROVIDER - CARE PROVIDERS DIRECT ADDRESSES
,[email or web address],DirectAddress_Unknown,DirectAddress_Unknown

## 2019-11-27 NOTE — DISCHARGE NOTE PROVIDER - NSDCFUSCHEDAPPT_GEN_ALL_CORE_FT
LAKSHMI JUDD ; 12/02/2019 ; [facility] [address] LAKSHMI Tomlinson ; 01/14/2020 ; [facility] [address] [facility] LAKSHMI JUDD ; 12/02/2019 ; [facility] [place] [address] LAKSHMI Tomlinson ; 01/14/2020 ; [facility] [address] [facility]

## 2019-11-27 NOTE — DISCHARGE NOTE PROVIDER - PROVIDER TOKENS
PROVIDER:[TOKEN:[[id number]],FOLLOWUP:[1 week]],PROVIDER:[TOKEN:[[id number]],FOLLOWUP:[2 weeks]],PROVIDER:[TOKEN:[[id number]],FOLLOWUP:[2 weeks]],PROVIDER:[TOKEN:[[id number]],FOLLOWUP:[2 weeks]],PROVIDER:[TOKEN:[[id number]],FOLLOWUP:[1 week]]

## 2019-11-27 NOTE — PROGRESS NOTE ADULT - SUBJECTIVE AND OBJECTIVE BOX
LAKSHMI JUDD  84y Male    CHIEF COMPLAINT:    Patient is a 84y old  Male who presents with a chief complaint of SOB (26 Nov 2019 15:25)      INTERVAL HPI/OVERNIGHT EVENTS:    Patient seen and examined.    ROS: All other systems are negative.    Vital Signs:    T(F): 98 (11-27-19 @ 05:16), Max: 98.1 (11-26-19 @ 20:38)  HR: 76 (11-27-19 @ 05:16) (70 - 82)  BP: 135/62 (11-27-19 @ 05:16) (116/58 - 135/62)  RR: 18 (11-27-19 @ 05:16) (18 - 19)  SpO2: 95% (11-26-19 @ 19:52) (95% - 95%)  I&O's Summary    26 Nov 2019 07:01  -  27 Nov 2019 07:00  --------------------------------------------------------  IN: 720 mL / OUT: 1300 mL / NET: -580 mL      Daily     Daily   CAPILLARY BLOOD GLUCOSE      POCT Blood Glucose.: 123 mg/dL (26 Nov 2019 21:50)  POCT Blood Glucose.: 150 mg/dL (26 Nov 2019 16:49)      PHYSICAL EXAM:    GENERAL:  NAD  SKIN: No rashes or lesions  HENT: Atrumatic. Normocephalic. PERRL. Moist membranes.  NECK: Supple, No JVD. No lymphadenopathy.  PULMONARY: CTA B/L. No wheezing. No rales  CVS: Normal S1, S2. Rate and Rythm are regular. No murmurs.  ABDOMEN/GI: Soft, Nontender, Nondistended; BS present  EXTREMITIES: Peripheral pulses intact. No edema B/L LE.  NEUROLOGIC:  No motor or sensory deficit.  PSYCH: Alert & oriented x 3    Consultant(s) Notes Reviewed:  [x ] YES  [ ] NO  Care Discussed with Consultants/Other Providers [ x] YES  [ ] NO    EKG reviewed  Telemetry reviewed    LABS:                        8.9    9.60  )-----------( 316      ( 27 Nov 2019 05:27 )             28.5   Hemoglobin: 8.9 g/dL (11-27 @ 05:27)  Hemoglobin: 10.0 g/dL (11-26 @ 06:01)  Hemoglobin: 9.9 g/dL (11-25 @ 16:00)  Hemoglobin: 9.6 g/dL (11-25 @ 05:56)  Hemoglobin: 9.4 g/dL (11-24 @ 06:59)  Hemoglobin: 10.4 g/dL (11-23 @ 17:00)  Hemoglobin: 9.4 g/dL (11-23 @ 01:29)  Hemoglobin: 9.3 g/dL (11-22 @ 16:47)    11-27    137  |  95<L>  |  8<L>  ----------------------------<  114<H>  3.2<L>   |  28  |  0.5<L>    Ca    7.6<L>      27 Nov 2019 05:27  Mg     2.1     11-27                RADIOLOGY & ADDITIONAL TESTS:      Imaging or report Personally Reviewed:  [ ] YES  [ ] NO    Medications:  Standing  aMIOdarone    Tablet 200 milliGRAM(s) Oral daily  atorvastatin 40 milliGRAM(s) Oral at bedtime  budesonide  80 MICROgram(s)/formoterol 4.5 MICROgram(s) Inhaler 2 Puff(s) Inhalation two times a day  chlorhexidine 4% Liquid 1 Application(s) Topical <User Schedule>  clopidogrel Tablet 75 milliGRAM(s) Oral daily  furosemide    Tablet 40 milliGRAM(s) Oral daily  hydrocortisone 2.5% Rectal Cream 1 Application(s) Rectal two times a day  levothyroxine 300 MICROGram(s) Oral daily  lisinopril 20 milliGRAM(s) Oral daily  metoprolol tartrate 12.5 milliGRAM(s) Oral two times a day  multivitamin 1 Tablet(s) Oral daily  nystatin    Suspension 327938 Unit(s) Oral two times a day  pantoprazole  Injectable 40 milliGRAM(s) IV Push two times a day  senna 2 Tablet(s) Oral at bedtime  simethicone 80 milliGRAM(s) Chew four times a day  sucralfate 1 Gram(s) Oral every 12 hours    PRN Meds  acetaminophen   Tablet .. 650 milliGRAM(s) Oral every 6 hours PRN  albuterol/ipratropium for Nebulization. 3 milliLiter(s) Nebulizer every 6 hours PRN  guaiFENesin    Syrup 200 milliGRAM(s) Oral every 6 hours PRN  methocarbamol 750 milliGRAM(s) Oral every 12 hours PRN  oxycodone    5 mG/acetaminophen 325 mG 1 Tablet(s) Oral every 4 hours PRN      Case discussed with resident    Care discussed with pt/family LAKSHMI JUDD  84y Male    CHIEF COMPLAINT:    Patient is a 84y old  Male who presents with a chief complaint of SOB (26 Nov 2019 15:25)      INTERVAL HPI/OVERNIGHT EVENTS:    Patient seen and examined. No more rectal bleeding. No bleeding from surgical wound over the last 24 hrs. Drop in H/H. No sob.     ROS: All other systems are negative.    Vital Signs:    T(F): 98 (11-27-19 @ 05:16), Max: 98.1 (11-26-19 @ 20:38)  HR: 76 (11-27-19 @ 05:16) (70 - 82)  BP: 135/62 (11-27-19 @ 05:16) (116/58 - 135/62)  RR: 18 (11-27-19 @ 05:16) (18 - 19)  SpO2: 95% (11-26-19 @ 19:52) (95% - 95%)  I&O's Summary    26 Nov 2019 07:01  -  27 Nov 2019 07:00  --------------------------------------------------------  IN: 720 mL / OUT: 1300 mL / NET: -580 mL      Daily     Daily   CAPILLARY BLOOD GLUCOSE      POCT Blood Glucose.: 123 mg/dL (26 Nov 2019 21:50)  POCT Blood Glucose.: 150 mg/dL (26 Nov 2019 16:49)      PHYSICAL EXAM:    GENERAL:  NAD  SKIN: No rashes or lesions  HENT: Atraumatic Normocephalic. PERRL. Moist membranes.  NECK: Supple, No JVD. No lymphadenopathy.  PULMONARY: BS are decreased in the bases B/L. No wheezing. No rales  CVS: Normal S1, S2. Rate and Rhythm are regular. No murmurs.  ABDOMEN/GI: Soft, Nontender, Nondistended; BS present  EXTREMITIES: Peripheral pulses intact. Trace edema B/L LE.  NEUROLOGIC:  No motor or sensory deficit.  PSYCH: Alert & oriented x 3    Consultant(s) Notes Reviewed:  [x ] YES  [ ] NO  Care Discussed with Consultants/Other Providers [ x] YES  [ ] NO    EKG reviewed  Telemetry reviewed    LABS:                        8.9    9.60  )-----------( 316      ( 27 Nov 2019 05:27 )             28.5   Hemoglobin: 8.9 g/dL (11-27 @ 05:27)  Hemoglobin: 10.0 g/dL (11-26 @ 06:01)  Hemoglobin: 9.9 g/dL (11-25 @ 16:00)  Hemoglobin: 9.6 g/dL (11-25 @ 05:56)  Hemoglobin: 9.4 g/dL (11-24 @ 06:59)  Hemoglobin: 10.4 g/dL (11-23 @ 17:00)  Hemoglobin: 9.4 g/dL (11-23 @ 01:29)  Hemoglobin: 9.3 g/dL (11-22 @ 16:47)    11-27    137  |  95<L>  |  8<L>  ----------------------------<  114<H>  3.2<L>   |  28  |  0.5<L>    Ca    7.6<L>      27 Nov 2019 05:27  Mg     2.1     11-27                RADIOLOGY & ADDITIONAL TESTS:      Imaging or report Personally Reviewed:  [ ] YES  [ ] NO    Medications:  Standing  aMIOdarone    Tablet 200 milliGRAM(s) Oral daily  atorvastatin 40 milliGRAM(s) Oral at bedtime  budesonide  80 MICROgram(s)/formoterol 4.5 MICROgram(s) Inhaler 2 Puff(s) Inhalation two times a day  chlorhexidine 4% Liquid 1 Application(s) Topical <User Schedule>  clopidogrel Tablet 75 milliGRAM(s) Oral daily  furosemide    Tablet 40 milliGRAM(s) Oral daily  hydrocortisone 2.5% Rectal Cream 1 Application(s) Rectal two times a day  levothyroxine 300 MICROGram(s) Oral daily  lisinopril 20 milliGRAM(s) Oral daily  metoprolol tartrate 12.5 milliGRAM(s) Oral two times a day  multivitamin 1 Tablet(s) Oral daily  nystatin    Suspension [id number] Unit(s) Oral two times a day  pantoprazole  Injectable 40 milliGRAM(s) IV Push two times a day  senna 2 Tablet(s) Oral at bedtime  simethicone 80 milliGRAM(s) Chew four times a day  sucralfate 1 Gram(s) Oral every 12 hours    PRN Meds  acetaminophen   Tablet .. 650 milliGRAM(s) Oral every 6 hours PRN  albuterol/ipratropium for Nebulization. 3 milliLiter(s) Nebulizer every 6 hours PRN  guaiFENesin    Syrup 200 milliGRAM(s) Oral every 6 hours PRN  methocarbamol 750 milliGRAM(s) Oral every 12 hours PRN  oxycodone    5 mG/acetaminophen 325 mG 1 Tablet(s) Oral every 4 hours PRN      Case discussed with resident    Care discussed with pt/family

## 2019-11-27 NOTE — DISCHARGE NOTE PROVIDER - CARE PROVIDER_API CALL
Masoud Zarco)  Family Medicine  [address], [address]  [place] [address]  Phone: [phone]  Fax: [phone]  Follow Up Time: 1 week    Kole Cronin)  Surgery  [address] [place] [address]  Phone: [phone]  Fax: [phone]  Follow Up Time: 2 weeks    Trevon Post)  Gastroenterology; Internal Medicine  [address] [place] [address]  Phone: [phone]  Fax: [phone]  Follow Up Time: 2 weeks    Maxwell Burgos)  Critical Care Medicine; Internal Medicine; Pulmonary Disease; Sleep Medicine  [address] [place] [address]  Phone: [phone]  Fax: [phone]  Follow Up Time: 2 weeks    James Chavez)  Cardiovascular Disease; Interventional Cardiology  [address] [place] [address]  Phone: [phone]  Fax: [phone]  Follow Up Time: 1 week

## 2019-11-27 NOTE — PROGRESS NOTE ADULT - ASSESSMENT
Pt. 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 pt also with recent complicated admission in october for abdominal pain, colitis and found to have mesenteric ischemia s/p exploratory laparotomy with a left external iliac to SMA bypass and 80 cm small bowel resection with primary anastomosis,  admission also was complicated by UGIB requiring endoscopic intervention for bleeding gastric ulcer. GI are consulted for now small amount of fresh blood per rectum on straining , with constipation.    # Anemia - Maroon Colored stool- S/P colonoscopy - Capsule Enteroscopy - Negative for Blood (Poor Bowel Prep)  - s/p 4 units this admission  - trend Hb: 9.3 > 9.4 > 10.4 >9.4>9.6>8.9  - Keep Hb >8. Transfuse as needed  - S/P total 600 mg of Venofer.   - Capsule Enteroscopy - Negative for blood - Poor bowel prep  - Protonix IV 40 BID   - Monitor CBC     # Acute on Chronic Diastolic CHF Exacerbation with Acute Hypoxic Respiratory Failure  - B/L wheezing   - Continue with Lasix 40 qd, Symbicort, duoneb PRN, Bipap at night, Incentive Spirometry  -Cardiology following   - CT chest is negative for PE, but with bilateral pleural effusion  - EKG with no new ischemic changes  - BNP 2500, close to 2300 during last admission  - Troponin 0.04 and stable, denies chest pain  - LE Duplex neg  - Daily Chest X-Ray   - strict Is and Os, Salt and FLuid restriction 1.5 L/C.   - Daily Weights    # CAD, HTN, h/o V tach  - oral amio and metoprolol  - c/w  Plavix, statin and lisinopril  - ASA Still holding ASA as per Dr. Chavez - Ok To be on Plavix - Follow up     #Abdominal pain - s/p L. Iliac-SMA bypass with SBR  - Pending Surgery Recs for Dressing for discharge  -KUB wnl   -Miralax and hemorrhoid  suppositories     # hypomagnesemia - Repleted     # Hypothyroidism -- Continue with levothyroxine       DVT PPX : Sequentials  GI PPX: protonix IV   Diet: DASH/tlc - Advanced as per GI  Activity: As tolerated  Disposition: Likely back to [facility]  FULL CODE Pt. 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 pt also with recent complicated admission in october for abdominal pain, colitis and found to have mesenteric ischemia s/p exploratory laparotomy with a left external iliac to SMA bypass and 80 cm small bowel resection with primary anastomosis,  admission also was complicated by UGIB requiring endoscopic intervention for bleeding gastric ulcer. GI are consulted for now small amount of fresh blood per rectum on straining , with constipation.    # Anemia - Maroon Colored stool- S/P colonoscopy - Capsule Enteroscopy - Negative for Blood (Poor Bowel Prep)  - s/p 4 units this admission  - trend Hb: 9.3 > 9.4 > 10.4 >9.4>9.6>8.9  - Keep Hb >8. Transfuse as needed  - S/P total 600 mg of Venofer.   - Capsule Enteroscopy - Negative for blood - Poor bowel prep  - Protonix IV 40 BID   - Monitor CBC     # Acute on Chronic Diastolic CHF Exacerbation with Acute Hypoxic Respiratory Failure  - B/L wheezing   - Continue with Lasix 40 qd, Symbicort, duoneb PRN, Bipap at night, Incentive Spirometry  -Cardiology following   - CT chest is negative for PE, but with bilateral pleural effusion  - EKG with no new ischemic changes  - BNP 2500, close to 2300 during last admission  - Troponin 0.04 and stable, denies chest pain  - LE Duplex neg  - Daily Chest X-Ray   - strict Is and Os, Salt and FLuid restriction 1.5 L/C.   - Daily Weights    # CAD, HTN, h/o V tach  - oral amio and metoprolol  - c/w  Plavix, statin and lisinopril  - ASA Still holding ASA as per Dr. Chavez - Ok To be on Plavix - Follow up     #Abdominal pain - s/p L. Iliac-SMA bypass with SBR  - Surgery Recs for Discharge  Wound Care  1. Wash hand with soap and water  2. Gently wet with sterile water   3. Remove packing carefully  4. Clean wound with soap and water  5. Pat dry with sterile guaze  6. Pack the wound gently with1/2 inch plain gauze. You can use a sterile cotton swab if needed.   7. Cover the wound with dry sterile guaze  8. Tape down as needed to keep the gauze in place   -KUR[place] wnl   -Miralax and hemorrhoid  suppositories     # hypomagnesemia - Repleted     # Hypothyroidism -- Continue with levothyroxine       DVT PPX : Sequentials  GI PPX: protonix IV   Diet: DASH/tlc - Advanced as per GI  Activity: As tolerated  Disposition: Likely back to er; PT/Rehab Pending  FULL CODE

## 2019-11-27 NOTE — DISCHARGE NOTE PROVIDER - NSDCCPCAREPLAN_GEN_ALL_CORE_FT
PRINCIPAL DISCHARGE DIAGNOSIS  Diagnosis: Congestive heart failure  Assessment and Plan of Treatment: Congestive Heart Failure (CHF)  Congestive heart failure is a chronic condition in which the heart has trouble pumping blood. In some cases of heart failure, fluid may back up into your lungs or you may have swelling (edema) in your lower legs. There are many causes of heart failure including high blood pressure, coronary artery disease, abnormal heart valves, heart muscle disease, lung disease, diabetes, etc. Symptoms include shortness of breath with activity or when lying flat, cough, swelling of the legs, fatigue, or increased urination during the night.   Treatment is aimed at managing the symptoms of heart failure and may include lifestyle changes, medications, or surgical procedures. Take medicines only as directed by your health care provider and do not stop unless instructed to do so. Eat heart-healthy foods with low or no trans/saturated fats, cholesterol and salt. Weigh yourself every day for early recognition of fluid accumulation.  SEEK IMMEDIATE MEDICAL CARE IF YOU HAVE ANY OF THE FOLLOWING SYMPTOMS: shortness of breath, change in mental status, chest pain, lightheadedness/dizziness/fainting, or worsening of symptoms including not being able to conduct normal physical activity.      SECONDARY DISCHARGE DIAGNOSES  Diagnosis: Acute GI bleeding  Assessment and Plan of Treatment: YOu were found to have Blood in you stool and a drop in your hemoglobin. The specific Bleed was not indentified, but your conditon has stabalized, and your hemoglobin has normalized. You were given 5 units of Blood during your hospital stay.  SEEK IMMEDIATE MEDICAL CARE IF YOU HAVE ANY OF THE FOLLOWING SYMPTOMS: chest pain, shortness of breath, abdominal pain, sweating, vomiting, blood in vomit/bowel movements/urine, dizziness/lightheadedness, weakness or numbness to face/arm/leg, trouble speaking or understanding, facial droop.

## 2019-11-27 NOTE — DISCHARGE NOTE PROVIDER - NSDCMRMEDTOKEN_GEN_ALL_CORE_FT
acetaminophen 325 mg oral tablet: 2 tab(s) orally every 6 hours, As needed, Moderate Pain (4 - 6)  amiodarone 200 mg oral tablet: 1 tab(s) orally 2 times a day   aspirin 81 mg oral tablet, chewable: 1 tab(s) orally once a day  budesonide-formoterol 80 mcg-4.5 mcg/inh inhalation aerosol:  inhaled   clopidogrel 75 mg oral tablet: 1 tab(s) orally once a day  Crestor 20 mg oral tablet: 1 tab(s) orally once a day (at bedtime)  furosemide 40 mg oral tablet: 1 tab(s) orally once a day  guaiFENesin 100 mg/5 mL oral liquid: 10 milliliter(s) orally every 6 hours, As needed, secretions  hydrocortisone 2.5% rectal cream with applicator: 1 application rectal 2 times a day  ipratropium-albuterol 0.5 mg-2.5 mg/3 mLinhalation solution: 3 milliliter(s) inhaled every 6 hours, As needed, Shortness of Breath and/or Wheezing  levothyroxine 300 mcg (0.3 mg) oral tablet: 1 tab(s) orally once a day  lisinopril 20 mg oral tablet: 1 tab(s) orally once a day  metoprolol tartrate 25 mg oral tablet: 0.5 tab(s) orally 2 times a day   12.5 mg per day  Multiple Vitamins oral tablet: 1 tab(s) orally once a day  Protonix 40 mg oral delayed release tablet: 1 tab(s) orally 2 times a day  senna oral tablet: 2 tab(s) orally once a day (at bedtime)  simethicone 80 mg oral tablet, chewable: 1 tab(s) orally 4 times a day  sucralfate 1 g oral tablet: 1 tab(s) orally every 12 hours acetaminophen 325 mg oral tablet: 2 tab(s) orally every 6 hours, As needed, Moderate Pain (4 - 6)  amiodarone 200 mg oral tablet: 1 tab(s) orally 2 times a day   budesonide-formoterol 80 mcg-4.5 mcg/inh inhalation aerosol:  inhaled   clopidogrel 75 mg oral tablet: 1 tab(s) orally once a day  Crestor 20 mg oral tablet: 1 tab(s) orally once a day (at bedtime)  furosemide 40 mg oral tablet: 1 tab(s) orally once a day  guaiFENesin 100 mg/5 mL oral liquid: 10 milliliter(s) orally every 6 hours, As needed, secretions  hydrocortisone 2.5% rectal cream with applicator: 1 application rectal 2 times a day  ipratropium-albuterol 0.5 mg-2.5 mg/3 mLinhalation solution: 3 milliliter(s) inhaled every 6 hours, As needed, Shortness of Breath and/or Wheezing  levothyroxine 300 mcg (0.3 mg) oral tablet: 1 tab(s) orally once a day  lisinopril 20 mg oral tablet: 1 tab(s) orally once a day  metoprolol tartrate 25 mg oral tablet: 0.5 tab(s) orally 2 times a day   12.5 mg per day  Multiple Vitamins oral tablet: 1 tab(s) orally once a day  Protonix 40 mg oral delayed release tablet: 1 tab(s) orally 2 times a day  senna oral tablet: 2 tab(s) orally once a day (at bedtime)  simethicone 80 mg oral tablet, chewable: 1 tab(s) orally 4 times a day  sucralfate 1 g oral tablet: 1 tab(s) orally every 12 hours  tamsulosin 0.4 mg oral capsule: 1 cap(s) orally once a day (at bedtime)

## 2019-11-27 NOTE — DISCHARGE NOTE PROVIDER - NSDCFUADDINST_GEN_ALL_CORE_FT
Medications  Take your Medications as prescribed by your physician  The following Changes were made to your medications:   *STOP taking the aspirin 81*    *START taking Flomax .4mg Once a day    Continue your other home medications.      *Wound Care  1. Wash hand with soap and water  2. Gently wet with sterile water   3. Remove packing carefully  4. Clean wound with soap and water  5. Pat dry with sterile guaze  6. Pack the wound gently with1/2 inch plain gauze. You can use a sterile cotton swab if needed.   7. Cover the wound with dry sterile guaze  8. Tape down as needed to keep the gauze in place

## 2019-11-27 NOTE — PROGRESS NOTE ADULT - SUBJECTIVE AND OBJECTIVE BOX
*This is a Preliminary Note and will be edited*  SUBJECTIVE:  Pt. is 85 yo M yo with PMH of CAD s/p NSTEMI Oct 1 w/ pci and stent x3, Mesenteric Ischemia with L. Iliac-SMA bypass with SBR and anastomosis, HTN ,DLD, Hypothyroidism presenting for shortness of breath,.    Currently admitted to medicine with the primary diagnosis of Hypervolemia, unspecified hypervolemia type     Today is hospital day 16d. This morning he is resting comfortably in bed.   Pt. denies HA, Cp. pt reports having abdominal pain around incision. Pt reports eating with little pain. Pt reports Coughing throughout the night, relieved with Cough syrup.  Pt. is urinating. Pt passed Large nonbloody stool       PAST MEDICAL & SURGICAL HISTORY  Neuropathy  TIA (transient ischemic attack)  Left carotid stenosis  Lyme disease  Hypothyroidism  Coronary artery disease  History of mesenteric infarction    SOCIAL HISTORY:  Negative for smoking/alcohol/drug use.     ALLERGIES:  iodinated radiocontrast agents (Hives)    MEDICATIONS:  STANDING MEDICATIONS  acetaminophen   Tablet .. 650 milliGRAM(s) Oral every 6 hours PRN Temp greater or equal to 38C (100.4F), Mild Pain (1 - 3)  albuterol/ipratropium for Nebulization. 3 milliLiter(s) Nebulizer every 6 hours PRN Shortness of Breath and/or Wheezing  aMIOdarone    Tablet 200 milliGRAM(s) Oral daily  atorvastatin 40 milliGRAM(s) Oral at bedtime  budesonide  80 MICROgram(s)/formoterol 4.5 MICROgram(s) Inhaler 2 Puff(s) Inhalation two times a day  chlorhexidine 4% Liquid 1 Application(s) Topical <User Schedule>  clopidogrel Tablet 75 milliGRAM(s) Oral daily  furosemide    Tablet 40 milliGRAM(s) Oral daily  guaiFENesin    Syrup 200 milliGRAM(s) Oral every 6 hours PRN secretions  hydrocortisone 2.5% Rectal Cream 1 Application(s) Rectal two times a day  levothyroxine 300 MICROGram(s) Oral daily  lisinopril 20 milliGRAM(s) Oral daily  methocarbamol 750 milliGRAM(s) Oral every 12 hours PRN muscle pain  metoprolol tartrate 12.5 milliGRAM(s) Oral two times a day  multivitamin 1 Tablet(s) Oral daily  nystatin    Suspension 513707 Unit(s) Oral two times a day  oxycodone    5 mG/acetaminophen 325 mG 1 Tablet(s) Oral every 4 hours PRN Severe Pain (7 - 10)  pantoprazole  Injectable 40 milliGRAM(s) IV Push two times a day  senna 2 Tablet(s) Oral at bedtime  simethicone 80 milliGRAM(s) Chew four times a day  sucralfate 1 Gram(s) Oral every 12 hours    PRN MEDICATIONS  acetaminophen   Tablet .. 650 milliGRAM(s) Oral every 6 hours PRN  albuterol/ipratropium for Nebulization. 3 milliLiter(s) Nebulizer every 6 hours PRN  guaiFENesin    Syrup 200 milliGRAM(s) Oral every 6 hours PRN  methocarbamol 750 milliGRAM(s) Oral every 12 hours PRN  oxycodone    5 mG/acetaminophen 325 mG 1 Tablet(s) Oral every 4 hours PRN    VITALS:   T(F): 98  HR: 76 (70 - 76)  BP: 135/62 (116/58 - 135/62)  RR: 18 (18 - 19)  SpO2: 95% (95% - 95%)    LABS:                        8.9    9.60  )-----------( 316      ( 27 Nov 2019 05:27 )             28.5     11-27    137  |  95<L>  |  8<L>  ----------------------------<  114<H>  3.2<L>   |  28  |  0.5<L>    Ca    7.6<L>      27 Nov 2019 05:27  Mg     2.1     11-27      RADIOLOGY:    PHYSICAL EXAM:  GEN: In no acute distress. Pt. is awake in bed able to have a conversation.  LUNGS: Pt has improved LUng exam from Prior, Slight crackles at base,  Symmetrical inspiration, no increased work of breathing  HEART: +S1,S2, RRR, No murmurs, Rubs, Gallops   ABD: Bowel Sounds Present, Soft, non distended, no guarding, no rebound. Tender around Incision covered with Dry Clean Gauze. No erythema pus or jose blood appreciated around wound.   EXT: 2+ peripheral Pulses, no clubbing, no cyanosis, LE Edema Worse in Right LE (Same from Prior exam)  Stool: Stool observed non bloody. Normal Brown/Green    NEURO: AAOX3. No focal deficits. CN 2-12 Grossly intact. SUBJECTIVE:  Pt. is 83 yo M yo with PMH of CAD s/p NSTEMI Oct 1 w/ pci and stent x3, Mesenteric Ischemia with L. Iliac-SMA bypass with SBR and anastomosis, HTN ,DLD, Hypothyroidism presenting for shortness of breath,.    Currently admitted to medicine with the primary diagnosis of Hypervolemia, unspecified hypervolemia type     Today is hospital day 16d. This morning he is resting comfortably in bed.   Pt. denies HA, Cp. pt reports having abdominal pain around incision. Pt reports eating with little pain. Pt reports Coughing throughout the night, relieved with Cough syrup. Pt. would very much like to walk with PT before being Discharge. Will discuss with Medical Team.    Pt. is urinating. Pt passed Large nonbloody stool x 2     Slight blood on the Towel after wiping. likely superficial Hemorrhoids vs Trauma  PAST MEDICAL & SURGICAL HISTORY  Neuropathy  TIA (transient ischemic attack)  Left carotid stenosis  Lyme disease  Hypothyroidism  Coronary artery disease  History of mesenteric infarction    SOCIAL HISTORY:  Negative for smoking/alcohol/drug use.     ALLERGIES:  iodinated radiocontrast agents (Hives)    MEDICATIONS:  STANDING MEDICATIONS  acetaminophen   Tablet .. 650 milliGRAM(s) Oral every 6 hours PRN Temp greater or equal to 38C (100.4F), Mild Pain (1 - 3)  albuterol/ipratropium for Nebulization. 3 milliLiter(s) Nebulizer every 6 hours PRN Shortness of Breath and/or Wheezing  aMIOdarone    Tablet 200 milliGRAM(s) Oral daily  atorvastatin 40 milliGRAM(s) Oral at bedtime  budesonide  80 MICROgram(s)/formoterol 4.5 MICROgram(s) Inhaler 2 Puff(s) Inhalation two times a day  chlorhexidine 4% Liquid 1 Application(s) Topical <User Schedule>  clopidogrel Tablet 75 milliGRAM(s) Oral daily  furosemide    Tablet 40 milliGRAM(s) Oral daily  guaiFENesin    Syrup 200 milliGRAM(s) Oral every 6 hours PRN secretions  hydrocortisone 2.5% Rectal Cream 1 Application(s) Rectal two times a day  levothyroxine 300 MICROGram(s) Oral daily  lisinopril 20 milliGRAM(s) Oral daily  methocarbamol 750 milliGRAM(s) Oral every 12 hours PRN muscle pain  metoprolol tartrate 12.5 milliGRAM(s) Oral two times a day  multivitamin 1 Tablet(s) Oral daily  nystatin    Suspension 816824 Unit(s) Oral two times a day  oxycodone    5 mG/acetaminophen 325 mG 1 Tablet(s) Oral every 4 hours PRN Severe Pain (7 - 10)  pantoprazole  Injectable 40 milliGRAM(s) IV Push two times a day  senna 2 Tablet(s) Oral at bedtime  simethicone 80 milliGRAM(s) Chew four times a day  sucralfate 1 Gram(s) Oral every 12 hours    PRN MEDICATIONS  acetaminophen   Tablet .. 650 milliGRAM(s) Oral every 6 hours PRN  albuterol/ipratropium for Nebulization. 3 milliLiter(s) Nebulizer every 6 hours PRN  guaiFENesin    Syrup 200 milliGRAM(s) Oral every 6 hours PRN  methocarbamol 750 milliGRAM(s) Oral every 12 hours PRN  oxycodone    5 mG/acetaminophen 325 mG 1 Tablet(s) Oral every 4 hours PRN    VITALS:   T(F): 98  HR: 76 (70 - 76)  BP: 135/62 (116/58 - 135/62)  RR: 18 (18 - 19)  SpO2: 95% (95% - 95%)    LABS:                        8.9    9.60  )-----------( 316      ( 27 Nov 2019 05:27 )             28.5     11-27    137  |  95<L>  |  8<L>  ----------------------------<  114<H>  3.2<L>   |  28  |  0.5<L>    Ca    7.6<L>      27 Nov 2019 05:27  Mg     2.1     11-27      RADIOLOGY:    PHYSICAL EXAM:  GEN: In no acute distress. Pt. is awake in bed able to have a conversation.  LUNGS: Pt has improved LUng exam from Prior, Slight crackles at base,  Symmetrical inspiration, no increased work of breathing  HEART: +S1,S2, RRR, No murmurs, Rubs, Gallops   ABD: Bowel Sounds Present, Soft, non distended, no guarding, no rebound. Tender around Incision covered with Dry Clean Gauze. No erythema pus or jose blood appreciated around wound. ( later in the day steffanie gauze was saturated with Serosanguinous Fluid)   EXT: 2+ peripheral Pulses, no clubbing, no cyanosis, LE Edema Worse in Right LE (Same from Prior exam)  Stool: Stool observed non bloody. Normal Brown/Green  X2  - On towel after wiping was some Blood.   NEURO: AAOX3. No focal deficits. CN 2-12 Grossly intact.

## 2019-11-27 NOTE — DISCHARGE NOTE PROVIDER - HOSPITAL COURSE
83 yo M yo with PMH of CAD s/p NSTEMI Oct 1 w/ pci and stent x3, HTN ,DLD, Hypothyroidism presenting for SOB. at the time of the interview pt was placed on BiPAP and collateral history obtained from [facility] records. pt endorses progressive dyspnea and wheezing at [facility] where he was discharged for short term rehab. also endorses bilateral LE swelling and fatigue. CXR at that time showed left sided small pleural effusion. pt denies fever, chills, productive cough, abdominal pain, chest pain, palpitation or change in bowel/urinary habits.         to note, patient was admitted on Oct 1st for NSTEMI and had 3 stents placed. pt also with recent complicated admission in october for abdominal pain, colitis and found to have mesenteric ischemia s/p exploratory laparotomy with a left external iliac to SMA bypass and 80 cm small bowel resection with primary anastomosis. admission also was complicated by UGIB requiring endoscopic intervention for bleeding gastric ulcer.         Vital Signs in the ED:    T(F): 97.2 (11 Nov 2019 07:58), Max: 98 (10 Nov 2019 23:44)    HR: 85 (11 Nov 2019 08:15) (85 - 106)    BP: 142/63 (11 Nov 2019 07:58) (115/65 - 142/63)    RR: 20 (11 Nov 2019 07:58) (18 - 22)    SpO2: 100% (11 Nov 2019 08:15) (96% - 100%)        in the ED, pt received IV Lasix 40 mg , 2 gm MgSo4, benadryl.    CTA chest and abdomen ruled out PE, but showing bilateral pleural effusion.         Pt was admitted for Acute Diastolic CHF Exacerbation with Acute Hypoxic Respiratory Failure.    Pt was treated medically for this condition, and was showing improvement. Pt was considered for Thoracentesis but in the end was decided that it wasnt indicated and patient was improving.         Nov 17th The pt was found to have Blood Per rectum. This was originally attributed to Hemorrhoid history. The pt. later had Melena, and was dropping his Hemoglobin. The patient was given 2 Units PRBC's. Pt was continued on DAPT in the  light of Cardiac history, and pt and team were preparing for EGD and Colonsocopy. GI got cardiac clearance to hold ASA and stay on plavix.     Colonoscopy showed large amount of maroon colored blood throughout the colon and small intestine. CTA colon was negative. No active bleeding site was found. The patient received a total of 5 units of blood at this point and was transferred. to the  CCU    GI recommended a Capsule Enteroscopy, which was taken and found to be negative for any bleeding with somewhat poor Bowel Prep.         Pt was stablized and transferred back to the telemetry Unit where he has remained until discharge.     While on the floor in preperation for discharge, the patient got up to walk with pT, and reports that he started draining blood from his abdomen where his wound is from prior abdominal surgery.     Surgery was consulted and they said to continue to change the dressings accordingly, and call surgery team if needed.                                                                                                                   The patient is cleared from Surgery, GI, and Cardiology with follow up.    The patient is medically Cleared for discharge.

## 2019-11-27 NOTE — DISCHARGE NOTE PROVIDER - NSDCFUADDAPPT_GEN_ALL_CORE_FT
Follow up with your PMD in 1-2 weeks. Please discuss with your PMD your overall condition specifically surrounding your current situation.   Follow up with Dr. Cronin from the Surgery Department in 1-2 weeks. Please discuss at this time any further management if your condition specifically relating to your recent surgery and any wound care instructions.   Follow up with Dr. Post from the GI Department in 1-2 weeks. Please discuss at this time any further management of your recent GI bleed and any further medications or Interventions.  Follow up with Dr. Burgos from the Pulmonary Department in 1-2 weeks. Please discuss at this time any further management if your condition and any change in medications  Follow up with Dr. Chavez from the Cardiology Department in 1-2 weeks. Please discuss at this time any further management of your cadiac conditions and any change in medications specifically the need to restart Aspirin or not.

## 2019-11-28 NOTE — PROGRESS NOTE ADULT - SUBJECTIVE AND OBJECTIVE BOX
no chest pain   no sob   patient with wet dressing on abdomen yellowish in color    Vital Signs Last 24 Hrs  T(C): 36.7 (28 Nov 2019 05:44), Max: 36.7 (28 Nov 2019 05:44)  T(F): 98 (28 Nov 2019 05:44), Max: 98 (28 Nov 2019 05:44)  HR: 87 (28 Nov 2019 07:30) (70 - 87)  BP: 149/68 (28 Nov 2019 05:44) (118/56 - 149/68)  BP(mean): --  RR: 18 (28 Nov 2019 05:44) (18 - 18)  SpO2: 97% (28 Nov 2019 07:30) (94% - 98%)    PHYSICAL EXAM:  GENERAL: NAD,   Pulm: Clear to auscultation bilaterally; No wheeze  CV: Regular rate and rhythm; No murmurs, rubs, or gallops  GI: Soft, Nontender, Nondistended; Bowel sounds present  EXTREMITIES:  2+ Peripheral Pulses, No clubbing, cyanosis, or edema  PSYCH: AAOx3  NEUROLOGY: non-focal  SKIN: No rashes or lesions                          9.2    7.74  )-----------( 294      ( 28 Nov 2019 06:13 )             30.4     11-28    137  |  97<L>  |  9<L>  ----------------------------<  104<H>  3.8   |  27  |  <0.5<L>    Ca    7.4<L>      28 Nov 2019 06:13  Mg     2.1     11-27    TPro  5.1<L>  /  Alb  2.5<L>  /  TBili  0.3  /  DBili  x   /  AST  22  /  ALT  21  /  AlkPhos  63  11-28    LIVER FUNCTIONS - ( 28 Nov 2019 06:13 )  Alb: 2.5 g/dL / Pro: 5.1 g/dL / ALK PHOS: 63 U/L / ALT: 21 U/L / AST: 22 U/L / GGT: x                 MEDICATIONS  (STANDING):  aMIOdarone    Tablet 200 milliGRAM(s) Oral daily  atorvastatin 40 milliGRAM(s) Oral at bedtime  budesonide  80 MICROgram(s)/formoterol 4.5 MICROgram(s) Inhaler 2 Puff(s) Inhalation two times a day  chlorhexidine 4% Liquid 1 Application(s) Topical <User Schedule>  clopidogrel Tablet 75 milliGRAM(s) Oral daily  furosemide    Tablet 40 milliGRAM(s) Oral daily  hydrocortisone 2.5% Rectal Cream 1 Application(s) Rectal two times a day  levothyroxine 300 MICROGram(s) Oral daily  lisinopril 20 milliGRAM(s) Oral daily  metoprolol tartrate 12.5 milliGRAM(s) Oral two times a day  multivitamin 1 Tablet(s) Oral daily  nystatin    Suspension 090019 Unit(s) Oral two times a day  pantoprazole  Injectable 40 milliGRAM(s) IV Push two times a day  senna 2 Tablet(s) Oral at bedtime  simethicone 80 milliGRAM(s) Chew four times a day  sucralfate 1 Gram(s) Oral every 12 hours    MEDICATIONS  (PRN):  acetaminophen   Tablet .. 650 milliGRAM(s) Oral every 6 hours PRN Temp greater or equal to 38C (100.4F), Mild Pain (1 - 3)  albuterol/ipratropium for Nebulization. 3 milliLiter(s) Nebulizer every 6 hours PRN Shortness of Breath and/or Wheezing  guaiFENesin    Syrup 200 milliGRAM(s) Oral every 6 hours PRN secretions  methocarbamol 750 milliGRAM(s) Oral every 12 hours PRN muscle pain  oxycodone    5 mG/acetaminophen 325 mG 1 Tablet(s) Oral every 4 hours PRN Severe Pain (7 - 10)

## 2019-11-28 NOTE — PROGRESS NOTE ADULT - ASSESSMENT
83 y/o man with PMH of CAD s/p NSTEMI Oct 1 w/ PCI and stent x3, HTN ,dyslipidemia, Hypothyroidism, recent admission for mesenteric ischemia s/p exploratory laparotomy with a left external iliac to SMA bypass and 80 cm small bowel resection with primary anastomosis and upper GI bleed requiring endoscopic intervention for bleeding gastric ulcer presented for progressively worsening SOB    Acute HFpEF  Acute Hypoxic respiratory failure on admission due to #1 - now resolved  B/L pleural effusions - improved  CAD S/P recent PCI  HTN   Hypothyroidism  Rectal bleeding    Hemorrhoids  Lower abdomen surgical wound         PLAN:    no bleeding from wound but dressing needs to be changed . called blue team and they will come to change dressing   Capsule enteroscopy result noted. Poor prep. Capsule did not reach caecum. Did not see any active bleeding.  Abdominal x-ray showed capsule in rectosigmoid colon.   Care D/W the cardiology as per note yesterday . Recommended to cont Plavix. Can stop ASA  Colonoscopy showed large amount of maroon colored blood throughout the colon and small intestine. CTA colon was negative. No active bleeding site was found.   HGB stable today   Anusol suppository for hemorrhoids.   No need for thoracentesis per Pulm - continue diuresis with Lasix PO  Check i's and o's and daily wts.   Low salt diet and water restriction to 1.5 L/C.       PROGRESS NOTE HANDOFF    Pending: surgery to change dressing today called blue team and needs PT/Rehab     Family discussion: with wife at bedside

## 2019-11-29 NOTE — PROGRESS NOTE ADULT - SUBJECTIVE AND OBJECTIVE BOX
LAKSHMI JUDD  84y Male    CHIEF COMPLAINT:    Patient is a 84y old  Male who presents with a chief complaint of SOB (28 Nov 2019 11:03)      INTERVAL HPI/OVERNIGHT EVENTS:    Patient seen and examined.    ROS: All other systems are negative.    Vital Signs:    T(F): 97 (11-29-19 @ 05:39), Max: 97 (11-29-19 @ 05:39)  HR: 74 (11-29-19 @ 05:39) (71 - 83)  BP: 176/70 (11-29-19 @ 05:39) (121/68 - 176/70)  RR: 18 (11-29-19 @ 05:39) (18 - 18)  SpO2: --  I&O's Summary    28 Nov 2019 07:01  -  29 Nov 2019 07:00  --------------------------------------------------------  IN: 450 mL / OUT: 830 mL / NET: -380 mL    29 Nov 2019 07:01  -  29 Nov 2019 11:03  --------------------------------------------------------  IN: 200 mL / OUT: 1150 mL / NET: -950 mL      Daily     Daily   CAPILLARY BLOOD GLUCOSE      POCT Blood Glucose.: 123 mg/dL (28 Nov 2019 21:49)  POCT Blood Glucose.: 155 mg/dL (28 Nov 2019 16:57)  POCT Blood Glucose.: 127 mg/dL (28 Nov 2019 11:38)      PHYSICAL EXAM:    GENERAL:  NAD  SKIN: No rashes or lesions  HENT: Atrumatic. Normocephalic. PERRL. Moist membranes.  NECK: Supple, No JVD. No lymphadenopathy.  PULMONARY: CTA B/L. No wheezing. No rales  CVS: Normal S1, S2. Rate and Rythm are regular. No murmurs.  ABDOMEN/GI: Soft, Nontender, Nondistended; BS present  EXTREMITIES: Peripheral pulses intact. No edema B/L LE.  NEUROLOGIC:  No motor or sensory deficit.  PSYCH: Alert & oriented x 3    Consultant(s) Notes Reviewed:  [x ] YES  [ ] NO  Care Discussed with Consultants/Other Providers [ x] YES  [ ] NO    EKG reviewed  Telemetry reviewed    LABS:                        9.2    7.74  )-----------( 294      ( 28 Nov 2019 06:13 )             30.4   Hemoglobin: 9.2 g/dL (11-28 @ 06:13)  Hemoglobin: 9.2 g/dL (11-27 @ 16:56)  Hemoglobin: 8.9 g/dL (11-27 @ 05:27)  Hemoglobin: 10.0 g/dL (11-26 @ 06:01)  Hemoglobin: 9.9 g/dL (11-25 @ 16:00)  Hemoglobin: 9.6 g/dL (11-25 @ 05:56)    11-28    137  |  97<L>  |  9<L>  ----------------------------<  104<H>  3.8   |  27  |  <0.5<L>    Ca    7.4<L>      28 Nov 2019 06:13    TPro  5.1<L>  /  Alb  2.5<L>  /  TBili  0.3  /  DBili  x   /  AST  22  /  ALT  21  /  AlkPhos  63  11-28              RADIOLOGY & ADDITIONAL TESTS:      Imaging or report Personally Reviewed:  [ ] YES  [ ] NO    Medications:  Standing  aMIOdarone    Tablet 200 milliGRAM(s) Oral daily  atorvastatin 40 milliGRAM(s) Oral at bedtime  budesonide  80 MICROgram(s)/formoterol 4.5 MICROgram(s) Inhaler 2 Puff(s) Inhalation two times a day  chlorhexidine 4% Liquid 1 Application(s) Topical <User Schedule>  clopidogrel Tablet 75 milliGRAM(s) Oral daily  furosemide    Tablet 40 milliGRAM(s) Oral daily  hydrocortisone 2.5% Rectal Cream 1 Application(s) Rectal two times a day  levothyroxine 300 MICROGram(s) Oral daily  lisinopril 20 milliGRAM(s) Oral daily  metoprolol tartrate 12.5 milliGRAM(s) Oral two times a day  multivitamin 1 Tablet(s) Oral daily  nystatin    Suspension 297247 Unit(s) Oral two times a day  pantoprazole    Tablet 40 milliGRAM(s) Oral two times a day  senna 2 Tablet(s) Oral at bedtime  simethicone 80 milliGRAM(s) Chew four times a day  sucralfate 1 Gram(s) Oral every 12 hours  tamsulosin 0.4 milliGRAM(s) Oral at bedtime    PRN Meds  acetaminophen   Tablet .. 650 milliGRAM(s) Oral every 6 hours PRN  albuterol/ipratropium for Nebulization. 3 milliLiter(s) Nebulizer every 6 hours PRN  guaiFENesin    Syrup 200 milliGRAM(s) Oral every 6 hours PRN  methocarbamol 750 milliGRAM(s) Oral every 12 hours PRN  oxycodone    5 mG/acetaminophen 325 mG 1 Tablet(s) Oral every 4 hours PRN      Case discussed with resident    Care discussed with pt/family LAKSHMI JUDD  84y Male    CHIEF COMPLAINT:    Patient is a 84y old  Male who presents with a chief complaint of SOB (28 Nov 2019 11:03)      INTERVAL HPI/OVERNIGHT EVENTS:    Patient seen and examined. No more rectal bleeding. No bleeding from abdominal wound. No sob.     ROS: All other systems are negative.    Vital Signs:    T(F): 97 (11-29-19 @ 05:39), Max: 97 (11-29-19 @ 05:39)  HR: 74 (11-29-19 @ 05:39) (71 - 83)  BP: 176/70 (11-29-19 @ 05:39) (121/68 - 176/70)  RR: 18 (11-29-19 @ 05:39) (18 - 18)  SpO2: --  I&O's Summary    28 Nov 2019 07:01  -  29 Nov 2019 07:00  --------------------------------------------------------  IN: 450 mL / OUT: 830 mL / NET: -380 mL    29 Nov 2019 07:01  -  29 Nov 2019 11:03  --------------------------------------------------------  IN: 200 mL / OUT: 1150 mL / NET: -950 mL      Daily     Daily   CAPILLARY BLOOD GLUCOSE      POCT Blood Glucose.: 123 mg/dL (28 Nov 2019 21:49)  POCT Blood Glucose.: 155 mg/dL (28 Nov 2019 16:57)  POCT Blood Glucose.: 127 mg/dL (28 Nov 2019 11:38)      PHYSICAL EXAM:    GENERAL:  NAD  SKIN: No rashes or lesions  HENT: Atraumatic Normocephalic. PERRL. Moist membranes.  NECK: Supple, No JVD. No lymphadenopathy.  PULMONARY: BS are decreased in the Rt. Base. No wheezing. No rales  CVS: Normal S1, S2. Rate and Rhythm are regular. No murmurs.  ABDOMEN/GI: Soft, Nontender, Nondistended; BS present  EXTREMITIES: Peripheral pulses intact. No edema B/L LE.  NEUROLOGIC:  No motor or sensory deficit.  PSYCH: Alert & oriented x 3    Consultant(s) Notes Reviewed:  [x ] YES  [ ] NO  Care Discussed with Consultants/Other Providers [ x] YES  [ ] NO    EKG reviewed  Telemetry reviewed    LABS:                        9.2    7.74  )-----------( 294      ( 28 Nov 2019 06:13 )             30.4   Hemoglobin: 9.2 g/dL (11-28 @ 06:13)  Hemoglobin: 9.2 g/dL (11-27 @ 16:56)  Hemoglobin: 8.9 g/dL (11-27 @ 05:27)  Hemoglobin: 10.0 g/dL (11-26 @ 06:01)  Hemoglobin: 9.9 g/dL (11-25 @ 16:00)  Hemoglobin: 9.6 g/dL (11-25 @ 05:56)    11-28    137  |  97<L>  |  9<L>  ----------------------------<  104<H>  3.8   |  27  |  <0.5<L>    Ca    7.4<L>      28 Nov 2019 06:13    TPro  5.1<L>  /  Alb  2.5<L>  /  TBili  0.3  /  DBili  x   /  AST  22  /  ALT  21  /  AlkPhos  63  11-28              RADIOLOGY & ADDITIONAL TESTS:      Imaging or report Personally Reviewed:  [ ] YES  [ ] NO    Medications:  Standing  aMIOdarone    Tablet 200 milliGRAM(s) Oral daily  atorvastatin 40 milliGRAM(s) Oral at bedtime  budesonide  80 MICROgram(s)/formoterol 4.5 MICROgram(s) Inhaler 2 Puff(s) Inhalation two times a day  chlorhexidine 4% Liquid 1 Application(s) Topical <User Schedule>  clopidogrel Tablet 75 milliGRAM(s) Oral daily  furosemide    Tablet 40 milliGRAM(s) Oral daily  hydrocortisone 2.5% Rectal Cream 1 Application(s) Rectal two times a day  levothyroxine 300 MICROGram(s) Oral daily  lisinopril 20 milliGRAM(s) Oral daily  metoprolol tartrate 12.5 milliGRAM(s) Oral two times a day  multivitamin 1 Tablet(s) Oral daily  nystatin    Suspension 461966 Unit(s) Oral two times a day  pantoprazole    Tablet 40 milliGRAM(s) Oral two times a day  senna 2 Tablet(s) Oral at bedtime  simethicone 80 milliGRAM(s) Chew four times a day  sucralfate 1 Gram(s) Oral every 12 hours  tamsulosin 0.4 milliGRAM(s) Oral at bedtime    PRN Meds  acetaminophen   Tablet .. 650 milliGRAM(s) Oral every 6 hours PRN  albuterol/ipratropium for Nebulization. 3 milliLiter(s) Nebulizer every 6 hours PRN  guaiFENesin    Syrup 200 milliGRAM(s) Oral every 6 hours PRN  methocarbamol 750 milliGRAM(s) Oral every 12 hours PRN  oxycodone    5 mG/acetaminophen 325 mG 1 Tablet(s) Oral every 4 hours PRN      Case discussed with resident    Care discussed with pt/family

## 2019-11-29 NOTE — PROGRESS NOTE ADULT - ASSESSMENT
Pt. is 85 yo M yo with PMH of CAD s/p NSTEMI Oct 1 w/ pci and stent x3, Mesenteric Ischemia with L. Iliac-SMA bypass with SBR and anastomosis, HTN ,DLD, Hypothyroidism presenting for shortness of breath To note, patient was admitted on Oct 1st for NSTEMI and had 3 stents placed. pt also with recent complicated admission in october for abdominal pain, colitis and found to have mesenteric ischemia s/p exploratory laparotomy with a left external iliac to SMA bypass and 80 cm small bowel resection with primary anastomosis,  admission also was complicated by UGIB requiring endoscopic intervention for bleeding gastric ulcer. GI are consulted for now small amount of fresh blood per rectum on straining , with constipation.    # Anemia - Maroon Colored stool- S/P colonoscopy - Capsule Enteroscopy - Negative for Blood (Poor Bowel Prep)  - s/p 4 units this admission  - trend Hb: 9.3 > 9.4 > 10.4 >9.4>9.6>8.9>9.2  - Keep Hb >8. Transfuse as needed  - S/P total 600 mg of Venofer.   - Capsule Enteroscopy - Negative for blood - Poor bowel prep  - Protonix IV 40 BID   - Monitor CBC     # Acute on Chronic Diastolic CHF Exacerbation with Acute Hypoxic Respiratory Failure  - B/L wheezing   - Continue with Lasix 40 qd, Symbicort, duoneb PRN, Bipap at night, Incentive Spirometry  -Cardiology following   - CT chest is negative for PE, but with bilateral pleural effusion  - EKG with no new ischemic changes  - BNP 2500, close to 2300 during last admission  - Troponin 0.04 and stable, denies chest pain  - LE Duplex neg  - Daily Chest X-Ray   - strict Is and Os, Salt and FLuid restriction 1.5 L/C.   - Daily Weights    # CAD, HTN, h/o V tach  - oral amio and metoprolol  - c/w  Plavix, statin and lisinopril  - ASA Still holding ASA as per Dr. Chavez - Ok To be on Plavix - Follow up     #Abdominal pain - s/p L. Iliac-SMA bypass with SBR  - Surgery Recs for Discharge -Cleared  Wound Care - as per surgery.   1. Wash hand with soap and water  2. Gently wet with sterile water   3. Remove packing carefully  4. Clean wound with soap and water  5. Pat dry with sterile guaze  6. Pack the wound gently with1/2 inch plain gauze. You can use a sterile cotton swab if needed.   7. Cover the wound with dry sterile guaze  8. Tape down as needed to keep the gauze in place   -MARISA morenol   -Miralax and hemorrhoid  suppositories     # hypomagnesemia - Repleted     # Hypothyroidism -- Continue with levothyroxine       DVT PPX : Sequentials  GI PPX: protonix IV   Diet: DASH/tlc - Advanced as per GI  Activity: As tolerated  Disposition: SNF - [facility] -   FULL CODE

## 2019-11-29 NOTE — PROGRESS NOTE ADULT - SUBJECTIVE AND OBJECTIVE BOX
SUBJECTIVE:  Pt. is 83 yo M yo with PMH of CAD s/p NSTEMI Oct 1 w/ pci and stent x3, Mesenteric Ischemia with L. Iliac-SMA bypass with SBR and anastomosis, HTN ,DLD, Hypothyroidism presenting for shortness of breath,.    Currently admitted to medicine with the primary diagnosis of Hypervolemia, unspecified hypervolemia type     Today is hospital day 18d. This morning he is resting comfortably in bed.   Pt. denies HA, Cp. pt reports having abdominal pain around incision. Pt reports eating with little pain. Pt walked with PT today, abdmonial wound intact. Still feels sick and wants to stay. Pt explained no benefit in staying as he is cleared from Medicine, GI, and Surgery.  Pt. is urinating and stooling as appropriate.  Pt. reported being slightly lightheaded with some blurry vision on standing from lying down. Was able to wlak well with a walker.       PAST MEDICAL & SURGICAL HISTORY  Neuropathy  TIA (transient ischemic attack)  Left carotid stenosis  Lyme disease  Hypothyroidism  Coronary artery disease  History of mesenteric infarction    SOCIAL HISTORY:  Negative for smoking/alcohol/drug use.     ALLERGIES:  iodinated radiocontrast agents (Hives)    MEDICATIONS:  STANDING MEDICATIONS  acetaminophen   Tablet .. 650 milliGRAM(s) Oral every 6 hours PRN Temp greater or equal to 38C (100.4F), Mild Pain (1 - 3)  albuterol/ipratropium for Nebulization. 3 milliLiter(s) Nebulizer every 6 hours PRN Shortness of Breath and/or Wheezing  aMIOdarone    Tablet 200 milliGRAM(s) Oral daily  atorvastatin 40 milliGRAM(s) Oral at bedtime  budesonide  80 MICROgram(s)/formoterol 4.5 MICROgram(s) Inhaler 2 Puff(s) Inhalation two times a day  chlorhexidine 4% Liquid 1 Application(s) Topical <User Schedule>  clopidogrel Tablet 75 milliGRAM(s) Oral daily  furosemide    Tablet 40 milliGRAM(s) Oral daily  guaiFENesin    Syrup 200 milliGRAM(s) Oral every 6 hours PRN secretions  hydrocortisone 2.5% Rectal Cream 1 Application(s) Rectal two times a day  levothyroxine 300 MICROGram(s) Oral daily  lisinopril 20 milliGRAM(s) Oral daily  methocarbamol 750 milliGRAM(s) Oral every 12 hours PRN muscle pain  metoprolol tartrate 12.5 milliGRAM(s) Oral two times a day  multivitamin 1 Tablet(s) Oral daily  nystatin    Suspension 431091 Unit(s) Oral two times a day  oxycodone    5 mG/acetaminophen 325 mG 1 Tablet(s) Oral every 4 hours PRN Severe Pain (7 - 10)  pantoprazole    Tablet 40 milliGRAM(s) Oral two times a day  senna 2 Tablet(s) Oral at bedtime  simethicone 80 milliGRAM(s) Chew four times a day  sucralfate 1 Gram(s) Oral every 12 hours  tamsulosin 0.4 milliGRAM(s) Oral at bedtime    PRN MEDICATIONS  acetaminophen   Tablet .. 650 milliGRAM(s) Oral every 6 hours PRN  albuterol/ipratropium for Nebulization. 3 milliLiter(s) Nebulizer every 6 hours PRN  guaiFENesin    Syrup 200 milliGRAM(s) Oral every 6 hours PRN  methocarbamol 750 milliGRAM(s) Oral every 12 hours PRN  oxycodone    5 mG/acetaminophen 325 mG 1 Tablet(s) Oral every 4 hours PRN    VITALS:   T(F): 97.5  HR: 79 (71 - 83)  BP: 150/66 (121/68 - 176/70)  RR: 20 (18 - 20)  SpO2: --    LABS:                        9.2    7.74  )-----------( 294      ( 28 Nov 2019 06:13 )             30.4     11-28    137  |  97<L>  |  9<L>  ----------------------------<  104<H>  3.8   |  27  |  <0.5<L>    Ca    7.4<L>      28 Nov 2019 06:13    TPro  5.1<L>  /  Alb  2.5<L>  /  TBili  0.3  /  DBili  x   /  AST  22  /  ALT  21  /  AlkPhos  63  11-28                      RADIOLOGY:    PHYSICAL EXAM:  GEN: In no acute distress. Pt. is awake in bed able to have a conversation.  LUNGS: Pt has improved Lung exam, Slight crackles at base,  Symmetrical inspiration, no increased work of breathing  HEART: +S1,S2, RRR, No murmurs, Rubs, Gallops   ABD: Bowel Sounds Present, Soft, non distended, no guarding, no rebound. Tender around Incision covered with Dry Clean Gauze. No erythema pus or jose blood appreciated around wound.   EXT: 2+ peripheral Pulses, no clubbing, no cyanosis, LE Edema Worse in Right LE (Same from Prior exam)  NEURO: AAOX3. No focal deficits. CN 2-12 Grossly intact.  Gait: Was able to walk with walker and PT. Slight R. Leg foot drop - pt reports this as chronic from previous Lyme Disease episode.

## 2019-11-29 NOTE — PROGRESS NOTE ADULT - ASSESSMENT
83 y/o man with PMH of CAD s/p NSTEMI Oct 1 w/ PCI and stent x3, HTN ,dyslipidemia, Hypothyroidism, recent admission for mesenteric ischemia s/p exploratory laparotomy with a left external iliac to SMA bypass and 80 cm small bowel resection with primary anastomosis and upper GI bleed requiring endoscopic intervention for bleeding gastric ulcer presented for for progressively worsening SOB    1.	Acute HFpEF  2.	Acute Hypoxic respiratory failure on admission due to #1 - now resolved  3.	B/L pleural effusions - improved  4.	CAD S/P recent PCI  5.	HTN   6.	Hypothyroidism  7.	Rectal bleeding    8.	Hemorrhoids  9.	Lower abdomen surgical wound         PLAN:    ·	No more bleeding from Surgical wound today. Called the surgical for dressing change and F/U  ·	Capsule enteroscopy result noted. Poor prep. Capsule did not reach caecum. Did not see any active bleeding.  Abdominal x-ray showed capsule in rectosigmoid colon.   ·	Care D/W the cardiology. Recommended to cont Plavix. Can stop ASA  ·	Colonoscopy showed large amount of maroon colored blood throughout the colon and small intestine. CTA colon was negative. No active bleeding site was found.   ·	Drop in H/H again today, likely due to bleeding from surgical wound yesterday.   ·	S/P total 3 units of PRBC'S during this admission Follow H/H. Keep Hb >8. Transfuse as needed  ·	S/P total 600 mg of Venofer.   ·	Anusol suppository for hemorrhoids.   ·	No need for thoracentesis per Pulm - continue diuresis with Lasix PO  ·	Check i's and o's and daily wts.   ·	Low salt diet and water restriction to 1.5 L/C.   ·	surgery following for dressing changes  ·	GI f/u    PROGRESS NOTE HANDOFF    Pending:  Surgical F/U for bleeding from abdominal wound. Drop in H/H. Monitor for 24 hrs.     Family discussion: with wife at bedside    Disposition: to be determined, evaluate need for physical therapy 85 y/o man with PMH of CAD s/p NSTEMI Oct 1 w/ PCI and stent x3, HTN ,dyslipidemia, Hypothyroidism, recent admission for mesenteric ischemia s/p exploratory laparotomy with a left external iliac to SMA bypass and 80 cm small bowel resection with primary anastomosis and upper GI bleed requiring endoscopic intervention for bleeding gastric ulcer presented for for progressively worsening SOB    1.	Acute HFpEF  2.	Acute Hypoxic respiratory failure on admission due to #1 - now resolved  3.	B/L pleural effusions - improved  4.	CAD S/P recent PCI  5.	HTN   6.	Hypothyroidism  7.	Rectal bleeding    8.	Hemorrhoids  9.	Lower abdomen surgical wound         PLAN:    ·	No more bleeding from Surgical wound. Surgery recommended local wound care  ·	Capsule enteroscopy result noted. Poor prep. Capsule did not reach caecum. Did not see any active bleeding.  Abdominal x-ray showed capsule in rectosigmoid colon.   ·	Care D/W the cardiology. Recommended to cont Plavix. Can stop ASA  ·	Colonoscopy showed large amount of maroon colored blood throughout the colon and small intestine. CTA colon was negative. No active bleeding site was found.    ·	S/P total 3 units of PRBC'S during this admission. H/H stable now.  ·	S/P total 600 mg of Venofer.   ·	Anusol suppository for hemorrhoids.   ·	No need for thoracentesis per Pulm - continue diuresis with Lasix PO  ·	Low salt diet and water restriction to 1.5 L/C.   ·	Can D/C to SNF        * Med rec reviewed. Plan of care D/W the pt and his wife on bedside. Time spent 40 minutes.

## 2019-11-30 NOTE — PROGRESS NOTE ADULT - SUBJECTIVE AND OBJECTIVE BOX
no chest pain   no sob   doing well       Vital Signs Last 24 Hrs  T(C): 35.6 (30 Nov 2019 05:43), Max: 36.4 (29 Nov 2019 12:50)  T(F): 96.1 (30 Nov 2019 05:43), Max: 97.5 (29 Nov 2019 12:50)  HR: 74 (30 Nov 2019 11:47) (74 - 81)  BP: 156/67 (30 Nov 2019 11:47) (126/60 - 156/67)  BP(mean): --  RR: 18 (30 Nov 2019 05:43) (18 - 20)  SpO2: --    PHYSICAL EXAM:  GENERAL: NAD, well-developed  HEAD:  Atraumatic, Normocephalic  EYES: EOMI, PERRLA, conjunctiva and sclera clear  NECK: Supple, No JVD  CHEST/LUNG: Clear to auscultation bilaterally; No wheeze  HEART: Regular rate and rhythm; No murmurs, rubs, or gallops  ABDOMEN: Soft, Nontender, Nondistended; Bowel sounds present. has dressing on wound   EXTREMITIES:  2+ Peripheral Pulses, No clubbing, cyanosis, or edema  PSYCH: AAOx3  NEUROLOGY: non-focal                            9.0    6.94  )-----------( 269      ( 30 Nov 2019 07:21 )             29.5     11-30    138  |  96<L>  |  6<L>  ----------------------------<  104<H>  3.2<L>   |  29  |  <0.5<L>    Ca    7.8<L>      30 Nov 2019 07:21              MEDICATIONS  (STANDING):  aMIOdarone    Tablet 200 milliGRAM(s) Oral daily  atorvastatin 40 milliGRAM(s) Oral at bedtime  budesonide  80 MICROgram(s)/formoterol 4.5 MICROgram(s) Inhaler 2 Puff(s) Inhalation two times a day  chlorhexidine 4% Liquid 1 Application(s) Topical <User Schedule>  clopidogrel Tablet 75 milliGRAM(s) Oral daily  furosemide    Tablet 40 milliGRAM(s) Oral daily  hydrocortisone 2.5% Rectal Cream 1 Application(s) Rectal two times a day  levothyroxine 300 MICROGram(s) Oral daily  lisinopril 20 milliGRAM(s) Oral daily  metoprolol tartrate 12.5 milliGRAM(s) Oral two times a day  multivitamin 1 Tablet(s) Oral daily  nystatin    Suspension [id number] Unit(s) Oral two times a day  pantoprazole    Tablet 40 milliGRAM(s) Oral two times a day  senna 2 Tablet(s) Oral at bedtime  simethicone 80 milliGRAM(s) Chew four times a day  sucralfate 1 Gram(s) Oral every 12 hours  tamsulosin 0.4 milliGRAM(s) Oral at bedtime    MEDICATIONS  (PRN):  acetaminophen   Tablet .. 650 milliGRAM(s) Oral every 6 hours PRN Temp greater or equal to 38C (100.4F), Mild Pain (1 - 3)  albuterol/ipratropium for Nebulization. 3 milliLiter(s) Nebulizer every 6 hours PRN Shortness of Breath and/or Wheezing  guaiFENesin    Syrup 200 milliGRAM(s) Oral every 6 hours PRN secretions  methocarbamol 750 milliGRAM(s) Oral every 12 hours PRN muscle pain  oxycodone    5 mG/acetaminophen 325 mG 1 Tablet(s) Oral every 4 hours PRN Severe Pain (7 - 10)

## 2019-11-30 NOTE — DISCHARGE NOTE NURSING/CASE MANAGEMENT/SOCIAL WORK - PATIENT PORTAL LINK FT
You can access the FollowMyHealth Patient Portal offered by [facility] by registering at the following website: [email or web address]. By joining [facility]’s FollowMyHealth portal, you will also be able to view your health information using other applications (apps) compatible with our system.

## 2019-11-30 NOTE — CHART NOTE - NSCHARTNOTEFT_GEN_A_CORE
Pt due for comprehensive reassessment today, however has been dc'd since RD visited pt.  Pt complained of dysgeusia and xerostomia, so RD provided nutrition education for dysgeusia and xerostomia + NCM handout to pt and wife. All questions answered and pt and wife verbalize understanding of material presented.

## 2019-11-30 NOTE — PROGRESS NOTE ADULT - ASSESSMENT
85 y/o man with PMH of CAD s/p NSTEMI Oct 1 w/ PCI and stent x3, HTN ,dyslipidemia, Hypothyroidism, recent admission for mesenteric ischemia s/p exploratory laparotomy with a left external iliac to SMA bypass and 80 cm small bowel resection with primary anastomosis and upper GI bleed requiring endoscopic intervention for bleeding gastric ulcer presented for for progressively worsening SOB    Acute HFpEF  Acute Hypoxic respiratory failure on admission due to #1 - now resolved  B/L pleural effusions - improved  CAD S/P recent PCI  HTN   Hypothyroidism  Rectal bleeding    Hemorrhoids  Lower abdomen surgical wound         PLAN:    No more bleeding from Surgical wound. Surgery recommended local wound care  Capsule enteroscopy result noted. Poor prep. Capsule did not reach caecum. Did not see any active bleeding.  Abdominal x-ray showed capsule in rectosigmoid colon. cleared by GI for discharge and to follow up as OPT   Care D/W the cardiology. Recommended to cont Plavix. Can stop ASA  Colonoscopy showed large amount of maroon colored blood throughout the colon and small intestine. CTA colon was negative. No active bleeding   S/P total 3 units of PRBC'S during this admission. H/H stable now.  S/P total 600 mg of Venofer.   No need for thoracentesis per Pulm - continue diuresis with Lasix PO  Low salt diet and water restriction to 1.5 L/C.   Can D/C to SNF today   remove midline   replete potassium         * Med rec reviewed. Plan of care D/W the pt and his wife on bedside. spent 35 min on discharge

## 2019-11-30 NOTE — PHARMACOTHERAPY INTERVENTION NOTE - COMMENTS
Patient takes amiodarone 200mg po q12h at home and will continue this frequency when discharged as per discharge note;  however, he has been on amiodarone q24h while in hospital.  I spoke to Dr Perea to clarify frequency.  He will evaluate and adjust if necessary.

## 2019-12-22 NOTE — ASSESSMENT
[FreeTextEntry1] : 85 yo male patient with multiple medical problems, taken to the OR on October 22, 2019 with the diagnosis of mesenteric ischemia. he was found to have necrotic small bowel, 2.5 feet, resected and primary anastomosis was performed. Dr. Layton from vascular surgery performed left iliac artery to SMA bypass. he did well postoperatively. He was discharged to a nursing home and readmitted with CHF and bilateral pleural effusion. Postop GI bleeding secondary to gastric ulcer, controlled by GI. He has small wound dehiscence that has been treated with local wound care. He comes today for followup, some abdominal discomfort and he was started on cephalexin for wound infection. 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 October 22, 2019.\par \par Postop gastric ulcer with GI bleeding, controlled with clips by GI.\par Continue local wound care. Start Bactrim and Doxycycline x 7 days.\par Followup with Dr. Zarco, cardiologist and GI.\par \par Return in a week to reassess wound.\par Encouraged PO intake and ambulation.\par \par All the questions were answered to their satisfaction and I encouraged the patient to call my office at anytime if they had any questions. Plan of care fully discussed with the patient.

## 2019-12-22 NOTE — CONSULT LETTER
[Courtesy Letter:] : I had the pleasure of seeing your patient, [unfilled], in my office today. [Dear  ___] : Dear  [unfilled], [Referral Closing:] : Thank you very much for seeing this patient.  If you have any questions, please do not hesitate to contact me. [Sincerely,] : Sincerely, [DrOmega  ___] : Dr. BOYLE [DrOmega ___] : Dr. BOYLE

## 2019-12-22 NOTE — PHYSICAL EXAM
[Normal] : supple, no neck mass and thyroid not enlarged [Normal Neck Lymph Nodes] : normal neck lymph nodes  [Normal Supraclavicular Lymph Nodes] : normal supraclavicular lymph nodes [Normal] : oriented to person, place and time, with appropriate affect [de-identified] : abdominal wound intact, lower third with open wound 4 x 2 cm, purulent drainage, culture sent. wound irrigated and packed.

## 2019-12-22 NOTE — HISTORY OF PRESENT ILLNESS
[de-identified] : 85 yo male patient with multiple medical problems, taken to the OR on October 22, 2019 with the diagnosis of mesenteric ischemia. he was found to have necrotic small bowel, 2.5 feet, resected and primary anastomosis was performed. Dr. Layton from vascular surgery performed left iliac artery to SMA bypass. he did well postoperatively. He was discharged to a nursing home and readmitted with CHF and bilateral pleural effusion. Postop GI bleeding secondary to gastric ulcer, controlled by GI. He has small wound dehiscence that has been treated with local wound care. He comes today for followup, some abdominal discomfort and he was started on cephalexin for wound infection. Decreased appetite, no N/V.

## 2019-12-23 NOTE — ASSESSMENT
[FreeTextEntry1] : 83 yo male patient with multiple medical problems, taken to the OR on October 22, 2019 with the diagnosis of mesenteric ischemia. he was found to have necrotic small bowel, 2.5 feet, resected and primary anastomosis was performed. Dr. Layton from vascular surgery performed left iliac artery to SMA bypass. he did well postoperatively. He was discharged to a nursing home and readmitted with CHF and bilateral pleural effusion. Postop GI bleeding secondary to gastric ulcer, controlled by GI. He has small wound dehiscence that has been treated with local wound care. He comes today for followup, some abdominal discomfort and he was started on Bactrim and Clindamycin for wound infection, S. aureus.. Decreased appetite, no N/V. 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 October 22, 2019.\par \par Postop gastric ulcer with GI bleeding, controlled with clips by GI.\par Continue local wound care. Start Bactrim and Clindamycin x 7 days.\par Nystatin swish and swallow recommended.\par \par Followup with Dr. Zarco, cardiologist and GI.\par Return in a week to reassess wound.\par Encouraged PO intake and ambulation.\par \par All the questions were answered to their satisfaction and I encouraged the patient to call my office at anytime if they had any questions. Plan of care fully discussed with the patient.

## 2019-12-23 NOTE — ASSESSMENT
[FreeTextEntry1] : 85 yo male patient with multiple medical problems, taken to the OR on October 22, 2019 with the diagnosis of mesenteric ischemia. he was found to have necrotic small bowel, 2.5 feet, resected and primary anastomosis was performed. Dr. Layton from vascular surgery performed left iliac artery to SMA bypass. he did well postoperatively. He was discharged to a nursing home and readmitted with CHF and bilateral pleural effusion. Postop GI bleeding secondary to gastric ulcer, controlled by GI. He has small wound dehiscence that has been treated with local wound care. He comes today for followup, some abdominal discomfort and he was started on Bactrim and Clindamycin for wound infection, S. aureus.. Decreased appetite but eating better this week. 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 October 22, 2019.\par \par Postop gastric ulcer with GI bleeding, controlled with clips by GI.\par Continue local wound care. Start Bactrim and Clindamycin until tomorrow. Continue doxycycline 100 mg, PO, BID for 7 days.\par \par Continue Nystatin swish and swallow recommended.\par \par Followup with Dr. Zarco, cardiologist and GI.\par Return in a week to reassess wound.\par Encouraged PO intake and ambulation.\par \par All the questions were answered to their satisfaction and I encouraged the patient to call my office at anytime if they had any questions. Plan of care fully discussed with the patient.

## 2019-12-23 NOTE — HISTORY OF PRESENT ILLNESS
[de-identified] : 85 yo male patient with multiple medical problems, taken to the OR on October 22, 2019 with the diagnosis of mesenteric ischemia. he was found to have necrotic small bowel, 2.5 feet, resected and primary anastomosis was performed. Dr. Layton from vascular surgery performed left iliac artery to SMA bypass. he did well postoperatively. He was discharged to a nursing home and readmitted with CHF and bilateral pleural effusion. Postop GI bleeding secondary to gastric ulcer, controlled by GI. He has small wound dehiscence that has been treated with local wound care. He comes today for followup, some abdominal discomfort and he was started on Bactrim and Clindamycin for wound infection, S. aureus.. Decreased appetite but eating better this week. Dysphagia improved and refers less pain in the abdomen, no N/V..

## 2019-12-23 NOTE — CONSULT LETTER
[Dear  ___] : Dear  [unfilled], [Courtesy Letter:] : I had the pleasure of seeing your patient, [unfilled], in my office today. [Referral Closing:] : Thank you very much for seeing this patient.  If you have any questions, please do not hesitate to contact me. [Sincerely,] : Sincerely, [DrOmega  ___] : Dr. BOYLE [DrOmega ___] : Dr. BOYLE

## 2019-12-23 NOTE — PHYSICAL EXAM
[Normal] : supple, no neck mass and thyroid not enlarged [Normal Neck Lymph Nodes] : normal neck lymph nodes  [Normal Supraclavicular Lymph Nodes] : normal supraclavicular lymph nodes [Normal] : oriented to person, place and time, with appropriate affect [de-identified] : abdominal wound intact, lower third with open wound 3 x 2 cm, purulent drainage, culture sent. wound irrigated and packed.

## 2019-12-23 NOTE — PHYSICAL EXAM
[Normal] : supple, no neck mass and thyroid not enlarged [Normal Neck Lymph Nodes] : normal neck lymph nodes  [Normal Supraclavicular Lymph Nodes] : normal supraclavicular lymph nodes [Normal] : oriented to person, place and time, with appropriate affect [de-identified] : abdominal wound intact, lower third with open wound 2 x 2 cm, purulent drainage. Wound irrigated and packed.

## 2019-12-23 NOTE — HISTORY OF PRESENT ILLNESS
[de-identified] : 85 yo male patient with multiple medical problems, taken to the OR on October 22, 2019 with the diagnosis of mesenteric ischemia. he was found to have necrotic small bowel, 2.5 feet, resected and primary anastomosis was performed. Dr. Layton from vascular surgery performed left iliac artery to SMA bypass. he did well postoperatively. He was discharged to a nursing home and readmitted with CHF and bilateral pleural effusion. Postop GI bleeding secondary to gastric ulcer, controlled by GI. He has small wound dehiscence that has been treated with local wound care. He comes today for followup, some abdominal discomfort and he was started on Bactrim and Clindamycin for wound infection, S. aureus.. Decreased appetite, no N/V. refers some degree of dysphagia.

## 2019-12-23 NOTE — CONSULT LETTER
[Dear  ___] : Dear  [unfilled], [Courtesy Letter:] : I had the pleasure of seeing your patient, [unfilled], in my office today. [Referral Closing:] : Thank you very much for seeing this patient.  If you have any questions, please do not hesitate to contact me. [Sincerely,] : Sincerely, [DrOmega  ___] : Dr. BOYEL [DrOmega ___] : Dr. BOYLE

## 2020-01-01 ENCOUNTER — TRANSCRIPTION ENCOUNTER (OUTPATIENT)
Age: 85
End: 2020-01-01

## 2020-01-01 ENCOUNTER — OUTPATIENT (OUTPATIENT)
Dept: OUTPATIENT SERVICES | Facility: HOSPITAL | Age: 85
LOS: 1 days | Discharge: HOME | End: 2020-01-01
Payer: MEDICARE

## 2020-01-01 ENCOUNTER — RESULT REVIEW (OUTPATIENT)
Age: 85
End: 2020-01-01

## 2020-01-01 ENCOUNTER — LABORATORY RESULT (OUTPATIENT)
Age: 85
End: 2020-01-01

## 2020-01-01 ENCOUNTER — APPOINTMENT (OUTPATIENT)
Dept: GASTROENTEROLOGY | Facility: CLINIC | Age: 85
End: 2020-01-01

## 2020-01-01 ENCOUNTER — APPOINTMENT (OUTPATIENT)
Dept: SURGERY | Facility: CLINIC | Age: 85
End: 2020-01-01
Payer: MEDICARE

## 2020-01-01 ENCOUNTER — OUTPATIENT (OUTPATIENT)
Dept: OUTPATIENT SERVICES | Facility: HOSPITAL | Age: 85
LOS: 1 days | End: 2020-01-01

## 2020-01-01 ENCOUNTER — FORM ENCOUNTER (OUTPATIENT)
Age: 85
End: 2020-01-01

## 2020-01-01 ENCOUNTER — APPOINTMENT (OUTPATIENT)
Dept: CARDIOTHORACIC SURGERY | Facility: CLINIC | Age: 85
End: 2020-01-01
Payer: MEDICARE

## 2020-01-01 ENCOUNTER — OUTPATIENT (OUTPATIENT)
Dept: OUTPATIENT SERVICES | Facility: HOSPITAL | Age: 85
LOS: 1 days | Discharge: HOME | End: 2020-01-01

## 2020-01-01 VITALS
HEIGHT: 68 IN | RESPIRATION RATE: 18 BRPM | HEART RATE: 68 BPM | WEIGHT: 177.03 LBS | DIASTOLIC BLOOD PRESSURE: 72 MMHG | TEMPERATURE: 99 F | SYSTOLIC BLOOD PRESSURE: 138 MMHG

## 2020-01-01 VITALS
SYSTOLIC BLOOD PRESSURE: 111 MMHG | HEART RATE: 78 BPM | OXYGEN SATURATION: 98 % | HEIGHT: 68 IN | TEMPERATURE: 97.9 F | DIASTOLIC BLOOD PRESSURE: 77 MMHG

## 2020-01-01 VITALS
WEIGHT: 180 LBS | DIASTOLIC BLOOD PRESSURE: 75 MMHG | HEART RATE: 73 BPM | SYSTOLIC BLOOD PRESSURE: 116 MMHG | BODY MASS INDEX: 27.28 KG/M2 | HEIGHT: 68 IN | TEMPERATURE: 97.8 F

## 2020-01-01 VITALS
BODY MASS INDEX: 26.83 KG/M2 | HEART RATE: 99 BPM | WEIGHT: 177 LBS | OXYGEN SATURATION: 91 % | TEMPERATURE: 97.2 F | SYSTOLIC BLOOD PRESSURE: 120 MMHG | HEIGHT: 68 IN | DIASTOLIC BLOOD PRESSURE: 70 MMHG

## 2020-01-01 VITALS
TEMPERATURE: 97.8 F | SYSTOLIC BLOOD PRESSURE: 119 MMHG | HEART RATE: 76 BPM | WEIGHT: 176 LBS | HEIGHT: 68 IN | DIASTOLIC BLOOD PRESSURE: 69 MMHG | BODY MASS INDEX: 26.67 KG/M2

## 2020-01-01 VITALS — HEART RATE: 61 BPM | SYSTOLIC BLOOD PRESSURE: 144 MMHG | DIASTOLIC BLOOD PRESSURE: 72 MMHG | RESPIRATION RATE: 18 BRPM

## 2020-01-01 VITALS
OXYGEN SATURATION: 94 % | DIASTOLIC BLOOD PRESSURE: 68 MMHG | HEART RATE: 92 BPM | SYSTOLIC BLOOD PRESSURE: 115 MMHG | RESPIRATION RATE: 14 BRPM | TEMPERATURE: 98.2 F | HEIGHT: 68 IN | WEIGHT: 178 LBS | BODY MASS INDEX: 26.98 KG/M2

## 2020-01-01 DIAGNOSIS — Z98.890 OTHER SPECIFIED POSTPROCEDURAL STATES: Chronic | ICD-10-CM

## 2020-01-01 DIAGNOSIS — J90 PLEURAL EFFUSION, NOT ELSEWHERE CLASSIFIED: ICD-10-CM

## 2020-01-01 DIAGNOSIS — Z87.19 PERSONAL HISTORY OF OTHER DISEASES OF THE DIGESTIVE SYSTEM: Chronic | ICD-10-CM

## 2020-01-01 DIAGNOSIS — K22.10 ULCER OF ESOPHAGUS WITHOUT BLEEDING: ICD-10-CM

## 2020-01-01 DIAGNOSIS — Z78.9 OTHER SPECIFIED HEALTH STATUS: ICD-10-CM

## 2020-01-01 DIAGNOSIS — R10.9 UNSPECIFIED ABDOMINAL PAIN: ICD-10-CM

## 2020-01-01 DIAGNOSIS — Z90.49 ACQUIRED ABSENCE OF OTHER SPECIFIED PARTS OF DIGESTIVE TRACT: Chronic | ICD-10-CM

## 2020-01-01 DIAGNOSIS — A69.20 LYME DISEASE, UNSPECIFIED: ICD-10-CM

## 2020-01-01 DIAGNOSIS — L03.311 CELLULITIS OF ABDOMINAL WALL: ICD-10-CM

## 2020-01-01 DIAGNOSIS — Z11.59 ENCOUNTER FOR SCREENING FOR OTHER VIRAL DISEASES: ICD-10-CM

## 2020-01-01 DIAGNOSIS — Z01.818 ENCOUNTER FOR OTHER PREPROCEDURAL EXAMINATION: ICD-10-CM

## 2020-01-01 DIAGNOSIS — R91.8 OTHER NONSPECIFIC ABNORMAL FINDING OF LUNG FIELD: ICD-10-CM

## 2020-01-01 DIAGNOSIS — K29.50 UNSPECIFIED CHRONIC GASTRITIS WITHOUT BLEEDING: ICD-10-CM

## 2020-01-01 DIAGNOSIS — G45.9 TRANSIENT CEREBRAL ISCHEMIC ATTACK, UNSPECIFIED: ICD-10-CM

## 2020-01-01 DIAGNOSIS — I25.10 ATHEROSCLEROTIC HEART DISEASE OF NATIVE CORONARY ARTERY WITHOUT ANGINA PECTORIS: ICD-10-CM

## 2020-01-01 DIAGNOSIS — Z79.02 LONG TERM (CURRENT) USE OF ANTITHROMBOTICS/ANTIPLATELETS: ICD-10-CM

## 2020-01-01 DIAGNOSIS — E03.9 HYPOTHYROIDISM, UNSPECIFIED: ICD-10-CM

## 2020-01-01 DIAGNOSIS — Z86.19 PERSONAL HISTORY OF OTHER INFECTIOUS AND PARASITIC DISEASES: ICD-10-CM

## 2020-01-01 DIAGNOSIS — R06.00 DYSPNEA, UNSPECIFIED: ICD-10-CM

## 2020-01-01 DIAGNOSIS — K22.11 ULCER OF ESOPHAGUS WITH BLEEDING: ICD-10-CM

## 2020-01-01 DIAGNOSIS — Z86.69 PERSONAL HISTORY OF OTHER DISEASES OF THE NERVOUS SYSTEM AND SENSE ORGANS: ICD-10-CM

## 2020-01-01 DIAGNOSIS — K20.9 ESOPHAGITIS, UNSPECIFIED: ICD-10-CM

## 2020-01-01 DIAGNOSIS — G62.9 POLYNEUROPATHY, UNSPECIFIED: ICD-10-CM

## 2020-01-01 DIAGNOSIS — I65.22 OCCLUSION AND STENOSIS OF LEFT CAROTID ARTERY: ICD-10-CM

## 2020-01-01 DIAGNOSIS — K55.059 ACUTE (REVERSIBLE) ISCHEMIA OF INTESTINE, PART AND EXTENT UNSPECIFIED: ICD-10-CM

## 2020-01-01 DIAGNOSIS — Z86.39 PERSONAL HISTORY OF OTHER ENDOCRINE, NUTRITIONAL AND METABOLIC DISEASE: ICD-10-CM

## 2020-01-01 DIAGNOSIS — Z86.73 PERSONAL HISTORY OF TRANSIENT ISCHEMIC ATTACK (TIA), AND CEREBRAL INFARCTION WITHOUT RESIDUAL DEFICITS: ICD-10-CM

## 2020-01-01 DIAGNOSIS — Z86.79 PERSONAL HISTORY OF OTHER DISEASES OF THE CIRCULATORY SYSTEM: ICD-10-CM

## 2020-01-01 DIAGNOSIS — K29.80 DUODENITIS WITHOUT BLEEDING: ICD-10-CM

## 2020-01-01 LAB
AMYLASE FLD-CCNC: 45 U/L — SIGNIFICANT CHANGE UP
B PERT IGG+IGM PNL SER: ABNORMAL
COLOR FLD: YELLOW — SIGNIFICANT CHANGE UP
CULTURE RESULTS: SIGNIFICANT CHANGE UP
CULTURE RESULTS: SIGNIFICANT CHANGE UP
FLUID INTAKE SUBSTANCE CLASS: SIGNIFICANT CHANGE UP
FLUID SEGMENTED GRANULOCYTES: SIGNIFICANT CHANGE UP %
GLUCOSE BLDC GLUCOMTR-MCNC: 94 MG/DL — SIGNIFICANT CHANGE UP (ref 70–99)
GLUCOSE FLD-MCNC: 109 MG/DL — SIGNIFICANT CHANGE UP
GRAM STN FLD: SIGNIFICANT CHANGE UP
LDH SERPL L TO P-CCNC: 160 U/L — SIGNIFICANT CHANGE UP
LYMPHOCYTES # FLD: SIGNIFICANT CHANGE UP
MESOTHL CELL # FLD: SIGNIFICANT CHANGE UP %
MONOS+MACROS # FLD: SIGNIFICANT CHANGE UP %
NIGHT BLUE STAIN TISS: SIGNIFICANT CHANGE UP
NON-GYNECOLOGICAL CYTOLOGY STUDY: SIGNIFICANT CHANGE UP
PROT FLD-MCNC: 4.3 G/DL — SIGNIFICANT CHANGE UP
RCV VOL RI: 2000 /UL — HIGH (ref 0–0)
SARS-COV-2 RNA SPEC QL NAA+PROBE: SIGNIFICANT CHANGE UP
SPECIMEN SOURCE FLD: SIGNIFICANT CHANGE UP
SPECIMEN SOURCE: SIGNIFICANT CHANGE UP
SURGICAL PATHOLOGY STUDY: SIGNIFICANT CHANGE UP
TOTAL NUCLEATED CELL COUNT, BODY FLUID: 306 /UL — SIGNIFICANT CHANGE UP
TUBE TYPE: SIGNIFICANT CHANGE UP

## 2020-01-01 PROCEDURE — 99204 OFFICE O/P NEW MOD 45 MIN: CPT

## 2020-01-01 PROCEDURE — 88341 IMHCHEM/IMCYTCHM EA ADD ANTB: CPT | Mod: 26

## 2020-01-01 PROCEDURE — 88344 IMHCHEM/IMCYTCHM EA MLT ANTB: CPT | Mod: 26

## 2020-01-01 PROCEDURE — 78815 PET IMAGE W/CT SKULL-THIGH: CPT | Mod: 26,PI

## 2020-01-01 PROCEDURE — 71045 X-RAY EXAM CHEST 1 VIEW: CPT | Mod: 26

## 2020-01-01 PROCEDURE — 88312 SPECIAL STAINS GROUP 1: CPT | Mod: 26

## 2020-01-01 PROCEDURE — 99213 OFFICE O/P EST LOW 20 MIN: CPT

## 2020-01-01 PROCEDURE — 71260 CT THORAX DX C+: CPT | Mod: 26,CS

## 2020-01-01 PROCEDURE — 88112 CYTOPATH CELL ENHANCE TECH: CPT | Mod: 26

## 2020-01-01 PROCEDURE — 32555 ASPIRATE PLEURA W/ IMAGING: CPT | Mod: LT

## 2020-01-01 PROCEDURE — 99024 POSTOP FOLLOW-UP VISIT: CPT

## 2020-01-01 PROCEDURE — 88305 TISSUE EXAM BY PATHOLOGIST: CPT | Mod: 26

## 2020-01-01 PROCEDURE — 88342 IMHCHEM/IMCYTCHM 1ST ANTB: CPT | Mod: 26,59

## 2020-01-01 PROCEDURE — 99214 OFFICE O/P EST MOD 30 MIN: CPT

## 2020-01-01 PROCEDURE — 74177 CT ABD & PELVIS W/CONTRAST: CPT | Mod: 26

## 2020-01-01 RX ORDER — PREDNISONE 50 MG/1
50 TABLET ORAL
Qty: 3 | Refills: 0 | Status: ACTIVE | COMMUNITY
Start: 2020-01-01 | End: 1900-01-01

## 2020-01-01 RX ORDER — ROSUVASTATIN CALCIUM 5 MG/1
1 TABLET ORAL
Qty: 0 | Refills: 0 | DISCHARGE

## 2020-01-01 RX ORDER — LISINOPRIL 20 MG/1
20 TABLET ORAL
Refills: 0 | Status: ACTIVE | COMMUNITY

## 2020-01-01 RX ORDER — OMEPRAZOLE 20 MG/1
20 CAPSULE, DELAYED RELEASE ORAL
Refills: 0 | Status: ACTIVE | COMMUNITY

## 2020-01-01 RX ORDER — PANTOPRAZOLE SODIUM 20 MG/1
1 TABLET, DELAYED RELEASE ORAL
Qty: 0 | Refills: 0 | DISCHARGE

## 2020-01-01 RX ORDER — AMIODARONE HYDROCHLORIDE 400 MG/1
TABLET ORAL
Refills: 0 | Status: ACTIVE | COMMUNITY

## 2020-01-01 RX ORDER — METOPROLOL TARTRATE 25 MG/1
25 TABLET, FILM COATED ORAL
Refills: 0 | Status: ACTIVE | COMMUNITY

## 2020-01-01 RX ORDER — ROSUVASTATIN CALCIUM 20 MG/1
20 TABLET, FILM COATED ORAL
Refills: 0 | Status: ACTIVE | COMMUNITY

## 2020-01-01 RX ORDER — CLOPIDOGREL 75 MG/1
75 TABLET, FILM COATED ORAL
Refills: 0 | Status: ACTIVE | COMMUNITY

## 2020-01-01 RX ORDER — THYROID 120 MG
1 TABLET ORAL
Qty: 0 | Refills: 0 | DISCHARGE

## 2020-01-01 RX ORDER — LISINOPRIL 2.5 MG/1
1 TABLET ORAL
Qty: 0 | Refills: 0 | DISCHARGE

## 2020-01-01 RX ORDER — TAMSULOSIN HCL 0.4 MG
0.4 CAPSULE ORAL
Refills: 0 | Status: ACTIVE | COMMUNITY

## 2020-01-01 NOTE — HISTORY OF PRESENT ILLNESS
[de-identified] : 85 yo male patient with multiple medical problems, taken to the OR on October 22, 2019 with the diagnosis of mesenteric ischemia. he was found to have necrotic small bowel, 2.5 feet, resected and primary anastomosis was performed. Dr. Layton from vascular surgery performed left iliac artery to SMA bypass. he did well postoperatively. He was discharged to a nursing home and readmitted with CHF and bilateral pleural effusion. Postop GI bleeding secondary to gastric ulcer, controlled by GI. He has small wound dehiscence that has been treated with local wound care. He comes today for followup, some abdominal discomfort and he was started on Bactrim and Clindamycin / doxycycline for wound infection, S. aureus.. Decreased appetite but eating better this week. Dysphagia improved and refers less pain in the abdomen, no N/V.

## 2020-01-01 NOTE — PHYSICAL EXAM
[Normal] : supple, no neck mass and thyroid not enlarged [Normal Neck Lymph Nodes] : normal neck lymph nodes  [Normal Supraclavicular Lymph Nodes] : normal supraclavicular lymph nodes [Normal] : oriented to person, place and time, with appropriate affect [de-identified] : abdominal wound intact, lower third with open wound 1 x 2 cm, purulent drainage. Wound irrigated and packed. Better, culture sent.

## 2020-01-01 NOTE — ASSESSMENT
[FreeTextEntry1] : 85 yo male patient with multiple medical problems, taken to the OR on October 22, 2019 with the diagnosis of mesenteric ischemia. he was found to have necrotic small bowel, 2.5 feet, resected and primary anastomosis was performed. Dr. Layton from vascular surgery performed left iliac artery to SMA bypass. he did well postoperatively. He was discharged to a nursing home and readmitted with CHF and bilateral pleural effusion. Postop GI bleeding secondary to gastric ulcer, controlled by GI. He has small wound dehiscence that has been treated with local wound care. He comes today for followup, some abdominal discomfort and he was started on Bactrim and Clindamycin / doxycycline for wound infection, S. aureus.. Decreased appetite but eating better this week. Dysphagia improved and refers less pain in the abdomen, no N/V. 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 October 22, 2019.\par \par Postop gastric ulcer with GI bleeding, controlled with clips by GI.\par Continue local wound care. finished course of Bactrim and Clindamycin and another week on Doxycycline. Continue doxycycline 100 mg, PO, BID for 1 more day.. Culture sent today.\par \par Continue Nystatin swish and swallow recommended.\par \par Followup with Dr. Zarco, cardiologist and GI.\par Return in 2 weeks to reassess wound.\par Encouraged PO intake and ambulation.\par \par All the questions were answered to their satisfaction and I encouraged the patient to call my office at anytime if they had any questions. Plan of care fully discussed with the patient.

## 2020-01-11 NOTE — CONSULT LETTER
[Dear  ___] : Dear  [unfilled], [Courtesy Letter:] : I had the pleasure of seeing your patient, [unfilled], in my office today. [Sincerely,] : Sincerely, [Referral Closing:] : Thank you very much for seeing this patient.  If you have any questions, please do not hesitate to contact me. [DrOmega  ___] : Dr. BOYLE [DrOmega ___] : Dr. BOYLE

## 2020-01-20 NOTE — ASSESSMENT
[FreeTextEntry1] : 85 yo male patient with multiple medical problems, taken to the OR on October 22, 2019 with the diagnosis of mesenteric ischemia. he was found to have necrotic small bowel, 2.5 feet, resected and primary anastomosis was performed. Dr. Layton from vascular surgery performed left iliac artery to SMA bypass. he did well postoperatively. He was discharged to a nursing home and readmitted with CHF and bilateral pleural effusion. Postop GI bleeding secondary to gastric ulcer, controlled by GI. He has small wound dehiscence that has been treated with local wound care. \par \par He comes today for followup, feels better, less abdominal discomfort. His appetite is coming back and he is walking with a cane. No N/V. Undergoing local wound care. 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 October 22, 2019.\par \par Postop gastric ulcer with GI bleeding, controlled with clips by GI. On PPIs. Better.\par Continue local wound care. wound was opened more and packed open. Cultures sent.\par \par Followup with Dr. Zarco, cardiologist and GI.\par Return in 2 weeks to reassess wound.\par Encouraged PO intake and ambulation.\par \par All the questions were answered to their satisfaction and I encouraged the patient to call my office at anytime if they had any questions. Plan of care fully discussed with the patient.

## 2020-01-20 NOTE — PHYSICAL EXAM
[Normal] : supple, no neck mass and thyroid not enlarged [Normal Supraclavicular Lymph Nodes] : normal supraclavicular lymph nodes [Normal Neck Lymph Nodes] : normal neck lymph nodes  [Normal] : grossly intact [de-identified] : abdominal wound intact, lower third with open wound 1 x 2 cm, opened more to 4 x 2 cm less purulent drainage. Wound irrigated and packed. Better, culture sent.

## 2020-01-20 NOTE — HISTORY OF PRESENT ILLNESS
[de-identified] : 83 yo male patient with multiple medical problems, taken to the OR on October 22, 2019 with the diagnosis of mesenteric ischemia. he was found to have necrotic small bowel, 2.5 feet, resected and primary anastomosis was performed. Dr. Layton from vascular surgery performed left iliac artery to SMA bypass. he did well postoperatively. He was discharged to a nursing home and readmitted with CHF and bilateral pleural effusion. Postop GI bleeding secondary to gastric ulcer, controlled by GI. He has small wound dehiscence that has been treated with local wound care. \par \par He comes today for followup, feels better, less abdominal discomfort. His appetite is coming back and he is walking with a cane. No N/V. Undergoing local wound care. Ct scan showed collection in the abdominal wall above the area of packing.

## 2020-01-28 NOTE — PHYSICAL EXAM
[Normal] : supple, no neck mass and thyroid not enlarged [Normal Neck Lymph Nodes] : normal neck lymph nodes  [Normal Supraclavicular Lymph Nodes] : normal supraclavicular lymph nodes [Normal] : oriented to person, place and time, with appropriate affect [de-identified] : abdominal wound intact, lower third with open wound 1 x 2 cm, opened more to 4 x 2 cm less purulent drainage. Wound irrigated and packed. Better, culture sent.

## 2020-01-28 NOTE — HISTORY OF PRESENT ILLNESS
[de-identified] : 84 yo male patient with multiple medical problems, taken to the OR on October 22, 2019 with the diagnosis of mesenteric ischemia. he was found to have necrotic small bowel, 2.5 feet, resected and primary anastomosis was performed. Dr. Layton from vascular surgery performed left iliac artery to SMA bypass. he did well postoperatively. He was discharged to a nursing home and readmitted with CHF and bilateral pleural effusion. Postop GI bleeding secondary to gastric ulcer, controlled by GI. He has small wound dehiscence that has been treated with local wound care. \par \par He comes today for followup, feels better, less abdominal discomfort. His appetite is coming back and he is walking with a cane. No N/V. Undergoing local wound care and the wound is closing and healing nicely. He is going to have an EGD next week by Dr. Daugherty.

## 2020-01-28 NOTE — ASSESSMENT
[FreeTextEntry1] : 84 yo male patient with multiple medical problems, taken to the OR on October 22, 2019 with the diagnosis of mesenteric ischemia. he was found to have necrotic small bowel, 2.5 feet, resected and primary anastomosis was performed. Dr. Layton from vascular surgery performed left iliac artery to SMA bypass. he did well postoperatively. He was discharged to a nursing home and readmitted with CHF and bilateral pleural effusion. Postop GI bleeding secondary to gastric ulcer, controlled by GI. He has small wound dehiscence that has been treated with local wound care. \par \par He comes today for followup, feels better, less abdominal discomfort. His appetite is coming back and he is walking with a cane. No N/V. Undergoing local wound care and the wound is closing and healing nicely. He is going to have an EGD next week by Dr. 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 October 22, 2019.\par \par Postop gastric ulcer with GI bleeding, controlled with clips by GI. On PPIs. Still with epigastric pain. EGD by Dr. Dat next week.\par \par Continue local wound care. wound closing and healing nicely.\par \par Followup with Dr. Zarco, cardiologist and GI.\par Return in 2-3 weeks to reassess wound.\par Encouraged PO intake and ambulation.\par \par All the questions were answered to their satisfaction and I encouraged the patient to call my office at anytime if they had any questions. Plan of care fully discussed with the patient.

## 2020-02-12 PROBLEM — R10.9 ABDOMINAL PAIN: Status: ACTIVE | Noted: 2019-01-01

## 2020-02-12 PROBLEM — L03.311 CELLULITIS OF ABDOMINAL WALL: Status: ACTIVE | Noted: 2019-01-01

## 2020-02-12 NOTE — CONSULT LETTER
[Dear  ___] : Dear  [unfilled], [Referral Closing:] : Thank you very much for seeing this patient.  If you have any questions, please do not hesitate to contact me. [Courtesy Letter:] : I had the pleasure of seeing your patient, [unfilled], in my office today. [Sincerely,] : Sincerely, [DrOmega  ___] : Dr. BOYLE [DrOmega ___] : Dr. BOYLE

## 2020-02-25 NOTE — PHYSICAL EXAM
[Normal] : supple, no neck mass and thyroid not enlarged [Normal Neck Lymph Nodes] : normal neck lymph nodes  [Normal Supraclavicular Lymph Nodes] : normal supraclavicular lymph nodes [Normal] : grossly intact [de-identified] : abdominal wound close, no cellulitis, no drainage.

## 2020-02-25 NOTE — ASSESSMENT
[FreeTextEntry1] : 86 yo male patient with multiple medical problems, taken to the OR on October 22, 2019 with the diagnosis of mesenteric ischemia. he was found to have necrotic small bowel, 2.5 feet, resected and primary anastomosis was performed. Dr. Layton from vascular surgery performed left iliac artery to SMA bypass. he did well postoperatively. He was discharged to a nursing home and readmitted with CHF and bilateral pleural effusion. Postop GI bleeding secondary to gastric ulcer, controlled by GI. He has small wound dehiscence that has been treated with local wound care. \par \par He comes today for followup, feels better, less abdominal discomfort. His appetite is coming back and he is walking with a cane. No N/V. Undergoing local wound care and the wound is close. He underwent EGD last week by Dr. Daugherty and they found gastritis, clip in the fundus and small ulceration. 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 October 22, 2019.\par \par Postop gastric ulcer with GI bleeding, controlled with clips by GI. On PPIs. Still with epigastric pain. EGD by Omega Evans next week.\par \par Continue local wound care. wound closing and healing nicely.\par \par Followup with Dr. Zarco, cardiologist and GI.\par Return in 2-3 weeks to reassess wound.\par Encouraged PO intake and ambulation.\par \par All the questions were answered to their satisfaction and I encouraged the patient to call my office at anytime if they had any questions. Plan of care fully discussed with the patient.

## 2020-02-25 NOTE — HISTORY OF PRESENT ILLNESS
[de-identified] : 86 yo male patient with multiple medical problems, taken to the OR on October 22, 2019 with the diagnosis of mesenteric ischemia. he was found to have necrotic small bowel, 2.5 feet, resected and primary anastomosis was performed. Dr. Layton from vascular surgery performed left iliac artery to SMA bypass. he did well postoperatively. He was discharged to a nursing home and readmitted with CHF and bilateral pleural effusion. Postop GI bleeding secondary to gastric ulcer, controlled by GI. He has small wound dehiscence that has been treated with local wound care. \par \par He comes today for followup, feels better, less abdominal discomfort. His appetite is coming back and he is walking with a cane. No N/V. Undergoing local wound care and the wound is close. He underwent EGD last week by Dr. Daugherty and they found gastritis, clip in the fundus and small ulceration. He feels stronger and better.

## 2020-05-21 NOTE — PROGRESS NOTE ADULT - SUBJECTIVE AND OBJECTIVE BOX
PREOPERATIVE DAY OF PROCEDURE EVALUATION:     I have personally seen and examined this patient. I agree with the history and physical which I have reviewed and noted any changes below:     Plan is for image guided thoracentesis today 5/21    Procedure/ risks/ benefits/ goals/ alternatives were explained. All questions answered. Informed content obtained from patient. Consent placed in chart.         Interventional Radiology Brief- Operative Note    Procedure: left sided image guided thoracentesis     Pre-Op Diagnosis: left pleural effusion    Post-Op Diagnosis: same     Performing Provider: TROY Guzman    Anesthesia (type):  [ ] General Anesthesia  [ ] Sedation  [ ] Spinal Anesthesia  [ X ] Local/Regional    Contrast: none    Estimated Blood Loss: <5mL    Condition:   [ ] Critical  [ ] Serious  [ ] Fair   [ x ] Good    Findings/Follow up Plan of Care: 1.32L of serous fluid removed     Specimens Removed: 120cc specimen removed and sent to lab     Implants: none     Complications: none     Condition/Disposition: STAT bedside CXR, if no PTX, can d/c home       Please call Interventional Radiology [phone] with any questions, concerns, or issues.

## 2020-06-04 PROBLEM — G45.9 TIA (TRANSIENT ISCHEMIC ATTACK): Status: RESOLVED | Noted: 2020-01-01 | Resolved: 2020-01-01

## 2020-06-04 PROBLEM — Z86.79 HISTORY OF CORONARY ARTERY DISEASE: Status: RESOLVED | Noted: 2020-01-01 | Resolved: 2020-01-01

## 2020-06-04 PROBLEM — Z86.69 HISTORY OF NEUROPATHY: Status: RESOLVED | Noted: 2020-01-01 | Resolved: 2020-01-01

## 2020-06-04 PROBLEM — J90 PLEURAL EFFUSION: Status: ACTIVE | Noted: 2020-01-01

## 2020-06-04 PROBLEM — Z86.39 HISTORY OF HYPOTHYROIDISM: Status: RESOLVED | Noted: 2020-01-01 | Resolved: 2020-01-01

## 2020-06-04 PROBLEM — Z86.19 HISTORY OF LYME DISEASE: Status: RESOLVED | Noted: 2020-01-01 | Resolved: 2020-01-01

## 2020-06-04 PROBLEM — I65.22 STENOSIS OF LEFT CAROTID ARTERY: Status: RESOLVED | Noted: 2020-01-01 | Resolved: 2020-01-01

## 2020-06-04 PROBLEM — Z78.9 DRINKS WINE: Status: ACTIVE | Noted: 2020-01-01

## 2020-06-06 NOTE — ASSESSMENT
[FreeTextEntry1] : Mr. Weldon is an 84 y/o male that arrives today for consultation for his lung nodule. PMH of CAD s/p N-STEMI Oct 1 w/ pci and stent x 3, HTN ,DLD, Hypothyroidism. A follow up PET showed FDG avid area's. He states he is fatigued and SOB. He had a biopsy (thoracentesis) and it showed "suspicion for malignancy."  \par \par We are asked for consideration of thoracoscopy with biopsy and pleurodesis.  Of note, the patients post thora film does not show complete lung expansion.  Additionally he notes a very well described pain with the thora, not at the beginning of it but towards the end, and much with with inspiration.  These two things taken together suggest that the patient's lung is trapped.  It is much more likely that we would end up doing a pleurex rather than pleurodesis on him.\par \par Given this, it is unclear what additional utility a thoracoscopy would have for him over other sites of biopsy.  (i.e. can place pleurex under sedation)  I suggested we discuss with IR if they can biopsy his large liver lesions, we would place a pleurex same day.  He is agreeable to this.  \par \par Plan will be for tissue biopsy for definitive diagnosis. We reached out to Dr. Chavez regarding surgical planning. We will plan for IR Biopsy of liver lesion, with PLeur-X catheter placement same day. He will hold his Plavix for 5 days prior to procedure with the intent to restart it the next day. \par \par I Savanna Rubio [facility] am acting as the scribe for Dr. Wang\par I personally performed the services described in the documentation, reviewed the documentation recorded by the scribe in my presence and it accurately and completely records my words and actions.\par \par

## 2020-06-06 NOTE — PHYSICAL EXAM
[General Appearance - Alert] : alert [General Appearance - In No Acute Distress] : in no acute distress [Sclera] : the sclera and conjunctiva were normal [Extraocular Movements] : extraocular movements were intact [PERRL With Normal Accommodation] : pupils were equal in size, round, and reactive to light [Respiration, Rhythm And Depth] : normal respiratory rhythm and effort [Heart Rate And Rhythm] : heart rate was normal and rhythm regular [Heart Sounds] : normal S1 and S2 [Heart Sounds Gallop] : no gallops [Murmurs] : no murmurs [Heart Sounds Pericardial Friction Rub] : no pericardial rub [Skin Color & Pigmentation] : normal skin color and pigmentation [Skin Turgor] : normal skin turgor [] : no rash [Deep Tendon Reflexes (DTR)] : deep tendon reflexes were 2+ and symmetric [Sensation] : the sensory exam was normal to light touch and pinprick [No Focal Deficits] : no focal deficits [Oriented To Time, Place, And Person] : oriented to person, place, and time [Impaired Insight] : insight and judgment were intact [Affect] : the affect was normal [FreeTextEntry1] : SCAR

## 2020-06-06 NOTE — DATA REVIEWED
[FreeTextEntry1] : EXAM: PETCT [facility] ONC FDG INIT PROCEDURE DATE: 06/02/2020 \par \par INTERPRETATION: ishan jeffery \par FDG PET CT STUDY, INITIAL TREATMENT STRATEGY \par HISTORY: This is an 85-year-old patient with abnormal CT of the chest and \par abnormal pleural effusion \par Left pleural fluid biopsy - 5/21/2020 was suspicious for malignancy \par CT of chest 5/14/2020 showed increase in size of left-sided pleural effusion \par with compressive atelectasis/consolidation of left lower lobe; right basilar \par atelectasis; bilateral calcified plaques; biapical pleural nodularity \par appears to be high density 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; \par mildly enlarged subcarinal lymph node measuring 1.5 cm; no enlarged axillary \par lymph nodes; postoperative change of anterior abdominal wall is likely \par 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 measuring up to 3 cm 4/13; \par apparent stable collection within the pancreatic tail 4/3 \par Chest x-ray 5/21/2020 showed improved left pleural effusion since recent CT \par of chest \par CT of abdomen and pelvis 1/10/2020 showed new anterior abdominal wall \par intramuscular air fluid collection measuring up to 3 x 1 x 14 cm; improving \par pulmonary disease; subcentimeter hepatic hypodensity too small to \par characterize \par Blood glucose pre injection 94 mg/dL \par TECHNIQUE: Approximately 45 minutes after the intravenous administration of \par 9.2 mCi 18-Fluorine FDG, whole body PET images were acquired from base of \par skull to mid - thigh. In addition, non-contrast, low dose, non - diagnostic \par CT was acquired for attenuation correction and anatomic correlation purposes \par only. \par These images reveal multiple FDG avid liver metastases (SUV up to 11.1), \par multiple peritoneal implants (SUV up to 8.7-left of midline epigastrium) and \par multiple sites of pathologic FDG uptake (SUV up to 7.5) corresponding with \par left pleural studding. \par No other definite sites of pathologic FDG uptake \par \par The left pleural effusion itself is not FDG avid however multiple sites of \par pleural studding are FDG avid \par IMPRESSION: \par Multiple FDG avid liver metastases (SUV up to 11.1), multiple bilateral and \par mid midline peritoneal implants (SUV up to 8.7) and multiple sites of \par pathologic FDG uptake (SUV up to 7.5) corresponding with left pleural \par studding. \par No other definite sites of pathologic FDG uptake \par \par The left pleural effusion itself is not FDG avid however multiple sites of \par pleural studding are FDG avid \par \par EXAM: CT CHEST IC \par \par PROCEDURE DATE: 05/14/2020 \par FINDINGS: \par \par PULMONARY EMBOLUS: There are no filling defects in the main pulmonary artery \par or segmental branches to suggest pulmonary embolus. Breathing motion \par artifact is present. \par \par LUNGS: Increasing left-sided pleural effusion with adjacent \par atelectasis/consolidation of the left lower lobe. There is an apparent area \par of decreased enhancement within the atelectasis of the left lower lobe. \par There is no right pleural effusion. Stable elevation right hemidiaphragm. \par There is right basilar atelectasis. No pneumothorax. Patent central airways. \par Bilateral calcified plaques. 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 \par \par HEART/GREAT VESSELS: The heart size is within normal limits. There is small \par pericardial fluid.. The main pulmonary artery measures 3.2 cm. The thoracic \par aorta is within normal limits. Atherosclerotic disease of the aorta. \par Partially imaged mixed plaque with penetrating atherosclerotic ulcers in the \par upper abdomen. Severe stenosis of the celiac axis. 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 \par \par MEDIASTINUM/THORACIC NODES: Mildly enlarged subcarinal lymph node measuring \par 1.5 cm. No enlarged axillary lymph nodes. 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 Apparent stable collection within the pancreatic \par tail, although this is partially imaged and not completely evaluated (series \par 4/3). \par \par BONES/SOFT TISSUES: Degenerative changes of the thoracic spine. Osteopenia. \par \par IMPRESSION: \par \par No central pulmonary embolus. \par \par Increase in size of a left-sided pleural effusion with compressive \par atelectasis/consolidation of the left lower lobe. \par \par Apparent stable collection within the pancreatic tail, although this is \par partially imaged and not completely evaluated \par \par

## 2020-06-06 NOTE — HISTORY OF PRESENT ILLNESS
[Dyslipidemia] : Dyslipidemia [Hypertension] : Hypertension [FreeTextEntry1] : Mr. Weldon is an 84 y/o male that arrives today for consultation for his lung nodule. PMH of CAD s/p N-STEMI Oct 1 w/ pci and stent x 3, HTN ,DLD, Hypothyroidism.Patient has had a very complicated end of 2019. He was admitted with Acute Diastolic CHF Exacerbation with Acute Hypoxic Respiratory Failure. Pt was treated medically for this condition, and was showing improvement. He was sent home only to be readmitted a week later as an N-STEMI and had 3 stents placed. He then had another complicated admission in October for abdominal pain, colitis and found to have mesenteric ischemia s/p exploratory laparotomy with a left external iliac to SMA bypass and 80 cm small bowel resection with primary anastomosis. HE states "he was dropped out of bed" and ripped his abdominal surgery site. The next morning he needed endoscopic intervention for bleeding gastric ulcer. HE then was sent to a nursing home and he was readmitted for poor wound packing. During this time he had pleural effusion which was evaluated by pulmonary and had a CT chest which showed lung nodules, A follow up PET was done which showed FDG avid area's. He had a thoracentesis and had 1.5 L removed. He had a biopsy and it showed "suspicion for malignancy" He arrives today for discussion for tissue biopsy for definitive diagnosis. \par \par \par His healthcare teams is as follows:\par PMD: Masoud Zarco\par Cardio: Kathy\par Pulm: Rigoberto\par

## 2020-06-06 NOTE — REASON FOR VISIT
[Consultation] : a consultation visit [Family Member] : family member [FreeTextEntry1] : Lung Nodule's, Pleural Effusion

## 2020-06-15 PROBLEM — K55.059 ACUTE MESENTERIC ISCHEMIA: Status: ACTIVE | Noted: 2019-01-01

## 2020-06-15 PROBLEM — R91.8 LUNG NODULES: Status: ACTIVE | Noted: 2020-01-01

## 2020-06-20 NOTE — PHYSICAL EXAM
[Normal] : supple, no neck mass and thyroid not enlarged [Normal Neck Lymph Nodes] : normal neck lymph nodes  [Normal Supraclavicular Lymph Nodes] : normal supraclavicular lymph nodes [Normal] : oriented to person, place and time, with appropriate affect [de-identified] : abdominal wound close, no cellulitis, no drainage.

## 2020-06-20 NOTE — ASSESSMENT
[FreeTextEntry1] : 84 yo male patient with multiple medical problems, taken to the OR on October 22, 2019 with the diagnosis of mesenteric ischemia. he was found to have necrotic small bowel, 2.5 feet, resected and primary anastomosis was performed. Dr. Layton from vascular surgery performed left iliac artery to SMA bypass. he did well postoperatively. He was discharged to a nursing home and readmitted with CHF and bilateral pleural effusion. Postop GI bleeding secondary to gastric ulcer, controlled by GI. He has small wound dehiscence that has been treated with local wound care. \par \par He comes today for followup, feels better, less abdominal discomfort. His appetite is coming back and he is walking with a cane. No N/V. Undergoing local wound care and the wound is close. He underwent EGD in February by Dr. Daugherty and they found gastritis, clip in the fundus and small ulceration. \par \par Recently, developed SO, failure to thrive and fatigued. He was found to have a large left and CT PET with multiple liver metastasis and peritoneal implants. Cytology of left pleural fluid revealed atypical cells. 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 Liver lesion biopsy pending. Patient says he does not want to pursue more treatment, no surgery or chemotherapy.\par \par Followup with Dr. Zarco, cardiologist and Pulmonary medicine.\par Return to office as needed.\par \par All the questions were answered to their satisfaction and I encouraged the patient to call my office at anytime if they had any questions. Plan of care fully discussed with the patient.

## 2020-06-20 NOTE — HISTORY OF PRESENT ILLNESS
[de-identified] : 84 yo male patient with multiple medical problems, taken to the OR on October 22, 2019 with the diagnosis of mesenteric ischemia. he was found to have necrotic small bowel, 2.5 feet, resected and primary anastomosis was performed. Dr. Layton from vascular surgery performed left iliac artery to SMA bypass. he did well postoperatively. He was discharged to a nursing home and readmitted with CHF and bilateral pleural effusion. Postop GI bleeding secondary to gastric ulcer, controlled by GI. He has small wound dehiscence that has been treated with local wound care. \par \par He comes today for followup, feels better, less abdominal discomfort. His appetite is coming back and he is walking with a cane. No N/V. Undergoing local wound care and the wound is close. He underwent EGD in February by Dr. Daugherty and they found gastritis, clip in the fundus and small ulceration. \par \par Recently, developed SO, failure to thrive and fatigued. He was found to have a large left and CT PET with multiple liver metastasis and peritoneal implants. Cytology of left pleural fluid revealed atypical cells. He is depressed and he comes today for evaluation.

## 2020-06-29 NOTE — PATIENT PROFILE ADULT - NSASFUNCLEVELADLTRANSFER_GEN_A_NUR
0 = independent PRE-OP DIAGNOSIS:  Perforated viscus 28-Jun-2020 18:41:27  Luigi Lopez PRE-OP DIAGNOSIS:  Perforated viscus 28-Jun-2020 18:41:27  Luigi Lopez

## 2020-09-10 DIAGNOSIS — C80.1 MALIGNANT (PRIMARY) NEOPLASM, UNSPECIFIED: ICD-10-CM

## 2021-05-13 NOTE — PRE-OP CHECKLIST - DENTURES
Patient Education     Middle Ear Infection (Adult)  You have an infection of the middle ear, the space behind the eardrum. This is also called acute otitis media (AOM). Sometimes it is caused by the common cold. This is because congestion can block the internal passage (eustachian tube) that drains fluid from the middle ear. When the middle ear fills with fluid, bacteria can grow there and cause an infection. Oral antibiotics are used to treat this illness, not ear drops. Symptoms usually start to improve within 1 to 2 days of treatment.    Home care  The following are general care guidelines:  · Finish all of the antibiotic medicine given, even though you may feel better after the first few days.  · You may use over-the-counter medicine, such as acetaminophen or ibuprofen, to control pain and fever, unless something else was prescribed. If you have chronic liver or kidney disease or have ever had a stomach ulcer or gastrointestinal bleeding, talk with your healthcare provider before using these medicines. Do not give aspirin to anyone under 18 years of age who has a fever. It may cause severe illness or death.  Follow-up care  Follow up with your healthcare provider, or as advised, in 2 weeks if all symptoms have not gotten better, or if hearing doesn't go back to normal within 1 month.  When to seek medical advice  Call your healthcare provider right away if any of these occur:  · Ear pain gets worse or does not improve after 3 days of treatment  · Unusual drowsiness or confusion  · Neck pain, stiff neck, or headache  · Fluid or blood draining from the ear canal  · Fever of 100.4°F (38°C) or as advised   · Seizure  Date Last Reviewed: 6/1/2016 © 2000-2018 [employer]. [address] [place] [address], [place] [address]. All rights reserved. This information is not intended as a substitute for professional medical care. Always follow your healthcare professional's instructions.           Patient Education      Viral Syndrome (Adult)  A viral illness may cause a number of symptoms such as fever. Other symptoms depend on the part of the body that the virus affects. If it settles in your nose, throat, and lungs, it may cause cough, sore throat, congestion, runny nose, headache, earache and other ear symptoms, or shortness of breath. If it settles in your stomach and intestinal tract, it may cause nausea, vomiting, cramping, and diarrhea. Sometimes it causes generalized symptoms like \"aching all over,\" feeling tired, loss of energy, or loss of appetite.  A viral illness usually lasts anywhere from several days to several weeks, but sometimes it lasts longer. In some cases, a more serious infection can look like a viral syndrome in the first few days of the illness. You may need another exam and additional tests to know the difference. Watch for the warning signs listed below for when to seek medical advice.  Home care  Follow these guidelines for taking care of yourself at home:  · If symptoms are severe, rest at home for the first 2 to 3 days.  · Stay away from cigarette smoke - both your smoke and the smoke from others.  · You may use over-the-counter acetaminophen or ibuprofen for fever, muscle aching, and headache, unless another medicine was prescribed for this. If you have chronic liver or kidney disease or ever had a stomach ulcer or gastrointestinal bleeding, talk with your healthcare provider before using these medicines. No one who is younger than 18 and ill with a fever should take aspirin. It may cause severe disease or death.  · Your appetite may be poor, so a light diet is fine. Avoid dehydration by drinking 8 to 12, 8-ounce glasses of fluids each day. This may include water; orange juice; lemonade; apple, grape, and cranberry juice; clear fruit drinks; electrolyte replacement and sports drinks; and decaffeinated teas and coffee. If you have been diagnosed with a kidney disease, ask your healthcare provider  how much and what types of fluids you should drink to prevent dehydration. If you have kidney disease, drinking too much fluid can cause it build up in the your body and be dangerous to your health.  · Over-the-counter remedies won't shorten the length of the illness but may be helpful for symptoms such as cough, sore throat, nasal and sinus congestion, or diarrhea. Don't use decongestants if you have high blood pressure.  Follow-up care  Follow up with your healthcare provider if you do not improve over the next week.  Call 911  Call 911 if any of the following occur:  · Convulsion  · Feeling weak, dizzy, or like you are going to faint  · Chest pain, or more than mild shortness of breath   When to seek medical advice  Call your healthcare provider right away if any of these occur:  · Cough with lots of colored sputum (mucus) or blood in your sputum  · Chest pain, shortness of breath, wheezing, or trouble breathing  · Severe headache; face, neck, or ear pain  · Severe, constant pain in the lower right side of your belly (abdominal)  · Continued vomiting (can’t keep liquids down)  · Frequent diarrhea (more than 5 times a day); blood (red or black color) or mucus in diarrhea  · Feeling weak, dizzy, or like you are going to faint  · Extreme thirst  · Fever of 100.4°F (38°C) or higher, or as directed by your healthcare provider  Date Last Reviewed: 4/1/2018  © 5491-4615 [employer]. [address] [place] [address], [place] [address]. All rights reserved. This information is not intended as a substitute for professional medical care. Always follow your healthcare professional's instructions.            no

## 2023-10-18 NOTE — PATIENT PROFILE ADULT - OVER THE PAST TWO WEEKS, HAVE YOU FELT LITTLE INTEREST OR PLEASURE IN DOING THINGS?
10/18/2023         RE: Ko Thakkar  [address]  [address]  [place] [address]        Dear Colleague,    Thank you for referring your patient, Ko Thakkar, to the [facility]. Please see a copy of my visit note below.    Video-Visit Details     Type of service:  Video Visit     Video Start Time (time video started): 7:56am     Video End Time (time video stopped): 8:36am    Originating Location (pt. 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   Ace has been focused on eating mostly soft, pureed foods as he recently had his teeth removed and he has been having some difficulty with swallowing.   He has also noticed decreased taste with foods since he started chemo this week.   He has been eating 2 meals/day, often skips lunch as he's traveling, on the road.   He has been drinking 2-3 Muscle Milk shakes per day.    He is drinking a smoothie made by his wife every morning for breakfast.    He has been drinking >80 oz water between vitamin water and regular water.  He also drinks Corky D and diet coke.  He tries to  "limit sugary beverages due to his diabetes.     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    ANTHROPOMETRICS  Height: 66\"  Weight: 178 lbs/80kg  BMI: 28  Weight Status:  Overweight BMI 25-29.9  IBW: 142 lbs (125%)  Weight History: stable   Wt Readings from Last 10 Encounters:   10/16/23 80.9 kg (178 lb 4.8 oz)   10/13/23 82.3 kg (181 lb 6.4 oz)   10/06/23 82.8 kg (182 lb 8 oz)   09/19/23 82.6 kg (182 lb)   09/14/23 81.6 kg (180 lb)   09/13/23 80.3 kg (177 lb)   08/29/23 79.7 kg (175 lb 11.3 oz)   10/12/14 81.6 kg (180 lb)   10/12/14 81.6 kg (180 lb)   10/12/14 81.6 kg (180 lb)     Dosing Weight: 68kg    Medications/vitamins/minerals/herbals:   Reviewed    Labs:   Labs reviewed    NUTRITION FOCUSED PHYSICAL ASSESSMENT FOR DIAGNOSING MALNUTRITION:  Consult for education only      ASSESSED NUTRITION NEEDS:  Estimated Energy Needs: 9820-5926 kcals (30-35 Kcal/Kg)  Justification: increased needs with " chemo/radiation   Estimated Protein Needs:  grams protein (1.2-1.5 g pro/Kg)  Justification: increased needs with chemo/radiation   Estimated Fluid Needs: 2400  mL   Justification: increase needs with chemo    NUTRITION DIAGNOSIS:  Predicted suboptimal nutrient intake related cancer treatment to head/neck region, difficulty swallowing, PEG tube in place    INTERVENTIONS  Provided written & verbal education:     - Reviewed nutrition and hydration needs.   Advised pt to aim for at least 2000-2400kcal and 80-100g protein daily.    Advised pt to aim for 10 cups non-caffeine containing beverages (water/electrolytes) daily.    - Discussed strategies to help fortify meals and snacks via pureed/moist foods. Encouraged to focus on small, frequent meals and bringing foods/snacks on the road if working.   - Reviewed sources of protein. Encouraged to have a protein source with each meal and snack.    - Reviewed common barriers to eating with cancer treatment.  Discussed ways to cope with taste aversions.   - Reviewed high calorie/high protein oral nutrition supplement options (Ensure Complete, Ensure Plus/Boost Plus, Weight tamela powders etc).   - Encouraged utilizing these ONS in home made shakes/smoothies to prevent flavor fatigue.   - Reviewed PO to EN transition if PO intake declines.  Reviewed formula types, method of administration, role of home infusion company and RD.      Provided pt with corresponding education materials/handouts on:  --High calorie, high protein recipes  --Coping with taste aversions  --High calorie, high protein beverages  --Additional High calorie, high protein recipes  --Soft Moist high protein foods  --Nutrition considerations for head/neck cancer  --Electrolyte hydration options   --Tips to increase calories in your diet    Pt verbalize understanding of materials provided during consult.   Patient Understanding: good  Expected patient engagement: good     Goals  1.  Aim for 5-6 small  frequent meals  2.  Aim for 2000-2400kcal and 80-100g protein, >8 cups non-caffeine fluids per day  3. Weight maintenance/no more than 1-2 lb wt loss each week during treatment    Follow-Up Plans: Pt has RD contact information for questions.      MONITORING AND EVALUATION:  -Food/beverage intake  -Weight trends    Meka Lee RD, , LD     no

## 2023-12-05 NOTE — H&P ADULT - CLICK TO LAUNCH ORM
Quality 110: Preventive Care And Screening: Influenza Immunization: Influenza Immunization Administered during Influenza season
Quality 226: Preventive Care And Screening: Tobacco Use: Screening And Cessation Intervention: Patient screened for tobacco use and is an ex/non-smoker
Quality 111:Pneumonia Vaccination Status For Older Adults: Patient received any pneumococcal conjugate or polysaccharide vaccine on or after their 60th birthday and before the end of the measurement period
Detail Level: Detailed
Quality 130: Documentation Of Current Medications In The Medical Record: Current Medications Documented
Quality 431: Preventive Care And Screening: Unhealthy Alcohol Use - Screening: Patient not identified as an unhealthy alcohol user when screened for unhealthy alcohol use using a systematic screening method
.

## 2025-04-22 NOTE — PATIENT PROFILE ADULT - HEALTH LITERACY
Mom call. Patient woke up with a rash all over her upper leg and little bit in his butt and back   no

## 2025-04-23 NOTE — H&P ADULT - HISTORY OF PRESENT ILLNESS
83 yo male with a pmh of CAD, stent 3x on 10/1, hypothyroid, and neuropathy present with abdominal pain. pt states this pain has been present for about one week to bilateral upper quadrants with no radiation to the chest. The  pain is described as sharp in nature and he has had a decrease in appetite for the past two days. he has not noted any aggravating/alleviating factors and has been experiencing nausea. he denies any other symptoms including fevers, chill, headache, recent illness/travel, cough, urinary/bowel changes, chest pain, or SOB  On presentation to hospital pt was meeting sepsis criterion and found to have colitis on CT scan.
no